# Patient Record
Sex: FEMALE | Race: WHITE | NOT HISPANIC OR LATINO
[De-identification: names, ages, dates, MRNs, and addresses within clinical notes are randomized per-mention and may not be internally consistent; named-entity substitution may affect disease eponyms.]

---

## 2017-09-19 ENCOUNTER — NON-APPOINTMENT (OUTPATIENT)
Age: 72
End: 2017-09-19

## 2017-09-19 ENCOUNTER — APPOINTMENT (OUTPATIENT)
Dept: CARDIOLOGY | Facility: CLINIC | Age: 72
End: 2017-09-19
Payer: MEDICARE

## 2017-09-19 VITALS
BODY MASS INDEX: 27.42 KG/M2 | OXYGEN SATURATION: 96 % | SYSTOLIC BLOOD PRESSURE: 126 MMHG | HEART RATE: 78 BPM | DIASTOLIC BLOOD PRESSURE: 62 MMHG | HEIGHT: 62 IN | WEIGHT: 149 LBS

## 2017-09-19 PROCEDURE — 99214 OFFICE O/P EST MOD 30 MIN: CPT | Mod: 25

## 2017-09-19 PROCEDURE — 93000 ELECTROCARDIOGRAM COMPLETE: CPT

## 2017-09-19 PROCEDURE — 36415 COLL VENOUS BLD VENIPUNCTURE: CPT

## 2017-09-20 LAB
25(OH)D3 SERPL-MCNC: 59.1 NG/ML
ALBUMIN SERPL ELPH-MCNC: 4.1 G/DL
ALP BLD-CCNC: 65 U/L
ALT SERPL-CCNC: 14 U/L
ANION GAP SERPL CALC-SCNC: 12 MMOL/L
APPEARANCE: CLEAR
AST SERPL-CCNC: 20 U/L
BACTERIA: NEGATIVE
BASOPHILS # BLD AUTO: 0.07 K/UL
BASOPHILS NFR BLD AUTO: 0.8 %
BILIRUB SERPL-MCNC: 0.3 MG/DL
BILIRUBIN URINE: NEGATIVE
BLOOD URINE: NEGATIVE
BUN SERPL-MCNC: 21 MG/DL
CALCIUM SERPL-MCNC: 9.9 MG/DL
CHLORIDE SERPL-SCNC: 102 MMOL/L
CHOLEST SERPL-MCNC: 164 MG/DL
CHOLEST/HDLC SERPL: 1.6 RATIO
CK SERPL-CCNC: 93 U/L
CO2 SERPL-SCNC: 28 MMOL/L
COLOR: YELLOW
CREAT SERPL-MCNC: 1.08 MG/DL
EOSINOPHIL # BLD AUTO: 0.67 K/UL
EOSINOPHIL NFR BLD AUTO: 7.5 %
GLUCOSE QUALITATIVE U: NORMAL MG/DL
GLUCOSE SERPL-MCNC: 95 MG/DL
HBA1C MFR BLD HPLC: 5.4 %
HCT VFR BLD CALC: 35.1 %
HDLC SERPL-MCNC: 102 MG/DL
HGB BLD-MCNC: 11 G/DL
HYALINE CASTS: 1 /LPF
IMM GRANULOCYTES NFR BLD AUTO: 0.2 %
KETONES URINE: NEGATIVE
LDLC SERPL CALC-MCNC: 47 MG/DL
LEUKOCYTE ESTERASE URINE: NEGATIVE
LYMPHOCYTES # BLD AUTO: 1.23 K/UL
LYMPHOCYTES NFR BLD AUTO: 13.7 %
MAGNESIUM SERPL-MCNC: 2 MG/DL
MAN DIFF?: NORMAL
MCHC RBC-ENTMCNC: 29.6 PG
MCHC RBC-ENTMCNC: 31.3 GM/DL
MCV RBC AUTO: 94.6 FL
MICROSCOPIC-UA: NORMAL
MONOCYTES # BLD AUTO: 0.87 K/UL
MONOCYTES NFR BLD AUTO: 9.7 %
NEUTROPHILS # BLD AUTO: 6.1 K/UL
NEUTROPHILS NFR BLD AUTO: 68.1 %
NITRITE URINE: NEGATIVE
PH URINE: 6
PLATELET # BLD AUTO: 272 K/UL
POTASSIUM SERPL-SCNC: 5.5 MMOL/L
PROT SERPL-MCNC: 6.4 G/DL
PROTEIN URINE: NEGATIVE MG/DL
RBC # BLD: 3.71 M/UL
RBC # FLD: 14.9 %
RED BLOOD CELLS URINE: 1 /HPF
SODIUM SERPL-SCNC: 142 MMOL/L
SPECIFIC GRAVITY URINE: 1.01
SQUAMOUS EPITHELIAL CELLS: 1 /HPF
TRIGL SERPL-MCNC: 77 MG/DL
TSH SERPL-ACNC: 2.4 UIU/ML
UROBILINOGEN URINE: NORMAL MG/DL
WBC # FLD AUTO: 8.96 K/UL
WHITE BLOOD CELLS URINE: 2 /HPF

## 2017-09-21 ENCOUNTER — OUTPATIENT (OUTPATIENT)
Dept: OUTPATIENT SERVICES | Facility: HOSPITAL | Age: 72
LOS: 1 days | Discharge: HOME | End: 2017-09-21
Payer: MEDICARE

## 2017-09-21 ENCOUNTER — RESULT REVIEW (OUTPATIENT)
Age: 72
End: 2017-09-21

## 2017-09-21 ENCOUNTER — TRANSCRIPTION ENCOUNTER (OUTPATIENT)
Age: 72
End: 2017-09-21

## 2017-09-21 DIAGNOSIS — R10.13 EPIGASTRIC PAIN: ICD-10-CM

## 2017-09-21 DIAGNOSIS — R11.11 VOMITING WITHOUT NAUSEA: ICD-10-CM

## 2017-09-21 DIAGNOSIS — Z98.89 OTHER SPECIFIED POSTPROCEDURAL STATES: Chronic | ICD-10-CM

## 2017-09-21 PROCEDURE — 43239 EGD BIOPSY SINGLE/MULTIPLE: CPT

## 2017-09-21 PROCEDURE — 88313 SPECIAL STAINS GROUP 2: CPT | Mod: 26

## 2017-09-21 PROCEDURE — 88312 SPECIAL STAINS GROUP 1: CPT | Mod: 26

## 2017-09-21 PROCEDURE — 88305 TISSUE EXAM BY PATHOLOGIST: CPT

## 2017-09-21 PROCEDURE — 88305 TISSUE EXAM BY PATHOLOGIST: CPT | Mod: 26

## 2017-09-21 PROCEDURE — 88313 SPECIAL STAINS GROUP 2: CPT

## 2017-09-21 PROCEDURE — 88312 SPECIAL STAINS GROUP 1: CPT

## 2017-09-22 ENCOUNTER — APPOINTMENT (OUTPATIENT)
Dept: CARDIOLOGY | Facility: CLINIC | Age: 72
End: 2017-09-22
Payer: MEDICARE

## 2017-09-22 ENCOUNTER — APPOINTMENT (OUTPATIENT)
Dept: CARDIOLOGY | Facility: CLINIC | Age: 72
End: 2017-09-22

## 2017-09-22 LAB — SURGICAL PATHOLOGY FINAL REPORT - CH: SIGNIFICANT CHANGE UP

## 2017-09-22 PROCEDURE — 93880 EXTRACRANIAL BILAT STUDY: CPT

## 2017-09-23 ENCOUNTER — LABORATORY RESULT (OUTPATIENT)
Age: 72
End: 2017-09-23

## 2017-09-24 ENCOUNTER — RESULT REVIEW (OUTPATIENT)
Age: 72
End: 2017-09-24

## 2017-09-24 LAB — BACTERIA UR CULT: NORMAL

## 2017-09-25 DIAGNOSIS — Z79.82 LONG TERM (CURRENT) USE OF ASPIRIN: ICD-10-CM

## 2017-09-25 DIAGNOSIS — I10 ESSENTIAL (PRIMARY) HYPERTENSION: ICD-10-CM

## 2017-09-25 DIAGNOSIS — Z87.891 PERSONAL HISTORY OF NICOTINE DEPENDENCE: ICD-10-CM

## 2017-09-25 DIAGNOSIS — Z88.3 ALLERGY STATUS TO OTHER ANTI-INFECTIVE AGENTS: ICD-10-CM

## 2017-09-25 DIAGNOSIS — K21.9 GASTRO-ESOPHAGEAL REFLUX DISEASE WITHOUT ESOPHAGITIS: ICD-10-CM

## 2017-09-25 DIAGNOSIS — J44.9 CHRONIC OBSTRUCTIVE PULMONARY DISEASE, UNSPECIFIED: ICD-10-CM

## 2017-09-25 DIAGNOSIS — Z86.73 PERSONAL HISTORY OF TRANSIENT ISCHEMIC ATTACK (TIA), AND CEREBRAL INFARCTION WITHOUT RESIDUAL DEFICITS: ICD-10-CM

## 2017-09-25 DIAGNOSIS — Z88.8 ALLERGY STATUS TO OTHER DRUGS, MEDICAMENTS AND BIOLOGICAL SUBSTANCES: ICD-10-CM

## 2017-09-25 DIAGNOSIS — M06.9 RHEUMATOID ARTHRITIS, UNSPECIFIED: ICD-10-CM

## 2017-09-25 DIAGNOSIS — Z91.041 RADIOGRAPHIC DYE ALLERGY STATUS: ICD-10-CM

## 2017-09-25 DIAGNOSIS — K25.9 GASTRIC ULCER, UNSPECIFIED AS ACUTE OR CHRONIC, WITHOUT HEMORRHAGE OR PERFORATION: ICD-10-CM

## 2017-09-25 DIAGNOSIS — Z88.0 ALLERGY STATUS TO PENICILLIN: ICD-10-CM

## 2017-09-25 DIAGNOSIS — E78.5 HYPERLIPIDEMIA, UNSPECIFIED: ICD-10-CM

## 2017-09-25 DIAGNOSIS — K29.50 UNSPECIFIED CHRONIC GASTRITIS WITHOUT BLEEDING: ICD-10-CM

## 2017-09-25 DIAGNOSIS — K44.9 DIAPHRAGMATIC HERNIA WITHOUT OBSTRUCTION OR GANGRENE: ICD-10-CM

## 2017-10-13 ENCOUNTER — EMERGENCY (EMERGENCY)
Facility: HOSPITAL | Age: 72
LOS: 1 days | Discharge: HOME | End: 2017-10-13
Attending: EMERGENCY MEDICINE | Admitting: EMERGENCY MEDICINE
Payer: MEDICARE

## 2017-10-13 VITALS
SYSTOLIC BLOOD PRESSURE: 128 MMHG | TEMPERATURE: 98 F | OXYGEN SATURATION: 97 % | HEART RATE: 94 BPM | WEIGHT: 145.06 LBS | HEIGHT: 62 IN | DIASTOLIC BLOOD PRESSURE: 62 MMHG | RESPIRATION RATE: 16 BRPM

## 2017-10-13 VITALS — RESPIRATION RATE: 16 BRPM | HEART RATE: 97 BPM | OXYGEN SATURATION: 97 %

## 2017-10-13 DIAGNOSIS — Z98.89 OTHER SPECIFIED POSTPROCEDURAL STATES: Chronic | ICD-10-CM

## 2017-10-13 PROCEDURE — 99284 EMERGENCY DEPT VISIT MOD MDM: CPT

## 2017-10-13 PROCEDURE — 72040 X-RAY EXAM NECK SPINE 2-3 VW: CPT | Mod: 26

## 2017-10-13 PROCEDURE — 72040 X-RAY EXAM NECK SPINE 2-3 VW: CPT

## 2017-10-13 PROCEDURE — 99283 EMERGENCY DEPT VISIT LOW MDM: CPT | Mod: 25

## 2017-10-13 RX ORDER — METHOCARBAMOL 500 MG/1
1 TABLET, FILM COATED ORAL
Qty: 15 | Refills: 0 | OUTPATIENT
Start: 2017-10-13 | End: 2017-10-18

## 2017-10-13 NOTE — ED ADULT NURSE NOTE - OBJECTIVE STATEMENT
71 yr. old female c/o posterior neck pain x 1 wk.  Pain came on spontaneously, no numbness, tingling.

## 2017-10-13 NOTE — ED PROVIDER NOTE - PMH
Asthma    Esophageal reflux    HLD (hyperlipidemia)    Lung cancer  Right lung.  Rheumatoid arthritis    TIA (transient ischemic attack)

## 2017-10-13 NOTE — ED PROVIDER NOTE - OBJECTIVE STATEMENT
70 y/o F pt w/ PMHx of asthma, esophageal reflux, HLD, RA, TIA, Lung CA (right lung) and PSHx Lobectomy of right lung (1996) presents to the ED c/o right sided neck pain, worsening x 1 week. Pt states the pain is worse when she turns her head to the right... states she tried Bengay to relieve the pain but it did not help. Pt took Aleve at 07:00 this morning for pain. Pt denies trauma/injury, numbness, tingling or any other complaints at this time.

## 2017-10-13 NOTE — ED ADULT NURSE NOTE - NS PRO PASSIVE SMOKE EXP
"He is having cystoscopy because he was cancelled before because he did not have a pulmonary clearance" - daughter
No

## 2017-10-13 NOTE — ED PROVIDER NOTE - MUSCULOSKELETAL NECK EXAM
tenderness to lateral aspect right side of neck, increasing pain with lateral movement to the right/PAIN ON MOVEMENT

## 2017-10-23 ENCOUNTER — INPATIENT (INPATIENT)
Facility: HOSPITAL | Age: 72
LOS: 1 days | Discharge: HOME | DRG: 191 | End: 2017-10-25
Attending: INTERNAL MEDICINE | Admitting: INTERNAL MEDICINE
Payer: MEDICARE

## 2017-10-23 VITALS
SYSTOLIC BLOOD PRESSURE: 121 MMHG | DIASTOLIC BLOOD PRESSURE: 60 MMHG | HEIGHT: 62 IN | RESPIRATION RATE: 20 BRPM | TEMPERATURE: 98 F | HEART RATE: 104 BPM | WEIGHT: 139.99 LBS

## 2017-10-23 DIAGNOSIS — M06.9 RHEUMATOID ARTHRITIS, UNSPECIFIED: ICD-10-CM

## 2017-10-23 DIAGNOSIS — J44.1 CHRONIC OBSTRUCTIVE PULMONARY DISEASE WITH (ACUTE) EXACERBATION: ICD-10-CM

## 2017-10-23 DIAGNOSIS — J40 BRONCHITIS, NOT SPECIFIED AS ACUTE OR CHRONIC: ICD-10-CM

## 2017-10-23 DIAGNOSIS — N28.9 DISORDER OF KIDNEY AND URETER, UNSPECIFIED: ICD-10-CM

## 2017-10-23 DIAGNOSIS — Z98.89 OTHER SPECIFIED POSTPROCEDURAL STATES: Chronic | ICD-10-CM

## 2017-10-23 DIAGNOSIS — Z29.9 ENCOUNTER FOR PROPHYLACTIC MEASURES, UNSPECIFIED: ICD-10-CM

## 2017-10-23 DIAGNOSIS — K21.9 GASTRO-ESOPHAGEAL REFLUX DISEASE WITHOUT ESOPHAGITIS: ICD-10-CM

## 2017-10-23 DIAGNOSIS — I10 ESSENTIAL (PRIMARY) HYPERTENSION: ICD-10-CM

## 2017-10-23 DIAGNOSIS — E78.5 HYPERLIPIDEMIA, UNSPECIFIED: ICD-10-CM

## 2017-10-23 LAB
ALBUMIN SERPL ELPH-MCNC: 3.3 G/DL — SIGNIFICANT CHANGE UP (ref 3.3–5)
ALP SERPL-CCNC: 85 U/L — SIGNIFICANT CHANGE UP (ref 30–120)
ALT FLD-CCNC: 14 U/L DA — SIGNIFICANT CHANGE UP (ref 10–60)
ANION GAP SERPL CALC-SCNC: 13 MMOL/L — SIGNIFICANT CHANGE UP (ref 5–17)
APPEARANCE UR: CLEAR — SIGNIFICANT CHANGE UP
APTT BLD: 28.6 SEC — SIGNIFICANT CHANGE UP (ref 27.5–37.4)
AST SERPL-CCNC: 14 U/L — SIGNIFICANT CHANGE UP (ref 10–40)
BACTERIA # UR AUTO: ABNORMAL
BASOPHILS # BLD AUTO: 0.1 K/UL — SIGNIFICANT CHANGE UP (ref 0–0.2)
BASOPHILS NFR BLD AUTO: 1.2 % — SIGNIFICANT CHANGE UP (ref 0–2)
BILIRUB SERPL-MCNC: 0.7 MG/DL — SIGNIFICANT CHANGE UP (ref 0.2–1.2)
BILIRUB UR-MCNC: NEGATIVE — SIGNIFICANT CHANGE UP
BUN SERPL-MCNC: 31 MG/DL — HIGH (ref 7–23)
CALCIUM SERPL-MCNC: 9.6 MG/DL — SIGNIFICANT CHANGE UP (ref 8.4–10.5)
CHLORIDE SERPL-SCNC: 103 MMOL/L — SIGNIFICANT CHANGE UP (ref 96–108)
CK MB CFR SERPL CALC: 0.6 NG/ML — SIGNIFICANT CHANGE UP (ref 0–3.6)
CO2 SERPL-SCNC: 26 MMOL/L — SIGNIFICANT CHANGE UP (ref 22–31)
COLOR SPEC: YELLOW — SIGNIFICANT CHANGE UP
CREAT SERPL-MCNC: 1.64 MG/DL — HIGH (ref 0.5–1.3)
DIFF PNL FLD: NEGATIVE — SIGNIFICANT CHANGE UP
EOSINOPHIL # BLD AUTO: 0.9 K/UL — HIGH (ref 0–0.5)
EOSINOPHIL NFR BLD AUTO: 7.3 % — HIGH (ref 0–6)
EPI CELLS # UR: SIGNIFICANT CHANGE UP
FLUAV SPEC QL CULT: NEGATIVE — SIGNIFICANT CHANGE UP
FLUBV AG SPEC QL IA: NEGATIVE — SIGNIFICANT CHANGE UP
GLUCOSE SERPL-MCNC: 109 MG/DL — HIGH (ref 70–99)
GLUCOSE UR QL: NEGATIVE MG/DL — SIGNIFICANT CHANGE UP
GRAN CASTS # UR COMP ASSIST: ABNORMAL /LPF
HCT VFR BLD CALC: 38.3 % — SIGNIFICANT CHANGE UP (ref 34.5–45)
HGB BLD-MCNC: 12.1 G/DL — SIGNIFICANT CHANGE UP (ref 11.5–15.5)
INR BLD: 1.19 RATIO — HIGH (ref 0.88–1.16)
KETONES UR-MCNC: NEGATIVE — SIGNIFICANT CHANGE UP
LACTATE SERPL-SCNC: 0.6 MMOL/L — LOW (ref 0.7–2)
LEUKOCYTE ESTERASE UR-ACNC: ABNORMAL
LYMPHOCYTES # BLD AUTO: 1.8 K/UL — SIGNIFICANT CHANGE UP (ref 1–3.3)
LYMPHOCYTES # BLD AUTO: 13.9 % — SIGNIFICANT CHANGE UP (ref 13–44)
MCHC RBC-ENTMCNC: 29.9 PG — SIGNIFICANT CHANGE UP (ref 27–34)
MCHC RBC-ENTMCNC: 31.5 GM/DL — LOW (ref 32–36)
MCV RBC AUTO: 95 FL — SIGNIFICANT CHANGE UP (ref 80–100)
MONOCYTES # BLD AUTO: 0.9 K/UL — SIGNIFICANT CHANGE UP (ref 0–0.9)
MONOCYTES NFR BLD AUTO: 7.2 % — SIGNIFICANT CHANGE UP (ref 2–14)
NEUTROPHILS # BLD AUTO: 9.1 K/UL — HIGH (ref 1.8–7.4)
NEUTROPHILS NFR BLD AUTO: 70.4 % — SIGNIFICANT CHANGE UP (ref 43–77)
NITRITE UR-MCNC: NEGATIVE — SIGNIFICANT CHANGE UP
NT-PROBNP SERPL-SCNC: 125 PG/ML — SIGNIFICANT CHANGE UP (ref 0–125)
PH UR: 5 — SIGNIFICANT CHANGE UP (ref 5–8)
PLATELET # BLD AUTO: 335 K/UL — SIGNIFICANT CHANGE UP (ref 150–400)
POTASSIUM SERPL-MCNC: 4.1 MMOL/L — SIGNIFICANT CHANGE UP (ref 3.5–5.3)
POTASSIUM SERPL-SCNC: 4.1 MMOL/L — SIGNIFICANT CHANGE UP (ref 3.5–5.3)
PROT SERPL-MCNC: 7.4 G/DL — SIGNIFICANT CHANGE UP (ref 6–8.3)
PROT UR-MCNC: 15 MG/DL
PROTHROM AB SERPL-ACNC: 13 SEC — HIGH (ref 9.8–12.7)
RBC # BLD: 4.03 M/UL — SIGNIFICANT CHANGE UP (ref 3.8–5.2)
RBC # FLD: 13.4 % — SIGNIFICANT CHANGE UP (ref 10.3–14.5)
SODIUM SERPL-SCNC: 142 MMOL/L — SIGNIFICANT CHANGE UP (ref 135–145)
SP GR SPEC: 1.02 — SIGNIFICANT CHANGE UP (ref 1.01–1.02)
TROPONIN I SERPL-MCNC: 0 NG/ML — LOW (ref 0.02–0.06)
UROBILINOGEN FLD QL: NEGATIVE MG/DL — SIGNIFICANT CHANGE UP
WBC # BLD: 12.9 K/UL — HIGH (ref 3.8–10.5)
WBC # FLD AUTO: 12.9 K/UL — HIGH (ref 3.8–10.5)
WBC UR QL: SIGNIFICANT CHANGE UP

## 2017-10-23 PROCEDURE — 71020: CPT | Mod: 26

## 2017-10-23 PROCEDURE — 74020: CPT | Mod: 26

## 2017-10-23 PROCEDURE — 99285 EMERGENCY DEPT VISIT HI MDM: CPT

## 2017-10-23 RX ORDER — CHOLECALCIFEROL (VITAMIN D3) 125 MCG
2000 CAPSULE ORAL DAILY
Qty: 0 | Refills: 0 | Status: DISCONTINUED | OUTPATIENT
Start: 2017-10-23 | End: 2017-10-25

## 2017-10-23 RX ORDER — ASCORBIC ACID 60 MG
500 TABLET,CHEWABLE ORAL DAILY
Qty: 0 | Refills: 0 | Status: DISCONTINUED | OUTPATIENT
Start: 2017-10-23 | End: 2017-10-25

## 2017-10-23 RX ORDER — HYDROXYCHLOROQUINE SULFATE 200 MG
200 TABLET ORAL DAILY
Qty: 0 | Refills: 0 | Status: DISCONTINUED | OUTPATIENT
Start: 2017-10-23 | End: 2017-10-25

## 2017-10-23 RX ORDER — SODIUM CHLORIDE 9 MG/ML
1000 INJECTION INTRAMUSCULAR; INTRAVENOUS; SUBCUTANEOUS
Qty: 0 | Refills: 0 | Status: COMPLETED | OUTPATIENT
Start: 2017-10-23 | End: 2017-10-23

## 2017-10-23 RX ORDER — ATORVASTATIN CALCIUM 80 MG/1
20 TABLET, FILM COATED ORAL AT BEDTIME
Qty: 0 | Refills: 0 | Status: DISCONTINUED | OUTPATIENT
Start: 2017-10-23 | End: 2017-10-25

## 2017-10-23 RX ORDER — IPRATROPIUM/ALBUTEROL SULFATE 18-103MCG
3 AEROSOL WITH ADAPTER (GRAM) INHALATION EVERY 6 HOURS
Qty: 0 | Refills: 0 | Status: DISCONTINUED | OUTPATIENT
Start: 2017-10-23 | End: 2017-10-25

## 2017-10-23 RX ORDER — MONTELUKAST 4 MG/1
10 TABLET, CHEWABLE ORAL AT BEDTIME
Qty: 0 | Refills: 0 | Status: DISCONTINUED | OUTPATIENT
Start: 2017-10-23 | End: 2017-10-25

## 2017-10-23 RX ORDER — PANTOPRAZOLE SODIUM 20 MG/1
40 TABLET, DELAYED RELEASE ORAL
Qty: 0 | Refills: 0 | Status: DISCONTINUED | OUTPATIENT
Start: 2017-10-23 | End: 2017-10-25

## 2017-10-23 RX ORDER — BUDESONIDE AND FORMOTEROL FUMARATE DIHYDRATE 160; 4.5 UG/1; UG/1
2 AEROSOL RESPIRATORY (INHALATION)
Qty: 0 | Refills: 0 | Status: DISCONTINUED | OUTPATIENT
Start: 2017-10-23 | End: 2017-10-25

## 2017-10-23 RX ORDER — ENOXAPARIN SODIUM 100 MG/ML
30 INJECTION SUBCUTANEOUS EVERY 24 HOURS
Qty: 0 | Refills: 0 | Status: DISCONTINUED | OUTPATIENT
Start: 2017-10-23 | End: 2017-10-25

## 2017-10-23 RX ORDER — IPRATROPIUM/ALBUTEROL SULFATE 18-103MCG
3 AEROSOL WITH ADAPTER (GRAM) INHALATION ONCE
Qty: 0 | Refills: 0 | Status: COMPLETED | OUTPATIENT
Start: 2017-10-23 | End: 2017-10-23

## 2017-10-23 RX ORDER — AMLODIPINE BESYLATE 2.5 MG/1
5 TABLET ORAL DAILY
Qty: 0 | Refills: 0 | Status: DISCONTINUED | OUTPATIENT
Start: 2017-10-23 | End: 2017-10-25

## 2017-10-23 RX ORDER — ASPIRIN/CALCIUM CARB/MAGNESIUM 324 MG
81 TABLET ORAL DAILY
Qty: 0 | Refills: 0 | Status: DISCONTINUED | OUTPATIENT
Start: 2017-10-23 | End: 2017-10-25

## 2017-10-23 RX ORDER — IPRATROPIUM/ALBUTEROL SULFATE 18-103MCG
3 AEROSOL WITH ADAPTER (GRAM) INHALATION EVERY 4 HOURS
Qty: 0 | Refills: 0 | Status: DISCONTINUED | OUTPATIENT
Start: 2017-10-23 | End: 2017-10-25

## 2017-10-23 RX ORDER — FOLIC ACID 0.8 MG
1 TABLET ORAL DAILY
Qty: 0 | Refills: 0 | Status: DISCONTINUED | OUTPATIENT
Start: 2017-10-23 | End: 2017-10-25

## 2017-10-23 RX ORDER — ROPINIROLE 8 MG/1
2 TABLET, FILM COATED, EXTENDED RELEASE ORAL AT BEDTIME
Qty: 0 | Refills: 0 | Status: DISCONTINUED | OUTPATIENT
Start: 2017-10-23 | End: 2017-10-25

## 2017-10-23 RX ORDER — PANTOPRAZOLE SODIUM 20 MG/1
40 TABLET, DELAYED RELEASE ORAL ONCE
Qty: 0 | Refills: 0 | Status: COMPLETED | OUTPATIENT
Start: 2017-10-23 | End: 2017-10-23

## 2017-10-23 RX ORDER — ONDANSETRON 8 MG/1
4 TABLET, FILM COATED ORAL ONCE
Qty: 0 | Refills: 0 | Status: COMPLETED | OUTPATIENT
Start: 2017-10-23 | End: 2017-10-23

## 2017-10-23 RX ORDER — ONDANSETRON 8 MG/1
4 TABLET, FILM COATED ORAL EVERY 4 HOURS
Qty: 0 | Refills: 0 | Status: DISCONTINUED | OUTPATIENT
Start: 2017-10-23 | End: 2017-10-25

## 2017-10-23 RX ORDER — SODIUM CHLORIDE 9 MG/ML
1000 INJECTION INTRAMUSCULAR; INTRAVENOUS; SUBCUTANEOUS
Qty: 0 | Refills: 0 | Status: DISCONTINUED | OUTPATIENT
Start: 2017-10-23 | End: 2017-10-24

## 2017-10-23 RX ORDER — AMLODIPINE BESYLATE 2.5 MG/1
5 TABLET ORAL DAILY
Qty: 0 | Refills: 0 | Status: DISCONTINUED | OUTPATIENT
Start: 2017-10-23 | End: 2017-10-23

## 2017-10-23 RX ADMIN — Medication 3 MILLILITER(S): at 14:39

## 2017-10-23 RX ADMIN — ONDANSETRON 4 MILLIGRAM(S): 8 TABLET, FILM COATED ORAL at 14:42

## 2017-10-23 RX ADMIN — Medication 81 MILLIGRAM(S): at 18:43

## 2017-10-23 RX ADMIN — ENOXAPARIN SODIUM 30 MILLIGRAM(S): 100 INJECTION SUBCUTANEOUS at 22:32

## 2017-10-23 RX ADMIN — BUDESONIDE AND FORMOTEROL FUMARATE DIHYDRATE 2 PUFF(S): 160; 4.5 AEROSOL RESPIRATORY (INHALATION) at 19:53

## 2017-10-23 RX ADMIN — Medication 3 MILLILITER(S): at 20:07

## 2017-10-23 RX ADMIN — SODIUM CHLORIDE 1000 MILLILITER(S): 9 INJECTION INTRAMUSCULAR; INTRAVENOUS; SUBCUTANEOUS at 19:49

## 2017-10-23 RX ADMIN — SODIUM CHLORIDE 1000 MILLILITER(S): 9 INJECTION INTRAMUSCULAR; INTRAVENOUS; SUBCUTANEOUS at 16:00

## 2017-10-23 RX ADMIN — PANTOPRAZOLE SODIUM 40 MILLIGRAM(S): 20 TABLET, DELAYED RELEASE ORAL at 14:42

## 2017-10-23 RX ADMIN — SODIUM CHLORIDE 1000 MILLILITER(S): 9 INJECTION INTRAMUSCULAR; INTRAVENOUS; SUBCUTANEOUS at 14:43

## 2017-10-23 RX ADMIN — Medication 200 MILLIGRAM(S): at 18:43

## 2017-10-23 RX ADMIN — Medication 40 MILLIGRAM(S): at 21:11

## 2017-10-23 NOTE — H&P ADULT - HISTORY OF PRESENT ILLNESS
71 years old female with history of asthma/COPD, lung CA,  status post right sided lobectomy, hypertension, dyslipidemia, rheumatoid arthritis and TIA,  who has been treated for possible bronchitis and COPD exacerbation as outpatient for last 1-2 weeks.  Patient continued to complain of increasing shortness of breath and cough despite the treatment.  She was seen by Dr. Galan in the office today and was recommended admission to the hospital.    Patient denies any chest pain or chest pressure.    She has a cough which is not productive of any sputum.   Patient gets short of breath on minimal exertion.    She denies any nausea or vomiting.    She denies any fevers but feels feverish. .   She denies any syncope.    Denies any orthopnea or PND.

## 2017-10-23 NOTE — H&P ADULT - PROBLEM SELECTOR PLAN 1
Patient with acute exacerbation of her COPD.  Failed out patient treatment.   Will admit to hospital and start on IV steroids and DuoNeb  Continue Symbicort and Spiriva.  Pulmonary follow up.  Further management as per patient's clinical course.

## 2017-10-23 NOTE — ED PROVIDER NOTE - MEDICAL DECISION MAKING DETAILS
70 yo female with COPD bronchitis sent from pulm office with worsening cough and sob after Zpack. PE reveals raspy cough with rhonchi and wheezing. Plan CXR, Duonebs, IV steroids antibiotics, likely admit.

## 2017-10-23 NOTE — H&P ADULT - PROBLEM SELECTOR PLAN 2
Patient with acute bronchitis, possibly viral.  Will defer antibiotics at this time and follow serial CBC.  Follow RVP panel.   Further management as per patient's clinical course.

## 2017-10-23 NOTE — H&P ADULT - NSHPSOCIALHISTORY_GEN_ALL_CORE
Social History:    Marital Status:   Occupation: Retired  Lives with: Spouse    Substance Use :  Tobacco Usage:  (X) never smoked   (   ) former smoker   (   ) current smoker  (     ) pack year  (        ) last tobacco use date  Alcohol Usage: denies    (X) Advanced Directives: (X) declined   [  ] health care proxy

## 2017-10-23 NOTE — ED ADULT TRIAGE NOTE - CHIEF COMPLAINT QUOTE
" Dr Galan sent me, I have a cough shortness of breath, wheezing, my stomach hurts, diarrhea, vomiting 3 days 1go "

## 2017-10-23 NOTE — ED PROVIDER NOTE - OBJECTIVE STATEMENT
72 y/o F w/ PMHx lung CA, HTN presents to the ED c/o vomiting, abd pain, productive cough, wheezing. Pt states she was dx with URI last week, Rxd Z-pack, just finished antibiotics today... saw Dr. Galan, symptoms are not improving with antibiotics sent to ED for further evaluation. Pt states she had an upper endoscopy completed 2-3 weeks ago because of her stomach pains and was told she has an hiatal hernia and small ulcers, is currently on Omeprazole. Pt denies fever, CP or any other complaints at this time.    Pulmonologist: Dr. Galan

## 2017-10-23 NOTE — ED ADULT NURSE NOTE - OBJECTIVE STATEMENT
Amb to ED from PMD office. Pt has had moist productive cough for past 4 days. Started Z pack & Mucinex with some improvement but continues to feel weak & achy with cough .Color fair. Skin warm & dry to touch. Lungs- bilat scattered rhonchi. Abd soft nontender

## 2017-10-23 NOTE — H&P ADULT - PROBLEM SELECTOR PLAN 3
Patient with possible ESPERANZA from dehydration.   Will place on IVF and monitor BMP.  Further management as per patient's clinical course.

## 2017-10-23 NOTE — H&P ADULT - NSHPLABSRESULTS_GEN_ALL_CORE
12.1   12.9  )-----------( 335      ( 23 Oct 2017 14:16 )             38.3     23 Oct 2017 14:16    142    |  103    |  31     ----------------------------<  109    4.1     |  26     |  1.64     Ca    9.6        23 Oct 2017 14:16    TPro  7.4    /  Alb  3.3    /  TBili  0.7    /  DBili  x      /  AST  14     /  ALT  14     /  AlkPhos  85     23 Oct 2017 14:16    CAPILLARY BLOOD GLUCOSE        PT/INR - ( 23 Oct 2017 14:16 )   PT: 13.0 sec;   INR: 1.19 ratio         PTT - ( 23 Oct 2017 14:16 )  PTT:28.6 sec            Troponin I, Serum: .000 ng/mL (10-23 @ 14:16) 12.1   12.9  )-----------( 335      ( 23 Oct 2017 14:16 )             38.3     23 Oct 2017 14:16    142    |  103    |  31     ----------------------------<  109    4.1     |  26     |  1.64     Ca    9.6        23 Oct 2017 14:16    TPro  7.4    /  Alb  3.3    /  TBili  0.7    /  DBili  x      /  AST  14     /  ALT  14     /  AlkPhos  85     23 Oct 2017 14:16      PT/INR - ( 23 Oct 2017 14:16 )   PT: 13.0 sec;   INR: 1.19 ratio         PTT - ( 23 Oct 2017 14:16 )  PTT:28.6 sec      Troponin I, Serum: .000 ng/mL (10-23 @ 14:16)

## 2017-10-23 NOTE — H&P ADULT - NSHPREVIEWOFSYSTEMS_GEN_ALL_CORE
REVIEW OF SYSTEMS:  CONSTITUTIONAL: No fever. + fatigue  EYES: No eye pain, visual disturbances, or discharge  ENMT:  No difficulty hearing, tinnitus, vertigo; No sinus or throat pain  NECK: No pain or stiffness  BREASTS: No pain, masses, or nipple discharge  RESPIRATORY: + cough, + wheezing, + shortness of breath  CARDIOVASCULAR: No chest pain, palpitations, dizziness, or leg swelling  GASTROINTESTINAL: No abdominal or epigastric pain. No nausea, vomiting, or hematemesis; No diarrhea or constipation. No melena or hematochezia.  GENITOURINARY: No dysuria, frequency, hematuria, or incontinence  NEUROLOGICAL: No headaches, memory loss, loss of strength, numbness, or tremors  SKIN: No itching, burning, rashes, or lesions   LYMPH NODES: No enlarged glands  ENDOCRINE: No heat or cold intolerance; No hair loss; No polydipsia or polyuria  MUSCULOSKELETAL: No joint pain or swelling; No muscle, back, or extremity pain  PSYCHIATRIC: No depression, anxiety, mood swings, or difficulty sleeping  HEME/LYMPH: No easy bruising, or bleeding gums  ALLERGY AND IMMUNOLOGIC: No hives or eczema

## 2017-10-23 NOTE — H&P ADULT - PROBLEM SELECTOR PLAN 4
Patient had recent EGD done.  Still c/o abdominal discomfort.   Will continue on PPI and get GI to see patient.

## 2017-10-23 NOTE — H&P ADULT - NSHPPHYSICALEXAM_GEN_ALL_CORE
Vital Signs Last 24 Hrs  T(C): 36.8 (23 Oct 2017 22:15), Max: 36.8 (23 Oct 2017 22:15)  T(F): 98.2 (23 Oct 2017 22:15), Max: 98.2 (23 Oct 2017 22:15)  HR: 86 (23 Oct 2017 22:15) (77 - 104)  BP: 121/80 (23 Oct 2017 22:15) (118/62 - 121/80)  BP(mean): --  RR: 18 (23 Oct 2017 22:15) (16 - 20)  SpO2: 94% (23 Oct 2017 22:15) (94% - 100%)    PHYSICAL EXAM:  GENERAL: well-groomed, well-developed  HEAD:  Atraumatic, Normocephalic  EYES: Mild pallor noted.   ENMT: oral mucosa moist.   NECK: Supple.   CHEST/LUNG: Decreased breath sounds both sides with scattered exp wheeze noted.   HEART: S1, S2, SM +  ABDOMEN: Soft, Nontender, Nondistended; Bowel sounds present  EXTREMITIES:  2+ Peripheral Pulses, No clubbing, cyanosis, or edema  MS: No joint swelling or deformity.   LYMPH: No lymphadenopathy noted  SKIN: No rashes or lesions  NERVOUS SYSTEM:  Motor Strength 4/5 B/L upper and lower extremities; DTRs 2+ intact and symmetric  PSYCH:  Alert & Oriented X3, Good concentration.

## 2017-10-23 NOTE — ED PROVIDER NOTE - CONSTITUTIONAL, MLM
normal... Well appearing, well nourished, awake, alert, oriented to person, place, time/situation and in no apparent distress. Afebrile.

## 2017-10-23 NOTE — H&P ADULT - ASSESSMENT
71 years old female with history of asthma/COPD, lung CA,  status post right sided lobectomy, hypertension, dyslipidemia, rheumatoid arthritis and TIA,  who has been treated for possible bronchitis and COPD exacerbation as outpatient for last 1-2 weeks.  Patient continued to complain of increasing shortness of breath and cough despite the treatment.  She was seen by Dr. Galan in the office today and was recommended admission to the hospital.

## 2017-10-24 LAB
ANION GAP SERPL CALC-SCNC: 8 MMOL/L — SIGNIFICANT CHANGE UP (ref 5–17)
BASOPHILS # BLD AUTO: 0 K/UL — SIGNIFICANT CHANGE UP (ref 0–0.2)
BASOPHILS NFR BLD AUTO: 0.2 % — SIGNIFICANT CHANGE UP (ref 0–2)
BUN SERPL-MCNC: 20 MG/DL — SIGNIFICANT CHANGE UP (ref 7–23)
CALCIUM SERPL-MCNC: 8.9 MG/DL — SIGNIFICANT CHANGE UP (ref 8.4–10.5)
CHLORIDE SERPL-SCNC: 107 MMOL/L — SIGNIFICANT CHANGE UP (ref 96–108)
CHOLEST SERPL-MCNC: 149 MG/DL — SIGNIFICANT CHANGE UP (ref 10–199)
CO2 SERPL-SCNC: 25 MMOL/L — SIGNIFICANT CHANGE UP (ref 22–31)
CREAT SERPL-MCNC: 0.98 MG/DL — SIGNIFICANT CHANGE UP (ref 0.5–1.3)
EOSINOPHIL # BLD AUTO: 0 K/UL — SIGNIFICANT CHANGE UP (ref 0–0.5)
EOSINOPHIL NFR BLD AUTO: 0 % — SIGNIFICANT CHANGE UP (ref 0–6)
GLUCOSE SERPL-MCNC: 134 MG/DL — HIGH (ref 70–99)
HBA1C BLD-MCNC: 5.3 % — SIGNIFICANT CHANGE UP (ref 4–5.6)
HCT VFR BLD CALC: 33.2 % — LOW (ref 34.5–45)
HDLC SERPL-MCNC: 70 MG/DL — SIGNIFICANT CHANGE UP (ref 40–125)
HGB BLD-MCNC: 11.4 G/DL — LOW (ref 11.5–15.5)
LIPID PNL WITH DIRECT LDL SERPL: 60 MG/DL — SIGNIFICANT CHANGE UP
LYMPHOCYTES # BLD AUTO: 0.4 K/UL — LOW (ref 1–3.3)
LYMPHOCYTES # BLD AUTO: 5.1 % — LOW (ref 13–44)
MAGNESIUM SERPL-MCNC: 1.9 MG/DL — SIGNIFICANT CHANGE UP (ref 1.6–2.6)
MCHC RBC-ENTMCNC: 32.3 PG — SIGNIFICANT CHANGE UP (ref 27–34)
MCHC RBC-ENTMCNC: 34.2 GM/DL — SIGNIFICANT CHANGE UP (ref 32–36)
MCV RBC AUTO: 94.5 FL — SIGNIFICANT CHANGE UP (ref 80–100)
MONOCYTES # BLD AUTO: 0.1 K/UL — SIGNIFICANT CHANGE UP (ref 0–0.9)
MONOCYTES NFR BLD AUTO: 1.1 % — LOW (ref 2–14)
NEUTROPHILS # BLD AUTO: 7.8 K/UL — HIGH (ref 1.8–7.4)
NEUTROPHILS NFR BLD AUTO: 93.6 % — HIGH (ref 43–77)
PHOSPHATE SERPL-MCNC: 3.3 MG/DL — SIGNIFICANT CHANGE UP (ref 2.5–4.5)
PLATELET # BLD AUTO: 270 K/UL — SIGNIFICANT CHANGE UP (ref 150–400)
POTASSIUM SERPL-MCNC: 5 MMOL/L — SIGNIFICANT CHANGE UP (ref 3.5–5.3)
POTASSIUM SERPL-SCNC: 5 MMOL/L — SIGNIFICANT CHANGE UP (ref 3.5–5.3)
RBC # BLD: 3.52 M/UL — LOW (ref 3.8–5.2)
RBC # FLD: 12.5 % — SIGNIFICANT CHANGE UP (ref 10.3–14.5)
SODIUM SERPL-SCNC: 140 MMOL/L — SIGNIFICANT CHANGE UP (ref 135–145)
T3 SERPL-MCNC: 83 NG/DL — SIGNIFICANT CHANGE UP (ref 80–200)
T4 AB SER-ACNC: 7.1 UG/DL — SIGNIFICANT CHANGE UP (ref 4.6–12)
TOTAL CHOLESTEROL/HDL RATIO MEASUREMENT: 2.1 RATIO — LOW (ref 3.3–7.1)
TRIGL SERPL-MCNC: 96 MG/DL — SIGNIFICANT CHANGE UP (ref 10–149)
TSH SERPL-MCNC: 0.49 UIU/ML — SIGNIFICANT CHANGE UP (ref 0.27–4.2)
WBC # BLD: 8.3 K/UL — SIGNIFICANT CHANGE UP (ref 3.8–10.5)
WBC # FLD AUTO: 8.3 K/UL — SIGNIFICANT CHANGE UP (ref 3.8–10.5)

## 2017-10-24 RX ADMIN — Medication 40 MILLIGRAM(S): at 05:36

## 2017-10-24 RX ADMIN — Medication 3 MILLILITER(S): at 09:02

## 2017-10-24 RX ADMIN — Medication 200 MILLIGRAM(S): at 11:36

## 2017-10-24 RX ADMIN — Medication 2000 UNIT(S): at 11:36

## 2017-10-24 RX ADMIN — Medication 81 MILLIGRAM(S): at 11:35

## 2017-10-24 RX ADMIN — Medication 1 TABLET(S): at 17:07

## 2017-10-24 RX ADMIN — Medication 3 MILLILITER(S): at 13:29

## 2017-10-24 RX ADMIN — BUDESONIDE AND FORMOTEROL FUMARATE DIHYDRATE 2 PUFF(S): 160; 4.5 AEROSOL RESPIRATORY (INHALATION) at 06:45

## 2017-10-24 RX ADMIN — Medication 40 MILLIGRAM(S): at 13:14

## 2017-10-24 RX ADMIN — PANTOPRAZOLE SODIUM 40 MILLIGRAM(S): 20 TABLET, DELAYED RELEASE ORAL at 06:45

## 2017-10-24 RX ADMIN — Medication 40 MILLIGRAM(S): at 21:33

## 2017-10-24 RX ADMIN — Medication 1 TABLET(S): at 05:36

## 2017-10-24 RX ADMIN — ATORVASTATIN CALCIUM 20 MILLIGRAM(S): 80 TABLET, FILM COATED ORAL at 21:32

## 2017-10-24 RX ADMIN — ENOXAPARIN SODIUM 30 MILLIGRAM(S): 100 INJECTION SUBCUTANEOUS at 21:32

## 2017-10-24 RX ADMIN — MONTELUKAST 10 MILLIGRAM(S): 4 TABLET, CHEWABLE ORAL at 21:32

## 2017-10-24 RX ADMIN — ROPINIROLE 2 MILLIGRAM(S): 8 TABLET, FILM COATED, EXTENDED RELEASE ORAL at 22:24

## 2017-10-24 RX ADMIN — AMLODIPINE BESYLATE 5 MILLIGRAM(S): 2.5 TABLET ORAL at 05:36

## 2017-10-24 RX ADMIN — BUDESONIDE AND FORMOTEROL FUMARATE DIHYDRATE 2 PUFF(S): 160; 4.5 AEROSOL RESPIRATORY (INHALATION) at 19:07

## 2017-10-24 RX ADMIN — Medication 500 MILLIGRAM(S): at 11:36

## 2017-10-24 RX ADMIN — Medication 3 MILLILITER(S): at 19:52

## 2017-10-24 RX ADMIN — Medication 1 MILLIGRAM(S): at 11:36

## 2017-10-24 NOTE — CONSULT NOTE ADULT - SUBJECTIVE AND OBJECTIVE BOX
Chief Complaint:  Patient is a 71y old  Female who presents with a chief complaint of Shortness of breath, abdominal pain, vomiting and diarrhea. (23 Oct 2017 15:30)      HPI:  71 years old female with history of asthma/COPD, lung CA,  status post right sided lobectomy, hypertension, dyslipidemia, rheumatoid arthritis and TIA,  who has been treated for possible bronchitis and COPD exacerbation as outpatient for last 1-2 weeks.  Patient continued to complain of increasing shortness of breath and cough despite the treatment.  She was seen by Dr. Galan in the office today and was recommended admission to the hospital. She was also complaining of abdominal pain, vomiting and episodes of diarrhea and feeling "very sick"    Patient denies any chest pain or chest pressure.    She has a cough which is not productive of any sputum.   Patient gets short of breath on minimal exertion.    She denies any fevers but feels feverish. .   She denies any syncope.    Denies any orthopnea or PND. (23 Oct 2017 15:30)      Allergies:  Enbrel (Unknown)  IV Contrast (Hives; Rash)  Levaquin (Other)  minocycline (Other)  penicillin (Other)      Medications:  ALBUTerol/ipratropium for Nebulization 3 milliLiter(s) Nebulizer every 6 hours  ALBUTerol/ipratropium for Nebulization 3 milliLiter(s) Nebulizer every 4 hours PRN  amLODIPine   Tablet 5 milliGRAM(s) Oral daily  ascorbic acid 500 milliGRAM(s) Oral daily  aspirin enteric coated 81 milliGRAM(s) Oral daily  atorvastatin 20 milliGRAM(s) Oral at bedtime  buDESOnide 160 MICROgram(s)/formoterol 4.5 MICROgram(s) Inhaler 2 Puff(s) Inhalation two times a day  calcium carbonate 1250 mG + Vitamin D (OsCal 500 + D) 1 Tablet(s) Oral two times a day  cholecalciferol 2000 Unit(s) Oral daily  enoxaparin Injectable 30 milliGRAM(s) SubCutaneous every 24 hours  folic acid 1 milliGRAM(s) Oral daily  hydroxychloroquine 200 milliGRAM(s) Oral daily  methylPREDNISolone sodium succinate Injectable 40 milliGRAM(s) IV Push every 8 hours  montelukast 10 milliGRAM(s) Oral at bedtime  ondansetron Injectable 4 milliGRAM(s) IV Push every 4 hours PRN  pantoprazole    Tablet 40 milliGRAM(s) Oral before breakfast  rOPINIRole 2 milliGRAM(s) Oral at bedtime  sodium chloride 0.9%. 1000 milliLiter(s) IV Continuous <Continuous>      PMHX/PSHX:  Lung cancer  Esophageal reflux  TIA (transient ischemic attack)  Rheumatoid arthritis  HLD (hyperlipidemia)  Asthma  S/P lobectomy of lung      Family history:  No pertinent family history in first degree relatives      Social History:     ROS:     General:  No wt loss, fevers, chills, night sweats, fatigue,   Eyes:  Good vision, no reported pain  ENT:  No sore throat, pain, runny nose, dysphagia  CV:  No pain, palpitations, hypo/hypertension  Resp:  No dyspnea, cough, tachypnea, wheezing  GI:  No constipation, No weight loss, No fever, No pruritis, No rectal bleeding, No tarry stools, No dysphagia,  :  No pain, bleeding, incontinence, nocturia  Muscle:  No pain, weakness  Neuro:  No weakness, tingling, memory problems        PHYSICAL EXAM:   Vital Signs:  Vital Signs Last 24 Hrs  T(C): 36.6 (24 Oct 2017 06:29), Max: 36.8 (23 Oct 2017 22:15)  T(F): 97.8 (24 Oct 2017 06:29), Max: 98.2 (23 Oct 2017 22:15)  HR: 84 (24 Oct 2017 08:04) (77 - 104)  BP: 150/72 (24 Oct 2017 08:04) (118/62 - 150/72)  BP(mean): --  RR: 18 (24 Oct 2017 08:04) (16 - 20)  SpO2: 95% (24 Oct 2017 08:04) (94% - 100%)  Daily Height in cm: 157.48 (23 Oct 2017 13:18)    Daily     GENERAL:  Appears stated age, well-groomed, well-nourished, no distress  HEENT:  NC/AT,  conjunctivae clear and pink, no thyromegaly, nodules, adenopathy, no JVD, sclera -anicteric  NECK: Supple, No JVD  CHEST:  Full & symmetric excursion, no increased effort, breath sounds clear  HEART:  Regular rhythm, S1, S2, no murmur/rub/S3/S4, no abdominal bruit, no edema  ABDOMEN:  Soft, non-tender, non-distended, normoactive bowel sounds,  no masses ,no hepato-splenomegaly, no signs of chronic liver disease  EXTEREMITIES:  no cyanosis,clubbing or edema  NEURO:  Alert, oriented, no asterixis, no tremor, no encephalopathy    LABS:                        11.4   8.3   )-----------( 270      ( 24 Oct 2017 07:19 )             33.2     10-24    140  |  107  |  20  ----------------------------<  134<H>  5.0   |  25  |  0.98    Ca    8.9      24 Oct 2017 07:19  Phos  3.3     10-  Mg     1.9     10-    TPro  7.4  /  Alb  3.3  /  TBili  0.7  /  DBili  x   /  AST  14  /  ALT  14  /  AlkPhos  85  10    LIVER FUNCTIONS - ( 23 Oct 2017 14:16 )  Alb: 3.3 g/dL / Pro: 7.4 g/dL / ALK PHOS: 85 U/L / ALT: 14 U/L DA / AST: 14 U/L / GGT: x           PT/INR - ( 23 Oct 2017 14:16 )   PT: 13.0 sec;   INR: 1.19 ratio         PTT - ( 23 Oct 2017 14:16 )  PTT:28.6 sec  Urinalysis Basic - ( 23 Oct 2017 21:37 )    Color: Yellow / Appearance: Clear / S.020 / pH: x  Gluc: x / Ketone: Negative  / Bili: Negative / Urobili: Negative mg/dL   Blood: x / Protein: 15 mg/dL / Nitrite: Negative   Leuk Esterase: Trace / RBC: x / WBC 3-5   Sq Epi: x / Non Sq Epi: Occasional / Bacteria: Moderate          Imaging:

## 2017-10-24 NOTE — CONSULT NOTE ADULT - ASSESSMENT
Parainfluenza Virus, Abdominal pain, Vomiting, Diarrhea    1 Pulmonary treatment as per Dr Galan  2 IVF hydration  3 No further abdominal pain or diarrhea  4 Tolerating PO diet  5 Pt had EGD done 9/17, see report and pathology  6 Care coordinated with PMD  7 Spent 45-55 minutes with patient  8 Thank you

## 2017-10-25 ENCOUNTER — TRANSCRIPTION ENCOUNTER (OUTPATIENT)
Age: 72
End: 2017-10-25

## 2017-10-25 VITALS
RESPIRATION RATE: 18 BRPM | HEART RATE: 94 BPM | SYSTOLIC BLOOD PRESSURE: 132 MMHG | TEMPERATURE: 98 F | OXYGEN SATURATION: 97 % | DIASTOLIC BLOOD PRESSURE: 76 MMHG

## 2017-10-25 LAB
ANION GAP SERPL CALC-SCNC: 8 MMOL/L — SIGNIFICANT CHANGE UP (ref 5–17)
BUN SERPL-MCNC: 25 MG/DL — HIGH (ref 7–23)
CALCIUM SERPL-MCNC: 9.2 MG/DL — SIGNIFICANT CHANGE UP (ref 8.4–10.5)
CHLORIDE SERPL-SCNC: 108 MMOL/L — SIGNIFICANT CHANGE UP (ref 96–108)
CO2 SERPL-SCNC: 24 MMOL/L — SIGNIFICANT CHANGE UP (ref 22–31)
CREAT SERPL-MCNC: 1.06 MG/DL — SIGNIFICANT CHANGE UP (ref 0.5–1.3)
CULTURE RESULTS: SIGNIFICANT CHANGE UP
GLUCOSE SERPL-MCNC: 137 MG/DL — HIGH (ref 70–99)
HCT VFR BLD CALC: 30.3 % — LOW (ref 34.5–45)
HGB BLD-MCNC: 10.3 G/DL — LOW (ref 11.5–15.5)
MCHC RBC-ENTMCNC: 31.6 PG — SIGNIFICANT CHANGE UP (ref 27–34)
MCHC RBC-ENTMCNC: 34 GM/DL — SIGNIFICANT CHANGE UP (ref 32–36)
MCV RBC AUTO: 93.1 FL — SIGNIFICANT CHANGE UP (ref 80–100)
PLATELET # BLD AUTO: 279 K/UL — SIGNIFICANT CHANGE UP (ref 150–400)
POTASSIUM SERPL-MCNC: 4.2 MMOL/L — SIGNIFICANT CHANGE UP (ref 3.5–5.3)
POTASSIUM SERPL-SCNC: 4.2 MMOL/L — SIGNIFICANT CHANGE UP (ref 3.5–5.3)
RBC # BLD: 3.26 M/UL — LOW (ref 3.8–5.2)
RBC # FLD: 13 % — SIGNIFICANT CHANGE UP (ref 10.3–14.5)
SODIUM SERPL-SCNC: 140 MMOL/L — SIGNIFICANT CHANGE UP (ref 135–145)
SPECIMEN SOURCE: SIGNIFICANT CHANGE UP
WBC # BLD: 12.6 K/UL — HIGH (ref 3.8–10.5)
WBC # FLD AUTO: 12.6 K/UL — HIGH (ref 3.8–10.5)

## 2017-10-25 RX ORDER — ACETAMINOPHEN 500 MG
650 TABLET ORAL ONCE
Qty: 0 | Refills: 0 | Status: COMPLETED | OUTPATIENT
Start: 2017-10-25 | End: 2017-10-25

## 2017-10-25 RX ADMIN — Medication 3 MILLILITER(S): at 07:07

## 2017-10-25 RX ADMIN — Medication 200 MILLIGRAM(S): at 06:09

## 2017-10-25 RX ADMIN — Medication 1 MILLIGRAM(S): at 11:14

## 2017-10-25 RX ADMIN — Medication 650 MILLIGRAM(S): at 05:56

## 2017-10-25 RX ADMIN — AMLODIPINE BESYLATE 5 MILLIGRAM(S): 2.5 TABLET ORAL at 05:53

## 2017-10-25 RX ADMIN — BUDESONIDE AND FORMOTEROL FUMARATE DIHYDRATE 2 PUFF(S): 160; 4.5 AEROSOL RESPIRATORY (INHALATION) at 05:57

## 2017-10-25 RX ADMIN — Medication 650 MILLIGRAM(S): at 06:30

## 2017-10-25 RX ADMIN — Medication 40 MILLIGRAM(S): at 05:54

## 2017-10-25 RX ADMIN — Medication 81 MILLIGRAM(S): at 11:14

## 2017-10-25 RX ADMIN — PANTOPRAZOLE SODIUM 40 MILLIGRAM(S): 20 TABLET, DELAYED RELEASE ORAL at 05:53

## 2017-10-25 RX ADMIN — Medication 2000 UNIT(S): at 11:14

## 2017-10-25 RX ADMIN — Medication 1 TABLET(S): at 05:53

## 2017-10-25 RX ADMIN — Medication 500 MILLIGRAM(S): at 11:14

## 2017-10-25 RX ADMIN — Medication 200 MILLIGRAM(S): at 11:14

## 2017-10-25 NOTE — PHYSICAL THERAPY INITIAL EVALUATION ADULT - PERTINENT HX OF CURRENT PROBLEM, REHAB EVAL
SOB,71 years old female with history of asthma/COPD, lung CA,  status post right sided lobectomy, hypertension, dyslipidemia, rheumatoid arthritis and TIA,  who has been treated for possible bronchitis and COPD exacerbation as outpatient for last 1-2 weeks.  Patient continued to complain of increasing shortness of breath and cough despite the treatment.  She was seen by Dr. Galan in the office today and was recommended admission to the hospital.

## 2017-10-25 NOTE — DISCHARGE NOTE ADULT - HOSPITAL COURSE
Admitted for copd and resp distress, found to have rsv causing bronchitis. doing well, will fu with pulm as outpt in one week.

## 2017-10-25 NOTE — PROGRESS NOTE ADULT - PROBLEM SELECTOR PLAN 1
Steroids   HHN  Inhalers
Clinically improving with current management.   Will continue IV steroids and Nebs.  Continue Symbicort.  Continue oxygen supplementation.  Pulmonary input noted and appreciated.   Increase activity.
Taper Steroids   See me in office next week.  Inhalers

## 2017-10-25 NOTE — PROGRESS NOTE ADULT - ASSESSMENT
71 years old female with history of asthma/COPD, lung CA,  status post right sided lobectomy, hypertension, dyslipidemia, rheumatoid arthritis and TIA,  who has been treated for possible bronchitis and COPD exacerbation as outpatient for last 1-2 weeks.  Patient continued to complain of increasing shortness of breath and cough despite the treatment.    No much improved, less dehydrated, Has Parainfluenza bronchitis.
71 years old female with history of asthma/COPD, lung CA,  status post right sided lobectomy, hypertension, dyslipidemia, rheumatoid arthritis and TIA,  who has been treated for possible bronchitis and COPD exacerbation as outpatient for last 1-2 weeks.  Patient continued to complain of increasing shortness of breath and cough despite the treatment.  She was seen by Dr. Galan in the office today and was recommended admission to the hospital.
S/P Abdominal Pain, Nausea and Vomiting, Diarrhea due to viral syndrome    1 Tolerating diet  2 No GI complaints today  3 Stable from a GI standpoint  4 F/U as outpatient  5 Care coordinated with PMD  6 Spent 30 min on patient care
71 years old female with history of asthma/COPD, lung CA,  status post right sided lobectomy, hypertension, dyslipidemia, rheumatoid arthritis and TIA,  who has been treated for possible bronchitis and COPD exacerbation as outpatient for last 1-2 weeks.  Patient continued to complain of increasing shortness of breath and cough despite the treatment.    No much improved, less dehydrated, Has Parainfluenza bronchitis.

## 2017-10-25 NOTE — PHYSICAL THERAPY INITIAL EVALUATION ADULT - CRITERIA FOR SKILLED THERAPEUTIC INTERVENTIONS
d/c home no DME required no additional PT needs/anticipated discharge recommendation/anticipated equipment needs at discharge

## 2017-10-25 NOTE — PROGRESS NOTE ADULT - PROBLEM SELECTOR PROBLEM 6
Rheumatoid arthritis, involving unspecified site, unspecified rheumatoid factor presence
Rheumatoid arthritis, involving unspecified site, unspecified rheumatoid factor presence
Hyperlipidemia, unspecified hyperlipidemia type

## 2017-10-25 NOTE — PROGRESS NOTE ADULT - SUBJECTIVE AND OBJECTIVE BOX
Chief Complaint:  I feel better.      INTERVAL HPI/OVERNIGHT EVENTS: Overnight events noted. No GI complaints today. tolerating diet.        MEDICATIONS  (STANDING):  ALBUTerol/ipratropium for Nebulization 3 milliLiter(s) Nebulizer every 6 hours  amLODIPine   Tablet 5 milliGRAM(s) Oral daily  ascorbic acid 500 milliGRAM(s) Oral daily  aspirin enteric coated 81 milliGRAM(s) Oral daily  atorvastatin 20 milliGRAM(s) Oral at bedtime  buDESOnide 160 MICROgram(s)/formoterol 4.5 MICROgram(s) Inhaler 2 Puff(s) Inhalation two times a day  calcium carbonate 1250 mG + Vitamin D (OsCal 500 + D) 1 Tablet(s) Oral two times a day  cholecalciferol 2000 Unit(s) Oral daily  enoxaparin Injectable 30 milliGRAM(s) SubCutaneous every 24 hours  folic acid 1 milliGRAM(s) Oral daily  hydroxychloroquine 200 milliGRAM(s) Oral daily  methylPREDNISolone sodium succinate Injectable 40 milliGRAM(s) IV Push every 8 hours  montelukast 10 milliGRAM(s) Oral at bedtime  pantoprazole    Tablet 40 milliGRAM(s) Oral before breakfast  rOPINIRole 2 milliGRAM(s) Oral at bedtime    MEDICATIONS  (PRN):  ALBUTerol/ipratropium for Nebulization 3 milliLiter(s) Nebulizer every 4 hours PRN Shortness of Breath and/or Wheezing  ondansetron Injectable 4 milliGRAM(s) IV Push every 4 hours PRN Nausea and/or Vomiting      Allergies    Enbrel (Unknown)  IV Contrast (Hives; Rash)  Levaquin (Other)  minocycline (Other)  penicillin (Other)    Intolerances        ROS:     General:  No wt loss, fevers, chills, night sweats, fatigue,   Eyes:  Good vision, no reported pain  ENT:  No sore throat, pain, runny nose, dysphagia  CV:  No pain, palpitations, hypo/hypertension  Resp:  No dyspnea, cough, tachypnea, wheezing  GI:  No pain, No nausea, No vomiting, No diarrhea, No constipation, No weight loss, No fever, No pruritis, No rectal bleeding, No tarry stools, No dysphagia,  :  No pain, bleeding, incontinence, nocturia  Muscle:  No pain, weakness  Neuro:  No weakness, tingling, memory problems        Vital Signs Last 24 Hrs  T(C): 36.7 (25 Oct 2017 00:58), Max: 37.2 (24 Oct 2017 15:57)  T(F): 98.1 (25 Oct 2017 00:58), Max: 98.9 (24 Oct 2017 15:57)  HR: 74 (25 Oct 2017 07:15) (72 - 97)  BP: 120/80 (25 Oct 2017 00:58) (120/80 - 125/70)  BP(mean): --  RR: 18 (25 Oct 2017 00:58) (18 - 18)  SpO2: 98% (25 Oct 2017 07:15) (91% - 98%)    PHYSICAL EXAM:      Constitutional:Elderly female in NAD        ENMT: NCAT    Neck:Supple, no JVD      Respiratory:Clear Bilaterally    Cardiovascular:RRR@ s1s2    Gastrointestinal:Soft NT + BS    Extremities:No edema    Neurological:A+Ox3                            LABS:                        10.3   12.6  )-----------( 279      ( 25 Oct 2017 06:57 )             30.3     10-25    140  |  108  |  25<H>  ----------------------------<  137<H>  4.2   |  24  |  1.06    Ca    9.2      25 Oct 2017 06:57  Phos  3.3     10-24  Mg     1.9     10-24    TPro  7.4  /  Alb  3.3  /  TBili  0.7  /  DBili  x   /  AST  14  /  ALT  14  /  AlkPhos  85  10-23    PT/INR - ( 23 Oct 2017 14:16 )   PT: 13.0 sec;   INR: 1.19 ratio         PTT - ( 23 Oct 2017 14:16 )  PTT:28.6 sec  Urinalysis Basic - ( 23 Oct 2017 21:37 )    Color: Yellow / Appearance: Clear / S.020 / pH: x  Gluc: x / Ketone: Negative  / Bili: Negative / Urobili: Negative mg/dL   Blood: x / Protein: 15 mg/dL / Nitrite: Negative   Leuk Esterase: Trace / RBC: x / WBC 3-5   Sq Epi: x / Non Sq Epi: Occasional / Bacteria: Moderate        RADIOLOGY & ADDITIONAL TESTS:
PULMONARY/CRITICAL CARE      INTERVAL HPI/OVERNIGHT EVENTS:  Pt. stable, improved, less sob.  71y FemaleHPI:  71 years old female with history of asthma/COPD, lung CA,  status post right sided lobectomy, hypertension, dyslipidemia, rheumatoid arthritis and TIA,  who has been treated for possible bronchitis and COPD exacerbation as outpatient for last 1-2 weeks.  Patient continued to complain of increasing shortness of breath and cough despite the treatment.  She was seen by Dr. Galan in the office today and was recommended admission to the hospital.    Patient denies any chest pain or chest pressure.    She has a cough which is not productive of any sputum.   Patient gets short of breath on minimal exertion.    She denies any nausea or vomiting.    She denies any fevers but feels feverish. .   She denies any syncope.    Denies any orthopnea or PND. (23 Oct 2017 15:30)        PAST MEDICAL & SURGICAL HISTORY:  Lung cancer: Right lung.  Esophageal reflux  TIA (transient ischemic attack)  Rheumatoid arthritis  HLD (hyperlipidemia)  Asthma  S/P lobectomy of lung: Right lung in .        ICU Vital Signs Last 24 Hrs  T(C): 36.7 (25 Oct 2017 00:58), Max: 37.2 (24 Oct 2017 15:57)  T(F): 98.1 (25 Oct 2017 00:58), Max: 98.9 (24 Oct 2017 15:57)  HR: 74 (25 Oct 2017 07:15) (73 - 97)  BP: 120/80 (25 Oct 2017 00:58) (120/80 - 125/70)  BP(mean): --  ABP: --  ABP(mean): --  RR: 18 (25 Oct 2017 00:58) (18 - 18)  SpO2: 97% (25 Oct 2017 08:59) (91% - 98%)    Qtts:     I&O's Summary    24 Oct 2017 07:01  -  25 Oct 2017 07:00  --------------------------------------------------------  IN: 700 mL / OUT: 0 mL / NET: 700 mL            REVIEW OF SYSTEMS:    CONSTITUTIONAL: No fever, weight loss, or fatigue  EYES: No eye pain, visual disturbances, or discharge  ENMT:  No difficulty hearing, tinnitus, vertigo; No sinus or throat pain  NECK: No pain or stiffness  BREASTS: No pain, masses, or nipple discharge  RESPIRATORY: Some cough, wheezing, chills or hemoptysis; No shortness of breath  CARDIOVASCULAR: No chest pain, palpitations, dizziness, or leg swelling  GASTROINTESTINAL: No abdominal or epigastric pain. No nausea, vomiting, or hematemesis; No diarrhea or constipation. No melena or hematochezia.  GENITOURINARY: No dysuria, frequency, hematuria, or incontinence  NEUROLOGICAL: No headaches, memory loss, loss of strength, numbness, or tremors  SKIN: No itching, burning, rashes, or lesions   LYMPH NODES: No enlarged glands  ENDOCRINE: No heat or cold intolerance; No hair loss  MUSCULOSKELETAL: No joint pain or swelling; No muscle, back, or extremity pain, no calf tenderness  PSYCHIATRIC: No depression, anxiety, mood swings, or difficulty sleeping  HEME/LYMPH: No easy bruising, or bleeding gums  ALLERGY AND IMMUNOLOGIC: No hives or eczema      PHYSICAL EXAM:    GENERAL: NAD, well-groomed, well-developed, NAD  HEAD:  Atraumatic, Normocephalic  EYES: EOMI, PERRLA, conjunctiva and sclera clear  ENMT: No tonsillar erythema, exudates, or enlargement; Moist mucous membranes, Good dentition, No lesions  NECK: Supple, No JVD, Normal thyroid  NERVOUS SYSTEM:  Alert & Oriented X3, Good concentration; Motor Strength 5/5 B/L upper and lower extremities  CHEST/LUNG : Few , rhonchi, wheezing, No  rubs  HEART: Regular rate and rhythm; No murmurs, rubs, or gallops  ABDOMEN: Soft, Nontender, Nondistended; Bowel sounds present  EXTREMITIES:  2+ Peripheral Pulses, No clubbing, cyanosis, or edema  LYMPH: No lymphadenopathy noted  SKIN: No rashes or lesions        LABS:                        10.3   12.6  )-----------( 279      ( 25 Oct 2017 06:57 )             30.3     10-    140  |  108  |  25<H>  ----------------------------<  137<H>  4.2   |  24  |  1.06    Ca    9.2      25 Oct 2017 06:57  Phos  3.3     10-24  Mg     1.9     10-24    TPro  7.4  /  Alb  3.3  /  TBili  0.7  /  DBili  x   /  AST  14  /  ALT  14  /  AlkPhos  85  10-    PT/INR - ( 23 Oct 2017 14:16 )   PT: 13.0 sec;   INR: 1.19 ratio         PTT - ( 23 Oct 2017 14:16 )  PTT:28.6 sec  Urinalysis Basic - ( 23 Oct 2017 21:37 )    Color: Yellow / Appearance: Clear / S.020 / pH: x  Gluc: x / Ketone: Negative  / Bili: Negative / Urobili: Negative mg/dL   Blood: x / Protein: 15 mg/dL / Nitrite: Negative   Leuk Esterase: Trace / RBC: x / WBC 3-5   Sq Epi: x / Non Sq Epi: Occasional / Bacteria: Moderate        vanco through     RADIOLOGY & ADDITIONAL STUDIES:      CRITICAL CARE TIME SPENT:
Patient is a 71y old  Female who presents with a chief complaint of Shortness of breath (23 Oct 2017 15:30)    HPI:  71 years old female with history of asthma/COPD, lung CA,  status post right sided lobectomy, hypertension, dyslipidemia, rheumatoid arthritis and TIA,  who has been treated for possible bronchitis and COPD exacerbation as outpatient for last 1-2 weeks.  Patient continued to complain of increasing shortness of breath and cough despite the treatment.  She was seen by Dr. Galan in the office today and was recommended admission to the hospital.    Patient denies any chest pain or chest pressure.    She has a cough which is not productive of any sputum.   Patient gets short of breath on minimal exertion.    She denies any nausea or vomiting.    She denies any fevers but feels feverish. .   She denies any syncope.    Denies any orthopnea or PND. (23 Oct 2017 15:30)    INTERVAL HPI/OVERNIGHT EVENTS:  Chart reviewed, notes reviewed.   Patient seen and examined.  Feeling much better today.  Still has cough but improved.   Shortness of breath is much better.   No chest pain or pressure.   Being followed by following specialists: Pulmonary and GI.     Consultant(s) Notes Reviewed:  [X] Yes    Care Discussed with Consultants/Other Providers: [X] Yes    REVIEW OF SYSTEMS:  CONSTITUTIONAL: No fever. + fatigue  	EYES: No eye pain, visual disturbances, or discharge  	ENMT:  No difficulty hearing, tinnitus, vertigo; No sinus or throat pain  	NECK: No pain or stiffness  	BREASTS: No pain, masses, or nipple discharge  	RESPIRATORY: + cough, + wheezing, + shortness of breath (improved)  	CARDIOVASCULAR: No chest pain, palpitations, dizziness, or leg swelling  	GASTROINTESTINAL: No abdominal or epigastric pain. No nausea, vomiting, or hematemesis; No diarrhea or constipation. No melena or hematochezia.  	GENITOURINARY: No dysuria, frequency, hematuria, or incontinence  	NEUROLOGICAL: No headaches, memory loss, loss of strength, numbness, or tremors  	SKIN: No itching, burning, rashes, or lesions   	LYMPH NODES: No enlarged glands  	ENDOCRINE: No heat or cold intolerance; No hair loss; No polydipsia or polyuria  	MUSCULOSKELETAL: No joint pain or swelling; No muscle, back, or extremity pain  	PSYCHIATRIC: No depression, anxiety, mood swings, or difficulty sleeping    Allergies    Enbrel (Unknown)  IV Contrast (Hives; Rash)  Levaquin (Other)  minocycline (Other)  penicillin (Other)    Intolerances    MEDICATIONS  (STANDING):  ALBUTerol/ipratropium for Nebulization 3 milliLiter(s) Nebulizer every 6 hours  amLODIPine   Tablet 5 milliGRAM(s) Oral daily  ascorbic acid 500 milliGRAM(s) Oral daily  aspirin enteric coated 81 milliGRAM(s) Oral daily  atorvastatin 20 milliGRAM(s) Oral at bedtime  buDESOnide 160 MICROgram(s)/formoterol 4.5 MICROgram(s) Inhaler 2 Puff(s) Inhalation two times a day  calcium carbonate 1250 mG + Vitamin D (OsCal 500 + D) 1 Tablet(s) Oral two times a day  cholecalciferol 2000 Unit(s) Oral daily  enoxaparin Injectable 30 milliGRAM(s) SubCutaneous every 24 hours  folic acid 1 milliGRAM(s) Oral daily  hydroxychloroquine 200 milliGRAM(s) Oral daily  methylPREDNISolone sodium succinate Injectable 40 milliGRAM(s) IV Push every 8 hours  montelukast 10 milliGRAM(s) Oral at bedtime  pantoprazole    Tablet 40 milliGRAM(s) Oral before breakfast  rOPINIRole 2 milliGRAM(s) Oral at bedtime    MEDICATIONS  (PRN):  ALBUTerol/ipratropium for Nebulization 3 milliLiter(s) Nebulizer every 4 hours PRN Shortness of Breath and/or Wheezing  ondansetron Injectable 4 milliGRAM(s) IV Push every 4 hours PRN Nausea and/or Vomiting    Vital Signs Last 24 Hrs  T(C): 36.6 (24 Oct 2017 06:29), Max: 36.8 (23 Oct 2017 22:15)  T(F): 97.8 (24 Oct 2017 06:29), Max: 98.2 (23 Oct 2017 22:15)  HR: 73 (24 Oct 2017 13:34) (72 - 88)  BP: 150/72 (24 Oct 2017 08:04) (118/62 - 150/72)  BP(mean): --  RR: 18 (24 Oct 2017 08:04) (16 - 18)  SpO2: 98% (24 Oct 2017 13:34) (94% - 99%)    PHYSICAL EXAM:  	GENERAL: well-groomed, well-developed  	HEAD:  Atraumatic, Normocephalic  	EYES: Mild pallor noted.   	ENMT: oral mucosa moist.   	NECK: Supple.   	CHEST/LUNG: Improved air entry with occasional exp wheeze noted.   	HEART: S1, S2, SM +  	ABDOMEN: Soft, Nontender, Nondistended; Bowel sounds present  	EXTREMITIES:  2+ Peripheral Pulses, No clubbing, cyanosis, or edema  	MS: No joint swelling or deformity.   	LYMPH: No lymphadenopathy noted  	SKIN: No rashes or lesions  	NERVOUS SYSTEM:  Motor Strength 4/5 B/L upper and lower extremities; DTRs 2+ intact and symmetric    PSYCH:  Alert & Oriented X3, Good concentration.                        11.4   8.3   )-----------( 270      ( 24 Oct 2017 07:19 )             33.2     24 Oct 2017 07:19    140    |  107    |  20     ----------------------------<  134    5.0     |  25     |  0.98     Ca    8.9        24 Oct 2017 07:19  Phos  3.3       24 Oct 2017 07:19  Mg     1.9       24 Oct 2017 07:19    TPro  7.4    /  Alb  3.3    /  TBili  0.7    /  DBili  x      /  AST  14     /  ALT  14     /  AlkPhos  85     23 Oct 2017 14:16    PT/INR - ( 23 Oct 2017 14:16 )   PT: 13.0 sec;   INR: 1.19 ratio       PTT - ( 23 Oct 2017 14:16 )  PTT:28.6 sec    10-24 [id number] 5.3    10-24 Chol 149 LDL 60 HDL 70 Trig 96    Thyroid Stimulating Hormone, Serum: 0.49 uIU/mL (10-24 @ 12:53)    Troponin I, Serum: .000 ng/mL (10-23 @ 14:16)      Urinalysis Basic - ( 23 Oct 2017 21:37 )    Color: Yellow / Appearance: Clear / S.020 / pH: x  Gluc: x / Ketone: Negative  / Bili: Negative / Urobili: Negative mg/dL   Blood: x / Protein: 15 mg/dL / Nitrite: Negative   Leuk Esterase: Trace / RBC: x / WBC 3-5   Sq Epi: x / Non Sq Epi: Occasional / Bacteria: Moderate    Rapid Respiratory Viral Panel (10.23.17 @ 20:14)    Rapid RVP Result: Detected: The [employer] RVP Rapid uses polymerase chain reaction (PCR) and melt  curve analysis to screen for adenovirus; coronavirus HKU1, NL63, 229E,  OC43; human metapneumovirus (hMPV); human enterovirus/rhinovirus  (Entero/RV); influenza A; influenza A/H1;influenza A/H3; influenza  A/H1-2009; influenza B; parainfluenza viruses 1, 2, 3, 4; respiratory  syncytial virus; Bordetella pertussis; Mycoplasma pneumoniae; and  Chlamydophila pneumoniae.    Parainfluenza 4 (RapRVP): Detected    RADIOLOGY TEST: (IMAGES REVIEWED BY ME)      Imaging Personally Reviewed:  [ ] YES        HEALTH ISSUES - PROBLEM Dx:  Need for prophylactic measure: Need for prophylactic measure  Essential hypertension: Essential hypertension  Hyperlipidemia, unspecified hyperlipidemia type: Hyperlipidemia, unspecified hyperlipidemia type  Rheumatoid arthritis, involving unspecified site, unspecified rheumatoid factor presence: Rheumatoid arthritis, involving unspecified site, unspecified rheumatoid factor presence  Gastroesophageal reflux disease, esophagitis presence not specified: Gastroesophageal reflux disease, esophagitis presence not specified  Renal insufficiency: Renal insufficiency  Bronchitis: Bronchitis  COPD exacerbation: COPD exacerbation
PULMONARY/CRITICAL CARE      INTERVAL HPI/OVERNIGHT EVENTS: pt. markedly improved, Less sob, some wheeze.  no fever. No diarrhea.  Parainfluenza positive.     71y FemaleHPI:  71 years old female with history of asthma/COPD, lung CA,  status post right sided lobectomy, hypertension, dyslipidemia, rheumatoid arthritis and TIA,  who has been treated for possible bronchitis and COPD exacerbation as outpatient for last 1-2 weeks.  Patient continued to complain of increasing shortness of breath and cough despite the treatment.  She was seen by Dr. Galan in the office today and was recommended admission to the hospital.    Patient denies any chest pain or chest pressure.    She has a cough which is not productive of any sputum.   Patient gets short of breath on minimal exertion.    She denies any nausea or vomiting.    She denies any fevers but feels feverish. .   She denies any syncope.    Denies any orthopnea or PND. (23 Oct 2017 15:30)        PAST MEDICAL & SURGICAL HISTORY:  Lung cancer: Right lung.  Esophageal reflux  TIA (transient ischemic attack)  Rheumatoid arthritis  HLD (hyperlipidemia)  Asthma  S/P lobectomy of lung: Right lung in .        ICU Vital Signs Last 24 Hrs  T(C): 36.6 (24 Oct 2017 06:29), Max: 36.8 (23 Oct 2017 22:15)  T(F): 97.8 (24 Oct 2017 06:29), Max: 98.2 (23 Oct 2017 22:15)  HR: 84 (24 Oct 2017 08:04) (77 - 104)  BP: 150/72 (24 Oct 2017 08:04) (118/62 - 150/72)  BP(mean): --  ABP: --  ABP(mean): --  RR: 18 (24 Oct 2017 08:04) (16 - 20)  SpO2: 95% (24 Oct 2017 08:04) (94% - 100%)    Qtts:     I&O's Summary    23 Oct 2017 07:01  -  24 Oct 2017 07:00  --------------------------------------------------------  IN: 500 mL / OUT: 300 mL / NET: 200 mL            REVIEW OF SYSTEMS:    CONSTITUTIONAL: No fever, weight loss, or fatigue  EYES: No eye pain, visual disturbances, or discharge  ENMT:  No difficulty hearing, tinnitus, vertigo; No sinus or throat pain  NECK: No pain or stiffness  BREASTS: No pain, masses, or nipple discharge  RESPIRATORY: Has nonprod,cough, wheezing, No further chills or hemoptysis; Mild shortness of breath  CARDIOVASCULAR: No chest pain, palpitations, dizziness, or leg swelling  GASTROINTESTINAL: No abdominal or epigastric pain. No nausea, vomiting, or hematemesis; No diarrhea or constipation. No melena or hematochezia.  GENITOURINARY: No dysuria, frequency, hematuria, or incontinence  NEUROLOGICAL: No headaches, memory loss, loss of strength, numbness, or tremors  SKIN: No itching, burning, rashes, or lesions   LYMPH NODES: No enlarged glands  ENDOCRINE: No heat or cold intolerance; No hair loss  MUSCULOSKELETAL: No joint pain or swelling; No muscle, back, or extremity pain, no calf tenderness  PSYCHIATRIC: No depression, anxiety, mood swings, or difficulty sleeping  HEME/LYMPH: No easy bruising, or bleeding gums  ALLERGY AND IMMUNOLOGIC: No hives or eczema      PHYSICAL EXAM:    GENERAL: NAD, well-groomed, well-developed, NAD  HEAD:  Atraumatic, Normocephalic  EYES: EOMI, PERRLA, conjunctiva and sclera clear  ENMT: No tonsillar erythema, exudates, or enlargement; Moist mucous membranes, Good dentition, No lesions  NECK: Supple, No JVD, Normal thyroid  NERVOUS SYSTEM:  Alert & Oriented X3, Good concentration; Motor Strength 5/5 B/L upper and lower extremities  CHEST/LUNG: Few bilat rhonchi, wheezing, Thoracoplasty right. Good excursion. No rubs  HEART: Regular rate and rhythm; No murmurs, rubs, or gallops  ABDOMEN: Soft, Nontender, Nondistended; Bowel sounds present  EXTREMITIES:  2+ Peripheral Pulses, No clubbing, cyanosis, or edema  LYMPH: No lymphadenopathy noted  SKIN: No rashes or lesions        LABS:                        11.4   8.3   )-----------( 270      ( 24 Oct 2017 07:19 )             33.2     10-24    140  |  107  |  20  ----------------------------<  134<H>  5.0   |  25  |  0.98    Ca    8.9      24 Oct 2017 07:19  Phos  3.3     10-24  Mg     1.9     10-24    TPro  7.4  /  Alb  3.3  /  TBili  0.7  /  DBili  x   /  AST  14  /  ALT  14  /  AlkPhos  85  10-23    PT/INR - ( 23 Oct 2017 14:16 )   PT: 13.0 sec;   INR: 1.19 ratio         PTT - ( 23 Oct 2017 14:16 )  PTT:28.6 sec  Urinalysis Basic - ( 23 Oct 2017 21:37 )    Color: Yellow / Appearance: Clear / S.020 / pH: x  Gluc: x / Ketone: Negative  / Bili: Negative / Urobili: Negative mg/dL   Blood: x / Protein: 15 mg/dL / Nitrite: Negative   Leuk Esterase: Trace / RBC: x / WBC 3-5   Sq Epi: x / Non Sq Epi: Occasional / Bacteria: Moderate        vanco through     RADIOLOGY & ADDITIONAL STUDIES:  CXR unchanged.      CRITICAL CARE TIME SPENT:

## 2017-10-25 NOTE — DISCHARGE NOTE ADULT - PLAN OF CARE
have no more sob stable  fu with pulm in one week continue home meds  fu with pulm in one week stable  drink plenty of fluids  rest resume home meds

## 2017-10-25 NOTE — DISCHARGE NOTE ADULT - NS AS ACTIVITY OBS
Showering allowed/Walking-Indoors allowed/Driving allowed/Walking-Outdoors allowed/No Heavy lifting/straining/Bathing allowed/Stairs allowed

## 2017-10-25 NOTE — DISCHARGE NOTE ADULT - SECONDARY DIAGNOSIS.
COPD (chronic obstructive pulmonary disease) with acute bronchitis Parainfluenza virus bronchitis Rheumatoid arthritis

## 2017-10-25 NOTE — PROGRESS NOTE ADULT - PROBLEM SELECTOR PROBLEM 7
Hyperlipidemia, unspecified hyperlipidemia type
Hyperlipidemia, unspecified hyperlipidemia type
Essential hypertension

## 2017-10-25 NOTE — DISCHARGE NOTE ADULT - MEDICATION SUMMARY - MEDICATIONS TO TAKE
I will START or STAY ON the medications listed below when I get home from the hospital:    benacor  -- 20 milligram(s) by mouth once a day  -- Indication: For Heart    Aspirin Enteric Coated 81 mg oral delayed release tablet  -- 1 tab(s) by mouth once a day  -- home  -- Indication: For Heart    Crestor 5 mg oral tablet  -- 1 tab(s) by mouth once a day  -- Indication: For Heart    Plaquenil Sulfate 200 mg oral tablet  -- 1 tab(s) by mouth once a day  -- home  -- Indication: For Rheumatoid arthritis, involving unspecified site, unspecified rheumatoid factor presence    Requip 2 mg oral tablet  --  by mouth once a day (at bedtime)  -- Indication: For Rheumatoid arthritis, involving unspecified site, unspecified rheumatoid factor presence    Symbicort  -- 2 puff(s) inhaled 2 times a day  -- home  -- Indication: For COPD (chronic obstructive pulmonary disease) with acute bronchitis    Arava 20 mg oral tablet  -- 1 tab(s) by mouth once a day  -- home  -- Indication: For Rheumatoid arthritis, involving unspecified site, unspecified rheumatoid factor presence    Singulair 10 mg oral tablet  -- 1 tab(s) by mouth once a day (in the evening)  -- home  -- Indication: For COPD (chronic obstructive pulmonary disease) with acute bronchitis    Robaxin-750 oral tablet  -- 1 tab(s) by mouth 3 times a day   -- May cause drowsiness.  Alcohol may intensify this effect.  Use care when operating dangerous machinery.    -- Indication: For Cns    Astepro  -- 2 spray(s) into nose 2 times a day  -- home  -- Indication: For Nasal     omeprazole 40 mg oral delayed release capsule  -- 1 cap(s) by mouth once a day  -- Indication: For Gerd

## 2017-10-25 NOTE — DISCHARGE NOTE ADULT - CARE PROVIDER_API CALL
Lukas Fernandez (), Gastroenterology; Internal Medicine  [address] [place] [address]  Phone: [phone]  Fax: [phone]    Adriel Galan), Critical Care Medicine; Internal Medicine; Pulmonary Disease  [address] [place] [address]  Phone: [phone]  Fax: [phone]

## 2017-10-25 NOTE — PHYSICAL THERAPY INITIAL EVALUATION ADULT - RANGE OF MOTION EXAMINATION, REHAB EVAL
bilateral lower extremity was ROM was WNL (within normal limits)/bilateral upper extremity ROM was WFL (within functional limits)

## 2017-10-25 NOTE — DISCHARGE NOTE ADULT - PATIENT PORTAL LINK FT
“You can access the Follow[facility] Patient Portal, offered by [facility], by registering with the following website: [email or web address]”

## 2017-10-25 NOTE — PROGRESS NOTE ADULT - PROBLEM SELECTOR PLAN 2
Steroids  Isolation
Patient with acute viral bronchitis due to parainfluenza.   Continue Contact and droplet precautions.  Supportive care.
Steroids  Isolation

## 2017-10-25 NOTE — DISCHARGE NOTE ADULT - CARE PLAN
Principal Discharge DX:	Bronchitis  Goal:	have no more sob  Instructions for follow-up, activity and diet:	stable  fu with pulm in one week  Secondary Diagnosis:	COPD (chronic obstructive pulmonary disease) with acute bronchitis  Instructions for follow-up, activity and diet:	continue home meds  fu with pulm in one week  Secondary Diagnosis:	Parainfluenza virus bronchitis  Instructions for follow-up, activity and diet:	stable  drink plenty of fluids  rest  Secondary Diagnosis:	Rheumatoid arthritis  Instructions for follow-up, activity and diet:	resume home meds

## 2017-10-25 NOTE — PROGRESS NOTE ADULT - PROBLEM SELECTOR PROBLEM 5
Gastroesophageal reflux disease, esophagitis presence not specified
Gastroesophageal reflux disease, esophagitis presence not specified
Rheumatoid arthritis, involving unspecified site, unspecified rheumatoid factor presence

## 2017-10-29 LAB
CULTURE RESULTS: SIGNIFICANT CHANGE UP
CULTURE RESULTS: SIGNIFICANT CHANGE UP
SPECIMEN SOURCE: SIGNIFICANT CHANGE UP
SPECIMEN SOURCE: SIGNIFICANT CHANGE UP

## 2017-11-08 ENCOUNTER — EMERGENCY (EMERGENCY)
Facility: HOSPITAL | Age: 72
LOS: 1 days | Discharge: HOME | End: 2017-11-08
Attending: EMERGENCY MEDICINE
Payer: MEDICARE

## 2017-11-08 VITALS
SYSTOLIC BLOOD PRESSURE: 167 MMHG | OXYGEN SATURATION: 96 % | HEART RATE: 85 BPM | DIASTOLIC BLOOD PRESSURE: 75 MMHG | WEIGHT: 145.06 LBS | HEIGHT: 62 IN | RESPIRATION RATE: 14 BRPM | TEMPERATURE: 98 F

## 2017-11-08 VITALS
SYSTOLIC BLOOD PRESSURE: 149 MMHG | DIASTOLIC BLOOD PRESSURE: 74 MMHG | OXYGEN SATURATION: 96 % | TEMPERATURE: 98 F | RESPIRATION RATE: 17 BRPM | HEART RATE: 80 BPM

## 2017-11-08 DIAGNOSIS — S80.01XA CONTUSION OF RIGHT KNEE, INITIAL ENCOUNTER: ICD-10-CM

## 2017-11-08 DIAGNOSIS — J45.909 UNSPECIFIED ASTHMA, UNCOMPLICATED: ICD-10-CM

## 2017-11-08 DIAGNOSIS — S60.211A CONTUSION OF RIGHT WRIST, INITIAL ENCOUNTER: ICD-10-CM

## 2017-11-08 DIAGNOSIS — S81.012A LACERATION WITHOUT FOREIGN BODY, LEFT KNEE, INITIAL ENCOUNTER: ICD-10-CM
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
## 2017-11-08 DIAGNOSIS — E78.5 HYPERLIPIDEMIA, UNSPECIFIED: ICD-10-CM
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
## 2017-11-08 DIAGNOSIS — Z98.89 OTHER SPECIFIED POSTPROCEDURAL STATES: Chronic | ICD-10-CM

## 2017-11-08 DIAGNOSIS — Z86.73 PERSONAL HISTORY OF TRANSIENT ISCHEMIC ATTACK (TIA), AND CEREBRAL INFARCTION WITHOUT RESIDUAL DEFICITS: ICD-10-CM

## 2017-11-08 PROCEDURE — 73562 X-RAY EXAM OF KNEE 3: CPT

## 2017-11-08 PROCEDURE — 73110 X-RAY EXAM OF WRIST: CPT

## 2017-11-08 PROCEDURE — 73130 X-RAY EXAM OF HAND: CPT

## 2017-11-08 PROCEDURE — 73590 X-RAY EXAM OF LOWER LEG: CPT

## 2017-11-08 PROCEDURE — 99284 EMERGENCY DEPT VISIT MOD MDM: CPT

## 2017-11-08 PROCEDURE — 73590 X-RAY EXAM OF LOWER LEG: CPT | Mod: 26,LT

## 2017-11-08 PROCEDURE — 73130 X-RAY EXAM OF HAND: CPT | Mod: 26,RT

## 2017-11-08 PROCEDURE — 73110 X-RAY EXAM OF WRIST: CPT | Mod: 26,RT

## 2017-11-08 PROCEDURE — 73562 X-RAY EXAM OF KNEE 3: CPT | Mod: 26,LT

## 2017-11-08 PROCEDURE — 99284 EMERGENCY DEPT VISIT MOD MDM: CPT | Mod: 25

## 2017-11-08 NOTE — ED PROVIDER NOTE - UPPER EXTREMITY EXAM, RIGHT
TENDERNESS/BRUISING/right wrist anterior aspect ecchymosis, mild pain with from,, nvi, no deformity, no open wound

## 2017-11-08 NOTE — CONSULT NOTE ADULT - SUBJECTIVE AND OBJECTIVE BOX
71F community ambulator, right hand dominant presents c/o LLE laceration at the level of the tibial tubercle s/p mechanical fall tripping on a curb while shopping. Patient states she landed on right hand and left knee. Denies HS/LOC. Denies numbness/tingling. Denies fever/chills. Denies pain/injury elsewhere. No other orthopedic complaints at this time.    PAST MEDICAL & SURGICAL HISTORY:  Lung cancer: Right lung.  Esophageal reflux  TIA (transient ischemic attack)  Rheumatoid arthritis  HLD (hyperlipidemia)  Asthma  S/P lobectomy of lung: Right lung in 1996.    Allergies  Enbrel (Unknown)  IV Contrast (Hives; Rash)  Levaquin (Other)  minocycline (Other)  penicillin (Other)    Vital Signs Last 24 Hrs  T(C): 36.7 (08 Nov 2017 18:40), Max: 36.7 (08 Nov 2017 16:02)  T(F): 98.1 (08 Nov 2017 18:40), Max: 98.1 (08 Nov 2017 18:40)  HR: 80 (08 Nov 2017 18:40) (80 - 85)  BP: 149/74 (08 Nov 2017 18:40) (149/74 - 167/75)  RR: 17 (08 Nov 2017 18:40) (14 - 17)  SpO2: 96% (08 Nov 2017 18:40) (96% - 96%)    Physical Exam  Gen: NAD    Left LE:  Approx 5cm superficial laceration 1cm superior to tibial tubercle; no sign of patella tendon involvement; low suspicion of arthrotomy   No TTP surrounding laceration or at joint line  Full AROM/PROM w/out pain  No effusion noted  Able to SLR without pain; pt ambulated for extended period of time following injury and walked into ED  Neg log roll, Negative Heel strike  Full AROM of ankle/hip joint without pain  no ttp elsewhere  EHL/FHL/TA/GS 5/5  SILT L3-S1  DP +2  compartments soft  Calf soft, nontender    Secondary Survey: No TTP over bony prominences, SILT, palpable pulses, full/painless range of motion, compartments soft. Full AROM of R wrist, no TTP, AIN/PIN/M/R/U 5/5.    XR R hand/Wrist: Neg acute Fx  XR L Knee and L Tib/Fib: Neg acute Fx    Procedure:  Laceration injected with 10cc 1% lidocaine using sterile technique  Washed with betadine and sterile saline  Site prepped using standard sterile technique  Laceration closed using 3-0 sutures and dressing using xeroform and sterile 4x4s

## 2017-11-08 NOTE — CONSULT NOTE ADULT - ASSESSMENT
A/P: 71F with LLE laceration  Very low suspicion for joint involvement as patient can SLR, ambulate with minimal pain, no visual evidence of tendon or joint involvement and AROM intact without pain  Pain control  WBAT  Ice/elevate PRN  Keep dressing C/D/I  Abx per ED team  Tetanus up to date  D/w pt no need for urgent orthopedic surgery at this time  All pt questions answered  FU w/ Dr Campos in office 10-14 days following DC from hospital  D/w attending and agrees with above plan  Orthopedically stable for DC

## 2017-11-08 NOTE — ED PROVIDER NOTE - OBJECTIVE STATEMENT
70 yo female presents with s/p trip and fall today in front of store, landed on left knee, + laceration, + bruise,  and struck right wrist/hand on pavement, + bruise,   denies head injury, no loc.    at bedside was presents, states no loc.  patient is on asa.  no neck pain, no back pain,  refused pain medication.  tetanus utd.  was able to ambulate after fall.  Ortho Dr Valentine

## 2017-11-08 NOTE — ED ADULT NURSE NOTE - OBJECTIVE STATEMENT
Presents to ER with left leg laceration. Pt states she tripped on a curb and injured her leg and right wrist.  Denies LOC/denies hitting her head. Small bruise noted to right wrist, lac noted to left leg. Presents to ER with left leg laceration. Pt states she tripped on a curb and injured her leg and right wrist.  Denies LOC/denies hitting her head. Small bruise noted to right wrist, lac noted to left leg.  Pt and significant other at bedside screaming and demanding that they want PA Lavern to suture the pt now so they don't have to wait for the ortho residents.  Pt has only been in our ER for a short period of time.  Pt educated on why we called the ortho residents in but continues to complain. Pt notified that we are working as hard and as fast as we possibly can, but pt continues to complain about the wait.  Dr Thomas now made aware and is currently speaking to pt. Presents to ER with left leg laceration. Pt states she tripped on a curb and injured her leg and right wrist.  Denies LOC/denies hitting her head. Small bruise noted to right wrist, lac noted to left leg.  Pt and significant other at bedside are acting in a manner that is very inappropriate and demanding that they want PA Lavern to suture the pt now so they don't have to wait for the ortho residents.  Pt has only been in our ER for a short period of time.  Pt educated on why we called the ortho residents in but continues to complain. Pt notified that we are working as hard and as fast as we possibly can, but pt continues to complain about the wait.  Dr Thomas now made aware and is currently speaking to pt.

## 2017-11-08 NOTE — ED PROVIDER NOTE - PROGRESS NOTE DETAILS
patient seen and treated by ortho resident, recommended follow up with Dr Valentine, suture removal in 10 days, advised over the counter tylenol as directed for pain, rx for clindamycin sent to pharmacy, copy of results given, return to ed if any signs of infection. patient seen and treated by ortho resident, recommended follow up with Dr Valentine, suture removal in 10 days, advised over the counter tylenol as directed for pain, rx for clindamycin sent to pharmacy, copy of results given, return to ed if any signs of infection.    note:  patients  conducted himself with intrusive behavior during care of his wife, disrupting patient care, demanding his wife be taken care of immediately.  all efforts were made to assure him care was being given effectively , and collaboratively with ortho residents and hospital staff

## 2017-11-08 NOTE — ED PROVIDER NOTE - ATTENDING CONTRIBUTION TO CARE
I have personally performed a face to face diagnostic evaluation on this patient.  I have reviewed the PA note and agree with the history, exam, and plan of care, except as noted.  History and Exam by me shows  left knee lac and right wrist bruising p trip and fall in front of supermarket today.  left knee- 3cm lac sutured by ortho from. right wrist- bruising to ant wrist c from.  xrays were neg for fx.

## 2017-11-08 NOTE — ED PROVIDER NOTE - CARE PLAN
Principal Discharge DX:	Laceration of left knee, initial encounter  Secondary Diagnosis:	Contusion of right knee, initial encounter  Secondary Diagnosis:	Contusion of right wrist, initial encounter

## 2017-11-17 ENCOUNTER — APPOINTMENT (OUTPATIENT)
Dept: CARDIOLOGY | Facility: CLINIC | Age: 72
End: 2017-11-17

## 2017-11-21 ENCOUNTER — APPOINTMENT (OUTPATIENT)
Dept: CARDIOLOGY | Facility: CLINIC | Age: 72
End: 2017-11-21
Payer: MEDICARE

## 2017-11-21 ENCOUNTER — NON-APPOINTMENT (OUTPATIENT)
Age: 72
End: 2017-11-21

## 2017-11-21 VITALS — RESPIRATION RATE: 14 BRPM | SYSTOLIC BLOOD PRESSURE: 130 MMHG | DIASTOLIC BLOOD PRESSURE: 70 MMHG

## 2017-11-21 VITALS — HEART RATE: 78 BPM | OXYGEN SATURATION: 97 %

## 2017-11-21 PROCEDURE — 93000 ELECTROCARDIOGRAM COMPLETE: CPT

## 2017-11-21 PROCEDURE — 99215 OFFICE O/P EST HI 40 MIN: CPT | Mod: 25

## 2017-11-25 ENCOUNTER — EMERGENCY (EMERGENCY)
Facility: HOSPITAL | Age: 72
LOS: 1 days | Discharge: HOME | End: 2017-11-25
Attending: EMERGENCY MEDICINE | Admitting: EMERGENCY MEDICINE
Payer: MEDICARE

## 2017-11-25 VITALS
HEIGHT: 62 IN | HEART RATE: 90 BPM | RESPIRATION RATE: 18 BRPM | SYSTOLIC BLOOD PRESSURE: 130 MMHG | WEIGHT: 145.06 LBS | DIASTOLIC BLOOD PRESSURE: 62 MMHG | TEMPERATURE: 98 F | OXYGEN SATURATION: 95 %

## 2017-11-25 VITALS — RESPIRATION RATE: 16 BRPM | TEMPERATURE: 99 F | OXYGEN SATURATION: 96 % | HEART RATE: 88 BPM

## 2017-11-25 DIAGNOSIS — Z98.89 OTHER SPECIFIED POSTPROCEDURAL STATES: Chronic | ICD-10-CM

## 2017-11-25 PROCEDURE — 99284 EMERGENCY DEPT VISIT MOD MDM: CPT

## 2017-11-25 PROCEDURE — 72100 X-RAY EXAM L-S SPINE 2/3 VWS: CPT

## 2017-11-25 PROCEDURE — 99283 EMERGENCY DEPT VISIT LOW MDM: CPT | Mod: 25

## 2017-11-25 PROCEDURE — 72100 X-RAY EXAM L-S SPINE 2/3 VWS: CPT | Mod: 26

## 2017-11-25 PROCEDURE — 96372 THER/PROPH/DIAG INJ SC/IM: CPT

## 2017-11-25 RX ORDER — KETOROLAC TROMETHAMINE 30 MG/ML
15 SYRINGE (ML) INJECTION ONCE
Qty: 0 | Refills: 0 | Status: DISCONTINUED | OUTPATIENT
Start: 2017-11-25 | End: 2017-11-25

## 2017-11-25 RX ADMIN — Medication 15 MILLIGRAM(S): at 12:17

## 2017-11-25 NOTE — ED PROVIDER NOTE - SKIN, MLM
old laceration 3cm left knee with erythema to border no obvious infection. ecchymosis right hand no signs of infection

## 2017-11-25 NOTE — ED PROVIDER NOTE - OBJECTIVE STATEMENT
71 y/o F pt presents to ED  c/o increase lower back pain for 2 weeks. Pt states she fell on 11/9, was seen in ED and admitted and received pain management. She was placed on medrol dose pack and tramadol, which has only helped for the neck pain; no relief for back pain. Denies new injuries, falls, trauma. Denies chest pain, SOB, abdominal pain, nausea, vomiting, diarrhea, constipation, urinary symptoms, fevers, chills, headache, numbness, tingling,. No further complaints at this time.

## 2017-11-25 NOTE — ED ADULT NURSE NOTE - OBJECTIVE STATEMENT
Pt came in for lower back pain. Pt fell November 9, 2017 and was seen by her MD. Medication was prescribed which afforded relief for her neck pain but not her lower back pain. Pt also presented with a old left knee laceration which was also sustained during her fall.

## 2017-12-04 ENCOUNTER — APPOINTMENT (OUTPATIENT)
Dept: CARDIOLOGY | Facility: CLINIC | Age: 72
End: 2017-12-04

## 2017-12-07 ENCOUNTER — APPOINTMENT (OUTPATIENT)
Dept: CARDIOLOGY | Facility: CLINIC | Age: 72
End: 2017-12-07
Payer: MEDICARE

## 2017-12-07 PROCEDURE — 93306 TTE W/DOPPLER COMPLETE: CPT

## 2017-12-08 ENCOUNTER — APPOINTMENT (OUTPATIENT)
Dept: CARDIOLOGY | Facility: CLINIC | Age: 72
End: 2017-12-08
Payer: MEDICARE

## 2017-12-08 PROCEDURE — 93015 CV STRESS TEST SUPVJ I&R: CPT

## 2017-12-08 PROCEDURE — 78452 HT MUSCLE IMAGE SPECT MULT: CPT

## 2017-12-08 PROCEDURE — A9500: CPT

## 2017-12-19 ENCOUNTER — APPOINTMENT (OUTPATIENT)
Dept: CARDIOLOGY | Facility: CLINIC | Age: 72
End: 2017-12-19
Payer: MEDICARE

## 2017-12-19 VITALS — SYSTOLIC BLOOD PRESSURE: 136 MMHG | DIASTOLIC BLOOD PRESSURE: 78 MMHG | RESPIRATION RATE: 14 BRPM

## 2017-12-19 VITALS — HEART RATE: 81 BPM | BODY MASS INDEX: 27.42 KG/M2 | WEIGHT: 149 LBS | HEIGHT: 62 IN | OXYGEN SATURATION: 94 %

## 2017-12-19 PROCEDURE — 87086 URINE CULTURE/COLONY COUNT: CPT

## 2017-12-19 PROCEDURE — 85027 COMPLETE CBC AUTOMATED: CPT

## 2017-12-19 PROCEDURE — 84443 ASSAY THYROID STIM HORMONE: CPT

## 2017-12-19 PROCEDURE — 80048 BASIC METABOLIC PNL TOTAL CA: CPT

## 2017-12-19 PROCEDURE — 82553 CREATINE MB FRACTION: CPT

## 2017-12-19 PROCEDURE — 71046 X-RAY EXAM CHEST 2 VIEWS: CPT

## 2017-12-19 PROCEDURE — 83036 HEMOGLOBIN GLYCOSYLATED A1C: CPT

## 2017-12-19 PROCEDURE — 94664 DEMO&/EVAL PT USE INHALER: CPT

## 2017-12-19 PROCEDURE — 84480 ASSAY TRIIODOTHYRONINE (T3): CPT

## 2017-12-19 PROCEDURE — 83605 ASSAY OF LACTIC ACID: CPT

## 2017-12-19 PROCEDURE — 83735 ASSAY OF MAGNESIUM: CPT

## 2017-12-19 PROCEDURE — 87040 BLOOD CULTURE FOR BACTERIA: CPT

## 2017-12-19 PROCEDURE — 36415 COLL VENOUS BLD VENIPUNCTURE: CPT

## 2017-12-19 PROCEDURE — 87633 RESP VIRUS 12-25 TARGETS: CPT

## 2017-12-19 PROCEDURE — 87798 DETECT AGENT NOS DNA AMP: CPT

## 2017-12-19 PROCEDURE — 84436 ASSAY OF TOTAL THYROXINE: CPT

## 2017-12-19 PROCEDURE — 81001 URINALYSIS AUTO W/SCOPE: CPT

## 2017-12-19 PROCEDURE — 84484 ASSAY OF TROPONIN QUANT: CPT

## 2017-12-19 PROCEDURE — 83880 ASSAY OF NATRIURETIC PEPTIDE: CPT

## 2017-12-19 PROCEDURE — 80053 COMPREHEN METABOLIC PANEL: CPT

## 2017-12-19 PROCEDURE — 99285 EMERGENCY DEPT VISIT HI MDM: CPT | Mod: 25

## 2017-12-19 PROCEDURE — 87581 M.PNEUMON DNA AMP PROBE: CPT

## 2017-12-19 PROCEDURE — 99214 OFFICE O/P EST MOD 30 MIN: CPT

## 2017-12-19 PROCEDURE — 74020: CPT

## 2017-12-19 PROCEDURE — 80061 LIPID PANEL: CPT

## 2017-12-19 PROCEDURE — 85610 PROTHROMBIN TIME: CPT

## 2017-12-19 PROCEDURE — 84100 ASSAY OF PHOSPHORUS: CPT

## 2017-12-19 PROCEDURE — 94640 AIRWAY INHALATION TREATMENT: CPT

## 2017-12-19 PROCEDURE — 87400 INFLUENZA A/B EACH AG IA: CPT

## 2017-12-19 PROCEDURE — 96374 THER/PROPH/DIAG INJ IV PUSH: CPT

## 2017-12-19 PROCEDURE — 87486 CHLMYD PNEUM DNA AMP PROBE: CPT

## 2017-12-19 PROCEDURE — 97161 PT EVAL LOW COMPLEX 20 MIN: CPT

## 2017-12-19 PROCEDURE — 85730 THROMBOPLASTIN TIME PARTIAL: CPT

## 2018-04-17 ENCOUNTER — EMERGENCY (EMERGENCY)
Facility: HOSPITAL | Age: 73
LOS: 1 days | Discharge: HOME | End: 2018-04-17
Attending: EMERGENCY MEDICINE | Admitting: EMERGENCY MEDICINE
Payer: MEDICARE

## 2018-04-17 VITALS
SYSTOLIC BLOOD PRESSURE: 110 MMHG | RESPIRATION RATE: 18 BRPM | WEIGHT: 139.99 LBS | DIASTOLIC BLOOD PRESSURE: 60 MMHG | HEART RATE: 76 BPM | OXYGEN SATURATION: 95 % | HEIGHT: 62 IN | TEMPERATURE: 98 F

## 2018-04-17 VITALS
HEART RATE: 84 BPM | RESPIRATION RATE: 16 BRPM | OXYGEN SATURATION: 95 % | DIASTOLIC BLOOD PRESSURE: 60 MMHG | SYSTOLIC BLOOD PRESSURE: 109 MMHG

## 2018-04-17 DIAGNOSIS — Z98.89 OTHER SPECIFIED POSTPROCEDURAL STATES: Chronic | ICD-10-CM

## 2018-04-17 LAB
ALBUMIN SERPL ELPH-MCNC: 3.2 G/DL — LOW (ref 3.3–5)
ALP SERPL-CCNC: 82 U/L — SIGNIFICANT CHANGE UP (ref 30–120)
ALT FLD-CCNC: 16 U/L DA — SIGNIFICANT CHANGE UP (ref 10–60)
ANION GAP SERPL CALC-SCNC: 9 MMOL/L — SIGNIFICANT CHANGE UP (ref 5–17)
APTT BLD: 30.3 SEC — SIGNIFICANT CHANGE UP (ref 27.5–37.4)
AST SERPL-CCNC: 17 U/L — SIGNIFICANT CHANGE UP (ref 10–40)
BASOPHILS # BLD AUTO: 0.2 K/UL — SIGNIFICANT CHANGE UP (ref 0–0.2)
BASOPHILS NFR BLD AUTO: 1.7 % — SIGNIFICANT CHANGE UP (ref 0–2)
BILIRUB SERPL-MCNC: 0.4 MG/DL — SIGNIFICANT CHANGE UP (ref 0.2–1.2)
BUN SERPL-MCNC: 21 MG/DL — SIGNIFICANT CHANGE UP (ref 7–23)
CALCIUM SERPL-MCNC: 9.2 MG/DL — SIGNIFICANT CHANGE UP (ref 8.4–10.5)
CHLORIDE SERPL-SCNC: 101 MMOL/L — SIGNIFICANT CHANGE UP (ref 96–108)
CK MB BLD-MCNC: 0.4 % — SIGNIFICANT CHANGE UP (ref 0–3.5)
CK MB CFR SERPL CALC: 0.3 NG/ML — SIGNIFICANT CHANGE UP (ref 0–3.6)
CK SERPL-CCNC: 67 U/L — SIGNIFICANT CHANGE UP (ref 26–192)
CO2 SERPL-SCNC: 28 MMOL/L — SIGNIFICANT CHANGE UP (ref 22–31)
CREAT SERPL-MCNC: 1.91 MG/DL — HIGH (ref 0.5–1.3)
D DIMER BLD IA.RAPID-MCNC: <150 NG/ML DDU — SIGNIFICANT CHANGE UP
EOSINOPHIL # BLD AUTO: 0.2 K/UL — SIGNIFICANT CHANGE UP (ref 0–0.5)
EOSINOPHIL NFR BLD AUTO: 2.1 % — SIGNIFICANT CHANGE UP (ref 0–6)
GLUCOSE SERPL-MCNC: 108 MG/DL — HIGH (ref 70–99)
HCT VFR BLD CALC: 33.2 % — LOW (ref 34.5–45)
HGB BLD-MCNC: 11.2 G/DL — LOW (ref 11.5–15.5)
INR BLD: 1.14 RATIO — SIGNIFICANT CHANGE UP (ref 0.88–1.16)
LIDOCAIN IGE QN: 172 U/L — SIGNIFICANT CHANGE UP (ref 73–393)
LYMPHOCYTES # BLD AUTO: 0.8 K/UL — LOW (ref 1–3.3)
LYMPHOCYTES # BLD AUTO: 8.7 % — LOW (ref 13–44)
MCHC RBC-ENTMCNC: 31 PG — SIGNIFICANT CHANGE UP (ref 27–34)
MCHC RBC-ENTMCNC: 33.5 GM/DL — SIGNIFICANT CHANGE UP (ref 32–36)
MCV RBC AUTO: 92.6 FL — SIGNIFICANT CHANGE UP (ref 80–100)
MONOCYTES # BLD AUTO: 0.9 K/UL — SIGNIFICANT CHANGE UP (ref 0–0.9)
MONOCYTES NFR BLD AUTO: 9.6 % — SIGNIFICANT CHANGE UP (ref 2–14)
NEUTROPHILS # BLD AUTO: 7.1 K/UL — SIGNIFICANT CHANGE UP (ref 1.8–7.4)
NEUTROPHILS NFR BLD AUTO: 77.8 % — HIGH (ref 43–77)
NT-PROBNP SERPL-SCNC: 273 PG/ML — HIGH (ref 0–125)
PLATELET # BLD AUTO: 240 K/UL — SIGNIFICANT CHANGE UP (ref 150–400)
POTASSIUM SERPL-MCNC: 3.9 MMOL/L — SIGNIFICANT CHANGE UP (ref 3.5–5.3)
POTASSIUM SERPL-SCNC: 3.9 MMOL/L — SIGNIFICANT CHANGE UP (ref 3.5–5.3)
PROT SERPL-MCNC: 6.6 G/DL — SIGNIFICANT CHANGE UP (ref 6–8.3)
PROTHROM AB SERPL-ACNC: 12.5 SEC — SIGNIFICANT CHANGE UP (ref 9.8–12.7)
RBC # BLD: 3.59 M/UL — LOW (ref 3.8–5.2)
RBC # FLD: 13.8 % — SIGNIFICANT CHANGE UP (ref 10.3–14.5)
SODIUM SERPL-SCNC: 138 MMOL/L — SIGNIFICANT CHANGE UP (ref 135–145)
TROPONIN I SERPL-MCNC: 0 NG/ML — LOW (ref 0.02–0.06)
WBC # BLD: 9.1 K/UL — SIGNIFICANT CHANGE UP (ref 3.8–10.5)
WBC # FLD AUTO: 9.1 K/UL — SIGNIFICANT CHANGE UP (ref 3.8–10.5)

## 2018-04-17 PROCEDURE — 83880 ASSAY OF NATRIURETIC PEPTIDE: CPT

## 2018-04-17 PROCEDURE — 99285 EMERGENCY DEPT VISIT HI MDM: CPT

## 2018-04-17 PROCEDURE — 99284 EMERGENCY DEPT VISIT MOD MDM: CPT | Mod: 25

## 2018-04-17 PROCEDURE — 74176 CT ABD & PELVIS W/O CONTRAST: CPT

## 2018-04-17 PROCEDURE — 85730 THROMBOPLASTIN TIME PARTIAL: CPT

## 2018-04-17 PROCEDURE — 80053 COMPREHEN METABOLIC PANEL: CPT

## 2018-04-17 PROCEDURE — 85610 PROTHROMBIN TIME: CPT

## 2018-04-17 PROCEDURE — 96374 THER/PROPH/DIAG INJ IV PUSH: CPT

## 2018-04-17 PROCEDURE — 83690 ASSAY OF LIPASE: CPT

## 2018-04-17 PROCEDURE — 74176 CT ABD & PELVIS W/O CONTRAST: CPT | Mod: 26

## 2018-04-17 PROCEDURE — 71045 X-RAY EXAM CHEST 1 VIEW: CPT | Mod: 26

## 2018-04-17 PROCEDURE — 82553 CREATINE MB FRACTION: CPT

## 2018-04-17 PROCEDURE — 71045 X-RAY EXAM CHEST 1 VIEW: CPT

## 2018-04-17 PROCEDURE — 93010 ELECTROCARDIOGRAM REPORT: CPT

## 2018-04-17 PROCEDURE — 82550 ASSAY OF CK (CPK): CPT

## 2018-04-17 PROCEDURE — 84484 ASSAY OF TROPONIN QUANT: CPT

## 2018-04-17 PROCEDURE — 85027 COMPLETE CBC AUTOMATED: CPT

## 2018-04-17 PROCEDURE — 86140 C-REACTIVE PROTEIN: CPT

## 2018-04-17 PROCEDURE — 93005 ELECTROCARDIOGRAM TRACING: CPT

## 2018-04-17 PROCEDURE — 85379 FIBRIN DEGRADATION QUANT: CPT

## 2018-04-17 RX ORDER — SODIUM CHLORIDE 9 MG/ML
3 INJECTION INTRAMUSCULAR; INTRAVENOUS; SUBCUTANEOUS ONCE
Qty: 0 | Refills: 0 | Status: COMPLETED | OUTPATIENT
Start: 2018-04-17 | End: 2018-04-17

## 2018-04-17 RX ORDER — IOHEXOL 300 MG/ML
30 INJECTION, SOLUTION INTRAVENOUS ONCE
Qty: 0 | Refills: 0 | Status: COMPLETED | OUTPATIENT
Start: 2018-04-17 | End: 2018-04-17

## 2018-04-17 RX ORDER — METOCLOPRAMIDE HCL 10 MG
10 TABLET ORAL ONCE
Qty: 0 | Refills: 0 | Status: COMPLETED | OUTPATIENT
Start: 2018-04-17 | End: 2018-04-17

## 2018-04-17 RX ADMIN — Medication 10 MILLIGRAM(S): at 15:09

## 2018-04-17 RX ADMIN — SODIUM CHLORIDE 3 MILLILITER(S): 9 INJECTION INTRAMUSCULAR; INTRAVENOUS; SUBCUTANEOUS at 15:34

## 2018-04-17 RX ADMIN — IOHEXOL 30 MILLILITER(S): 300 INJECTION, SOLUTION INTRAVENOUS at 15:33

## 2018-04-17 NOTE — ED ADULT NURSE NOTE - OBJECTIVE STATEMENT
Amb to Ed. Pt c/o abd discomfort Amb to Ed. Pt c/o abd discomfort with vomiting. this am felt very weak while getting her nails done 'I almost passed out' color fair. skin warm & dry to touch. lungs clear. abd soft nontender. cm- RSR without ectopics noted.

## 2018-04-17 NOTE — ED PROVIDER NOTE - OBJECTIVE STATEMENT
71 y/o F pt presents to ED c/o worsening abdominal pain associated with vomiting, diaphoresis. Pain rated at 8/10. Per  pt was getting her nails done had a near-syncopal episode.  took pt to eat at the diner; soon after seen at Dr. Galan's office and vomited. Sent to ED by Dr. Galan for further evaluation. Per  yesterday pt wasn't feeling well; laid in bed all day with decrease appetite. Pt notes she's had the abdominal pain for a while. Denies fevers, chills, diarrhea, chest pain, SOB. No further complaints at this time.

## 2018-04-17 NOTE — ED PROVIDER NOTE - MEDICAL DECISION MAKING DETAILS
determine the etiology of abdominal pain and near syncope episode. Correlate with lab and imaging study.

## 2018-04-18 LAB — CRP SERPL-MCNC: 2 MG/DL — HIGH (ref 0–0.4)

## 2018-04-19 DIAGNOSIS — R10.9 UNSPECIFIED ABDOMINAL PAIN: ICD-10-CM

## 2018-07-17 ENCOUNTER — APPOINTMENT (OUTPATIENT)
Dept: SURGERY | Facility: CLINIC | Age: 73
End: 2018-07-17
Payer: MEDICARE

## 2018-07-17 DIAGNOSIS — Z86.73 PERSONAL HISTORY OF TRANSIENT ISCHEMIC ATTACK (TIA), AND CEREBRAL INFARCTION W/OUT RESIDUAL DEFICITS: ICD-10-CM

## 2018-07-17 PROCEDURE — 99205 OFFICE O/P NEW HI 60 MIN: CPT

## 2018-07-25 ENCOUNTER — FORM ENCOUNTER (OUTPATIENT)
Age: 73
End: 2018-07-25

## 2018-07-26 ENCOUNTER — OUTPATIENT (OUTPATIENT)
Dept: OUTPATIENT SERVICES | Facility: HOSPITAL | Age: 73
LOS: 1 days | End: 2018-07-26
Payer: MEDICARE

## 2018-07-26 VITALS
RESPIRATION RATE: 16 BRPM | SYSTOLIC BLOOD PRESSURE: 126 MMHG | TEMPERATURE: 98 F | HEART RATE: 75 BPM | HEIGHT: 62 IN | DIASTOLIC BLOOD PRESSURE: 64 MMHG | WEIGHT: 151.02 LBS

## 2018-07-26 DIAGNOSIS — K80.10 CALCULUS OF GALLBLADDER WITH CHRONIC CHOLECYSTITIS WITHOUT OBSTRUCTION: ICD-10-CM

## 2018-07-26 DIAGNOSIS — Z98.89 OTHER SPECIFIED POSTPROCEDURAL STATES: Chronic | ICD-10-CM

## 2018-07-26 DIAGNOSIS — Z01.818 ENCOUNTER FOR OTHER PREPROCEDURAL EXAMINATION: ICD-10-CM

## 2018-07-26 DIAGNOSIS — Z98.890 OTHER SPECIFIED POSTPROCEDURAL STATES: Chronic | ICD-10-CM

## 2018-07-26 LAB
ALBUMIN SERPL ELPH-MCNC: 2.9 G/DL — LOW (ref 3.3–5)
ALP SERPL-CCNC: 72 U/L — SIGNIFICANT CHANGE UP (ref 40–120)
ALT FLD-CCNC: 14 U/L — SIGNIFICANT CHANGE UP (ref 12–78)
ANION GAP SERPL CALC-SCNC: 6 MMOL/L — SIGNIFICANT CHANGE UP (ref 5–17)
AST SERPL-CCNC: 14 U/L — LOW (ref 15–37)
BILIRUB SERPL-MCNC: 0.4 MG/DL — SIGNIFICANT CHANGE UP (ref 0.2–1.2)
BUN SERPL-MCNC: 22 MG/DL — SIGNIFICANT CHANGE UP (ref 7–23)
CALCIUM SERPL-MCNC: 8.6 MG/DL — SIGNIFICANT CHANGE UP (ref 8.5–10.1)
CHLORIDE SERPL-SCNC: 110 MMOL/L — HIGH (ref 96–108)
CO2 SERPL-SCNC: 28 MMOL/L — SIGNIFICANT CHANGE UP (ref 22–31)
CREAT SERPL-MCNC: 1 MG/DL — SIGNIFICANT CHANGE UP (ref 0.5–1.3)
GLUCOSE SERPL-MCNC: 85 MG/DL — SIGNIFICANT CHANGE UP (ref 70–99)
HCT VFR BLD CALC: 33 % — LOW (ref 34.5–45)
HGB BLD-MCNC: 10.3 G/DL — LOW (ref 11.5–15.5)
MCHC RBC-ENTMCNC: 30.4 PG — SIGNIFICANT CHANGE UP (ref 27–34)
MCHC RBC-ENTMCNC: 31.2 GM/DL — LOW (ref 32–36)
MCV RBC AUTO: 97.3 FL — SIGNIFICANT CHANGE UP (ref 80–100)
NRBC # BLD: 0 /100 WBCS — SIGNIFICANT CHANGE UP (ref 0–0)
PLATELET # BLD AUTO: 329 K/UL — SIGNIFICANT CHANGE UP (ref 150–400)
POTASSIUM SERPL-MCNC: 4.7 MMOL/L — SIGNIFICANT CHANGE UP (ref 3.5–5.3)
POTASSIUM SERPL-SCNC: 4.7 MMOL/L — SIGNIFICANT CHANGE UP (ref 3.5–5.3)
PROT SERPL-MCNC: 6.3 G/DL — SIGNIFICANT CHANGE UP (ref 6–8.3)
RBC # BLD: 3.39 M/UL — LOW (ref 3.8–5.2)
RBC # FLD: 16.4 % — HIGH (ref 10.3–14.5)
SODIUM SERPL-SCNC: 144 MMOL/L — SIGNIFICANT CHANGE UP (ref 135–145)
WBC # BLD: 9.91 K/UL — SIGNIFICANT CHANGE UP (ref 3.8–10.5)
WBC # FLD AUTO: 9.91 K/UL — SIGNIFICANT CHANGE UP (ref 3.8–10.5)

## 2018-07-26 PROCEDURE — 93010 ELECTROCARDIOGRAM REPORT: CPT | Mod: NC

## 2018-07-26 PROCEDURE — 86900 BLOOD TYPING SEROLOGIC ABO: CPT

## 2018-07-26 PROCEDURE — 86901 BLOOD TYPING SEROLOGIC RH(D): CPT

## 2018-07-26 PROCEDURE — G0463: CPT

## 2018-07-26 PROCEDURE — 86850 RBC ANTIBODY SCREEN: CPT

## 2018-07-26 PROCEDURE — 80053 COMPREHEN METABOLIC PANEL: CPT

## 2018-07-26 PROCEDURE — 85027 COMPLETE CBC AUTOMATED: CPT

## 2018-07-26 PROCEDURE — 93005 ELECTROCARDIOGRAM TRACING: CPT

## 2018-07-26 PROCEDURE — 71046 X-RAY EXAM CHEST 2 VIEWS: CPT

## 2018-07-26 PROCEDURE — 71046 X-RAY EXAM CHEST 2 VIEWS: CPT | Mod: 26

## 2018-07-26 RX ORDER — CYCLOBENZAPRINE HYDROCHLORIDE 10 MG/1
0 TABLET, FILM COATED ORAL
Qty: 0 | Refills: 0 | COMMUNITY

## 2018-07-26 NOTE — H&P PST ADULT - HISTORY OF PRESENT ILLNESS
73 yo female with HTN, COPD and RA, presents to [facility] scheduled for Laparoscopic cholecystectomy on 8/6 with Dr. Smart. C/O abdominal tenderness after being sick for one week with dehydration. U/S found gallstones.

## 2018-07-26 NOTE — H&P PST ADULT - OTHER CARE PROVIDERS
Dr. Galan ([facility]) Dr. Galan ([facility]) Dr. Barrera ([facility])

## 2018-07-26 NOTE — H&P PST ADULT - PROBLEM SELECTOR PLAN 2
Labs CBC, CMP, T&S and EKG, CXR  MC with Dr. Fernandez  Pre op and Hibiclens instructions reviewed and given. Take routine am meds DOS with sip of water. Hold water pill DOS. Use inhalers. Avoid NSAIDs and OTC supplements. Verbalized understanding

## 2018-07-26 NOTE — H&P PST ADULT - PMH
Asthma    Chronic obstructive pulmonary disease, unspecified COPD type    Esophageal reflux    Essential hypertension    Hiatal hernia with GERD    HLD (hyperlipidemia)    Hyperlipidemia, unspecified hyperlipidemia type    Lung cancer  Right lung.  Rheumatoid arthritis    TIA (transient ischemic attack)  2012 Asthma    Calculus of gallbladder with chronic cholecystitis without obstruction    Chronic obstructive pulmonary disease, unspecified COPD type    Esophageal reflux    Essential hypertension    Hiatal hernia with GERD    HLD (hyperlipidemia)    Hyperlipidemia, unspecified hyperlipidemia type    Lung cancer  Right lung.  Rheumatoid arthritis    TIA (transient ischemic attack)  2012

## 2018-07-31 DIAGNOSIS — J44.9 CHRONIC OBSTRUCTIVE PULMONARY DISEASE, UNSPECIFIED: ICD-10-CM

## 2018-07-31 DIAGNOSIS — M06.9 RHEUMATOID ARTHRITIS, UNSPECIFIED: ICD-10-CM

## 2018-07-31 DIAGNOSIS — Z01.818 ENCOUNTER FOR OTHER PREPROCEDURAL EXAMINATION: ICD-10-CM

## 2018-07-31 DIAGNOSIS — K80.10 CALCULUS OF GALLBLADDER WITH CHRONIC CHOLECYSTITIS WITHOUT OBSTRUCTION: ICD-10-CM

## 2018-08-05 ENCOUNTER — TRANSCRIPTION ENCOUNTER (OUTPATIENT)
Age: 73
End: 2018-08-05

## 2018-08-06 ENCOUNTER — APPOINTMENT (OUTPATIENT)
Dept: SURGERY | Facility: HOSPITAL | Age: 73
End: 2018-08-06

## 2018-08-06 ENCOUNTER — RESULT REVIEW (OUTPATIENT)
Age: 73
End: 2018-08-06

## 2018-08-06 ENCOUNTER — OUTPATIENT (OUTPATIENT)
Dept: OUTPATIENT SERVICES | Facility: HOSPITAL | Age: 73
LOS: 1 days | End: 2018-08-06
Payer: MEDICARE

## 2018-08-06 VITALS
HEART RATE: 80 BPM | DIASTOLIC BLOOD PRESSURE: 60 MMHG | OXYGEN SATURATION: 95 % | RESPIRATION RATE: 16 BRPM | SYSTOLIC BLOOD PRESSURE: 140 MMHG

## 2018-08-06 VITALS
HEIGHT: 62 IN | WEIGHT: 151.02 LBS | HEART RATE: 84 BPM | SYSTOLIC BLOOD PRESSURE: 163 MMHG | TEMPERATURE: 99 F | RESPIRATION RATE: 14 BRPM | DIASTOLIC BLOOD PRESSURE: 70 MMHG | OXYGEN SATURATION: 97 %

## 2018-08-06 DIAGNOSIS — M06.9 RHEUMATOID ARTHRITIS, UNSPECIFIED: ICD-10-CM

## 2018-08-06 DIAGNOSIS — E78.5 HYPERLIPIDEMIA, UNSPECIFIED: ICD-10-CM

## 2018-08-06 DIAGNOSIS — J44.9 CHRONIC OBSTRUCTIVE PULMONARY DISEASE, UNSPECIFIED: ICD-10-CM

## 2018-08-06 DIAGNOSIS — K80.10 CALCULUS OF GALLBLADDER WITH CHRONIC CHOLECYSTITIS WITHOUT OBSTRUCTION: ICD-10-CM

## 2018-08-06 DIAGNOSIS — Z98.89 OTHER SPECIFIED POSTPROCEDURAL STATES: Chronic | ICD-10-CM

## 2018-08-06 DIAGNOSIS — Z98.890 OTHER SPECIFIED POSTPROCEDURAL STATES: Chronic | ICD-10-CM

## 2018-08-06 DIAGNOSIS — J45.909 UNSPECIFIED ASTHMA, UNCOMPLICATED: ICD-10-CM

## 2018-08-06 DIAGNOSIS — I10 ESSENTIAL (PRIMARY) HYPERTENSION: ICD-10-CM

## 2018-08-06 DIAGNOSIS — C34.90 MALIGNANT NEOPLASM OF UNSPECIFIED PART OF UNSPECIFIED BRONCHUS OR LUNG: ICD-10-CM

## 2018-08-06 DIAGNOSIS — Z01.818 ENCOUNTER FOR OTHER PREPROCEDURAL EXAMINATION: ICD-10-CM

## 2018-08-06 PROCEDURE — 47562 LAPAROSCOPIC CHOLECYSTECTOMY: CPT | Mod: AS

## 2018-08-06 PROCEDURE — 88304 TISSUE EXAM BY PATHOLOGIST: CPT

## 2018-08-06 PROCEDURE — 47562 LAPAROSCOPIC CHOLECYSTECTOMY: CPT

## 2018-08-06 PROCEDURE — 88304 TISSUE EXAM BY PATHOLOGIST: CPT | Mod: 26

## 2018-08-06 RX ORDER — OXYCODONE HYDROCHLORIDE 5 MG/1
5 TABLET ORAL ONCE
Qty: 0 | Refills: 0 | Status: DISCONTINUED | OUTPATIENT
Start: 2018-08-06 | End: 2018-08-06

## 2018-08-06 RX ORDER — SODIUM CHLORIDE 9 MG/ML
1000 INJECTION, SOLUTION INTRAVENOUS
Qty: 0 | Refills: 0 | Status: DISCONTINUED | OUTPATIENT
Start: 2018-08-06 | End: 2018-08-06

## 2018-08-06 RX ORDER — GABAPENTIN 400 MG/1
1 CAPSULE ORAL
Qty: 0 | Refills: 0 | COMMUNITY

## 2018-08-06 RX ORDER — HYDROMORPHONE HYDROCHLORIDE 2 MG/ML
0.5 INJECTION INTRAMUSCULAR; INTRAVENOUS; SUBCUTANEOUS
Qty: 0 | Refills: 0 | Status: DISCONTINUED | OUTPATIENT
Start: 2018-08-06 | End: 2018-08-06

## 2018-08-06 RX ORDER — DIPHENHYDRAMINE HCL 50 MG
12.5 CAPSULE ORAL ONCE
Qty: 0 | Refills: 0 | Status: COMPLETED | OUTPATIENT
Start: 2018-08-06 | End: 2018-08-06

## 2018-08-06 RX ORDER — DIPHENHYDRAMINE HCL 50 MG
25 CAPSULE ORAL ONCE
Qty: 0 | Refills: 0 | Status: COMPLETED | OUTPATIENT
Start: 2018-08-06 | End: 2018-08-06

## 2018-08-06 RX ORDER — METOCLOPRAMIDE HCL 10 MG
5 TABLET ORAL ONCE
Qty: 0 | Refills: 0 | Status: DISCONTINUED | OUTPATIENT
Start: 2018-08-06 | End: 2018-08-06

## 2018-08-06 RX ADMIN — SODIUM CHLORIDE 100 MILLILITER(S): 9 INJECTION, SOLUTION INTRAVENOUS at 11:52

## 2018-08-06 RX ADMIN — Medication 25 MILLIGRAM(S): at 13:46

## 2018-08-06 RX ADMIN — SODIUM CHLORIDE 50 MILLILITER(S): 9 INJECTION, SOLUTION INTRAVENOUS at 08:40

## 2018-08-06 RX ADMIN — Medication 12.5 MILLIGRAM(S): at 12:58

## 2018-08-06 RX ADMIN — HYDROMORPHONE HYDROCHLORIDE 0.5 MILLIGRAM(S): 2 INJECTION INTRAMUSCULAR; INTRAVENOUS; SUBCUTANEOUS at 11:51

## 2018-08-06 RX ADMIN — HYDROMORPHONE HYDROCHLORIDE 0.5 MILLIGRAM(S): 2 INJECTION INTRAMUSCULAR; INTRAVENOUS; SUBCUTANEOUS at 12:46

## 2018-08-06 RX ADMIN — HYDROMORPHONE HYDROCHLORIDE 0.5 MILLIGRAM(S): 2 INJECTION INTRAMUSCULAR; INTRAVENOUS; SUBCUTANEOUS at 12:04

## 2018-08-06 RX ADMIN — HYDROMORPHONE HYDROCHLORIDE 0.5 MILLIGRAM(S): 2 INJECTION INTRAMUSCULAR; INTRAVENOUS; SUBCUTANEOUS at 12:24

## 2018-08-06 NOTE — PROGRESS NOTE ADULT - SUBJECTIVE AND OBJECTIVE BOX
PULMONARY/CRITICAL CARE      INTERVAL HPI/OVERNIGHT EVENTS:    72y FemaleHPI:  Pt. well known to me for Cholecystectomy.      PAST MEDICAL & SURGICAL HISTORY:  Calculus of gallbladder with chronic cholecystitis without obstruction  Hyperlipidemia, unspecified hyperlipidemia type  Essential hypertension  Hiatal hernia with GERD  Chronic obstructive pulmonary disease, unspecified COPD type/asthma  Lung cancer: Right lung.  Esophageal reflux  TIA (transient ischemic attack): 2012  Rheumatoid arthritis  HLD (hyperlipidemia)  Asthma  History of endoscopy: Sept 2017  H/O colonoscopy  S/P lobectomy of lung: Right lung in 1996.      Fam and Soc: no recents cigs, etoh.  ICU Vital Signs Last 24 Hrs  T(C): --  T(F): --  HR: --  BP: --  BP(mean): --  ABP: --  ABP(mean): --  RR: --  SpO2: --    Qtts:     I&O's Summary          REVIEW OF SYSTEMS:    CONSTITUTIONAL: No fever, weight loss, or fatigue  EYES: No eye pain, visual disturbances, or discharge  ENMT:  No difficulty hearing, tinnitus, vertigo; No sinus or throat pain  NECK: No pain or stiffness  BREASTS: No pain, masses, or nipple discharge  RESPIRATORY: No cough, wheezing, chills or hemoptysis; No shortness of breath  CARDIOVASCULAR: No chest pain, palpitations, dizziness, or leg swelling  GASTROINTESTINAL: mild ruq abdominal pain. No nausea, vomiting, or hematemesis; No diarrhea or constipation. No melena or hematochezia.  GENITOURINARY: No dysuria, frequency, hematuria, or incontinence  NEUROLOGICAL: No headaches, memory loss, loss of strength, numbness, or tremors  SKIN: No itching, burning, rashes, or lesions   LYMPH NODES: No enlarged glands  ENDOCRINE: No heat or cold intolerance; No hair loss  MUSCULOSKELETAL: No joint pain or swelling; No muscle, back, or extremity pain, no calf tenderness  PSYCHIATRIC: No depression, anxiety, mood swings, or difficulty sleeping  HEME/LYMPH: No easy bruising, or bleeding gums  ALLERGY AND IMMUNOLOGIC: No hives or eczema      PHYSICAL EXAM:    GENERAL: NAD, well-groomed, well-developed, NAD  HEAD:  Atraumatic, Normocephalic  EYES: EOMI, PERRLA, conjunctiva and sclera clear  ENMT: No tonsillar erythema, exudates, or enlargement; Moist mucous membranes, Good dentition, No lesions  NECK: Supple, No JVD, Normal thyroid  NERVOUS SYSTEM:  Alert & Oriented X3, Good concentration; Motor Strength 5/5 B/L upper and lower extremities  CHEST/LUNG: Clear to percussion bilaterally; No rales, rhonchi, wheezing, or rubs : Thoracoplasty scar right  HEART: Regular rate and rhythm; No murmurs, rubs, or gallops  ABDOMEN: Soft, Nontender, Nondistended; Bowel sounds present  EXTREMITIES:  2+ Peripheral Pulses, No clubbing, cyanosis, or edema  LYMPH: No lymphadenopathy noted  SKIN: No rashes or lesions        LABS:                vanco through     RADIOLOGY & ADDITIONAL STUDIES:    < from: Xray Chest 2 Views PA/Lat (07.26.18 @ 11:05) >  EXAM:  XR CHEST PA LAT 2V                            PROCEDURE DATE:  07/26/2018          INTERPRETATION:      Clinical history: Preoperative, gallbladder surgery.    PA and lateral views are submitted.     Comparison: none        Postsurgical changes are present right hemithorax, with volume loss and   rib cage deformity. There is ipsilateral cardiomediastinal shift.  There is probable chronic scarring/fibrosis right lower lung   zone/costophrenic angle.    The left lung is clear.          Impression:    Findings as discussed above.    < end of copied text >  < from: CT Abdomen and Pelvis w/ Oral Cont (04.17.18 @ 18:07) >  EXAM:  CT ABDOMEN AND PELVIS OC                                  PROCEDURE DATE:  04/17/2018          INTERPRETATION:  Clinical information: Abdominal pain    Axial images obtained, coronal and sagittal images computer reformatted.    Oral contrastmaterial administered. IV contrast material not   administered limiting evaluation. No prior studies present for comparison    Stranding, chronic changes right lung base and minimally at the left lung   base. No drainable pleural effusion.    Unenhanced liver spleen adrenal glands pancreas unremarkable. Minimal   stranding, thickening inferior to the pancreas. Gallbladder contains   stones. Normal caliber abdominal aorta. No retroperitoneal   lymphadenopathy. No ascites. No biliary ductal dilatation.    No hydronephrotic changes. Trace left perinephric stranding. 2 mm   parenchymal calcification upper pole left kidney. 3 mm calcification,   right kidney may be in the renal pelvis? Correlate with sonography.    No bowel obstruction. No appendicitis. Diverticulosis present.    Urinary bladder is filled, no stones evident. No free pelvic fluid   identified. Inguinal regions intact. Kyphoplasty L4. Posterior spurring   L5-S1. Posterior spurring L1-L2.    IMPRESSION:  1. No bowel obstruction  2. No hydronephrosis, follow-up with renal sonography, see above report                  SHU WELLINGTON M.D.,ATTENDING RADIOLOGIST  This document has been electronically signed. Apr 17 2018  6:16PM              < end of copied text >    CRITICAL CARE TIME SPENT:

## 2018-08-06 NOTE — ASU DISCHARGE PLAN (ADULT/PEDIATRIC). - MEDICATION SUMMARY - MEDICATIONS TO TAKE
I will START or STAY ON the medications listed below when I get home from the hospital:    Aspirin Enteric Coated 81 mg oral delayed release tablet  -- 1 tab(s) by mouth once a day  -- home  -- Indication: For Continuing home medications    oxyCODONE-acetaminophen 2.5 mg-325 mg oral tablet  -- 1 tab(s) by mouth every 6 hours, As Needed -for severe pain MDD:4   -- Caution federal law prohibits the transfer of this drug to any person other  than the person for whom it was prescribed.  May cause drowsiness.  Alcohol may intensify this effect.  Use care when operating dangerous machinery.  This prescription cannot be refilled.  This product contains acetaminophen.  Do not use  with any other product containing acetaminophen to prevent possible liver damage.  Using more of this medication than prescribed may cause serious breathing problems.    -- Indication: For moderate to severe pain    Crestor 5 mg oral tablet  -- 1 tab(s) by mouth once a day  -- Indication: For Continuing home medications    olmesartan-hydrochlorothiazide 20 mg-12.5 mg oral tablet  -- 1 tab(s) by mouth once a day  -- Indication: For Continuing home medications    Requip 2 mg oral tablet  -- 1  by mouth 2 times a day  -- Indication: For Continuing home medications    Spiriva 18 mcg inhalation capsule  -- 2 cap(s) inhaled 2 times a day  -- Indication: For Continuing home medications    Symbicort  -- 2 puff(s) inhaled 2 times a day  -- home  -- Indication: For Continuing home medications    amLODIPine 5 mg oral tablet  -- 1 tab(s) by mouth once a day  -- Indication: For Continuing home medications    Arava 20 mg oral tablet  -- 1 tab(s) by mouth once a day  -- home  -- Indication: For Continuing home medications    Singulair 10 mg oral tablet  -- 1 tab(s) by mouth once a day (in the evening)  -- home  -- Indication: For Continuing home medications    azelastine-fluticasone 137 mcg-50 mcg/inh nasal spray  -- 1 spray(s) into nose 2 times a day  -- Indication: For Continuing home medications    Acidophilus oral tablet  -- 2 tab(s) by mouth once a day  -- Indication: For Continuing home medications    omeprazole 40 mg oral delayed release capsule  -- 1 cap(s) by mouth once a day  -- Indication: For Continuing home medications    Calcium 600+D oral tablet  -- 2 tab(s) by mouth once a day  -- Indication: For Continuing home medications    Vitamin B-12 1000 mcg oral tablet  -- 1 tab(s) by mouth once a day  -- Indication: For Continuing home medications    Vitamin C 1000 mg oral tablet  -- 1 tab(s) by mouth once a day  -- Indication: For Continuing home medications

## 2018-08-06 NOTE — ASU PATIENT PROFILE, ADULT - PMH
Asthma    Calculus of gallbladder with chronic cholecystitis without obstruction    Chronic obstructive pulmonary disease, unspecified COPD type    Esophageal reflux    Essential hypertension    Hiatal hernia with GERD    HLD (hyperlipidemia)    Hyperlipidemia, unspecified hyperlipidemia type    Lung cancer  Right lung.  Rheumatoid arthritis    TIA (transient ischemic attack)  2012

## 2018-08-06 NOTE — ASU DISCHARGE PLAN (ADULT/PEDIATRIC). - ACTIVITY LEVEL
no sports/gym/no tub baths/no heavy lifting/ambulation no sports/gym/no heavy lifting/no tub baths/ambulation, incentive spirometer

## 2018-08-06 NOTE — ASU DISCHARGE PLAN (ADULT/PEDIATRIC). - SPECIAL INSTRUCTIONS
Ice packs to abdomen and shoulder  Pain meds as per MD  Take an over the counter stool softener like Colace to avoid constipation while taking a narcotic for pain Ice packs to abdomen and shoulder  Pain meds as per MD  Take an over the counter stool softener like Colace to avoid constipation while taking a narcotic for pain  incentive spirometry 10 times hourly while awake for the next week

## 2018-08-06 NOTE — ASU PREOP CHECKLIST - AICD PRESENT
Patient Vitals for the past 12 hrs:   Temp Pulse Resp BP SpO2   05/28/18 0827 97.7 °F (36.5 °C) (!) 111 18 143/58 97 %   05/28/18 0304 98.5 °F (36.9 °C) (!) 108 18 100/55 97 %   05/27/18 2352 97.9 °F (36.6 °C) (!) 110 18 127/80 94 %       POD 1 R hemicolectomy  She says pain is well controlled but c/o being sleepy  Laying in bed- osorio still in place- has only been up to get on the scale for weight per family  abd is obese, dressings intact with minimal staining    Overall stable  Will dc osorio and try to mobilize her today  Labs reviewed          CBC WITH AUTOMATED DIFF    Collection Time: 05/28/18  8:55 AM   Result Value Ref Range    WBC 15.6 (H) 3.6 - 11.0 K/uL    RBC 5.15 3.80 - 5.20 M/uL    HGB 14.5 11.5 - 16.0 g/dL    HCT 45.7 35.0 - 47.0 %    MCV 88.7 80.0 - 99.0 FL    MCH 28.2 26.0 - 34.0 PG    MCHC 31.7 30.0 - 36.5 g/dL    RDW 13.7 11.5 - 14.5 %    PLATELET 307 724 - 869 K/uL    MPV 10.1 8.9 - 12.9 FL    NRBC 0.0 0  WBC    ABSOLUTE NRBC 0.00 0.00 - 0.01 K/uL    NEUTROPHILS 85 (H) 32 - 75 %    BAND NEUTROPHILS 4 0 - 6 %    LYMPHOCYTES 6 (L) 12 - 49 %    MONOCYTES 5 5 - 13 %    EOSINOPHILS 0 0 - 7 %    BASOPHILS 0 0 - 1 %    IMMATURE GRANULOCYTES 0 %    ABS. NEUTROPHILS 13.9 (H) 1.8 - 8.0 K/UL    ABS. LYMPHOCYTES 0.9 0.8 - 3.5 K/UL    ABS. MONOCYTES 0.8 0.0 - 1.0 K/UL    ABS. EOSINOPHILS 0.0 0.0 - 0.4 K/UL    ABS. BASOPHILS 0.0 0.0 - 0.1 K/UL    ABS. IMM.  GRANS. 0.0 K/UL    DF MANUAL      RBC COMMENTS MALLORIE CELLS  PRESENT        RBC COMMENTS ANISOCYTOSIS  1+       CBC W/O DIFF    Collection Time: 05/28/18  3:26 AM   Result Value Ref Range    WBC 15.7 (H) 3.6 - 11.0 K/uL    RBC 5.28 (H) 3.80 - 5.20 M/uL    HGB 15.2 11.5 - 16.0 g/dL    HCT 51.1 (H) 35.0 - 47.0 %    MCV 96.8 80.0 - 99.0 FL    MCH 28.8 26.0 - 34.0 PG    MCHC 29.7 (L) 30.0 - 36.5 g/dL    RDW 13.8 11.5 - 14.5 %    PLATELET 989 914 - 461 K/uL    MPV 10.2 8.9 - 12.9 FL    NRBC 0.0 0  WBC    ABSOLUTE NRBC 0.00 0.00 - 0.01 K/uL Date 05/27/18 0700 - 05/28/18 0659 05/28/18 0700 - 05/29/18 0659   Shift 6266-5905 9169-8700 24 Hour Total 4011-2009 2332-3821 24 Hour Total   I  N  T  A  K  E   I.V.  (mL/kg/hr) 1895  (1.9)  1895  (1)         Volume (0.45% sodium chloride with KCl 20 mEq/L infusion) 395  395         Volume (lactated Ringers infusion) 1500  1500       Shift Total  (mL/kg) 1895  (23)  1895  (22.9)      O  U  T  P  U  T   Urine  (mL/kg/hr) 175  (0.2)  175  (0.1) 700  700      Urine Output 100  100         Urine Output (mL) (Urinary Catheter 05/27/18 2- way) 75  75 700  700    Blood 200  200         Estimated Blood Loss 200  200       Shift Total  (mL/kg) 375  (4.5)  375  (4.5) 700  (8.5)  700  (8.5)   NET 1520  1520 -700  -700   Weight (kg) 82.6 82.8 82.8 82.8 82.8 82.8 no

## 2018-08-06 NOTE — BRIEF OPERATIVE NOTE - PROCEDURE
<<-----Click on this checkbox to enter Procedure Cholecystectomy, laparoscopic  08/06/2018    Active  DONALD

## 2018-08-06 NOTE — BRIEF OPERATIVE NOTE - PRE-OP DX
Calculus of gallbladder with chronic cholecystitis without obstruction  08/06/2018    Active  Bromberg, Nancy E

## 2018-08-07 PROBLEM — K21.9 GASTRO-ESOPHAGEAL REFLUX DISEASE WITHOUT ESOPHAGITIS: Chronic | Status: ACTIVE | Noted: 2018-07-26

## 2018-08-07 PROBLEM — I10 ESSENTIAL (PRIMARY) HYPERTENSION: Chronic | Status: ACTIVE | Noted: 2018-07-26

## 2018-08-14 ENCOUNTER — APPOINTMENT (OUTPATIENT)
Dept: SURGERY | Facility: CLINIC | Age: 73
End: 2018-08-14
Payer: MEDICARE

## 2018-08-14 DIAGNOSIS — K80.10 CALCULUS OF GALLBLADDER WITH CHRONIC CHOLECYSTITIS W/OUT OBSTRUCTION: ICD-10-CM

## 2018-08-14 PROCEDURE — 99024 POSTOP FOLLOW-UP VISIT: CPT

## 2018-08-16 NOTE — H&P PST ADULT - FUNCTIONAL SCREEN CURRENT LEVEL: BATHING, MLM
Anesthesia Post Op Note


Date & Time


Aug 15, 2018 at 13:30





Vital Signs


Pain Intensity:  0





Vital Signs Past 12 Hours








  Date Time  Temp Pulse Resp B/P (MAP) Pulse Ox O2 Delivery O2 Flow Rate FiO2


 


8/15/18 13:25  67 16 145/84 100 Room Air  


 


8/15/18 13:15  59 16 145/77 100 Room Air  


 


8/15/18 13:05  60 16 143/81 99 Room Air  


 


8/15/18 12:57 36.0 60 16 141/74 98 Room Air  


 


8/15/18 07:49 36.6 63 18 148/93 99 Room Air  











Notes


Mental Status:  alert / awake / arousable, participated in evaluation


Pt Amnestic to Procedure:  Yes


Nausea / Vomiting:  adequately controlled


Pain:  adequately controlled


Airway Patency, RR, SpO2:  stable & adequate


BP & HR:  stable & adequate


Hydration State:  stable & adequate


Anesthetic Complications:  no major complications apparent
Anesthesia Post Op Note


Date & Time


Aug 16, 2018 at 09:54





Vital Signs


Pain Intensity:  1.0





Vital Signs Past 12 Hours








  Date Time  Temp Pulse Resp B/P (MAP) Pulse Ox O2 Delivery O2 Flow Rate FiO2


 


8/16/18 07:50      Room Air  


 


8/16/18 07:41 36.6 45 16 151/76 (101) 98 Room Air  


 


8/16/18 07:20  48      


 


8/16/18 02:53 36.6 81 16 152/77 (102) 96 Room Air  


 


8/15/18 23:40      Room Air  


 


8/15/18 23:01 36.4 63 18 161/89 (113) 97 Room Air  











Notes


Mental Status:  alert / awake / arousable, participated in evaluation


Pt Amnestic to Procedure:  Yes


Nausea / Vomiting:  adequately controlled


Pain:  adequately controlled


Airway Patency, RR, SpO2:  stable & adequate


BP & HR:  stable & adequate


Hydration State:  stable & adequate


Neuraxial Anesthesia:  sensory block resolved


Anesthetic Complications:  no major complications apparent
(0) independent

## 2018-08-28 ENCOUNTER — CHART COPY (OUTPATIENT)
Age: 73
End: 2018-08-28

## 2018-12-17 ENCOUNTER — TRANSCRIPTION ENCOUNTER (OUTPATIENT)
Age: 73
End: 2018-12-17

## 2018-12-18 ENCOUNTER — OUTPATIENT (OUTPATIENT)
Dept: OUTPATIENT SERVICES | Facility: HOSPITAL | Age: 73
LOS: 1 days | End: 2018-12-18
Payer: MEDICARE

## 2018-12-18 ENCOUNTER — RESULT REVIEW (OUTPATIENT)
Age: 73
End: 2018-12-18

## 2018-12-18 DIAGNOSIS — Z98.89 OTHER SPECIFIED POSTPROCEDURAL STATES: Chronic | ICD-10-CM

## 2018-12-18 DIAGNOSIS — R13.10 DYSPHAGIA, UNSPECIFIED: ICD-10-CM

## 2018-12-18 DIAGNOSIS — Z98.890 OTHER SPECIFIED POSTPROCEDURAL STATES: Chronic | ICD-10-CM

## 2018-12-18 PROCEDURE — 88313 SPECIAL STAINS GROUP 2: CPT | Mod: 26

## 2018-12-18 PROCEDURE — 88305 TISSUE EXAM BY PATHOLOGIST: CPT

## 2018-12-18 PROCEDURE — 88312 SPECIAL STAINS GROUP 1: CPT

## 2018-12-18 PROCEDURE — 43239 EGD BIOPSY SINGLE/MULTIPLE: CPT

## 2018-12-18 PROCEDURE — 88305 TISSUE EXAM BY PATHOLOGIST: CPT | Mod: 26

## 2018-12-18 PROCEDURE — 88312 SPECIAL STAINS GROUP 1: CPT | Mod: 26

## 2018-12-18 PROCEDURE — 88313 SPECIAL STAINS GROUP 2: CPT

## 2018-12-20 LAB — SURGICAL PATHOLOGY STUDY: SIGNIFICANT CHANGE UP

## 2019-03-15 ENCOUNTER — OUTPATIENT (OUTPATIENT)
Dept: OUTPATIENT SERVICES | Facility: HOSPITAL | Age: 74
LOS: 1 days | Discharge: HOME | End: 2019-03-15
Payer: MEDICARE

## 2019-03-15 DIAGNOSIS — L97.501 NON-PRESSURE CHRONIC ULCER OF OTHER PART OF UNSPECIFIED FOOT LIMITED TO BREAKDOWN OF SKIN: ICD-10-CM

## 2019-03-15 DIAGNOSIS — Z98.890 OTHER SPECIFIED POSTPROCEDURAL STATES: Chronic | ICD-10-CM

## 2019-03-15 DIAGNOSIS — M20.41 OTHER HAMMER TOE(S) (ACQUIRED), RIGHT FOOT: ICD-10-CM

## 2019-03-15 DIAGNOSIS — Z98.89 OTHER SPECIFIED POSTPROCEDURAL STATES: Chronic | ICD-10-CM

## 2019-03-15 PROCEDURE — G0463: CPT

## 2019-03-15 PROCEDURE — 73630 X-RAY EXAM OF FOOT: CPT | Mod: 26,50

## 2019-03-15 PROCEDURE — 73630 X-RAY EXAM OF FOOT: CPT

## 2019-03-22 ENCOUNTER — OUTPATIENT (OUTPATIENT)
Dept: OUTPATIENT SERVICES | Facility: HOSPITAL | Age: 74
LOS: 1 days | End: 2019-03-22
Payer: MEDICARE

## 2019-03-22 DIAGNOSIS — Z98.89 OTHER SPECIFIED POSTPROCEDURAL STATES: Chronic | ICD-10-CM

## 2019-03-22 DIAGNOSIS — M20.41 OTHER HAMMER TOE(S) (ACQUIRED), RIGHT FOOT: ICD-10-CM

## 2019-03-22 DIAGNOSIS — Z98.890 OTHER SPECIFIED POSTPROCEDURAL STATES: Chronic | ICD-10-CM

## 2019-03-22 PROCEDURE — 93923 UPR/LXTR ART STDY 3+ LVLS: CPT

## 2019-03-22 PROCEDURE — 93923 UPR/LXTR ART STDY 3+ LVLS: CPT | Mod: 26

## 2019-03-22 PROCEDURE — 93925 LOWER EXTREMITY STUDY: CPT

## 2019-03-22 PROCEDURE — 93925 LOWER EXTREMITY STUDY: CPT | Mod: 26

## 2019-03-26 ENCOUNTER — OUTPATIENT (OUTPATIENT)
Dept: OUTPATIENT SERVICES | Facility: HOSPITAL | Age: 74
LOS: 1 days | End: 2019-03-26
Payer: MEDICARE

## 2019-03-26 VITALS
OXYGEN SATURATION: 98 % | HEART RATE: 81 BPM | HEIGHT: 62 IN | TEMPERATURE: 98 F | RESPIRATION RATE: 15 BRPM | WEIGHT: 143.08 LBS | SYSTOLIC BLOOD PRESSURE: 151 MMHG | DIASTOLIC BLOOD PRESSURE: 54 MMHG

## 2019-03-26 DIAGNOSIS — M20.41 OTHER HAMMER TOE(S) (ACQUIRED), RIGHT FOOT: ICD-10-CM

## 2019-03-26 DIAGNOSIS — Z98.890 OTHER SPECIFIED POSTPROCEDURAL STATES: Chronic | ICD-10-CM

## 2019-03-26 DIAGNOSIS — Z01.818 ENCOUNTER FOR OTHER PREPROCEDURAL EXAMINATION: ICD-10-CM

## 2019-03-26 DIAGNOSIS — Z90.49 ACQUIRED ABSENCE OF OTHER SPECIFIED PARTS OF DIGESTIVE TRACT: Chronic | ICD-10-CM

## 2019-03-26 DIAGNOSIS — M21.621 BUNIONETTE OF RIGHT FOOT: ICD-10-CM

## 2019-03-26 DIAGNOSIS — M79.671 PAIN IN RIGHT FOOT: ICD-10-CM

## 2019-03-26 DIAGNOSIS — Z98.89 OTHER SPECIFIED POSTPROCEDURAL STATES: Chronic | ICD-10-CM

## 2019-03-26 LAB
ALBUMIN SERPL ELPH-MCNC: 3.1 G/DL — LOW (ref 3.3–5)
ALP SERPL-CCNC: 63 U/L — SIGNIFICANT CHANGE UP (ref 40–120)
ALT FLD-CCNC: 17 U/L — SIGNIFICANT CHANGE UP (ref 12–78)
ANION GAP SERPL CALC-SCNC: 7 MMOL/L — SIGNIFICANT CHANGE UP (ref 5–17)
AST SERPL-CCNC: 16 U/L — SIGNIFICANT CHANGE UP (ref 15–37)
BILIRUB SERPL-MCNC: 0.3 MG/DL — SIGNIFICANT CHANGE UP (ref 0.2–1.2)
BUN SERPL-MCNC: 27 MG/DL — HIGH (ref 7–23)
CALCIUM SERPL-MCNC: 8.4 MG/DL — LOW (ref 8.5–10.1)
CHLORIDE SERPL-SCNC: 105 MMOL/L — SIGNIFICANT CHANGE UP (ref 96–108)
CO2 SERPL-SCNC: 28 MMOL/L — SIGNIFICANT CHANGE UP (ref 22–31)
CREAT SERPL-MCNC: 1.2 MG/DL — SIGNIFICANT CHANGE UP (ref 0.5–1.3)
GLUCOSE SERPL-MCNC: 101 MG/DL — HIGH (ref 70–99)
HCT VFR BLD CALC: 36.8 % — SIGNIFICANT CHANGE UP (ref 34.5–45)
HGB BLD-MCNC: 11.2 G/DL — LOW (ref 11.5–15.5)
MCHC RBC-ENTMCNC: 29.9 PG — SIGNIFICANT CHANGE UP (ref 27–34)
MCHC RBC-ENTMCNC: 30.4 GM/DL — LOW (ref 32–36)
MCV RBC AUTO: 98.1 FL — SIGNIFICANT CHANGE UP (ref 80–100)
NRBC # BLD: 0 /100 WBCS — SIGNIFICANT CHANGE UP (ref 0–0)
PLATELET # BLD AUTO: 266 K/UL — SIGNIFICANT CHANGE UP (ref 150–400)
POTASSIUM SERPL-MCNC: 4.4 MMOL/L — SIGNIFICANT CHANGE UP (ref 3.5–5.3)
POTASSIUM SERPL-SCNC: 4.4 MMOL/L — SIGNIFICANT CHANGE UP (ref 3.5–5.3)
PROT SERPL-MCNC: 6.5 G/DL — SIGNIFICANT CHANGE UP (ref 6–8.3)
RBC # BLD: 3.75 M/UL — LOW (ref 3.8–5.2)
RBC # FLD: 14.2 % — SIGNIFICANT CHANGE UP (ref 10.3–14.5)
SODIUM SERPL-SCNC: 140 MMOL/L — SIGNIFICANT CHANGE UP (ref 135–145)
WBC # BLD: 5.18 K/UL — SIGNIFICANT CHANGE UP (ref 3.8–10.5)
WBC # FLD AUTO: 5.18 K/UL — SIGNIFICANT CHANGE UP (ref 3.8–10.5)

## 2019-03-26 PROCEDURE — 93005 ELECTROCARDIOGRAM TRACING: CPT

## 2019-03-26 PROCEDURE — G0463: CPT

## 2019-03-26 PROCEDURE — 71046 X-RAY EXAM CHEST 2 VIEWS: CPT

## 2019-03-26 PROCEDURE — 80053 COMPREHEN METABOLIC PANEL: CPT

## 2019-03-26 PROCEDURE — 36415 COLL VENOUS BLD VENIPUNCTURE: CPT

## 2019-03-26 PROCEDURE — 71046 X-RAY EXAM CHEST 2 VIEWS: CPT | Mod: 26

## 2019-03-26 PROCEDURE — 85027 COMPLETE CBC AUTOMATED: CPT

## 2019-03-26 PROCEDURE — 93010 ELECTROCARDIOGRAM REPORT: CPT | Mod: NC

## 2019-03-26 RX ORDER — AMLODIPINE BESYLATE 2.5 MG/1
1 TABLET ORAL
Qty: 0 | Refills: 0 | COMMUNITY

## 2019-03-26 NOTE — H&P PST ADULT - HISTORY OF PRESENT ILLNESS
74 yo F for right silver Denilson bunionectomy  hammertoe correction right 2 nd and 5 th toes   c/o pain "years" right foot   use O2 at night  ? 3 L as per pt

## 2019-03-26 NOTE — H&P PST ADULT - NSICDXPASTMEDICALHX_GEN_ALL_CORE_FT
PAST MEDICAL HISTORY:  Asthma     Calculus of gallbladder with chronic cholecystitis without obstruction     Chronic obstructive pulmonary disease, unspecified COPD type O2 at night    Esophageal reflux     Essential hypertension     Hiatal hernia with GERD     HLD (hyperlipidemia)     Hyperlipidemia, unspecified hyperlipidemia type     Lung cancer Right lung.    Rheumatoid arthritis     TIA (transient ischemic attack) 2012

## 2019-03-26 NOTE — H&P PST ADULT - NSICDXPASTSURGICALHX_GEN_ALL_CORE_FT
PAST SURGICAL HISTORY:  H/O colonoscopy     History of endoscopy Sept 2017    S/P cholecystectomy     S/P lobectomy of lung Right lung in 1996.

## 2019-03-26 NOTE — H&P PST ADULT - ASSESSMENT
74 yo F w COPD for right  Luquillo bunionectomy   hammertoe correction  right 2 nd and 5 th toes      Med cl pending      d/c ASA

## 2019-03-26 NOTE — H&P PST ADULT - ATTENDING COMMENTS
04/03/2019 - ADDENDUM:  Pt seen and examined in holding room - pt awake, alert and oriented X 3 - VS as charted - Denies any changes in her health since she was last seen in [facility] - Procedure and site verified with patient - Assessment unchanged - Anesthesia (Dr Behrens) in to speak with patient - Consent obtained - Pt to proceed for scheduled procedure - RIGHT Silver/Dur[place] Bunionectomy - Hammertoe Correction Right 2nd and 5th Toes with Dr Jean Laird on 4/3/19 - JAMES Baltazar ANP-C

## 2019-03-27 PROBLEM — J44.9 CHRONIC OBSTRUCTIVE PULMONARY DISEASE, UNSPECIFIED: Chronic | Status: ACTIVE | Noted: 2018-07-26

## 2019-04-02 ENCOUNTER — TRANSCRIPTION ENCOUNTER (OUTPATIENT)
Age: 74
End: 2019-04-02

## 2019-04-03 ENCOUNTER — OUTPATIENT (OUTPATIENT)
Dept: OUTPATIENT SERVICES | Facility: HOSPITAL | Age: 74
LOS: 1 days | End: 2019-04-03
Payer: MEDICARE

## 2019-04-03 ENCOUNTER — RESULT REVIEW (OUTPATIENT)
Age: 74
End: 2019-04-03

## 2019-04-03 VITALS
DIASTOLIC BLOOD PRESSURE: 77 MMHG | TEMPERATURE: 98 F | SYSTOLIC BLOOD PRESSURE: 184 MMHG | HEART RATE: 85 BPM | HEIGHT: 62 IN | OXYGEN SATURATION: 97 % | RESPIRATION RATE: 15 BRPM | WEIGHT: 143.08 LBS

## 2019-04-03 VITALS
RESPIRATION RATE: 19 BRPM | OXYGEN SATURATION: 95 % | SYSTOLIC BLOOD PRESSURE: 177 MMHG | HEART RATE: 75 BPM | DIASTOLIC BLOOD PRESSURE: 71 MMHG

## 2019-04-03 DIAGNOSIS — Z01.818 ENCOUNTER FOR OTHER PREPROCEDURAL EXAMINATION: ICD-10-CM

## 2019-04-03 DIAGNOSIS — Z98.890 OTHER SPECIFIED POSTPROCEDURAL STATES: Chronic | ICD-10-CM

## 2019-04-03 DIAGNOSIS — M20.41 OTHER HAMMER TOE(S) (ACQUIRED), RIGHT FOOT: ICD-10-CM

## 2019-04-03 DIAGNOSIS — Z98.89 OTHER SPECIFIED POSTPROCEDURAL STATES: Chronic | ICD-10-CM

## 2019-04-03 DIAGNOSIS — Z90.49 ACQUIRED ABSENCE OF OTHER SPECIFIED PARTS OF DIGESTIVE TRACT: Chronic | ICD-10-CM

## 2019-04-03 DIAGNOSIS — M21.621 BUNIONETTE OF RIGHT FOOT: ICD-10-CM

## 2019-04-03 PROCEDURE — 73630 X-RAY EXAM OF FOOT: CPT | Mod: 26,RT

## 2019-04-03 PROCEDURE — 88304 TISSUE EXAM BY PATHOLOGIST: CPT | Mod: 26

## 2019-04-03 PROCEDURE — 28285 REPAIR OF HAMMERTOE: CPT | Mod: T6

## 2019-04-03 PROCEDURE — C1889: CPT

## 2019-04-03 PROCEDURE — 73630 X-RAY EXAM OF FOOT: CPT

## 2019-04-03 PROCEDURE — 88304 TISSUE EXAM BY PATHOLOGIST: CPT

## 2019-04-03 PROCEDURE — C1713: CPT

## 2019-04-03 PROCEDURE — 28298 COR HLX VLGS PRX PHLX OSTEOT: CPT | Mod: RT

## 2019-04-03 PROCEDURE — 88311 DECALCIFY TISSUE: CPT | Mod: 26

## 2019-04-03 PROCEDURE — 88311 DECALCIFY TISSUE: CPT

## 2019-04-03 RX ORDER — ACETAMINOPHEN 500 MG
1000 TABLET ORAL ONCE
Qty: 0 | Refills: 0 | Status: DISCONTINUED | OUTPATIENT
Start: 2019-04-03 | End: 2019-04-04

## 2019-04-03 RX ORDER — HYDROMORPHONE HYDROCHLORIDE 2 MG/ML
0.5 INJECTION INTRAMUSCULAR; INTRAVENOUS; SUBCUTANEOUS
Qty: 0 | Refills: 0 | Status: DISCONTINUED | OUTPATIENT
Start: 2019-04-03 | End: 2019-04-04

## 2019-04-03 RX ORDER — VANCOMYCIN HCL 1 G
1000 VIAL (EA) INTRAVENOUS ONCE
Qty: 0 | Refills: 0 | Status: COMPLETED | OUTPATIENT
Start: 2019-04-03 | End: 2019-04-03

## 2019-04-03 RX ORDER — OXYCODONE HYDROCHLORIDE 5 MG/1
5 TABLET ORAL ONCE
Qty: 0 | Refills: 0 | Status: DISCONTINUED | OUTPATIENT
Start: 2019-04-03 | End: 2019-04-04

## 2019-04-03 RX ORDER — SODIUM CHLORIDE 9 MG/ML
1000 INJECTION, SOLUTION INTRAVENOUS
Qty: 0 | Refills: 0 | Status: DISCONTINUED | OUTPATIENT
Start: 2019-04-03 | End: 2019-04-04

## 2019-04-03 RX ORDER — ASPIRIN/CALCIUM CARB/MAGNESIUM 324 MG
81 TABLET ORAL DAILY
Qty: 0 | Refills: 0 | Status: DISCONTINUED | OUTPATIENT
Start: 2019-04-03 | End: 2019-04-03

## 2019-04-03 RX ORDER — SODIUM CHLORIDE 9 MG/ML
1000 INJECTION, SOLUTION INTRAVENOUS
Qty: 0 | Refills: 0 | Status: DISCONTINUED | OUTPATIENT
Start: 2019-04-03 | End: 2019-04-03

## 2019-04-03 RX ORDER — METOCLOPRAMIDE HCL 10 MG
5 TABLET ORAL ONCE
Qty: 0 | Refills: 0 | Status: DISCONTINUED | OUTPATIENT
Start: 2019-04-03 | End: 2019-04-18

## 2019-04-03 RX ADMIN — SODIUM CHLORIDE 100 MILLILITER(S): 9 INJECTION, SOLUTION INTRAVENOUS at 13:31

## 2019-04-03 RX ADMIN — SODIUM CHLORIDE 50 MILLILITER(S): 9 INJECTION, SOLUTION INTRAVENOUS at 10:07

## 2019-04-03 NOTE — BRIEF OPERATIVE NOTE - NSICDXBRIEFPROCEDURE_GEN_ALL_CORE_FT
PROCEDURES:  Repair of hammertoe 03-Apr-2019 13:17:26 2nd digit Krisat Mckinley  Repair of hammertoe 03-Apr-2019 13:17:17 3rd digit Krista Mckinley  Repair, hammer toe 03-Apr-2019 13:17:09 5th digit Krista Mckinley  Bunionectomy, right 03-Apr-2019 13:17:00  Krista Mckinley  Ryan bunionectomy 03-Apr-2019 13:16:26  Krista Mckinley

## 2019-04-03 NOTE — BRIEF OPERATIVE NOTE - NSICDXBRIEFPREOP_GEN_ALL_CORE_FT
PRE-OP DIAGNOSIS:  Hammer toe of right foot 03-Apr-2019 13:18:35  Krista Mckinley  Hammer toe of right foot 03-Apr-2019 13:18:28 third digit Krista Mckinley  Hammer toe of right foot 03-Apr-2019 13:18:20 5th digit Krista Mckinley  Bunion, right foot 03-Apr-2019 13:18:11  Krista Mckinley

## 2019-04-03 NOTE — ASU DISCHARGE PLAN (ADULT/PEDIATRIC) - CALL YOUR DOCTOR IF YOU HAVE ANY OF THE FOLLOWING:
Bleeding that does not stop/Wound/Surgical Site with redness, or foul smelling discharge or pus/Numbness, tingling, color or temperature change to extremity/Swelling that gets worse/Nausea and vomiting that does not stop/Pain not relieved by Medications

## 2019-04-03 NOTE — ASU DISCHARGE PLAN (ADULT/PEDIATRIC) - ASU DC SPECIAL INSTRUCTIONSFT
Please keep dressing clean dry and intact   elevate lower extremity   cover with waterproof dressing when bathing   patient to be weight bearing as tolerated to heel   Patient to follow up Friday with Dr sarmiento in wound care center   Patient to take pain medication was directed

## 2019-04-05 ENCOUNTER — OUTPATIENT (OUTPATIENT)
Dept: OUTPATIENT SERVICES | Facility: HOSPITAL | Age: 74
LOS: 1 days | Discharge: HOME | End: 2019-04-05
Payer: MEDICARE

## 2019-04-05 DIAGNOSIS — Z90.49 ACQUIRED ABSENCE OF OTHER SPECIFIED PARTS OF DIGESTIVE TRACT: Chronic | ICD-10-CM

## 2019-04-05 DIAGNOSIS — M20.41 OTHER HAMMER TOE(S) (ACQUIRED), RIGHT FOOT: ICD-10-CM

## 2019-04-05 DIAGNOSIS — Z98.890 OTHER SPECIFIED POSTPROCEDURAL STATES: Chronic | ICD-10-CM

## 2019-04-05 DIAGNOSIS — Z98.89 OTHER SPECIFIED POSTPROCEDURAL STATES: Chronic | ICD-10-CM

## 2019-04-05 LAB — SURGICAL PATHOLOGY STUDY: SIGNIFICANT CHANGE UP

## 2019-04-05 PROCEDURE — G0463: CPT

## 2019-04-10 ENCOUNTER — OUTPATIENT (OUTPATIENT)
Dept: OUTPATIENT SERVICES | Facility: HOSPITAL | Age: 74
LOS: 1 days | Discharge: HOME | End: 2019-04-10
Payer: MEDICARE

## 2019-04-10 DIAGNOSIS — Z98.890 OTHER SPECIFIED POSTPROCEDURAL STATES: Chronic | ICD-10-CM

## 2019-04-10 DIAGNOSIS — Z90.49 ACQUIRED ABSENCE OF OTHER SPECIFIED PARTS OF DIGESTIVE TRACT: Chronic | ICD-10-CM

## 2019-04-10 DIAGNOSIS — Z98.89 OTHER SPECIFIED POSTPROCEDURAL STATES: Chronic | ICD-10-CM

## 2019-04-10 DIAGNOSIS — M20.41 OTHER HAMMER TOE(S) (ACQUIRED), RIGHT FOOT: ICD-10-CM

## 2019-04-10 PROCEDURE — G0463: CPT

## 2019-04-14 DIAGNOSIS — Z90.49 ACQUIRED ABSENCE OF OTHER SPECIFIED PARTS OF DIGESTIVE TRACT: ICD-10-CM

## 2019-04-14 DIAGNOSIS — Z80.1 FAMILY HISTORY OF MALIGNANT NEOPLASM OF TRACHEA, BRONCHUS AND LUNG: ICD-10-CM

## 2019-04-14 DIAGNOSIS — E66.3 OVERWEIGHT: ICD-10-CM

## 2019-04-14 DIAGNOSIS — Z82.49 FAMILY HISTORY OF ISCHEMIC HEART DISEASE AND OTHER DISEASES OF THE CIRCULATORY SYSTEM: ICD-10-CM

## 2019-04-14 DIAGNOSIS — Z85.118 PERSONAL HISTORY OF OTHER MALIGNANT NEOPLASM OF BRONCHUS AND LUNG: ICD-10-CM

## 2019-04-14 DIAGNOSIS — E78.00 PURE HYPERCHOLESTEROLEMIA, UNSPECIFIED: ICD-10-CM

## 2019-04-14 DIAGNOSIS — M21.611 BUNION OF RIGHT FOOT: ICD-10-CM

## 2019-04-14 DIAGNOSIS — J45.909 UNSPECIFIED ASTHMA, UNCOMPLICATED: ICD-10-CM

## 2019-04-14 DIAGNOSIS — Z48.02 ENCOUNTER FOR REMOVAL OF SUTURES: ICD-10-CM

## 2019-04-14 DIAGNOSIS — Z86.73 PERSONAL HISTORY OF TRANSIENT ISCHEMIC ATTACK (TIA), AND CEREBRAL INFARCTION WITHOUT RESIDUAL DEFICITS: ICD-10-CM

## 2019-04-14 DIAGNOSIS — I10 ESSENTIAL (PRIMARY) HYPERTENSION: ICD-10-CM

## 2019-04-14 DIAGNOSIS — Z83.3 FAMILY HISTORY OF DIABETES MELLITUS: ICD-10-CM

## 2019-04-14 DIAGNOSIS — Z98.42 CATARACT EXTRACTION STATUS, LEFT EYE: ICD-10-CM

## 2019-04-14 DIAGNOSIS — Z90.2 ACQUIRED ABSENCE OF LUNG [PART OF]: ICD-10-CM

## 2019-04-14 DIAGNOSIS — Z98.41 CATARACT EXTRACTION STATUS, RIGHT EYE: ICD-10-CM

## 2019-04-14 DIAGNOSIS — K21.9 GASTRO-ESOPHAGEAL REFLUX DISEASE WITHOUT ESOPHAGITIS: ICD-10-CM

## 2019-04-14 DIAGNOSIS — M06.9 RHEUMATOID ARTHRITIS, UNSPECIFIED: ICD-10-CM

## 2019-04-14 DIAGNOSIS — M20.41 OTHER HAMMER TOE(S) (ACQUIRED), RIGHT FOOT: ICD-10-CM

## 2019-04-14 DIAGNOSIS — Z79.899 OTHER LONG TERM (CURRENT) DRUG THERAPY: ICD-10-CM

## 2019-04-14 DIAGNOSIS — Z87.891 PERSONAL HISTORY OF NICOTINE DEPENDENCE: ICD-10-CM

## 2019-04-15 DIAGNOSIS — Z80.1 FAMILY HISTORY OF MALIGNANT NEOPLASM OF TRACHEA, BRONCHUS AND LUNG: ICD-10-CM

## 2019-04-15 DIAGNOSIS — Z90.2 ACQUIRED ABSENCE OF LUNG [PART OF]: ICD-10-CM

## 2019-04-15 DIAGNOSIS — Z85.118 PERSONAL HISTORY OF OTHER MALIGNANT NEOPLASM OF BRONCHUS AND LUNG: ICD-10-CM

## 2019-04-15 DIAGNOSIS — Z90.49 ACQUIRED ABSENCE OF OTHER SPECIFIED PARTS OF DIGESTIVE TRACT: ICD-10-CM

## 2019-04-15 DIAGNOSIS — Z98.41 CATARACT EXTRACTION STATUS, RIGHT EYE: ICD-10-CM

## 2019-04-15 DIAGNOSIS — E66.3 OVERWEIGHT: ICD-10-CM

## 2019-04-15 DIAGNOSIS — M21.611 BUNION OF RIGHT FOOT: ICD-10-CM

## 2019-04-15 DIAGNOSIS — Z83.3 FAMILY HISTORY OF DIABETES MELLITUS: ICD-10-CM

## 2019-04-15 DIAGNOSIS — J45.909 UNSPECIFIED ASTHMA, UNCOMPLICATED: ICD-10-CM

## 2019-04-15 DIAGNOSIS — M20.41 OTHER HAMMER TOE(S) (ACQUIRED), RIGHT FOOT: ICD-10-CM

## 2019-04-15 DIAGNOSIS — E78.00 PURE HYPERCHOLESTEROLEMIA, UNSPECIFIED: ICD-10-CM

## 2019-04-15 DIAGNOSIS — Z98.42 CATARACT EXTRACTION STATUS, LEFT EYE: ICD-10-CM

## 2019-04-15 DIAGNOSIS — K21.9 GASTRO-ESOPHAGEAL REFLUX DISEASE WITHOUT ESOPHAGITIS: ICD-10-CM

## 2019-04-15 DIAGNOSIS — Z87.891 PERSONAL HISTORY OF NICOTINE DEPENDENCE: ICD-10-CM

## 2019-04-15 DIAGNOSIS — I10 ESSENTIAL (PRIMARY) HYPERTENSION: ICD-10-CM

## 2019-04-15 DIAGNOSIS — M79.671 PAIN IN RIGHT FOOT: ICD-10-CM

## 2019-04-15 DIAGNOSIS — Z79.899 OTHER LONG TERM (CURRENT) DRUG THERAPY: ICD-10-CM

## 2019-04-15 DIAGNOSIS — Z86.73 PERSONAL HISTORY OF TRANSIENT ISCHEMIC ATTACK (TIA), AND CEREBRAL INFARCTION WITHOUT RESIDUAL DEFICITS: ICD-10-CM

## 2019-04-15 DIAGNOSIS — M06.9 RHEUMATOID ARTHRITIS, UNSPECIFIED: ICD-10-CM

## 2019-04-15 DIAGNOSIS — Z82.49 FAMILY HISTORY OF ISCHEMIC HEART DISEASE AND OTHER DISEASES OF THE CIRCULATORY SYSTEM: ICD-10-CM

## 2019-04-16 ENCOUNTER — OUTPATIENT (OUTPATIENT)
Dept: OUTPATIENT SERVICES | Facility: HOSPITAL | Age: 74
LOS: 1 days | Discharge: HOME | End: 2019-04-16
Payer: MEDICARE

## 2019-04-16 DIAGNOSIS — Z98.890 OTHER SPECIFIED POSTPROCEDURAL STATES: Chronic | ICD-10-CM

## 2019-04-16 DIAGNOSIS — M20.41 OTHER HAMMER TOE(S) (ACQUIRED), RIGHT FOOT: ICD-10-CM

## 2019-04-16 DIAGNOSIS — Z98.89 OTHER SPECIFIED POSTPROCEDURAL STATES: Chronic | ICD-10-CM

## 2019-04-16 DIAGNOSIS — Z90.49 ACQUIRED ABSENCE OF OTHER SPECIFIED PARTS OF DIGESTIVE TRACT: Chronic | ICD-10-CM

## 2019-04-16 PROCEDURE — G0463: CPT

## 2019-04-18 DIAGNOSIS — E66.3 OVERWEIGHT: ICD-10-CM

## 2019-04-18 DIAGNOSIS — M20.41 OTHER HAMMER TOE(S) (ACQUIRED), RIGHT FOOT: ICD-10-CM

## 2019-04-18 DIAGNOSIS — Z90.49 ACQUIRED ABSENCE OF OTHER SPECIFIED PARTS OF DIGESTIVE TRACT: ICD-10-CM

## 2019-04-18 DIAGNOSIS — J45.909 UNSPECIFIED ASTHMA, UNCOMPLICATED: ICD-10-CM

## 2019-04-18 DIAGNOSIS — Z98.41 CATARACT EXTRACTION STATUS, RIGHT EYE: ICD-10-CM

## 2019-04-18 DIAGNOSIS — Z90.2 ACQUIRED ABSENCE OF LUNG [PART OF]: ICD-10-CM

## 2019-04-18 DIAGNOSIS — M06.9 RHEUMATOID ARTHRITIS, UNSPECIFIED: ICD-10-CM

## 2019-04-18 DIAGNOSIS — Z98.42 CATARACT EXTRACTION STATUS, LEFT EYE: ICD-10-CM

## 2019-04-18 DIAGNOSIS — Z79.899 OTHER LONG TERM (CURRENT) DRUG THERAPY: ICD-10-CM

## 2019-04-18 DIAGNOSIS — Z80.1 FAMILY HISTORY OF MALIGNANT NEOPLASM OF TRACHEA, BRONCHUS AND LUNG: ICD-10-CM

## 2019-04-18 DIAGNOSIS — I10 ESSENTIAL (PRIMARY) HYPERTENSION: ICD-10-CM

## 2019-04-18 DIAGNOSIS — Z85.118 PERSONAL HISTORY OF OTHER MALIGNANT NEOPLASM OF BRONCHUS AND LUNG: ICD-10-CM

## 2019-04-18 DIAGNOSIS — E78.00 PURE HYPERCHOLESTEROLEMIA, UNSPECIFIED: ICD-10-CM

## 2019-04-18 DIAGNOSIS — K21.9 GASTRO-ESOPHAGEAL REFLUX DISEASE WITHOUT ESOPHAGITIS: ICD-10-CM

## 2019-04-18 DIAGNOSIS — Z82.49 FAMILY HISTORY OF ISCHEMIC HEART DISEASE AND OTHER DISEASES OF THE CIRCULATORY SYSTEM: ICD-10-CM

## 2019-04-18 DIAGNOSIS — Z87.891 PERSONAL HISTORY OF NICOTINE DEPENDENCE: ICD-10-CM

## 2019-04-18 DIAGNOSIS — M21.611 BUNION OF RIGHT FOOT: ICD-10-CM

## 2019-04-18 DIAGNOSIS — Z48.02 ENCOUNTER FOR REMOVAL OF SUTURES: ICD-10-CM

## 2019-04-18 DIAGNOSIS — Z83.3 FAMILY HISTORY OF DIABETES MELLITUS: ICD-10-CM

## 2019-04-18 DIAGNOSIS — Z86.73 PERSONAL HISTORY OF TRANSIENT ISCHEMIC ATTACK (TIA), AND CEREBRAL INFARCTION WITHOUT RESIDUAL DEFICITS: ICD-10-CM

## 2019-04-23 ENCOUNTER — OUTPATIENT (OUTPATIENT)
Dept: OUTPATIENT SERVICES | Facility: HOSPITAL | Age: 74
LOS: 1 days | Discharge: HOME | End: 2019-04-23
Payer: MEDICARE

## 2019-04-23 DIAGNOSIS — Z98.89 OTHER SPECIFIED POSTPROCEDURAL STATES: Chronic | ICD-10-CM

## 2019-04-23 DIAGNOSIS — M20.41 OTHER HAMMER TOE(S) (ACQUIRED), RIGHT FOOT: ICD-10-CM

## 2019-04-23 DIAGNOSIS — Z98.890 OTHER SPECIFIED POSTPROCEDURAL STATES: Chronic | ICD-10-CM

## 2019-04-23 DIAGNOSIS — Z90.49 ACQUIRED ABSENCE OF OTHER SPECIFIED PARTS OF DIGESTIVE TRACT: Chronic | ICD-10-CM

## 2019-04-23 PROCEDURE — G0463: CPT

## 2019-04-25 ENCOUNTER — OUTPATIENT (OUTPATIENT)
Dept: OUTPATIENT SERVICES | Facility: HOSPITAL | Age: 74
LOS: 1 days | Discharge: HOME | End: 2019-04-25
Payer: MEDICARE

## 2019-04-25 DIAGNOSIS — Z86.73 PERSONAL HISTORY OF TRANSIENT ISCHEMIC ATTACK (TIA), AND CEREBRAL INFARCTION WITHOUT RESIDUAL DEFICITS: ICD-10-CM

## 2019-04-25 DIAGNOSIS — E78.00 PURE HYPERCHOLESTEROLEMIA, UNSPECIFIED: ICD-10-CM

## 2019-04-25 DIAGNOSIS — T81.31XD DISRUPTION OF EXTERNAL OPERATION (SURGICAL) WOUND, NOT ELSEWHERE CLASSIFIED, SUBSEQUENT ENCOUNTER: ICD-10-CM

## 2019-04-25 DIAGNOSIS — Z80.1 FAMILY HISTORY OF MALIGNANT NEOPLASM OF TRACHEA, BRONCHUS AND LUNG: ICD-10-CM

## 2019-04-25 DIAGNOSIS — J45.909 UNSPECIFIED ASTHMA, UNCOMPLICATED: ICD-10-CM

## 2019-04-25 DIAGNOSIS — M20.41 OTHER HAMMER TOE(S) (ACQUIRED), RIGHT FOOT: ICD-10-CM

## 2019-04-25 DIAGNOSIS — Z87.891 PERSONAL HISTORY OF NICOTINE DEPENDENCE: ICD-10-CM

## 2019-04-25 DIAGNOSIS — Z82.49 FAMILY HISTORY OF ISCHEMIC HEART DISEASE AND OTHER DISEASES OF THE CIRCULATORY SYSTEM: ICD-10-CM

## 2019-04-25 DIAGNOSIS — Z85.118 PERSONAL HISTORY OF OTHER MALIGNANT NEOPLASM OF BRONCHUS AND LUNG: ICD-10-CM

## 2019-04-25 DIAGNOSIS — Z83.3 FAMILY HISTORY OF DIABETES MELLITUS: ICD-10-CM

## 2019-04-25 DIAGNOSIS — Z98.42 CATARACT EXTRACTION STATUS, LEFT EYE: ICD-10-CM

## 2019-04-25 DIAGNOSIS — Z98.89 OTHER SPECIFIED POSTPROCEDURAL STATES: Chronic | ICD-10-CM

## 2019-04-25 DIAGNOSIS — Z90.49 ACQUIRED ABSENCE OF OTHER SPECIFIED PARTS OF DIGESTIVE TRACT: Chronic | ICD-10-CM

## 2019-04-25 DIAGNOSIS — E66.3 OVERWEIGHT: ICD-10-CM
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
## 2019-04-25 DIAGNOSIS — Z98.890 OTHER SPECIFIED POSTPROCEDURAL STATES: Chronic | ICD-10-CM

## 2019-04-25 DIAGNOSIS — I10 ESSENTIAL (PRIMARY) HYPERTENSION: ICD-10-CM

## 2019-04-25 DIAGNOSIS — Z90.49 ACQUIRED ABSENCE OF OTHER SPECIFIED PARTS OF DIGESTIVE TRACT: ICD-10-CM

## 2019-04-25 DIAGNOSIS — M06.9 RHEUMATOID ARTHRITIS, UNSPECIFIED: ICD-10-CM

## 2019-04-25 DIAGNOSIS — K21.9 GASTRO-ESOPHAGEAL REFLUX DISEASE WITHOUT ESOPHAGITIS: ICD-10-CM

## 2019-04-25 DIAGNOSIS — Z98.41 CATARACT EXTRACTION STATUS, RIGHT EYE: ICD-10-CM

## 2019-04-25 DIAGNOSIS — Z79.899 OTHER LONG TERM (CURRENT) DRUG THERAPY: ICD-10-CM

## 2019-04-25 DIAGNOSIS — Z90.2 ACQUIRED ABSENCE OF LUNG [PART OF]: ICD-10-CM

## 2019-04-25 PROCEDURE — G0463: CPT

## 2019-04-28 DIAGNOSIS — T81.31XD DISRUPTION OF EXTERNAL OPERATION (SURGICAL) WOUND, NOT ELSEWHERE CLASSIFIED, SUBSEQUENT ENCOUNTER: ICD-10-CM
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
## 2019-04-29 ENCOUNTER — OUTPATIENT (OUTPATIENT)
Dept: OUTPATIENT SERVICES | Facility: HOSPITAL | Age: 74
LOS: 1 days | Discharge: HOME | End: 2019-04-29
Payer: MEDICARE

## 2019-04-29 DIAGNOSIS — Z98.89 OTHER SPECIFIED POSTPROCEDURAL STATES: Chronic | ICD-10-CM

## 2019-04-29 DIAGNOSIS — Z90.49 ACQUIRED ABSENCE OF OTHER SPECIFIED PARTS OF DIGESTIVE TRACT: Chronic | ICD-10-CM

## 2019-04-29 DIAGNOSIS — Z98.890 OTHER SPECIFIED POSTPROCEDURAL STATES: Chronic | ICD-10-CM

## 2019-04-29 DIAGNOSIS — M20.41 OTHER HAMMER TOE(S) (ACQUIRED), RIGHT FOOT: ICD-10-CM

## 2019-04-29 PROCEDURE — 15275 SKIN SUB GRAFT FACE/NK/HF/G: CPT

## 2019-05-01 DIAGNOSIS — I10 ESSENTIAL (PRIMARY) HYPERTENSION: ICD-10-CM

## 2019-05-01 DIAGNOSIS — Z82.49 FAMILY HISTORY OF ISCHEMIC HEART DISEASE AND OTHER DISEASES OF THE CIRCULATORY SYSTEM: ICD-10-CM

## 2019-05-01 DIAGNOSIS — Z86.73 PERSONAL HISTORY OF TRANSIENT ISCHEMIC ATTACK (TIA), AND CEREBRAL INFARCTION WITHOUT RESIDUAL DEFICITS: ICD-10-CM

## 2019-05-01 DIAGNOSIS — Z98.41 CATARACT EXTRACTION STATUS, RIGHT EYE: ICD-10-CM

## 2019-05-01 DIAGNOSIS — Z90.2 ACQUIRED ABSENCE OF LUNG [PART OF]: ICD-10-CM

## 2019-05-01 DIAGNOSIS — Z79.899 OTHER LONG TERM (CURRENT) DRUG THERAPY: ICD-10-CM

## 2019-05-01 DIAGNOSIS — Z83.3 FAMILY HISTORY OF DIABETES MELLITUS: ICD-10-CM

## 2019-05-01 DIAGNOSIS — K21.9 GASTRO-ESOPHAGEAL REFLUX DISEASE WITHOUT ESOPHAGITIS: ICD-10-CM

## 2019-05-01 DIAGNOSIS — Z80.1 FAMILY HISTORY OF MALIGNANT NEOPLASM OF TRACHEA, BRONCHUS AND LUNG: ICD-10-CM

## 2019-05-01 DIAGNOSIS — Z87.891 PERSONAL HISTORY OF NICOTINE DEPENDENCE: ICD-10-CM

## 2019-05-01 DIAGNOSIS — Z85.118 PERSONAL HISTORY OF OTHER MALIGNANT NEOPLASM OF BRONCHUS AND LUNG: ICD-10-CM

## 2019-05-01 DIAGNOSIS — E78.00 PURE HYPERCHOLESTEROLEMIA, UNSPECIFIED: ICD-10-CM

## 2019-05-01 DIAGNOSIS — T81.31XD DISRUPTION OF EXTERNAL OPERATION (SURGICAL) WOUND, NOT ELSEWHERE CLASSIFIED, SUBSEQUENT ENCOUNTER: ICD-10-CM
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
## 2019-05-01 DIAGNOSIS — M06.9 RHEUMATOID ARTHRITIS, UNSPECIFIED: ICD-10-CM

## 2019-05-01 DIAGNOSIS — E66.3 OVERWEIGHT: ICD-10-CM

## 2019-05-01 DIAGNOSIS — J45.909 UNSPECIFIED ASTHMA, UNCOMPLICATED: ICD-10-CM

## 2019-05-01 DIAGNOSIS — Z90.49 ACQUIRED ABSENCE OF OTHER SPECIFIED PARTS OF DIGESTIVE TRACT: ICD-10-CM

## 2019-05-01 DIAGNOSIS — Z98.42 CATARACT EXTRACTION STATUS, LEFT EYE: ICD-10-CM

## 2019-05-06 ENCOUNTER — OUTPATIENT (OUTPATIENT)
Dept: OUTPATIENT SERVICES | Facility: HOSPITAL | Age: 74
LOS: 1 days | Discharge: HOME | End: 2019-05-06
Payer: MEDICARE

## 2019-05-06 DIAGNOSIS — M20.41 OTHER HAMMER TOE(S) (ACQUIRED), RIGHT FOOT: ICD-10-CM

## 2019-05-06 DIAGNOSIS — Z90.49 ACQUIRED ABSENCE OF OTHER SPECIFIED PARTS OF DIGESTIVE TRACT: Chronic | ICD-10-CM

## 2019-05-06 DIAGNOSIS — Z98.890 OTHER SPECIFIED POSTPROCEDURAL STATES: Chronic | ICD-10-CM

## 2019-05-06 DIAGNOSIS — Z98.89 OTHER SPECIFIED POSTPROCEDURAL STATES: Chronic | ICD-10-CM

## 2019-05-06 PROCEDURE — 15275 SKIN SUB GRAFT FACE/NK/HF/G: CPT

## 2019-05-07 DIAGNOSIS — K21.9 GASTRO-ESOPHAGEAL REFLUX DISEASE WITHOUT ESOPHAGITIS: ICD-10-CM

## 2019-05-07 DIAGNOSIS — E66.3 OVERWEIGHT: ICD-10-CM

## 2019-05-07 DIAGNOSIS — Z82.49 FAMILY HISTORY OF ISCHEMIC HEART DISEASE AND OTHER DISEASES OF THE CIRCULATORY SYSTEM: ICD-10-CM

## 2019-05-07 DIAGNOSIS — Z87.891 PERSONAL HISTORY OF NICOTINE DEPENDENCE: ICD-10-CM

## 2019-05-07 DIAGNOSIS — Z86.73 PERSONAL HISTORY OF TRANSIENT ISCHEMIC ATTACK (TIA), AND CEREBRAL INFARCTION WITHOUT RESIDUAL DEFICITS: ICD-10-CM

## 2019-05-07 DIAGNOSIS — Z79.899 OTHER LONG TERM (CURRENT) DRUG THERAPY: ICD-10-CM

## 2019-05-07 DIAGNOSIS — Z98.41 CATARACT EXTRACTION STATUS, RIGHT EYE: ICD-10-CM

## 2019-05-07 DIAGNOSIS — M06.9 RHEUMATOID ARTHRITIS, UNSPECIFIED: ICD-10-CM

## 2019-05-07 DIAGNOSIS — Z85.118 PERSONAL HISTORY OF OTHER MALIGNANT NEOPLASM OF BRONCHUS AND LUNG: ICD-10-CM

## 2019-05-07 DIAGNOSIS — T81.31XD DISRUPTION OF EXTERNAL OPERATION (SURGICAL) WOUND, NOT ELSEWHERE CLASSIFIED, SUBSEQUENT ENCOUNTER: ICD-10-CM
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
## 2019-05-07 DIAGNOSIS — J45.909 UNSPECIFIED ASTHMA, UNCOMPLICATED: ICD-10-CM

## 2019-05-07 DIAGNOSIS — Z98.42 CATARACT EXTRACTION STATUS, LEFT EYE: ICD-10-CM

## 2019-05-07 DIAGNOSIS — Z90.49 ACQUIRED ABSENCE OF OTHER SPECIFIED PARTS OF DIGESTIVE TRACT: ICD-10-CM

## 2019-05-07 DIAGNOSIS — Z80.1 FAMILY HISTORY OF MALIGNANT NEOPLASM OF TRACHEA, BRONCHUS AND LUNG: ICD-10-CM

## 2019-05-07 DIAGNOSIS — E78.00 PURE HYPERCHOLESTEROLEMIA, UNSPECIFIED: ICD-10-CM

## 2019-05-07 DIAGNOSIS — I10 ESSENTIAL (PRIMARY) HYPERTENSION: ICD-10-CM

## 2019-05-07 DIAGNOSIS — Z83.3 FAMILY HISTORY OF DIABETES MELLITUS: ICD-10-CM

## 2019-05-07 DIAGNOSIS — Z90.2 ACQUIRED ABSENCE OF LUNG [PART OF]: ICD-10-CM

## 2019-05-13 ENCOUNTER — APPOINTMENT (OUTPATIENT)
Dept: PODIATRY | Facility: CLINIC | Age: 74
End: 2019-05-13
Payer: MEDICARE

## 2019-05-13 ENCOUNTER — OUTPATIENT (OUTPATIENT)
Dept: OUTPATIENT SERVICES | Facility: HOSPITAL | Age: 74
LOS: 1 days | Discharge: HOME | End: 2019-05-13
Payer: MEDICARE

## 2019-05-13 DIAGNOSIS — E11.621 TYPE 2 DIABETES MELLITUS WITH FOOT ULCER: ICD-10-CM

## 2019-05-13 DIAGNOSIS — Z98.89 OTHER SPECIFIED POSTPROCEDURAL STATES: Chronic | ICD-10-CM

## 2019-05-13 DIAGNOSIS — T81.31XD DISRUPTION OF EXTERNAL OPERATION (SURGICAL) WOUND, NOT ELSEWHERE CLASSIFIED, SUBSEQUENT ENCOUNTER: ICD-10-CM

## 2019-05-13 DIAGNOSIS — Z98.890 OTHER SPECIFIED POSTPROCEDURAL STATES: Chronic | ICD-10-CM

## 2019-05-13 DIAGNOSIS — Z90.49 ACQUIRED ABSENCE OF OTHER SPECIFIED PARTS OF DIGESTIVE TRACT: Chronic | ICD-10-CM

## 2019-05-13 PROCEDURE — 15275 SKIN SUB GRAFT FACE/NK/HF/G: CPT

## 2019-05-14 DIAGNOSIS — Z83.3 FAMILY HISTORY OF DIABETES MELLITUS: ICD-10-CM

## 2019-05-14 DIAGNOSIS — K21.9 GASTRO-ESOPHAGEAL REFLUX DISEASE WITHOUT ESOPHAGITIS: ICD-10-CM

## 2019-05-14 DIAGNOSIS — Z79.899 OTHER LONG TERM (CURRENT) DRUG THERAPY: ICD-10-CM

## 2019-05-14 DIAGNOSIS — M06.9 RHEUMATOID ARTHRITIS, UNSPECIFIED: ICD-10-CM

## 2019-05-14 DIAGNOSIS — Z98.41 CATARACT EXTRACTION STATUS, RIGHT EYE: ICD-10-CM

## 2019-05-14 DIAGNOSIS — Z90.2 ACQUIRED ABSENCE OF LUNG [PART OF]: ICD-10-CM

## 2019-05-14 DIAGNOSIS — I10 ESSENTIAL (PRIMARY) HYPERTENSION: ICD-10-CM

## 2019-05-14 DIAGNOSIS — E66.3 OVERWEIGHT: ICD-10-CM

## 2019-05-14 DIAGNOSIS — Z87.891 PERSONAL HISTORY OF NICOTINE DEPENDENCE: ICD-10-CM
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
## 2019-05-14 DIAGNOSIS — T81.31XD DISRUPTION OF EXTERNAL OPERATION (SURGICAL) WOUND, NOT ELSEWHERE CLASSIFIED, SUBSEQUENT ENCOUNTER: ICD-10-CM

## 2019-05-14 DIAGNOSIS — E78.00 PURE HYPERCHOLESTEROLEMIA, UNSPECIFIED: ICD-10-CM

## 2019-05-14 DIAGNOSIS — Z80.1 FAMILY HISTORY OF MALIGNANT NEOPLASM OF TRACHEA, BRONCHUS AND LUNG: ICD-10-CM

## 2019-05-14 DIAGNOSIS — Z90.49 ACQUIRED ABSENCE OF OTHER SPECIFIED PARTS OF DIGESTIVE TRACT: ICD-10-CM

## 2019-05-14 DIAGNOSIS — J45.909 UNSPECIFIED ASTHMA, UNCOMPLICATED: ICD-10-CM

## 2019-05-14 DIAGNOSIS — Z85.118 PERSONAL HISTORY OF OTHER MALIGNANT NEOPLASM OF BRONCHUS AND LUNG: ICD-10-CM

## 2019-05-14 DIAGNOSIS — Z82.49 FAMILY HISTORY OF ISCHEMIC HEART DISEASE AND OTHER DISEASES OF THE CIRCULATORY SYSTEM: ICD-10-CM

## 2019-05-14 DIAGNOSIS — Z86.73 PERSONAL HISTORY OF TRANSIENT ISCHEMIC ATTACK (TIA), AND CEREBRAL INFARCTION WITHOUT RESIDUAL DEFICITS: ICD-10-CM

## 2019-05-14 DIAGNOSIS — Z98.42 CATARACT EXTRACTION STATUS, LEFT EYE: ICD-10-CM

## 2019-05-20 ENCOUNTER — EMERGENCY (EMERGENCY)
Facility: HOSPITAL | Age: 74
LOS: 1 days | Discharge: HOME | End: 2019-05-20
Attending: EMERGENCY MEDICINE | Admitting: EMERGENCY MEDICINE
Payer: MEDICARE

## 2019-05-20 ENCOUNTER — APPOINTMENT (OUTPATIENT)
Dept: PLASTIC SURGERY | Facility: CLINIC | Age: 74
End: 2019-05-20

## 2019-05-20 VITALS
HEART RATE: 112 BPM | OXYGEN SATURATION: 96 % | DIASTOLIC BLOOD PRESSURE: 78 MMHG | HEIGHT: 62 IN | WEIGHT: 130.07 LBS | SYSTOLIC BLOOD PRESSURE: 156 MMHG | TEMPERATURE: 98 F | RESPIRATION RATE: 16 BRPM

## 2019-05-20 VITALS
OXYGEN SATURATION: 94 % | RESPIRATION RATE: 16 BRPM | HEART RATE: 108 BPM | SYSTOLIC BLOOD PRESSURE: 128 MMHG | DIASTOLIC BLOOD PRESSURE: 70 MMHG | TEMPERATURE: 100 F

## 2019-05-20 DIAGNOSIS — Z98.89 OTHER SPECIFIED POSTPROCEDURAL STATES: Chronic | ICD-10-CM

## 2019-05-20 DIAGNOSIS — Z98.890 OTHER SPECIFIED POSTPROCEDURAL STATES: Chronic | ICD-10-CM

## 2019-05-20 DIAGNOSIS — Z90.49 ACQUIRED ABSENCE OF OTHER SPECIFIED PARTS OF DIGESTIVE TRACT: Chronic | ICD-10-CM

## 2019-05-20 LAB
ALBUMIN SERPL ELPH-MCNC: 3.6 G/DL — SIGNIFICANT CHANGE UP (ref 3.3–5)
ALP SERPL-CCNC: 67 U/L — SIGNIFICANT CHANGE UP (ref 30–120)
ALT FLD-CCNC: 19 U/L DA — SIGNIFICANT CHANGE UP (ref 10–60)
ANION GAP SERPL CALC-SCNC: 9 MMOL/L — SIGNIFICANT CHANGE UP (ref 5–17)
APPEARANCE UR: CLEAR — SIGNIFICANT CHANGE UP
APTT BLD: 24.1 SEC — LOW (ref 28.5–37)
AST SERPL-CCNC: 23 U/L — SIGNIFICANT CHANGE UP (ref 10–40)
BACTERIA # UR AUTO: ABNORMAL
BASOPHILS # BLD AUTO: 0.02 K/UL — SIGNIFICANT CHANGE UP (ref 0–0.2)
BASOPHILS NFR BLD AUTO: 0.3 % — SIGNIFICANT CHANGE UP (ref 0–2)
BILIRUB SERPL-MCNC: 0.6 MG/DL — SIGNIFICANT CHANGE UP (ref 0.2–1.2)
BILIRUB UR-MCNC: NEGATIVE — SIGNIFICANT CHANGE UP
BUN SERPL-MCNC: 24 MG/DL — HIGH (ref 7–23)
CALCIUM SERPL-MCNC: 9.5 MG/DL — SIGNIFICANT CHANGE UP (ref 8.4–10.5)
CHLORIDE SERPL-SCNC: 106 MMOL/L — SIGNIFICANT CHANGE UP (ref 96–108)
CO2 SERPL-SCNC: 30 MMOL/L — SIGNIFICANT CHANGE UP (ref 22–31)
COLOR SPEC: YELLOW — SIGNIFICANT CHANGE UP
CREAT SERPL-MCNC: 1.19 MG/DL — SIGNIFICANT CHANGE UP (ref 0.5–1.3)
DIFF PNL FLD: ABNORMAL
EOSINOPHIL # BLD AUTO: 0.43 K/UL — SIGNIFICANT CHANGE UP (ref 0–0.5)
EOSINOPHIL NFR BLD AUTO: 6.2 % — HIGH (ref 0–6)
EPI CELLS # UR: SIGNIFICANT CHANGE UP
GLUCOSE SERPL-MCNC: 95 MG/DL — SIGNIFICANT CHANGE UP (ref 70–99)
GLUCOSE UR QL: NEGATIVE MG/DL — SIGNIFICANT CHANGE UP
GRAN CASTS # UR COMP ASSIST: ABNORMAL /LPF
HCT VFR BLD CALC: 38.3 % — SIGNIFICANT CHANGE UP (ref 34.5–45)
HGB BLD-MCNC: 12.2 G/DL — SIGNIFICANT CHANGE UP (ref 11.5–15.5)
HYALINE CASTS # UR AUTO: ABNORMAL /LPF
IMM GRANULOCYTES NFR BLD AUTO: 0.1 % — SIGNIFICANT CHANGE UP (ref 0–1.5)
INR BLD: 1.08 RATIO — SIGNIFICANT CHANGE UP (ref 0.88–1.16)
KETONES UR-MCNC: NEGATIVE — SIGNIFICANT CHANGE UP
LACTATE SERPL-SCNC: 0.7 MMOL/L — SIGNIFICANT CHANGE UP (ref 0.7–2)
LEUKOCYTE ESTERASE UR-ACNC: ABNORMAL
LIDOCAIN IGE QN: 81 U/L — SIGNIFICANT CHANGE UP (ref 73–393)
LYMPHOCYTES # BLD AUTO: 0.14 K/UL — LOW (ref 1–3.3)
LYMPHOCYTES # BLD AUTO: 2 % — LOW (ref 13–44)
MCHC RBC-ENTMCNC: 31.2 PG — SIGNIFICANT CHANGE UP (ref 27–34)
MCHC RBC-ENTMCNC: 31.9 GM/DL — LOW (ref 32–36)
MCV RBC AUTO: 98 FL — SIGNIFICANT CHANGE UP (ref 80–100)
MONOCYTES # BLD AUTO: 0.41 K/UL — SIGNIFICANT CHANGE UP (ref 0–0.9)
MONOCYTES NFR BLD AUTO: 5.9 % — SIGNIFICANT CHANGE UP (ref 2–14)
NEUTROPHILS # BLD AUTO: 5.98 K/UL — SIGNIFICANT CHANGE UP (ref 1.8–7.4)
NEUTROPHILS NFR BLD AUTO: 85.5 % — HIGH (ref 43–77)
NITRITE UR-MCNC: NEGATIVE — SIGNIFICANT CHANGE UP
NRBC # BLD: 0 /100 WBCS — SIGNIFICANT CHANGE UP (ref 0–0)
PH UR: 5 — SIGNIFICANT CHANGE UP (ref 5–8)
PLATELET # BLD AUTO: 210 K/UL — SIGNIFICANT CHANGE UP (ref 150–400)
POTASSIUM SERPL-MCNC: 4.5 MMOL/L — SIGNIFICANT CHANGE UP (ref 3.5–5.3)
POTASSIUM SERPL-SCNC: 4.5 MMOL/L — SIGNIFICANT CHANGE UP (ref 3.5–5.3)
PROT SERPL-MCNC: 7 G/DL — SIGNIFICANT CHANGE UP (ref 6–8.3)
PROT UR-MCNC: 15 MG/DL
PROTHROM AB SERPL-ACNC: 11.8 SEC — SIGNIFICANT CHANGE UP (ref 10–12.9)
RBC # BLD: 3.91 M/UL — SIGNIFICANT CHANGE UP (ref 3.8–5.2)
RBC # FLD: 14.5 % — SIGNIFICANT CHANGE UP (ref 10.3–14.5)
RBC CASTS # UR COMP ASSIST: ABNORMAL /HPF (ref 0–4)
SODIUM SERPL-SCNC: 145 MMOL/L — SIGNIFICANT CHANGE UP (ref 135–145)
SP GR SPEC: 1.01 — SIGNIFICANT CHANGE UP (ref 1.01–1.02)
UROBILINOGEN FLD QL: NEGATIVE MG/DL — SIGNIFICANT CHANGE UP
WBC # BLD: 6.99 K/UL — SIGNIFICANT CHANGE UP (ref 3.8–10.5)
WBC # FLD AUTO: 6.99 K/UL — SIGNIFICANT CHANGE UP (ref 3.8–10.5)
WBC UR QL: ABNORMAL

## 2019-05-20 PROCEDURE — 96375 TX/PRO/DX INJ NEW DRUG ADDON: CPT

## 2019-05-20 PROCEDURE — 74019 RADEX ABDOMEN 2 VIEWS: CPT

## 2019-05-20 PROCEDURE — 87040 BLOOD CULTURE FOR BACTERIA: CPT

## 2019-05-20 PROCEDURE — 85027 COMPLETE CBC AUTOMATED: CPT

## 2019-05-20 PROCEDURE — 74019 RADEX ABDOMEN 2 VIEWS: CPT | Mod: 26

## 2019-05-20 PROCEDURE — 96374 THER/PROPH/DIAG INJ IV PUSH: CPT

## 2019-05-20 PROCEDURE — 85730 THROMBOPLASTIN TIME PARTIAL: CPT

## 2019-05-20 PROCEDURE — 81001 URINALYSIS AUTO W/SCOPE: CPT

## 2019-05-20 PROCEDURE — 83605 ASSAY OF LACTIC ACID: CPT

## 2019-05-20 PROCEDURE — 71045 X-RAY EXAM CHEST 1 VIEW: CPT

## 2019-05-20 PROCEDURE — 36415 COLL VENOUS BLD VENIPUNCTURE: CPT

## 2019-05-20 PROCEDURE — 99284 EMERGENCY DEPT VISIT MOD MDM: CPT | Mod: 25

## 2019-05-20 PROCEDURE — 80053 COMPREHEN METABOLIC PANEL: CPT

## 2019-05-20 PROCEDURE — 85610 PROTHROMBIN TIME: CPT

## 2019-05-20 PROCEDURE — 99284 EMERGENCY DEPT VISIT MOD MDM: CPT

## 2019-05-20 PROCEDURE — 83690 ASSAY OF LIPASE: CPT

## 2019-05-20 PROCEDURE — 71045 X-RAY EXAM CHEST 1 VIEW: CPT | Mod: 26

## 2019-05-20 RX ORDER — PANTOPRAZOLE SODIUM 20 MG/1
40 TABLET, DELAYED RELEASE ORAL ONCE
Refills: 0 | Status: COMPLETED | OUTPATIENT
Start: 2019-05-20 | End: 2019-05-20

## 2019-05-20 RX ORDER — METOCLOPRAMIDE HCL 10 MG
10 TABLET ORAL ONCE
Refills: 0 | Status: COMPLETED | OUTPATIENT
Start: 2019-05-20 | End: 2019-05-20

## 2019-05-20 RX ORDER — SODIUM CHLORIDE 9 MG/ML
1000 INJECTION INTRAMUSCULAR; INTRAVENOUS; SUBCUTANEOUS ONCE
Refills: 0 | Status: COMPLETED | OUTPATIENT
Start: 2019-05-20 | End: 2019-05-20

## 2019-05-20 RX ADMIN — PANTOPRAZOLE SODIUM 40 MILLIGRAM(S): 20 TABLET, DELAYED RELEASE ORAL at 09:28

## 2019-05-20 RX ADMIN — Medication 10 MILLIGRAM(S): at 09:28

## 2019-05-20 RX ADMIN — SODIUM CHLORIDE 1000 MILLILITER(S): 9 INJECTION INTRAMUSCULAR; INTRAVENOUS; SUBCUTANEOUS at 09:28

## 2019-05-20 NOTE — ED ADULT NURSE NOTE - OBJECTIVE STATEMENT
73 yr. old female c/o abdominal pain with nausea/vomiting/diarrhea since yesterday.  Pt. states she has been vomiting on and off for several weeks.  Saw Dr. Brewer concerning vomiting.  Pt. history of right foot surgery, bunionectomy in April.  Right foot dressing dry and intact.

## 2019-05-20 NOTE — ED PROVIDER NOTE - NONTENDER LOCATION
left upper quadrant/right costovertebral angle/right upper quadrant/right lower quadrant/left lower quadrant/periumbilical/umbilical/suprapubic/left costovertebral angle

## 2019-05-20 NOTE — ED PROVIDER NOTE - PSH
H/O colonoscopy    History of endoscopy  Sept 2017  S/P cholecystectomy    S/P lobectomy of lung  Right lung in 1996.

## 2019-05-20 NOTE — ED PROVIDER NOTE - OBJECTIVE STATEMENT
72 yo F with nv and abd pain since last night. +watery diarrhea. Denies fever, hematemesis, melena, dysuria hematuria.   PMD Jim Galan.

## 2019-05-20 NOTE — ED PROVIDER NOTE - PMH
Asthma    Calculus of gallbladder with chronic cholecystitis without obstruction    Chronic obstructive pulmonary disease, unspecified COPD type  O2 at night  Esophageal reflux    Essential hypertension    Hiatal hernia with GERD    HLD (hyperlipidemia)    Hyperlipidemia, unspecified hyperlipidemia type    Lung cancer  Right lung.  Rheumatoid arthritis    TIA (transient ischemic attack)  2012

## 2019-05-22 ENCOUNTER — APPOINTMENT (OUTPATIENT)
Dept: SURGERY | Facility: CLINIC | Age: 74
End: 2019-05-22
Payer: MEDICARE

## 2019-05-22 ENCOUNTER — OUTPATIENT (OUTPATIENT)
Dept: OUTPATIENT SERVICES | Facility: HOSPITAL | Age: 74
LOS: 1 days | Discharge: HOME | End: 2019-05-22
Payer: MEDICARE

## 2019-05-22 DIAGNOSIS — Z98.89 OTHER SPECIFIED POSTPROCEDURAL STATES: Chronic | ICD-10-CM

## 2019-05-22 DIAGNOSIS — Z90.49 ACQUIRED ABSENCE OF OTHER SPECIFIED PARTS OF DIGESTIVE TRACT: Chronic | ICD-10-CM

## 2019-05-22 DIAGNOSIS — Z98.890 OTHER SPECIFIED POSTPROCEDURAL STATES: Chronic | ICD-10-CM

## 2019-05-22 DIAGNOSIS — T81.31XD DISRUPTION OF EXTERNAL OPERATION (SURGICAL) WOUND, NOT ELSEWHERE CLASSIFIED, SUBSEQUENT ENCOUNTER: ICD-10-CM

## 2019-05-22 PROCEDURE — 99213 OFFICE O/P EST LOW 20 MIN: CPT

## 2019-05-22 PROCEDURE — G0463: CPT

## 2019-05-25 DIAGNOSIS — Z98.42 CATARACT EXTRACTION STATUS, LEFT EYE: ICD-10-CM

## 2019-05-25 DIAGNOSIS — M06.9 RHEUMATOID ARTHRITIS, UNSPECIFIED: ICD-10-CM

## 2019-05-25 DIAGNOSIS — E78.00 PURE HYPERCHOLESTEROLEMIA, UNSPECIFIED: ICD-10-CM

## 2019-05-25 DIAGNOSIS — Z87.891 PERSONAL HISTORY OF NICOTINE DEPENDENCE: ICD-10-CM
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
## 2019-05-25 DIAGNOSIS — Z85.118 PERSONAL HISTORY OF OTHER MALIGNANT NEOPLASM OF BRONCHUS AND LUNG: ICD-10-CM

## 2019-05-25 DIAGNOSIS — T81.31XD DISRUPTION OF EXTERNAL OPERATION (SURGICAL) WOUND, NOT ELSEWHERE CLASSIFIED, SUBSEQUENT ENCOUNTER: ICD-10-CM

## 2019-05-25 DIAGNOSIS — Z82.49 FAMILY HISTORY OF ISCHEMIC HEART DISEASE AND OTHER DISEASES OF THE CIRCULATORY SYSTEM: ICD-10-CM

## 2019-05-25 DIAGNOSIS — Z79.899 OTHER LONG TERM (CURRENT) DRUG THERAPY: ICD-10-CM

## 2019-05-25 DIAGNOSIS — I10 ESSENTIAL (PRIMARY) HYPERTENSION: ICD-10-CM

## 2019-05-25 DIAGNOSIS — Z90.49 ACQUIRED ABSENCE OF OTHER SPECIFIED PARTS OF DIGESTIVE TRACT: ICD-10-CM

## 2019-05-25 DIAGNOSIS — Z90.2 ACQUIRED ABSENCE OF LUNG [PART OF]: ICD-10-CM

## 2019-05-25 DIAGNOSIS — Z83.3 FAMILY HISTORY OF DIABETES MELLITUS: ICD-10-CM

## 2019-05-25 DIAGNOSIS — J45.909 UNSPECIFIED ASTHMA, UNCOMPLICATED: ICD-10-CM

## 2019-05-25 DIAGNOSIS — Z98.41 CATARACT EXTRACTION STATUS, RIGHT EYE: ICD-10-CM

## 2019-05-25 DIAGNOSIS — Z80.1 FAMILY HISTORY OF MALIGNANT NEOPLASM OF TRACHEA, BRONCHUS AND LUNG: ICD-10-CM

## 2019-05-25 DIAGNOSIS — Z86.73 PERSONAL HISTORY OF TRANSIENT ISCHEMIC ATTACK (TIA), AND CEREBRAL INFARCTION WITHOUT RESIDUAL DEFICITS: ICD-10-CM

## 2019-05-25 DIAGNOSIS — E66.3 OVERWEIGHT: ICD-10-CM

## 2019-05-25 DIAGNOSIS — K21.9 GASTRO-ESOPHAGEAL REFLUX DISEASE WITHOUT ESOPHAGITIS: ICD-10-CM

## 2019-05-28 ENCOUNTER — APPOINTMENT (OUTPATIENT)
Dept: OBGYN | Facility: HOSPITAL | Age: 74
End: 2019-05-28

## 2019-05-28 ENCOUNTER — APPOINTMENT (OUTPATIENT)
Dept: PODIATRY | Facility: HOSPITAL | Age: 74
End: 2019-05-28

## 2019-05-28 ENCOUNTER — OUTPATIENT (OUTPATIENT)
Dept: OUTPATIENT SERVICES | Facility: HOSPITAL | Age: 74
LOS: 1 days | Discharge: HOME | End: 2019-05-28
Payer: MEDICARE

## 2019-05-28 ENCOUNTER — APPOINTMENT (OUTPATIENT)
Dept: PODIATRY | Facility: CLINIC | Age: 74
End: 2019-05-28

## 2019-05-28 DIAGNOSIS — Z98.890 OTHER SPECIFIED POSTPROCEDURAL STATES: Chronic | ICD-10-CM

## 2019-05-28 DIAGNOSIS — Z90.49 ACQUIRED ABSENCE OF OTHER SPECIFIED PARTS OF DIGESTIVE TRACT: Chronic | ICD-10-CM

## 2019-05-28 DIAGNOSIS — T81.31XD DISRUPTION OF EXTERNAL OPERATION (SURGICAL) WOUND, NOT ELSEWHERE CLASSIFIED, SUBSEQUENT ENCOUNTER: ICD-10-CM

## 2019-05-28 DIAGNOSIS — Z98.89 OTHER SPECIFIED POSTPROCEDURAL STATES: Chronic | ICD-10-CM

## 2019-05-28 PROCEDURE — G0463: CPT

## 2019-05-28 PROCEDURE — 99213 OFFICE O/P EST LOW 20 MIN: CPT
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
## 2019-05-29 DIAGNOSIS — J45.909 UNSPECIFIED ASTHMA, UNCOMPLICATED: ICD-10-CM

## 2019-05-29 DIAGNOSIS — Z87.891 PERSONAL HISTORY OF NICOTINE DEPENDENCE: ICD-10-CM

## 2019-05-29 DIAGNOSIS — K21.9 GASTRO-ESOPHAGEAL REFLUX DISEASE WITHOUT ESOPHAGITIS: ICD-10-CM

## 2019-05-29 DIAGNOSIS — Z85.118 PERSONAL HISTORY OF OTHER MALIGNANT NEOPLASM OF BRONCHUS AND LUNG: ICD-10-CM

## 2019-05-29 DIAGNOSIS — Z90.2 ACQUIRED ABSENCE OF LUNG [PART OF]: ICD-10-CM

## 2019-05-29 DIAGNOSIS — Z98.42 CATARACT EXTRACTION STATUS, LEFT EYE: ICD-10-CM

## 2019-05-29 DIAGNOSIS — M06.9 RHEUMATOID ARTHRITIS, UNSPECIFIED: ICD-10-CM

## 2019-05-29 DIAGNOSIS — I10 ESSENTIAL (PRIMARY) HYPERTENSION: ICD-10-CM

## 2019-05-29 DIAGNOSIS — E66.3 OVERWEIGHT: ICD-10-CM

## 2019-05-29 DIAGNOSIS — Z90.49 ACQUIRED ABSENCE OF OTHER SPECIFIED PARTS OF DIGESTIVE TRACT: ICD-10-CM

## 2019-05-29 DIAGNOSIS — Z98.41 CATARACT EXTRACTION STATUS, RIGHT EYE: ICD-10-CM

## 2019-05-29 DIAGNOSIS — Z79.899 OTHER LONG TERM (CURRENT) DRUG THERAPY: ICD-10-CM

## 2019-05-29 DIAGNOSIS — E78.00 PURE HYPERCHOLESTEROLEMIA, UNSPECIFIED: ICD-10-CM

## 2019-05-29 DIAGNOSIS — T81.31XD DISRUPTION OF EXTERNAL OPERATION (SURGICAL) WOUND, NOT ELSEWHERE CLASSIFIED, SUBSEQUENT ENCOUNTER: ICD-10-CM

## 2019-05-29 DIAGNOSIS — Z83.3 FAMILY HISTORY OF DIABETES MELLITUS: ICD-10-CM

## 2019-05-29 DIAGNOSIS — Z80.1 FAMILY HISTORY OF MALIGNANT NEOPLASM OF TRACHEA, BRONCHUS AND LUNG: ICD-10-CM

## 2019-05-29 DIAGNOSIS — Z82.49 FAMILY HISTORY OF ISCHEMIC HEART DISEASE AND OTHER DISEASES OF THE CIRCULATORY SYSTEM: ICD-10-CM

## 2019-05-29 DIAGNOSIS — Z86.73 PERSONAL HISTORY OF TRANSIENT ISCHEMIC ATTACK (TIA), AND CEREBRAL INFARCTION WITHOUT RESIDUAL DEFICITS: ICD-10-CM

## 2019-06-03 ENCOUNTER — OUTPATIENT (OUTPATIENT)
Dept: OUTPATIENT SERVICES | Facility: HOSPITAL | Age: 74
LOS: 1 days | Discharge: HOME | End: 2019-06-03
Payer: MEDICARE

## 2019-06-03 ENCOUNTER — APPOINTMENT (OUTPATIENT)
Dept: PODIATRY | Facility: CLINIC | Age: 74
End: 2019-06-03
Payer: MEDICARE

## 2019-06-03 DIAGNOSIS — Z98.89 OTHER SPECIFIED POSTPROCEDURAL STATES: Chronic | ICD-10-CM

## 2019-06-03 DIAGNOSIS — Z98.890 OTHER SPECIFIED POSTPROCEDURAL STATES: Chronic | ICD-10-CM

## 2019-06-03 DIAGNOSIS — Z90.49 ACQUIRED ABSENCE OF OTHER SPECIFIED PARTS OF DIGESTIVE TRACT: Chronic | ICD-10-CM

## 2019-06-03 DIAGNOSIS — T81.31XD DISRUPTION OF EXTERNAL OPERATION (SURGICAL) WOUND, NOT ELSEWHERE CLASSIFIED, SUBSEQUENT ENCOUNTER: ICD-10-CM

## 2019-06-03 PROCEDURE — 99213 OFFICE O/P EST LOW 20 MIN: CPT

## 2019-06-03 PROCEDURE — G0463: CPT

## 2019-06-04 ENCOUNTER — APPOINTMENT (OUTPATIENT)
Dept: SURGERY | Facility: CLINIC | Age: 74
End: 2019-06-04

## 2019-06-05 DIAGNOSIS — K21.9 GASTRO-ESOPHAGEAL REFLUX DISEASE WITHOUT ESOPHAGITIS: ICD-10-CM

## 2019-06-05 DIAGNOSIS — I10 ESSENTIAL (PRIMARY) HYPERTENSION: ICD-10-CM

## 2019-06-05 DIAGNOSIS — Z83.3 FAMILY HISTORY OF DIABETES MELLITUS: ICD-10-CM

## 2019-06-05 DIAGNOSIS — Z80.1 FAMILY HISTORY OF MALIGNANT NEOPLASM OF TRACHEA, BRONCHUS AND LUNG: ICD-10-CM

## 2019-06-05 DIAGNOSIS — Z87.891 PERSONAL HISTORY OF NICOTINE DEPENDENCE: ICD-10-CM

## 2019-06-05 DIAGNOSIS — Z90.2 ACQUIRED ABSENCE OF LUNG [PART OF]: ICD-10-CM

## 2019-06-05 DIAGNOSIS — E78.00 PURE HYPERCHOLESTEROLEMIA, UNSPECIFIED: ICD-10-CM

## 2019-06-05 DIAGNOSIS — Z82.49 FAMILY HISTORY OF ISCHEMIC HEART DISEASE AND OTHER DISEASES OF THE CIRCULATORY SYSTEM: ICD-10-CM

## 2019-06-05 DIAGNOSIS — Z85.118 PERSONAL HISTORY OF OTHER MALIGNANT NEOPLASM OF BRONCHUS AND LUNG: ICD-10-CM

## 2019-06-05 DIAGNOSIS — Z90.49 ACQUIRED ABSENCE OF OTHER SPECIFIED PARTS OF DIGESTIVE TRACT: ICD-10-CM
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
## 2019-06-05 DIAGNOSIS — Z98.42 CATARACT EXTRACTION STATUS, LEFT EYE: ICD-10-CM

## 2019-06-05 DIAGNOSIS — M06.9 RHEUMATOID ARTHRITIS, UNSPECIFIED: ICD-10-CM

## 2019-06-05 DIAGNOSIS — Z98.41 CATARACT EXTRACTION STATUS, RIGHT EYE: ICD-10-CM

## 2019-06-05 DIAGNOSIS — J45.909 UNSPECIFIED ASTHMA, UNCOMPLICATED: ICD-10-CM

## 2019-06-05 DIAGNOSIS — Z86.73 PERSONAL HISTORY OF TRANSIENT ISCHEMIC ATTACK (TIA), AND CEREBRAL INFARCTION WITHOUT RESIDUAL DEFICITS: ICD-10-CM

## 2019-06-05 DIAGNOSIS — T81.31XD DISRUPTION OF EXTERNAL OPERATION (SURGICAL) WOUND, NOT ELSEWHERE CLASSIFIED, SUBSEQUENT ENCOUNTER: ICD-10-CM

## 2019-06-05 DIAGNOSIS — Z79.899 OTHER LONG TERM (CURRENT) DRUG THERAPY: ICD-10-CM

## 2019-06-05 DIAGNOSIS — E66.3 OVERWEIGHT: ICD-10-CM

## 2019-06-10 ENCOUNTER — RECORD ABSTRACTING (OUTPATIENT)
Age: 74
End: 2019-06-10

## 2019-06-10 DIAGNOSIS — Z87.01 PERSONAL HISTORY OF PNEUMONIA (RECURRENT): ICD-10-CM

## 2019-06-10 DIAGNOSIS — Z85.118 PERSONAL HISTORY OF OTHER MALIGNANT NEOPLASM OF BRONCHUS AND LUNG: ICD-10-CM

## 2019-06-10 DIAGNOSIS — Z87.09 PERSONAL HISTORY OF OTHER DISEASES OF THE RESPIRATORY SYSTEM: ICD-10-CM

## 2019-06-10 DIAGNOSIS — G45.9 TRANSIENT CEREBRAL ISCHEMIC ATTACK, UNSPECIFIED: ICD-10-CM

## 2019-06-17 ENCOUNTER — APPOINTMENT (OUTPATIENT)
Dept: PODIATRY | Facility: CLINIC | Age: 74
End: 2019-06-17
Payer: MEDICARE

## 2019-06-17 ENCOUNTER — OUTPATIENT (OUTPATIENT)
Dept: OUTPATIENT SERVICES | Facility: HOSPITAL | Age: 74
LOS: 1 days | Discharge: HOME | End: 2019-06-17
Payer: MEDICARE

## 2019-06-17 ENCOUNTER — MOBILE ON CALL (OUTPATIENT)
Age: 74
End: 2019-06-17

## 2019-06-17 VITALS — TEMPERATURE: 96 F

## 2019-06-17 VITALS
HEIGHT: 62 IN | HEART RATE: 80 BPM | WEIGHT: 130 LBS | DIASTOLIC BLOOD PRESSURE: 60 MMHG | OXYGEN SATURATION: 97 % | RESPIRATION RATE: 20 BRPM | SYSTOLIC BLOOD PRESSURE: 115 MMHG | BODY MASS INDEX: 23.92 KG/M2

## 2019-06-17 DIAGNOSIS — Z90.49 ACQUIRED ABSENCE OF OTHER SPECIFIED PARTS OF DIGESTIVE TRACT: Chronic | ICD-10-CM

## 2019-06-17 DIAGNOSIS — Z98.890 OTHER SPECIFIED POSTPROCEDURAL STATES: Chronic | ICD-10-CM

## 2019-06-17 DIAGNOSIS — Z98.89 OTHER SPECIFIED POSTPROCEDURAL STATES: Chronic | ICD-10-CM

## 2019-06-17 DIAGNOSIS — T81.31XD DISRUPTION OF EXTERNAL OPERATION (SURGICAL) WOUND, NOT ELSEWHERE CLASSIFIED, SUBSEQUENT ENCOUNTER: ICD-10-CM

## 2019-06-17 PROCEDURE — 99213 OFFICE O/P EST LOW 20 MIN: CPT

## 2019-06-17 PROCEDURE — G0463: CPT

## 2019-06-17 NOTE — ASSESSMENT
[Verbal] : Verbal [Patient] : Patient [Spouse] : Spouse [Good - alert, interested, motivated] : Good - alert, interested, motivated [Verbalizes knowledge/Understanding] : Verbalizes knowledge/understanding [Foot Care] : foot care [Skin Care] : skin care [Pressure relief] : pressure relief [Signs and symptoms of infection] : sign and symptoms of infection [How and When to Call] : how and when to call [Patient responsibility to plan of care] : patient responsibility to plan of care [] : Yes [Stable] : stable [Home] : Home [Ambulatory] : Ambulatory [FreeTextEntry2] : Maintain optimal skin integrity\par  [FreeTextEntry4] : Dr Laird/ Photo taken\par Wound closed\par F/U to C in 1 month [FreeTextEntry1] : patient doing well , happy with the outcome PTR 1 month

## 2019-06-17 NOTE — PHYSICAL EXAM
[de-identified] : No Dressing [FreeTextEntry1] : Dorsal Hallux [de-identified] : closed post surgical  [de-identified] : Closed

## 2019-06-17 NOTE — VITALS
[Pain related to present condition?] : The patient's  pain is not related to present condition. [] : No

## 2019-06-17 NOTE — PLAN
[FreeTextEntry1] : patient doing very well , all areas closed patient happy with the results , PTR 1 month

## 2019-06-18 DIAGNOSIS — J45.909 UNSPECIFIED ASTHMA, UNCOMPLICATED: ICD-10-CM

## 2019-06-18 DIAGNOSIS — Z79.899 OTHER LONG TERM (CURRENT) DRUG THERAPY: ICD-10-CM

## 2019-06-18 DIAGNOSIS — Z87.891 PERSONAL HISTORY OF NICOTINE DEPENDENCE: ICD-10-CM

## 2019-06-18 DIAGNOSIS — Z83.3 FAMILY HISTORY OF DIABETES MELLITUS: ICD-10-CM

## 2019-06-18 DIAGNOSIS — Z85.118 PERSONAL HISTORY OF OTHER MALIGNANT NEOPLASM OF BRONCHUS AND LUNG: ICD-10-CM

## 2019-06-18 DIAGNOSIS — Z80.1 FAMILY HISTORY OF MALIGNANT NEOPLASM OF TRACHEA, BRONCHUS AND LUNG: ICD-10-CM

## 2019-06-18 DIAGNOSIS — Z86.73 PERSONAL HISTORY OF TRANSIENT ISCHEMIC ATTACK (TIA), AND CEREBRAL INFARCTION WITHOUT RESIDUAL DEFICITS: ICD-10-CM

## 2019-06-18 DIAGNOSIS — Z90.2 ACQUIRED ABSENCE OF LUNG [PART OF]: ICD-10-CM

## 2019-06-18 DIAGNOSIS — Z82.3 FAMILY HISTORY OF STROKE: ICD-10-CM

## 2019-06-18 DIAGNOSIS — Z98.49 CATARACT EXTRACTION STATUS, UNSPECIFIED EYE: ICD-10-CM

## 2019-06-18 DIAGNOSIS — Z82.49 FAMILY HISTORY OF ISCHEMIC HEART DISEASE AND OTHER DISEASES OF THE CIRCULATORY SYSTEM: ICD-10-CM
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
## 2019-06-18 DIAGNOSIS — E78.5 HYPERLIPIDEMIA, UNSPECIFIED: ICD-10-CM

## 2019-06-18 DIAGNOSIS — T81.31XD DISRUPTION OF EXTERNAL OPERATION (SURGICAL) WOUND, NOT ELSEWHERE CLASSIFIED, SUBSEQUENT ENCOUNTER: ICD-10-CM

## 2019-06-18 DIAGNOSIS — M06.9 RHEUMATOID ARTHRITIS, UNSPECIFIED: ICD-10-CM

## 2019-06-18 DIAGNOSIS — K21.9 GASTRO-ESOPHAGEAL REFLUX DISEASE WITHOUT ESOPHAGITIS: ICD-10-CM

## 2019-06-18 DIAGNOSIS — I10 ESSENTIAL (PRIMARY) HYPERTENSION: ICD-10-CM

## 2019-06-18 DIAGNOSIS — Z90.49 ACQUIRED ABSENCE OF OTHER SPECIFIED PARTS OF DIGESTIVE TRACT: ICD-10-CM

## 2019-07-16 ENCOUNTER — OUTPATIENT (OUTPATIENT)
Dept: OUTPATIENT SERVICES | Facility: HOSPITAL | Age: 74
LOS: 1 days | Discharge: HOME | End: 2019-07-16
Payer: MEDICARE

## 2019-07-16 ENCOUNTER — APPOINTMENT (OUTPATIENT)
Dept: PODIATRY | Facility: CLINIC | Age: 74
End: 2019-07-16
Payer: MEDICARE

## 2019-07-16 VITALS
BODY MASS INDEX: 23.92 KG/M2 | WEIGHT: 130 LBS | HEIGHT: 62 IN | OXYGEN SATURATION: 97 % | HEART RATE: 75 BPM | DIASTOLIC BLOOD PRESSURE: 80 MMHG | TEMPERATURE: 97.9 F | SYSTOLIC BLOOD PRESSURE: 124 MMHG | RESPIRATION RATE: 20 BRPM

## 2019-07-16 DIAGNOSIS — T81.31XD DISRUPTION OF EXTERNAL OPERATION (SURGICAL) WOUND, NOT ELSEWHERE CLASSIFIED, SUBSEQUENT ENCOUNTER: ICD-10-CM

## 2019-07-16 DIAGNOSIS — Z98.890 OTHER SPECIFIED POSTPROCEDURAL STATES: Chronic | ICD-10-CM

## 2019-07-16 DIAGNOSIS — Z90.49 ACQUIRED ABSENCE OF OTHER SPECIFIED PARTS OF DIGESTIVE TRACT: Chronic | ICD-10-CM

## 2019-07-16 DIAGNOSIS — Z98.89 OTHER SPECIFIED POSTPROCEDURAL STATES: Chronic | ICD-10-CM

## 2019-07-16 PROCEDURE — 99213 OFFICE O/P EST LOW 20 MIN: CPT

## 2019-07-16 PROCEDURE — G0463: CPT

## 2019-07-16 NOTE — ASSESSMENT
[Verbal] : Verbal [Patient] : Patient [Spouse] : Spouse [Good - alert, interested, motivated] : Good - alert, interested, motivated [Verbalizes knowledge/Understanding] : Verbalizes knowledge/understanding [Foot Care] : foot care [Skin Care] : skin care [Pressure relief] : pressure relief [Signs and symptoms of infection] : sign and symptoms of infection [How and When to Call] : how and when to call [Off-loading] : off-loading [Discharge Planning] : discharge planning [Patient responsibility to plan of care] : patient responsibility to plan of care [] : Yes [Stable] : stable [Home] : Home [Ambulatory] : Ambulatory [FreeTextEntry2] : Maintain optimal skin integrity\par  [FreeTextEntry4] : Dr Laird/ Photo taken\par Wound closed- pt discharged from [facility] by Dr Laird

## 2019-07-16 NOTE — REVIEW OF SYSTEMS
[Fever] : no fever [Chills] : no chills [Abdominal Pain] : no abdominal pain [Arthralgias] : arthralgias [Joint Swelling] : no joint swelling [Joint Stiffness] : joint stiffness [Skin Lesions] : no skin lesions [Skin Wound] : no skin wound [FreeTextEntry5] : hld, htn [FreeTextEntry6] : copd [de-identified] : no neuropathy

## 2019-07-16 NOTE — PHYSICAL EXAM
[1+] : left 1+ [Varicose Veins Of Lower Extremities] : bilaterally [Ankle Swelling On The Right] : mild [Alert] : alert [Oriented to Person] : oriented to person [Oriented to Place] : oriented to place [de-identified] : comfortable [de-identified] : pt uses inhaler [de-identified] : htn, hld [de-identified] : hammer toes  [de-identified] : no open wounds  [FreeTextEntry1] : Dorsal Hallux- Closed [de-identified] : No Dressing

## 2019-07-16 NOTE — HISTORY OF PRESENT ILLNESS
[FreeTextEntry1] : patient s/p bunion correction and hammer toes 2,3 . All areas closed . no pain , patient happy with results . Slight abrasion of 3rd toe , patient told to use mupirocin , no follow up needed , patient can be discharged

## 2019-07-18 NOTE — ED PROVIDER NOTE - PROGRESS NOTE DETAILS
Hospitalist Progress Note    2019  Admit Date: 2019 11:21 PM   NAME: Zak Hinson   :  1937   MRN:  [id number]   Attending: Ankit Meeks MD  PCP:  Priscilla Thurman NP    SUBJECTIVE:     Zak Hinson is a 80years old male with hx of prostate cancer, CAD, A.fib, systolic CHF with EF 25-62%, moderated pulmonary HTN with recently diagnosed minimum stage 3 lung cancer admitted on 7/15 with nausea/vomiting, dehydration, acute renal failure. :  Pt feels nauseous while working with PT. Nurse notified to administer antiemetic. Pt was requiring higher than baseline supplemental oxygen yesterday, was started on IV albumin, IV fluids were stopped. No fever, chills, chest/abdominal pain. Review of Systems negative with exception of pertinent positives noted above      PHYSICAL EXAM       Visit Vitals  BP 96/70 (BP 1 Location: Right arm, BP Patient Position: At rest)   Pulse 83   Temp 97.4 °F (36.3 °C)   Resp 20   Ht 5' 6\" (1.676 m)   Wt 67 kg (147 lb 11.2 oz)   SpO2 (!) 89%   BMI 23.84 kg/m²      Temp (24hrs), Av °F (36.7 °C), Min:97.4 °F (36.3 °C), Max:98.6 °F (37 °C)    Oxygen Therapy  O2 Sat (%): (!) 89 % (19)  Pulse via Oximetry: 88 beats per minute (19)  O2 Device: Nasal cannula (19)  O2 Flow Rate (L/min): 5 l/min(found on 4L increased to 5L) (19)    Intake/Output Summary (Last 24 hours) at 2019 1031  Last data filed at 2019 0542  Gross per 24 hour   Intake    Output 125 ml   Net -125 ml          General: Elderly, frail, NAD, on 2 ltrs NC  Head:  Atraumatic Normocephalic. Eyes:  PERRLA, EOMI, Anicteric. ENT:  No discharges/lesions. Lungs:  CTA Bilaterally. CVS:  Regular rate and rhythm,  No murmur, rub, or gallop, No JVD, No lower   extremity edema. Abdomen: Soft, Non distended, Non tender, Positive bowel sounds. MSK:  No deformities, lesions, Spontaneously moves extremities.   Neurologic:  GCS 15, no motor or sensory deficits, CN 2-12 intact  Psychiatry:      No anxiety/Depression  Skin:   No rash/lesions. Good skin turgor  Heme/Lymph/Immune:  No petechiae, ecchymoses, overt signs of bleeding or    lymphadenopathy noted. Recent Results (from the past 24 hour(s))   PLEASE READ & DOCUMENT PPD TEST IN 48 HRS    Collection Time: 07/17/19  2:16 PM   Result Value Ref Range    PPD Negative Negative    mm Induration 0 0 - 5 mm   METABOLIC PANEL, BASIC    Collection Time: 07/18/19  8:14 AM   Result Value Ref Range    Sodium 134 (L) 136 - 145 mmol/L    Potassium 4.2 3.5 - 5.1 mmol/L    Chloride 101 98 - 107 mmol/L    CO2 23 21 - 32 mmol/L    Anion gap 10 7 - 16 mmol/L    Glucose 119 (H) 65 - 100 mg/dL    BUN 44 (H) 8 - 23 MG/DL    Creatinine 2.36 (H) 0.8 - 1.5 MG/DL    GFR est AA 34 (L) >60 ml/min/1.73m2    GFR est non-AA 28 (L) >60 ml/min/1.73m2    Calcium 9.3 8.3 - 10.4 MG/DL         Imaging /Procedures /Studies   Chest X-ray     INDICATION: Shortness of breath and cough     PA and lateral views of the chest were obtained.     FINDINGS: The lungs are clear. There are no infiltrates or effusions. There is  stable cardiomegaly.   Pacemaker is present.       IMPRESSION  IMPRESSION: No acute findings in the chest    ASSESSMENT      Hospital Problems as of 7/18/2019 Date Reviewed: 7/11/2019          Codes Class Noted - Resolved POA    Nausea with vomiting ICD-10-CM: R11.2  ICD-9-CM: 787.01  7/15/2019 - Present Yes        * (Principal) JAZZY (acute kidney injury) ([facility] [address] ICD-10-CM: N17.9  ICD-9-CM: 584.9  7/15/2019 - Present Yes        Malignant neoplasm of hilus of left lung ([facility] [address] ICD-10-CM: C34.02  ICD-9-CM: 162.2  7/3/2019 - Present Yes        History of DVT (deep vein thrombosis) (Chronic) ICD-10-CM: Z86.718  ICD-9-CM: V12.51  6/24/2019 - Present Yes        Atrial fibrillation (HCC) (Chronic) ICD-10-CM: I48.91  ICD-9-CM: 427.31  3/22/2019 - Present Yes        Essential hypertension ICD-10-CM: I10  ICD-9-CM: 401.9  9/4/2018 - Present Yes    Overview Addendum 7/7/2019  5:31 PM by Manan Delgado NP     BP readings have been in target range. No complications noted from the medication presently being used. GERD (gastroesophageal reflux disease) ICD-10-CM: K21.9  ICD-9-CM: 530.81  9/4/2018 - Present Yes        Cardiac defibrillator in place ICD-10-CM: Z95.810  ICD-9-CM: V45.02  9/4/2018 - Present Yes    Overview Addendum 7/7/2019  5:31 PM by Manan Delgado NP     [employer] DDD ICD              History of MI (myocardial infarction) ICD-10-CM: I25.2  ICD-9-CM: 104  9/4/2018 - Present Yes        History of prostate cancer ICD-10-CM: Z85.46  ICD-9-CM: V10.46  9/4/2018 - Present Yes    Overview Signed 7/7/2019  5:29 PM by Manan Delgado NP     Overview:   Added automatically from request for surgery [id number]             CAD (coronary artery disease), atherosclerotic of the native vessel (Chronic) ICD-10-CM: I25.10  ICD-9-CM: 414.00  Unknown - Present Yes    Overview Addendum 10/19/2015  9:21 AM by Pia Carty     ? MI 4-2014  Angina has improved with no episodes of chest pain since the last visit. Mild CAD by cath 6/2014. Chronic obstructive lung disease ([facility] [address] ICD-10-CM: J44.9  ICD-9-CM: 674  Unknown - Present Yes        Chronic congestive heart failure ([facility] [address] ICD-10-CM: I50.9  ICD-9-CM: 428.0  10/7/2014 - Present Yes    Overview Addendum 7/7/2019  5:31 PM by Manan Delgado NP     [place] Class I.  EF 35%. Plan:  - nausea/vomiting; resolved with improving appetite  IV fluids stopped as pt was getting more hypoxic yesterday. He has hx of CHF with EF 25-30%  - Creatinine up from yesterday, 1.92 --> 2.36  Will continue oral lasix and albumin, likely from fluid overload in setting of chronic systolic CHF.   Monitor daily BMP    Pt is DNR/DNI      DVT Prophylaxis: eliquis  High risk   Dispo: plan was for discharge to home with HHA/PT/OT today, however, pt's grandson is overwhelmed and today he has more requests. Palliative care is being consulted to address to those needs. Discharge is cancelled for now.      Tyesha Cloeman MD Slightly better but O2 sat still drops into 80s. Pt wants to be admitted.

## 2019-07-19 DIAGNOSIS — T81.31XD DISRUPTION OF EXTERNAL OPERATION (SURGICAL) WOUND, NOT ELSEWHERE CLASSIFIED, SUBSEQUENT ENCOUNTER: ICD-10-CM

## 2019-07-19 DIAGNOSIS — M06.9 RHEUMATOID ARTHRITIS, UNSPECIFIED: ICD-10-CM

## 2019-07-19 DIAGNOSIS — I10 ESSENTIAL (PRIMARY) HYPERTENSION: ICD-10-CM

## 2019-07-19 DIAGNOSIS — Z86.73 PERSONAL HISTORY OF TRANSIENT ISCHEMIC ATTACK (TIA), AND CEREBRAL INFARCTION WITHOUT RESIDUAL DEFICITS: ICD-10-CM

## 2019-07-19 DIAGNOSIS — Z90.49 ACQUIRED ABSENCE OF OTHER SPECIFIED PARTS OF DIGESTIVE TRACT: ICD-10-CM

## 2019-07-19 DIAGNOSIS — Z87.891 PERSONAL HISTORY OF NICOTINE DEPENDENCE: ICD-10-CM

## 2019-07-19 DIAGNOSIS — Z79.899 OTHER LONG TERM (CURRENT) DRUG THERAPY: ICD-10-CM

## 2019-07-19 DIAGNOSIS — Z85.118 PERSONAL HISTORY OF OTHER MALIGNANT NEOPLASM OF BRONCHUS AND LUNG: ICD-10-CM

## 2019-07-19 DIAGNOSIS — J45.909 UNSPECIFIED ASTHMA, UNCOMPLICATED: ICD-10-CM

## 2019-07-19 DIAGNOSIS — K21.9 GASTRO-ESOPHAGEAL REFLUX DISEASE WITHOUT ESOPHAGITIS: ICD-10-CM

## 2019-07-19 DIAGNOSIS — Z82.3 FAMILY HISTORY OF STROKE: ICD-10-CM

## 2019-07-19 DIAGNOSIS — Z90.2 ACQUIRED ABSENCE OF LUNG [PART OF]: ICD-10-CM

## 2019-07-19 DIAGNOSIS — Z98.49 CATARACT EXTRACTION STATUS, UNSPECIFIED EYE: ICD-10-CM
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
## 2019-07-19 DIAGNOSIS — Z80.1 FAMILY HISTORY OF MALIGNANT NEOPLASM OF TRACHEA, BRONCHUS AND LUNG: ICD-10-CM

## 2019-07-19 DIAGNOSIS — Z82.49 FAMILY HISTORY OF ISCHEMIC HEART DISEASE AND OTHER DISEASES OF THE CIRCULATORY SYSTEM: ICD-10-CM

## 2019-07-19 DIAGNOSIS — E78.5 HYPERLIPIDEMIA, UNSPECIFIED: ICD-10-CM

## 2019-07-19 DIAGNOSIS — Z83.3 FAMILY HISTORY OF DIABETES MELLITUS: ICD-10-CM

## 2019-12-15 ENCOUNTER — TRANSCRIPTION ENCOUNTER (OUTPATIENT)
Age: 74
End: 2019-12-15

## 2019-12-16 ENCOUNTER — OUTPATIENT (OUTPATIENT)
Dept: OUTPATIENT SERVICES | Facility: HOSPITAL | Age: 74
LOS: 1 days | End: 2019-12-16
Payer: MEDICARE

## 2019-12-16 ENCOUNTER — RESULT REVIEW (OUTPATIENT)
Age: 74
End: 2019-12-16

## 2019-12-16 DIAGNOSIS — Z98.89 OTHER SPECIFIED POSTPROCEDURAL STATES: Chronic | ICD-10-CM

## 2019-12-16 DIAGNOSIS — R13.10 DYSPHAGIA, UNSPECIFIED: ICD-10-CM

## 2019-12-16 DIAGNOSIS — Z98.890 OTHER SPECIFIED POSTPROCEDURAL STATES: Chronic | ICD-10-CM

## 2019-12-16 DIAGNOSIS — Z90.49 ACQUIRED ABSENCE OF OTHER SPECIFIED PARTS OF DIGESTIVE TRACT: Chronic | ICD-10-CM

## 2019-12-16 PROCEDURE — 88313 SPECIAL STAINS GROUP 2: CPT | Mod: 26

## 2019-12-16 PROCEDURE — 88313 SPECIAL STAINS GROUP 2: CPT

## 2019-12-16 PROCEDURE — 88312 SPECIAL STAINS GROUP 1: CPT | Mod: 26

## 2019-12-16 PROCEDURE — 43239 EGD BIOPSY SINGLE/MULTIPLE: CPT

## 2019-12-16 PROCEDURE — 88305 TISSUE EXAM BY PATHOLOGIST: CPT

## 2019-12-16 PROCEDURE — 88305 TISSUE EXAM BY PATHOLOGIST: CPT | Mod: 26

## 2019-12-16 PROCEDURE — 88312 SPECIAL STAINS GROUP 1: CPT

## 2019-12-17 LAB — SURGICAL PATHOLOGY STUDY: SIGNIFICANT CHANGE UP

## 2019-12-30 ENCOUNTER — APPOINTMENT (OUTPATIENT)
Dept: PODIATRY | Facility: HOSPITAL | Age: 74
End: 2019-12-30
Payer: MEDICARE

## 2019-12-30 ENCOUNTER — OUTPATIENT (OUTPATIENT)
Dept: OUTPATIENT SERVICES | Facility: HOSPITAL | Age: 74
LOS: 1 days | Discharge: HOME | End: 2019-12-30
Payer: MEDICARE

## 2019-12-30 VITALS
RESPIRATION RATE: 20 BRPM | HEIGHT: 62 IN | DIASTOLIC BLOOD PRESSURE: 60 MMHG | WEIGHT: 130 LBS | TEMPERATURE: 97.4 F | HEART RATE: 92 BPM | BODY MASS INDEX: 23.92 KG/M2 | SYSTOLIC BLOOD PRESSURE: 130 MMHG | OXYGEN SATURATION: 95 %

## 2019-12-30 DIAGNOSIS — S91.309A UNSPECIFIED OPEN WOUND, UNSPECIFIED FOOT, INITIAL ENCOUNTER: ICD-10-CM

## 2019-12-30 DIAGNOSIS — Z98.890 OTHER SPECIFIED POSTPROCEDURAL STATES: Chronic | ICD-10-CM

## 2019-12-30 DIAGNOSIS — Z90.49 ACQUIRED ABSENCE OF OTHER SPECIFIED PARTS OF DIGESTIVE TRACT: Chronic | ICD-10-CM

## 2019-12-30 DIAGNOSIS — Z98.89 OTHER SPECIFIED POSTPROCEDURAL STATES: Chronic | ICD-10-CM

## 2019-12-30 PROCEDURE — 73630 X-RAY EXAM OF FOOT: CPT | Mod: 26,RT

## 2019-12-30 PROCEDURE — G0463: CPT

## 2019-12-30 PROCEDURE — 73630 X-RAY EXAM OF FOOT: CPT

## 2019-12-30 PROCEDURE — 99214 OFFICE O/P EST MOD 30 MIN: CPT

## 2019-12-30 RX ORDER — ALBUTEROL SULFATE 90 UG/1
108 (90 BASE) AEROSOL, METERED RESPIRATORY (INHALATION)
Qty: 1 | Refills: 5 | Status: DISCONTINUED | COMMUNITY
Start: 2017-09-18 | End: 2019-12-30

## 2019-12-30 RX ORDER — LEFLUNOMIDE 10 MG/1
10 TABLET, FILM COATED ORAL DAILY
Qty: 90 | Refills: 0 | Status: DISCONTINUED | COMMUNITY
Start: 2017-06-13 | End: 2019-12-30

## 2019-12-30 RX ORDER — MUPIROCIN 20 MG/G
2 OINTMENT TOPICAL TWICE DAILY
Qty: 1 | Refills: 5 | Status: DISCONTINUED | COMMUNITY
Start: 2019-07-16 | End: 2019-12-30

## 2019-12-31 NOTE — VITALS
[Occasional] : occasional [] : Yes [Tender] : tender [Pain related to present condition?] : The patient's  pain is related to present condition. [de-identified] : 4/10 [FreeTextEntry3] : Right dorsal and medial hallux  [FreeTextEntry1] : Offloading  [FreeTextEntry2] : Direct contact

## 2019-12-31 NOTE — REASON FOR VISIT
[Consultation] : a consultation visit [Spouse] : spouse [FreeTextEntry1] : Patient had right hallux bunionectomy and right foot 2nd metatarsal hammer toe surgery in April 2019. Patient has pain on right hallux arthroplasty site.

## 2019-12-31 NOTE — ASSESSMENT
[Verbal] : Verbal [Patient] : Patient [Written] : Written [Handout] : Handout [Spouse] : Spouse [Good - alert, interested, motivated] : Good - alert, interested, motivated [Verbalizes knowledge/Understanding] : Verbalizes knowledge/understanding [Foot Care] : foot care [Signs and symptoms of infection] : sign and symptoms of infection [Skin Care] : skin care [How and When to Call] : how and when to call [Pain Management] : pain management [Labs and Tests] : labs and tests [Off-loading] : off-loading [Patient responsibility to plan of care] : patient responsibility to plan of care [] : Yes [Stable] : stable [Ambulatory] : Ambulatory [Not Applicable - Long Term Care/Home Health Agency] : Long Term Care/Home Health Agency: Not Applicable [Other: ____] : [unfilled] [FreeTextEntry2] : Offloading \par Infection prevention  [FreeTextEntry4] : Patient having x ray of right hallux today \par Auth submitted for vascular studies per protocol \par Follow up in 1 week for possible Cortizone injections

## 2019-12-31 NOTE — HISTORY OF PRESENT ILLNESS
[FreeTextEntry1] : s/p bunion and hammer toe correction right foot . Adhesions of the scar line right dorsal 1st metatarsal and 3rd toe right

## 2019-12-31 NOTE — PHYSICAL EXAM
[1+] : left 1+ [Varicose Veins Of Lower Extremities] : bilaterally [Ankle Swelling On The Right] : mild [Skin Induration] : induration [Alert] : alert [Oriented to Person] : oriented to person [Oriented to Place] : oriented to place [Calm] : calm [Purpura] : no purpura  [Petechiae] : no petechiae [Skin Ulcer] : no ulcer [de-identified] : comfortable [de-identified] : pt uses inhaler [de-identified] : htn, hld [de-identified] : hammer toes , s/p bunion correction and hammer toes right foot  [de-identified] : no open wounds , there is scar adhesions at the 1st MJ and the dorsal 3rd toe right foot  [FreeTextEntry1] : Right dorsal hallux - Old incisions scar - No open wounds [de-identified] : Cleansed with NS\par No other treatment  [de-identified] : Dorsalis Pedis: +1\par Posterior Tibialis: +1\par Doppler pulses:  N/A\par Extremity color: Pink \par Extremity temperature: warm \par Capillary refill: < 3 sec\par \par  [de-identified] : Normal [TWNoteComboBox4] : None

## 2019-12-31 NOTE — PLAN
[FreeTextEntry1] : evaluate x rays , possible extensor tenotomy 3rd toe and steroid injections of the hallux

## 2019-12-31 NOTE — REVIEW OF SYSTEMS
[Arthralgias] : arthralgias [Joint Stiffness] : joint stiffness [Negative] : Endocrine [Easy Bleeding] : a tendency for easy bleeding [Fever] : no fever [Chills] : no chills [Abdominal Pain] : no abdominal pain [Joint Swelling] : no joint swelling [Skin Lesions] : no skin lesions [Skin Wound] : no skin wound [Confused] : no confusion [Anxiety] : no anxiety [FreeTextEntry5] : hld, htn [de-identified] : adhesions of the scar line right 1st and 3rd toes .  [FreeTextEntry6] : copd [de-identified] : no neuropathy

## 2020-01-01 DIAGNOSIS — T81.31XS DISRUPTION OF EXTERNAL OPERATION (SURGICAL) WOUND, NOT ELSEWHERE CLASSIFIED, SEQUELA: ICD-10-CM

## 2020-01-01 DIAGNOSIS — Z82.49 FAMILY HISTORY OF ISCHEMIC HEART DISEASE AND OTHER DISEASES OF THE CIRCULATORY SYSTEM: ICD-10-CM

## 2020-01-01 DIAGNOSIS — Z85.118 PERSONAL HISTORY OF OTHER MALIGNANT NEOPLASM OF BRONCHUS AND LUNG: ICD-10-CM

## 2020-01-01 DIAGNOSIS — Z80.1 FAMILY HISTORY OF MALIGNANT NEOPLASM OF TRACHEA, BRONCHUS AND LUNG: ICD-10-CM

## 2020-01-01 DIAGNOSIS — Z86.73 PERSONAL HISTORY OF TRANSIENT ISCHEMIC ATTACK (TIA), AND CEREBRAL INFARCTION WITHOUT RESIDUAL DEFICITS: ICD-10-CM

## 2020-01-01 DIAGNOSIS — Z83.3 FAMILY HISTORY OF DIABETES MELLITUS: ICD-10-CM

## 2020-01-01 DIAGNOSIS — Z82.3 FAMILY HISTORY OF STROKE: ICD-10-CM

## 2020-01-01 DIAGNOSIS — I10 ESSENTIAL (PRIMARY) HYPERTENSION: ICD-10-CM

## 2020-01-01 DIAGNOSIS — Z87.891 PERSONAL HISTORY OF NICOTINE DEPENDENCE: ICD-10-CM

## 2020-01-01 DIAGNOSIS — E78.5 HYPERLIPIDEMIA, UNSPECIFIED: ICD-10-CM

## 2020-01-01 DIAGNOSIS — Z90.2 ACQUIRED ABSENCE OF LUNG [PART OF]: ICD-10-CM

## 2020-01-01 DIAGNOSIS — Z79.899 OTHER LONG TERM (CURRENT) DRUG THERAPY: ICD-10-CM

## 2020-01-01 DIAGNOSIS — M06.9 RHEUMATOID ARTHRITIS, UNSPECIFIED: ICD-10-CM

## 2020-01-01 DIAGNOSIS — Z98.49 CATARACT EXTRACTION STATUS, UNSPECIFIED EYE: ICD-10-CM
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
## 2020-01-01 DIAGNOSIS — K21.9 GASTRO-ESOPHAGEAL REFLUX DISEASE WITHOUT ESOPHAGITIS: ICD-10-CM

## 2020-01-01 DIAGNOSIS — Z90.49 ACQUIRED ABSENCE OF OTHER SPECIFIED PARTS OF DIGESTIVE TRACT: ICD-10-CM

## 2020-01-01 DIAGNOSIS — J44.9 CHRONIC OBSTRUCTIVE PULMONARY DISEASE, UNSPECIFIED: ICD-10-CM

## 2020-01-06 ENCOUNTER — OUTPATIENT (OUTPATIENT)
Dept: OUTPATIENT SERVICES | Facility: HOSPITAL | Age: 75
LOS: 1 days | End: 2020-01-06
Payer: MEDICARE

## 2020-01-06 DIAGNOSIS — Z98.890 OTHER SPECIFIED POSTPROCEDURAL STATES: Chronic | ICD-10-CM

## 2020-01-06 DIAGNOSIS — T81.31XS DISRUPTION OF EXTERNAL OPERATION (SURGICAL) WOUND, NOT ELSEWHERE CLASSIFIED, SEQUELA: ICD-10-CM

## 2020-01-06 DIAGNOSIS — Z90.49 ACQUIRED ABSENCE OF OTHER SPECIFIED PARTS OF DIGESTIVE TRACT: Chronic | ICD-10-CM

## 2020-01-06 DIAGNOSIS — Z98.89 OTHER SPECIFIED POSTPROCEDURAL STATES: Chronic | ICD-10-CM

## 2020-01-06 PROCEDURE — 93923 UPR/LXTR ART STDY 3+ LVLS: CPT

## 2020-01-06 PROCEDURE — 93923 UPR/LXTR ART STDY 3+ LVLS: CPT | Mod: 26

## 2020-01-09 ENCOUNTER — OUTPATIENT (OUTPATIENT)
Dept: OUTPATIENT SERVICES | Facility: HOSPITAL | Age: 75
LOS: 1 days | Discharge: HOME | End: 2020-01-09
Payer: MEDICARE

## 2020-01-09 ENCOUNTER — APPOINTMENT (OUTPATIENT)
Dept: PODIATRY | Facility: HOSPITAL | Age: 75
End: 2020-01-09
Payer: MEDICARE

## 2020-01-09 VITALS
HEIGHT: 62 IN | WEIGHT: 127 LBS | RESPIRATION RATE: 20 BRPM | BODY MASS INDEX: 23.37 KG/M2 | HEART RATE: 77 BPM | DIASTOLIC BLOOD PRESSURE: 54 MMHG | SYSTOLIC BLOOD PRESSURE: 136 MMHG | TEMPERATURE: 97.7 F

## 2020-01-09 DIAGNOSIS — Z90.49 ACQUIRED ABSENCE OF OTHER SPECIFIED PARTS OF DIGESTIVE TRACT: Chronic | ICD-10-CM

## 2020-01-09 DIAGNOSIS — Z98.89 OTHER SPECIFIED POSTPROCEDURAL STATES: Chronic | ICD-10-CM

## 2020-01-09 DIAGNOSIS — Z98.890 OTHER SPECIFIED POSTPROCEDURAL STATES: Chronic | ICD-10-CM

## 2020-01-09 DIAGNOSIS — T81.31XS DISRUPTION OF EXTERNAL OPERATION (SURGICAL) WOUND, NOT ELSEWHERE CLASSIFIED, SEQUELA: ICD-10-CM

## 2020-01-09 PROCEDURE — 20600 DRAIN/INJ JOINT/BURSA W/O US: CPT | Mod: RT

## 2020-01-09 PROCEDURE — 99214 OFFICE O/P EST MOD 30 MIN: CPT

## 2020-01-13 NOTE — PHYSICAL EXAM
[1+] : left 1+ [Varicose Veins Of Lower Extremities] : bilaterally [Purpura] : no purpura  [Ankle Swelling On The Right] : mild [Skin Ulcer] : no ulcer [Petechiae] : no petechiae [Skin Induration] : induration [Alert] : alert [Oriented to Place] : oriented to place [Oriented to Person] : oriented to person [de-identified] : comfortable [Calm] : calm [de-identified] : htn, hld [de-identified] : pt uses inhaler [de-identified] : hammer toes , s/p bunion correction and hammer toes right foot  [de-identified] : no open wounds , there is scar adhesions at the 1st MJ and the dorsal 3rd toe right foot  [FreeTextEntry1] : Right Dorsal Hallux- No open wound [de-identified] : Trigger Point injection administered by Dr Laird Right Foot 1st Met [de-identified] : Dry Dressing applied- to be removed by pt this evening [de-identified] : Cleansed with Normal saline\par

## 2020-01-13 NOTE — ASSESSMENT
[Demo] : Demo [Verbal] : Verbal [Good - alert, interested, motivated] : Good - alert, interested, motivated [Patient] : Patient [Verbalizes knowledge/Understanding] : Verbalizes knowledge/understanding [Foot Care] : foot care [Skin Care] : skin care [Dressing changes] : dressing changes [Signs and symptoms of infection] : sign and symptoms of infection [Pressure relief] : pressure relief [How and When to Call] : how and when to call [Labs and Tests] : labs and tests [Off-loading] : off-loading [Pain Management] : pain management [Patient responsibility to plan of care] : patient responsibility to plan of care [Home] : Home [Stable] : stable [Ambulatory] : Ambulatory [] : No [FreeTextEntry2] : Alteration in pt comfort related to pain- promote optimal pt comfort [FreeTextEntry3] : unchanged [FreeTextEntry4] : Dr Laird\jean pierre Pt underwent Foot rays & Vascular studies since last visit to [facility]- results reviewed by Dr Laird with Pt & Pt's \par Trigger Point injection administered by Dr Laird Right Foot 1st Met\par Preauth sheet submitted for Tenotomy Right Foot 3rd Digit next visit to [facility] by Dr Laird\par F/U to [facility] in 1 week

## 2020-01-13 NOTE — PLAN
[FreeTextEntry1] : the right great toe was anesthetized with 8 cc marcaine , along the scar line dorsal , 3cc ( 18 mg ) celestone was also injected along the scar line , the patient tolerated the procedure well .

## 2020-01-13 NOTE — REVIEW OF SYSTEMS
[Fever] : no fever [Chills] : no chills [Abdominal Pain] : no abdominal pain [Arthralgias] : arthralgias [Joint Swelling] : no joint swelling [Joint Stiffness] : joint stiffness [Skin Lesions] : no skin lesions [Skin Wound] : no skin wound [Confused] : no confusion [Easy Bleeding] : a tendency for easy bleeding [Anxiety] : no anxiety [Negative] : Endocrine [FreeTextEntry5] : hld, htn [de-identified] : no neuropathy  [de-identified] : adhesions of the scar line right 1st and 3rd toes .  [FreeTextEntry6] : copd

## 2020-01-13 NOTE — VITALS
[Pain related to present condition?] : The patient's  pain is related to present condition. [Occasional] : occasional [] : No [de-identified] : Pt reports pain Right Hallux area- Dr Laird aware [FreeTextEntry3] : Right Dorsal Hallux [FreeTextEntry1] : Varies [FreeTextEntry2] : Varies [FreeTextEntry4] : offloading

## 2020-01-15 DIAGNOSIS — Z82.3 FAMILY HISTORY OF STROKE: ICD-10-CM

## 2020-01-15 DIAGNOSIS — Z87.891 PERSONAL HISTORY OF NICOTINE DEPENDENCE: ICD-10-CM

## 2020-01-15 DIAGNOSIS — M79.674 PAIN IN RIGHT TOE(S): ICD-10-CM

## 2020-01-15 DIAGNOSIS — Z79.899 OTHER LONG TERM (CURRENT) DRUG THERAPY: ICD-10-CM

## 2020-01-15 DIAGNOSIS — J44.9 CHRONIC OBSTRUCTIVE PULMONARY DISEASE, UNSPECIFIED: ICD-10-CM

## 2020-01-15 DIAGNOSIS — E78.5 HYPERLIPIDEMIA, UNSPECIFIED: ICD-10-CM

## 2020-01-15 DIAGNOSIS — T81.31XS DISRUPTION OF EXTERNAL OPERATION (SURGICAL) WOUND, NOT ELSEWHERE CLASSIFIED, SEQUELA: ICD-10-CM

## 2020-01-15 DIAGNOSIS — M06.9 RHEUMATOID ARTHRITIS, UNSPECIFIED: ICD-10-CM

## 2020-01-15 DIAGNOSIS — Z86.73 PERSONAL HISTORY OF TRANSIENT ISCHEMIC ATTACK (TIA), AND CEREBRAL INFARCTION WITHOUT RESIDUAL DEFICITS: ICD-10-CM
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
## 2020-01-15 DIAGNOSIS — Z90.2 ACQUIRED ABSENCE OF LUNG [PART OF]: ICD-10-CM

## 2020-01-15 DIAGNOSIS — K21.9 GASTRO-ESOPHAGEAL REFLUX DISEASE WITHOUT ESOPHAGITIS: ICD-10-CM

## 2020-01-15 DIAGNOSIS — Z85.118 PERSONAL HISTORY OF OTHER MALIGNANT NEOPLASM OF BRONCHUS AND LUNG: ICD-10-CM

## 2020-01-15 DIAGNOSIS — Z82.49 FAMILY HISTORY OF ISCHEMIC HEART DISEASE AND OTHER DISEASES OF THE CIRCULATORY SYSTEM: ICD-10-CM

## 2020-01-15 DIAGNOSIS — I10 ESSENTIAL (PRIMARY) HYPERTENSION: ICD-10-CM

## 2020-01-15 DIAGNOSIS — Z83.3 FAMILY HISTORY OF DIABETES MELLITUS: ICD-10-CM

## 2020-01-15 DIAGNOSIS — Z90.49 ACQUIRED ABSENCE OF OTHER SPECIFIED PARTS OF DIGESTIVE TRACT: ICD-10-CM

## 2020-01-15 DIAGNOSIS — Z98.49 CATARACT EXTRACTION STATUS, UNSPECIFIED EYE: ICD-10-CM

## 2020-01-15 DIAGNOSIS — Z80.1 FAMILY HISTORY OF MALIGNANT NEOPLASM OF TRACHEA, BRONCHUS AND LUNG: ICD-10-CM

## 2020-01-16 ENCOUNTER — OUTPATIENT (OUTPATIENT)
Dept: OUTPATIENT SERVICES | Facility: HOSPITAL | Age: 75
LOS: 1 days | Discharge: HOME | End: 2020-01-16
Payer: MEDICARE

## 2020-01-16 ENCOUNTER — APPOINTMENT (OUTPATIENT)
Dept: PODIATRY | Facility: HOSPITAL | Age: 75
End: 2020-01-16
Payer: MEDICARE

## 2020-01-16 VITALS
TEMPERATURE: 97.2 F | HEART RATE: 80 BPM | WEIGHT: 127 LBS | SYSTOLIC BLOOD PRESSURE: 126 MMHG | HEIGHT: 62 IN | OXYGEN SATURATION: 98 % | BODY MASS INDEX: 23.37 KG/M2 | RESPIRATION RATE: 20 BRPM | DIASTOLIC BLOOD PRESSURE: 60 MMHG

## 2020-01-16 DIAGNOSIS — Z90.49 ACQUIRED ABSENCE OF OTHER SPECIFIED PARTS OF DIGESTIVE TRACT: Chronic | ICD-10-CM

## 2020-01-16 DIAGNOSIS — Z98.89 OTHER SPECIFIED POSTPROCEDURAL STATES: Chronic | ICD-10-CM

## 2020-01-16 DIAGNOSIS — Z98.890 OTHER SPECIFIED POSTPROCEDURAL STATES: Chronic | ICD-10-CM

## 2020-01-16 DIAGNOSIS — T81.31XS DISRUPTION OF EXTERNAL OPERATION (SURGICAL) WOUND, NOT ELSEWHERE CLASSIFIED, SEQUELA: ICD-10-CM

## 2020-01-16 PROCEDURE — 28234 INCISION OF FOOT TENDON: CPT | Mod: T7

## 2020-01-16 NOTE — REVIEW OF SYSTEMS
[Fever] : no fever [Abdominal Pain] : no abdominal pain [Chills] : no chills [Arthralgias] : arthralgias [Joint Swelling] : no joint swelling [Joint Stiffness] : joint stiffness [Skin Wound] : no skin wound [Skin Lesions] : no skin lesions [Confused] : no confusion [Anxiety] : no anxiety [Easy Bleeding] : a tendency for easy bleeding [Negative] : Endocrine [FreeTextEntry6] : copd [FreeTextEntry5] : hld, htn [de-identified] : no neuropathy  [de-identified] : adhesions of the scar line right 1st and 3rd toes .

## 2020-01-16 NOTE — PROCEDURE
[de-identified] : Contracted 3rd toe right foot  [FreeTextEntry9] : 11:07am [] : Yes [de-identified] : Eitan [FreeTextEntry6] : Painful , deformed contracted 3rd toe right foot  [FreeTextEntry7] : same  [de-identified] : 10 cc marcaine  [de-identified] : 5cc [de-identified] : none

## 2020-01-16 NOTE — PHYSICAL EXAM
[4 x 4] : 4 x 4  [1+] : left 1+ [Varicose Veins Of Lower Extremities] : present [Ankle Swelling On The Right] : mild [Purpura] : no purpura  [Petechiae] : no petechiae [Skin Ulcer] : no ulcer [Alert] : alert [Skin Induration] : induration [Oriented to Person] : oriented to person [Oriented to Place] : oriented to place [Calm] : calm [de-identified] : comfortable [de-identified] : pt uses inhaler [de-identified] : htn, hld [de-identified] : no open wounds , there is scar adhesions at the 1st MJ and the dorsal 3rd toe right foot  [de-identified] : hammer toes , s/p bunion correction and hammer toes right foot  [FreeTextEntry1] : Dorsal hallux- CLOSED  [de-identified] : Cleansed with Normal saline\par  [de-identified] : No treatment required  [de-identified] : Bupivicaine 0.5%, 7ml aspirated, 7 ml injected [de-identified] : Tenectomy  [FreeTextEntry7] : Foot 3rd Dorsal Digit- Incision Line  [de-identified] : 3 sutures applied, Silver Alginate [TWNoteComboBox1] : Right [de-identified] : Cleansed with Betadine then Normal saline\par  [TWNoteComboBox9] : Right [de-identified] : Other [de-identified] : Primary Dressing

## 2020-01-16 NOTE — ASSESSMENT
[Verbal] : Verbal [Patient] : Patient [Fair - mild discomfort, physical impairment, low acceptance] : Fair - mild discomfort, physical impairment, low acceptance [Verbalizes knowledge/Understanding] : Verbalizes knowledge/understanding [Dressing changes] : dressing changes [Foot Care] : foot care [Pressure relief] : pressure relief [Signs and symptoms of infection] : sign and symptoms of infection [How and When to Call] : how and when to call [Off-loading] : off-loading [Patient responsibility to plan of care] : patient responsibility to plan of care [] : Yes [Stable] : stable [Home] : Home [Ambulatory] : Ambulatory [Not Applicable - Long Term Care/Home Health Agency] : Long Term Care/Home Health Agency: Not Applicable [FreeTextEntry2] : Infection Prevention\par Tenectomy of right foot 3rd digit\par F/U 1 week  [FreeTextEntry4] : DPM performed a tenectomy of the right foot 3rd digit.\par Patient advised to keep dressing dry and in place until the next [facility] visit.\par F/U 1 week \par

## 2020-01-18 DIAGNOSIS — M20.41 OTHER HAMMER TOE(S) (ACQUIRED), RIGHT FOOT: ICD-10-CM

## 2020-01-18 DIAGNOSIS — Z82.49 FAMILY HISTORY OF ISCHEMIC HEART DISEASE AND OTHER DISEASES OF THE CIRCULATORY SYSTEM: ICD-10-CM

## 2020-01-18 DIAGNOSIS — Z79.899 OTHER LONG TERM (CURRENT) DRUG THERAPY: ICD-10-CM

## 2020-01-18 DIAGNOSIS — I10 ESSENTIAL (PRIMARY) HYPERTENSION: ICD-10-CM

## 2020-01-18 DIAGNOSIS — J44.9 CHRONIC OBSTRUCTIVE PULMONARY DISEASE, UNSPECIFIED: ICD-10-CM

## 2020-01-18 DIAGNOSIS — Z86.73 PERSONAL HISTORY OF TRANSIENT ISCHEMIC ATTACK (TIA), AND CEREBRAL INFARCTION WITHOUT RESIDUAL DEFICITS: ICD-10-CM

## 2020-01-18 DIAGNOSIS — Z87.891 PERSONAL HISTORY OF NICOTINE DEPENDENCE: ICD-10-CM

## 2020-01-18 DIAGNOSIS — Z80.1 FAMILY HISTORY OF MALIGNANT NEOPLASM OF TRACHEA, BRONCHUS AND LUNG: ICD-10-CM

## 2020-01-18 DIAGNOSIS — Z98.49 CATARACT EXTRACTION STATUS, UNSPECIFIED EYE: ICD-10-CM

## 2020-01-18 DIAGNOSIS — Z83.3 FAMILY HISTORY OF DIABETES MELLITUS: ICD-10-CM

## 2020-01-18 DIAGNOSIS — Z90.2 ACQUIRED ABSENCE OF LUNG [PART OF]: ICD-10-CM

## 2020-01-18 DIAGNOSIS — M06.9 RHEUMATOID ARTHRITIS, UNSPECIFIED: ICD-10-CM

## 2020-01-18 DIAGNOSIS — E78.5 HYPERLIPIDEMIA, UNSPECIFIED: ICD-10-CM

## 2020-01-18 DIAGNOSIS — Z85.118 PERSONAL HISTORY OF OTHER MALIGNANT NEOPLASM OF BRONCHUS AND LUNG: ICD-10-CM

## 2020-01-18 DIAGNOSIS — K21.9 GASTRO-ESOPHAGEAL REFLUX DISEASE WITHOUT ESOPHAGITIS: ICD-10-CM

## 2020-01-18 DIAGNOSIS — Z90.49 ACQUIRED ABSENCE OF OTHER SPECIFIED PARTS OF DIGESTIVE TRACT: ICD-10-CM

## 2020-01-18 DIAGNOSIS — Z82.3 FAMILY HISTORY OF STROKE: ICD-10-CM
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
## 2020-01-23 ENCOUNTER — OUTPATIENT (OUTPATIENT)
Dept: OUTPATIENT SERVICES | Facility: HOSPITAL | Age: 75
LOS: 1 days | Discharge: HOME | End: 2020-01-23
Payer: MEDICARE

## 2020-01-23 ENCOUNTER — APPOINTMENT (OUTPATIENT)
Dept: PODIATRY | Facility: HOSPITAL | Age: 75
End: 2020-01-23
Payer: MEDICARE

## 2020-01-23 VITALS
SYSTOLIC BLOOD PRESSURE: 120 MMHG | DIASTOLIC BLOOD PRESSURE: 60 MMHG | WEIGHT: 127 LBS | RESPIRATION RATE: 20 BRPM | BODY MASS INDEX: 23.37 KG/M2 | HEIGHT: 62 IN | TEMPERATURE: 98.4 F | HEART RATE: 82 BPM | OXYGEN SATURATION: 95 %

## 2020-01-23 DIAGNOSIS — Z90.49 ACQUIRED ABSENCE OF OTHER SPECIFIED PARTS OF DIGESTIVE TRACT: Chronic | ICD-10-CM

## 2020-01-23 DIAGNOSIS — Z98.890 OTHER SPECIFIED POSTPROCEDURAL STATES: Chronic | ICD-10-CM

## 2020-01-23 DIAGNOSIS — T81.31XS DISRUPTION OF EXTERNAL OPERATION (SURGICAL) WOUND, NOT ELSEWHERE CLASSIFIED, SEQUELA: ICD-10-CM

## 2020-01-23 DIAGNOSIS — Z98.89 OTHER SPECIFIED POSTPROCEDURAL STATES: Chronic | ICD-10-CM

## 2020-01-23 PROCEDURE — 99024 POSTOP FOLLOW-UP VISIT: CPT

## 2020-01-23 PROCEDURE — G0463: CPT

## 2020-01-27 NOTE — REVIEW OF SYSTEMS
[Arthralgias] : arthralgias [Joint Stiffness] : joint stiffness [Easy Bleeding] : a tendency for easy bleeding [Negative] : Endocrine [Fever] : no fever [Chills] : no chills [Abdominal Pain] : no abdominal pain [Joint Swelling] : no joint swelling [Skin Lesions] : no skin lesions [Skin Wound] : no skin wound [Confused] : no confusion [Anxiety] : no anxiety [FreeTextEntry5] : hld, htn [FreeTextEntry6] : copd [de-identified] : adhesions of the scar line right 1st and 3rd toes .  [de-identified] : no neuropathy

## 2020-01-27 NOTE — ASSESSMENT
[Written] : Written [Verbal] : Verbal [Patient] : Patient [Spouse] : Spouse [Verbalizes knowledge/Understanding] : Verbalizes knowledge/understanding [Fair - mild discomfort, physical impairment, low acceptance] : Fair - mild discomfort, physical impairment, low acceptance [Dressing changes] : dressing changes [Foot Care] : foot care [Pressure relief] : pressure relief [Signs and symptoms of infection] : sign and symptoms of infection [How and When to Call] : how and when to call [Off-loading] : off-loading [Patient responsibility to plan of care] : patient responsibility to plan of care [] : Yes [Stable] : stable [Home] : Home [Ambulatory] : Ambulatory [Not Applicable - Long Term Care/Home Health Agency] : Long Term Care/Home Health Agency: Not Applicable [FreeTextEntry2] : Infection Prevention\par Offloading/ pressure relief \par Restoration of skin integrity  [FreeTextEntry4] : DPM performed a tenectomy of the right foot 3rd digit last visit, sutures removed this visit.\par No signs/symptoms of infection. \par Follow up to [facility] in one month \par

## 2020-01-27 NOTE — PHYSICAL EXAM
[1+] : left 1+ [Varicose Veins Of Lower Extremities] : bilaterally [Ankle Swelling On The Right] : mild [Skin Induration] : induration [Alert] : alert [Oriented to Person] : oriented to person [Oriented to Place] : oriented to place [Calm] : calm [Purpura] : no purpura  [Petechiae] : no petechiae [Skin Ulcer] : no ulcer [de-identified] : comfortable [de-identified] : pt uses inhaler [de-identified] : htn, hld [de-identified] : hammer toes , s/p bunion correction and hammer toes right foot  [de-identified] : no open wounds , there is scar adhesions at the 1st MJ and the dorsal 3rd toe right foot  [de-identified] : DPM removed sutures  [FreeTextEntry1] : Right Dorsal Hallux- No open wound [de-identified] : Trigger Point injection administered by Dr Laird Right Foot 1st Met [de-identified] : Dry Dressing applied- to be removed by pt this evening [de-identified] : Cleansed with Normal saline\par  [TWNoteComboBox1] : Right

## 2020-01-27 NOTE — PLAN
[FreeTextEntry1] : sutures removed , toe in corrected position , patient happy with the results . PTR 1 week

## 2020-06-03 ENCOUNTER — APPOINTMENT (OUTPATIENT)
Dept: PODIATRY | Facility: HOSPITAL | Age: 75
End: 2020-06-03

## 2020-07-24 NOTE — ED PROVIDER NOTE - BOWEL SOUNDS
Did he agree to go to the er?
He did and he will have someone driving him there
Patient called this am crying because his "head hurts so bad, and feels like throwing up"  He did vomit yesterday  He had an appointment with neurosurg yesterday and he said that he feels like they did not help him for the pain in his head " it hurts so bad, I can't take it anymore"  I told him if he is vomiting, he should go to the ER  I asked him if he had anyone to drive him there    I called Neurosurg and told them what was happening, they advised that he go to the  [facility] and that someone from their team would probably see him there  The patient does have MRI's scheduled at [facility], however they may just do the mri's at [facility] today 
present x 4 quadrants

## 2020-07-27 ENCOUNTER — RESULT REVIEW (OUTPATIENT)
Age: 75
End: 2020-07-27

## 2020-08-19 ENCOUNTER — NON-APPOINTMENT (OUTPATIENT)
Age: 75
End: 2020-08-19

## 2020-08-19 ENCOUNTER — APPOINTMENT (OUTPATIENT)
Dept: CARDIOLOGY | Facility: CLINIC | Age: 75
End: 2020-08-19
Payer: MEDICARE

## 2020-08-19 VITALS
HEART RATE: 83 BPM | BODY MASS INDEX: 22.82 KG/M2 | OXYGEN SATURATION: 98 % | SYSTOLIC BLOOD PRESSURE: 131 MMHG | HEIGHT: 62 IN | WEIGHT: 124 LBS | DIASTOLIC BLOOD PRESSURE: 69 MMHG

## 2020-08-19 DIAGNOSIS — Z87.898 PERSONAL HISTORY OF OTHER SPECIFIED CONDITIONS: ICD-10-CM

## 2020-08-19 DIAGNOSIS — Z87.09 PERSONAL HISTORY OF OTHER DISEASES OF THE RESPIRATORY SYSTEM: ICD-10-CM

## 2020-08-19 DIAGNOSIS — T81.89XS OTHER COMPLICATIONS OF PROCEDURES, NOT ELSEWHERE CLASSIFIED, SEQUELA: ICD-10-CM

## 2020-08-19 DIAGNOSIS — S30.1XXA CONTUSION OF ABDOMINAL WALL, INITIAL ENCOUNTER: ICD-10-CM

## 2020-08-19 DIAGNOSIS — Z87.39 PERSONAL HISTORY OF OTHER DISEASES OF THE MUSCULOSKELETAL SYSTEM AND CONNECTIVE TISSUE: ICD-10-CM

## 2020-08-19 DIAGNOSIS — Z85.118 PERSONAL HISTORY OF OTHER MALIGNANT NEOPLASM OF BRONCHUS AND LUNG: ICD-10-CM

## 2020-08-19 DIAGNOSIS — T81.31XD DISRUPTION OF EXTERNAL OPERATION (SURGICAL) WOUND, NOT ELSEWHERE CLASSIFIED, SUBSEQUENT ENCOUNTER: ICD-10-CM

## 2020-08-19 DIAGNOSIS — Z98.890 OTHER SPECIFIED POSTPROCEDURAL STATES: ICD-10-CM

## 2020-08-19 PROCEDURE — 93000 ELECTROCARDIOGRAM COMPLETE: CPT

## 2020-08-19 PROCEDURE — 99213 OFFICE O/P EST LOW 20 MIN: CPT

## 2020-08-19 PROCEDURE — 99203 OFFICE O/P NEW LOW 30 MIN: CPT

## 2020-08-19 RX ORDER — MUPIROCIN 2 %
2 OINTMENT (GRAM) TOPICAL
Refills: 0 | Status: DISCONTINUED | COMMUNITY
End: 2020-08-19

## 2020-08-19 RX ORDER — TIOTROPIUM BROMIDE INHALATION SPRAY 1.56 UG/1
1.25 SPRAY, METERED RESPIRATORY (INHALATION) DAILY
Refills: 0 | Status: DISCONTINUED | COMMUNITY
End: 2020-08-19

## 2020-08-19 RX ORDER — GABAPENTIN 300 MG
300 TABLET ORAL 3 TIMES DAILY
Refills: 0 | Status: DISCONTINUED | COMMUNITY
End: 2020-08-19

## 2020-08-19 NOTE — DISCUSSION/SUMMARY
[FreeTextEntry1] : Hypertension: Well controlled and tolerated on current regime of medication.  Low Sodium Diet discussed.\par Hyperlipidemia:Recent blood work with Dr. Galan will obtain copy.. Low Fat diet, exercise and weight loss discussed.\par Carotid plaque: Monitoring in future. no symptoms currently. \par F/u in 1 year\par

## 2020-08-19 NOTE — HISTORY OF PRESENT ILLNESS
[Doing Well] : doing well [Chest Pain Which Starts With Exertion] : denies exertional chest pain [Chest Pain Starting When At Rest] : denies chest pain at rest [None] : ~He/She~ has no significant interval events [Feeling Tired (Fatigue)] : denies fatigue [Feelings Of Weakness On Exertion] : denies reduced exercise tolerance [Difficulty Breathing (Dyspnea)] : denies dyspnea [Regional Soft Tissue Swelling Both Lower Extremities] : denies edema [Palpitations] : denies palpitations [Difficulty Breathing Only When Lying Down (Supine Dyspnea)] : denies orthopnea [Dizziness] : denies dizziness [Leg Pain With Exercise (Leg Claudication)] : denies claudication [Dizziness Upon Standing Up] : denies orthostatic dizziness [Adherent] : the patient is adherent with ~his/her~ medication regimen [FreeTextEntry1] : \par

## 2020-08-19 NOTE — REASON FOR VISIT
[Follow-Up - Clinic] : a clinic follow-up of [Spouse] : spouse [FreeTextEntry2] : chest discomfort.

## 2020-08-19 NOTE — PHYSICAL EXAM
[General Appearance - Well Developed] : well developed [Normal Appearance] : normal appearance [General Appearance - In No Acute Distress] : no acute distress [General Appearance - Well Nourished] : well nourished [Well Groomed] : well groomed [Normal Conjunctiva] : the conjunctiva exhibited no abnormalities [Eyelids - No Xanthelasma] : the eyelids demonstrated no xanthelasmas [No Oral Pallor] : no oral pallor [No Oral Cyanosis] : no oral cyanosis [Respiration, Rhythm And Depth] : normal respiratory rhythm and effort [Exaggerated Use Of Accessory Muscles For Inspiration] : no accessory muscle use [Heart Sounds] : normal S1 and S2 [Murmurs] : no murmurs present [Heart Rate And Rhythm] : heart rate and rhythm were normal [Edema] : no peripheral edema present [Abdomen Tenderness] : non-tender [Abdomen Soft] : soft [Bowel Sounds] : normal bowel sounds [Gait - Sufficient For Exercise Testing] : the gait was sufficient for exercise testing [] : no hepato-splenomegaly [Abnormal Walk] : normal gait [Cyanosis, Localized] : no localized cyanosis [Nail Clubbing] : no clubbing of the fingernails [Oriented To Time, Place, And Person] : oriented to person, place, and time [Skin Color & Pigmentation] : normal skin color and pigmentation [Affect] : the affect was normal [No Anxiety] : not feeling anxious [FreeTextEntry1] : no calf tenderness.

## 2020-08-27 ENCOUNTER — APPOINTMENT (OUTPATIENT)
Dept: POPULATION HEALTH | Facility: CLINIC | Age: 75
End: 2020-08-27
Payer: MEDICARE

## 2020-08-27 PROCEDURE — 99205 OFFICE O/P NEW HI 60 MIN: CPT | Mod: 95

## 2020-08-27 NOTE — HISTORY OF PRESENT ILLNESS
[Home] : at home, [unfilled] , at the time of the visit. [Medical Office: ([facility])___] : at the medical office located in  [Verbal consent obtained from patient] : the patient, [unfilled] [FreeTextEntry1] : 73 yo f h/x of LCa and RA for telehealth visit regarding longstanding exposure to the [place] plume and is concerned about its impact on overall health.\par \par She moved to [place] in 1967 and lived in proximity to the plume until 1972. \par \par Lung cancer in 1996 tx'd surgery with lobe and some ribs removed. Pt indicated that the cancer was rare.\par \par House. No vegetable garden; A pool one year.\par No use of park.\par \par Smoking hx: 1/2 ppd [id number].\par \par No testing of air, water, soil. \par \par No family hx of lung cancer.\par \par No unusual hobbies/activites or exposures.\par \par Never worked in [employer]. \par \par Never checked for radon in any of her homes.

## 2020-08-27 NOTE — ASSESSMENT
[FreeTextEntry1] : 73 yo f h/x of LCa and RA for telehealth visit regarding longstanding exposure to the [place] plume which may have contributed to the development of her cancer. \par The histology of the cancer is rare but not known to pt. Regardless, given the five years she was there, a time when the drinking water was contaminated as well as the solid and possibly the IAQ of her home (from vapor intrusion) so she had substantial exposure. A number of the components of the plume are carcinogens and a number linked to lung cancer--radium, PCBs, Cd.\par \par Moving forward there are additional cancers for which there is increased risk at risk. Among the cancers known to be associated with contaminants found in the plume at high levels are: Brain, kidney, lung, hematopoietic, melanoma, nasal, sinus, prostate.\par Among the non-cancer health outcomes include: Hepatocellular injury, kidney injury, neurological injury, autoimmune disease, particularly scleroderma, thyroid, reproductive (fetal cardiac defects, sperm impairment). However, during this visit there was no history given suggestive of these. \par \par While largely sx free, it is valuable and appropriate to evaluate for selected contaminant health effects including hematopoietic, hepatic, kidney and thyroid effects for the purposes of establishing a base line. Subsequently, such issues can be evaluated as guided by signs or symptoms. Given the pandemic, I'm concerned about sending into health facilities at this time. Will send me copies of recent blood work that may include some of the testing I would like done. \par Extensive discussion about these health effects, the importance of vigilance for s/sxs of these, good f/u care with  PMD and other doctors, and following through on routine cancer screenings. Despite the risks to health that exposure to these pollutants pose, I advised that vigilance was more appropriate than panic.\par I further advised that yearly assessment is warranted. If/when further exposure information becomes available, the plan/guidance will be adjusted accordingly.\par I counseled on LSM and preventive medicine strategies. \par All questions answered. Pt understands and agrees with plan. \par RTC 1 year\par

## 2020-09-01 ENCOUNTER — OUTPATIENT (OUTPATIENT)
Dept: OUTPATIENT SERVICES | Facility: HOSPITAL | Age: 75
LOS: 1 days | Discharge: HOME | End: 2020-09-01
Payer: MEDICARE

## 2020-09-01 ENCOUNTER — APPOINTMENT (OUTPATIENT)
Dept: PODIATRY | Facility: HOSPITAL | Age: 75
End: 2020-09-01
Payer: MEDICARE

## 2020-09-01 VITALS
DIASTOLIC BLOOD PRESSURE: 64 MMHG | HEIGHT: 62 IN | BODY MASS INDEX: 21.9 KG/M2 | HEART RATE: 81 BPM | OXYGEN SATURATION: 98 % | WEIGHT: 119 LBS | SYSTOLIC BLOOD PRESSURE: 112 MMHG | RESPIRATION RATE: 16 BRPM | TEMPERATURE: 98.2 F

## 2020-09-01 DIAGNOSIS — L97.301 NON-PRESSURE CHRONIC ULCER OF UNSPECIFIED ANKLE LIMITED TO BREAKDOWN OF SKIN: ICD-10-CM

## 2020-09-01 DIAGNOSIS — S91.309A UNSPECIFIED OPEN WOUND, UNSPECIFIED FOOT, INITIAL ENCOUNTER: ICD-10-CM

## 2020-09-01 DIAGNOSIS — Z98.89 OTHER SPECIFIED POSTPROCEDURAL STATES: Chronic | ICD-10-CM

## 2020-09-01 DIAGNOSIS — Z98.890 OTHER SPECIFIED POSTPROCEDURAL STATES: Chronic | ICD-10-CM

## 2020-09-01 DIAGNOSIS — Z90.49 ACQUIRED ABSENCE OF OTHER SPECIFIED PARTS OF DIGESTIVE TRACT: Chronic | ICD-10-CM

## 2020-09-01 PROCEDURE — G0463: CPT | Mod: 25

## 2020-09-01 PROCEDURE — 20550 NJX 1 TENDON SHEATH/LIGAMENT: CPT | Mod: 50

## 2020-09-01 PROCEDURE — 99214 OFFICE O/P EST MOD 30 MIN: CPT

## 2020-09-02 DIAGNOSIS — Z82.49 FAMILY HISTORY OF ISCHEMIC HEART DISEASE AND OTHER DISEASES OF THE CIRCULATORY SYSTEM: ICD-10-CM

## 2020-09-02 DIAGNOSIS — Z87.891 PERSONAL HISTORY OF NICOTINE DEPENDENCE: ICD-10-CM

## 2020-09-02 DIAGNOSIS — I11.9 HYPERTENSIVE HEART DISEASE WITHOUT HEART FAILURE: ICD-10-CM

## 2020-09-02 DIAGNOSIS — Z88.8 ALLERGY STATUS TO OTHER DRUGS, MEDICAMENTS AND BIOLOGICAL SUBSTANCES STATUS: ICD-10-CM

## 2020-09-02 DIAGNOSIS — Z98.49 CATARACT EXTRACTION STATUS, UNSPECIFIED EYE: ICD-10-CM

## 2020-09-02 DIAGNOSIS — Z85.118 PERSONAL HISTORY OF OTHER MALIGNANT NEOPLASM OF BRONCHUS AND LUNG: ICD-10-CM

## 2020-09-02 DIAGNOSIS — M79.671 PAIN IN RIGHT FOOT: ICD-10-CM

## 2020-09-02 DIAGNOSIS — Z88.1 ALLERGY STATUS TO OTHER ANTIBIOTIC AGENTS STATUS: ICD-10-CM

## 2020-09-02 DIAGNOSIS — I65.29 OCCLUSION AND STENOSIS OF UNSPECIFIED CAROTID ARTERY: ICD-10-CM

## 2020-09-02 DIAGNOSIS — M06.9 RHEUMATOID ARTHRITIS, UNSPECIFIED: ICD-10-CM

## 2020-09-02 DIAGNOSIS — G57.61 LESION OF PLANTAR NERVE, RIGHT LOWER LIMB: ICD-10-CM

## 2020-09-02 DIAGNOSIS — M79.672 PAIN IN LEFT FOOT: ICD-10-CM

## 2020-09-02 DIAGNOSIS — Z83.3 FAMILY HISTORY OF DIABETES MELLITUS: ICD-10-CM

## 2020-09-02 DIAGNOSIS — Z80.1 FAMILY HISTORY OF MALIGNANT NEOPLASM OF TRACHEA, BRONCHUS AND LUNG: ICD-10-CM

## 2020-09-02 DIAGNOSIS — Z87.01 PERSONAL HISTORY OF PNEUMONIA (RECURRENT): ICD-10-CM

## 2020-09-02 DIAGNOSIS — Z79.82 LONG TERM (CURRENT) USE OF ASPIRIN: ICD-10-CM

## 2020-09-02 DIAGNOSIS — E78.5 HYPERLIPIDEMIA, UNSPECIFIED: ICD-10-CM

## 2020-09-02 DIAGNOSIS — Z90.2 ACQUIRED ABSENCE OF LUNG [PART OF]: ICD-10-CM

## 2020-09-02 DIAGNOSIS — Z90.49 ACQUIRED ABSENCE OF OTHER SPECIFIED PARTS OF DIGESTIVE TRACT: ICD-10-CM

## 2020-09-02 DIAGNOSIS — Z82.3 FAMILY HISTORY OF STROKE: ICD-10-CM

## 2020-09-02 DIAGNOSIS — Z79.899 OTHER LONG TERM (CURRENT) DRUG THERAPY: ICD-10-CM

## 2020-09-02 DIAGNOSIS — Z91.041 RADIOGRAPHIC DYE ALLERGY STATUS: ICD-10-CM

## 2020-09-02 DIAGNOSIS — Z86.73 PERSONAL HISTORY OF TRANSIENT ISCHEMIC ATTACK (TIA), AND CEREBRAL INFARCTION WITHOUT RESIDUAL DEFICITS: ICD-10-CM

## 2020-09-02 DIAGNOSIS — J44.9 CHRONIC OBSTRUCTIVE PULMONARY DISEASE, UNSPECIFIED: ICD-10-CM

## 2020-09-02 DIAGNOSIS — K21.9 GASTRO-ESOPHAGEAL REFLUX DISEASE WITHOUT ESOPHAGITIS: ICD-10-CM

## 2020-09-02 DIAGNOSIS — Z88.0 ALLERGY STATUS TO PENICILLIN: ICD-10-CM

## 2020-09-02 NOTE — VITALS
[Pain related to present condition?] : The patient's  pain is related to present condition. [Aching] : aching [Throbbing] : throbbing [Occasional] : occasional [] : No [de-identified] : 4/10      Acceptable pain tolerance levels deemed to be 3/10. [FreeTextEntry3] : Right plantar foot and left lateral foot.  [FreeTextEntry1] : Offloading  [FreeTextEntry2] : Pressure to area  [FreeTextEntry4] : Bupivacaine and Dexamethasone injection.

## 2020-09-02 NOTE — PHYSICAL EXAM
[1+] : left 1+ [Varicose Veins Of Lower Extremities] : bilaterally [Purpura] : no purpura  [Ankle Swelling On The Right] : mild [Petechiae] : no petechiae [Skin Ulcer] : no ulcer [Skin Induration] : no induration [Alert] : alert [Oriented to Person] : oriented to person [Oriented to Place] : oriented to place [Oriented to Time] : oriented to time [Calm] : calm [de-identified] : comfortable [de-identified] : pt uses inhaler [de-identified] : htn, hld [de-identified] : hammer toes , s/p bunion , inter metatarsal pain 1st interspace right foot and pain base of the 5th metatrsal left foot  [de-identified] : no open wounds , radiating pain with ambulation and palpation right foot 1st interspace  [de-identified] : Bupivacaine injection   [FreeTextEntry1] : Right plantar foot inferior to metatarsals pain site - No open wound.  [de-identified] : Dexamethasone, Kenalog injection  [de-identified] : Band Aid  [de-identified] : Injections given between left anterior foot  1st and second metatarsal.  [FreeTextEntry7] : Left lateral foot pain site - No open wound  [de-identified] : Bupivacaine injection   [de-identified] : Dexamethasone, Kenalog injection  [de-identified] : Band Aid  [de-identified] : Injections given between to left lateral foot.  [de-identified] : Dorsalis Pedis: +1 Left foot     +2 right foot \par Posterior Tibialis:+1 left foot     +2 right foot \par Doppler pulses: N/A\par Extremity color: Pink \par Extremity temperature: Warm \par Capillary refill: < 3 sec\par \par  [de-identified] : Normal [de-identified] : Normal

## 2020-09-02 NOTE — HISTORY OF PRESENT ILLNESS
[FreeTextEntry1] : Pain between the 1st and 2nd metatarsal of the right foot radiating laterally and pain with palpation in that area . It has been present for several months and was getting worse over time . The left foot 5th metatarsal base also has arthritic pain with palpation  . the right is worse then the left

## 2020-09-02 NOTE — REASON FOR VISIT
[Consultation] : a consultation visit [Spouse] : spouse [FreeTextEntry1] : Pain on right plantar foot under metatarsals and left lateral foot. Pain has been on and off for months.

## 2020-09-02 NOTE — REVIEW OF SYSTEMS
[Fever] : no fever [Chills] : no chills [Eye Pain] : no eye pain [Loss Of Hearing] : no hearing loss [Abdominal Pain] : no abdominal pain [Shortness Of Breath] : no shortness of breath [Arthralgias] : arthralgias [Joint Swelling] : no joint swelling [Joint Stiffness] : joint stiffness [Skin Lesions] : no skin lesions [Skin Wound] : no skin wound [Confused] : no confusion [Anxiety] : no anxiety [Change In Personality] : no personality change [Negative] : Endocrine [Easy Bleeding] : a tendency for easy bleeding [FreeTextEntry5] : hld, htn [FreeTextEntry6] : copd [de-identified] : neuroma like pain 1st interspace right foot  [de-identified] : neuroma 1st interspace right foot  and left foot 5th metatarsal base

## 2020-09-02 NOTE — ASSESSMENT
[Verbal] : Verbal [Written] : Written [Patient] : Patient [Spouse] : Spouse [Good - alert, interested, motivated] : Good - alert, interested, motivated [Verbalizes knowledge/Understanding] : Verbalizes knowledge/understanding [Foot Care] : foot care [Skin Care] : skin care [Signs and symptoms of infection] : sign and symptoms of infection [Pain Management] : pain management [Off-loading] : off-loading [Patient responsibility to plan of care] : patient responsibility to plan of care [Stable] : stable [Home] : Home [Ambulatory] : Ambulatory [Not Applicable - Long Term Care/Home Health Agency] : Long Term Care/Home Health Agency: Not Applicable [] : No [FreeTextEntry2] : Infection prevention\par Goal of remaining pain free regarding wounds.\par Acceptable pain tolerance levels deemed to be 3/10.\par Offloading \par \par  [FreeTextEntry4] : Follow up in 1 week

## 2020-09-02 NOTE — PLAN
[FreeTextEntry1] : Kenalog , dexamethasone mixture injected 1st interspace right foot and to the base of the 5th metatarsal left foot . The patient will be seen next week for re-evaluation . If the pain persists x rays and MRI may be taken . OFF LOADING  shoe gear may also be needed .

## 2020-09-06 ENCOUNTER — EMERGENCY (EMERGENCY)
Facility: HOSPITAL | Age: 75
LOS: 1 days | Discharge: ANOTHER HOSPITAL | End: 2020-09-06
Attending: EMERGENCY MEDICINE | Admitting: EMERGENCY MEDICINE
Payer: MEDICARE

## 2020-09-06 ENCOUNTER — INPATIENT (INPATIENT)
Facility: HOSPITAL | Age: 75
LOS: 1 days | Discharge: HOME | DRG: 312 | End: 2020-09-08
Attending: FAMILY MEDICINE | Admitting: FAMILY MEDICINE
Payer: MEDICARE

## 2020-09-06 VITALS
HEIGHT: 62 IN | OXYGEN SATURATION: 97 % | SYSTOLIC BLOOD PRESSURE: 136 MMHG | WEIGHT: 117.07 LBS | HEART RATE: 88 BPM | DIASTOLIC BLOOD PRESSURE: 90 MMHG | RESPIRATION RATE: 20 BRPM | TEMPERATURE: 98 F

## 2020-09-06 VITALS
TEMPERATURE: 98 F | RESPIRATION RATE: 16 BRPM | HEART RATE: 79 BPM | OXYGEN SATURATION: 97 % | WEIGHT: 115.96 LBS | HEIGHT: 62 IN

## 2020-09-06 VITALS
RESPIRATION RATE: 18 BRPM | HEART RATE: 78 BPM | OXYGEN SATURATION: 97 % | SYSTOLIC BLOOD PRESSURE: 138 MMHG | DIASTOLIC BLOOD PRESSURE: 88 MMHG

## 2020-09-06 DIAGNOSIS — M06.9 RHEUMATOID ARTHRITIS, UNSPECIFIED: ICD-10-CM

## 2020-09-06 DIAGNOSIS — Z98.890 OTHER SPECIFIED POSTPROCEDURAL STATES: Chronic | ICD-10-CM

## 2020-09-06 DIAGNOSIS — Z90.49 ACQUIRED ABSENCE OF OTHER SPECIFIED PARTS OF DIGESTIVE TRACT: Chronic | ICD-10-CM

## 2020-09-06 DIAGNOSIS — Z98.89 OTHER SPECIFIED POSTPROCEDURAL STATES: Chronic | ICD-10-CM

## 2020-09-06 DIAGNOSIS — I10 ESSENTIAL (PRIMARY) HYPERTENSION: ICD-10-CM

## 2020-09-06 DIAGNOSIS — R55 SYNCOPE AND COLLAPSE: ICD-10-CM

## 2020-09-06 DIAGNOSIS — J44.9 CHRONIC OBSTRUCTIVE PULMONARY DISEASE, UNSPECIFIED: ICD-10-CM

## 2020-09-06 DIAGNOSIS — K21.9 GASTRO-ESOPHAGEAL REFLUX DISEASE WITHOUT ESOPHAGITIS: ICD-10-CM

## 2020-09-06 DIAGNOSIS — S81.811A LACERATION WITHOUT FOREIGN BODY, RIGHT LOWER LEG, INITIAL ENCOUNTER: ICD-10-CM

## 2020-09-06 LAB
ALBUMIN SERPL ELPH-MCNC: 3.2 G/DL — LOW (ref 3.3–5)
ALP SERPL-CCNC: 63 U/L — SIGNIFICANT CHANGE UP (ref 30–120)
ALT FLD-CCNC: 17 U/L DA — SIGNIFICANT CHANGE UP (ref 10–60)
ANION GAP SERPL CALC-SCNC: 9 MMOL/L — SIGNIFICANT CHANGE UP (ref 5–17)
APTT BLD: 27 SEC — LOW (ref 27.5–35.5)
AST SERPL-CCNC: 20 U/L — SIGNIFICANT CHANGE UP (ref 10–40)
BASOPHILS # BLD AUTO: 0.04 K/UL — SIGNIFICANT CHANGE UP (ref 0–0.2)
BASOPHILS NFR BLD AUTO: 0.4 % — SIGNIFICANT CHANGE UP (ref 0–2)
BILIRUB SERPL-MCNC: 0.4 MG/DL — SIGNIFICANT CHANGE UP (ref 0.2–1.2)
BUN SERPL-MCNC: 21 MG/DL — SIGNIFICANT CHANGE UP (ref 7–23)
CALCIUM SERPL-MCNC: 9.1 MG/DL — SIGNIFICANT CHANGE UP (ref 8.4–10.5)
CHLORIDE SERPL-SCNC: 103 MMOL/L — SIGNIFICANT CHANGE UP (ref 96–108)
CO2 SERPL-SCNC: 26 MMOL/L — SIGNIFICANT CHANGE UP (ref 22–31)
CREAT SERPL-MCNC: 1.11 MG/DL — SIGNIFICANT CHANGE UP (ref 0.5–1.3)
EOSINOPHIL # BLD AUTO: 0.15 K/UL — SIGNIFICANT CHANGE UP (ref 0–0.5)
EOSINOPHIL NFR BLD AUTO: 1.4 % — SIGNIFICANT CHANGE UP (ref 0–6)
GLUCOSE SERPL-MCNC: 91 MG/DL — SIGNIFICANT CHANGE UP (ref 70–99)
HCT VFR BLD CALC: 35.9 % — SIGNIFICANT CHANGE UP (ref 34.5–45)
HGB BLD-MCNC: 11.3 G/DL — LOW (ref 11.5–15.5)
IMM GRANULOCYTES NFR BLD AUTO: 0.8 % — SIGNIFICANT CHANGE UP (ref 0–1.5)
INR BLD: 0.98 RATIO — SIGNIFICANT CHANGE UP (ref 0.88–1.16)
LYMPHOCYTES # BLD AUTO: 0.96 K/UL — LOW (ref 1–3.3)
LYMPHOCYTES # BLD AUTO: 9 % — LOW (ref 13–44)
MCHC RBC-ENTMCNC: 30.7 PG — SIGNIFICANT CHANGE UP (ref 27–34)
MCHC RBC-ENTMCNC: 31.5 GM/DL — LOW (ref 32–36)
MCV RBC AUTO: 97.6 FL — SIGNIFICANT CHANGE UP (ref 80–100)
MONOCYTES # BLD AUTO: 0.88 K/UL — SIGNIFICANT CHANGE UP (ref 0–0.9)
MONOCYTES NFR BLD AUTO: 8.3 % — SIGNIFICANT CHANGE UP (ref 2–14)
NEUTROPHILS # BLD AUTO: 8.52 K/UL — HIGH (ref 1.8–7.4)
NEUTROPHILS NFR BLD AUTO: 80.1 % — HIGH (ref 43–77)
NRBC # BLD: 0 /100 WBCS — SIGNIFICANT CHANGE UP (ref 0–0)
PLATELET # BLD AUTO: 265 K/UL — SIGNIFICANT CHANGE UP (ref 150–400)
POTASSIUM SERPL-MCNC: 4.6 MMOL/L — SIGNIFICANT CHANGE UP (ref 3.5–5.3)
POTASSIUM SERPL-SCNC: 4.6 MMOL/L — SIGNIFICANT CHANGE UP (ref 3.5–5.3)
PROT SERPL-MCNC: 6.4 G/DL — SIGNIFICANT CHANGE UP (ref 6–8.3)
PROTHROM AB SERPL-ACNC: 11.9 SEC — SIGNIFICANT CHANGE UP (ref 10.6–13.6)
RBC # BLD: 3.68 M/UL — LOW (ref 3.8–5.2)
RBC # FLD: 15.9 % — HIGH (ref 10.3–14.5)
SARS-COV-2 RNA SPEC QL NAA+PROBE: SIGNIFICANT CHANGE UP
SODIUM SERPL-SCNC: 138 MMOL/L — SIGNIFICANT CHANGE UP (ref 135–145)
TROPONIN I SERPL-MCNC: 0.1 NG/ML — HIGH (ref 0.02–0.06)
TROPONIN I SERPL-MCNC: 0.37 NG/ML — HIGH (ref 0.01–0.04)
WBC # BLD: 10.63 K/UL — HIGH (ref 3.8–10.5)
WBC # FLD AUTO: 10.63 K/UL — HIGH (ref 3.8–10.5)

## 2020-09-06 PROCEDURE — 70450 CT HEAD/BRAIN W/O DYE: CPT

## 2020-09-06 PROCEDURE — 99283 EMERGENCY DEPT VISIT LOW MDM: CPT

## 2020-09-06 PROCEDURE — 72125 CT NECK SPINE W/O DYE: CPT

## 2020-09-06 PROCEDURE — 84484 ASSAY OF TROPONIN QUANT: CPT

## 2020-09-06 PROCEDURE — 96360 HYDRATION IV INFUSION INIT: CPT

## 2020-09-06 PROCEDURE — 80053 COMPREHEN METABOLIC PANEL: CPT

## 2020-09-06 PROCEDURE — 90715 TDAP VACCINE 7 YRS/> IM: CPT

## 2020-09-06 PROCEDURE — 85027 COMPLETE CBC AUTOMATED: CPT

## 2020-09-06 PROCEDURE — 99285 EMERGENCY DEPT VISIT HI MDM: CPT | Mod: 25

## 2020-09-06 PROCEDURE — 90471 IMMUNIZATION ADMIN: CPT

## 2020-09-06 PROCEDURE — 71045 X-RAY EXAM CHEST 1 VIEW: CPT

## 2020-09-06 PROCEDURE — 70450 CT HEAD/BRAIN W/O DYE: CPT | Mod: 26

## 2020-09-06 PROCEDURE — 99223 1ST HOSP IP/OBS HIGH 75: CPT

## 2020-09-06 PROCEDURE — 93010 ELECTROCARDIOGRAM REPORT: CPT

## 2020-09-06 PROCEDURE — 71045 X-RAY EXAM CHEST 1 VIEW: CPT | Mod: 26

## 2020-09-06 PROCEDURE — 93005 ELECTROCARDIOGRAM TRACING: CPT

## 2020-09-06 PROCEDURE — 85730 THROMBOPLASTIN TIME PARTIAL: CPT

## 2020-09-06 PROCEDURE — 85610 PROTHROMBIN TIME: CPT

## 2020-09-06 PROCEDURE — 36415 COLL VENOUS BLD VENIPUNCTURE: CPT

## 2020-09-06 PROCEDURE — 99285 EMERGENCY DEPT VISIT HI MDM: CPT

## 2020-09-06 PROCEDURE — 72125 CT NECK SPINE W/O DYE: CPT | Mod: 26

## 2020-09-06 RX ORDER — INFLUENZA VIRUS VACCINE 15; 15; 15; 15 UG/.5ML; UG/.5ML; UG/.5ML; UG/.5ML
0.5 SUSPENSION INTRAMUSCULAR ONCE
Refills: 0 | Status: DISCONTINUED | OUTPATIENT
Start: 2020-09-06 | End: 2020-09-08

## 2020-09-06 RX ORDER — ASPIRIN/CALCIUM CARB/MAGNESIUM 324 MG
81 TABLET ORAL DAILY
Refills: 0 | Status: DISCONTINUED | OUTPATIENT
Start: 2020-09-06 | End: 2020-09-08

## 2020-09-06 RX ORDER — TIOTROPIUM BROMIDE 18 UG/1
2 CAPSULE ORAL; RESPIRATORY (INHALATION)
Qty: 0 | Refills: 0 | DISCHARGE

## 2020-09-06 RX ORDER — SODIUM CHLORIDE 9 MG/ML
1000 INJECTION INTRAMUSCULAR; INTRAVENOUS; SUBCUTANEOUS ONCE
Refills: 0 | Status: COMPLETED | OUTPATIENT
Start: 2020-09-06 | End: 2020-09-06

## 2020-09-06 RX ORDER — LACTOBACILLUS ACIDOPHILUS 100MM CELL
2 CAPSULE ORAL
Qty: 0 | Refills: 0 | DISCHARGE

## 2020-09-06 RX ORDER — LACTOBACILLUS ACIDOPHILUS 100MM CELL
1 CAPSULE ORAL THREE TIMES A DAY
Refills: 0 | Status: DISCONTINUED | OUTPATIENT
Start: 2020-09-06 | End: 2020-09-08

## 2020-09-06 RX ORDER — ATORVASTATIN CALCIUM 80 MG/1
20 TABLET, FILM COATED ORAL AT BEDTIME
Refills: 0 | Status: DISCONTINUED | OUTPATIENT
Start: 2020-09-06 | End: 2020-09-08

## 2020-09-06 RX ORDER — HYDROXYCHLOROQUINE SULFATE 200 MG
200 TABLET ORAL DAILY
Refills: 0 | Status: DISCONTINUED | OUTPATIENT
Start: 2020-09-06 | End: 2020-09-08

## 2020-09-06 RX ORDER — BUDESONIDE AND FORMOTEROL FUMARATE DIHYDRATE 160; 4.5 UG/1; UG/1
2 AEROSOL RESPIRATORY (INHALATION)
Refills: 0 | Status: DISCONTINUED | OUTPATIENT
Start: 2020-09-06 | End: 2020-09-08

## 2020-09-06 RX ORDER — ASCORBIC ACID 60 MG
500 TABLET,CHEWABLE ORAL DAILY
Refills: 0 | Status: DISCONTINUED | OUTPATIENT
Start: 2020-09-06 | End: 2020-09-08

## 2020-09-06 RX ORDER — ROPINIROLE 8 MG/1
1 TABLET, FILM COATED, EXTENDED RELEASE ORAL
Refills: 0 | Status: DISCONTINUED | OUTPATIENT
Start: 2020-09-06 | End: 2020-09-08

## 2020-09-06 RX ORDER — ASPIRIN/CALCIUM CARB/MAGNESIUM 324 MG
325 TABLET ORAL ONCE
Refills: 0 | Status: COMPLETED | OUTPATIENT
Start: 2020-09-06 | End: 2020-09-06

## 2020-09-06 RX ORDER — PREGABALIN 225 MG/1
1000 CAPSULE ORAL DAILY
Refills: 0 | Status: DISCONTINUED | OUTPATIENT
Start: 2020-09-06 | End: 2020-09-08

## 2020-09-06 RX ORDER — AZELASTINE HYDROCHLORIDE AND FLUTICASONE PROPIONATE 137; 50 UG/1; UG/1
1 SPRAY, METERED NASAL
Qty: 0 | Refills: 0 | DISCHARGE

## 2020-09-06 RX ORDER — MONTELUKAST 4 MG/1
10 TABLET, CHEWABLE ORAL ONCE
Refills: 0 | Status: COMPLETED | OUTPATIENT
Start: 2020-09-06 | End: 2020-09-06

## 2020-09-06 RX ORDER — TETANUS TOXOID, REDUCED DIPHTHERIA TOXOID AND ACELLULAR PERTUSSIS VACCINE, ADSORBED 5; 2.5; 8; 8; 2.5 [IU]/.5ML; [IU]/.5ML; UG/.5ML; UG/.5ML; UG/.5ML
0.5 SUSPENSION INTRAMUSCULAR ONCE
Refills: 0 | Status: COMPLETED | OUTPATIENT
Start: 2020-09-06 | End: 2020-09-06

## 2020-09-06 RX ADMIN — ATORVASTATIN CALCIUM 20 MILLIGRAM(S): 80 TABLET, FILM COATED ORAL at 21:54

## 2020-09-06 RX ADMIN — SODIUM CHLORIDE 1000 MILLILITER(S): 9 INJECTION INTRAMUSCULAR; INTRAVENOUS; SUBCUTANEOUS at 10:10

## 2020-09-06 RX ADMIN — BUDESONIDE AND FORMOTEROL FUMARATE DIHYDRATE 2 PUFF(S): 160; 4.5 AEROSOL RESPIRATORY (INHALATION) at 21:54

## 2020-09-06 RX ADMIN — MONTELUKAST 10 MILLIGRAM(S): 4 TABLET, CHEWABLE ORAL at 22:38

## 2020-09-06 RX ADMIN — SODIUM CHLORIDE 1000 MILLILITER(S): 9 INJECTION INTRAMUSCULAR; INTRAVENOUS; SUBCUTANEOUS at 11:07

## 2020-09-06 RX ADMIN — Medication 1 TABLET(S): at 21:54

## 2020-09-06 RX ADMIN — TETANUS TOXOID, REDUCED DIPHTHERIA TOXOID AND ACELLULAR PERTUSSIS VACCINE, ADSORBED 0.5 MILLILITER(S): 5; 2.5; 8; 8; 2.5 SUSPENSION INTRAMUSCULAR at 10:17

## 2020-09-06 RX ADMIN — ROPINIROLE 1 MILLIGRAM(S): 8 TABLET, FILM COATED, EXTENDED RELEASE ORAL at 21:54

## 2020-09-06 RX ADMIN — Medication 325 MILLIGRAM(S): at 12:08

## 2020-09-06 NOTE — ED PROVIDER NOTE - MUSCULOSKELETAL, MLM
Pt is here today for Tdap.   Spine appears normal, range of motion is not limited, no muscle or joint tenderness. radial and dp pulses equal and intact bilaterally

## 2020-09-06 NOTE — ED PROVIDER NOTE - CARE PLAN
Principal Discharge DX:	Syncope and collapse  Secondary Diagnosis:	Skin tear of lower leg without complication, right, initial encounter  Secondary Diagnosis:	Elevated troponin

## 2020-09-06 NOTE — H&P ADULT - HISTORY OF PRESENT ILLNESS
· Chief Complaint: The patient is a 74y Female complaining of syncope.  · HPI Objective Statement: 73 yo female with h/o HTN, HLD, COPD transferred from [place] for syncope and elevated troponin. Patient explains she was putting choline tablets in the pool, passed out and fell into the pool. Patient obtained skin tear to right knee. no additional imaging. Denies fever, chills, chest pain, sob, abd pain, N/V, UE/LE weakness or paresthesias, headache, dizziness, visual changes. At [place] patient had CT head and neck - both unremarkable, + elevated trop of 0.095. GINNY VASQUEZ is a 75 YO Female transferred from [place] ED for evaluation of syncope and elevated troponin. Patient explains she was putting choline tablets in the pool, passed out and fell into the pool. Patient got skin tear to right knee.  Denies fever, chills, chest pain, SOB, Abd pain, N/V, UE/LE weakness or paresthesias, headache, dizziness, visual changes. At [place] patient had CT head and neck - both unremarkable, + elevated trop of 0.095.   Past medical history of HTN, HLD & COPD.

## 2020-09-06 NOTE — ED PROVIDER NOTE - PROGRESS NOTE DETAILS
d/w lili (cards) will accept xfer to [place] for admission. pt and  refusing xfer to [facility], as they feel it is too far. they are willing to be xferred to [place]. d/w laisha (cards) will follow pt at [place], d/w hira munoz (med) will admit pt for alamuri at [place], d/w haydee ([place] er) will accept xfer er->er

## 2020-09-06 NOTE — ED ADULT NURSE NOTE - CHPI ED NUR SYMPTOMS NEG
no shortness of breath/no vomiting/no nausea/no chills/no diaphoresis/no dizziness/no back pain/no fever/no chest pain/no congestion

## 2020-09-06 NOTE — CONSULT NOTE ADULT - SUBJECTIVE AND OBJECTIVE BOX
[facility] - Neema Park, Emanuel Varela, Rand, Anish, Vita Peralta  Office Number: [phone]    Initial Consult Note  CHIEF COMPLAINT: Patient is a 74y old  Female who presents with a chief complaint of syncope     HPI: 73 yo female with h/o HTN, HLD, COPD transferred from [facility] for syncope and elevated troponin. Patient reports that she was putting choline tablets in the pool, passed out and fell into the pool and sustained skin tear to right knee. Patient was putting chlorine into , passed out, fell on to the pool, woke up underwater, swam to surface, witnessed by , who called for help, neighbor helped pt out of water.   Patient had unremarkable CT head and neck, but found to have elevated trop of 0.095. Patient transferred here for further work up.     Patient follows up Dr Reilly for cardiology. Cardiology consulted for syncope and elevated troponin.     Allergies  Enbrel (Unknown)  IV Contrast (Hives; Rash)  Levaquin (Other)  minocycline (Other)  penicillin (Other)  Zofran (Other)    PAST MEDICAL & SURGICAL HISTORY:  Calculus of gallbladder with chronic cholecystitis without obstruction  Hyperlipidemia, unspecified hyperlipidemia type  Essential hypertension  Hiatal hernia with GERD  Chronic obstructive pulmonary disease, unspecified COPD type: O2 at night  Lung cancer: Right lung.  Esophageal reflux  TIA (transient ischemic attack): 2012  Rheumatoid arthritis  HLD (hyperlipidemia)  Asthma  S/P cholecystectomy  History of endoscopy: Sept 2017  H/O colonoscopy  S/P lobectomy of lung: Right lung in 1996.    MEDICATIONS  (STANDING):    MEDICATIONS  (PRN):    FAMILY HISTORY:  No pertinent family history in first degree relatives    SOCIAL HISTORY  Marital Status:   Occupation:   Lives with:     SUBSTANCE USE  Tobacco Usage:  ( ) None ( ) never smoked   ( ) former smoker  ( ) current smoker; Packs per day:   Alcohol Usage: ( ) none  ( ) occasional ( ) 2-3 times a week ( ) daily; Last drink:   Recreational drugs ( ) None    REVIEW OF SYSTEMS   CONSTITUTIONAL: No fevers, No chills, No fatigue, No weight gain  EYES: No vision changes, No vertigo, No throat pain   ENT: No congestion, No ear pain, No sore throat.  NECK: No pain, No stiffness  RESPIRATORY: No shortness of breath, No cough, No wheezing, No hemoptysis  CARDIOVASCULAR: No chest pain. No palpitations, No CHEN, No orthopnea, No PND, No pleuritic pain  GASTROINTESTINAL: No abdominal pain, No nausea, No vomiting, No hematemesis, No diarrhea No constipation. No melena  GENITOURINARY: No dysuria, No frequency, No incontinence, No hematuria  NEUROLOGICAL: No dizziness, No lightheadedness, + syncope, No LOC, No headache, No numbness, No weakness  MUSCULOSKELETAL: + neck pain, No joint swelling.  PSYCHIATRIC: No anxiety, No depression  DERMATOLOGY: No diaphoresis. No itching, No rashes, No pressure ulcers  HEME/LYMPH: No easy bruising, or bleeding gums  All other review of systems is negative unless indicated above.    VITAL SIGNS:   Vital Signs Last 24 Hrs  T(C): 36.6 (06 Sep 2020 13:18), Max: 36.6 (06 Sep 2020 08:47)  T(F): 97.9 (06 Sep 2020 13:18), Max: 97.9 (06 Sep 2020 13:18)  HR: 79 (06 Sep 2020 13:18) (77 - 88)  BP: 130/70 (06 Sep 2020 13:30) (130/70 - 145/89)  BP(mean): --  RR: 16 (06 Sep 2020 13:30) (16 - 20)  SpO2: 97% (06 Sep 2020 13:30) (97% - 97%)    Physical Exam:  Appearance: NAD, no distress, alert,   HEENT: Moist Mucous Membranes, Anicteric  Cardiovascular: Regular rate and rhythm, Normal S1 S2, No JVD, No murmurs, No rubs, gallops or clicks  Respiratory: Lungs clear to auscultation. No rales, No rhonchi, No wheezing. No tenderness to palpation  Gastrointestinal:  Soft, Non-tender, + BS  Neurologic: Non-focal  Skin: Warm and dry, + laceration, No rashes, No ecchymosis, No cyanosis, No ulcers   Musculoskeletal: No clubbing, No cyanosis  Vascular: Peripheral pulses palpable 2+ bilaterally  Psychiatry: Mood & affect appropriate  Lymph: No peripheral edema.     LABS: All Labs Reviewed:                        11.3   10.63 )-----------( 265      ( 06 Sep 2020 10:12 )             35.9     06 Sep 2020 10:12    138    |  103    |  21     ----------------------------<  91     4.6     |  26     |  1.11     Ca    9.1        06 Sep 2020 10:12    TPro  6.4    /  Alb  3.2    /  TBili  0.4    /  DBili  x      /  AST  20     /  ALT  17     /  AlkPhos  63     06 Sep 2020 10:12  PT/INR - ( 06 Sep 2020 10:12 )   PT: 11.9 sec;   INR: 0.98 ratio    PTT - ( 06 Sep 2020 10:12 )  PTT:27.0 sec  Troponin I, Serum: .095 ng/mL (09-06-20 @ 10:12)    < from: Cardiac Cath Lab (12.11.13 @ 18:06) >  CORONARY VESSELS: The coronary circulation is left dominant.  LM:   --  LM: Angiography showed minor luminal irregularities with no flow  limiting lesions.  LAD:   --  LAD: Angiography showed minor luminal irregularities with no  flow limiting lesions.  CX:   --  Circumflex: Angiography showed minor luminal irregularities with  no flow limiting lesions.  RCA:   --  RCA: Angiography showed minor luminal irregularities with no  flow limiting lesions.  COMPLICATIONS: There were no complications.  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  < end of copied text >    < from: CT Cervical Spine No Cont (09.06.20 @ 11:10) >  FINDINGS:    CT head:  There is no acute intracranial hemorrhage or mass effect. The ventricles and sulci are normal in size for patient's age.  There is no extraaxial fluid collection.  There is no displaced calvarial fracture. Status post bilateral intraocular lens implants. Left sphenoid sinus mucosal thickening. Right maxillary sinus polyp or retention cyst. The mastoid air cells are well aerated.    CT cervical spine:  Cervical alignment is maintained. Vertebral body heights are within normal limits. Intervertebral disc spaces are preserved. Disc osteophyte complex at C4-C5 protrusion spinal canal narrowing. Right C4-C5 neuroforaminal narrowing.  There is no prevertebral soft tissue swelling. The paraspinal soft tissues are unremarkable. Calcified plaque in the bilateral carotid bulbs and proximal ICAs. Postoperative changes in the right hemithorax. Emphysema.    IMPRESSION:  CT head: No acute intracranial hemorrhage or mass effect.  CT cervical spine: No evidence for acute displaced fracture or malalignment.  < end of copied text >    < from: Xray Chest 1 View AP/PA (09.06.20 @ 11:00) >  Frontal expiratory view of the chest shows the heart to be normal in size. The lungs show more volume loss of the upper right lung and similar pleural thickening of the right chest apex. The left lung is clear. Surgical clips in the right hilum are again noted. and there is no evidence of pneumothorax nor pleural effusion. Right upper quadrant clips are again noted.  IMPRESSION:  Progression of volume loss of right lung/chest. Clear left lung.  Thank you for the courtesy of this referral.  < end of copied text > [facility] - Neema Park, Emanuel Varela, Rand, Anish, Vita Peralta  Office Number: [phone]    Initial Consult Note  CHIEF COMPLAINT: Patient is a 74y old  Female who presents with a chief complaint of syncope     HPI: 75 yo female with h/o HTN, HLD, COPD transferred from [facility] for syncope and elevated troponin. Patient reports that she was putting choline tablets in the pool, passed out and fell into the pool and sustained skin tear to right knee. Patient was putting chlorine into , passed out, fell on to the pool, woke up underwater, swam to surface, witnessed by , who called for help, neighbor helped pt out of water. Patient had unremarkable CT head and neck, but found to have elevated trop of 0.095. Patient received NS bolus 1L and aspirin 325 mg x 1. Patient transferred here for further work up.     Patient follows up Dr Reilly for cardiology. Cardiology consulted for syncope and elevated troponin.     Allergies  Enbrel (Unknown)  IV Contrast (Hives; Rash)  Levaquin (Other)  minocycline (Other)  penicillin (Other)  Zofran (Other)    PAST MEDICAL & SURGICAL HISTORY:  Calculus of gallbladder with chronic cholecystitis without obstruction  Hyperlipidemia, unspecified hyperlipidemia type  Essential hypertension  Hiatal hernia with GERD  Chronic obstructive pulmonary disease, unspecified COPD type: O2 at night  Lung cancer: Right lung.  Esophageal reflux  TIA (transient ischemic attack): 2012  Rheumatoid arthritis  HLD (hyperlipidemia)  Asthma  S/P cholecystectomy  History of endoscopy: Sept 2017  H/O colonoscopy  S/P lobectomy of lung: Right lung in 1996.    MEDICATIONS  (STANDING):    MEDICATIONS  (PRN):    FAMILY HISTORY:  No pertinent family history in first degree relatives    SOCIAL HISTORY  Marital Status:   Occupation:   Lives with:     SUBSTANCE USE  Tobacco Usage:  ( ) None ( ) never smoked   ( ) former smoker  ( ) current smoker; Packs per day:   Alcohol Usage: ( ) none  ( ) occasional ( ) 2-3 times a week ( ) daily; Last drink:   Recreational drugs ( ) None    REVIEW OF SYSTEMS   CONSTITUTIONAL: No fevers, No chills, No fatigue, No weight gain  EYES: No vision changes, No vertigo, No throat pain   ENT: No congestion, No ear pain, No sore throat.  NECK: No pain, No stiffness  RESPIRATORY: No shortness of breath, No cough, No wheezing, No hemoptysis  CARDIOVASCULAR: No chest pain. No palpitations, No CHEN, No orthopnea, No PND, No pleuritic pain  GASTROINTESTINAL: No abdominal pain, No nausea, No vomiting, No hematemesis, No diarrhea No constipation. No melena  GENITOURINARY: No dysuria, No frequency, No incontinence, No hematuria  NEUROLOGICAL: No dizziness, No lightheadedness, + syncope, No LOC, No headache, No numbness, No weakness  MUSCULOSKELETAL: + neck pain, No joint swelling.  PSYCHIATRIC: No anxiety, No depression  DERMATOLOGY: No diaphoresis. No itching, No rashes, No pressure ulcers  HEME/LYMPH: No easy bruising, or bleeding gums  All other review of systems is negative unless indicated above.    VITAL SIGNS:   Vital Signs Last 24 Hrs  T(C): 36.6 (06 Sep 2020 13:18), Max: 36.6 (06 Sep 2020 08:47)  T(F): 97.9 (06 Sep 2020 13:18), Max: 97.9 (06 Sep 2020 13:18)  HR: 79 (06 Sep 2020 13:18) (77 - 88)  BP: 130/70 (06 Sep 2020 13:30) (130/70 - 145/89)  BP(mean): --  RR: 16 (06 Sep 2020 13:30) (16 - 20)  SpO2: 97% (06 Sep 2020 13:30) (97% - 97%)    Physical Exam:  Appearance: NAD, no distress, alert,   HEENT: Moist Mucous Membranes, Anicteric  Cardiovascular: Regular rate and rhythm, Normal S1 S2, No JVD, No murmurs, No rubs, gallops or clicks  Respiratory: Lungs clear to auscultation. No rales, No rhonchi, No wheezing. No tenderness to palpation  Gastrointestinal:  Soft, Non-tender, + BS  Neurologic: Non-focal  Skin: Warm and dry, + laceration, No rashes, No ecchymosis, No cyanosis, No ulcers   Musculoskeletal: No clubbing, No cyanosis  Vascular: Peripheral pulses palpable 2+ bilaterally  Psychiatry: Mood & affect appropriate  Lymph: No peripheral edema.     LABS: All Labs Reviewed:                        11.3   10.63 )-----------( 265      ( 06 Sep 2020 10:12 )             35.9     06 Sep 2020 10:12    138    |  103    |  21     ----------------------------<  91     4.6     |  26     |  1.11     Ca    9.1        06 Sep 2020 10:12    TPro  6.4    /  Alb  3.2    /  TBili  0.4    /  DBili  x      /  AST  20     /  ALT  17     /  AlkPhos  63     06 Sep 2020 10:12  PT/INR - ( 06 Sep 2020 10:12 )   PT: 11.9 sec;   INR: 0.98 ratio    PTT - ( 06 Sep 2020 10:12 )  PTT:27.0 sec  Troponin I, Serum: .095 ng/mL (09-06-20 @ 10:12)    < from: Cardiac Cath Lab (12.11.13 @ 18:06) >  CORONARY VESSELS: The coronary circulation is left dominant.  LM:   --  LM: Angiography showed minor luminal irregularities with no flow  limiting lesions.  LAD:   --  LAD: Angiography showed minor luminal irregularities with no  flow limiting lesions.  CX:   --  Circumflex: Angiography showed minor luminal irregularities with  no flow limiting lesions.  RCA:   --  RCA: Angiography showed minor luminal irregularities with no  flow limiting lesions.  COMPLICATIONS: There were no complications.  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  < end of copied text >    < from: CT Cervical Spine No Cont (09.06.20 @ 11:10) >  FINDINGS:    CT head:  There is no acute intracranial hemorrhage or mass effect. The ventricles and sulci are normal in size for patient's age.  There is no extraaxial fluid collection.  There is no displaced calvarial fracture. Status post bilateral intraocular lens implants. Left sphenoid sinus mucosal thickening. Right maxillary sinus polyp or retention cyst. The mastoid air cells are well aerated.    CT cervical spine:  Cervical alignment is maintained. Vertebral body heights are within normal limits. Intervertebral disc spaces are preserved. Disc osteophyte complex at C4-C5 protrusion spinal canal narrowing. Right C4-C5 neuroforaminal narrowing.  There is no prevertebral soft tissue swelling. The paraspinal soft tissues are unremarkable. Calcified plaque in the bilateral carotid bulbs and proximal ICAs. Postoperative changes in the right hemithorax. Emphysema.    IMPRESSION:  CT head: No acute intracranial hemorrhage or mass effect.  CT cervical spine: No evidence for acute displaced fracture or malalignment.  < end of copied text >    < from: Xray Chest 1 View AP/PA (09.06.20 @ 11:00) >  Frontal expiratory view of the chest shows the heart to be normal in size. The lungs show more volume loss of the upper right lung and similar pleural thickening of the right chest apex. The left lung is clear. Surgical clips in the right hilum are again noted. and there is no evidence of pneumothorax nor pleural effusion. Right upper quadrant clips are again noted.  IMPRESSION:  Progression of volume loss of right lung/chest. Clear left lung.  Thank you for the courtesy of this referral.  < end of copied text > [facility] - Neema Park, Emanuel Varela, Rand, Anish, Vita Peralta  Office Number: [phone]    Initial Consult Note  CHIEF COMPLAINT: Patient is a 74y old  Female who presents with a chief complaint of syncope     HPI: 73 yo female with h/o HTN, HLD, COPD transferred from [facility] for syncope and elevated troponin. Patient reports that she was putting choline tablets in the pool, passed out and fell into the pool, woke up underwater, swam to surface, witnessed by , who called for help, neighbor helped pt out of water sustained skin tear to right knee.  Patient had unremarkable CT head and neck, but found to have elevated trop of 0.095. Patient received NS bolus 1L and aspirin 325 mg x 1. Patient transferred here for further work up. EKG showed NSR @ 72. No acute ischemic changes noted.     Patient follows up Dr Reilly for cardiology. Cardiology consulted for syncope and elevated troponin.     Allergies  Enbrel (Unknown)  IV Contrast (Hives; Rash)  Levaquin (Other)  minocycline (Other)  penicillin (Other)  Zofran (Other)    PAST MEDICAL & SURGICAL HISTORY:  Calculus of gallbladder with chronic cholecystitis without obstruction  Hyperlipidemia, unspecified hyperlipidemia type  Essential hypertension  Hiatal hernia with GERD  Chronic obstructive pulmonary disease, unspecified COPD type: O2 at night  Lung cancer: Right lung.  Esophageal reflux  TIA (transient ischemic attack): 2012  Rheumatoid arthritis  HLD (hyperlipidemia)  Asthma  S/P cholecystectomy  History of endoscopy: Sept 2017  H/O colonoscopy  S/P lobectomy of lung: Right lung in 1996.    MEDICATIONS  (STANDING):    MEDICATIONS  (PRN):    FAMILY HISTORY:  No pertinent family history in first degree relatives    SOCIAL HISTORY  Marital Status:   Occupation:   Lives with:     SUBSTANCE USE  Tobacco Usage:  ( ) None ( ) never smoked   ( ) former smoker  ( ) current smoker; Packs per day:   Alcohol Usage: ( ) none  ( ) occasional ( ) 2-3 times a week ( ) daily; Last drink:   Recreational drugs ( ) None    REVIEW OF SYSTEMS   CONSTITUTIONAL: No fevers, No chills, No fatigue, No weight gain  EYES: No vision changes, No vertigo, No throat pain   ENT: No congestion, No ear pain, No sore throat.  NECK: No pain, No stiffness  RESPIRATORY: No shortness of breath, No cough, No wheezing, No hemoptysis  CARDIOVASCULAR: No chest pain. No palpitations, No CHEN, No orthopnea, No PND, No pleuritic pain  GASTROINTESTINAL: No abdominal pain, No nausea, No vomiting, No hematemesis, No diarrhea No constipation. No melena  GENITOURINARY: No dysuria, No frequency, No incontinence, No hematuria  NEUROLOGICAL: No dizziness, No lightheadedness, + syncope, No LOC, No headache, No numbness, No weakness  MUSCULOSKELETAL: + neck pain, No joint swelling.  PSYCHIATRIC: No anxiety, No depression  DERMATOLOGY: No diaphoresis. No itching, No rashes, No pressure ulcers  HEME/LYMPH: No easy bruising, or bleeding gums  All other review of systems is negative unless indicated above.    VITAL SIGNS:   Vital Signs Last 24 Hrs  T(C): 36.6 (06 Sep 2020 13:18), Max: 36.6 (06 Sep 2020 08:47)  T(F): 97.9 (06 Sep 2020 13:18), Max: 97.9 (06 Sep 2020 13:18)  HR: 79 (06 Sep 2020 13:18) (77 - 88)  BP: 130/70 (06 Sep 2020 13:30) (130/70 - 145/89)  BP(mean): --  RR: 16 (06 Sep 2020 13:30) (16 - 20)  SpO2: 97% (06 Sep 2020 13:30) (97% - 97%)    Physical Exam:  Appearance: NAD, no distress, alert,   HEENT: Moist Mucous Membranes, Anicteric  Cardiovascular: Regular rate and rhythm, Normal S1 S2, No JVD, No murmurs, No rubs, gallops or clicks  Respiratory: Lungs clear to auscultation. No rales, No rhonchi, No wheezing. No tenderness to palpation  Gastrointestinal:  Soft, Non-tender, + BS  Neurologic: Non-focal  Skin: Warm and dry, + laceration, No rashes, No ecchymosis, No cyanosis, No ulcers   Musculoskeletal: No clubbing, No cyanosis  Vascular: Peripheral pulses palpable 2+ bilaterally  Psychiatry: Mood & affect appropriate  Lymph: No peripheral edema.     LABS: All Labs Reviewed:                        11.3   10.63 )-----------( 265      ( 06 Sep 2020 10:12 )             35.9     06 Sep 2020 10:12    138    |  103    |  21     ----------------------------<  91     4.6     |  26     |  1.11     Ca    9.1        06 Sep 2020 10:12    TPro  6.4    /  Alb  3.2    /  TBili  0.4    /  DBili  x      /  AST  20     /  ALT  17     /  AlkPhos  63     06 Sep 2020 10:12  PT/INR - ( 06 Sep 2020 10:12 )   PT: 11.9 sec;   INR: 0.98 ratio    PTT - ( 06 Sep 2020 10:12 )  PTT:27.0 sec  Troponin I, Serum: .095 ng/mL (09-06-20 @ 10:12)    < from: Cardiac Cath Lab (12.11.13 @ 18:06) >  CORONARY VESSELS: The coronary circulation is left dominant.  LM:   --  LM: Angiography showed minor luminal irregularities with no flow  limiting lesions.  LAD:   --  LAD: Angiography showed minor luminal irregularities with no  flow limiting lesions.  CX:   --  Circumflex: Angiography showed minor luminal irregularities with  no flow limiting lesions.  RCA:   --  RCA: Angiography showed minor luminal irregularities with no  flow limiting lesions.  COMPLICATIONS: There were no complications.  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  < end of copied text >    < from: CT Cervical Spine No Cont (09.06.20 @ 11:10) >  FINDINGS:    CT head:  There is no acute intracranial hemorrhage or mass effect. The ventricles and sulci are normal in size for patient's age.  There is no extraaxial fluid collection.  There is no displaced calvarial fracture. Status post bilateral intraocular lens implants. Left sphenoid sinus mucosal thickening. Right maxillary sinus polyp or retention cyst. The mastoid air cells are well aerated.    CT cervical spine:  Cervical alignment is maintained. Vertebral body heights are within normal limits. Intervertebral disc spaces are preserved. Disc osteophyte complex at C4-C5 protrusion spinal canal narrowing. Right C4-C5 neuroforaminal narrowing.  There is no prevertebral soft tissue swelling. The paraspinal soft tissues are unremarkable. Calcified plaque in the bilateral carotid bulbs and proximal ICAs. Postoperative changes in the right hemithorax. Emphysema.    IMPRESSION:  CT head: No acute intracranial hemorrhage or mass effect.  CT cervical spine: No evidence for acute displaced fracture or malalignment.  < end of copied text >    < from: Xray Chest 1 View AP/PA (09.06.20 @ 11:00) >  Frontal expiratory view of the chest shows the heart to be normal in size. The lungs show more volume loss of the upper right lung and similar pleural thickening of the right chest apex. The left lung is clear. Surgical clips in the right hilum are again noted. and there is no evidence of pneumothorax nor pleural effusion. Right upper quadrant clips are again noted.  IMPRESSION:  Progression of volume loss of right lung/chest. Clear left lung.  Thank you for the courtesy of this referral.  < end of copied text > [facility] - Neema Park, Emanuel Varela, Rand, Anish, Vita Peralta  Office Number: [phone]    Initial Consult Note  CHIEF COMPLAINT: Patient is a 74y old  Female who presents with a chief complaint of syncope     HPI: 75 yo female with h/o RA, HTN, HLD, COPD, lung CA, s/p lobectomy 1996, TIA transferred from [facility] for syncope and elevated troponin. Patient reports that she was putting choline tablets in the pool, passed out and fell into the pool, woke up underwater, swam to surface, witnessed by , who called for help, neighbor helped pt out of water sustained skin tear to right knee.  Patient had unremarkable CT head and neck, but found to have elevated trop of 0.095. Patient received NS bolus 1L and aspirin 325 mg x 1. Patient transferred here for further work up. EKG showed NSR @ 72. No acute ischemic changes noted.     Patient follows up Dr Reilly for cardiology. Cardiology consulted for syncope and elevated troponin.     Allergies  Enbrel (Unknown)  IV Contrast (Hives; Rash)  Levaquin (Other)  minocycline (Other)  penicillin (Other)  Zofran (Other)    PAST MEDICAL & SURGICAL HISTORY:  Calculus of gallbladder with chronic cholecystitis without obstruction  Hyperlipidemia, unspecified hyperlipidemia type  Essential hypertension  Hiatal hernia with GERD  Chronic obstructive pulmonary disease, unspecified COPD type: O2 at night  Lung cancer: Right lung.  Esophageal reflux  TIA (transient ischemic attack): 2012  Rheumatoid arthritis  HLD (hyperlipidemia)  Asthma  S/P cholecystectomy  History of endoscopy: Sept 2017  H/O colonoscopy  S/P lobectomy of lung: Right lung in 1996.    MEDICATIONS  (STANDING):    MEDICATIONS  (PRN):    FAMILY HISTORY:  No pertinent family history in first degree relatives    SOCIAL HISTORY  Marital Status:    Occupation: retired  Lives with:      SUBSTANCE USE  Tobacco Usage:  ( ) None ( ) never smoked   ( ) former smoker  ( ) current smoker; Packs per day:   Alcohol Usage: ( ) none  ( ) occasional ( ) 2-3 times a week ( ) daily; Last drink:   Recreational drugs (x ) None    REVIEW OF SYSTEMS   CONSTITUTIONAL: No fevers, No chills, No fatigue, No weight gain  EYES: No vision changes, No vertigo, No throat pain   ENT: No congestion, No ear pain, No sore throat.  NECK: No pain, No stiffness  RESPIRATORY: No shortness of breath, No cough, No wheezing, No hemoptysis  CARDIOVASCULAR: No chest pain. No palpitations, No CHEN, No orthopnea, No PND, No pleuritic pain  GASTROINTESTINAL: No abdominal pain, No nausea, No vomiting, No hematemesis, No diarrhea No constipation. No melena  GENITOURINARY: No dysuria, No frequency, No incontinence, No hematuria  NEUROLOGICAL: No dizziness, No lightheadedness, + syncope, No LOC, No headache, No numbness, No weakness  MUSCULOSKELETAL: + neck pain, No joint swelling.  PSYCHIATRIC: No anxiety, No depression  DERMATOLOGY: No diaphoresis. No itching, No rashes, No pressure ulcers  HEME/LYMPH: No easy bruising, or bleeding gums  All other review of systems is negative unless indicated above.    VITAL SIGNS:   Vital Signs Last 24 Hrs  T(C): 36.6 (06 Sep 2020 13:18), Max: 36.6 (06 Sep 2020 08:47)  T(F): 97.9 (06 Sep 2020 13:18), Max: 97.9 (06 Sep 2020 13:18)  HR: 79 (06 Sep 2020 13:18) (77 - 88)  BP: 130/70 (06 Sep 2020 13:30) (130/70 - 145/89)  BP(mean): --  RR: 16 (06 Sep 2020 13:30) (16 - 20)  SpO2: 97% (06 Sep 2020 13:30) (97% - 97%)    Physical Exam:  Appearance: NAD, no distress, alert, Well developed   HEENT: Moist Mucous Membranes, Anicteric  Cardiovascular: Regular rate and rhythm, Normal S1 S2, No JVD, No murmurs, No rubs, gallops or clicks  Respiratory: Lungs clear to auscultation. No rales, No rhonchi, No wheezing. No tenderness to palpation  Gastrointestinal:  Soft, Non-tender, + BS  Neurologic: Non-focal  Skin: Warm and dry, + knee laceration, No rashes, No ecchymosis, No cyanosis, No ulcers   Musculoskeletal: No clubbing, No cyanosis  Vascular: Peripheral pulses palpable 2+ bilaterally  Psychiatry: Mood & affect appropriate  Lymph: No peripheral edema.     LABS: All Labs Reviewed:                        11.3   10.63 )-----------( 265      ( 06 Sep 2020 10:12 )             35.9     06 Sep 2020 10:12    138    |  103    |  21     ----------------------------<  91     4.6     |  26     |  1.11     Ca    9.1        06 Sep 2020 10:12    TPro  6.4    /  Alb  3.2    /  TBili  0.4    /  DBili  x      /  AST  20     /  ALT  17     /  AlkPhos  63     06 Sep 2020 10:12  PT/INR - ( 06 Sep 2020 10:12 )   PT: 11.9 sec;   INR: 0.98 ratio    PTT - ( 06 Sep 2020 10:12 )  PTT:27.0 sec  Troponin I, Serum: .095 ng/mL (09-06-20 @ 10:12)    < from: Cardiac Cath Lab (12.11.13 @ 18:06) >  CORONARY VESSELS: The coronary circulation is left dominant.  LM:   --  LM: Angiography showed minor luminal irregularities with no flow  limiting lesions.  LAD:   --  LAD: Angiography showed minor luminal irregularities with no  flow limiting lesions.  CX:   --  Circumflex: Angiography showed minor luminal irregularities with  no flow limiting lesions.  RCA:   --  RCA: Angiography showed minor luminal irregularities with no  flow limiting lesions.  COMPLICATIONS: There were no complications.  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  < end of copied text >    < from: CT Cervical Spine No Cont (09.06.20 @ 11:10) >  FINDINGS:    CT head:  There is no acute intracranial hemorrhage or mass effect. The ventricles and sulci are normal in size for patient's age.  There is no extraaxial fluid collection.  There is no displaced calvarial fracture. Status post bilateral intraocular lens implants. Left sphenoid sinus mucosal thickening. Right maxillary sinus polyp or retention cyst. The mastoid air cells are well aerated.    CT cervical spine:  Cervical alignment is maintained. Vertebral body heights are within normal limits. Intervertebral disc spaces are preserved. Disc osteophyte complex at C4-C5 protrusion spinal canal narrowing. Right C4-C5 neuroforaminal narrowing.  There is no prevertebral soft tissue swelling. The paraspinal soft tissues are unremarkable. Calcified plaque in the bilateral carotid bulbs and proximal ICAs. Postoperative changes in the right hemithorax. Emphysema.    IMPRESSION:  CT head: No acute intracranial hemorrhage or mass effect.  CT cervical spine: No evidence for acute displaced fracture or malalignment.  < end of copied text >    < from: Xray Chest 1 View AP/PA (09.06.20 @ 11:00) >  Frontal expiratory view of the chest shows the heart to be normal in size. The lungs show more volume loss of the upper right lung and similar pleural thickening of the right chest apex. The left lung is clear. Surgical clips in the right hilum are again noted. and there is no evidence of pneumothorax nor pleural effusion. Right upper quadrant clips are again noted.  IMPRESSION:  Progression of volume loss of right lung/chest. Clear left lung.  Thank you for the courtesy of this referral.  < end of copied text >

## 2020-09-06 NOTE — H&P ADULT - NSHPLABSRESULTS_GEN_ALL_CORE
11.3   10.63 )-----------( 265      ( 06 Sep 2020 10:12 )             35.9     06 Sep 2020 10:12    138    |  103    |  21     ----------------------------<  91     4.6     |  26     |  1.11     Ca    9.1        06 Sep 2020 10:12    TPro  6.4    /  Alb  3.2    /  TBili  0.4    /  DBili  x      /  AST  20     /  ALT  17     /  AlkPhos  63     06 Sep 2020 10:12    LIVER FUNCTIONS - ( 06 Sep 2020 10:12 )  Alb: 3.2 g/dL / Pro: 6.4 g/dL / ALK PHOS: 63 U/L / ALT: 17 U/L DA / AST: 20 U/L / GGT: x           PT/INR - ( 06 Sep 2020 10:12 )   PT: 11.9 sec;   INR: 0.98 ratio         PTT - ( 06 Sep 2020 10:12 )  PTT:27.0 sec  CAPILLARY BLOOD GLUCOSE        CARDIAC MARKERS ( 06 Sep 2020 10:12 )  .095 ng/mL / x     / x     / x     / x        < from: CT Cervical Spine No Cont (09.06.20 @ 11:10) >    IMPRESSION:    CT head: No acute intracranial hemorrhage or mass effect.    CT cervical spine: No evidence for acute displaced fracture or malalignment.    < end of copied text >

## 2020-09-06 NOTE — ED ADULT TRIAGE NOTE - AS HEIGHT TYPE
by mouth daily 90 tablet 1    busPIRone (BUSPAR) 10 MG tablet Take 10 mg by mouth 2 times daily       levocetirizine (XYZAL) 5 MG tablet Take 5 mg by mouth every morning       sertraline (ZOLOFT) 100 MG tablet Take 200 mg by mouth      omeprazole (PRILOSEC) 20 MG delayed release capsule Take 1 capsule by mouth daily 90 capsule 3     No current facility-administered medications for this visit. Allergies   Allergen Reactions    Biaxin [Clarithromycin] Nausea Only    Eggs Or Egg-Derived Products [Eggs Or Egg-Derived Products]        Health Maintenance   Topic Date Due    HIV screen  03/08/2021 (Originally 5/22/2010)    Flu vaccine (1) 09/01/2018    Potassium monitoring  05/02/2019    Creatinine monitoring  05/02/2019    DTaP/Tdap/Td vaccine (7 - Td) 07/17/2022       Subjective:      Review of Systems   Constitutional: Negative. HENT: Negative. Eyes: Negative. Respiratory: Negative. Cardiovascular: Negative. Gastrointestinal: Negative. Genitourinary: Negative. Musculoskeletal: Negative. Skin: Positive for wound. Neurological: Negative for dizziness, syncope, light-headedness and headaches. Objective:     Physical Exam   Constitutional: He appears well-developed and well-nourished. No distress. HENT:   Head: Normocephalic. Head is with laceration. Right Ear: Hearing, tympanic membrane, external ear and ear canal normal.   Left Ear: Hearing, tympanic membrane, external ear and ear canal normal.   Nose: Nose normal.   Mouth/Throat: Uvula is midline. Posterior oropharyngeal erythema present. No oropharyngeal exudate or posterior oropharyngeal edema. Healing good. No redness/swelling/drainage. Eyes: Conjunctivae and EOM are normal. Pupils are equal, round, and reactive to light. Neck: Normal range of motion. Neck supple. Cardiovascular: Normal rate, regular rhythm, normal heart sounds and intact distal pulses. No murmur heard.   Pulmonary/Chest: Effort normal stated

## 2020-09-06 NOTE — CONSULT NOTE ADULT - ASSESSMENT
73 yo female with h/o RA, HTN, HLD, COPD, lung CA, s/p lobectomy 1996, TIA transferred from [facility] for syncope and elevated troponin. Cardiology consulted for syncope and elevated troponin.     Patient reports that she was putting choline tablets in the pool, passed out and fell into the pool, woke up underwater, swam to surface, witnessed by , who called for help, neighbor helped pt out of water sustained skin tear to right knee.  Patient had unremarkable CT head and neck, but found to have elevated trop of 0.095. Patient received NS bolus 1L and aspirin 325 mg x 1.   Patient follows up Dr Reilly for cardiology.     - EKG showed NSR @ 72. No acute ischemic changes noted.   - Patient found to have elevated troponin I .095  - Denies chest pain, SOB, palpitation, CHEN, orthopnea, PND, dizziness, lightheadedness, leg swelling  - Low suspicion of ACS given clinical history.   - Last cath on 2013 which showed non obs CAD  - Unclear etiology of elevated hsTrop, but doubt this represents myocardial injury.  - Continue to trend CE  - Continue Aspirin 81 once a day  - Please do not treat for ACS at present with Plavix or heparin gtt, unless clinical story changes or patient develops chest pain, EKG changes  - Monitor tele for arrhythmias.   - Would obtain TTE in am to r/o valvular abnormalities and evaluate ventricular function and wall motion abnormalities   - Continue medical management for the patients underlying conditions.    Hypertension  - Continue olmesartan-hydrochlorothiazide 20 mg-12.5 mg daily   - Monitor routine hemodynamics     Hyperlipidemia  - Continue Crestor 5 mg once a day    - Monitor patient for chemical pneumonitis   - Monitor and replete lytes, keep K>4, Mg>2.  - All other medical needs as per primary team.  - Other cardiovascular workup will depend on clinical course.  - Will continue to follow.      Errol Holland, MS FNP, [facility]  Nurse Practitioner- Cardiology   Spectra #[phone]

## 2020-09-06 NOTE — PROVIDER CONTACT NOTE (CRITICAL VALUE NOTIFICATION) - NS PROVIDER READ BACK
Referred by: Faustino Shannon MD; Medical Diagnosis (from order):    Diagnosis Information      Diagnosis    719.41 (ICD-9-CM) - M25.511, M25.512 (ICD-10-CM) - Bilateral shoulder pain, unspecified chronicity                Physical Therapy -  Initial Evaluation    Visit:  1   Treatment Diagnosis: right: shoulder symptoms with increased pain/symptoms, impaired posture, impaired range of motion, impaired muscle length/flexibility, impaired scapulohumeral rhythm, impaired strength  Date of onset: 2020  Chart reviewed at time of initial evaluation (relevant co-morbidities, allergies, tests and medications listed): Current Outpatient Medications:  Valacyclovir HCl (VALTREX) 1000 MG Tab, Take 2 tablets by mouth at onset of blisters and take 2 tablets again 12 hours later.  ibuprofen (MOTRIN) 400 MG tablet, Take 1 tablet by mouth 4 times daily as needed for Pain.  tiZANidine (ZANAFLEX) 2 MG tablet, Take 1 tablet by mouth at bedtime as needed (pain).  fluorouracil 4.5 % cream, Apply topically to the face and ears 9:00 am and 3:00 pm for 2 weeks.    No current facility-administered medications for this encounter.     Patient Active Problem List:     Senile nuclear sclerosis     PVD (posterior vitreous detachment), left eye     Blepharitis of both eyes    Diagnostic Tests: MRI studies: reviewed.     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 Pain with reaching behind back. Pain with horizontal AB. Pain at both arms.     Band - horizontal AB, row from foot, bicep curl, band walks lateral, SLR, bridge, supine overhead flexion       Pain / Symptoms:  Pain rating (out of 10): Current: 3 ; Best: 1; Worst: 6  Location: Anterior shoulders bilateral   Quality / Description: sore, stiff, discomfort.  Alleviating Factors: avoiding movement in involved area.   Progression since onset: improved  Function:    Limitations / Exacerbation Factors: difficulty, increased time, pain, upper body dressing, grooming/hygiene/self-care activities, house/yard work, work - limited or modified, opening doors  Prior Level of Function: pain free ADLs and IADLs,  Patient Goals: decreased pain, improved balance, increased motion and increased strength    Prior treatment: outpatient PT  Discharged from hospital, home health, or skilled nursing facility in last 30 days: yes  Home Environment:   Patient lives with significant other  Type of home: multiple level home  Assistance available: consistent  Feels safe at home/work/school.  2 or more falls or an unexplained fall with injury in the last year:  No    OBJECTIVE                                                                                                                     Range of Motion (ROM)   (norms in parentheses, degrees unless noted, active unless noted):   Shoulder:     - Flexion (180):        • Left: 140         • Right: 145     - Internal Rotation at 0°:          • Left: Passive: 40        • Right: Passive: 40    - External Rotation at 90° (90):         • Left: Passive: 60        • Right: Passive: 60    Strength  (out of 5 unless noted, standard test position unless noted, lbs tested with hand held dynamometer)   Shoulder:     - Flexion:         • Left: 4+         • Right: 4+     - Abduction:        • Left: 4-        • Right: 4-    - Adduction:           • Right: 4+    - Internal Rotation:          • Left (at 0°): 5        • Right (at 0°): 5    - External Rotation:         • Left (at 0°): 5        • Right (at 0°) :5    Special Tests:  Drop Arm: Left: negative Right: negative  Vasquez-Jona: Left: positive Right: positive  Neer's: Left: positive Right: positive    Quick Disabilities of the Arm, Shoulder and Hand (QDASH): Score: 23 (11-55), Calculated Score 27.27 (0-100)  scored 0-100; a higher score indicates greater disability    TREATMENT                                                                                                                 initial evaluation completed  Therapeutic Exercise:  Issued HEP  Modified old exercises    Home Exercise Program: (*above indicates provided as part of home exercise program)  Supine flexion with dowel 10  Supine ER with dowel 10  Side lye ER 2 x 10  Green band internal rotation and external rotation 2 x 10    Patient doing horizontal AB, Bicep curl already      ASSESSMENT                                                                                                             78 year old male patient has reported functional limitations listed above impacted by signs and symptoms consistent with right shoulder, increased pain/symptoms, impaired posture, impaired range of motion, impaired muscle length/flexibility, impaired scapulohumeral rhythm and impaired strength.  Patient actually has fairly good range of motion and overall strength. Patient would benefit from therapy.    Prognosis: patient will benefit from skilled therapy  Rehabilitative Potential: good  Predicted patient presentation: Low (stable) - Patient comorbidities and complexities, as defined above, will have little effect on progress for prescribed plan of care.      Pain/symptoms after session: 2  Patient Education:   Who will be receiving education: patient  Preferred learning style: verbal, demonstration and written  Barriers to learning: no barriers apparent at this time  Results of above outlined education: Verbalizes understanding     PLAN                                                                                                                           The following skilled interventions to be implemented to achieve goals listed below:  Manual Therapy (04924)  Therapeutic Exercise (51907)      Frequency / Duration: 1 times per week tapering as patient progresses for 8 weeks    patient involved in and agreed to plan of care and goals.  Patient given  attendance policy at time of initial evaluation.    Suggestions for next session as indicated: Progress per plan of care, review HEP, gym progression    GOALS                                                                                                                       Long Term Goals: To be met by end of plan of care:      Home Exercise Program: Independent with progressed and modified home exercise program (HEP)      Pain: Decrease pain/symptoms to 2  Strength: Improve involved strength to  5  Range of Motion: Improve involved range of motion (ROM) to   WNL to reach above head and behind back.    Dressing: Complete upper body dressing: without reported difficulty   Grooming/Hygiene: Complete grooming/hygiene tasks without reported difficulty   Reaching: Reach without reported difficulty       Procedures and total treatment time documented Time Entry flowsheet.     yes

## 2020-09-06 NOTE — ED PROVIDER NOTE - ATTENDING CONTRIBUTION TO CARE
I have personally performed a face to face diagnostic evaluation on this patient.  I have reviewed the PA note and agree with the history, exam, and plan of care, except as noted.  History and Exam by me shows  referral from [facility] for admission.  pt passed out while pouring chlorine granules over the pool  today and elevated troponin.  pt has no cp. pt is a n ox 3 and in nad.  lungs cta. heart s1s2.  admit

## 2020-09-06 NOTE — ED PROVIDER NOTE - OBJECTIVE STATEMENT
pt c/o skin tear to right knee, neck pain s/p passing out and falling into pool. pt relates was putting chlorine into , woke up underwater, swam to surface, witnessed by , who called for help, neighbor helped pt out of water. no d/n/v, cp, palp, sob, abd pain, focal weakness or numbness, arm pain, back pain. unk last tetanus.  pmd - [facility]  cards - benigno

## 2020-09-06 NOTE — ED ADULT NURSE NOTE - NURSING NEURO LEVEL OF CONSCIOUSNESS
The patient's mother states that she will flush the PICC with heparin when she gets home. alert and awake

## 2020-09-06 NOTE — ED PROVIDER NOTE - OBJECTIVE STATEMENT
73 yo female with h/o HTN, HLD, COPD transferred from [place] for syncope and elevated troponin. Patient explains she was putting choline tablets in the pool, passed out and fell into the pool. Patient obtained skin tear to right knee. no additional imaging. Denies fever, chills, chest pain, sob, abd pain, N/V, UE/LE weakness or paresthesias, headache, dizziness, visual changes. At [place] patient had CT head and neck - both unremarkable, + elevated trop of 0.095.     pmd: Dr. Galan, cards: Dr. Reilly

## 2020-09-06 NOTE — H&P ADULT - NEGATIVE OPHTHALMOLOGIC SYMPTOMS
no lacrimation L/no lacrimation R/no diplopia/no photophobia/no blurred vision R/no blurred vision L

## 2020-09-06 NOTE — H&P ADULT - NEUROLOGICAL DETAILS
no spontaneous movement/deep reflexes intact/cranial nerves intact/normal strength/superficial reflexes intact/sensation intact/alert and oriented x 3

## 2020-09-06 NOTE — PROVIDER CONTACT NOTE (CRITICAL VALUE NOTIFICATION) - ASSESSMENT
Pt currently does not have any active chest pain and refusing any additional VS overnight. Cardiology made aware. No new orders. Will continue to monitor patient overnight.

## 2020-09-06 NOTE — ED ADULT NURSE REASSESSMENT NOTE - NS ED NURSE REASSESS COMMENT FT1
as per Dr. Del Valle, pt reports passing out after smelling the chlorine and fell into the pool
wound care and dressing done, pillow given to pt for back as per family request, monitored pending dispo

## 2020-09-06 NOTE — ED PROVIDER NOTE - CLINICAL SUMMARY MEDICAL DECISION MAKING FREE TEXT BOX
75 yo female with h/o HTN, HLD, COPD transferred from [place] for syncope and elevated troponin. Patient explains she was putting choline tablets in the pool, passed out and fell into the pool. Patient obtained skin tear to right knee. no additional imaging. Denies fever, chills, chest pain, sob, abd pain, N/V, UE/LE weakness or paresthesias, headache, dizziness, visual changes. At [place] patient had CT head and neck - both unremarkable, + elevated trop of 0.095. A/P: admit to tele for syncope and elevated trop

## 2020-09-06 NOTE — CONSULT NOTE ADULT - ATTENDING COMMENTS
The patient was personally seen and examined, in addition to being examined and evaluated by NP.  All elements of the note were edited where appropriate.    syncope and pos troponin  the syncope would have had to be essentially momentary- she fell into the pool and was awake underwater, and did not drown  was she overcome by chlorine fumes? was she holding her breath to avoid it?  her exam and ekg do not suggest heart block/bradyarrhythmia, but cannot rule it out  her trop may reflect demand in the setting of what was likely a hypotensive episode, and then excitement in the pool as she struggled  cycle ce, monitor tele, echo

## 2020-09-06 NOTE — ED ADULT NURSE NOTE - NSIMPLEMENTINTERV_GEN_ALL_ED
Implemented All Fall Risk Interventions:  [place] to call system. Call bell, personal items and telephone within reach. Instruct patient to call for assistance. Room bathroom lighting operational. Non-slip footwear when patient is off stretcher. Physically safe environment: no spills, clutter or unnecessary equipment. Stretcher in lowest position, wheels locked, appropriate side rails in place. Provide visual cue, wrist band, yellow gown, etc. Monitor gait and stability. Monitor for mental status changes and reorient to person, place, and time. Review medications for side effects contributing to fall risk. Reinforce activity limits and safety measures with patient and family.

## 2020-09-06 NOTE — ED ADULT NURSE NOTE - OBJECTIVE STATEMENT
pt transferred from [place] for admission for syncopal episode - pt states she inhaled chlorine and shortly after passed out and fell into pool - she awoke under water was pulled out by neighbor because she couldn't swim - pt was not coughing up water and states she doesn't think she inhaled any.  Pt had elevated troponin so she was transferred here for admission -

## 2020-09-06 NOTE — ED PROVIDER NOTE - CARE PLAN
Principal Discharge DX:	Syncope  Secondary Diagnosis:	Noninfected skin tear of right leg, initial encounter Principal Discharge DX:	Syncope  Secondary Diagnosis:	Noninfected skin tear of right leg, initial encounter  Secondary Diagnosis:	Elevated troponin level

## 2020-09-06 NOTE — ED PROVIDER NOTE - CLINICAL SUMMARY MEDICAL DECISION MAKING FREE TEXT BOX
pt with neck pain, right knee skin tear s/p passing out, falling into pool - ekg/xr/ct/labs/ivf/cards

## 2020-09-06 NOTE — ED ADULT NURSE REASSESSMENT NOTE - NS ED NURSE REASSESS COMMENT FT1
pt covid negative - pt awaits room assignment - Troponin sent to lab - pt denies pain - had dinner -  at bedside

## 2020-09-07 ENCOUNTER — TRANSCRIPTION ENCOUNTER (OUTPATIENT)
Age: 75
End: 2020-09-07

## 2020-09-07 LAB
CK MB BLD-MCNC: 3 % — SIGNIFICANT CHANGE UP (ref 0–3.5)
CK MB CFR SERPL CALC: 1.8 NG/ML — SIGNIFICANT CHANGE UP (ref 0–3.6)
CK SERPL-CCNC: 61 U/L — SIGNIFICANT CHANGE UP (ref 26–192)
HCV AB S/CO SERPL IA: 0.06 S/CO — SIGNIFICANT CHANGE UP (ref 0–0.99)
HCV AB SERPL-IMP: SIGNIFICANT CHANGE UP
SARS-COV-2 IGG SERPL QL IA: NEGATIVE — SIGNIFICANT CHANGE UP
SARS-COV-2 IGM SERPL IA-ACNC: 0.08 INDEX — SIGNIFICANT CHANGE UP
TROPONIN I SERPL-MCNC: 0.12 NG/ML — HIGH (ref 0.01–0.04)

## 2020-09-07 PROCEDURE — 99233 SBSQ HOSP IP/OBS HIGH 50: CPT

## 2020-09-07 PROCEDURE — 93306 TTE W/DOPPLER COMPLETE: CPT | Mod: 26

## 2020-09-07 RX ORDER — ACETAMINOPHEN 500 MG
650 TABLET ORAL EVERY 6 HOURS
Refills: 0 | Status: DISCONTINUED | OUTPATIENT
Start: 2020-09-07 | End: 2020-09-08

## 2020-09-07 RX ADMIN — ROPINIROLE 1 MILLIGRAM(S): 8 TABLET, FILM COATED, EXTENDED RELEASE ORAL at 04:41

## 2020-09-07 RX ADMIN — ROPINIROLE 1 MILLIGRAM(S): 8 TABLET, FILM COATED, EXTENDED RELEASE ORAL at 17:35

## 2020-09-07 RX ADMIN — PREGABALIN 1000 MICROGRAM(S): 225 CAPSULE ORAL at 11:33

## 2020-09-07 RX ADMIN — Medication 650 MILLIGRAM(S): at 18:34

## 2020-09-07 RX ADMIN — Medication 1 TABLET(S): at 11:32

## 2020-09-07 RX ADMIN — BUDESONIDE AND FORMOTEROL FUMARATE DIHYDRATE 2 PUFF(S): 160; 4.5 AEROSOL RESPIRATORY (INHALATION) at 08:21

## 2020-09-07 RX ADMIN — Medication 81 MILLIGRAM(S): at 11:33

## 2020-09-07 RX ADMIN — Medication 1 TABLET(S): at 04:40

## 2020-09-07 RX ADMIN — Medication 1 TABLET(S): at 21:35

## 2020-09-07 RX ADMIN — Medication 200 MILLIGRAM(S): at 11:33

## 2020-09-07 RX ADMIN — Medication 1 TABLET(S): at 11:33

## 2020-09-07 RX ADMIN — BUDESONIDE AND FORMOTEROL FUMARATE DIHYDRATE 2 PUFF(S): 160; 4.5 AEROSOL RESPIRATORY (INHALATION) at 17:35

## 2020-09-07 RX ADMIN — Medication 650 MILLIGRAM(S): at 17:34

## 2020-09-07 RX ADMIN — Medication 500 MILLIGRAM(S): at 11:33

## 2020-09-07 RX ADMIN — ATORVASTATIN CALCIUM 20 MILLIGRAM(S): 80 TABLET, FILM COATED ORAL at 21:35

## 2020-09-07 NOTE — DISCHARGE NOTE PROVIDER - NSDCACTIVITY_GEN_ALL_CORE
No restrictions Do not make important decisions/Do not drive or operate machinery/No heavy lifting/straining

## 2020-09-07 NOTE — PROVIDER CONTACT NOTE (OTHER) - ACTION/TREATMENT ORDERED:
MD Denton made aware. MD Hernandez came to bedside to fill the form , but pt refused to leave today. EDUARDO Fernandez also made aware.

## 2020-09-07 NOTE — DISCHARGE NOTE PROVIDER - HOSPITAL COURSE
GINNY VASQUEZ is a 73 YO Female was presented to [place] ED complaining of syncope and elevated troponin. Patient explains she was putting choline tablets in the pool, passed out and fell into the pool. Patient obtained skin tear to right knee. no additional imaging. Denies fever, chills, chest pain, sob, abd pain, N/V, UE/LE weakness or paresthesias, headache, dizziness, visual changes. At [facility] patient had CT head and neck - both unremarkable, + elevated trop of 0.095.     Patient discussed in detail and also discussed her  bed side he wants to take her home and signed AMA. GINNY VASQUEZ is a 73 YO Female was presented to [place] ED complaining of syncope and elevated troponin. Patient explains she was putting choline tablets in the pool, passed out and fell into the pool. Patient obtained skin tear to right knee. no additional imaging. Denies fever, chills, chest pain, sob, abd pain, N/V, UE/LE weakness or paresthesias, headache, dizziness, visual changes. At [place] patient had CT head and neck - both unremarkable, + elevated trop of 0.095.     Patient discussed in detail and also discussed her  bed side he wants to take her home and signed AMA.    DC after PT and cardio clearance

## 2020-09-07 NOTE — DISCHARGE NOTE PROVIDER - NSDCCPCAREPLAN_GEN_ALL_CORE_FT
PRINCIPAL DISCHARGE DIAGNOSIS  Diagnosis: Syncope and collapse  Assessment and Plan of Treatment:       SECONDARY DISCHARGE DIAGNOSES  Diagnosis: Elevated troponin  Assessment and Plan of Treatment:     Diagnosis: Skin tear of lower leg without complication, right, initial encounter  Assessment and Plan of Treatment: PRINCIPAL DISCHARGE DIAGNOSIS  Diagnosis: Syncope and collapse  Assessment and Plan of Treatment: follow up with heart Dr. GRANT      SECONDARY DISCHARGE DIAGNOSES  Diagnosis: Elevated troponin  Assessment and Plan of Treatment:     Diagnosis: Skin tear of lower leg without complication, right, initial encounter  Assessment and Plan of Treatment:

## 2020-09-07 NOTE — PROGRESS NOTE ADULT - SUBJECTIVE AND OBJECTIVE BOX
Patient is a 74y old  Female who presents with a chief complaint of Syncope (06 Sep 2020 14:33)      INTERVAL OVER NIGHT EVENTS:    MEDICATIONS  (STANDING):  ascorbic acid 500 milliGRAM(s) Oral daily  aspirin enteric coated 81 milliGRAM(s) Oral daily  atorvastatin 20 milliGRAM(s) Oral at bedtime  budesonide 160 MICROgram(s)/formoterol 4.5 MICROgram(s) Inhaler 2 Puff(s) Inhalation two times a day  calcium carbonate 1250 mG  + Vitamin D (OsCal 500 + D) 1 Tablet(s) Oral daily  cyanocobalamin 1000 MICROGram(s) Oral daily  hydroxychloroquine 200 milliGRAM(s) Oral daily  influenza   Vaccine 0.5 milliLiter(s) IntraMuscular once  lactobacillus acidophilus 1 Tablet(s) Oral three times a day  rOPINIRole 1 milliGRAM(s) Oral two times a day    MEDICATIONS  (PRN):  dicyclomine 10 milliGRAM(s) Oral three times a day before meals PRN abdominal cramps      Allergies    Enbrel (Unknown)  IV Contrast (Hives; Rash)  Levaquin (Other)  minocycline (Other)  penicillin (Other)  Zofran (Other)    Intolerances        REVIEW OF SYSTEMS:  CONSTITUTIONAL: No fever, weight loss, or fatigue  EYES: No eye pain, visual disturbances, or discharge  ENMT:  No difficulty hearing, tinnitus, vertigo; No sinus or throat pain  NECK: No pain or stiffness  BREASTS: No pain, masses, or nipple discharge  RESPIRATORY: No cough, wheezing, chills or hemoptysis; No shortness of breath  CARDIOVASCULAR: No chest pain, palpitations, dizziness, or leg swelling  GASTROINTESTINAL: No abdominal or epigastric pain. No nausea, vomiting, or hematemesis; No diarrhea or constipation. No melena or hematochezia.  GENITOURINARY: No dysuria, frequency, hematuria, or incontinence  NEUROLOGICAL: No headaches, memory loss, loss of strength, numbness, or tremors  SKIN: No itching, burning, rashes, or lesions   LYMPH NODES: No enlarged glands  ENDOCRINE: No heat or cold intolerance; No hair loss  MUSCULOSKELETAL: No joint pain or swelling; No muscle, back, or extremity pain  PSYCHIATRIC: No depression, anxiety, mood swings, or difficulty sleeping  HEME/LYMPH: No easy bruising, or bleeding gums  ALLERGY AND IMMUNOLOGIC: No hives or eczema    Vital Signs Last 24 Hrs  T(C): 36.5 (07 Sep 2020 07:36), Max: 37 (07 Sep 2020 04:34)  T(F): 97.7 (07 Sep 2020 07:36), Max: 98.6 (07 Sep 2020 04:34)  HR: 77 (07 Sep 2020 07:36) (77 - 88)  BP: 128/78 (07 Sep 2020 07:36) (120/82 - 145/89)  RR: 17 (07 Sep 2020 07:36) (16 - 20)  SpO2: 94% (07 Sep 2020 07:36) (94% - 97%)    PHYSICAL EXAM:  GENERAL: NAD, well-groomed, well-developed  HEAD:  Atraumatic, Normocephalic  EYES: EOMI, PERRLA, conjunctiva and sclera clear  ENMT: No tonsillar erythema, exudates, or enlargement; Moist mucous membranes, Good dentition, No lesions  NECK: Supple, No JVD, Normal thyroid  NERVOUS SYSTEM:  Alert & Oriented X3, Motor Strength 5/5 B/L upper and lower extremities; DTRs 2+ intact and symmetric  CHEST/LUNG: Clear to percussion bilaterally; No rales, rhonchi, wheezing, or rubs  HEART: Regular rate and rhythm; No murmurs, rubs, or gallops  ABDOMEN: Soft, Nontender, Nondistended; Bowel sounds present  EXTREMITIES:  2+ Peripheral Pulses, No clubbing, cyanosis, or edema  LYMPH: No lymphadenopathy noted  SKIN: No rashes or lesions    LABS:                        11.3   10.63 )-----------( 265      ( 06 Sep 2020 10:12 )             35.9     09-06    138  |  103  |  21  ----------------------------<  91  4.6   |  26  |  1.11    Ca    9.1      06 Sep 2020 10:12    TPro  6.4  /  Alb  3.2<L>  /  TBili  0.4  /  DBili  x   /  AST  20  /  ALT  17  /  AlkPhos  63  09-06    PT/INR - ( 06 Sep 2020 10:12 )   PT: 11.9 sec;   INR: 0.98 ratio         PTT - ( 06 Sep 2020 10:12 )  PTT:27.0 sec    CAPILLARY BLOOD GLUCOSE          RADIOLOGY & ADDITIONAL TESTS:    Imaging Personally Reviewed:  [x] YES  [ ] NO    Consultant(s) Notes Reviewed:  [x] YES  [ ] NO    Care Discussed with Consultants/Other Providers [x] YES  [ ] NO Patient is a 74y old  Female who presents with a chief complaint of Syncope (06 Sep 2020 14:33)      INTERVAL OVER NIGHT EVENTS:    MEDICATIONS  (STANDING):  ascorbic acid 500 milliGRAM(s) Oral daily  aspirin enteric coated 81 milliGRAM(s) Oral daily  atorvastatin 20 milliGRAM(s) Oral at bedtime  budesonide 160 MICROgram(s)/formoterol 4.5 MICROgram(s) Inhaler 2 Puff(s) Inhalation two times a day  calcium carbonate 1250 mG  + Vitamin D (OsCal 500 + D) 1 Tablet(s) Oral daily  cyanocobalamin 1000 MICROGram(s) Oral daily  hydroxychloroquine 200 milliGRAM(s) Oral daily  influenza   Vaccine 0.5 milliLiter(s) IntraMuscular once  lactobacillus acidophilus 1 Tablet(s) Oral three times a day  rOPINIRole 1 milliGRAM(s) Oral two times a day    MEDICATIONS  (PRN):  dicyclomine 10 milliGRAM(s) Oral three times a day before meals PRN abdominal cramps      Allergies    Enbrel (Unknown)  IV Contrast (Hives; Rash)  Levaquin (Other)  minocycline (Other)  penicillin (Other)  Zofran (Other)    Intolerances        REVIEW OF SYSTEMS:  CONSTITUTIONAL: No fever, weight loss, or fatigue  EYES: No eye pain, visual disturbances, or discharge  ENMT:  No difficulty hearing, tinnitus, vertigo; No sinus or throat pain  NECK: No pain or stiffness  BREASTS: No pain, masses, or nipple discharge  RESPIRATORY: No cough, wheezing, chills or hemoptysis; No shortness of breath  CARDIOVASCULAR: No chest pain, palpitations, dizziness, or leg swelling  GASTROINTESTINAL: No abdominal or epigastric pain. No nausea, vomiting, or hematemesis; No diarrhea or constipation. No melena or hematochezia.  GENITOURINARY: No dysuria, frequency, hematuria, or incontinence  NEUROLOGICAL: No headaches, memory loss, loss of strength, numbness, or tremors  SKIN: No itching, burning, rashes, or lesions   LYMPH NODES: No enlarged glands  ENDOCRINE: No heat or cold intolerance; No hair loss  MUSCULOSKELETAL: Rt knee joint pain, swelling and abrasion.     PSYCHIATRIC: No depression, anxiety, mood swings, or difficulty sleeping  HEME/LYMPH: No easy bruising, or bleeding gums  ALLERGY AND IMMUNOLOGIC: No hives or eczema    Vital Signs Last 24 Hrs  T(C): 36.6 (07 Sep 2020 19:38), Max: 37 (07 Sep 2020 04:34)  T(F): 97.9 (07 Sep 2020 19:38), Max: 98.6 (07 Sep 2020 04:34)  HR: 65 (07 Sep 2020 19:38) (65 - 85)  BP: 128/61 (07 Sep 2020 19:38) (120/82 - 130/62)  RR: 18 (07 Sep 2020 19:38) (16 - 18)  SpO2: 95% (07 Sep 2020 19:38) (94% - 98%)    PHYSICAL EXAM:  GENERAL: NAD, well-groomed, well-developed  HEAD:  Atraumatic, Normocephalic  EYES: EOMI, PERRLA, conjunctiva and sclera clear  ENMT: No tonsillar erythema, exudates, or enlargement; Moist mucous membranes, Good dentition, No lesions  NECK: Supple, No JVD, Normal thyroid  NERVOUS SYSTEM:  Alert & Oriented X3, Motor Strength 5/5 B/L upper and lower extremities; DTRs 2+ intact and symmetric  CHEST/LUNG: Clear to percussion bilaterally; No rales, rhonchi, wheezing, or rubs  HEART: Regular rate and rhythm; No murmurs, rubs, or gallops  ABDOMEN: Soft, Nontender, Nondistended; Bowel sounds present  EXTREMITIES:  2+ Peripheral Pulses, No clubbing, cyanosis, or edema  LYMPH: No lymphadenopathy noted  SKIN: No rashes or lesions    LABS:                        11.3   10.63 )-----------( 265      ( 06 Sep 2020 10:12 )             35.9     06 Sep 2020 10:12    138    |  103    |  21     ----------------------------<  91     4.6     |  26     |  1.11     Ca    9.1        06 Sep 2020 10:12    TPro  6.4    /  Alb  3.2    /  TBili  0.4    /  DBili  x      /  AST  20     /  ALT  17     /  AlkPhos  63     06 Sep 2020 10:12    LIVER FUNCTIONS - ( 06 Sep 2020 10:12 )  Alb: 3.2 g/dL / Pro: 6.4 g/dL / ALK PHOS: 63 U/L / ALT: 17 U/L DA / AST: 20 U/L / GGT: x           PT/INR - ( 06 Sep 2020 10:12 )   PT: 11.9 sec;   INR: 0.98 ratio         PTT - ( 06 Sep 2020 10:12 )  PTT:27.0 sec  CAPILLARY BLOOD GLUCOSE        CARDIAC MARKERS ( 07 Sep 2020 11:37 )  .123 ng/mL / x     / 61 U/L / x     / 1.8 ng/mL  CARDIAC MARKERS ( 06 Sep 2020 20:08 )  .371 ng/mL / x     / x     / x     / x      CARDIAC MARKERS ( 06 Sep 2020 10:12 )  .095 ng/mL / x     / x     / x     / x                                11.3   10.63 )-----------( 265      ( 06 Sep 2020 10:12 )             35.9     09-06    138  |  103  |  21  ----------------------------<  91  4.6   |  26  |  1.11    Ca    9.1      06 Sep 2020 10:12    TPro  6.4  /  Alb  3.2<L>  /  TBili  0.4  /  DBili  x   /  AST  20  /  ALT  17  /  AlkPhos  63  09-06    PT/INR - ( 06 Sep 2020 10:12 )   PT: 11.9 sec;   INR: 0.98 ratio         PTT - ( 06 Sep 2020 10:12 )  PTT:27.0 sec    CAPILLARY BLOOD GLUCOSE          RADIOLOGY & ADDITIONAL TESTS:    Imaging Personally Reviewed:  [x] YES  [ ] NO    Consultant(s) Notes Reviewed:  [x] YES  [ ] NO    Care Discussed with Consultants/Other Providers [x] YES  [ ] NO

## 2020-09-07 NOTE — DISCHARGE NOTE PROVIDER - PROVIDER TOKENS
PROVIDER:[TOKEN:[[id number]]],PROVIDER:[TOKEN:[[id number]]] PROVIDER:[TOKEN:[[id number]]],PROVIDER:[TOKEN:[[id number]]],PROVIDER:[TOKEN:[[id number]]],PROVIDER:[TOKEN:[[id number]]]

## 2020-09-07 NOTE — DISCHARGE NOTE PROVIDER - CARE PROVIDER_API CALL
Adriel Galan  CRITICAL CARE MEDICINE  [address] [place] [address]  Phone: [phone]  Fax: [phone]  Follow Up Time:     Andre Reilly  CARDIOVASCULAR DISEASE  [address] [place] [address]  Phone: [phone]  Fax: [phone]  Follow Up Time: Adriel Galan  CRITICAL CARE MEDICINE  [address] [place] [address]  Phone: [phone]  Fax: [phone]  Follow Up Time:     Andre Reilly  CARDIOVASCULAR DISEASE  [address] [place] [address]  Phone: [phone]  Fax: [phone]  Follow Up Time:     Jonas Eddy  PLASTIC SURGERY  [address] [place] [address]  Phone: [phone]  Fax: [phone]  Follow Up Time:     Vinay Benitez  [facility] & HYPERBARIC MEDICINE  [address] [place] [address]  Phone: [phone]  Fax: [phone]  Follow Up Time:

## 2020-09-07 NOTE — DISCHARGE NOTE PROVIDER - NSDCMRMEDTOKEN_GEN_ALL_CORE_FT
Acidophilus oral tablet: 1 tab(s) orally once a day  Arava 20 mg oral tablet: 1 tab(s) orally once a day  Aspir 81 oral delayed release tablet: 1 tab(s) orally once a day  Calcium 600+D oral tablet: 2 tab(s) orally once a day  Crestor 5 mg oral tablet: 1 tab(s) orally once a day  dicyclomine 10 mg oral capsule: 1 cap(s) orally 3 times a day, As Needed  olmesartan-hydrochlorothiazide 20 mg-12.5 mg oral tablet: 1 tab(s) orally once a day  omeprazole 40 mg oral delayed release capsule: 1 cap(s) orally once a day  Plaquenil 200 mg oral tablet: 1 tab(s) orally once a day  Requip 2 mg oral tablet: 1  orally 2 times a day  Singulair 10 mg oral tablet: 1 tab(s) orally once a day (in the evening)  Symbicort 160 mcg-4.5 mcg/inh inhalation aerosol: 2 puff(s) inhaled 2 times a day  Vitamin B-12 1000 mcg oral tablet: 1 tab(s) orally once a day  Vitamin C 1000 mg oral tablet: 1 tab(s) orally once a day  Vitamin D3 2000 intl units (50 mcg) oral tablet: 1 tab(s) orally once a day acetaminophen 325 mg oral tablet: 2 tab(s) orally every 6 hours, As needed, Mild Pain (1 - 3), Moderate Pain (4 - 6)  Acidophilus oral tablet: 1 tab(s) orally once a day  Arava 20 mg oral tablet: 1 tab(s) orally once a day  Aspir 81 oral delayed release tablet: 1 tab(s) orally once a day  Calcium 600+D oral tablet: 2 tab(s) orally once a day  Crestor 5 mg oral tablet: 1 tab(s) orally once a day  dicyclomine 10 mg oral capsule: 1 cap(s) orally 3 times a day, As Needed  olmesartan-hydrochlorothiazide 20 mg-12.5 mg oral tablet: 1 tab(s) orally once a day  omeprazole 40 mg oral delayed release capsule: 1 cap(s) orally once a day  Plaquenil 200 mg oral tablet: 1 tab(s) orally once a day  Requip 2 mg oral tablet: 1  orally 2 times a day  Singulair 10 mg oral tablet: 1 tab(s) orally once a day (in the evening)  Symbicort 160 mcg-4.5 mcg/inh inhalation aerosol: 2 puff(s) inhaled 2 times a day  Vitamin B-12 1000 mcg oral tablet: 1 tab(s) orally once a day  Vitamin C 1000 mg oral tablet: 1 tab(s) orally once a day  Vitamin D3 2000 intl units (50 mcg) oral tablet: 1 tab(s) orally once a day
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
## 2020-09-07 NOTE — PROGRESS NOTE ADULT - SUBJECTIVE AND OBJECTIVE BOX
[facility] -- Neema Park, Nichole, Emanuel, Anish Gordillo Savella, Goodger  Office # [phone]    Follow Up:  Syncope? Fall    Subjective/Observations: Awake and alert.  Denies dizziness or lightheadedness.  Denies chest pain, palpitations or any form of respiratory discomfort.  No tele events    REVIEW OF SYSTEMS: All other review of systems is negative unless indicated above  PAST MEDICAL & SURGICAL HISTORY:  Calculus of gallbladder with chronic cholecystitis without obstruction  Hyperlipidemia, unspecified hyperlipidemia type  Essential hypertension  Hiatal hernia with GERD  Chronic obstructive pulmonary disease, unspecified COPD type: O2 at night  Lung cancer: Right lung.  Esophageal reflux  TIA (transient ischemic attack): 2012  Rheumatoid arthritis  HLD (hyperlipidemia)  Asthma  S/P cholecystectomy  History of endoscopy: Sept 2017  H/O colonoscopy  S/P lobectomy of lung: Right lung in 1996.    MEDICATIONS  (STANDING):  ascorbic acid 500 milliGRAM(s) Oral daily  aspirin enteric coated 81 milliGRAM(s) Oral daily  atorvastatin 20 milliGRAM(s) Oral at bedtime  budesonide 160 MICROgram(s)/formoterol 4.5 MICROgram(s) Inhaler 2 Puff(s) Inhalation two times a day  calcium carbonate 1250 mG  + Vitamin D (OsCal 500 + D) 1 Tablet(s) Oral daily  cyanocobalamin 1000 MICROGram(s) Oral daily  hydroxychloroquine 200 milliGRAM(s) Oral daily  influenza   Vaccine 0.5 milliLiter(s) IntraMuscular once  lactobacillus acidophilus 1 Tablet(s) Oral three times a day  rOPINIRole 1 milliGRAM(s) Oral two times a day    MEDICATIONS  (PRN):  dicyclomine 10 milliGRAM(s) Oral three times a day before meals PRN abdominal cramps    Allergies    Enbrel (Unknown)  IV Contrast (Hives; Rash)  Levaquin (Other)  minocycline (Other)  penicillin (Other)  Zofran (Other)    Intolerances    Vital Signs Last 24 Hrs  T(C): 36.5 (07 Sep 2020 07:36), Max: 37 (07 Sep 2020 04:34)  T(F): 97.7 (07 Sep 2020 07:36), Max: 98.6 (07 Sep 2020 04:34)  HR: 77 (07 Sep 2020 07:36) (77 - 80)  BP: 128/78 (07 Sep 2020 07:36) (120/82 - 138/88)  BP(mean): --  RR: 17 (07 Sep 2020 07:36) (16 - 18)  SpO2: 94% (07 Sep 2020 07:36) (94% - 97%)  I&O's Summary    Weight (kg): 52.6 (09-06 @ 13:18)    PHYSICAL EXAM:  TELE: NSR  Constitutional: NAD, awake and alert, well-developed  HEENT: Moist Mucous Membranes, Anicteric  Pulmonary: Non-labored, breath sounds are clear bilaterally, No wheezing, rales or rhonchi  Cardiovascular: Regular, S1 and S2, No murmurs, rubs, gallops or clicks  Gastrointestinal: Bowel Sounds present, soft, nontender.   Lymph: No peripheral edema. No lymphadenopathy.  Skin: No visible rashee.  right knee abrasion, dressing dry and intact  Psych:  Mood & affect appropriate  LABS: All Labs Reviewed:                        11.3   10.63 )-----------( 265      ( 06 Sep 2020 10:12 )             35.9     06 Sep 2020 10:12    138    |  103    |  21     ----------------------------<  91     4.6     |  26     |  1.11     Ca    9.1        06 Sep 2020 10:12    TPro  6.4    /  Alb  3.2    /  TBili  0.4    /  DBili  x      /  AST  20     /  ALT  17     /  AlkPhos  63     06 Sep 2020 10:12    PT/INR - ( 06 Sep 2020 10:12 )   PT: 11.9 sec;   INR: 0.98 ratio       PTT - ( 06 Sep 2020 10:12 )  PTT:27.0 sec  CARDIAC MARKERS ( 06 Sep 2020 20:08 )  .371 ng/mL / x     / x     / x     / x      CARDIAC MARKERS ( 06 Sep 2020 10:12 )  .095 ng/mL / x     / x     / x     / x        TTE Pending    < from: CT Head No Cont (09.06.20 @ 11:10) >    EXAM:  CT CERVICAL SPINE                          EXAM:  CT BRAIN                                  PROCEDURE DATE:  09/06/2020          INTERPRETATION:  CLINICAL INFORMATION: syncope, fell in pool. . .    TECHNIQUE: CT head: Sequential axial imageswere obtained from the vertex to the skull base without intravenous contrast. Coronal and sagittal reformations were obtained.  CT cervical spine: Noncontrast CT scan of the cervical spine was performed. Thin section axial images with sagittal and coronal reformations were obtained.    COMPARISON: Cervical spine radiographs 10/13/2017. .    FINDINGS:    CT head:    There is no acute intracranial hemorrhage or mass effect. The ventricles and sulci are normal in size for patient's age.    There is no extraaxial fluid collection.    There is no displaced calvarial fracture. Status post bilateral intraocular lens implants. Left sphenoid sinus mucosal thickening. Right maxillary sinus polyp or retention cyst. The mastoid air cells are well aerated.    CT cervical spine:    Cervical alignment is maintained. Vertebral body heights are within normal limits. Intervertebral disc spaces are preserved. Disc osteophyte complex at C4-C5 protrusion spinal canal narrowing. Right C4-C5 neuroforaminal narrowing.    There is no prevertebral soft tissue swelling. The paraspinal soft tissues are unremarkable. Calcified plaque in the bilateral carotid bulbs and proximal ICAs. Postoperative changes in the right hemithorax. Emphysema.    IMPRESSION:    CT head: No acute intracranial hemorrhage or mass effect.    CT cervical spine: No evidence for acute displaced fracture or malalignment.    ELOY PRADO M.D., ATTENDING RADIOLOGIST  This document has been electronically signed. Sep  6 2020 11:29AM      < end of copied text >    < from: Xray Chest 1 View AP/PA (09.06.20 @ 11:00) >    EXAM:  XR CHEST AP OR PA 1V                                  PROCEDURE DATE:  09/06/2020          INTERPRETATION:  Chest one view    HISTORY: Chest pain    COMPARISON STUDY: 5/20/2019    Frontal expiratory view of the chest shows the heart to be normal in size. The lungs show more volume loss of the upper right lung and similar pleural thickening of the right chest apex. The left lung is clear. Surgical clips in the right hilum are again noted. and there is no evidence of pneumothorax nor pleural effusion. Right upper quadrant clips are again noted.    IMPRESSION:  Progression of volume loss of right lung/chest. Clear left lung.    Thank you for the courtesy of this referral.    MOE ESCUDERO M.D., ATTENDING RADIOLOGIST  Thisdocument has been electronically signed. Sep  6 2020 11:33AM     < end of copied text >    < from: 12 Lead ECG (03.26.19 @ 09:34) >    Ventricular Rate 81 BPM    Atrial Rate 81 BPM    P-R Interval 130 ms    QRS Duration 80 ms    Q-T Interval 362 ms    QTC Calculation(Bezet) 420 ms    P Axis 65 degrees    R Axis 45 degrees    T Axis 54 degrees    Diagnosis Line Sinus rhythm with premature supraventricular complexes  Possible Left atrial enlargement  Septal infarct (cited on or before 15-JUL-2013)  Abnormal ECG    Confirmed by Josiane Mccormack ([id number]) on 3/26/2019 12:08:12 PM    < end of copied text >

## 2020-09-07 NOTE — DISCHARGE NOTE PROVIDER - NSDCHHNEEDSERVICE_GEN_ALL_CORE
Teaching and training/Medication teaching and assessment/Observation and assessment/Rehabilitation services

## 2020-09-07 NOTE — DISCHARGE NOTE PROVIDER - CARE PROVIDERS DIRECT ADDRESSES
,DirectAddress_Unknown,[email or web address] ,DirectAddress_Unknown,[email or web address],[email or web address]

## 2020-09-08 ENCOUNTER — TRANSCRIPTION ENCOUNTER (OUTPATIENT)
Age: 75
End: 2020-09-08

## 2020-09-08 VITALS
DIASTOLIC BLOOD PRESSURE: 62 MMHG | RESPIRATION RATE: 17 BRPM | OXYGEN SATURATION: 93 % | TEMPERATURE: 98 F | SYSTOLIC BLOOD PRESSURE: 138 MMHG | HEART RATE: 89 BPM

## 2020-09-08 DIAGNOSIS — R79.89 OTHER SPECIFIED ABNORMAL FINDINGS OF BLOOD CHEMISTRY: ICD-10-CM

## 2020-09-08 DIAGNOSIS — T59.4X1A TOXIC EFFECT OF CHLORINE GAS, ACCIDENTAL (UNINTENTIONAL), INITIAL ENCOUNTER: ICD-10-CM

## 2020-09-08 LAB
ANION GAP SERPL CALC-SCNC: 5 MMOL/L — SIGNIFICANT CHANGE UP (ref 5–17)
BUN SERPL-MCNC: 17 MG/DL — SIGNIFICANT CHANGE UP (ref 7–23)
CALCIUM SERPL-MCNC: 9.6 MG/DL — SIGNIFICANT CHANGE UP (ref 8.5–10.1)
CHLORIDE SERPL-SCNC: 107 MMOL/L — SIGNIFICANT CHANGE UP (ref 96–108)
CO2 SERPL-SCNC: 29 MMOL/L — SIGNIFICANT CHANGE UP (ref 22–31)
CREAT SERPL-MCNC: 1.1 MG/DL — SIGNIFICANT CHANGE UP (ref 0.5–1.3)
GLUCOSE SERPL-MCNC: 128 MG/DL — HIGH (ref 70–99)
HCT VFR BLD CALC: 36.6 % — SIGNIFICANT CHANGE UP (ref 34.5–45)
HGB BLD-MCNC: 11.7 G/DL — SIGNIFICANT CHANGE UP (ref 11.5–15.5)
MCHC RBC-ENTMCNC: 30.3 PG — SIGNIFICANT CHANGE UP (ref 27–34)
MCHC RBC-ENTMCNC: 32 GM/DL — SIGNIFICANT CHANGE UP (ref 32–36)
MCV RBC AUTO: 94.8 FL — SIGNIFICANT CHANGE UP (ref 80–100)
NRBC # BLD: 0 /100 WBCS — SIGNIFICANT CHANGE UP (ref 0–0)
PLATELET # BLD AUTO: 267 K/UL — SIGNIFICANT CHANGE UP (ref 150–400)
POTASSIUM SERPL-MCNC: 4.3 MMOL/L — SIGNIFICANT CHANGE UP (ref 3.5–5.3)
POTASSIUM SERPL-SCNC: 4.3 MMOL/L — SIGNIFICANT CHANGE UP (ref 3.5–5.3)
RBC # BLD: 3.86 M/UL — SIGNIFICANT CHANGE UP (ref 3.8–5.2)
RBC # FLD: 16.2 % — HIGH (ref 10.3–14.5)
SODIUM SERPL-SCNC: 141 MMOL/L — SIGNIFICANT CHANGE UP (ref 135–145)
TROPONIN I SERPL-MCNC: 0.04 NG/ML — SIGNIFICANT CHANGE UP (ref 0.01–0.04)
WBC # BLD: 9.61 K/UL — SIGNIFICANT CHANGE UP (ref 3.8–10.5)
WBC # FLD AUTO: 9.61 K/UL — SIGNIFICANT CHANGE UP (ref 3.8–10.5)

## 2020-09-08 PROCEDURE — 94640 AIRWAY INHALATION TREATMENT: CPT

## 2020-09-08 PROCEDURE — U0003: CPT

## 2020-09-08 PROCEDURE — 84484 ASSAY OF TROPONIN QUANT: CPT

## 2020-09-08 PROCEDURE — 85027 COMPLETE CBC AUTOMATED: CPT

## 2020-09-08 PROCEDURE — 86769 SARS-COV-2 COVID-19 ANTIBODY: CPT

## 2020-09-08 PROCEDURE — 97165 OT EVAL LOW COMPLEX 30 MIN: CPT

## 2020-09-08 PROCEDURE — 82553 CREATINE MB FRACTION: CPT

## 2020-09-08 PROCEDURE — 99285 EMERGENCY DEPT VISIT HI MDM: CPT

## 2020-09-08 PROCEDURE — 93306 TTE W/DOPPLER COMPLETE: CPT

## 2020-09-08 PROCEDURE — 99232 SBSQ HOSP IP/OBS MODERATE 35: CPT

## 2020-09-08 PROCEDURE — 80048 BASIC METABOLIC PNL TOTAL CA: CPT

## 2020-09-08 PROCEDURE — 82550 ASSAY OF CK (CPK): CPT

## 2020-09-08 PROCEDURE — 93880 EXTRACRANIAL BILAT STUDY: CPT | Mod: 26

## 2020-09-08 PROCEDURE — 97161 PT EVAL LOW COMPLEX 20 MIN: CPT

## 2020-09-08 PROCEDURE — 93880 EXTRACRANIAL BILAT STUDY: CPT

## 2020-09-08 PROCEDURE — 86803 HEPATITIS C AB TEST: CPT

## 2020-09-08 PROCEDURE — 36415 COLL VENOUS BLD VENIPUNCTURE: CPT

## 2020-09-08 RX ORDER — ACETAMINOPHEN 500 MG
2 TABLET ORAL
Qty: 0 | Refills: 0 | DISCHARGE
Start: 2020-09-08

## 2020-09-08 RX ADMIN — ROPINIROLE 1 MILLIGRAM(S): 8 TABLET, FILM COATED, EXTENDED RELEASE ORAL at 05:44

## 2020-09-08 RX ADMIN — Medication 1 TABLET(S): at 05:44

## 2020-09-08 RX ADMIN — BUDESONIDE AND FORMOTEROL FUMARATE DIHYDRATE 2 PUFF(S): 160; 4.5 AEROSOL RESPIRATORY (INHALATION) at 08:26
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
## 2020-09-08 NOTE — DISCHARGE NOTE NURSING/CASE MANAGEMENT/SOCIAL WORK - PATIENT PORTAL LINK FT
You can access the FollowMyHealth Patient Portal offered by [facility] by registering at the following website: [email or web address]. By joining [facility]’s FollowMyHealth portal, you will also be able to view your health information using other applications (apps) compatible with our system.
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
## 2020-09-08 NOTE — CHART NOTE - NSCHARTNOTEFT_GEN_A_CORE
Do you have Advance Directives (HCP / LV / Organ donation / Documentation of oral advance Directive):   ( x   )  yes    (      )    NO                                                                            Do you have LV - Living will :                                                                                                                                             (    )  yes    (  x    )   No    Do you have HCP - Health Care Proxy:                                                                                                                            (   x  )  yes   (       ) N0    Do you have DNR- Do Not Resuscitate :                                                                                                                           (      )  yes  (     x   )  No    Do you have DNI- Do Not intubate  :                                                                                                                               (      )  yes   (    x   ) No    Do you have MOLST - Medical orders for Life sustaining treatment  :                                                                    (      ) yes    (  x     ) No    Decision Maker :  (    x ) Patient     (      )  HCA   (     ) Public Health Law Surrogate     (      ) Surrogate  (       ) Guardian    Goals of Care :  (    x  )   Complete Care     (       ) No Limitations                              (       )   Comfort Care       (       )  Hospice                               (      )   Limited medical Intervention / s    Medical Interventions :   (   x     )   CPR       (        )  DNR                                               (    x    )  Intubation with MV - Mechanical Ventilation  (    x  ) BIPAP/CPAP    (         )   DNI                                               (    x     )  Artificial Nutrition -  IVF, TPN / PPN, Tube Feeds             (         )   No Feeding Tube                                                (   x     ) Use Antibiotics                         (          ) No Antibiotics                                                (    x     ) Blood and Blood Products     (         )   No Blood or Blood products                                                (    x      )  Dialysis                                    (         )  No Dialysis                                                (          )  Medical Management only  (         )  No Invasive Interventions or Surgery  Time spent :                        (    x   ) up to 30 minutes                       (           )   more than 30 minutes  ACP and GOC reviewed and discussed
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
## 2020-09-08 NOTE — PROGRESS NOTE ADULT - SUBJECTIVE AND OBJECTIVE BOX
[facility] -- Neema Park, Nichole, Emanuel, Anish Gordillo Savella, Goodger  Office # [phone]    Follow Up:  Syncope    Subjective/Observations: Awake and alert, denies any dizziness and lightheadedness. Denies any respiratory or cardiac discomfort.  No tele events    REVIEW OF SYSTEMS: All other review of systems is negative unless indicated above  PAST MEDICAL & SURGICAL HISTORY:  Calculus of gallbladder with chronic cholecystitis without obstruction  Hyperlipidemia, unspecified hyperlipidemia type  Essential hypertension  Hiatal hernia with GERD  Chronic obstructive pulmonary disease, unspecified COPD type: O2 at night  Lung cancer: Right lung.  Esophageal reflux  TIA (transient ischemic attack): 2012  Rheumatoid arthritis  HLD (hyperlipidemia)  Asthma  S/P cholecystectomy  History of endoscopy: Sept 2017  H/O colonoscopy  S/P lobectomy of lung: Right lung in 1996.    MEDICATIONS  (STANDING):  ascorbic acid 500 milliGRAM(s) Oral daily  aspirin enteric coated 81 milliGRAM(s) Oral daily  atorvastatin 20 milliGRAM(s) Oral at bedtime  budesonide 160 MICROgram(s)/formoterol 4.5 MICROgram(s) Inhaler 2 Puff(s) Inhalation two times a day  calcium carbonate 1250 mG  + Vitamin D (OsCal 500 + D) 1 Tablet(s) Oral daily  cyanocobalamin 1000 MICROGram(s) Oral daily  hydroxychloroquine 200 milliGRAM(s) Oral daily  influenza   Vaccine 0.5 milliLiter(s) IntraMuscular once  lactobacillus acidophilus 1 Tablet(s) Oral three times a day  rOPINIRole 1 milliGRAM(s) Oral two times a day    MEDICATIONS  (PRN):  acetaminophen   Tablet .. 650 milliGRAM(s) Oral every 6 hours PRN Mild Pain (1 - 3), Moderate Pain (4 - 6)  dicyclomine 10 milliGRAM(s) Oral three times a day before meals PRN abdominal cramps    Allergies    Enbrel (Unknown)  IV Contrast (Hives; Rash)  Levaquin (Other)  minocycline (Other)  penicillin (Other)  Zofran (Other)    Intolerances    Vital Signs Last 24 Hrs  T(C): 36.3 (08 Sep 2020 07:30), Max: 36.9 (07 Sep 2020 12:34)  T(F): 97.4 (08 Sep 2020 07:30), Max: 98.4 (07 Sep 2020 12:34)  HR: 83 (08 Sep 2020 07:30) (65 - 86)  BP: 134/76 (08 Sep 2020 07:30) (128/61 - 150/72)  BP(mean): --  RR: 17 (08 Sep 2020 07:30) (17 - 18)  SpO2: 95% (08 Sep 2020 07:30) (94% - 98%)  I&O's Summary    07 Sep 2020 07:01  -  08 Sep 2020 07:00  --------------------------------------------------------  IN: 540 mL / OUT: 0 mL / NET: 540 mL    PHYSICAL EXAM:  TELE: NSR  Constitutional: NAD, awake and alert, well-developed  HEENT: Moist Mucous Membranes, Anicteric  Pulmonary: Non-labored, breath sounds are clear bilaterally, No wheezing, rales or rhonchi  Cardiovascular: Regular, S1 and S2, No murmurs, rubs, gallops or clicks  Gastrointestinal: Bowel Sounds present, soft, nontender.   Lymph: No peripheral edema. No lymphadenopathy.  Skin: No visible rashes.  right knee abrasion/laceration with dressing dry and intact  Psych:  Mood & affect appropriate  LABS: All Labs Reviewed:                        11.7   9.61  )-----------( 267      ( 08 Sep 2020 09:58 )             36.6                         11.3   10.63 )-----------( 265      ( 06 Sep 2020 10:12 )             35.9     08 Sep 2020 09:58    141    |  107    |  17     ----------------------------<  128    4.3     |  29     |  1.10   06 Sep 2020 10:12    138    |  103    |  21     ----------------------------<  91     4.6     |  26     |  1.11     Ca    9.6        08 Sep 2020 09:58  Ca    9.1        06 Sep 2020 10:12    TPro  6.4    /  Alb  3.2    /  TBili  0.4    /  DBili  x      /  AST  20     /  ALT  17     /  AlkPhos  63     06 Sep 2020 10:12    CARDIAC MARKERS ( 08 Sep 2020 09:58 )  .036 ng/mL / x     / x     / x     / x      CARDIAC MARKERS ( 07 Sep 2020 11:37 )  .123 ng/mL / x     / 61 U/L / x     / 1.8 ng/mL  CARDIAC MARKERS ( 06 Sep 2020 20:08 )  .371 ng/mL / x     / x     / x     / x          < from: TTE Echo Complete w/o Contrast w/ Doppler (09.07.20 @ 09:44) >     EXAM:  ECHO TTE WO CON COMP W DOPP         PROCEDURE DATE:  09/07/2020        INTERPRETATION:  INDICATION: Syncope  Sonographer AS    Blood Pressure 120/78    Height 157.48 cm     Weight 52.6 kg       BSA 1.5 sq m    Dimensions:  LA 2.6       Normal Values: 2.0 - 4.0 cm  Ao 3.2        Normal Values: 2.0 - 3.8 cm  SEPTUM 1.2       Normal Values: 0.6 - 1.2 cm  PWT 1.1       Normal Values: 0.6 - 1.1 cm  LVIDd 3.9         Normal Values: 3.0 - 5.6 cm  LVIDs 2.6         Normal Values: 1.8 - 4.0 cm    OBSERVATIONS:  Technically difficult and limited study  Mitral Valve: Thickened leaflets, mild MR.  Aortic Valve/Aorta: Aortic valve is not well-visualized, no signs of severe valvular pathology  Tricuspid Valve: Mild TR.  Pulmonic Valve: Not well-visualized  Left Atrium: normal  Right Atrium: Not well-visualized  Left Ventricle: Grossly normal LV size and systolic function, estimated LVEF of 65%. Endocardium is not well-visualized, cannot rule out segmental dysfunction  Right Ventricle: The right ventricle is not well-visualized  Pericardium/Pleura: Trace pericardial effusion.  Pulmonary/RV Pressure: estimated PA systolic pressure of 27.7 mmHg assuming an RA pressure of 3 mmHg.  LV diastolic dysfunction is present    Conclusion:  Technically difficult and limited study  Grossly normal left ventricular internal dimensions and systolic function, estimated LVEF of 65%.  The right ventricle is not well-visualized  Aortic valve is not well-visualized, no signs of severe valvular pathology  Mild MR and TR.  No significant pericardial effusion.      ARUN MORENO   This document has been electronically signed. Sep  8 2020  8:57AM    < end of copied text >     < from: 12 Lead ECG (09.06.20 @ 09:54) >    Ventricular Rate 72 BPM    Atrial Rate 72 BPM    P-R Interval 134 ms    QRS Duration 80 ms    Q-T Interval 364 ms    QTC Calculation(Bezet) 398 ms    P Axis 68 degrees    R Axis 52 degrees    T Axis 68 degrees    Diagnosis Line Normal sinus rhythm  Left ventricular hypertrophy  Cannot rule out Septal infarct , age undetermined  Abnormal ECG  No previous ECGs available  Confirmed by AUTUMN ALICIA MD ([id number]) on 9/8/2020 7:46:10 AM    < end of copied text >    < from: 12 Lead ECG (09.06.20 @ 09:54) >    Ventricular Rate 72 BPM    Atrial Rate 72 BPM    P-R Interval 134 ms    QRS Duration 80 ms    Q-T Interval 364 ms    QTC Calculation(Bezet) 398 ms    P Axis 68 degrees    R Axis 52 degrees    T Axis 68 degrees    Diagnosis Line Normal sinus rhythm  Left ventricular hypertrophy  Cannot rule out Septal infarct , age undetermined  Abnormal ECG  No previous ECGs available  Confirmed by AUTUMN ALICIA MD ([id number]) on 9/8/2020 7:46:10 AM    < end of copied text >

## 2020-09-08 NOTE — PROGRESS NOTE ADULT - ATTENDING COMMENTS
Seen/examined. agree with above.
The patient was personally seen and examined, in addition to being examined and evaluated by NP.  All elements of the note were edited where appropriate.    unclear explanation for syncope  given the history, she had an altered level of awareness only for moments  her trop is uptrending without clear explanation thus far  followup echo-prelim reveals a normal ef, but will review  would cont to monitor over the next 24h
Patient son called from [place] and asked why her mother in the hospita.  He advised that his mother is A-O X 4.  As per HIPPA law he has to talk to his mother.  Patient's  came to hospital and he wants his wife to take home but later he change his mind didn't took her home.
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
## 2020-09-08 NOTE — OCCUPATIONAL THERAPY INITIAL EVALUATION ADULT - ADDITIONAL COMMENTS
Pt lives with spouse in a private home. She was independent with ADLs/IADLs and functional mobility without AD PTA. Pt reports she has shower chair.

## 2020-09-09 ENCOUNTER — APPOINTMENT (OUTPATIENT)
Dept: PODIATRY | Facility: HOSPITAL | Age: 75
End: 2020-09-09

## 2020-09-11 ENCOUNTER — APPOINTMENT (OUTPATIENT)
Dept: PLASTIC SURGERY | Facility: HOSPITAL | Age: 75
End: 2020-09-11
Payer: MEDICARE

## 2020-09-11 ENCOUNTER — OUTPATIENT (OUTPATIENT)
Dept: OUTPATIENT SERVICES | Facility: HOSPITAL | Age: 75
LOS: 1 days | Discharge: HOME | End: 2020-09-11
Payer: MEDICARE

## 2020-09-11 VITALS
HEIGHT: 62 IN | BODY MASS INDEX: 21.53 KG/M2 | DIASTOLIC BLOOD PRESSURE: 66 MMHG | RESPIRATION RATE: 20 BRPM | WEIGHT: 117 LBS | HEART RATE: 88 BPM | SYSTOLIC BLOOD PRESSURE: 144 MMHG | TEMPERATURE: 98.2 F

## 2020-09-11 DIAGNOSIS — Z98.89 OTHER SPECIFIED POSTPROCEDURAL STATES: Chronic | ICD-10-CM

## 2020-09-11 DIAGNOSIS — M79.671 PAIN IN RIGHT FOOT: ICD-10-CM

## 2020-09-11 DIAGNOSIS — Z98.890 OTHER SPECIFIED POSTPROCEDURAL STATES: Chronic | ICD-10-CM

## 2020-09-11 DIAGNOSIS — M79.672 PAIN IN LEFT FOOT: ICD-10-CM

## 2020-09-11 DIAGNOSIS — Z90.49 ACQUIRED ABSENCE OF OTHER SPECIFIED PARTS OF DIGESTIVE TRACT: Chronic | ICD-10-CM

## 2020-09-11 DIAGNOSIS — G57.61 LESION OF PLANTAR NERVE, RIGHT LOWER LIMB: ICD-10-CM

## 2020-09-11 DIAGNOSIS — S81.801D UNSPECIFIED OPEN WOUND, RIGHT LOWER LEG, SUBSEQUENT ENCOUNTER: ICD-10-CM

## 2020-09-11 PROCEDURE — G0463: CPT

## 2020-09-11 PROCEDURE — 99214 OFFICE O/P EST MOD 30 MIN: CPT

## 2020-09-14 DIAGNOSIS — M79.672 PAIN IN LEFT FOOT: ICD-10-CM

## 2020-09-14 DIAGNOSIS — S81.011D LACERATION WITHOUT FOREIGN BODY, RIGHT KNEE, SUBSEQUENT ENCOUNTER: ICD-10-CM
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
## 2020-09-14 DIAGNOSIS — Z85.118 PERSONAL HISTORY OF OTHER MALIGNANT NEOPLASM OF BRONCHUS AND LUNG: ICD-10-CM

## 2020-09-14 DIAGNOSIS — M79.671 PAIN IN RIGHT FOOT: ICD-10-CM

## 2020-09-14 DIAGNOSIS — J44.9 CHRONIC OBSTRUCTIVE PULMONARY DISEASE, UNSPECIFIED: ICD-10-CM

## 2020-09-14 DIAGNOSIS — Z79.82 LONG TERM (CURRENT) USE OF ASPIRIN: ICD-10-CM

## 2020-09-14 DIAGNOSIS — Z87.01 PERSONAL HISTORY OF PNEUMONIA (RECURRENT): ICD-10-CM

## 2020-09-14 DIAGNOSIS — Z88.1 ALLERGY STATUS TO OTHER ANTIBIOTIC AGENTS STATUS: ICD-10-CM

## 2020-09-14 DIAGNOSIS — M06.9 RHEUMATOID ARTHRITIS, UNSPECIFIED: ICD-10-CM
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
## 2020-09-14 DIAGNOSIS — Z83.3 FAMILY HISTORY OF DIABETES MELLITUS: ICD-10-CM

## 2020-09-14 DIAGNOSIS — Z90.2 ACQUIRED ABSENCE OF LUNG [PART OF]: ICD-10-CM
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
## 2020-09-14 DIAGNOSIS — Z82.49 FAMILY HISTORY OF ISCHEMIC HEART DISEASE AND OTHER DISEASES OF THE CIRCULATORY SYSTEM: ICD-10-CM
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
## 2020-09-14 DIAGNOSIS — E78.5 HYPERLIPIDEMIA, UNSPECIFIED: ICD-10-CM

## 2020-09-14 DIAGNOSIS — K21.9 GASTRO-ESOPHAGEAL REFLUX DISEASE WITHOUT ESOPHAGITIS: ICD-10-CM

## 2020-09-14 DIAGNOSIS — I11.9 HYPERTENSIVE HEART DISEASE WITHOUT HEART FAILURE: ICD-10-CM

## 2020-09-14 DIAGNOSIS — I65.29 OCCLUSION AND STENOSIS OF UNSPECIFIED CAROTID ARTERY: ICD-10-CM

## 2020-09-14 DIAGNOSIS — Z90.49 ACQUIRED ABSENCE OF OTHER SPECIFIED PARTS OF DIGESTIVE TRACT: ICD-10-CM

## 2020-09-14 DIAGNOSIS — Z98.49 CATARACT EXTRACTION STATUS, UNSPECIFIED EYE: ICD-10-CM

## 2020-09-14 DIAGNOSIS — Z87.891 PERSONAL HISTORY OF NICOTINE DEPENDENCE: ICD-10-CM

## 2020-09-14 DIAGNOSIS — Z88.8 ALLERGY STATUS TO OTHER DRUGS, MEDICAMENTS AND BIOLOGICAL SUBSTANCES: ICD-10-CM

## 2020-09-14 DIAGNOSIS — Z88.0 ALLERGY STATUS TO PENICILLIN: ICD-10-CM

## 2020-09-14 DIAGNOSIS — Z86.73 PERSONAL HISTORY OF TRANSIENT ISCHEMIC ATTACK (TIA), AND CEREBRAL INFARCTION WITHOUT RESIDUAL DEFICITS: ICD-10-CM

## 2020-09-14 DIAGNOSIS — Z82.3 FAMILY HISTORY OF STROKE: ICD-10-CM

## 2020-09-14 DIAGNOSIS — Z80.1 FAMILY HISTORY OF MALIGNANT NEOPLASM OF TRACHEA, BRONCHUS AND LUNG: ICD-10-CM

## 2020-09-14 DIAGNOSIS — Z79.899 OTHER LONG TERM (CURRENT) DRUG THERAPY: ICD-10-CM

## 2020-09-14 DIAGNOSIS — Z91.041 RADIOGRAPHIC DYE ALLERGY STATUS: ICD-10-CM

## 2020-09-14 DIAGNOSIS — G57.61 LESION OF PLANTAR NERVE, RIGHT LOWER LIMB: ICD-10-CM

## 2020-09-14 NOTE — PHYSICAL EXAM
[1+] : right 1+ [Varicose Veins Of Lower Extremities] : present [Ankle Swelling On The Right] : mild [Alert] : alert [Oriented to Person] : oriented to person [Oriented to Time] : oriented to time [Oriented to Place] : oriented to place [Calm] : calm [Purpura] : no purpura  [Petechiae] : no petechiae [Skin Ulcer] : no ulcer [Skin Induration] : no induration [de-identified] : comfortable [de-identified] : no open wounds , radiating pain with ambulation and palpation right foot 1st interspace . 9/11/20 right knee wound with avulsion [de-identified] : hammer toes , s/p bunion , inter metatarsal pain 1st interspace right foot and pain base of the 5th metatrsal left foot  [FreeTextEntry1] : Right Plantar Foot- No open wound [FreeTextEntry7] : Left Lateral Foot- No open wound  [de-identified] : Right Knee- Skin tear with residual flap present- NEW [de-identified] : 7.4 [de-identified] : 0.2 [de-identified] : 5.5 [de-identified] : Small Serosanguineous [de-identified] : Xeroform/ Dry Dressing [de-identified] : Intact [de-identified] : Cleansed with Normal saline\par  [de-identified] : False [de-identified] : \par  [de-identified] : Traumatic [de-identified] : None [de-identified] : None [de-identified] : False [de-identified] : 100% [de-identified] : No

## 2020-09-14 NOTE — HISTORY OF PRESENT ILLNESS
[FreeTextEntry1] : 5 days ago patient passed out and fell on concrete injuring and avulsing tissue right knee

## 2020-09-14 NOTE — REVIEW OF SYSTEMS
[Joint Stiffness] : joint stiffness [Arthralgias] : arthralgias [Negative] : Endocrine [Easy Bleeding] : a tendency for easy bleeding [Chills] : no chills [Fever] : no fever [Eye Pain] : no eye pain [Loss Of Hearing] : no hearing loss [Abdominal Pain] : no abdominal pain [Shortness Of Breath] : no shortness of breath [Joint Swelling] : no joint swelling [Confused] : no confusion [Skin Lesions] : no skin lesions [Skin Wound] : no skin wound [FreeTextEntry5] : hld, htn [Change In Personality] : no personality change [Anxiety] : no anxiety [de-identified] : neuroma like pain 1st interspace right foot  [FreeTextEntry6] : copd [de-identified] : neuroma 1st interspace right foot  and left foot 5th metatarsal base

## 2020-09-14 NOTE — ASSESSMENT
[Verbal] : Verbal [Demo] : Demo [Patient] : Patient [Spouse] : Spouse [Good - alert, interested, motivated] : Good - alert, interested, motivated [Verbalizes knowledge/Understanding] : Verbalizes knowledge/understanding [Skin Care] : skin care [Dressing changes] : dressing changes [Pressure relief] : pressure relief [How and When to Call] : how and when to call [Signs and symptoms of infection] : sign and symptoms of infection [Off-loading] : off-loading [Patient responsibility to plan of care] : patient responsibility to plan of care [Stable] : stable [Home] : Home [Ambulatory] : Ambulatory [] : No [FreeTextEntry3] : Right Knee- New wound [FreeTextEntry2] : Alteration in skin integrity- promote optimal skin integrity\par  [FreeTextEntry4] : Dr Gonsales/ Photo taken\par Pt states she fainted from Chlorine at her poolside & injured her Right Knee- pt hospitalized [facility] 09/06/20-09/08/20 related to same & underwent several diagnostic tests (pt states all negative)- Dr Gonsales aware\par F/U to [facility] in 1 week

## 2020-09-17 ENCOUNTER — APPOINTMENT (OUTPATIENT)
Dept: SURGERY | Facility: HOSPITAL | Age: 75
End: 2020-09-17

## 2020-09-17 ENCOUNTER — OUTPATIENT (OUTPATIENT)
Dept: OUTPATIENT SERVICES | Facility: HOSPITAL | Age: 75
LOS: 1 days | Discharge: HOME | End: 2020-09-17
Payer: MEDICARE

## 2020-09-17 ENCOUNTER — APPOINTMENT (OUTPATIENT)
Dept: WOUND CARE | Facility: HOSPITAL | Age: 75
End: 2020-09-17
Payer: MEDICARE

## 2020-09-17 VITALS
SYSTOLIC BLOOD PRESSURE: 136 MMHG | HEIGHT: 62 IN | HEART RATE: 77 BPM | WEIGHT: 117 LBS | BODY MASS INDEX: 21.53 KG/M2 | TEMPERATURE: 97.4 F | RESPIRATION RATE: 20 BRPM | DIASTOLIC BLOOD PRESSURE: 62 MMHG | OXYGEN SATURATION: 96 %

## 2020-09-17 DIAGNOSIS — Z98.890 OTHER SPECIFIED POSTPROCEDURAL STATES: Chronic | ICD-10-CM

## 2020-09-17 DIAGNOSIS — S81.011D LACERATION WITHOUT FOREIGN BODY, RIGHT KNEE, SUBSEQUENT ENCOUNTER: ICD-10-CM
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
## 2020-09-17 DIAGNOSIS — Z90.49 ACQUIRED ABSENCE OF OTHER SPECIFIED PARTS OF DIGESTIVE TRACT: Chronic | ICD-10-CM
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
## 2020-09-17 DIAGNOSIS — Z98.89 OTHER SPECIFIED POSTPROCEDURAL STATES: Chronic | ICD-10-CM

## 2020-09-17 DIAGNOSIS — G57.61 LESION OF PLANTAR NERVE, RIGHT LOWER LIMB: ICD-10-CM
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
## 2020-09-17 PROCEDURE — G0463: CPT

## 2020-09-17 PROCEDURE — ZZZZZ: CPT

## 2020-09-22 ENCOUNTER — APPOINTMENT (OUTPATIENT)
Dept: SURGERY | Facility: HOSPITAL | Age: 75
End: 2020-09-22
Payer: MEDICARE

## 2020-09-22 ENCOUNTER — OUTPATIENT (OUTPATIENT)
Dept: OUTPATIENT SERVICES | Facility: HOSPITAL | Age: 75
LOS: 1 days | Discharge: HOME | End: 2020-09-22
Payer: MEDICARE

## 2020-09-22 VITALS
HEIGHT: 62 IN | TEMPERATURE: 97.3 F | OXYGEN SATURATION: 97 % | HEART RATE: 80 BPM | SYSTOLIC BLOOD PRESSURE: 130 MMHG | DIASTOLIC BLOOD PRESSURE: 60 MMHG | RESPIRATION RATE: 20 BRPM | BODY MASS INDEX: 21.53 KG/M2 | WEIGHT: 117 LBS

## 2020-09-22 DIAGNOSIS — Z98.890 OTHER SPECIFIED POSTPROCEDURAL STATES: Chronic | ICD-10-CM

## 2020-09-22 DIAGNOSIS — S81.011D LACERATION WITHOUT FOREIGN BODY, RIGHT KNEE, SUBSEQUENT ENCOUNTER: ICD-10-CM

## 2020-09-22 DIAGNOSIS — Z98.89 OTHER SPECIFIED POSTPROCEDURAL STATES: Chronic | ICD-10-CM

## 2020-09-22 DIAGNOSIS — Z90.49 ACQUIRED ABSENCE OF OTHER SPECIFIED PARTS OF DIGESTIVE TRACT: Chronic | ICD-10-CM

## 2020-09-22 PROCEDURE — G0463: CPT

## 2020-09-22 PROCEDURE — 99213 OFFICE O/P EST LOW 20 MIN: CPT

## 2020-09-22 NOTE — PHYSICAL EXAM
[4 x 4] : 4 x 4  [Normal Heart Sounds] : normal heart sounds [2+] : left 2+ [0] : left 0 [Ankle Swelling Bilaterally] : bilaterally  [Alert] : alert [Oriented to Person] : oriented to person [Calm] : calm [JVD] : no jugular venous distention  [Ankle Swelling (On Exam)] : not present [Varicose Veins Of Lower Extremities] : not present [] : not present [de-identified] : WD/WN in no acute distress. [de-identified] : WNL [de-identified] : WNL [de-identified] : Right knee laceration is clean, a piece of ischemic skin at the center, other parts of the wound is mostly slough, no infection, periwound skin is intact with no cellulitis. [de-identified] : Right Knee  [de-identified] : 6.8 [de-identified] : 5.2 [de-identified] : 0.1 [de-identified] : Serosanguineous  [de-identified] : 50% [de-identified] : Medihoney  [de-identified] : Cleansed with Normal Saline\par Tegaderm  [de-identified] : Small [de-identified] : Traumatic [de-identified] : None [de-identified] : None [de-identified] : 50% [de-identified] : Yes [de-identified] : 3x Weekly

## 2020-09-22 NOTE — VITALS
[Shooting] : shooting [Occasional] : occasional [] : No [de-identified] : Pt reports pain 5/10 - "Shocking pain" [FreeTextEntry3] : Right Knee [FreeTextEntry1] : Standing/Walking [FreeTextEntry2] : Sitting/elevation  [FreeTextEntry4] : Aleve

## 2020-09-22 NOTE — ASSESSMENT
[Verbal] : Verbal [Written] : Written [Demo] : Demo [Patient] : Patient [Spouse] : Spouse [Good - alert, interested, motivated] : Good - alert, interested, motivated [Verbalizes knowledge/Understanding] : Verbalizes knowledge/understanding [Dressing changes] : dressing changes [Skin Care] : skin care [Pressure relief] : pressure relief [Signs and symptoms of infection] : sign and symptoms of infection [How and When to Call] : how and when to call [Pain Management] : pain management [Patient responsibility to plan of care] : patient responsibility to plan of care [Stable] : stable [Home] : Home [Cane] : Cane [Not Applicable - Long Term Care/Home Health Agency] : Long Term Care/Home Health Agency: Not Applicable [] : No [FreeTextEntry2] : Infection Prevention\par Localized Wound Care\par Promote Optimal Skin Integrity\par Maintain acceptable pain level [FreeTextEntry3] : Altered skin integrity  [FreeTextEntry4] : F/U to [facility] in one week  [FreeTextEntry1] : Right knee wound with ischemic piece of skin at the center, no acute infection\par

## 2020-09-23 DIAGNOSIS — K21.9 GASTRO-ESOPHAGEAL REFLUX DISEASE WITHOUT ESOPHAGITIS: ICD-10-CM

## 2020-09-23 DIAGNOSIS — Z87.891 PERSONAL HISTORY OF NICOTINE DEPENDENCE: ICD-10-CM
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
## 2020-09-23 DIAGNOSIS — Z85.118 PERSONAL HISTORY OF OTHER MALIGNANT NEOPLASM OF BRONCHUS AND LUNG: ICD-10-CM

## 2020-09-23 DIAGNOSIS — Z82.3 FAMILY HISTORY OF STROKE: ICD-10-CM

## 2020-09-23 DIAGNOSIS — Z79.82 LONG TERM (CURRENT) USE OF ASPIRIN: ICD-10-CM

## 2020-09-23 DIAGNOSIS — Z88.1 ALLERGY STATUS TO OTHER ANTIBIOTIC AGENTS STATUS: ICD-10-CM

## 2020-09-23 DIAGNOSIS — Z91.041 RADIOGRAPHIC DYE ALLERGY STATUS: ICD-10-CM
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
## 2020-09-23 DIAGNOSIS — Z87.01 PERSONAL HISTORY OF PNEUMONIA (RECURRENT): ICD-10-CM
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
## 2020-09-23 DIAGNOSIS — I65.29 OCCLUSION AND STENOSIS OF UNSPECIFIED CAROTID ARTERY: ICD-10-CM

## 2020-09-23 DIAGNOSIS — Z98.49 CATARACT EXTRACTION STATUS, UNSPECIFIED EYE: ICD-10-CM

## 2020-09-23 DIAGNOSIS — M06.9 RHEUMATOID ARTHRITIS, UNSPECIFIED: ICD-10-CM

## 2020-09-23 DIAGNOSIS — S81.011D LACERATION WITHOUT FOREIGN BODY, RIGHT KNEE, SUBSEQUENT ENCOUNTER: ICD-10-CM

## 2020-09-23 DIAGNOSIS — Z90.49 ACQUIRED ABSENCE OF OTHER SPECIFIED PARTS OF DIGESTIVE TRACT: ICD-10-CM

## 2020-09-23 DIAGNOSIS — Z90.2 ACQUIRED ABSENCE OF LUNG [PART OF]: ICD-10-CM

## 2020-09-23 DIAGNOSIS — Z82.49 FAMILY HISTORY OF ISCHEMIC HEART DISEASE AND OTHER DISEASES OF THE CIRCULATORY SYSTEM: ICD-10-CM

## 2020-09-23 DIAGNOSIS — Z86.73 PERSONAL HISTORY OF TRANSIENT ISCHEMIC ATTACK (TIA), AND CEREBRAL INFARCTION WITHOUT RESIDUAL DEFICITS: ICD-10-CM

## 2020-09-23 DIAGNOSIS — E78.5 HYPERLIPIDEMIA, UNSPECIFIED: ICD-10-CM

## 2020-09-23 DIAGNOSIS — Z83.3 FAMILY HISTORY OF DIABETES MELLITUS: ICD-10-CM

## 2020-09-23 DIAGNOSIS — G57.61 LESION OF PLANTAR NERVE, RIGHT LOWER LIMB: ICD-10-CM

## 2020-09-23 DIAGNOSIS — Z79.899 OTHER LONG TERM (CURRENT) DRUG THERAPY: ICD-10-CM

## 2020-09-23 DIAGNOSIS — Z88.0 ALLERGY STATUS TO PENICILLIN: ICD-10-CM

## 2020-09-23 DIAGNOSIS — J44.9 CHRONIC OBSTRUCTIVE PULMONARY DISEASE, UNSPECIFIED: ICD-10-CM

## 2020-09-23 DIAGNOSIS — I11.9 HYPERTENSIVE HEART DISEASE WITHOUT HEART FAILURE: ICD-10-CM

## 2020-09-23 DIAGNOSIS — Z88.8 ALLERGY STATUS TO OTHER DRUGS, MEDICAMENTS AND BIOLOGICAL SUBSTANCES: ICD-10-CM

## 2020-09-23 DIAGNOSIS — Z80.1 FAMILY HISTORY OF MALIGNANT NEOPLASM OF TRACHEA, BRONCHUS AND LUNG: ICD-10-CM

## 2020-09-25 ENCOUNTER — NON-APPOINTMENT (OUTPATIENT)
Age: 75
End: 2020-09-25

## 2020-09-25 ENCOUNTER — APPOINTMENT (OUTPATIENT)
Dept: CARDIOLOGY | Facility: CLINIC | Age: 75
End: 2020-09-25
Payer: MEDICARE

## 2020-09-25 VITALS — DIASTOLIC BLOOD PRESSURE: 68 MMHG | SYSTOLIC BLOOD PRESSURE: 138 MMHG

## 2020-09-25 VITALS — OXYGEN SATURATION: 93 % | HEART RATE: 98 BPM | WEIGHT: 118 LBS | HEIGHT: 62 IN | BODY MASS INDEX: 21.71 KG/M2

## 2020-09-25 PROCEDURE — 99215 OFFICE O/P EST HI 40 MIN: CPT

## 2020-09-25 PROCEDURE — 93000 ELECTROCARDIOGRAM COMPLETE: CPT

## 2020-09-25 NOTE — HISTORY OF PRESENT ILLNESS
[FreeTextEntry1] : \par 74 year old female with hx of hypertension , HLD , COPD who was recently  hospitalized with complain th at she might have passed out after inhaling chlorine granules while putting in to pool , patient fell in to pool woke up under the water ,  patient woke up , was taken out of pool ,  patient did not swallow water , she was alert awake no problem , was taken to hospital ,where she had work up , patient  had CT head , cartoid without any significant abnormality , patient had blood work showed minimal elevated troponin\par patient had echo which was TDS  normal LVEF ,  patient denies any complains other than her right knee injury sustained when she fell , patient denies any prior syncopal episodes . patient blood pressure is mild elevated as she does not like the pain with cuff inflation \par \par Patient  [Stable] : stable [None] : ~He/She~ has no significant interval events [Chest Pain Starting When At Rest] : denies chest pain at rest [Chest Pain Which Starts With Exertion] : denies exertional chest pain [Difficulty Breathing (Dyspnea)] : denies dyspnea [Feeling Tired (Fatigue)] : denies fatigue [Feelings Of Weakness On Exertion] : denies reduced exercise tolerance [Palpitations] : denies palpitations [Regional Soft Tissue Swelling Both Lower Extremities] : denies edema [Difficulty Breathing Only When Lying Down (Supine Dyspnea)] : denies orthopnea [Leg Pain With Exercise (Leg Claudication)] : denies claudication [Dizziness] : denies dizziness [Dizziness Upon Standing Up] : denies orthostatic dizziness [Adherent] : the patient is adherent with ~his/her~ medication regimen

## 2020-09-25 NOTE — DISCUSSION/SUMMARY
[FreeTextEntry1] : \par Patient with above hx \par \par s/p syncopal episode ? after inhaled the chlorine ?   who is hemodynamically stable without recurrence , had minimal elevated troponin , with grossly normal LVEF , possibly demand ischemia due to hypotension during syncopal episode ?  will obtain chemical stress test , follow up after the test  continue ecotrin \par \par Hypertension: Well controlled and tolerated on current regime of medication.  Low Sodium Diet discussed.\par Hyperlipidemia:Recent blood work with Dr. Galan will obtain copy.. Low Fat diet, exercise and weight loss discussed.\par \par Carotid plaque: Monitoring in future. no symptoms currently. \par \par \par HLD   continue medication \par

## 2020-09-25 NOTE — PHYSICAL EXAM
[General Appearance - Well Developed] : well developed [Normal Appearance] : normal appearance [Well Groomed] : well groomed [General Appearance - Well Nourished] : well nourished [General Appearance - In No Acute Distress] : no acute distress [Normal Conjunctiva] : the conjunctiva exhibited no abnormalities [Eyelids - No Xanthelasma] : the eyelids demonstrated no xanthelasmas [No Oral Pallor] : no oral pallor [No Oral Cyanosis] : no oral cyanosis [Respiration, Rhythm And Depth] : normal respiratory rhythm and effort [Exaggerated Use Of Accessory Muscles For Inspiration] : no accessory muscle use [Heart Rate And Rhythm] : heart rate and rhythm were normal [Heart Sounds] : normal S1 and S2 [Murmurs] : no murmurs present [Edema] : no peripheral edema present [Bowel Sounds] : normal bowel sounds [Abdomen Soft] : soft [Abdomen Tenderness] : non-tender [] : no hepato-splenomegaly [Abnormal Walk] : normal gait [Gait - Sufficient For Exercise Testing] : the gait was sufficient for exercise testing [Nail Clubbing] : no clubbing of the fingernails [Cyanosis, Localized] : no localized cyanosis [FreeTextEntry1] : bruises noted on arms  [Oriented To Time, Place, And Person] : oriented to person, place, and time [Affect] : the affect was normal [No Anxiety] : not feeling anxious

## 2020-09-25 NOTE — REASON FOR VISIT
[Follow-Up - Clinic] : a clinic follow-up of [Syncope] : syncope [FreeTextEntry1] : cardiac murmur [Spouse] : spouse

## 2020-09-29 ENCOUNTER — APPOINTMENT (OUTPATIENT)
Dept: SURGERY | Facility: HOSPITAL | Age: 75
End: 2020-09-29
Payer: MEDICARE

## 2020-09-29 ENCOUNTER — OUTPATIENT (OUTPATIENT)
Dept: OUTPATIENT SERVICES | Facility: HOSPITAL | Age: 75
LOS: 1 days | Discharge: HOME | End: 2020-09-29
Payer: MEDICARE

## 2020-09-29 VITALS
WEIGHT: 118 LBS | RESPIRATION RATE: 20 BRPM | SYSTOLIC BLOOD PRESSURE: 110 MMHG | HEART RATE: 87 BPM | OXYGEN SATURATION: 98 % | BODY MASS INDEX: 21.71 KG/M2 | HEIGHT: 62 IN | DIASTOLIC BLOOD PRESSURE: 58 MMHG | TEMPERATURE: 97.9 F

## 2020-09-29 DIAGNOSIS — Z90.49 ACQUIRED ABSENCE OF OTHER SPECIFIED PARTS OF DIGESTIVE TRACT: Chronic | ICD-10-CM

## 2020-09-29 DIAGNOSIS — S81.011D LACERATION WITHOUT FOREIGN BODY, RIGHT KNEE, SUBSEQUENT ENCOUNTER: ICD-10-CM

## 2020-09-29 DIAGNOSIS — Z98.890 OTHER SPECIFIED POSTPROCEDURAL STATES: Chronic | ICD-10-CM

## 2020-09-29 DIAGNOSIS — Z98.89 OTHER SPECIFIED POSTPROCEDURAL STATES: Chronic | ICD-10-CM

## 2020-09-29 PROCEDURE — G0463: CPT

## 2020-09-29 PROCEDURE — 99213 OFFICE O/P EST LOW 20 MIN: CPT

## 2020-09-29 NOTE — PHYSICAL EXAM
[4 x 4] : 4 x 4  [Normal Heart Sounds] : normal heart sounds [2+] : left 2+ [0] : left 0 [Ankle Swelling Bilaterally] : bilaterally  [Alert] : alert [Oriented to Person] : oriented to person [Calm] : calm [JVD] : no jugular venous distention  [Ankle Swelling (On Exam)] : not present [Varicose Veins Of Lower Extremities] : not present [] : not present [de-identified] : WD/WN in no acute distress. [de-identified] : WNL [de-identified] : KORINL [de-identified] : WNL [de-identified] : Right knee laceration is clean, mostly slough, no acute infection, periwound skin is intact with no cellulitis. [FreeTextEntry1] : Right Knee [FreeTextEntry2] : 5.6 [FreeTextEntry3] : 3.6 [FreeTextEntry4] : 0.2 [de-identified] : Serosanguineous [de-identified] : 90% [de-identified] : Medihoney [de-identified] : Cleansed with Normal Saline\par  [TWNoteComboBox4] : Moderate [TWNoteComboBox5] : No [de-identified] : No [de-identified] : Erythema [de-identified] : None [de-identified] : None [de-identified] : <20% [de-identified] : Yes [de-identified] : 3x Weekly

## 2020-09-29 NOTE — PLAN
[FreeTextEntry1] : Medihoney, dry dressing, debridement next visit, return to office in one week.\par

## 2020-09-29 NOTE — ASSESSMENT
[Verbal] : Verbal [Patient] : Patient [Good - alert, interested, motivated] : Good - alert, interested, motivated [Verbalizes knowledge/Understanding] : Verbalizes knowledge/understanding [Dressing changes] : dressing changes [Skin Care] : skin care [Signs and symptoms of infection] : sign and symptoms of infection [How and When to Call] : how and when to call [Patient responsibility to plan of care] : patient responsibility to plan of care [] : Yes [Stable] : stable [Home] : Home [Ambulatory] : Ambulatory [Not Applicable - Long Term Care/Home Health Agency] : Long Term Care/Home Health Agency: Not Applicable [FreeTextEntry2] : Restore Skin Integrity\par Infection Control\par Localized wound care\par Maintain acceptable pain levels at satisfactory relief.\par Demonstrates use of both nonpharmacological and pharmacological pain relief strategies [FreeTextEntry4] : Photos Taken\par MD Feels pt would benefit from debridement, Auth submitted\par F/U to [facility] in 1 week [FreeTextEntry1] : Right knee laceration is stable, need debridement next visit.

## 2020-09-30 DIAGNOSIS — Z90.2 ACQUIRED ABSENCE OF LUNG [PART OF]: ICD-10-CM

## 2020-09-30 DIAGNOSIS — Z82.3 FAMILY HISTORY OF STROKE: ICD-10-CM

## 2020-09-30 DIAGNOSIS — Z90.49 ACQUIRED ABSENCE OF OTHER SPECIFIED PARTS OF DIGESTIVE TRACT: ICD-10-CM
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
## 2020-09-30 DIAGNOSIS — Z83.3 FAMILY HISTORY OF DIABETES MELLITUS: ICD-10-CM

## 2020-09-30 DIAGNOSIS — E78.5 HYPERLIPIDEMIA, UNSPECIFIED: ICD-10-CM
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
## 2020-09-30 DIAGNOSIS — Z87.01 PERSONAL HISTORY OF PNEUMONIA (RECURRENT): ICD-10-CM

## 2020-09-30 DIAGNOSIS — K21.9 GASTRO-ESOPHAGEAL REFLUX DISEASE WITHOUT ESOPHAGITIS: ICD-10-CM

## 2020-09-30 DIAGNOSIS — Z79.899 OTHER LONG TERM (CURRENT) DRUG THERAPY: ICD-10-CM

## 2020-09-30 DIAGNOSIS — M06.9 RHEUMATOID ARTHRITIS, UNSPECIFIED: ICD-10-CM
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
## 2020-09-30 DIAGNOSIS — Z88.0 ALLERGY STATUS TO PENICILLIN: ICD-10-CM

## 2020-09-30 DIAGNOSIS — Z82.49 FAMILY HISTORY OF ISCHEMIC HEART DISEASE AND OTHER DISEASES OF THE CIRCULATORY SYSTEM: ICD-10-CM

## 2020-09-30 DIAGNOSIS — Z87.891 PERSONAL HISTORY OF NICOTINE DEPENDENCE: ICD-10-CM

## 2020-09-30 DIAGNOSIS — S81.011D LACERATION WITHOUT FOREIGN BODY, RIGHT KNEE, SUBSEQUENT ENCOUNTER: ICD-10-CM

## 2020-09-30 DIAGNOSIS — I65.29 OCCLUSION AND STENOSIS OF UNSPECIFIED CAROTID ARTERY: ICD-10-CM

## 2020-09-30 DIAGNOSIS — Z80.1 FAMILY HISTORY OF MALIGNANT NEOPLASM OF TRACHEA, BRONCHUS AND LUNG: ICD-10-CM

## 2020-09-30 DIAGNOSIS — Z85.118 PERSONAL HISTORY OF OTHER MALIGNANT NEOPLASM OF BRONCHUS AND LUNG: ICD-10-CM

## 2020-09-30 DIAGNOSIS — J44.9 CHRONIC OBSTRUCTIVE PULMONARY DISEASE, UNSPECIFIED: ICD-10-CM

## 2020-09-30 DIAGNOSIS — Z88.1 ALLERGY STATUS TO OTHER ANTIBIOTIC AGENTS STATUS: ICD-10-CM

## 2020-09-30 DIAGNOSIS — Z79.82 LONG TERM (CURRENT) USE OF ASPIRIN: ICD-10-CM

## 2020-09-30 DIAGNOSIS — I11.9 HYPERTENSIVE HEART DISEASE WITHOUT HEART FAILURE: ICD-10-CM

## 2020-09-30 DIAGNOSIS — Z91.041 RADIOGRAPHIC DYE ALLERGY STATUS: ICD-10-CM

## 2020-09-30 DIAGNOSIS — Z98.49 CATARACT EXTRACTION STATUS, UNSPECIFIED EYE: ICD-10-CM

## 2020-09-30 DIAGNOSIS — G57.61 LESION OF PLANTAR NERVE, RIGHT LOWER LIMB: ICD-10-CM

## 2020-09-30 DIAGNOSIS — Z88.8 ALLERGY STATUS TO OTHER DRUGS, MEDICAMENTS AND BIOLOGICAL SUBSTANCES: ICD-10-CM

## 2020-09-30 DIAGNOSIS — Z86.73 PERSONAL HISTORY OF TRANSIENT ISCHEMIC ATTACK (TIA), AND CEREBRAL INFARCTION WITHOUT RESIDUAL DEFICITS: ICD-10-CM

## 2020-10-06 ENCOUNTER — OUTPATIENT (OUTPATIENT)
Dept: OUTPATIENT SERVICES | Facility: HOSPITAL | Age: 75
LOS: 1 days | Discharge: HOME | End: 2020-10-06
Payer: MEDICARE

## 2020-10-06 ENCOUNTER — APPOINTMENT (OUTPATIENT)
Dept: SURGERY | Facility: HOSPITAL | Age: 75
End: 2020-10-06
Payer: MEDICARE

## 2020-10-06 VITALS
SYSTOLIC BLOOD PRESSURE: 130 MMHG | WEIGHT: 118 LBS | DIASTOLIC BLOOD PRESSURE: 58 MMHG | HEIGHT: 62 IN | OXYGEN SATURATION: 98 % | RESPIRATION RATE: 20 BRPM | TEMPERATURE: 97.8 F | BODY MASS INDEX: 21.71 KG/M2 | HEART RATE: 78 BPM

## 2020-10-06 DIAGNOSIS — Z98.890 OTHER SPECIFIED POSTPROCEDURAL STATES: Chronic | ICD-10-CM

## 2020-10-06 DIAGNOSIS — Z98.89 OTHER SPECIFIED POSTPROCEDURAL STATES: Chronic | ICD-10-CM

## 2020-10-06 DIAGNOSIS — Z80.1 FAMILY HISTORY OF MALIGNANT NEOPLASM OF TRACHEA, BRONCHUS AND LUNG: ICD-10-CM

## 2020-10-06 DIAGNOSIS — Z88.1 ALLERGY STATUS TO OTHER ANTIBIOTIC AGENTS STATUS: ICD-10-CM

## 2020-10-06 DIAGNOSIS — S81.011A LACERATION WITHOUT FOREIGN BODY, RIGHT KNEE, INITIAL ENCOUNTER: ICD-10-CM

## 2020-10-06 DIAGNOSIS — S81.011A LACERATION W/OUT FOREIGN BODY, RIGHT KNEE, INITIAL ENCOUNTER: ICD-10-CM

## 2020-10-06 DIAGNOSIS — Z85.118 PERSONAL HISTORY OF OTHER MALIGNANT NEOPLASM OF BRONCHUS AND LUNG: ICD-10-CM

## 2020-10-06 DIAGNOSIS — Z79.82 LONG TERM (CURRENT) USE OF ASPIRIN: ICD-10-CM

## 2020-10-06 DIAGNOSIS — J44.9 CHRONIC OBSTRUCTIVE PULMONARY DISEASE, UNSPECIFIED: ICD-10-CM

## 2020-10-06 DIAGNOSIS — Z98.49 CATARACT EXTRACTION STATUS, UNSPECIFIED EYE: ICD-10-CM

## 2020-10-06 DIAGNOSIS — E78.5 HYPERLIPIDEMIA, UNSPECIFIED: ICD-10-CM

## 2020-10-06 DIAGNOSIS — Z88.8 ALLERGY STATUS TO OTHER DRUGS, MEDICAMENTS AND BIOLOGICAL SUBSTANCES: ICD-10-CM

## 2020-10-06 DIAGNOSIS — M06.9 RHEUMATOID ARTHRITIS, UNSPECIFIED: ICD-10-CM

## 2020-10-06 DIAGNOSIS — Z88.0 ALLERGY STATUS TO PENICILLIN: ICD-10-CM

## 2020-10-06 DIAGNOSIS — Z79.899 OTHER LONG TERM (CURRENT) DRUG THERAPY: ICD-10-CM

## 2020-10-06 DIAGNOSIS — Z83.3 FAMILY HISTORY OF DIABETES MELLITUS: ICD-10-CM
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
## 2020-10-06 DIAGNOSIS — Z87.01 PERSONAL HISTORY OF PNEUMONIA (RECURRENT): ICD-10-CM

## 2020-10-06 DIAGNOSIS — G57.61 LESION OF PLANTAR NERVE, RIGHT LOWER LIMB: ICD-10-CM

## 2020-10-06 DIAGNOSIS — K21.9 GASTRO-ESOPHAGEAL REFLUX DISEASE WITHOUT ESOPHAGITIS: ICD-10-CM

## 2020-10-06 DIAGNOSIS — Z90.49 ACQUIRED ABSENCE OF OTHER SPECIFIED PARTS OF DIGESTIVE TRACT: Chronic | ICD-10-CM

## 2020-10-06 DIAGNOSIS — Z90.49 ACQUIRED ABSENCE OF OTHER SPECIFIED PARTS OF DIGESTIVE TRACT: ICD-10-CM

## 2020-10-06 DIAGNOSIS — Z86.73 PERSONAL HISTORY OF TRANSIENT ISCHEMIC ATTACK (TIA), AND CEREBRAL INFARCTION WITHOUT RESIDUAL DEFICITS: ICD-10-CM
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
## 2020-10-06 DIAGNOSIS — Z91.041 RADIOGRAPHIC DYE ALLERGY STATUS: ICD-10-CM
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
## 2020-10-06 DIAGNOSIS — S81.011D LACERATION WITHOUT FOREIGN BODY, RIGHT KNEE, SUBSEQUENT ENCOUNTER: ICD-10-CM

## 2020-10-06 DIAGNOSIS — Z82.3 FAMILY HISTORY OF STROKE: ICD-10-CM
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
## 2020-10-06 DIAGNOSIS — Z90.2 ACQUIRED ABSENCE OF LUNG [PART OF]: ICD-10-CM

## 2020-10-06 DIAGNOSIS — I65.29 OCCLUSION AND STENOSIS OF UNSPECIFIED CAROTID ARTERY: ICD-10-CM

## 2020-10-06 DIAGNOSIS — Z82.49 FAMILY HISTORY OF ISCHEMIC HEART DISEASE AND OTHER DISEASES OF THE CIRCULATORY SYSTEM: ICD-10-CM

## 2020-10-06 DIAGNOSIS — I11.9 HYPERTENSIVE HEART DISEASE WITHOUT HEART FAILURE: ICD-10-CM

## 2020-10-06 DIAGNOSIS — Z87.891 PERSONAL HISTORY OF NICOTINE DEPENDENCE: ICD-10-CM

## 2020-10-06 PROCEDURE — 11042 DBRDMT SUBQ TIS 1ST 20SQCM/<: CPT

## 2020-10-06 PROCEDURE — 11045 DBRDMT SUBQ TISS EACH ADDL: CPT

## 2020-10-06 NOTE — ASSESSMENT
[Verbal] : Verbal [Written] : Written [Demo] : Demo [Patient] : Patient [Good - alert, interested, motivated] : Good - alert, interested, motivated [Verbalizes knowledge/Understanding] : Verbalizes knowledge/understanding [Dressing changes] : dressing changes [Skin Care] : skin care [Pressure relief] : pressure relief [Signs and symptoms of infection] : sign and symptoms of infection [How and When to Call] : how and when to call [Pain Management] : pain management [Patient responsibility to plan of care] : patient responsibility to plan of care [Stable] : stable [Home] : Home [Cane] : Cane [Not Applicable - Long Term Care/Home Health Agency] : Long Term Care/Home Health Agency: Not Applicable [] : No [FreeTextEntry2] : Infection Prevention\par Localized Wound Care\par Achieve acceptable pain level with use of pharmacological and nonpharmacological interventions \par Pressure Relief\par Promote Optimal Skin Integrity\par Autolytic Debridement  [FreeTextEntry3] : Increase of slough within the wound bed, wound debridement during today's visit  [FreeTextEntry4] : F/U to [facility] in one week \par

## 2020-10-06 NOTE — REASON FOR VISIT
[FreeTextEntry5] : Right Knee wound [FreeTextEntry4] : Right knee wound with mostly slough, no drainage, no acute infection\par  [FreeTextEntry3] : Slough and necrotic tissue right knee laceration. [FreeTextEntry6] : Slough and necrotic tissue right knee laceration. [FreeTextEntry7] : Slough and necrotic tissue right knee laceration.

## 2020-10-06 NOTE — VITALS
[Sharp] : sharp [Burning] : burning [Stabbing] : stabbing [Shooting] : shooting [Unbearable] : unbearable [Occasional] : occasional [] : No [de-identified] : Pt reports pain 9/10 [FreeTextEntry3] : Right knee  [FreeTextEntry1] : Pressure and Aleve  [FreeTextEntry2] : Cleaning wound and applying bandage  [FreeTextEntry4] : Aleve

## 2020-10-06 NOTE — PHYSICAL EXAM
[Normal Heart Sounds] : normal heart sounds [2+] : left 2+ [0] : left 0 [Ankle Swelling Bilaterally] : bilaterally  [Alert] : alert [Oriented to Person] : oriented to person [Calm] : calm [4 x 4] : 4 x 4  [JVD] : no jugular venous distention  [Ankle Swelling (On Exam)] : not present [Varicose Veins Of Lower Extremities] : not present [] : not present [de-identified] : WD/WN in no acute distress. [de-identified] : WNL [de-identified] : KORINL [de-identified] : WNL [de-identified] : Right knee laceration is clean, mostly slough, no acute infection, periwound skin is intact with no cellulitis. [FreeTextEntry1] : Right Knee  [FreeTextEntry2] : 5.5 [FreeTextEntry3] : 3.5 [FreeTextEntry4] : 0.2 [de-identified] : Serosanguineous  [de-identified] : 75% [de-identified] : 2.5 Lidocaine Topical  [de-identified] : Medihoney  [de-identified] : Cleansed with Normal Saline\par Rolled Gauze  \par Post Debridement Measurement: \par 5.6 x 3.6 x 0.2 [TWNoteComboBox4] : Moderate [TWNoteComboBox5] : No [de-identified] : No [de-identified] : Normal [de-identified] : Mild [de-identified] : None [de-identified] : <20% [de-identified] : Yes [de-identified] : 1% Lidocaine Injection [TWNoteComboBox7] : Sacha [de-identified] : 3x Weekly

## 2020-10-06 NOTE — PROCEDURE
[Saline] : saline [Topical Lidocaine] : topical lidocaine  [Injectable Novocain] : injectable novocain [Fibrotic] : fibrotic [Slough] : slough [Necrotic] : necrotic [Scalpel] : scalpel [Sharp curette] : sharp curette [Fat] : fat [Subcutaneous tissue] : subcutaneous tissue [Clean] : clean [Pressure] : pressure [FreeTextEntry1] : Medihoney, dry dressing, ACE wrap.  [] : No [FreeTextEntry9] : 10:15 AM [de-identified] : Slough and necrotic tissue right knee laceration. [de-identified] : LAVELLE Hinojosa [de-identified] : None [FreeTextEntry6] : Slough and necrotic tissue right knee laceration. [FreeTextEntry7] : Slough and necrotic tissue right knee laceration. [de-identified] : Lidocaine cream and 10cc of 1% plain Lidocaine [de-identified] : 2cc [de-identified] : None

## 2020-10-08 ENCOUNTER — APPOINTMENT (OUTPATIENT)
Dept: CARDIOLOGY | Facility: CLINIC | Age: 75
End: 2020-10-08

## 2020-10-13 ENCOUNTER — APPOINTMENT (OUTPATIENT)
Dept: PLASTIC SURGERY | Facility: HOSPITAL | Age: 75
End: 2020-10-13
Payer: MEDICARE

## 2020-10-13 ENCOUNTER — OUTPATIENT (OUTPATIENT)
Dept: OUTPATIENT SERVICES | Facility: HOSPITAL | Age: 75
LOS: 1 days | Discharge: HOME | End: 2020-10-13
Payer: MEDICARE

## 2020-10-13 VITALS
HEIGHT: 62 IN | TEMPERATURE: 97.7 F | OXYGEN SATURATION: 95 % | HEART RATE: 85 BPM | SYSTOLIC BLOOD PRESSURE: 130 MMHG | BODY MASS INDEX: 21.71 KG/M2 | RESPIRATION RATE: 20 BRPM | WEIGHT: 118 LBS | DIASTOLIC BLOOD PRESSURE: 68 MMHG

## 2020-10-13 DIAGNOSIS — Z98.89 OTHER SPECIFIED POSTPROCEDURAL STATES: Chronic | ICD-10-CM

## 2020-10-13 DIAGNOSIS — S81.011A LACERATION WITHOUT FOREIGN BODY, RIGHT KNEE, INITIAL ENCOUNTER: ICD-10-CM

## 2020-10-13 DIAGNOSIS — Z98.890 OTHER SPECIFIED POSTPROCEDURAL STATES: Chronic | ICD-10-CM

## 2020-10-13 DIAGNOSIS — Z90.49 ACQUIRED ABSENCE OF OTHER SPECIFIED PARTS OF DIGESTIVE TRACT: Chronic | ICD-10-CM

## 2020-10-13 PROCEDURE — G0463: CPT

## 2020-10-13 PROCEDURE — 99213 OFFICE O/P EST LOW 20 MIN: CPT

## 2020-10-13 NOTE — ASSESSMENT
[Verbal] : Verbal [Demo] : Demo [Patient] : Patient [Spouse] : Spouse [Fair - mild discomfort, physical impairment, low acceptance] : Fair - mild discomfort, physical impairment, low acceptance [Needs reinforcement] : needs reinforcement [Dressing changes] : dressing changes [Skin Care] : skin care [Pressure relief] : pressure relief [Signs and symptoms of infection] : sign and symptoms of infection [How and When to Call] : how and when to call [Pain Management] : pain management [Patient responsibility to plan of care] : patient responsibility to plan of care [] : Yes [Stable] : stable [Home] : Home [Cane] : Cane [Not Applicable - Long Term Care/Home Health Agency] : Long Term Care/Home Health Agency: Not Applicable [FreeTextEntry2] : Infection Prevention\par Localized Wound Care\par Achieve acceptable pain level with use of pharmacological and nonpharmacological interventions \par Autolytic Debridement\par Restore Skin Integrity  [FreeTextEntry4] : F/U to [facility] in one week \par Pt was given physician access line to contact a pain management physician \par

## 2020-10-13 NOTE — HISTORY OF PRESENT ILLNESS
[FreeTextEntry1] : Right knee wound is mostly slough, no C/O\par 10/13/20 right knee wound  since debridement.

## 2020-10-13 NOTE — VITALS
[] : No [de-identified] : Pt reports pain 8/10 [FreeTextEntry3] : Right Knee [FreeTextEntry1] : Advil or Aleve  [FreeTextEntry2] : Cleaning wound  [FreeTextEntry4] : Bandage application

## 2020-10-13 NOTE — PHYSICAL EXAM
[4 x 4] : 4 x 4  [Normal Heart Sounds] : normal heart sounds [2+] : left 2+ [0] : left 0 [Ankle Swelling Bilaterally] : bilaterally  [Alert] : alert [Oriented to Person] : oriented to person [Calm] : calm [JVD] : no jugular venous distention  [Ankle Swelling (On Exam)] : not present [Varicose Veins Of Lower Extremities] : not present [] : not present [de-identified] : WD/WN in no acute distress. [de-identified] : WNL [de-identified] : KORINL [de-identified] : WNL [de-identified] : Right knee laceration is clean, mostly slough, no acute infection, periwound skin is intact with no cellulitis. [FreeTextEntry1] : Right Knee  [FreeTextEntry2] : 5.7 [FreeTextEntry3] : 4.0 [FreeTextEntry4] : 0.1 [de-identified] : Serosanguineous  [de-identified] : Intact [de-identified] : 75% [de-identified] : Medihoney  [de-identified] : Cleansed with Normal Saline\par Kerlix  [TWNoteComboBox4] : Small [TWNoteComboBox5] : No [de-identified] : No [de-identified] : None [de-identified] : None [de-identified] : <20% [de-identified] : Yes [de-identified] : 3x Weekly

## 2020-10-14 DIAGNOSIS — K21.9 GASTRO-ESOPHAGEAL REFLUX DISEASE WITHOUT ESOPHAGITIS: ICD-10-CM

## 2020-10-14 DIAGNOSIS — Z83.3 FAMILY HISTORY OF DIABETES MELLITUS: ICD-10-CM
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
## 2020-10-14 DIAGNOSIS — Z82.3 FAMILY HISTORY OF STROKE: ICD-10-CM

## 2020-10-14 DIAGNOSIS — S81.011D LACERATION WITHOUT FOREIGN BODY, RIGHT KNEE, SUBSEQUENT ENCOUNTER: ICD-10-CM

## 2020-10-14 DIAGNOSIS — Z87.01 PERSONAL HISTORY OF PNEUMONIA (RECURRENT): ICD-10-CM

## 2020-10-14 DIAGNOSIS — I65.29 OCCLUSION AND STENOSIS OF UNSPECIFIED CAROTID ARTERY: ICD-10-CM

## 2020-10-14 DIAGNOSIS — Z98.49 CATARACT EXTRACTION STATUS, UNSPECIFIED EYE: ICD-10-CM

## 2020-10-14 DIAGNOSIS — Z86.73 PERSONAL HISTORY OF TRANSIENT ISCHEMIC ATTACK (TIA), AND CEREBRAL INFARCTION WITHOUT RESIDUAL DEFICITS: ICD-10-CM

## 2020-10-14 DIAGNOSIS — I11.9 HYPERTENSIVE HEART DISEASE WITHOUT HEART FAILURE: ICD-10-CM

## 2020-10-14 DIAGNOSIS — Z88.0 ALLERGY STATUS TO PENICILLIN: ICD-10-CM
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
## 2020-10-14 DIAGNOSIS — Z79.899 OTHER LONG TERM (CURRENT) DRUG THERAPY: ICD-10-CM

## 2020-10-14 DIAGNOSIS — Z88.1 ALLERGY STATUS TO OTHER ANTIBIOTIC AGENTS STATUS: ICD-10-CM

## 2020-10-14 DIAGNOSIS — Z80.1 FAMILY HISTORY OF MALIGNANT NEOPLASM OF TRACHEA, BRONCHUS AND LUNG: ICD-10-CM

## 2020-10-14 DIAGNOSIS — Z87.891 PERSONAL HISTORY OF NICOTINE DEPENDENCE: ICD-10-CM

## 2020-10-14 DIAGNOSIS — Z91.041 RADIOGRAPHIC DYE ALLERGY STATUS: ICD-10-CM
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
## 2020-10-14 DIAGNOSIS — E78.5 HYPERLIPIDEMIA, UNSPECIFIED: ICD-10-CM

## 2020-10-14 DIAGNOSIS — Z90.49 ACQUIRED ABSENCE OF OTHER SPECIFIED PARTS OF DIGESTIVE TRACT: ICD-10-CM

## 2020-10-14 DIAGNOSIS — Z79.82 LONG TERM (CURRENT) USE OF ASPIRIN: ICD-10-CM

## 2020-10-14 DIAGNOSIS — M06.9 RHEUMATOID ARTHRITIS, UNSPECIFIED: ICD-10-CM

## 2020-10-14 DIAGNOSIS — J44.9 CHRONIC OBSTRUCTIVE PULMONARY DISEASE, UNSPECIFIED: ICD-10-CM

## 2020-10-14 DIAGNOSIS — G57.61 LESION OF PLANTAR NERVE, RIGHT LOWER LIMB: ICD-10-CM

## 2020-10-14 DIAGNOSIS — Z88.8 ALLERGY STATUS TO OTHER DRUGS, MEDICAMENTS AND BIOLOGICAL SUBSTANCES: ICD-10-CM

## 2020-10-14 DIAGNOSIS — Z90.2 ACQUIRED ABSENCE OF LUNG [PART OF]: ICD-10-CM

## 2020-10-14 DIAGNOSIS — Z82.49 FAMILY HISTORY OF ISCHEMIC HEART DISEASE AND OTHER DISEASES OF THE CIRCULATORY SYSTEM: ICD-10-CM
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
## 2020-10-14 DIAGNOSIS — Z85.118 PERSONAL HISTORY OF OTHER MALIGNANT NEOPLASM OF BRONCHUS AND LUNG: ICD-10-CM

## 2020-10-19 ENCOUNTER — APPOINTMENT (OUTPATIENT)
Dept: SURGERY | Facility: HOSPITAL | Age: 75
End: 2020-10-19
Payer: MEDICARE

## 2020-10-19 ENCOUNTER — OUTPATIENT (OUTPATIENT)
Dept: OUTPATIENT SERVICES | Facility: HOSPITAL | Age: 75
LOS: 1 days | Discharge: HOME | End: 2020-10-19
Payer: MEDICARE

## 2020-10-19 VITALS
BODY MASS INDEX: 21.71 KG/M2 | SYSTOLIC BLOOD PRESSURE: 130 MMHG | DIASTOLIC BLOOD PRESSURE: 72 MMHG | TEMPERATURE: 97 F | HEART RATE: 77 BPM | WEIGHT: 118 LBS | OXYGEN SATURATION: 99 % | RESPIRATION RATE: 20 BRPM | HEIGHT: 62 IN

## 2020-10-19 DIAGNOSIS — Z98.890 OTHER SPECIFIED POSTPROCEDURAL STATES: Chronic | ICD-10-CM

## 2020-10-19 DIAGNOSIS — S81.011A LACERATION WITHOUT FOREIGN BODY, RIGHT KNEE, INITIAL ENCOUNTER: ICD-10-CM

## 2020-10-19 DIAGNOSIS — Z98.89 OTHER SPECIFIED POSTPROCEDURAL STATES: Chronic | ICD-10-CM

## 2020-10-19 DIAGNOSIS — Z90.49 ACQUIRED ABSENCE OF OTHER SPECIFIED PARTS OF DIGESTIVE TRACT: Chronic | ICD-10-CM

## 2020-10-19 PROCEDURE — 99213 OFFICE O/P EST LOW 20 MIN: CPT

## 2020-10-19 PROCEDURE — G0463: CPT

## 2020-10-19 NOTE — ASSESSMENT
[Verbal] : Verbal [Demo] : Demo [Good - alert, interested, motivated] : Good - alert, interested, motivated [Patient] : Patient [Verbalizes knowledge/Understanding] : Verbalizes knowledge/understanding [Dressing changes] : dressing changes [Skin Care] : skin care [Pressure relief] : pressure relief [Signs and symptoms of infection] : sign and symptoms of infection [How and When to Call] : how and when to call [Pain Management] : pain management [Patient responsibility to plan of care] : patient responsibility to plan of care [Stable] : stable [Home] : Home [Cane] : Cane [Not Applicable - Long Term Care/Home Health Agency] : Long Term Care/Home Health Agency: Not Applicable [] : No [FreeTextEntry4] : F/U to [facility] in 2 weeks \par Pt did not feel that she needed to follow up with pain management at this time.  She stated that she has adequate relief with OTC anti-inflamatories   [FreeTextEntry2] : Infection Prevention\par Localized Wound Care\par Autolytic debridement\par Offloading / Pressure Relief\par Achieve acceptable pain level with use of pharmacological and nonpharmacological interventions\par  [FreeTextEntry1] : Right knee wound is stable, more granulation tissue, no acute infection\par

## 2020-10-19 NOTE — VITALS
[] : No [de-identified] : Pt reports pain 5/10 [FreeTextEntry3] : Right Knee  [FreeTextEntry4] : Advil  [FreeTextEntry2] : Cleaning wound  [FreeTextEntry1] : Advil  [FreeTextEntry5] : pt was put on azithromycin from dermatologist for wound last week.  Course of antibiotics completed

## 2020-10-19 NOTE — PHYSICAL EXAM
[4 x 4] : 4 x 4  [Normal Heart Sounds] : normal heart sounds [2+] : left 2+ [0] : left 0 [Ankle Swelling Bilaterally] : bilaterally  [Alert] : alert [Oriented to Person] : oriented to person [Calm] : calm [JVD] : no jugular venous distention  [Varicose Veins Of Lower Extremities] : not present [Ankle Swelling (On Exam)] : not present [] : not present [de-identified] : WD/WN in no acute distress. [de-identified] : WNL [de-identified] : KORINL [de-identified] : Right knee laceration is clean, more granulation tissue, no drainage, no acute infection, periwound skin is intact with no cellulitis. [de-identified] : WNL [FreeTextEntry2] : 5.3 [FreeTextEntry1] : Right Knee  [de-identified] : Serosanguineous  [FreeTextEntry3] : 3.5 [FreeTextEntry4] : 0.1 [de-identified] : 50% [de-identified] : Intact  [de-identified] : Medihoney  [de-identified] : Cleansed with Normal Saline \par Gauze \par Kerlix  [TWNoteComboBox5] : No [TWNoteComboBox4] : Moderate [de-identified] : None [de-identified] : No [de-identified] : None [de-identified] : 50% [de-identified] : 3x Weekly [de-identified] : Yes

## 2020-10-20 DIAGNOSIS — Z90.49 ACQUIRED ABSENCE OF OTHER SPECIFIED PARTS OF DIGESTIVE TRACT: ICD-10-CM
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
## 2020-10-20 DIAGNOSIS — Z88.0 ALLERGY STATUS TO PENICILLIN: ICD-10-CM

## 2020-10-20 DIAGNOSIS — G57.61 LESION OF PLANTAR NERVE, RIGHT LOWER LIMB: ICD-10-CM

## 2020-10-20 DIAGNOSIS — Z86.73 PERSONAL HISTORY OF TRANSIENT ISCHEMIC ATTACK (TIA), AND CEREBRAL INFARCTION WITHOUT RESIDUAL DEFICITS: ICD-10-CM

## 2020-10-20 DIAGNOSIS — Z79.82 LONG TERM (CURRENT) USE OF ASPIRIN: ICD-10-CM

## 2020-10-20 DIAGNOSIS — J44.9 CHRONIC OBSTRUCTIVE PULMONARY DISEASE, UNSPECIFIED: ICD-10-CM

## 2020-10-20 DIAGNOSIS — M06.9 RHEUMATOID ARTHRITIS, UNSPECIFIED: ICD-10-CM

## 2020-10-20 DIAGNOSIS — Z79.899 OTHER LONG TERM (CURRENT) DRUG THERAPY: ICD-10-CM

## 2020-10-20 DIAGNOSIS — Z85.118 PERSONAL HISTORY OF OTHER MALIGNANT NEOPLASM OF BRONCHUS AND LUNG: ICD-10-CM

## 2020-10-20 DIAGNOSIS — Z87.01 PERSONAL HISTORY OF PNEUMONIA (RECURRENT): ICD-10-CM

## 2020-10-20 DIAGNOSIS — S81.011D LACERATION WITHOUT FOREIGN BODY, RIGHT KNEE, SUBSEQUENT ENCOUNTER: ICD-10-CM

## 2020-10-20 DIAGNOSIS — Z82.49 FAMILY HISTORY OF ISCHEMIC HEART DISEASE AND OTHER DISEASES OF THE CIRCULATORY SYSTEM: ICD-10-CM

## 2020-10-20 DIAGNOSIS — Z88.1 ALLERGY STATUS TO OTHER ANTIBIOTIC AGENTS STATUS: ICD-10-CM

## 2020-10-20 DIAGNOSIS — Z98.49 CATARACT EXTRACTION STATUS, UNSPECIFIED EYE: ICD-10-CM
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
## 2020-10-20 DIAGNOSIS — E78.5 HYPERLIPIDEMIA, UNSPECIFIED: ICD-10-CM

## 2020-10-20 DIAGNOSIS — Z82.3 FAMILY HISTORY OF STROKE: ICD-10-CM
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
## 2020-10-20 DIAGNOSIS — Z88.8 ALLERGY STATUS TO OTHER DRUGS, MEDICAMENTS AND BIOLOGICAL SUBSTANCES: ICD-10-CM

## 2020-10-20 DIAGNOSIS — I11.9 HYPERTENSIVE HEART DISEASE WITHOUT HEART FAILURE: ICD-10-CM

## 2020-10-20 DIAGNOSIS — Z83.3 FAMILY HISTORY OF DIABETES MELLITUS: ICD-10-CM

## 2020-10-20 DIAGNOSIS — Z80.1 FAMILY HISTORY OF MALIGNANT NEOPLASM OF TRACHEA, BRONCHUS AND LUNG: ICD-10-CM

## 2020-10-20 DIAGNOSIS — Z90.2 ACQUIRED ABSENCE OF LUNG [PART OF]: ICD-10-CM

## 2020-10-20 DIAGNOSIS — I65.29 OCCLUSION AND STENOSIS OF UNSPECIFIED CAROTID ARTERY: ICD-10-CM

## 2020-10-20 DIAGNOSIS — Z87.891 PERSONAL HISTORY OF NICOTINE DEPENDENCE: ICD-10-CM

## 2020-10-20 DIAGNOSIS — K21.9 GASTRO-ESOPHAGEAL REFLUX DISEASE WITHOUT ESOPHAGITIS: ICD-10-CM

## 2020-10-20 DIAGNOSIS — Z91.041 RADIOGRAPHIC DYE ALLERGY STATUS: ICD-10-CM
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
## 2020-10-23 ENCOUNTER — APPOINTMENT (OUTPATIENT)
Dept: CARDIOLOGY | Facility: CLINIC | Age: 75
End: 2020-10-23

## 2020-11-03 ENCOUNTER — APPOINTMENT (OUTPATIENT)
Dept: PLASTIC SURGERY | Facility: HOSPITAL | Age: 75
End: 2020-11-03
Payer: MEDICARE

## 2020-11-03 ENCOUNTER — OUTPATIENT (OUTPATIENT)
Dept: OUTPATIENT SERVICES | Facility: HOSPITAL | Age: 75
LOS: 1 days | Discharge: HOME | End: 2020-11-03
Payer: MEDICARE

## 2020-11-03 ENCOUNTER — RESULT REVIEW (OUTPATIENT)
Age: 75
End: 2020-11-03

## 2020-11-03 VITALS
DIASTOLIC BLOOD PRESSURE: 82 MMHG | HEIGHT: 62 IN | SYSTOLIC BLOOD PRESSURE: 160 MMHG | OXYGEN SATURATION: 95 % | BODY MASS INDEX: 21.71 KG/M2 | HEART RATE: 83 BPM | WEIGHT: 118 LBS | RESPIRATION RATE: 20 BRPM

## 2020-11-03 DIAGNOSIS — S81.011A LACERATION WITHOUT FOREIGN BODY, RIGHT KNEE, INITIAL ENCOUNTER: ICD-10-CM

## 2020-11-03 DIAGNOSIS — Z98.89 OTHER SPECIFIED POSTPROCEDURAL STATES: Chronic | ICD-10-CM

## 2020-11-03 DIAGNOSIS — Z90.49 ACQUIRED ABSENCE OF OTHER SPECIFIED PARTS OF DIGESTIVE TRACT: Chronic | ICD-10-CM

## 2020-11-03 DIAGNOSIS — Z98.890 OTHER SPECIFIED POSTPROCEDURAL STATES: Chronic | ICD-10-CM

## 2020-11-03 PROCEDURE — 73564 X-RAY EXAM KNEE 4 OR MORE: CPT

## 2020-11-03 PROCEDURE — G0463: CPT

## 2020-11-03 PROCEDURE — 73564 X-RAY EXAM KNEE 4 OR MORE: CPT | Mod: 26,RT

## 2020-11-03 PROCEDURE — 99213 OFFICE O/P EST LOW 20 MIN: CPT

## 2020-11-03 NOTE — ASSESSMENT
[Verbal] : Verbal [Demo] : Demo [Patient] : Patient [Good - alert, interested, motivated] : Good - alert, interested, motivated [Verbalizes knowledge/Understanding] : Verbalizes knowledge/understanding [Dressing changes] : dressing changes [Skin Care] : skin care [Pressure relief] : pressure relief [Signs and symptoms of infection] : sign and symptoms of infection [How and When to Call] : how and when to call [Pain Management] : pain management [Patient responsibility to plan of care] : patient responsibility to plan of care [Stable] : stable [Home] : Home [Cane] : Cane [Not Applicable - Long Term Care/Home Health Agency] : Long Term Care/Home Health Agency: Not Applicable [Labs and Tests] : labs and tests [] : No [FreeTextEntry2] : Infection Prevention\par Localized Wound Care\par Autolytic debridement\par Offloading / Pressure Relief\par Achieve acceptable pain level with use of pharmacological and nonpharmacological interventions\par Xray\par F/U 2 weeks [FreeTextEntry4] : MD ordered an xray of the right knee, patient will complete xray post assessment\par Patient requested 4x4 gauze and Tegaderm be ordered through [facility], request sent to \par MD ordered Medihoney, patient is aware of need to  prescription\par F/U  2 weeks \par  [FreeTextEntry1] : Right knee wound is stable, more granulation tissue, no acute infection\par

## 2020-11-03 NOTE — PHYSICAL EXAM
[4 x 4] : 4 x 4  [Normal Heart Sounds] : normal heart sounds [2+] : left 2+ [0] : left 0 [Ankle Swelling Bilaterally] : bilaterally  [Alert] : alert [Oriented to Person] : oriented to person [Calm] : calm [Abdominal Pad] : Abdominal Pad [JVD] : no jugular venous distention  [Ankle Swelling (On Exam)] : not present [Varicose Veins Of Lower Extremities] : not present [] : not present [de-identified] : WD/WN in no acute distress. [de-identified] : WNL [de-identified] : KORINL [de-identified] : WNL [de-identified] : Right knee laceration is clean, more granulation tissue, no drainage, no acute infection, periwound skin is intact with no cellulitis. [FreeTextEntry1] :  Knee  [FreeTextEntry2] : 4.9 [FreeTextEntry3] : 3.1 [FreeTextEntry4] : 0.1 [de-identified] : Serosanguineous  [de-identified] : mild  [de-identified] : 50% [de-identified] : Medihoney, Alginate [de-identified] : Cleansed with Normal Saline \par  [TWNoteComboBox1] : Right [TWNoteComboBox4] : Moderate [TWNoteComboBox5] : No [de-identified] : No [de-identified] : Erythema [de-identified] : None [de-identified] : None [de-identified] : 50% [de-identified] : Yes [de-identified] : Daily [de-identified] : Primary Dressing

## 2020-11-03 NOTE — VITALS
[Pain related to present condition?] : The patient's  pain is related to present condition. [Burning] : burning [Continuous] : continuous [] : No [de-identified] : 8/10 [FreeTextEntry3] : Right Knee  [FreeTextEntry1] : Gentamycin

## 2020-11-04 DIAGNOSIS — Z80.1 FAMILY HISTORY OF MALIGNANT NEOPLASM OF TRACHEA, BRONCHUS AND LUNG: ICD-10-CM

## 2020-11-04 DIAGNOSIS — J44.9 CHRONIC OBSTRUCTIVE PULMONARY DISEASE, UNSPECIFIED: ICD-10-CM

## 2020-11-04 DIAGNOSIS — Z82.49 FAMILY HISTORY OF ISCHEMIC HEART DISEASE AND OTHER DISEASES OF THE CIRCULATORY SYSTEM: ICD-10-CM

## 2020-11-04 DIAGNOSIS — Z90.49 ACQUIRED ABSENCE OF OTHER SPECIFIED PARTS OF DIGESTIVE TRACT: ICD-10-CM

## 2020-11-04 DIAGNOSIS — I65.29 OCCLUSION AND STENOSIS OF UNSPECIFIED CAROTID ARTERY: ICD-10-CM

## 2020-11-04 DIAGNOSIS — Z85.118 PERSONAL HISTORY OF OTHER MALIGNANT NEOPLASM OF BRONCHUS AND LUNG: ICD-10-CM
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
## 2020-11-04 DIAGNOSIS — K21.9 GASTRO-ESOPHAGEAL REFLUX DISEASE WITHOUT ESOPHAGITIS: ICD-10-CM

## 2020-11-04 DIAGNOSIS — Z82.3 FAMILY HISTORY OF STROKE: ICD-10-CM

## 2020-11-04 DIAGNOSIS — Z91.041 RADIOGRAPHIC DYE ALLERGY STATUS: ICD-10-CM

## 2020-11-04 DIAGNOSIS — Z88.8 ALLERGY STATUS TO OTHER DRUGS, MEDICAMENTS AND BIOLOGICAL SUBSTANCES STATUS: ICD-10-CM

## 2020-11-04 DIAGNOSIS — Z86.73 PERSONAL HISTORY OF TRANSIENT ISCHEMIC ATTACK (TIA), AND CEREBRAL INFARCTION WITHOUT RESIDUAL DEFICITS: ICD-10-CM

## 2020-11-04 DIAGNOSIS — Z88.0 ALLERGY STATUS TO PENICILLIN: ICD-10-CM

## 2020-11-04 DIAGNOSIS — E78.5 HYPERLIPIDEMIA, UNSPECIFIED: ICD-10-CM

## 2020-11-04 DIAGNOSIS — S81.011D LACERATION WITHOUT FOREIGN BODY, RIGHT KNEE, SUBSEQUENT ENCOUNTER: ICD-10-CM

## 2020-11-04 DIAGNOSIS — M06.9 RHEUMATOID ARTHRITIS, UNSPECIFIED: ICD-10-CM

## 2020-11-04 DIAGNOSIS — Z87.01 PERSONAL HISTORY OF PNEUMONIA (RECURRENT): ICD-10-CM

## 2020-11-04 DIAGNOSIS — Z87.891 PERSONAL HISTORY OF NICOTINE DEPENDENCE: ICD-10-CM

## 2020-11-04 DIAGNOSIS — Z79.82 LONG TERM (CURRENT) USE OF ASPIRIN: ICD-10-CM

## 2020-11-04 DIAGNOSIS — G57.61 LESION OF PLANTAR NERVE, RIGHT LOWER LIMB: ICD-10-CM

## 2020-11-04 DIAGNOSIS — Z83.3 FAMILY HISTORY OF DIABETES MELLITUS: ICD-10-CM
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
## 2020-11-04 DIAGNOSIS — Z90.2 ACQUIRED ABSENCE OF LUNG [PART OF]: ICD-10-CM
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
## 2020-11-04 DIAGNOSIS — Z88.1 ALLERGY STATUS TO OTHER ANTIBIOTIC AGENTS STATUS: ICD-10-CM

## 2020-11-04 DIAGNOSIS — I11.9 HYPERTENSIVE HEART DISEASE WITHOUT HEART FAILURE: ICD-10-CM

## 2020-11-04 DIAGNOSIS — Z98.49 CATARACT EXTRACTION STATUS, UNSPECIFIED EYE: ICD-10-CM

## 2020-11-04 DIAGNOSIS — Z79.899 OTHER LONG TERM (CURRENT) DRUG THERAPY: ICD-10-CM

## 2020-11-06 ENCOUNTER — NON-APPOINTMENT (OUTPATIENT)
Age: 75
End: 2020-11-06

## 2020-11-16 ENCOUNTER — NON-APPOINTMENT (OUTPATIENT)
Age: 75
End: 2020-11-16

## 2020-11-17 ENCOUNTER — APPOINTMENT (OUTPATIENT)
Dept: SURGERY | Facility: HOSPITAL | Age: 75
End: 2020-11-17
Payer: MEDICARE

## 2020-11-17 ENCOUNTER — OUTPATIENT (OUTPATIENT)
Dept: OUTPATIENT SERVICES | Facility: HOSPITAL | Age: 75
LOS: 1 days | Discharge: HOME | End: 2020-11-17
Payer: MEDICARE

## 2020-11-17 VITALS
DIASTOLIC BLOOD PRESSURE: 62 MMHG | RESPIRATION RATE: 20 BRPM | OXYGEN SATURATION: 96 % | TEMPERATURE: 97.6 F | HEART RATE: 90 BPM | HEIGHT: 62 IN | SYSTOLIC BLOOD PRESSURE: 110 MMHG | BODY MASS INDEX: 21.71 KG/M2 | WEIGHT: 118 LBS

## 2020-11-17 DIAGNOSIS — Z98.890 OTHER SPECIFIED POSTPROCEDURAL STATES: Chronic | ICD-10-CM

## 2020-11-17 DIAGNOSIS — Z90.49 ACQUIRED ABSENCE OF OTHER SPECIFIED PARTS OF DIGESTIVE TRACT: Chronic | ICD-10-CM

## 2020-11-17 DIAGNOSIS — Z98.89 OTHER SPECIFIED POSTPROCEDURAL STATES: Chronic | ICD-10-CM

## 2020-11-17 DIAGNOSIS — S81.011A LACERATION WITHOUT FOREIGN BODY, RIGHT KNEE, INITIAL ENCOUNTER: ICD-10-CM

## 2020-11-17 PROCEDURE — 99213 OFFICE O/P EST LOW 20 MIN: CPT

## 2020-11-17 PROCEDURE — G0463: CPT

## 2020-11-17 NOTE — PLAN
[FreeTextEntry1] : Medihoney, dry dressing, Lotrisone to periwound skin, return to office in two weeks.\par

## 2020-11-17 NOTE — ASSESSMENT
[Verbal] : Verbal [Demo] : Demo [Patient] : Patient [Good - alert, interested, motivated] : Good - alert, interested, motivated [Verbalizes knowledge/Understanding] : Verbalizes knowledge/understanding [Dressing changes] : dressing changes [Skin Care] : skin care [Pressure relief] : pressure relief [Signs and symptoms of infection] : sign and symptoms of infection [How and When to Call] : how and when to call [Labs and Tests] : labs and tests [Pain Management] : pain management [Patient responsibility to plan of care] : patient responsibility to plan of care [] : Yes [Stable] : stable [Home] : Home [Cane] : Cane [Not Applicable - Long Term Care/Home Health Agency] : Long Term Care/Home Health Agency: Not Applicable [FreeTextEntry2] : Infection Prevention\par Localized Wound Care\par Autolytic debridement\par Offloading / Pressure Relief\par Achieve acceptable pain level with use of pharmacological and nonpharmacological interventions [FreeTextEntry4] : Photo Taken\par F/U  2 weeks \par  [FreeTextEntry1] : Right knee wound is healing well, allergic reaction to Tagaderm dressing in periwound skin, no acute infection\par

## 2020-11-17 NOTE — PHYSICAL EXAM
[4 x 4] : 4 x 4  [Abdominal Pad] : Abdominal Pad [JVD] : no jugular venous distention  [Normal Heart Sounds] : normal heart sounds [2+] : left 2+ [0] : left 0 [Ankle Swelling (On Exam)] : not present [Ankle Swelling Bilaterally] : bilaterally  [Varicose Veins Of Lower Extremities] : bilaterally [] : bilaterally [Alert] : alert [Oriented to Person] : oriented to person [Calm] : calm [de-identified] : WD/WN in no acute distress. [de-identified] : WNL [de-identified] : KORINL [de-identified] : WNL [de-identified] : Right knee laceration is clean, more granulation tissue, no drainage, no acute infection, periwound skin is intact with no cellulitis. [FreeTextEntry1] :  Knee  [FreeTextEntry2] : 3.5 [FreeTextEntry3] : 3.0 [FreeTextEntry4] : 0.1 [de-identified] : Serosanguineous  [de-identified] : mild rash [de-identified] : <25% [de-identified] : Medihoney, Alginate [de-identified] : Cleansed with Normal Saline \par Lotrisone to rash  [TWNoteComboBox1] : Right [TWNoteComboBox4] : Moderate [TWNoteComboBox5] : No [de-identified] : No [de-identified] : Erythema [de-identified] : None [de-identified] : None [de-identified] : >75% [de-identified] : Yes [de-identified] : Daily [de-identified] : Primary Dressing
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
## 2020-11-18 DIAGNOSIS — S81.011D LACERATION WITHOUT FOREIGN BODY, RIGHT KNEE, SUBSEQUENT ENCOUNTER: ICD-10-CM
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
## 2020-11-18 DIAGNOSIS — Z88.0 ALLERGY STATUS TO PENICILLIN: ICD-10-CM

## 2020-11-18 DIAGNOSIS — Z80.1 FAMILY HISTORY OF MALIGNANT NEOPLASM OF TRACHEA, BRONCHUS AND LUNG: ICD-10-CM

## 2020-11-18 DIAGNOSIS — Z83.3 FAMILY HISTORY OF DIABETES MELLITUS: ICD-10-CM

## 2020-11-18 DIAGNOSIS — Z98.49 CATARACT EXTRACTION STATUS, UNSPECIFIED EYE: ICD-10-CM

## 2020-11-18 DIAGNOSIS — Z91.041 RADIOGRAPHIC DYE ALLERGY STATUS: ICD-10-CM

## 2020-11-18 DIAGNOSIS — M06.9 RHEUMATOID ARTHRITIS, UNSPECIFIED: ICD-10-CM

## 2020-11-18 DIAGNOSIS — Z82.49 FAMILY HISTORY OF ISCHEMIC HEART DISEASE AND OTHER DISEASES OF THE CIRCULATORY SYSTEM: ICD-10-CM

## 2020-11-18 DIAGNOSIS — Z88.8 ALLERGY STATUS TO OTHER DRUGS, MEDICAMENTS AND BIOLOGICAL SUBSTANCES: ICD-10-CM

## 2020-11-18 DIAGNOSIS — E78.5 HYPERLIPIDEMIA, UNSPECIFIED: ICD-10-CM
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
## 2020-11-18 DIAGNOSIS — Z90.49 ACQUIRED ABSENCE OF OTHER SPECIFIED PARTS OF DIGESTIVE TRACT: ICD-10-CM

## 2020-11-18 DIAGNOSIS — G57.61 LESION OF PLANTAR NERVE, RIGHT LOWER LIMB: ICD-10-CM

## 2020-11-18 DIAGNOSIS — Z82.3 FAMILY HISTORY OF STROKE: ICD-10-CM

## 2020-11-18 DIAGNOSIS — Z86.73 PERSONAL HISTORY OF TRANSIENT ISCHEMIC ATTACK (TIA), AND CEREBRAL INFARCTION WITHOUT RESIDUAL DEFICITS: ICD-10-CM

## 2020-11-18 DIAGNOSIS — Z79.82 LONG TERM (CURRENT) USE OF ASPIRIN: ICD-10-CM

## 2020-11-18 DIAGNOSIS — Z88.1 ALLERGY STATUS TO OTHER ANTIBIOTIC AGENTS STATUS: ICD-10-CM

## 2020-11-18 DIAGNOSIS — I65.29 OCCLUSION AND STENOSIS OF UNSPECIFIED CAROTID ARTERY: ICD-10-CM

## 2020-11-18 DIAGNOSIS — Z90.2 ACQUIRED ABSENCE OF LUNG [PART OF]: ICD-10-CM

## 2020-11-18 DIAGNOSIS — Z79.899 OTHER LONG TERM (CURRENT) DRUG THERAPY: ICD-10-CM

## 2020-11-18 DIAGNOSIS — Z87.891 PERSONAL HISTORY OF NICOTINE DEPENDENCE: ICD-10-CM

## 2020-11-18 DIAGNOSIS — K21.9 GASTRO-ESOPHAGEAL REFLUX DISEASE WITHOUT ESOPHAGITIS: ICD-10-CM

## 2020-11-18 DIAGNOSIS — I11.9 HYPERTENSIVE HEART DISEASE WITHOUT HEART FAILURE: ICD-10-CM

## 2020-11-18 DIAGNOSIS — Z87.01 PERSONAL HISTORY OF PNEUMONIA (RECURRENT): ICD-10-CM

## 2020-11-18 DIAGNOSIS — J44.9 CHRONIC OBSTRUCTIVE PULMONARY DISEASE, UNSPECIFIED: ICD-10-CM

## 2020-11-18 DIAGNOSIS — Z85.118 PERSONAL HISTORY OF OTHER MALIGNANT NEOPLASM OF BRONCHUS AND LUNG: ICD-10-CM

## 2020-12-01 ENCOUNTER — APPOINTMENT (OUTPATIENT)
Dept: SURGERY | Facility: HOSPITAL | Age: 75
End: 2020-12-01

## 2020-12-01 ENCOUNTER — OUTPATIENT (OUTPATIENT)
Dept: OUTPATIENT SERVICES | Facility: HOSPITAL | Age: 75
LOS: 1 days | Discharge: HOME | End: 2020-12-01
Payer: MEDICARE

## 2020-12-01 ENCOUNTER — APPOINTMENT (OUTPATIENT)
Dept: PLASTIC SURGERY | Facility: HOSPITAL | Age: 75
End: 2020-12-01
Payer: MEDICARE

## 2020-12-01 VITALS
DIASTOLIC BLOOD PRESSURE: 58 MMHG | WEIGHT: 118 LBS | TEMPERATURE: 97.4 F | RESPIRATION RATE: 20 BRPM | HEART RATE: 77 BPM | OXYGEN SATURATION: 98 % | BODY MASS INDEX: 21.71 KG/M2 | SYSTOLIC BLOOD PRESSURE: 120 MMHG | HEIGHT: 62 IN

## 2020-12-01 DIAGNOSIS — S81.011A LACERATION WITHOUT FOREIGN BODY, RIGHT KNEE, INITIAL ENCOUNTER: ICD-10-CM

## 2020-12-01 DIAGNOSIS — Z98.89 OTHER SPECIFIED POSTPROCEDURAL STATES: Chronic | ICD-10-CM

## 2020-12-01 DIAGNOSIS — Z98.890 OTHER SPECIFIED POSTPROCEDURAL STATES: Chronic | ICD-10-CM

## 2020-12-01 DIAGNOSIS — Z90.49 ACQUIRED ABSENCE OF OTHER SPECIFIED PARTS OF DIGESTIVE TRACT: Chronic | ICD-10-CM

## 2020-12-01 PROCEDURE — 99213 OFFICE O/P EST LOW 20 MIN: CPT

## 2020-12-01 PROCEDURE — 87070 CULTURE OTHR SPECIMN AEROBIC: CPT

## 2020-12-01 PROCEDURE — G0463: CPT

## 2020-12-01 NOTE — PHYSICAL EXAM
[4 x 4] : 4 x 4  [Abdominal Pad] : Abdominal Pad [Normal Heart Sounds] : normal heart sounds [2+] : left 2+ [0] : left 0 [Ankle Swelling Bilaterally] : bilaterally  [Alert] : alert [Oriented to Person] : oriented to person [Calm] : calm [JVD] : no jugular venous distention  [Ankle Swelling (On Exam)] : not present [Varicose Veins Of Lower Extremities] : not present [] : not present [de-identified] : WD/WN in no acute distress. [de-identified] : WNL [de-identified] : KORINL [de-identified] : WNL [de-identified] : Right knee laceration is clean, more granulation tissue, no drainage, no acute infection, periwound skin is intact with no cellulitis. [FreeTextEntry1] :  Knee  [FreeTextEntry2] : 3.5 [FreeTextEntry3] : 3.0 [FreeTextEntry4] : 0.1 [de-identified] : Serosanguineous  [de-identified] : Erythema/Rash [de-identified] : <25% [de-identified] : Adaptic, Silver Alginate [de-identified] : Cleansed with Normal Saline \par Lotrisone to periwound [TWNoteComboBox1] : Right [TWNoteComboBox4] : Moderate [TWNoteComboBox5] : No [de-identified] : No [de-identified] : Erythema [de-identified] : None [de-identified] : None [de-identified] : >75% [de-identified] : Yes [de-identified] : Every other day [de-identified] : Primary Dressing

## 2020-12-01 NOTE — REASON FOR VISIT
----- Message from Burt Benjamin MD sent at 9/9/2019 11:40 AM CDT -----  Stool for occult blood is negative   [Follow-Up: _____] : a [unfilled] follow-up visit

## 2020-12-01 NOTE — ASSESSMENT
[Verbal] : Verbal [Demo] : Demo [Patient] : Patient [Good - alert, interested, motivated] : Good - alert, interested, motivated [Verbalizes knowledge/Understanding] : Verbalizes knowledge/understanding [Dressing changes] : dressing changes [Skin Care] : skin care [Pressure relief] : pressure relief [Signs and symptoms of infection] : sign and symptoms of infection [How and When to Call] : how and when to call [Labs and Tests] : labs and tests [Pain Management] : pain management [Patient responsibility to plan of care] : patient responsibility to plan of care [Stable] : stable [Home] : Home [Cane] : Cane [Not Applicable - Long Term Care/Home Health Agency] : Long Term Care/Home Health Agency: Not Applicable [] : No [FreeTextEntry2] : Infection Prevention\par Localized Wound Care\par Autolytic debridement\par Offloading / Pressure Relief\par Achieve acceptable pain level with use of pharmacological and nonpharmacological interventions [FreeTextEntry3] : Wound remains the same in status [FreeTextEntry4] : Photo Taken\par Culture swab obtained and sent to lab\par F/U to [facility] in 1 week \par

## 2020-12-01 NOTE — HISTORY OF PRESENT ILLNESS
[FreeTextEntry1] : Right knee wound is clean, more granulation, no C/O\par 12/1/20 right knee culture taken. persistent erythema periwound area. little slough

## 2020-12-01 NOTE — VITALS
[Burning] : burning [Aching] : aching [] : No [de-identified] : Patient reports pain is 4/10. [FreeTextEntry3] : Right knee [FreeTextEntry1] : Advil, aleve [FreeTextEntry2] : Medihoney [FreeTextEntry4] : Advil and aleve at home

## 2020-12-01 NOTE — PLAN
[FreeTextEntry1] : AgAlginate,wound veil,, dry dressing, Lotrisone to periwound skin, return to office in one weeks.\par

## 2020-12-02 DIAGNOSIS — Z82.3 FAMILY HISTORY OF STROKE: ICD-10-CM

## 2020-12-02 DIAGNOSIS — I65.29 OCCLUSION AND STENOSIS OF UNSPECIFIED CAROTID ARTERY: ICD-10-CM

## 2020-12-02 DIAGNOSIS — J44.9 CHRONIC OBSTRUCTIVE PULMONARY DISEASE, UNSPECIFIED: ICD-10-CM

## 2020-12-02 DIAGNOSIS — Z88.1 ALLERGY STATUS TO OTHER ANTIBIOTIC AGENTS STATUS: ICD-10-CM

## 2020-12-02 DIAGNOSIS — S81.011D LACERATION WITHOUT FOREIGN BODY, RIGHT KNEE, SUBSEQUENT ENCOUNTER: ICD-10-CM

## 2020-12-02 DIAGNOSIS — Z86.73 PERSONAL HISTORY OF TRANSIENT ISCHEMIC ATTACK (TIA), AND CEREBRAL INFARCTION WITHOUT RESIDUAL DEFICITS: ICD-10-CM
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
## 2020-12-02 DIAGNOSIS — Z79.82 LONG TERM (CURRENT) USE OF ASPIRIN: ICD-10-CM

## 2020-12-02 DIAGNOSIS — Z79.899 OTHER LONG TERM (CURRENT) DRUG THERAPY: ICD-10-CM
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
## 2020-12-02 DIAGNOSIS — I11.9 HYPERTENSIVE HEART DISEASE WITHOUT HEART FAILURE: ICD-10-CM

## 2020-12-02 DIAGNOSIS — Z98.49 CATARACT EXTRACTION STATUS, UNSPECIFIED EYE: ICD-10-CM

## 2020-12-02 DIAGNOSIS — Z85.118 PERSONAL HISTORY OF OTHER MALIGNANT NEOPLASM OF BRONCHUS AND LUNG: ICD-10-CM

## 2020-12-02 DIAGNOSIS — M06.9 RHEUMATOID ARTHRITIS, UNSPECIFIED: ICD-10-CM
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
## 2020-12-02 DIAGNOSIS — Z91.041 RADIOGRAPHIC DYE ALLERGY STATUS: ICD-10-CM
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
## 2020-12-02 DIAGNOSIS — G57.61 LESION OF PLANTAR NERVE, RIGHT LOWER LIMB: ICD-10-CM

## 2020-12-02 DIAGNOSIS — Z87.01 PERSONAL HISTORY OF PNEUMONIA (RECURRENT): ICD-10-CM

## 2020-12-02 DIAGNOSIS — Z90.49 ACQUIRED ABSENCE OF OTHER SPECIFIED PARTS OF DIGESTIVE TRACT: ICD-10-CM

## 2020-12-02 DIAGNOSIS — Z80.1 FAMILY HISTORY OF MALIGNANT NEOPLASM OF TRACHEA, BRONCHUS AND LUNG: ICD-10-CM

## 2020-12-02 DIAGNOSIS — Z88.0 ALLERGY STATUS TO PENICILLIN: ICD-10-CM

## 2020-12-02 DIAGNOSIS — Z87.891 PERSONAL HISTORY OF NICOTINE DEPENDENCE: ICD-10-CM

## 2020-12-02 DIAGNOSIS — Z83.3 FAMILY HISTORY OF DIABETES MELLITUS: ICD-10-CM

## 2020-12-02 DIAGNOSIS — E78.5 HYPERLIPIDEMIA, UNSPECIFIED: ICD-10-CM

## 2020-12-02 DIAGNOSIS — K21.9 GASTRO-ESOPHAGEAL REFLUX DISEASE WITHOUT ESOPHAGITIS: ICD-10-CM

## 2020-12-02 DIAGNOSIS — Z90.2 ACQUIRED ABSENCE OF LUNG [PART OF]: ICD-10-CM

## 2020-12-02 DIAGNOSIS — Z88.8 ALLERGY STATUS TO OTHER DRUGS, MEDICAMENTS AND BIOLOGICAL SUBSTANCES: ICD-10-CM

## 2020-12-02 DIAGNOSIS — Z82.49 FAMILY HISTORY OF ISCHEMIC HEART DISEASE AND OTHER DISEASES OF THE CIRCULATORY SYSTEM: ICD-10-CM

## 2020-12-03 LAB
CULTURE RESULTS: SIGNIFICANT CHANGE UP
SPECIMEN SOURCE: SIGNIFICANT CHANGE UP

## 2020-12-08 ENCOUNTER — OUTPATIENT (OUTPATIENT)
Dept: OUTPATIENT SERVICES | Facility: HOSPITAL | Age: 75
LOS: 1 days | Discharge: HOME | End: 2020-12-08
Payer: MEDICARE

## 2020-12-08 ENCOUNTER — APPOINTMENT (OUTPATIENT)
Dept: SURGERY | Facility: HOSPITAL | Age: 75
End: 2020-12-08
Payer: MEDICARE

## 2020-12-08 VITALS
HEART RATE: 78 BPM | OXYGEN SATURATION: 98 % | BODY MASS INDEX: 21.71 KG/M2 | TEMPERATURE: 97.2 F | HEIGHT: 62 IN | SYSTOLIC BLOOD PRESSURE: 130 MMHG | WEIGHT: 118 LBS | DIASTOLIC BLOOD PRESSURE: 52 MMHG | RESPIRATION RATE: 20 BRPM

## 2020-12-08 DIAGNOSIS — Z98.89 OTHER SPECIFIED POSTPROCEDURAL STATES: Chronic | ICD-10-CM

## 2020-12-08 DIAGNOSIS — Z98.890 OTHER SPECIFIED POSTPROCEDURAL STATES: Chronic | ICD-10-CM

## 2020-12-08 DIAGNOSIS — Z90.49 ACQUIRED ABSENCE OF OTHER SPECIFIED PARTS OF DIGESTIVE TRACT: Chronic | ICD-10-CM

## 2020-12-08 DIAGNOSIS — S81.011D LACERATION WITHOUT FOREIGN BODY, RIGHT KNEE, SUBSEQUENT ENCOUNTER: ICD-10-CM

## 2020-12-08 PROCEDURE — G0463: CPT

## 2020-12-08 PROCEDURE — 99213 OFFICE O/P EST LOW 20 MIN: CPT

## 2020-12-08 NOTE — ASSESSMENT
[Verbal] : Verbal [Written] : Written [Patient] : Patient [Good - alert, interested, motivated] : Good - alert, interested, motivated [Verbalizes knowledge/Understanding] : Verbalizes knowledge/understanding [Dressing changes] : dressing changes [Skin Care] : skin care [Pressure relief] : pressure relief [Signs and symptoms of infection] : sign and symptoms of infection [How and When to Call] : how and when to call [Pain Management] : pain management [Patient responsibility to plan of care] : patient responsibility to plan of care [Stable] : stable [Home] : Home [Cane] : Cane [Not Applicable - Long Term Care/Home Health Agency] : Long Term Care/Home Health Agency: Not Applicable [] : No [FreeTextEntry2] : Infection Prevention\par Localized Wound Care\par Promote Optimal Skin Integrity\par Pressure Relief\par Achieve acceptable pain level with use of pharmacological and nonpharmacological interventions  [FreeTextEntry4] : F/U to [facility] in One Week \par Pt stated that she has had significant weight loss and has been unable to hold food / medications down, pt GI has advised colonoscopy and pt expressed fear of her knee being affected during the procedure.  Pt was strongly advised to f/u with GI to address GI concerns and to make physician aware of wound so it can be avoided during GI procedures.  Pt verbalized understanding and stated that she will make an appointment with GI  \par Pt reported that she has an appointment for f/u with her dermatologist in regards to a rash that has developed on her leg \par Culture results from 12/1/2020 reported to pt - Normal Skin Betsy  [FreeTextEntry1] : Right knee laceration is healing, no acute infection\par

## 2020-12-08 NOTE — PLAN
[FreeTextEntry1] : Lotrisone to periwound skin, Adaptic touch, Silver Alginate, dry dressing, return to office in two weeks.\par

## 2020-12-08 NOTE — PHYSICAL EXAM
[4 x 4] : 4 x 4  [Normal Heart Sounds] : normal heart sounds [2+] : left 2+ [0] : left 0 [Ankle Swelling Bilaterally] : bilaterally  [Alert] : alert [Oriented to Person] : oriented to person [Calm] : calm [JVD] : no jugular venous distention  [Ankle Swelling (On Exam)] : not present [Varicose Veins Of Lower Extremities] : not present [] : not present [de-identified] : WD/WN in no acute distress. [de-identified] : WNL [de-identified] : KORINL [de-identified] : WNL [de-identified] : Right knee laceration is clean, more granulation tissue, no drainage, no acute infection, periwound skin is intact with no cellulitis. [FreeTextEntry1] :  Knee  [FreeTextEntry2] : 2.9 [FreeTextEntry3] : 3.0 [FreeTextEntry4] : 0.1 [de-identified] : Serosanguineous  [de-identified] : Erythema/Rash [de-identified] : <25% [de-identified] : Adaptic, Silver Alginate [de-identified] : Cleansed with Normal Saline \par Lotrisone to periwound [TWNoteComboBox1] : Right [TWNoteComboBox4] : Small [TWNoteComboBox5] : No [de-identified] : No [de-identified] : Erythema [de-identified] : None [de-identified] : None [de-identified] : >75% [de-identified] : Yes [de-identified] : Every other day [de-identified] : Primary Dressing

## 2020-12-08 NOTE — VITALS
[Burning] : burning [Shooting] : shooting [] : No [de-identified] : Pt reports pain 5/10 [FreeTextEntry3] : Right Anterior Leg  [FreeTextEntry1] : Aleve (pt will only take when pain is unbearable)  [FreeTextEntry2] : When cleaning wound  [FreeTextEntry4] : Applied Bandage

## 2020-12-09 DIAGNOSIS — Z88.0 ALLERGY STATUS TO PENICILLIN: ICD-10-CM

## 2020-12-09 DIAGNOSIS — E78.5 HYPERLIPIDEMIA, UNSPECIFIED: ICD-10-CM

## 2020-12-09 DIAGNOSIS — Z90.2 ACQUIRED ABSENCE OF LUNG [PART OF]: ICD-10-CM
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
## 2020-12-09 DIAGNOSIS — Z90.49 ACQUIRED ABSENCE OF OTHER SPECIFIED PARTS OF DIGESTIVE TRACT: ICD-10-CM

## 2020-12-09 DIAGNOSIS — Z82.49 FAMILY HISTORY OF ISCHEMIC HEART DISEASE AND OTHER DISEASES OF THE CIRCULATORY SYSTEM: ICD-10-CM

## 2020-12-09 DIAGNOSIS — Z87.891 PERSONAL HISTORY OF NICOTINE DEPENDENCE: ICD-10-CM

## 2020-12-09 DIAGNOSIS — K21.9 GASTRO-ESOPHAGEAL REFLUX DISEASE WITHOUT ESOPHAGITIS: ICD-10-CM

## 2020-12-09 DIAGNOSIS — I11.9 HYPERTENSIVE HEART DISEASE WITHOUT HEART FAILURE: ICD-10-CM

## 2020-12-09 DIAGNOSIS — M06.9 RHEUMATOID ARTHRITIS, UNSPECIFIED: ICD-10-CM

## 2020-12-09 DIAGNOSIS — G57.61 LESION OF PLANTAR NERVE, RIGHT LOWER LIMB: ICD-10-CM

## 2020-12-09 DIAGNOSIS — Z91.041 RADIOGRAPHIC DYE ALLERGY STATUS: ICD-10-CM

## 2020-12-09 DIAGNOSIS — Z79.82 LONG TERM (CURRENT) USE OF ASPIRIN: ICD-10-CM
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
## 2020-12-09 DIAGNOSIS — Z85.118 PERSONAL HISTORY OF OTHER MALIGNANT NEOPLASM OF BRONCHUS AND LUNG: ICD-10-CM

## 2020-12-09 DIAGNOSIS — Z88.1 ALLERGY STATUS TO OTHER ANTIBIOTIC AGENTS STATUS: ICD-10-CM
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
## 2020-12-09 DIAGNOSIS — Z98.49 CATARACT EXTRACTION STATUS, UNSPECIFIED EYE: ICD-10-CM
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
## 2020-12-09 DIAGNOSIS — Z82.3 FAMILY HISTORY OF STROKE: ICD-10-CM

## 2020-12-09 DIAGNOSIS — I65.29 OCCLUSION AND STENOSIS OF UNSPECIFIED CAROTID ARTERY: ICD-10-CM

## 2020-12-09 DIAGNOSIS — Z80.1 FAMILY HISTORY OF MALIGNANT NEOPLASM OF TRACHEA, BRONCHUS AND LUNG: ICD-10-CM

## 2020-12-09 DIAGNOSIS — Z79.899 OTHER LONG TERM (CURRENT) DRUG THERAPY: ICD-10-CM

## 2020-12-09 DIAGNOSIS — Z87.01 PERSONAL HISTORY OF PNEUMONIA (RECURRENT): ICD-10-CM

## 2020-12-09 DIAGNOSIS — Z88.8 ALLERGY STATUS TO OTHER DRUGS, MEDICAMENTS AND BIOLOGICAL SUBSTANCES: ICD-10-CM

## 2020-12-09 DIAGNOSIS — Z83.3 FAMILY HISTORY OF DIABETES MELLITUS: ICD-10-CM

## 2020-12-09 DIAGNOSIS — S81.011D LACERATION WITHOUT FOREIGN BODY, RIGHT KNEE, SUBSEQUENT ENCOUNTER: ICD-10-CM

## 2020-12-09 DIAGNOSIS — Z86.73 PERSONAL HISTORY OF TRANSIENT ISCHEMIC ATTACK (TIA), AND CEREBRAL INFARCTION WITHOUT RESIDUAL DEFICITS: ICD-10-CM

## 2020-12-09 DIAGNOSIS — J44.9 CHRONIC OBSTRUCTIVE PULMONARY DISEASE, UNSPECIFIED: ICD-10-CM

## 2020-12-22 ENCOUNTER — APPOINTMENT (OUTPATIENT)
Dept: SURGERY | Facility: HOSPITAL | Age: 75
End: 2020-12-22
Payer: MEDICARE

## 2020-12-22 ENCOUNTER — OUTPATIENT (OUTPATIENT)
Dept: OUTPATIENT SERVICES | Facility: HOSPITAL | Age: 75
LOS: 1 days | Discharge: HOME | End: 2020-12-22
Payer: MEDICARE

## 2020-12-22 VITALS
HEIGHT: 62 IN | SYSTOLIC BLOOD PRESSURE: 120 MMHG | OXYGEN SATURATION: 99 % | DIASTOLIC BLOOD PRESSURE: 22 MMHG | WEIGHT: 110 LBS | HEART RATE: 87 BPM | TEMPERATURE: 97.6 F | RESPIRATION RATE: 20 BRPM | BODY MASS INDEX: 20.24 KG/M2

## 2020-12-22 DIAGNOSIS — S81.011D LACERATION WITHOUT FOREIGN BODY, RIGHT KNEE, SUBSEQUENT ENCOUNTER: ICD-10-CM

## 2020-12-22 DIAGNOSIS — Z90.49 ACQUIRED ABSENCE OF OTHER SPECIFIED PARTS OF DIGESTIVE TRACT: Chronic | ICD-10-CM

## 2020-12-22 DIAGNOSIS — Z98.890 OTHER SPECIFIED POSTPROCEDURAL STATES: Chronic | ICD-10-CM

## 2020-12-22 DIAGNOSIS — Z98.89 OTHER SPECIFIED POSTPROCEDURAL STATES: Chronic | ICD-10-CM

## 2020-12-22 DIAGNOSIS — S81.812A LACERATION W/OUT FOREIGN BODY, LEFT LOWER LEG, INITIAL ENCOUNTER: ICD-10-CM

## 2020-12-22 PROCEDURE — 99214 OFFICE O/P EST MOD 30 MIN: CPT

## 2020-12-22 PROCEDURE — G0463: CPT

## 2020-12-22 NOTE — ASSESSMENT
[Verbal] : Verbal [Written] : Written [Patient] : Patient [Good - alert, interested, motivated] : Good - alert, interested, motivated [Verbalizes knowledge/Understanding] : Verbalizes knowledge/understanding [Dressing changes] : dressing changes [Skin Care] : skin care [Pressure relief] : pressure relief [Signs and symptoms of infection] : sign and symptoms of infection [How and When to Call] : how and when to call [Pain Management] : pain management [Patient responsibility to plan of care] : patient responsibility to plan of care [] : Yes [Stable] : stable [Home] : Home [Cane] : Cane [Not Applicable - Long Term Care/Home Health Agency] : Long Term Care/Home Health Agency: Not Applicable [FreeTextEntry2] : Infection Prevention\par Localized Wound Care\par Promote Optimal Skin Integrity\par Pressure Relief\par Achieve acceptable pain level with use of pharmacological and nonpharmacological interventions  [FreeTextEntry4] : Photos Taken\par F/U to [facility] in 2 Weeks \par  [FreeTextEntry1] : Right knee wound is healing well, new left anterior leg laceration, no acute infection.\par

## 2020-12-22 NOTE — HISTORY OF PRESENT ILLNESS
[FreeTextEntry1] : Right knee wound is clean, more granulation, also has two weeks old laceration of right anterior leg after a blunt injury, self treating the wound with dry dressing,  no C/O

## 2020-12-22 NOTE — PHYSICAL EXAM
[4 x 4] : 4 x 4  [JVD] : no jugular venous distention  [Normal Heart Sounds] : normal heart sounds [2+] : left 2+ [0] : left 0 [Ankle Swelling (On Exam)] : not present [Ankle Swelling Bilaterally] : bilaterally  [Varicose Veins Of Lower Extremities] : bilaterally [] : bilaterally [Alert] : alert [Oriented to Person] : oriented to person [Calm] : calm [de-identified] : WD/WN in no acute distress. [de-identified] : KORINL [de-identified] : WNL [de-identified] : Right knee laceration is clean, smaller, base is red and viable, no drainage, no acute infection, periwound skin is intact with no cellulitis. two weeks old new laceration left anterior leg, V shape laceration, with mild ecchymosis, wound is covered with black eschar, no acute infection, periwound skin is intact with no cellulitis. [FreeTextEntry1] :  Knee  [FreeTextEntry2] : 2.0 [FreeTextEntry3] : 2.6 [FreeTextEntry4] : 0.1 [de-identified] : Serosanguineous  [de-identified] : Erythema/Rash [de-identified] : <25% [de-identified] : Adaptic, Silver Alginate [de-identified] : Cleansed with Normal Saline \par Kerlix [FreeTextEntry7] : Anterior Leg - Dry black eschar [FreeTextEntry8] : 2.0 [FreeTextEntry9] : 3.5 [de-identified] : 0.1 [de-identified] : Xeroform [de-identified] : Cleansed with Normal Saline \par Kerlix [TWNoteComboBox1] : Right [TWNoteComboBox4] : Small [TWNoteComboBox5] : No [de-identified] : No [de-identified] : Erythema [de-identified] : None [de-identified] : None [de-identified] : >75% [de-identified] : Yes [de-identified] : Every other day [de-identified] : Primary Dressing [TWNoteComboBox9] : Left [de-identified] : None [de-identified] : 100% [de-identified] : None [de-identified] : No [de-identified] : Every other day

## 2020-12-22 NOTE — PLAN
[FreeTextEntry1] : Adaptic touch, Silver Alginate to right knee wound, Xeroform, dry dressing to left leg wound, return to office in two weeks.\par

## 2020-12-23 DIAGNOSIS — Z79.82 LONG TERM (CURRENT) USE OF ASPIRIN: ICD-10-CM

## 2020-12-23 DIAGNOSIS — Z82.3 FAMILY HISTORY OF STROKE: ICD-10-CM
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
## 2020-12-23 DIAGNOSIS — S81.812A LACERATION WITHOUT FOREIGN BODY, LEFT LOWER LEG, INITIAL ENCOUNTER: ICD-10-CM

## 2020-12-23 DIAGNOSIS — S81.011D LACERATION WITHOUT FOREIGN BODY, RIGHT KNEE, SUBSEQUENT ENCOUNTER: ICD-10-CM

## 2020-12-23 DIAGNOSIS — Z86.73 PERSONAL HISTORY OF TRANSIENT ISCHEMIC ATTACK (TIA), AND CEREBRAL INFARCTION WITHOUT RESIDUAL DEFICITS: ICD-10-CM

## 2020-12-23 DIAGNOSIS — Z90.2 ACQUIRED ABSENCE OF LUNG [PART OF]: ICD-10-CM

## 2020-12-23 DIAGNOSIS — Z91.041 RADIOGRAPHIC DYE ALLERGY STATUS: ICD-10-CM

## 2020-12-23 DIAGNOSIS — Z88.1 ALLERGY STATUS TO OTHER ANTIBIOTIC AGENTS STATUS: ICD-10-CM
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
## 2020-12-23 DIAGNOSIS — Z87.01 PERSONAL HISTORY OF PNEUMONIA (RECURRENT): ICD-10-CM
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
## 2020-12-23 DIAGNOSIS — K21.9 GASTRO-ESOPHAGEAL REFLUX DISEASE WITHOUT ESOPHAGITIS: ICD-10-CM

## 2020-12-23 DIAGNOSIS — J44.9 CHRONIC OBSTRUCTIVE PULMONARY DISEASE, UNSPECIFIED: ICD-10-CM
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
## 2020-12-23 DIAGNOSIS — G57.61 LESION OF PLANTAR NERVE, RIGHT LOWER LIMB: ICD-10-CM

## 2020-12-23 DIAGNOSIS — Z98.49 CATARACT EXTRACTION STATUS, UNSPECIFIED EYE: ICD-10-CM
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
## 2020-12-23 DIAGNOSIS — Z80.1 FAMILY HISTORY OF MALIGNANT NEOPLASM OF TRACHEA, BRONCHUS AND LUNG: ICD-10-CM

## 2020-12-23 DIAGNOSIS — I65.29 OCCLUSION AND STENOSIS OF UNSPECIFIED CAROTID ARTERY: ICD-10-CM

## 2020-12-23 DIAGNOSIS — E78.5 HYPERLIPIDEMIA, UNSPECIFIED: ICD-10-CM

## 2020-12-23 DIAGNOSIS — Z83.3 FAMILY HISTORY OF DIABETES MELLITUS: ICD-10-CM

## 2020-12-23 DIAGNOSIS — Z87.891 PERSONAL HISTORY OF NICOTINE DEPENDENCE: ICD-10-CM

## 2020-12-23 DIAGNOSIS — M06.9 RHEUMATOID ARTHRITIS, UNSPECIFIED: ICD-10-CM

## 2020-12-23 DIAGNOSIS — Z88.0 ALLERGY STATUS TO PENICILLIN: ICD-10-CM

## 2020-12-23 DIAGNOSIS — Z82.49 FAMILY HISTORY OF ISCHEMIC HEART DISEASE AND OTHER DISEASES OF THE CIRCULATORY SYSTEM: ICD-10-CM

## 2020-12-23 DIAGNOSIS — Z88.8 ALLERGY STATUS TO OTHER DRUGS, MEDICAMENTS AND BIOLOGICAL SUBSTANCES: ICD-10-CM

## 2020-12-23 DIAGNOSIS — Z85.118 PERSONAL HISTORY OF OTHER MALIGNANT NEOPLASM OF BRONCHUS AND LUNG: ICD-10-CM

## 2020-12-23 DIAGNOSIS — Z90.49 ACQUIRED ABSENCE OF OTHER SPECIFIED PARTS OF DIGESTIVE TRACT: ICD-10-CM

## 2020-12-23 DIAGNOSIS — Z79.899 OTHER LONG TERM (CURRENT) DRUG THERAPY: ICD-10-CM

## 2020-12-23 DIAGNOSIS — I11.9 HYPERTENSIVE HEART DISEASE WITHOUT HEART FAILURE: ICD-10-CM

## 2021-01-04 ENCOUNTER — APPOINTMENT (OUTPATIENT)
Dept: SURGERY | Facility: HOSPITAL | Age: 76
End: 2021-01-04
Payer: MEDICARE

## 2021-01-04 ENCOUNTER — OUTPATIENT (OUTPATIENT)
Dept: OUTPATIENT SERVICES | Facility: HOSPITAL | Age: 76
LOS: 1 days | Discharge: HOME | End: 2021-01-04
Payer: MEDICARE

## 2021-01-04 VITALS
BODY MASS INDEX: 20.24 KG/M2 | SYSTOLIC BLOOD PRESSURE: 110 MMHG | DIASTOLIC BLOOD PRESSURE: 55 MMHG | TEMPERATURE: 97.8 F | RESPIRATION RATE: 20 BRPM | HEART RATE: 78 BPM | HEIGHT: 62 IN | WEIGHT: 110 LBS | OXYGEN SATURATION: 98 %

## 2021-01-04 DIAGNOSIS — S81.011D LACERATION WITHOUT FOREIGN BODY, RIGHT KNEE, SUBSEQUENT ENCOUNTER: ICD-10-CM

## 2021-01-04 DIAGNOSIS — Z98.890 OTHER SPECIFIED POSTPROCEDURAL STATES: Chronic | ICD-10-CM

## 2021-01-04 DIAGNOSIS — Z90.49 ACQUIRED ABSENCE OF OTHER SPECIFIED PARTS OF DIGESTIVE TRACT: Chronic | ICD-10-CM

## 2021-01-04 DIAGNOSIS — Z98.89 OTHER SPECIFIED POSTPROCEDURAL STATES: Chronic | ICD-10-CM

## 2021-01-04 PROCEDURE — 99213 OFFICE O/P EST LOW 20 MIN: CPT

## 2021-01-04 PROCEDURE — G0463: CPT

## 2021-01-04 NOTE — PHYSICAL EXAM
[Normal Heart Sounds] : normal heart sounds [2+] : left 2+ [0] : left 0 [Ankle Swelling Bilaterally] : bilaterally  [Alert] : alert [Oriented to Person] : oriented to person [Calm] : calm [JVD] : no jugular venous distention  [Ankle Swelling (On Exam)] : not present [Varicose Veins Of Lower Extremities] : not present [] : not present [de-identified] : WD/WN in no acute distress. [de-identified] : WNL [de-identified] : KORINL [de-identified] : WNL [de-identified] : Right knee laceration is clean, more skin is forming, left leg laceration has fully healed, no acute infection, periwound skin is intact with no cellulitis. [FreeTextEntry1] : Right Knee- A few scattered small superficial open areas  [de-identified] : Scant Serosanguineous [de-identified] : Intact [de-identified] : Wound veil then Silver Alginate/ Dry Dressing  [de-identified] : Cleansed with Normal saline\par  [FreeTextEntry7] : Left Anterior Leg- Dry Eschar [de-identified] : No Dressing [de-identified] : Cleansed with Normal saline\par  [de-identified] : None [de-identified] : None [de-identified] : 100% [de-identified] : No [de-identified] : None

## 2021-01-04 NOTE — ASSESSMENT
[Verbal] : Verbal [Demo] : Demo [Patient] : Patient [Good - alert, interested, motivated] : Good - alert, interested, motivated [Verbalizes knowledge/Understanding] : Verbalizes knowledge/understanding [Dressing changes] : dressing changes [Skin Care] : skin care [Pressure relief] : pressure relief [Signs and symptoms of infection] : sign and symptoms of infection [How and When to Call] : how and when to call [Off-loading] : off-loading [Patient responsibility to plan of care] : patient responsibility to plan of care [] : Yes [Stable] : stable [Home] : Home [Cane] : Cane [FreeTextEntry2] : Alteration in skin integrity- promote optimal skin integrity\par  [FreeTextEntry4] : Dr Hinojosa/ Photo taken\par F/U to [facility] in 2 weeks [FreeTextEntry1] : Right knee wound has significantly improved, leg laceration is closed, no acute infection\par

## 2021-01-04 NOTE — HISTORY OF PRESENT ILLNESS
[FreeTextEntry1] : Right knee wound is clean, more granulation, more skin forming, laceration has fully healed, no C/O

## 2021-01-05 DIAGNOSIS — I11.9 HYPERTENSIVE HEART DISEASE WITHOUT HEART FAILURE: ICD-10-CM
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
## 2021-01-05 DIAGNOSIS — J44.9 CHRONIC OBSTRUCTIVE PULMONARY DISEASE, UNSPECIFIED: ICD-10-CM
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
## 2021-01-05 DIAGNOSIS — Z87.891 PERSONAL HISTORY OF NICOTINE DEPENDENCE: ICD-10-CM

## 2021-01-05 DIAGNOSIS — Z85.118 PERSONAL HISTORY OF OTHER MALIGNANT NEOPLASM OF BRONCHUS AND LUNG: ICD-10-CM

## 2021-01-05 DIAGNOSIS — S81.812D LACERATION WITHOUT FOREIGN BODY, LEFT LOWER LEG, SUBSEQUENT ENCOUNTER: ICD-10-CM
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
## 2021-01-05 DIAGNOSIS — M06.9 RHEUMATOID ARTHRITIS, UNSPECIFIED: ICD-10-CM

## 2021-01-05 DIAGNOSIS — Z79.899 OTHER LONG TERM (CURRENT) DRUG THERAPY: ICD-10-CM

## 2021-01-05 DIAGNOSIS — Z79.82 LONG TERM (CURRENT) USE OF ASPIRIN: ICD-10-CM

## 2021-01-05 DIAGNOSIS — Z98.49 CATARACT EXTRACTION STATUS, UNSPECIFIED EYE: ICD-10-CM

## 2021-01-05 DIAGNOSIS — Z91.041 RADIOGRAPHIC DYE ALLERGY STATUS: ICD-10-CM

## 2021-01-05 DIAGNOSIS — Z88.1 ALLERGY STATUS TO OTHER ANTIBIOTIC AGENTS STATUS: ICD-10-CM

## 2021-01-05 DIAGNOSIS — I65.29 OCCLUSION AND STENOSIS OF UNSPECIFIED CAROTID ARTERY: ICD-10-CM

## 2021-01-05 DIAGNOSIS — Z82.3 FAMILY HISTORY OF STROKE: ICD-10-CM

## 2021-01-05 DIAGNOSIS — Z90.49 ACQUIRED ABSENCE OF OTHER SPECIFIED PARTS OF DIGESTIVE TRACT: ICD-10-CM

## 2021-01-05 DIAGNOSIS — E78.5 HYPERLIPIDEMIA, UNSPECIFIED: ICD-10-CM

## 2021-01-05 DIAGNOSIS — Z82.49 FAMILY HISTORY OF ISCHEMIC HEART DISEASE AND OTHER DISEASES OF THE CIRCULATORY SYSTEM: ICD-10-CM
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
## 2021-01-05 DIAGNOSIS — Z88.0 ALLERGY STATUS TO PENICILLIN: ICD-10-CM

## 2021-01-05 DIAGNOSIS — Z87.01 PERSONAL HISTORY OF PNEUMONIA (RECURRENT): ICD-10-CM
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
## 2021-01-05 DIAGNOSIS — Z86.73 PERSONAL HISTORY OF TRANSIENT ISCHEMIC ATTACK (TIA), AND CEREBRAL INFARCTION WITHOUT RESIDUAL DEFICITS: ICD-10-CM

## 2021-01-05 DIAGNOSIS — Z80.1 FAMILY HISTORY OF MALIGNANT NEOPLASM OF TRACHEA, BRONCHUS AND LUNG: ICD-10-CM

## 2021-01-05 DIAGNOSIS — Z88.8 ALLERGY STATUS TO OTHER DRUGS, MEDICAMENTS AND BIOLOGICAL SUBSTANCES: ICD-10-CM

## 2021-01-05 DIAGNOSIS — Z90.2 ACQUIRED ABSENCE OF LUNG [PART OF]: ICD-10-CM

## 2021-01-05 DIAGNOSIS — S81.011D LACERATION WITHOUT FOREIGN BODY, RIGHT KNEE, SUBSEQUENT ENCOUNTER: ICD-10-CM

## 2021-01-05 DIAGNOSIS — G57.61 LESION OF PLANTAR NERVE, RIGHT LOWER LIMB: ICD-10-CM

## 2021-01-05 DIAGNOSIS — Z83.3 FAMILY HISTORY OF DIABETES MELLITUS: ICD-10-CM

## 2021-01-05 DIAGNOSIS — K21.9 GASTRO-ESOPHAGEAL REFLUX DISEASE WITHOUT ESOPHAGITIS: ICD-10-CM

## 2021-01-18 ENCOUNTER — OUTPATIENT (OUTPATIENT)
Dept: OUTPATIENT SERVICES | Facility: HOSPITAL | Age: 76
LOS: 1 days | Discharge: HOME | End: 2021-01-18
Payer: MEDICARE

## 2021-01-18 ENCOUNTER — APPOINTMENT (OUTPATIENT)
Dept: OBGYN | Facility: HOSPITAL | Age: 76
End: 2021-01-18
Payer: MEDICARE

## 2021-01-18 VITALS
BODY MASS INDEX: 20.24 KG/M2 | WEIGHT: 110 LBS | DIASTOLIC BLOOD PRESSURE: 62 MMHG | HEART RATE: 85 BPM | HEIGHT: 62 IN | SYSTOLIC BLOOD PRESSURE: 119 MMHG | OXYGEN SATURATION: 96 % | RESPIRATION RATE: 20 BRPM

## 2021-01-18 DIAGNOSIS — Z90.49 ACQUIRED ABSENCE OF OTHER SPECIFIED PARTS OF DIGESTIVE TRACT: Chronic | ICD-10-CM

## 2021-01-18 DIAGNOSIS — Z98.89 OTHER SPECIFIED POSTPROCEDURAL STATES: Chronic | ICD-10-CM

## 2021-01-18 DIAGNOSIS — Z98.890 OTHER SPECIFIED POSTPROCEDURAL STATES: Chronic | ICD-10-CM

## 2021-01-18 DIAGNOSIS — S81.011D LACERATION WITHOUT FOREIGN BODY, RIGHT KNEE, SUBSEQUENT ENCOUNTER: ICD-10-CM

## 2021-01-18 PROCEDURE — 99213 OFFICE O/P EST LOW 20 MIN: CPT

## 2021-01-18 PROCEDURE — G0463: CPT

## 2021-01-19 DIAGNOSIS — Z80.1 FAMILY HISTORY OF MALIGNANT NEOPLASM OF TRACHEA, BRONCHUS AND LUNG: ICD-10-CM

## 2021-01-19 DIAGNOSIS — Z90.49 ACQUIRED ABSENCE OF OTHER SPECIFIED PARTS OF DIGESTIVE TRACT: ICD-10-CM

## 2021-01-19 DIAGNOSIS — Z83.3 FAMILY HISTORY OF DIABETES MELLITUS: ICD-10-CM

## 2021-01-19 DIAGNOSIS — Z91.041 RADIOGRAPHIC DYE ALLERGY STATUS: ICD-10-CM
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
## 2021-01-19 DIAGNOSIS — G57.61 LESION OF PLANTAR NERVE, RIGHT LOWER LIMB: ICD-10-CM

## 2021-01-19 DIAGNOSIS — S81.011D LACERATION WITHOUT FOREIGN BODY, RIGHT KNEE, SUBSEQUENT ENCOUNTER: ICD-10-CM

## 2021-01-19 DIAGNOSIS — Z82.49 FAMILY HISTORY OF ISCHEMIC HEART DISEASE AND OTHER DISEASES OF THE CIRCULATORY SYSTEM: ICD-10-CM

## 2021-01-19 DIAGNOSIS — S81.812D LACERATION WITHOUT FOREIGN BODY, LEFT LOWER LEG, SUBSEQUENT ENCOUNTER: ICD-10-CM
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
## 2021-01-19 DIAGNOSIS — J44.9 CHRONIC OBSTRUCTIVE PULMONARY DISEASE, UNSPECIFIED: ICD-10-CM

## 2021-01-19 DIAGNOSIS — Z86.73 PERSONAL HISTORY OF TRANSIENT ISCHEMIC ATTACK (TIA), AND CEREBRAL INFARCTION WITHOUT RESIDUAL DEFICITS: ICD-10-CM

## 2021-01-19 DIAGNOSIS — Z98.49 CATARACT EXTRACTION STATUS, UNSPECIFIED EYE: ICD-10-CM

## 2021-01-19 DIAGNOSIS — Z85.118 PERSONAL HISTORY OF OTHER MALIGNANT NEOPLASM OF BRONCHUS AND LUNG: ICD-10-CM

## 2021-01-19 DIAGNOSIS — Z82.3 FAMILY HISTORY OF STROKE: ICD-10-CM
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
## 2021-01-19 DIAGNOSIS — Z88.0 ALLERGY STATUS TO PENICILLIN: ICD-10-CM

## 2021-01-19 DIAGNOSIS — I65.29 OCCLUSION AND STENOSIS OF UNSPECIFIED CAROTID ARTERY: ICD-10-CM

## 2021-01-19 DIAGNOSIS — Z88.1 ALLERGY STATUS TO OTHER ANTIBIOTIC AGENTS STATUS: ICD-10-CM

## 2021-01-19 DIAGNOSIS — Z87.01 PERSONAL HISTORY OF PNEUMONIA (RECURRENT): ICD-10-CM

## 2021-01-19 DIAGNOSIS — M06.9 RHEUMATOID ARTHRITIS, UNSPECIFIED: ICD-10-CM

## 2021-01-19 DIAGNOSIS — Z88.8 ALLERGY STATUS TO OTHER DRUGS, MEDICAMENTS AND BIOLOGICAL SUBSTANCES: ICD-10-CM

## 2021-01-19 DIAGNOSIS — E78.5 HYPERLIPIDEMIA, UNSPECIFIED: ICD-10-CM

## 2021-01-19 DIAGNOSIS — K21.9 GASTRO-ESOPHAGEAL REFLUX DISEASE WITHOUT ESOPHAGITIS: ICD-10-CM

## 2021-01-19 DIAGNOSIS — Z87.891 PERSONAL HISTORY OF NICOTINE DEPENDENCE: ICD-10-CM
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
## 2021-01-19 DIAGNOSIS — Z79.899 OTHER LONG TERM (CURRENT) DRUG THERAPY: ICD-10-CM

## 2021-01-19 DIAGNOSIS — Z79.82 LONG TERM (CURRENT) USE OF ASPIRIN: ICD-10-CM

## 2021-01-19 NOTE — PHYSICAL EXAM
[Normal Heart Sounds] : normal heart sounds [2+] : left 2+ [0] : left 0 [Ankle Swelling Bilaterally] : bilaterally  [Alert] : alert [Oriented to Person] : oriented to person [Calm] : calm [JVD] : no jugular venous distention  [Ankle Swelling (On Exam)] : not present [Varicose Veins Of Lower Extremities] : not present [] : not present [Abdomen Masses] : No abdominal massess [Abdomen Tenderness] : ~T ~M No abdominal tenderness [Tender] : nontender [Enlarged] : not enlarged [de-identified] : WD/WN in no acute distress. [de-identified] : WNL [de-identified] : KORINL [de-identified] : WNL [de-identified] : Right knee laceration is clean, more skin is forming, left leg laceration has fully healed, no acute infection, periwound skin is intact with no cellulitis. [FreeTextEntry1] : Right Knee- A few scattered very small superficial open areas & scattered dry eschar [de-identified] : Scant Serosanguineous [de-identified] : Intact [de-identified] : Wound veil then Silver Alginate/ Dry Dressing  [de-identified] : Cleansed with Normal saline\par  [FreeTextEntry7] : Left Anterior Leg- Dry Eschar [de-identified] : No Dressing [de-identified] : Cleansed with Normal saline\par  [de-identified] : None [de-identified] : None [de-identified] : 100% [de-identified] : No [de-identified] : None

## 2021-01-19 NOTE — ASSESSMENT
[Verbal] : Verbal [Demo] : Demo [Patient] : Patient [Good - alert, interested, motivated] : Good - alert, interested, motivated [Verbalizes knowledge/Understanding] : Verbalizes knowledge/understanding [Dressing changes] : dressing changes [Skin Care] : skin care [Pressure relief] : pressure relief [Signs and symptoms of infection] : sign and symptoms of infection [How and When to Call] : how and when to call [Off-loading] : off-loading [Patient responsibility to plan of care] : patient responsibility to plan of care [] : Yes [Stable] : stable [Home] : Home [Cane] : Cane [FreeTextEntry2] : Alteration in skin integrity- promote optimal skin integrity\par  [FreeTextEntry4] : Dr Morales/ Photo taken\par F/U to [facility] in 2 weeks [FreeTextEntry1] : Right knee wound has significantly improved, leg laceration is closed, no acute infection\par

## 2021-02-01 ENCOUNTER — APPOINTMENT (OUTPATIENT)
Dept: SURGERY | Facility: HOSPITAL | Age: 76
End: 2021-02-01

## 2021-02-03 ENCOUNTER — APPOINTMENT (OUTPATIENT)
Dept: SURGERY | Facility: HOSPITAL | Age: 76
End: 2021-02-03
Payer: MEDICARE

## 2021-02-03 ENCOUNTER — OUTPATIENT (OUTPATIENT)
Dept: OUTPATIENT SERVICES | Facility: HOSPITAL | Age: 76
LOS: 1 days | Discharge: HOME | End: 2021-02-03
Payer: MEDICARE

## 2021-02-03 VITALS
HEIGHT: 62 IN | BODY MASS INDEX: 20.24 KG/M2 | OXYGEN SATURATION: 97 % | HEART RATE: 90 BPM | DIASTOLIC BLOOD PRESSURE: 62 MMHG | RESPIRATION RATE: 20 BRPM | TEMPERATURE: 97.3 F | WEIGHT: 110 LBS | SYSTOLIC BLOOD PRESSURE: 108 MMHG

## 2021-02-03 DIAGNOSIS — Z98.89 OTHER SPECIFIED POSTPROCEDURAL STATES: Chronic | ICD-10-CM

## 2021-02-03 DIAGNOSIS — Z90.49 ACQUIRED ABSENCE OF OTHER SPECIFIED PARTS OF DIGESTIVE TRACT: Chronic | ICD-10-CM

## 2021-02-03 DIAGNOSIS — Z98.890 OTHER SPECIFIED POSTPROCEDURAL STATES: Chronic | ICD-10-CM

## 2021-02-03 DIAGNOSIS — S81.011D LACERATION WITHOUT FOREIGN BODY, RIGHT KNEE, SUBSEQUENT ENCOUNTER: ICD-10-CM

## 2021-02-03 PROCEDURE — G0463: CPT

## 2021-02-03 PROCEDURE — 99213 OFFICE O/P EST LOW 20 MIN: CPT

## 2021-02-03 NOTE — PHYSICAL EXAM
[4 x 4] : 4 x 4  [JVD] : no jugular venous distention  [Normal Heart Sounds] : normal heart sounds [2+] : left 2+ [0] : left 0 [Ankle Swelling (On Exam)] : not present [Ankle Swelling Bilaterally] : bilaterally  [Varicose Veins Of Lower Extremities] : bilaterally [] : bilaterally [Alert] : alert [Oriented to Person] : oriented to person [Calm] : calm [de-identified] : WD/WN in no acute distress. [de-identified] : WNL [de-identified] : KORINL [de-identified] : WNL [de-identified] : Right knee laceration is almost closed, small pinpoint wound with clean, red, and viable base, left leg laceration has fully healed, no acute infection, periwound skin is intact with no cellulitis. [FreeTextEntry1] : Right Knee- A few scattered very small superficial open areas & scattered dry eschar [de-identified] : Scant Serosanguineous [de-identified] : Intact [de-identified] : Xeroform  [de-identified] : Cleansed with Normal saline\par  [de-identified] : \par  [de-identified] : None [de-identified] : None [de-identified] : 100% [de-identified] : No [de-identified] : 3x Weekly [de-identified] : None

## 2021-02-03 NOTE — ASSESSMENT
[Verbal] : Verbal [Demo] : Demo [Patient] : Patient [Good - alert, interested, motivated] : Good - alert, interested, motivated [Verbalizes knowledge/Understanding] : Verbalizes knowledge/understanding [Dressing changes] : dressing changes [Skin Care] : skin care [Pressure relief] : pressure relief [Signs and symptoms of infection] : sign and symptoms of infection [How and When to Call] : how and when to call [Off-loading] : off-loading [Patient responsibility to plan of care] : patient responsibility to plan of care [] : Yes [Stable] : stable [Home] : Home [Cane] : Cane [FreeTextEntry2] : Alteration in skin integrity- promote optimal skin integrity\par Localized Wound Care \par Infection Prevention \par  [FreeTextEntry4] : F/U to [facility] in 2 weeks [FreeTextEntry1] : Right knee laceration is almost healed, left leg laceration is closed, no acute infection\par

## 2021-02-03 NOTE — PLAN
[FreeTextEntry1] : Xeroform, dry dressing, return to office in two weeks.\par 25 minutes spent for patient care and medical decision making.\par
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
## 2021-02-04 DIAGNOSIS — Z90.2 ACQUIRED ABSENCE OF LUNG [PART OF]: ICD-10-CM
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
## 2021-02-04 DIAGNOSIS — Z88.1 ALLERGY STATUS TO OTHER ANTIBIOTIC AGENTS STATUS: ICD-10-CM

## 2021-02-04 DIAGNOSIS — Z91.041 RADIOGRAPHIC DYE ALLERGY STATUS: ICD-10-CM

## 2021-02-04 DIAGNOSIS — S81.011D LACERATION WITHOUT FOREIGN BODY, RIGHT KNEE, SUBSEQUENT ENCOUNTER: ICD-10-CM

## 2021-02-04 DIAGNOSIS — Z79.899 OTHER LONG TERM (CURRENT) DRUG THERAPY: ICD-10-CM

## 2021-02-04 DIAGNOSIS — I11.9 HYPERTENSIVE HEART DISEASE WITHOUT HEART FAILURE: ICD-10-CM

## 2021-02-04 DIAGNOSIS — Z86.73 PERSONAL HISTORY OF TRANSIENT ISCHEMIC ATTACK (TIA), AND CEREBRAL INFARCTION WITHOUT RESIDUAL DEFICITS: ICD-10-CM

## 2021-02-04 DIAGNOSIS — Z98.49 CATARACT EXTRACTION STATUS, UNSPECIFIED EYE: ICD-10-CM

## 2021-02-04 DIAGNOSIS — J44.9 CHRONIC OBSTRUCTIVE PULMONARY DISEASE, UNSPECIFIED: ICD-10-CM

## 2021-02-04 DIAGNOSIS — E78.5 HYPERLIPIDEMIA, UNSPECIFIED: ICD-10-CM
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
## 2021-02-04 DIAGNOSIS — Z88.8 ALLERGY STATUS TO OTHER DRUGS, MEDICAMENTS AND BIOLOGICAL SUBSTANCES: ICD-10-CM

## 2021-02-04 DIAGNOSIS — Z79.82 LONG TERM (CURRENT) USE OF ASPIRIN: ICD-10-CM

## 2021-02-04 DIAGNOSIS — Z87.891 PERSONAL HISTORY OF NICOTINE DEPENDENCE: ICD-10-CM

## 2021-02-04 DIAGNOSIS — Z82.49 FAMILY HISTORY OF ISCHEMIC HEART DISEASE AND OTHER DISEASES OF THE CIRCULATORY SYSTEM: ICD-10-CM

## 2021-02-04 DIAGNOSIS — Z87.01 PERSONAL HISTORY OF PNEUMONIA (RECURRENT): ICD-10-CM

## 2021-02-04 DIAGNOSIS — M06.9 RHEUMATOID ARTHRITIS, UNSPECIFIED: ICD-10-CM

## 2021-02-04 DIAGNOSIS — Z83.3 FAMILY HISTORY OF DIABETES MELLITUS: ICD-10-CM

## 2021-02-04 DIAGNOSIS — Z85.118 PERSONAL HISTORY OF OTHER MALIGNANT NEOPLASM OF BRONCHUS AND LUNG: ICD-10-CM

## 2021-02-04 DIAGNOSIS — I65.29 OCCLUSION AND STENOSIS OF UNSPECIFIED CAROTID ARTERY: ICD-10-CM

## 2021-02-04 DIAGNOSIS — Z82.3 FAMILY HISTORY OF STROKE: ICD-10-CM

## 2021-02-04 DIAGNOSIS — K21.9 GASTRO-ESOPHAGEAL REFLUX DISEASE WITHOUT ESOPHAGITIS: ICD-10-CM
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
## 2021-02-04 DIAGNOSIS — G57.61 LESION OF PLANTAR NERVE, RIGHT LOWER LIMB: ICD-10-CM

## 2021-02-04 DIAGNOSIS — Z90.49 ACQUIRED ABSENCE OF OTHER SPECIFIED PARTS OF DIGESTIVE TRACT: ICD-10-CM

## 2021-02-04 DIAGNOSIS — S81.812D LACERATION WITHOUT FOREIGN BODY, LEFT LOWER LEG, SUBSEQUENT ENCOUNTER: ICD-10-CM

## 2021-02-04 DIAGNOSIS — Z80.1 FAMILY HISTORY OF MALIGNANT NEOPLASM OF TRACHEA, BRONCHUS AND LUNG: ICD-10-CM

## 2021-02-04 DIAGNOSIS — Z88.0 ALLERGY STATUS TO PENICILLIN: ICD-10-CM

## 2021-02-15 ENCOUNTER — APPOINTMENT (OUTPATIENT)
Dept: SURGERY | Facility: HOSPITAL | Age: 76
End: 2021-02-15
Payer: MEDICARE

## 2021-02-15 ENCOUNTER — OUTPATIENT (OUTPATIENT)
Dept: OUTPATIENT SERVICES | Facility: HOSPITAL | Age: 76
LOS: 1 days | Discharge: HOME | End: 2021-02-15
Payer: MEDICARE

## 2021-02-15 VITALS
BODY MASS INDEX: 20.24 KG/M2 | TEMPERATURE: 97.3 F | HEART RATE: 79 BPM | HEIGHT: 62 IN | WEIGHT: 110 LBS | SYSTOLIC BLOOD PRESSURE: 130 MMHG | DIASTOLIC BLOOD PRESSURE: 72 MMHG | RESPIRATION RATE: 20 BRPM | OXYGEN SATURATION: 99 %

## 2021-02-15 DIAGNOSIS — Z90.49 ACQUIRED ABSENCE OF OTHER SPECIFIED PARTS OF DIGESTIVE TRACT: Chronic | ICD-10-CM

## 2021-02-15 DIAGNOSIS — E11.621 TYPE 2 DIABETES MELLITUS WITH FOOT ULCER: ICD-10-CM

## 2021-02-15 DIAGNOSIS — S81.011D LACERATION WITHOUT FOREIGN BODY, RIGHT KNEE, SUBSEQUENT ENCOUNTER: ICD-10-CM

## 2021-02-15 DIAGNOSIS — Z98.890 OTHER SPECIFIED POSTPROCEDURAL STATES: Chronic | ICD-10-CM

## 2021-02-15 DIAGNOSIS — Z98.89 OTHER SPECIFIED POSTPROCEDURAL STATES: Chronic | ICD-10-CM

## 2021-02-15 DIAGNOSIS — S81.011D LACERATION W/OUT FOREIGN BODY, RIGHT KNEE, SUBSEQUENT ENCOUNTER: ICD-10-CM

## 2021-02-15 PROCEDURE — G0463: CPT

## 2021-02-15 PROCEDURE — 99213 OFFICE O/P EST LOW 20 MIN: CPT

## 2021-02-15 NOTE — ASSESSMENT
[Verbal] : Verbal [Written] : Written [Patient] : Patient [Good - alert, interested, motivated] : Good - alert, interested, motivated [Verbalizes knowledge/Understanding] : Verbalizes knowledge/understanding [Skin Care] : skin care [Signs and symptoms of infection] : sign and symptoms of infection [How and When to Call] : how and when to call [Pain Management] : pain management [Discharge Planning] : discharge planning [Patient responsibility to plan of care] : patient responsibility to plan of care [] : Yes [Stable] : stable [Home] : Home [Ambulatory] : Ambulatory [Not Applicable - Long Term Care/Home Health Agency] : Long Term Care/Home Health Agency: Not Applicable [FreeTextEntry2] : Infection prevention\par Goal of remaining pain free regarding wounds.\par Promote optimal skin integrity  [FreeTextEntry4] : Patient discharged  [FreeTextEntry1] : Right knee wound is closed, no acute infection\par

## 2021-02-15 NOTE — PHYSICAL EXAM
[Normal Heart Sounds] : normal heart sounds [2+] : left 2+ [0] : left 0 [Ankle Swelling Bilaterally] : bilaterally  [Oriented to Person] : oriented to person [Alert] : alert [Calm] : calm [JVD] : no jugular venous distention  [Ankle Swelling (On Exam)] : not present [Varicose Veins Of Lower Extremities] : not present [] : not present [de-identified] : WD/WN in no acute distress. [de-identified] : WNL [de-identified] : KORINL [de-identified] : WNL [de-identified] : Right knee laceration is closed, no drainage, no acute infection. [FreeTextEntry1] : Right knee - closed - no open wound  [de-identified] : Cleansed with NS\par

## 2021-02-15 NOTE — PLAN
[FreeTextEntry1] : No wound care needed, discharged, RTO PRN.\par 25 minutes spent for patient care and medical decision making.\par
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
## 2021-02-16 DIAGNOSIS — Z88.8 ALLERGY STATUS TO OTHER DRUGS, MEDICAMENTS AND BIOLOGICAL SUBSTANCES: ICD-10-CM

## 2021-02-16 DIAGNOSIS — Z88.1 ALLERGY STATUS TO OTHER ANTIBIOTIC AGENTS STATUS: ICD-10-CM

## 2021-02-16 DIAGNOSIS — Z90.2 ACQUIRED ABSENCE OF LUNG [PART OF]: ICD-10-CM

## 2021-02-16 DIAGNOSIS — G57.61 LESION OF PLANTAR NERVE, RIGHT LOWER LIMB: ICD-10-CM

## 2021-02-16 DIAGNOSIS — Z87.891 PERSONAL HISTORY OF NICOTINE DEPENDENCE: ICD-10-CM

## 2021-02-16 DIAGNOSIS — Z98.49 CATARACT EXTRACTION STATUS, UNSPECIFIED EYE: ICD-10-CM
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
## 2021-02-16 DIAGNOSIS — Z80.1 FAMILY HISTORY OF MALIGNANT NEOPLASM OF TRACHEA, BRONCHUS AND LUNG: ICD-10-CM

## 2021-02-16 DIAGNOSIS — E78.5 HYPERLIPIDEMIA, UNSPECIFIED: ICD-10-CM

## 2021-02-16 DIAGNOSIS — K21.9 GASTRO-ESOPHAGEAL REFLUX DISEASE WITHOUT ESOPHAGITIS: ICD-10-CM

## 2021-02-16 DIAGNOSIS — Z79.899 OTHER LONG TERM (CURRENT) DRUG THERAPY: ICD-10-CM

## 2021-02-16 DIAGNOSIS — M06.9 RHEUMATOID ARTHRITIS, UNSPECIFIED: ICD-10-CM
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
## 2021-02-16 DIAGNOSIS — Z82.3 FAMILY HISTORY OF STROKE: ICD-10-CM

## 2021-02-16 DIAGNOSIS — Z91.041 RADIOGRAPHIC DYE ALLERGY STATUS: ICD-10-CM

## 2021-02-16 DIAGNOSIS — S81.812D LACERATION WITHOUT FOREIGN BODY, LEFT LOWER LEG, SUBSEQUENT ENCOUNTER: ICD-10-CM

## 2021-02-16 DIAGNOSIS — J44.9 CHRONIC OBSTRUCTIVE PULMONARY DISEASE, UNSPECIFIED: ICD-10-CM

## 2021-02-16 DIAGNOSIS — S81.011D LACERATION WITHOUT FOREIGN BODY, RIGHT KNEE, SUBSEQUENT ENCOUNTER: ICD-10-CM

## 2021-02-16 DIAGNOSIS — Z79.82 LONG TERM (CURRENT) USE OF ASPIRIN: ICD-10-CM

## 2021-02-16 DIAGNOSIS — I11.9 HYPERTENSIVE HEART DISEASE WITHOUT HEART FAILURE: ICD-10-CM

## 2021-02-16 DIAGNOSIS — Z90.49 ACQUIRED ABSENCE OF OTHER SPECIFIED PARTS OF DIGESTIVE TRACT: ICD-10-CM

## 2021-02-16 DIAGNOSIS — Z82.49 FAMILY HISTORY OF ISCHEMIC HEART DISEASE AND OTHER DISEASES OF THE CIRCULATORY SYSTEM: ICD-10-CM

## 2021-02-16 DIAGNOSIS — Z87.01 PERSONAL HISTORY OF PNEUMONIA (RECURRENT): ICD-10-CM

## 2021-02-16 DIAGNOSIS — Z83.3 FAMILY HISTORY OF DIABETES MELLITUS: ICD-10-CM

## 2021-02-16 DIAGNOSIS — Z85.118 PERSONAL HISTORY OF OTHER MALIGNANT NEOPLASM OF BRONCHUS AND LUNG: ICD-10-CM
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
## 2021-02-16 DIAGNOSIS — Z86.73 PERSONAL HISTORY OF TRANSIENT ISCHEMIC ATTACK (TIA), AND CEREBRAL INFARCTION WITHOUT RESIDUAL DEFICITS: ICD-10-CM

## 2021-02-16 DIAGNOSIS — Z88.0 ALLERGY STATUS TO PENICILLIN: ICD-10-CM
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
## 2021-02-16 DIAGNOSIS — I65.29 OCCLUSION AND STENOSIS OF UNSPECIFIED CAROTID ARTERY: ICD-10-CM

## 2021-03-03 ENCOUNTER — NON-APPOINTMENT (OUTPATIENT)
Age: 76
End: 2021-03-03

## 2021-06-02 ENCOUNTER — APPOINTMENT (OUTPATIENT)
Dept: PODIATRY | Facility: HOSPITAL | Age: 76
End: 2021-06-02

## 2021-10-14 ENCOUNTER — APPOINTMENT (OUTPATIENT)
Dept: OTOLARYNGOLOGY | Facility: CLINIC | Age: 76
End: 2021-10-14
Payer: MEDICARE

## 2021-10-14 VITALS — HEIGHT: 62 IN | WEIGHT: 97 LBS | BODY MASS INDEX: 17.85 KG/M2

## 2021-10-14 DIAGNOSIS — R49.0 DYSPHONIA: ICD-10-CM

## 2021-10-14 DIAGNOSIS — J00 ACUTE NASOPHARYNGITIS [COMMON COLD]: ICD-10-CM

## 2021-10-14 DIAGNOSIS — R09.82 POSTNASAL DRIP: ICD-10-CM

## 2021-10-14 PROCEDURE — 99204 OFFICE O/P NEW MOD 45 MIN: CPT | Mod: 25

## 2021-10-14 PROCEDURE — 31575 DIAGNOSTIC LARYNGOSCOPY: CPT

## 2021-10-14 RX ORDER — ACETAMINOPHEN 325 MG/1
325 TABLET ORAL EVERY 6 HOURS
Refills: 0 | Status: COMPLETED | COMMUNITY
End: 2021-10-14

## 2021-10-14 RX ORDER — OMEPRAZOLE 40 MG/1
40 CAPSULE, DELAYED RELEASE ORAL
Qty: 30 | Refills: 1 | Status: COMPLETED | COMMUNITY
Start: 2017-07-17 | End: 2021-10-14

## 2021-10-14 NOTE — PHYSICAL EXAM
[] : septum deviated bilaterally [Midline] : trachea located in midline position [Laryngoscopy Performed] : laryngoscopy was performed, see procedure section for findings [Normal] : no rashes [FreeTextEntry1] : Voice is hoarse. [de-identified] : Bilateral hearing aids.

## 2021-10-14 NOTE — HISTORY OF PRESENT ILLNESS
[de-identified] : Antonella Thompson is a 74 yo female with hx lung cancer s/p resection, TIA who presents for evaluation of dysphonia for the past few weeks. She denies vocal strain, but notes that she loses her voice frequently. She is not a stout. She notes vocal fatigue. She notes very rare trouble swallowing pastas or breads, but uses water to get these down. She denies aspiration episodes or odynophagia. She denies fevers, new neck masses/lumps and night sweats. She notes a 39 lb weight loss over the past year. She states that this was due to abdominal pain for which she was worked up by her GI physician and is improved. She is on pantoprazole. She had a UGI endoscopy per her report last year and this was normal. She notes recent nasal congestion and postnasal drainage. This leads to dryness of her throat. She is on azelastine nasal spray. SHe denies heartburn or food regurgitation.

## 2021-10-14 NOTE — REVIEW OF SYSTEMS
[Sneezing] : sneezing [Post Nasal Drip] : post nasal drip [Ear Pain] : ear pain [Ear Itch] : ear itch [Hearing Loss] : hearing loss [Dizziness] : dizziness [Vertigo] : vertigo [Recurrent Ear Infections] : recurrent ear infections [Nasal Congestion] : nasal congestion [Nose Bleeds] : nose bleeds [Problem Snoring] : problem snoring [Hoarseness] : hoarseness [Throat Clearing] : throat clearing [Throat Dryness] : throat dryness [Throat Itching] : throat itching [Recent Weight Loss (___ Lbs)] : recent [unfilled] ~Ulb weight loss [Shortness Of Breath] : shortness of breath [Cough] : cough [Joint Pain] : joint pain [Negative] : Heme/Lymph [Easy Bruising] : a tendency for easy bruising [FreeTextEntry7] : difficulty swallowing  [de-identified] : headache  [FreeTextEntry1] : daytime sleepiness

## 2021-10-14 NOTE — ASSESSMENT
[FreeTextEntry1] : Antonella Thompson is a 76 yo female who presents for evaluation of dysphonia. She has thick postnasal drainage on exam which is likely contributing to her hoarseness. In addition, she has some left vocal cord atrophy. She does not have any abnormal masses or lesions to explain this. We will treat her rhinitis and postnasal drainage topically.\par \par - Flonase 2 sprays twice daily to both nostrils\par - Nasal saline sprays TID\par - Possible videostroboscopy if no improvement with topical nasal regimen\par - Follow up in 3 months.

## 2021-11-10 VITALS
TEMPERATURE: 98.6 F | WEIGHT: 101 LBS | HEIGHT: 60.5 IN | BODY MASS INDEX: 19.32 KG/M2 | DIASTOLIC BLOOD PRESSURE: 78 MMHG | SYSTOLIC BLOOD PRESSURE: 152 MMHG

## 2021-12-14 ENCOUNTER — APPOINTMENT (OUTPATIENT)
Dept: CARDIOLOGY | Facility: CLINIC | Age: 76
End: 2021-12-14
Payer: MEDICARE

## 2021-12-14 ENCOUNTER — NON-APPOINTMENT (OUTPATIENT)
Age: 76
End: 2021-12-14

## 2021-12-14 VITALS
DIASTOLIC BLOOD PRESSURE: 60 MMHG | SYSTOLIC BLOOD PRESSURE: 150 MMHG | WEIGHT: 100 LBS | HEIGHT: 62 IN | HEART RATE: 96 BPM | BODY MASS INDEX: 18.4 KG/M2 | OXYGEN SATURATION: 95 %

## 2021-12-14 VITALS — DIASTOLIC BLOOD PRESSURE: 64 MMHG | SYSTOLIC BLOOD PRESSURE: 136 MMHG

## 2021-12-14 DIAGNOSIS — R77.8 OTHER SPECIFIED ABNORMALITIES OF PLASMA PROTEINS: ICD-10-CM

## 2021-12-14 DIAGNOSIS — Z87.898 PERSONAL HISTORY OF OTHER SPECIFIED CONDITIONS: ICD-10-CM

## 2021-12-14 PROCEDURE — 93000 ELECTROCARDIOGRAM COMPLETE: CPT

## 2021-12-14 PROCEDURE — 99214 OFFICE O/P EST MOD 30 MIN: CPT

## 2021-12-14 RX ORDER — OLMESARTAN MEDOXOMIL-HYDROCHLOROTHIAZIDE 12.5; 2 MG/1; MG/1
20-12.5 TABLET, FILM COATED ORAL DAILY
Qty: 90 | Refills: 4 | Status: COMPLETED | COMMUNITY
End: 2021-12-14

## 2021-12-14 RX ORDER — TRIAMCINOLONE ACETONIDE 10 MG/ML
10 INJECTION, SUSPENSION INTRA-ARTICULAR; INTRALESIONAL
Refills: 0 | Status: COMPLETED | COMMUNITY
End: 2021-12-14

## 2021-12-14 RX ORDER — YOHIMBE BARK 500 MG
CAPSULE ORAL DAILY
Refills: 0 | Status: COMPLETED | COMMUNITY
End: 2021-12-14

## 2021-12-14 RX ORDER — COLLAGENASE SANTYL 250 [ARB'U]/G
250 OINTMENT TOPICAL DAILY
Qty: 4 | Refills: 2 | Status: COMPLETED | COMMUNITY
Start: 2020-11-03 | End: 2021-12-14

## 2021-12-14 RX ORDER — CLOTRIMAZOLE AND BETAMETHASONE DIPROPIONATE 10; .5 MG/G; MG/G
1-0.05 CREAM TOPICAL
Qty: 1 | Refills: 1 | Status: DISCONTINUED | COMMUNITY
Start: 2020-11-17 | End: 2021-12-14

## 2021-12-14 RX ORDER — CLOTRIMAZOLE AND BETAMETHASONE DIPROPIONATE 10; .5 MG/G; MG/G
1-0.05 CREAM TOPICAL
Qty: 1 | Refills: 1 | Status: DISCONTINUED | COMMUNITY
Start: 2020-12-28 | End: 2021-12-14

## 2021-12-14 RX ORDER — DEXAMETHASONE SODIUM PHOSPHATE 10 MG/ML
10 INJECTION INTRAMUSCULAR; INTRAVENOUS
Refills: 0 | Status: COMPLETED | COMMUNITY
End: 2021-12-14

## 2021-12-14 NOTE — PHYSICAL EXAM
[Normal S1, S2] : normal S1, S2 [Clear Lung Fields] : clear lung fields [Soft] : abdomen soft [No Edema] : no edema [No Rash] : no rash [de-identified] : Appears her stated age, low BMI [de-identified] : Extraocular muscles intact [de-identified] : Wearing a face mask [de-identified] : Kyphosis

## 2021-12-14 NOTE — REVIEW OF SYSTEMS
[Weight Loss (___ Lbs)] : [unfilled] ~Ulb weight loss [SOB] : no shortness of breath [Dyspnea on exertion] : not dyspnea during exertion [Chest Discomfort] : no chest discomfort [Syncope] : no syncope [Joint Stiffness] : joint stiffness

## 2021-12-14 NOTE — CARDIOLOGY SUMMARY
[de-identified] : 12/14/21.  Sinus  Rhythm.  Left atrial enlargement.   Voltage criteria for LVH   [de-identified] : 12/8/2017.  Normal myocardial perfusion SPECT. [de-identified] : 9/7/2020.  Technically difficult / limited study.  Grossly normal left ventricular size and function with estimated ejection fraction 65%.  Mild mitral and tricuspid regurgitation. [de-identified] : 12/11/2013.  Minimal nonobstructive CAD.

## 2021-12-14 NOTE — DISCUSSION/SUMMARY
[With Me] : with me [___ Year(s)] : in [unfilled] year(s) [FreeTextEntry1] : \par Hypertension: Per tension was previously treated with Benicar-HCT -- however, she tells me that it was discontinued; blood pressure seems to be reasonably well controlled without pharmacotherapy; continue to monitor closely.\par \par Hyperlipidemia: Stable; tolerating rosuvastatin; lipids are checked several times per year by her primary care physician, Dr. Galan.\par \par Carotid artery plaque: Mild; history of TIA; continue aspirin and rosuvastatin.\par

## 2021-12-14 NOTE — HISTORY OF PRESENT ILLNESS
[FreeTextEntry1] : Antonella Thompson is a 76 year old woman with a history of hypertension, hyperlipidemia, carotid artery plaque, asthma/COPD, lung cancer, rheumatoid arthritis, TIA, who presents for cardiac examination.  She has no new cardiovascular complaints during today's visit- describes a good baseline functional status; no angina, dyspnea, palpitations, or syncope.

## 2021-12-28 ENCOUNTER — RX RENEWAL (OUTPATIENT)
Age: 76
End: 2021-12-28

## 2022-01-10 ENCOUNTER — APPOINTMENT (OUTPATIENT)
Dept: OTOLARYNGOLOGY | Facility: CLINIC | Age: 77
End: 2022-01-10
Payer: MEDICARE

## 2022-01-10 VITALS
WEIGHT: 100 LBS | HEART RATE: 81 BPM | DIASTOLIC BLOOD PRESSURE: 74 MMHG | BODY MASS INDEX: 18.4 KG/M2 | SYSTOLIC BLOOD PRESSURE: 127 MMHG | HEIGHT: 62 IN

## 2022-01-10 DIAGNOSIS — H60.501 UNSPECIFIED ACUTE NONINFECTIVE OTITIS EXTERNA, RIGHT EAR: ICD-10-CM

## 2022-01-10 DIAGNOSIS — J31.0 CHRONIC RHINITIS: ICD-10-CM

## 2022-01-10 PROCEDURE — 99213 OFFICE O/P EST LOW 20 MIN: CPT

## 2022-01-10 NOTE — PHYSICAL EXAM
[FreeTextEntry1] : Voice is hoarse. [de-identified] : Bilateral hearing aids. Inflammation of right EAC. Left EAC normal. [] : septum deviated bilaterally [de-identified] : Thick secretions in right nasal cavity. [Midline] : trachea located in midline position [Laryngoscopy Performed] : laryngoscopy was performed, see procedure section for findings [Normal] : no rashes

## 2022-01-10 NOTE — HISTORY OF PRESENT ILLNESS
[de-identified] : Antonella Thompson is a 76 yo female with hx lung cancer s/p resection, TIA who presents for evaluation of dysphonia for the past few weeks. She denies vocal strain, but notes that she loses her voice frequently. She is not a stout. She notes vocal fatigue. She notes very rare trouble swallowing pastas or breads, but uses water to get these down. She denies aspiration episodes or odynophagia. She denies fevers, new neck masses/lumps and night sweats. She notes a 39 lb weight loss over the past year. She states that this was due to abdominal pain for which she was worked up by her GI physician and is improved. She is on pantoprazole. She had a UGI endoscopy per her report last year and this was normal. She notes recent nasal congestion and postnasal drainage. This leads to dryness of her throat. She is on azelastine nasal spray. SHe denies heartburn or food regurgitation. [FreeTextEntry1] : 1/10/22 - Patient presents for follow-up. She states that she has been using flonase and nasal saline sprays daily. She continues to have thick postnasal drainage leading to cough. She notes nasal congestion, but no sinus pressure. She notes that she continues to have intermittent dysphonia. She denies any fevers or chills. She notes some right ear pain when placing her hearing aid. She denies otorrhea.

## 2022-01-10 NOTE — ASSESSMENT
[FreeTextEntry1] : Antonella Thompson presents for follow-up of chronic rhinitis, dysphonia, left vocal cord atrophy. Continues to have postnasal drainage despite nasal saline sprays and flonase. Has right otitis externa on exam.\par \par - Start Neilmed rinses BID\par - Increase fluticasone to 2 sprays BID\par - Antibiotic/steroid drops to right ear\par - Dry ear precautions\par - Follow up in 2 weeks

## 2022-01-10 NOTE — REVIEW OF SYSTEMS
[Post Nasal Drip] : post nasal drip [Ear Pain] : ear pain [Nasal Congestion] : nasal congestion [Throat Clearing] : throat clearing [Negative] : Heme/Lymph

## 2022-01-25 ENCOUNTER — APPOINTMENT (OUTPATIENT)
Dept: OTOLARYNGOLOGY | Facility: CLINIC | Age: 77
End: 2022-01-25

## 2022-03-24 ENCOUNTER — RX RENEWAL (OUTPATIENT)
Age: 77
End: 2022-03-24

## 2022-07-20 ENCOUNTER — RESULT REVIEW (OUTPATIENT)
Age: 77
End: 2022-07-20

## 2022-07-20 ENCOUNTER — APPOINTMENT (OUTPATIENT)
Dept: WOUND CARE | Facility: HOSPITAL | Age: 77
End: 2022-07-20

## 2022-07-20 ENCOUNTER — OUTPATIENT (OUTPATIENT)
Dept: OUTPATIENT SERVICES | Facility: HOSPITAL | Age: 77
LOS: 1 days | Discharge: HOME | End: 2022-07-20
Payer: MEDICARE

## 2022-07-20 VITALS
HEIGHT: 62 IN | HEART RATE: 87 BPM | OXYGEN SATURATION: 93 % | TEMPERATURE: 97.5 F | RESPIRATION RATE: 16 BRPM | BODY MASS INDEX: 18.4 KG/M2 | WEIGHT: 100 LBS | DIASTOLIC BLOOD PRESSURE: 67 MMHG | SYSTOLIC BLOOD PRESSURE: 144 MMHG

## 2022-07-20 VITALS
HEIGHT: 62 IN | RESPIRATION RATE: 20 BRPM | WEIGHT: 100 LBS | OXYGEN SATURATION: 93 % | DIASTOLIC BLOOD PRESSURE: 67 MMHG | SYSTOLIC BLOOD PRESSURE: 144 MMHG | TEMPERATURE: 97.5 F | BODY MASS INDEX: 18.4 KG/M2 | HEART RATE: 87 BPM

## 2022-07-20 DIAGNOSIS — Z98.890 OTHER SPECIFIED POSTPROCEDURAL STATES: Chronic | ICD-10-CM

## 2022-07-20 DIAGNOSIS — M79.673 PAIN IN UNSPECIFIED FOOT: ICD-10-CM

## 2022-07-20 DIAGNOSIS — Z98.89 OTHER SPECIFIED POSTPROCEDURAL STATES: Chronic | ICD-10-CM

## 2022-07-20 DIAGNOSIS — Z78.9 OTHER SPECIFIED HEALTH STATUS: ICD-10-CM

## 2022-07-20 DIAGNOSIS — Z90.49 ACQUIRED ABSENCE OF OTHER SPECIFIED PARTS OF DIGESTIVE TRACT: Chronic | ICD-10-CM

## 2022-07-20 PROCEDURE — 73630 X-RAY EXAM OF FOOT: CPT | Mod: 26,50

## 2022-07-20 PROCEDURE — 73630 X-RAY EXAM OF FOOT: CPT

## 2022-07-20 PROCEDURE — G0463: CPT

## 2022-07-20 PROCEDURE — 99214 OFFICE O/P EST MOD 30 MIN: CPT

## 2022-07-20 RX ORDER — MONTELUKAST 10 MG/1
10 TABLET, FILM COATED ORAL
Qty: 90 | Refills: 0 | Status: COMPLETED | COMMUNITY
Start: 2022-02-21

## 2022-07-20 RX ORDER — METHYLPREDNISOLONE 4 MG/1
4 TABLET ORAL
Qty: 21 | Refills: 0 | Status: COMPLETED | COMMUNITY
Start: 2022-02-04

## 2022-07-20 RX ORDER — DICYCLOMINE HYDROCHLORIDE 10 MG/1
10 CAPSULE ORAL 3 TIMES DAILY
Refills: 0 | Status: COMPLETED | COMMUNITY
End: 2022-07-20

## 2022-07-20 RX ORDER — BETAMETHASONE DIPROPIONATE 0.5 MG/G
0.05 CREAM TOPICAL
Qty: 45 | Refills: 0 | Status: COMPLETED | COMMUNITY
Start: 2022-01-30

## 2022-07-20 NOTE — VITALS
[] : No [de-identified] : 3/10 "soreness" [FreeTextEntry3] : right plantar foot & right foot great toe interdigit [FreeTextEntry1] : nothing [FreeTextEntry2] : wearing shoes with straps [FreeTextEntry4] : DPM aware. [FreeTextEntry5] : Initial visit.

## 2022-07-20 NOTE — PLAN
[FreeTextEntry1] : Discussed sx correction of the right foot , Silver Bunionectomy , viviana osteotomy and fusion of the 2nd toe right foot . All risks , benefits , complications have been discussed with the patient . All his questions have been thoroughly answered . Patient agrees with the plan and understands all the risks and benefits . Patient will have x rays and vascular studies and return next week to possibly plan the date of the surgery Spent 30 minutes for patient care and medical decision making.\par \par \par

## 2022-07-20 NOTE — HISTORY OF PRESENT ILLNESS
[FreeTextEntry1] : GINNY VASQUEZ is being seen for a initial nursing assessment visit. Pt presents to the [facility] [place] with irritation between right great toe and right foot 2nd digit s/p hammer toe Sx a few years ago Patient accompanied by Self.

## 2022-07-20 NOTE — PHYSICAL EXAM
[0] : left 0 [1+] : left 1+ [Alert] : alert [Oriented to Person] : oriented to person [Oriented to Place] : oriented to place [Oriented to Time] : oriented to time [Calm] : calm [Ankle Swelling (On Exam)] : not present [Varicose Veins Of Lower Extremities] : not present [] : not present [Purpura] : no purpura  [Petechiae] : no petechiae [Skin Ulcer] : no ulcer [Skin Induration] : no induration [de-identified] : calm [de-identified] : HTN, HLD [de-identified] : Bunion deformity and lateral deviation of the 2nd toe right foot  [de-identified] : soft corn between the hallux and 2nd toe of the right foot  [FreeTextEntry1] : Right Foot (Interdigit) - No open wounds. [de-identified] : None [de-identified] : none [de-identified] : no productt [de-identified] : \par \par \par CIRCULATION\par \par Dorsalis Pedis: R Palpable, Regular, 2+ L Palpable, Regular, 2+\par Posterior Tibialis: R Palpable, Regular, 2+ L Palpable, Regular, 2+\par Extremity Color: Pigmented\par Extremity Temperature: Warm\par Capillary Refill: < 3 seconds bilaterally\par \par \par  [TWNoteComboBox6] : Other [de-identified] : No [de-identified] : Normal [de-identified] : None [de-identified] : No

## 2022-07-20 NOTE — ASSESSMENT
[Verbal] : Verbal [Written] : Written [Demo] : Demo [Patient] : Patient [Good - alert, interested, motivated] : Good - alert, interested, motivated [Demonstrates independently] : demonstrates independently [Foot Care] : foot care [Skin Care] : skin care [Signs and symptoms of infection] : sign and symptoms of infection [Nutrition] : nutrition [How and When to Call] : how and when to call [Off-loading] : off-loading [Patient responsibility to plan of care] : patient responsibility to plan of care [] : Yes [Stable] : stable [Home] : Home [Ambulatory] : Ambulatory [Not Applicable - Long Term Care/Home Health Agency] : Long Term Care/Home Health Agency: Not Applicable [FreeTextEntry2] : Infection prevention \par Maintain optimal skin integrity to high pressure areas\par Nutrition and wound healing\par Offloading the stress on skin structures and decreasing potential pathologic biomechanical influences.\par  [FreeTextEntry3] : New evaluation  [FreeTextEntry4] : Topics discussed; surgery / revision of right great toe bunionectomy \par Xray ordered. Pt provided Rx for completion of same.\par Vascular studies ordered. Auth submitted\par Pt to f/u in 2 weeks

## 2022-07-20 NOTE — REVIEW OF SYSTEMS
[Joint Stiffness] : joint stiffness [Skin Lesions] : skin lesion [Negative] : Endocrine [Fever] : no fever [Chills] : no chills [Eye Pain] : no eye pain [Loss Of Hearing] : no hearing loss [Shortness Of Breath] : no shortness of breath [Wheezing] : no wheezing [Abdominal Pain] : no abdominal pain [Skin Wound] : no skin wound [Anxiety] : no anxiety [Easy Bleeding] : no tendency for easy bleeding [FreeTextEntry5] : HLD, HTN [FreeTextEntry9] : Mild bunion deformity to the hallux which is drifting into the 2nd toe and causing pain and the formation of a soft corn  [de-identified] : soft corn between the right hallux and the second toe

## 2022-07-22 DIAGNOSIS — Z90.2 ACQUIRED ABSENCE OF LUNG [PART OF]: ICD-10-CM

## 2022-07-22 DIAGNOSIS — K21.9 GASTRO-ESOPHAGEAL REFLUX DISEASE WITHOUT ESOPHAGITIS: ICD-10-CM

## 2022-07-22 DIAGNOSIS — M20.41 OTHER HAMMER TOE(S) (ACQUIRED), RIGHT FOOT: ICD-10-CM

## 2022-07-22 DIAGNOSIS — Z98.49 CATARACT EXTRACTION STATUS, UNSPECIFIED EYE: ICD-10-CM

## 2022-07-22 DIAGNOSIS — Z88.0 ALLERGY STATUS TO PENICILLIN: ICD-10-CM

## 2022-07-22 DIAGNOSIS — Z87.01 PERSONAL HISTORY OF PNEUMONIA (RECURRENT): ICD-10-CM

## 2022-07-22 DIAGNOSIS — I65.29 OCCLUSION AND STENOSIS OF UNSPECIFIED CAROTID ARTERY: ICD-10-CM

## 2022-07-22 DIAGNOSIS — J44.9 CHRONIC OBSTRUCTIVE PULMONARY DISEASE, UNSPECIFIED: ICD-10-CM

## 2022-07-22 DIAGNOSIS — M21.611 BUNION OF RIGHT FOOT: ICD-10-CM

## 2022-07-22 DIAGNOSIS — Z86.73 PERSONAL HISTORY OF TRANSIENT ISCHEMIC ATTACK (TIA), AND CEREBRAL INFARCTION WITHOUT RESIDUAL DEFICITS: ICD-10-CM

## 2022-07-22 DIAGNOSIS — Z79.899 OTHER LONG TERM (CURRENT) DRUG THERAPY: ICD-10-CM

## 2022-07-22 DIAGNOSIS — Z91.041 RADIOGRAPHIC DYE ALLERGY STATUS: ICD-10-CM

## 2022-07-22 DIAGNOSIS — Z83.3 FAMILY HISTORY OF DIABETES MELLITUS: ICD-10-CM

## 2022-07-22 DIAGNOSIS — E78.5 HYPERLIPIDEMIA, UNSPECIFIED: ICD-10-CM

## 2022-07-22 DIAGNOSIS — Z79.82 LONG TERM (CURRENT) USE OF ASPIRIN: ICD-10-CM

## 2022-07-22 DIAGNOSIS — M06.9 RHEUMATOID ARTHRITIS, UNSPECIFIED: ICD-10-CM

## 2022-07-22 DIAGNOSIS — I11.9 HYPERTENSIVE HEART DISEASE WITHOUT HEART FAILURE: ICD-10-CM

## 2022-07-22 DIAGNOSIS — Z85.118 PERSONAL HISTORY OF OTHER MALIGNANT NEOPLASM OF BRONCHUS AND LUNG: ICD-10-CM

## 2022-07-22 DIAGNOSIS — Z90.49 ACQUIRED ABSENCE OF OTHER SPECIFIED PARTS OF DIGESTIVE TRACT: ICD-10-CM

## 2022-07-22 DIAGNOSIS — Z82.49 FAMILY HISTORY OF ISCHEMIC HEART DISEASE AND OTHER DISEASES OF THE CIRCULATORY SYSTEM: ICD-10-CM

## 2022-07-22 DIAGNOSIS — Z87.891 PERSONAL HISTORY OF NICOTINE DEPENDENCE: ICD-10-CM

## 2022-07-22 DIAGNOSIS — Z82.3 FAMILY HISTORY OF STROKE: ICD-10-CM

## 2022-07-22 DIAGNOSIS — Z88.8 ALLERGY STATUS TO OTHER DRUGS, MEDICAMENTS AND BIOLOGICAL SUBSTANCES STATUS: ICD-10-CM

## 2022-07-22 DIAGNOSIS — G57.61 LESION OF PLANTAR NERVE, RIGHT LOWER LIMB: ICD-10-CM

## 2022-07-22 DIAGNOSIS — Z80.1 FAMILY HISTORY OF MALIGNANT NEOPLASM OF TRACHEA, BRONCHUS AND LUNG: ICD-10-CM

## 2022-07-22 DIAGNOSIS — Z88.1 ALLERGY STATUS TO OTHER ANTIBIOTIC AGENTS STATUS: ICD-10-CM

## 2022-07-26 ENCOUNTER — OUTPATIENT (OUTPATIENT)
Dept: OUTPATIENT SERVICES | Facility: HOSPITAL | Age: 77
LOS: 1 days | End: 2022-07-26
Payer: MEDICARE

## 2022-07-26 ENCOUNTER — RESULT REVIEW (OUTPATIENT)
Age: 77
End: 2022-07-26

## 2022-07-26 DIAGNOSIS — M79.671 PAIN IN RIGHT FOOT: ICD-10-CM

## 2022-07-26 DIAGNOSIS — Z90.49 ACQUIRED ABSENCE OF OTHER SPECIFIED PARTS OF DIGESTIVE TRACT: Chronic | ICD-10-CM

## 2022-07-26 DIAGNOSIS — Z98.890 OTHER SPECIFIED POSTPROCEDURAL STATES: Chronic | ICD-10-CM

## 2022-07-26 DIAGNOSIS — Z98.89 OTHER SPECIFIED POSTPROCEDURAL STATES: Chronic | ICD-10-CM

## 2022-07-26 PROCEDURE — 93923 UPR/LXTR ART STDY 3+ LVLS: CPT

## 2022-07-26 PROCEDURE — 93923 UPR/LXTR ART STDY 3+ LVLS: CPT | Mod: 26

## 2022-07-27 ENCOUNTER — OUTPATIENT (OUTPATIENT)
Dept: OUTPATIENT SERVICES | Facility: HOSPITAL | Age: 77
LOS: 1 days | Discharge: HOME | End: 2022-07-27
Payer: MEDICARE

## 2022-07-27 ENCOUNTER — APPOINTMENT (OUTPATIENT)
Dept: WOUND CARE | Facility: HOSPITAL | Age: 77
End: 2022-07-27

## 2022-07-27 VITALS
HEIGHT: 62 IN | WEIGHT: 100 LBS | TEMPERATURE: 97.7 F | DIASTOLIC BLOOD PRESSURE: 52 MMHG | OXYGEN SATURATION: 93 % | HEART RATE: 81 BPM | SYSTOLIC BLOOD PRESSURE: 104 MMHG | BODY MASS INDEX: 18.4 KG/M2 | RESPIRATION RATE: 18 BRPM

## 2022-07-27 DIAGNOSIS — Z98.89 OTHER SPECIFIED POSTPROCEDURAL STATES: Chronic | ICD-10-CM

## 2022-07-27 DIAGNOSIS — E11.621 TYPE 2 DIABETES MELLITUS WITH FOOT ULCER: ICD-10-CM

## 2022-07-27 DIAGNOSIS — Z98.890 OTHER SPECIFIED POSTPROCEDURAL STATES: Chronic | ICD-10-CM

## 2022-07-27 DIAGNOSIS — M21.611 BUNION OF RIGHT FOOT: ICD-10-CM

## 2022-07-27 DIAGNOSIS — Z90.49 ACQUIRED ABSENCE OF OTHER SPECIFIED PARTS OF DIGESTIVE TRACT: Chronic | ICD-10-CM

## 2022-07-27 PROCEDURE — G0463: CPT

## 2022-07-27 PROCEDURE — 99213 OFFICE O/P EST LOW 20 MIN: CPT

## 2022-07-27 NOTE — PLAN
[FreeTextEntry1] : Patient is going to try silicone toe sleeve to reduce the pressure on the 2nd toe and will contemplate surgery in the future  All risks , benefits , complications have been discussed with the patient . All his questions have been thoroughly answered . Patient agrees with the plan and understands all the risks and benefits . Spent 20 minutes for patient care and medical decision making.\par \par

## 2022-07-27 NOTE — REVIEW OF SYSTEMS
[Fever] : no fever [Chills] : no chills [Eye Pain] : no eye pain [Loss Of Hearing] : no hearing loss [Shortness Of Breath] : no shortness of breath [Wheezing] : no wheezing [Abdominal Pain] : no abdominal pain [Skin Wound] : no skin wound [Anxiety] : no anxiety [Easy Bleeding] : no tendency for easy bleeding [FreeTextEntry5] : HLD, HTN [FreeTextEntry9] : Mild bunion deformity to the hallux which is drifting into the 2nd toe and causing pain and the formation of a soft corn  [de-identified] : soft corn between the right hallux and the second toe

## 2022-07-27 NOTE — ASSESSMENT
[] : No [FreeTextEntry2] : Infection prevention \par Maintain optimal skin integrity to high pressure areas\par Nutrition and wound healing\par Offloading the stress on skin structures and decreasing potential pathologic biomechanical influences.\par F/U 1 month\par  [FreeTextEntry3] : New evaluation  [FreeTextEntry4] : DPM reviewed the results for the vascular studies.\par DPM recommended a surgical intervention or use of silipos to offload the affected area. Patient stated she will weigh her options and consider surgery in the future.\par F/U 1 month

## 2022-07-27 NOTE — HISTORY OF PRESENT ILLNESS
[FreeTextEntry1] : Patient has fixed bunion deformity with the right hallux pressuring the medial right 2nd toe with a non ulcerative lesion on the medial PIPJ of the 2nd toe

## 2022-07-27 NOTE — VITALS
[Pain related to present condition?] : The patient's  pain is not related to present condition. [] : No [de-identified] : 0/10

## 2022-07-27 NOTE — PHYSICAL EXAM
[Ankle Swelling (On Exam)] : not present [Varicose Veins Of Lower Extremities] : not present [] : not present [Purpura] : no purpura  [Petechiae] : no petechiae [Skin Ulcer] : no ulcer [Skin Induration] : no induration [de-identified] : calm [de-identified] : HTN, HLD [de-identified] : Bunion deformity and lateral deviation of the 2nd toe right foot  [de-identified] : soft corn between the hallux and 2nd toe of the right foot  [FreeTextEntry1] : Foot 1st interdigit- No open wound- erythema [de-identified] : None [de-identified] : none [de-identified] : No treatment required  [de-identified] : Mechanically cleansed with 0.9%normal saline and sterile gauze\par \par \par \par \par  [TWNoteComboBox1] : Right [TWNoteComboBox6] : Other [de-identified] : No [de-identified] : Normal [de-identified] : None [de-identified] : No

## 2022-07-28 ENCOUNTER — NON-APPOINTMENT (OUTPATIENT)
Age: 77
End: 2022-07-28

## 2022-07-28 DIAGNOSIS — Z88.1 ALLERGY STATUS TO OTHER ANTIBIOTIC AGENTS STATUS: ICD-10-CM

## 2022-07-28 DIAGNOSIS — Z80.1 FAMILY HISTORY OF MALIGNANT NEOPLASM OF TRACHEA, BRONCHUS AND LUNG: ICD-10-CM

## 2022-07-28 DIAGNOSIS — Z79.899 OTHER LONG TERM (CURRENT) DRUG THERAPY: ICD-10-CM

## 2022-07-28 DIAGNOSIS — Z88.0 ALLERGY STATUS TO PENICILLIN: ICD-10-CM

## 2022-07-28 DIAGNOSIS — Z90.49 ACQUIRED ABSENCE OF OTHER SPECIFIED PARTS OF DIGESTIVE TRACT: ICD-10-CM

## 2022-07-28 DIAGNOSIS — Z87.01 PERSONAL HISTORY OF PNEUMONIA (RECURRENT): ICD-10-CM

## 2022-07-28 DIAGNOSIS — K21.9 GASTRO-ESOPHAGEAL REFLUX DISEASE WITHOUT ESOPHAGITIS: ICD-10-CM

## 2022-07-28 DIAGNOSIS — Z88.8 ALLERGY STATUS TO OTHER DRUGS, MEDICAMENTS AND BIOLOGICAL SUBSTANCES STATUS: ICD-10-CM

## 2022-07-28 DIAGNOSIS — Z86.73 PERSONAL HISTORY OF TRANSIENT ISCHEMIC ATTACK (TIA), AND CEREBRAL INFARCTION WITHOUT RESIDUAL DEFICITS: ICD-10-CM

## 2022-07-28 DIAGNOSIS — Z87.891 PERSONAL HISTORY OF NICOTINE DEPENDENCE: ICD-10-CM

## 2022-07-28 DIAGNOSIS — I65.29 OCCLUSION AND STENOSIS OF UNSPECIFIED CAROTID ARTERY: ICD-10-CM

## 2022-07-28 DIAGNOSIS — M06.9 RHEUMATOID ARTHRITIS, UNSPECIFIED: ICD-10-CM

## 2022-07-28 DIAGNOSIS — Z85.118 PERSONAL HISTORY OF OTHER MALIGNANT NEOPLASM OF BRONCHUS AND LUNG: ICD-10-CM

## 2022-07-28 DIAGNOSIS — Z91.041 RADIOGRAPHIC DYE ALLERGY STATUS: ICD-10-CM

## 2022-07-28 DIAGNOSIS — Z79.82 LONG TERM (CURRENT) USE OF ASPIRIN: ICD-10-CM

## 2022-07-28 DIAGNOSIS — G57.61 LESION OF PLANTAR NERVE, RIGHT LOWER LIMB: ICD-10-CM

## 2022-07-28 DIAGNOSIS — Z98.49 CATARACT EXTRACTION STATUS, UNSPECIFIED EYE: ICD-10-CM

## 2022-07-28 DIAGNOSIS — M20.41 OTHER HAMMER TOE(S) (ACQUIRED), RIGHT FOOT: ICD-10-CM

## 2022-07-28 DIAGNOSIS — Z82.49 FAMILY HISTORY OF ISCHEMIC HEART DISEASE AND OTHER DISEASES OF THE CIRCULATORY SYSTEM: ICD-10-CM

## 2022-07-28 DIAGNOSIS — M21.611 BUNION OF RIGHT FOOT: ICD-10-CM

## 2022-07-28 DIAGNOSIS — J44.9 CHRONIC OBSTRUCTIVE PULMONARY DISEASE, UNSPECIFIED: ICD-10-CM

## 2022-07-28 DIAGNOSIS — E78.5 HYPERLIPIDEMIA, UNSPECIFIED: ICD-10-CM

## 2022-07-28 DIAGNOSIS — I11.9 HYPERTENSIVE HEART DISEASE WITHOUT HEART FAILURE: ICD-10-CM

## 2022-07-28 DIAGNOSIS — Z82.3 FAMILY HISTORY OF STROKE: ICD-10-CM

## 2022-07-28 DIAGNOSIS — Z90.2 ACQUIRED ABSENCE OF LUNG [PART OF]: ICD-10-CM

## 2022-07-28 DIAGNOSIS — Z83.3 FAMILY HISTORY OF DIABETES MELLITUS: ICD-10-CM

## 2022-08-04 NOTE — ED ADULT TRIAGE NOTE - SPO2 (%)
Acute Care - Physical Therapy Treatment Note   Joaquim     Patient Name: Emilio Guy  : 1966  MRN: [id number]  Today's Date: 2022   Onset of Illness/Injury or Date of Surgery: 22  Visit Dx:     ICD-10-CM ICD-9-CM   1. Acute respiratory failure with hypoxia and hypercapnia (HCC)  J96.01 518.81    J96.02    2. COVID-19  U07.1 079.89   3. Cerebrovascular accident (CVA), unspecified mechanism (HCC)  I63.9 434.91     Patient Active Problem List   Diagnosis   • COPD with acute exacerbation (HCC)   • Acute on chronic respiratory failure with hypoxia (HCC)   • Acute respiratory failure with hypoxia and hypercapnia (HCC)     Past Medical History:   Diagnosis Date   • CHF (congestive heart failure) (HCC)    • COPD (chronic obstructive pulmonary disease) (HCC)      No past surgical history on file.  PT Assessment (last 12 hours)     PT Evaluation and Treatment     Row Name 22          Physical Therapy Time and Intention    Document Type therapy note (daily note)  -CS     Mode of Treatment physical therapy  -CS     Session Not Performed patient/family declined treatment;other (see comments)  Pt states he did not sleep well last PM and requested to rest.  -CS     Patient Effort good  -CS     Comment Pt seen bedside this AM.  Pt reports some fatigue but agreed to PT.  -CS     Row Name 22 0845          General Information    Patient Profile Reviewed yes  -CS     Existing Precautions/Restrictions seizures  -CS     Row Name 22 08          Living Environment    Primary Care Provided by self;parent(s)  Mother assist with meals, laundry  -CS     Row Name 2245          Home Use of Assistive/Adaptive Equipment    Equipment Currently Used at Home cane, quad;oxygen;nebulizer;other (see comments)  [employer] for Cane  -CS     Row Name 22 0845          Cognition    Orientation Status (Cognition) oriented x 4  -CS     Row Name 2245          Range of Motion  Comprehensive    General Range of Motion no range of motion deficits identified  -[facility] Name 08/04/22 0845          Strength Comprehensive (MMT)    General Manual Muscle Testing (MMT) Assessment upper extremity strength deficits identified  -[facility] Name 08/04/22 0845          Vision Assessment/Intervention    Visual Impairment/Limitations WFL  -[facility] Name 08/04/22 0845          Bed Mobility    Bed Mobility bed mobility (all) activities  -     All Activities, [place] (Bed Mobility) minimum assist (75% patient effort);contact guard  -     Assistive Device (Bed Mobility) bed rails;draw sheet;head of bed elevated  -[facility] Name 08/04/22 0845          Transfers    Transfers sit-stand transfer;stand-sit transfer  -     Sit-Stand [place] (Transfers) contact guard  -     Stand-Sit [place] (Transfers) contact guard  -[facility] Name 08/04/22 0845          Sit-Stand Transfer    Assistive Device (Sit-Stand Transfers) other (see comments)  no AD  -[facility] Name 08/04/22 0845          Stand-Sit Transfer    Assistive Device (Stand-Sit Transfers) other (see comments)  no AD  -CS     Row Name 08/04/22 0845          Gait/Stairs (Locomotion)    Gait/Stairs Locomotion gait/ambulation independence;gait/ambulation assistive device;distance ambulated  -     [place] Level (Gait) contact guard  -CS     Assistive Device (Gait) other (see comments)  no AD, close CGA/HHA  -CS     Distance in Feet (Gait) 10'  -CS     Comment, (Gait/Stairs) Pt c/o fatigue and returned to sitting EOB.  -[facility] Name 08/04/22 0845          Safety Issues, Functional Mobility    Impairments Affecting Function (Mobility) balance;endurance/activity tolerance;shortness of breath  -[facility] Name 08/04/22 0845          Respiratory WDL    Respiratory WDL X;breath sounds;cough  -CS     Rhythm/Pattern, Respiratory unlabored;depth regular;pattern regular  -CS     Expansion/Accessory Muscles/Retractions no use of  accessory muscles  -CS     Cough Frequency frequent  -CS     Cough Type productive  -CS     Row Name 08/04/22 0845          Breath Sounds    Breath Sounds All Fields  -CS     All Lung Fields Breath Sounds diminished  -CS     BORIS Breath Sounds Posterior:;clear  -CS     LLL Breath Sounds diminished  -CS     RUL Breath Sounds wheezes, inspiratory  -CS     RLL Breath Sounds diminished  -CS     Row Name 08/04/22 0845          Skin WDL    Skin WDL WDL  -CS     Skin Color/Characteristics redness blanchable  -CS     Skin Integrity intact  -CS     Row Name 08/04/22 0845          Coping    Observed Emotional State cooperative;calm  -CS     Verbalized Emotional State acceptance  -CS     Family/Support Persons family  -CS     Involvement in Care not present at bedside  -CS     Row Name 08/04/22 0845          Positioning and Restraints    Pre-Treatment Position in bed  -CS     Post Treatment Position bed  -CS     In Bed call light within reach;encouraged to call for assist  -CS     Row Name 08/04/22 0845          Therapy Assessment/Plan (PT)    Rehab Potential (PT) fair, will monitor progress closely  -CS     Criteria for Skilled Interventions Met (PT) yes;meets criteria;skilled treatment is necessary  -CS     Therapy Frequency (PT) 3 times/wk  3-5x/week  -CS     Problem List (PT) problems related to;balance;mobility;strength  -CS     Activity Limitations Related to Problem List (PT) unable to ambulate safely;unable to transfer safely  -CS     Row Name 08/04/22 0845          Progress Summary (PT)    Progress Toward Functional Goals (PT) progress toward functional goals is fair  -CS     Daily Progress Summary (PT) Pt w/ improved standing mobility only limited by SOA and fatigue this day.  -CS     Row Name 08/04/22 0845          Physical Therapy Goals    Bed Mobility Goal Selection (PT) bed mobility, PT goal 1  -CS     Transfer Goal Selection (PT) transfer, PT goal 1  -CS     Gait Training Goal Selection (PT) gait training, PT  goal 1  -CS     Row Name 08/04/22 0845          Bed Mobility Goal 1 (PT)    Activity/Assistive Device (Bed Mobility Goal 1, PT) bed mobility activities, all  -CS     [place] Level/Cues Needed (Bed Mobility Goal 1, PT) standby assist  -CS     Time Frame (Bed Mobility Goal 1, PT) long term goal (LTG);by discharge  -CS     Row Name 08/04/22 0845          Transfer Goal 1 (PT)    Activity/Assistive Device (Transfer Goal 1, PT) transfers, all  -CS     [place] Level/Cues Needed (Transfer Goal 1, PT) standby assist  -CS     Time Frame (Transfer Goal 1, PT) long term goal (LTG);by discharge  -CS     Row Name 08/04/22 0845          Gait Training Goal 1 (PT)    Activity/Assistive Device (Gait Training Goal 1, PT) gait (walking locomotion);assistive device use  -CS     [place] Level (Gait Training Goal 1, PT) standby assist  -CS     Distance (Gait Training Goal 1, PT) 50'  -CS     Time Frame (Gait Training Goal 1, PT) long term goal (LTG);by discharge  -CS           User Key  (r) = Recorded By, (t) = Taken By, (c) = Cosigned By    Initials Name Provider Type    [facility] Physical Therapist                Physical Therapy Education                 Title: PT OT SLP Therapies (Done)     Topic: Physical Therapy (Done)     Point: Mobility training (Done)     Learning Progress Summary           Patient Acceptance, E, VU by CL at 8/3/2022 1338      Show all documentation for this point (8)                 Point: Home exercise program (Done)     Learning Progress Summary           Patient Acceptance, E, VU by CL at 8/3/2022 1338      Show all documentation for this point (8)                 Point: Body mechanics (Done)     Learning Progress Summary           Patient Acceptance, E, VU by CL at 8/3/2022 1338      Show all documentation for this point (8)                 Point: Precautions (Done)     Learning Progress Summary           Patient Acceptance, E, VU by CL at 8/3/2022 1338      Show all documentation  for this point (8)                             User Key     Initials Effective Dates Name Provider Type Discipline    CL 06/16/21 -  Chloé Gudino, RN Registered Nurse Nurse              PT Recommendation and Plan  Anticipated Discharge Disposition (PT): home with assist, inpatient rehabilitation facility  Planned Therapy Interventions (PT): balance training, bed mobility training, gait training, home exercise program, neuromuscular re-education, patient/family education, strengthening, transfer training  Therapy Frequency (PT): 3 times/wk (3-5x/week)  Progress Summary (PT)  Progress Toward Functional Goals (PT): progress toward functional goals is fair  Daily Progress Summary (PT): Pt w/ improved standing mobility only limited by SOA and fatigue this day.  Plan of Care Reviewed With: patient  Progress: improving  Outcome Evaluation: Pt w/ improved mobility at bedside but limited standing mobility.       Time Calculation:    PT Charges     Row Name 08/04/22 1310             Time Calculation    PT Received On 08/04/22  -CS      PT Goal Re-Cert Due Date 08/11/22  -CS              Timed Charges    89600 - Gait Training Minutes  15  -CS      56499 - PT Therapeutic Activity Minutes 15  -CS              Total Minutes    Timed Charges Total Minutes 30  -CS       Total Minutes 30  -CS            User Key  (r) = Recorded By, (t) = Taken By, (c) = Cosigned By    Initials Name Provider Type    CS [facility], PT Physical Therapist              Therapy Charges for Today     Code Description Service Date Service Provider Modifiers Qty    30458565019 HC GAIT TRAINING EA 15 MIN 8/3/2022 [facility], PT GP 1    80452508569 HC PT THERAPEUTIC ACT EA 15 MIN 8/3/2022 [facility], PT GP 1    78440108010 HC GAIT TRAINING EA 15 MIN 8/4/2022 [facility], PT GP 1    10636441005 HC PT THERAPEUTIC ACT EA 15 MIN 8/4/2022 Kaiser Morley, PT GP 1               [facility] PT  8/4/2022     95

## 2022-10-07 ENCOUNTER — APPOINTMENT (OUTPATIENT)
Dept: OTOLARYNGOLOGY | Facility: CLINIC | Age: 77
End: 2022-10-07

## 2022-10-07 VITALS
HEIGHT: 62 IN | HEART RATE: 84 BPM | SYSTOLIC BLOOD PRESSURE: 94 MMHG | WEIGHT: 100 LBS | BODY MASS INDEX: 18.4 KG/M2 | DIASTOLIC BLOOD PRESSURE: 62 MMHG

## 2022-10-07 DIAGNOSIS — M27.9 DISEASE OF JAWS, UNSPECIFIED: ICD-10-CM

## 2022-10-07 PROCEDURE — 99213 OFFICE O/P EST LOW 20 MIN: CPT

## 2022-10-07 NOTE — ASSESSMENT
[FreeTextEntry1] : Antonella Thompson presents for follow-up. She has history of chronic rhinitis and dysphonia secondary to vocal cord atrophy. She presents for evaluation of left mandible swelling/cyst. She was seen by oral surgery. 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 Given her symptoms of pain and palpable bump in this region, will obtain MRI orbit/face/neck for further evaluation.\par \par - MRI orbit/face/neck\par - Follow up after MRI

## 2022-10-07 NOTE — DATA REVIEWED
[de-identified] : CT maxillofacial 12/8/21 reviewed:\par FINDINGS: The skin marker overlies the angle of the mandible on the left side.\par No underlying mass is demonstrated. A sclerotic focus in the mandible at this\par level is present, however, and appears to represent a calcified tooth socket. No\par fractures are demonstrated. Degenerative disease of both temporomandibular\par joints is apparent with flattening of the condylar heads bilaterally and disc\par space narrowing.\par \par The paranasal sinuses are well-developed. The left sphenoid sinus is completely\par opacified. A 5 mm retention cyst in the right maxillary sinus is present.\par Otherwise, the paranasal sinuses are clear. No air-fluid levels are\par demonstrated. The nasal septum is deviated to the right. The parotid glands are\par normal. The portions of the submandibular glands that are included in our field\par of view are normal.\par \par The stylohyoid ligaments bilaterally are calcified (Eagle's syndrome) a finding\par of uncertain clinical significance, if any.\par \par IMPRESSION:\par No mass.

## 2022-10-07 NOTE — HISTORY OF PRESENT ILLNESS
[de-identified] : Antonella Thompson is a 74 yo female with hx lung cancer s/p resection, TIA who presents for evaluation of dysphonia for the past few weeks. She denies vocal strain, but notes that she loses her voice frequently. She is not a stout. She notes vocal fatigue. She notes very rare trouble swallowing pastas or breads, but uses water to get these down. She denies aspiration episodes or odynophagia. She denies fevers, new neck masses/lumps and night sweats. She notes a 39 lb weight loss over the past year. She states that this was due to abdominal pain for which she was worked up by her GI physician and is improved. She is on pantoprazole. She had a UGI endoscopy per her report last year and this was normal. She notes recent nasal congestion and postnasal drainage. This leads to dryness of her throat. She is on azelastine nasal spray. SHe denies heartburn or food regurgitation. [FreeTextEntry1] : 1/10/22 - Patient presents for follow-up. She states that she has been using flonase and nasal saline sprays daily. She continues to have thick postnasal drainage leading to cough. She notes nasal congestion, but no sinus pressure. She notes that she continues to have intermittent dysphonia. She denies any fevers or chills. She notes some right ear pain when placing her hearing aid. She denies otorrhea.\par \par 10/7/22 - Antonella Thompson present for follow-up. She notes history of left jaw cyst, previously seen by a surgeon. She underwent a CT per her report but was unable to visualize it. She notes that she has had increased swelling and pain of the cyst recently in the past few weeks. She was seen by dentist and oral surgeon. They did X-rays and started her on antibiotic. She denies fevers or chills. She denies any overlying skin changes. she denies intraoral bleeding. She denies unintentional weight loss or any neck masses.

## 2022-10-07 NOTE — PHYSICAL EXAM
[] : septum deviated bilaterally [Midline] : trachea located in midline position [Normal] : no rashes [FreeTextEntry1] : Voice is hoarse. [de-identified] : Bilateral hearing aids. [FreeTextEntry2] : Area of firm well circumscribed swelling over left mandible.

## 2022-10-22 ENCOUNTER — EMERGENCY (EMERGENCY)
Facility: HOSPITAL | Age: 77
LOS: 1 days | Discharge: HOME | End: 2022-10-22
Attending: EMERGENCY MEDICINE | Admitting: EMERGENCY MEDICINE
Payer: MEDICARE

## 2022-10-22 VITALS
DIASTOLIC BLOOD PRESSURE: 56 MMHG | RESPIRATION RATE: 20 BRPM | OXYGEN SATURATION: 94 % | TEMPERATURE: 98 F | HEIGHT: 62 IN | SYSTOLIC BLOOD PRESSURE: 120 MMHG | WEIGHT: 100.09 LBS | HEART RATE: 92 BPM

## 2022-10-22 VITALS
OXYGEN SATURATION: 96 % | TEMPERATURE: 98 F | SYSTOLIC BLOOD PRESSURE: 115 MMHG | RESPIRATION RATE: 18 BRPM | HEART RATE: 80 BPM | DIASTOLIC BLOOD PRESSURE: 54 MMHG

## 2022-10-22 DIAGNOSIS — Z98.89 OTHER SPECIFIED POSTPROCEDURAL STATES: Chronic | ICD-10-CM

## 2022-10-22 DIAGNOSIS — Z90.49 ACQUIRED ABSENCE OF OTHER SPECIFIED PARTS OF DIGESTIVE TRACT: Chronic | ICD-10-CM

## 2022-10-22 DIAGNOSIS — Z98.890 OTHER SPECIFIED POSTPROCEDURAL STATES: Chronic | ICD-10-CM

## 2022-10-22 LAB
ALBUMIN SERPL ELPH-MCNC: 2.7 G/DL — LOW (ref 3.3–5)
ALP SERPL-CCNC: 82 U/L — SIGNIFICANT CHANGE UP (ref 30–120)
ALT FLD-CCNC: 17 U/L DA — SIGNIFICANT CHANGE UP (ref 10–60)
ANION GAP SERPL CALC-SCNC: 5 MMOL/L — SIGNIFICANT CHANGE UP (ref 5–17)
APTT BLD: 30.1 SEC — SIGNIFICANT CHANGE UP (ref 27.5–35.5)
AST SERPL-CCNC: 34 U/L — SIGNIFICANT CHANGE UP (ref 10–40)
BASOPHILS # BLD AUTO: 0.08 K/UL — SIGNIFICANT CHANGE UP (ref 0–0.2)
BASOPHILS NFR BLD AUTO: 0.7 % — SIGNIFICANT CHANGE UP (ref 0–2)
BILIRUB SERPL-MCNC: 0.4 MG/DL — SIGNIFICANT CHANGE UP (ref 0.2–1.2)
BUN SERPL-MCNC: 26 MG/DL — HIGH (ref 7–23)
CALCIUM SERPL-MCNC: 8.7 MG/DL — SIGNIFICANT CHANGE UP (ref 8.4–10.5)
CHLORIDE SERPL-SCNC: 103 MMOL/L — SIGNIFICANT CHANGE UP (ref 96–108)
CO2 SERPL-SCNC: 28 MMOL/L — SIGNIFICANT CHANGE UP (ref 22–31)
CREAT SERPL-MCNC: 1.07 MG/DL — SIGNIFICANT CHANGE UP (ref 0.5–1.3)
D DIMER BLD IA.RAPID-MCNC: 405 NG/ML DDU — HIGH
EGFR: 54 ML/MIN/1.73M2 — LOW
EOSINOPHIL # BLD AUTO: 0.85 K/UL — HIGH (ref 0–0.5)
EOSINOPHIL NFR BLD AUTO: 7.6 % — HIGH (ref 0–6)
GLUCOSE SERPL-MCNC: 89 MG/DL — SIGNIFICANT CHANGE UP (ref 70–99)
HCT VFR BLD CALC: 32.7 % — LOW (ref 34.5–45)
HGB BLD-MCNC: 10.3 G/DL — LOW (ref 11.5–15.5)
IMM GRANULOCYTES NFR BLD AUTO: 0.3 % — SIGNIFICANT CHANGE UP (ref 0–0.9)
INR BLD: 1.16 RATIO — SIGNIFICANT CHANGE UP (ref 0.88–1.16)
LYMPHOCYTES # BLD AUTO: 0.85 K/UL — LOW (ref 1–3.3)
LYMPHOCYTES # BLD AUTO: 7.6 % — LOW (ref 13–44)
MCHC RBC-ENTMCNC: 30 PG — SIGNIFICANT CHANGE UP (ref 27–34)
MCHC RBC-ENTMCNC: 31.5 GM/DL — LOW (ref 32–36)
MCV RBC AUTO: 95.3 FL — SIGNIFICANT CHANGE UP (ref 80–100)
MONOCYTES # BLD AUTO: 1.05 K/UL — HIGH (ref 0–0.9)
MONOCYTES NFR BLD AUTO: 9.4 % — SIGNIFICANT CHANGE UP (ref 2–14)
NEUTROPHILS # BLD AUTO: 8.36 K/UL — HIGH (ref 1.8–7.4)
NEUTROPHILS NFR BLD AUTO: 74.4 % — SIGNIFICANT CHANGE UP (ref 43–77)
NRBC # BLD: 0 /100 WBCS — SIGNIFICANT CHANGE UP (ref 0–0)
PLATELET # BLD AUTO: 202 K/UL — SIGNIFICANT CHANGE UP (ref 150–400)
POTASSIUM SERPL-MCNC: 4.3 MMOL/L — SIGNIFICANT CHANGE UP (ref 3.5–5.3)
POTASSIUM SERPL-SCNC: 4.3 MMOL/L — SIGNIFICANT CHANGE UP (ref 3.5–5.3)
PROT SERPL-MCNC: 6.3 G/DL — SIGNIFICANT CHANGE UP (ref 6–8.3)
PROTHROM AB SERPL-ACNC: 13.4 SEC — SIGNIFICANT CHANGE UP (ref 10.5–13.4)
RBC # BLD: 3.43 M/UL — LOW (ref 3.8–5.2)
RBC # FLD: 14.6 % — HIGH (ref 10.3–14.5)
SARS-COV-2 RNA SPEC QL NAA+PROBE: SIGNIFICANT CHANGE UP
SODIUM SERPL-SCNC: 136 MMOL/L — SIGNIFICANT CHANGE UP (ref 135–145)
TROPONIN I, HIGH SENSITIVITY RESULT: 20.3 NG/L — SIGNIFICANT CHANGE UP
WBC # BLD: 11.22 K/UL — HIGH (ref 3.8–10.5)
WBC # FLD AUTO: 11.22 K/UL — HIGH (ref 3.8–10.5)

## 2022-10-22 PROCEDURE — 85610 PROTHROMBIN TIME: CPT

## 2022-10-22 PROCEDURE — 85730 THROMBOPLASTIN TIME PARTIAL: CPT

## 2022-10-22 PROCEDURE — 85025 COMPLETE CBC W/AUTO DIFF WBC: CPT

## 2022-10-22 PROCEDURE — 87635 SARS-COV-2 COVID-19 AMP PRB: CPT

## 2022-10-22 PROCEDURE — 74176 CT ABD & PELVIS W/O CONTRAST: CPT | Mod: 26,MA

## 2022-10-22 PROCEDURE — 93005 ELECTROCARDIOGRAM TRACING: CPT

## 2022-10-22 PROCEDURE — 99285 EMERGENCY DEPT VISIT HI MDM: CPT | Mod: 25

## 2022-10-22 PROCEDURE — 74176 CT ABD & PELVIS W/O CONTRAST: CPT | Mod: MA

## 2022-10-22 PROCEDURE — 71250 CT THORAX DX C-: CPT | Mod: 26,MA

## 2022-10-22 PROCEDURE — 71045 X-RAY EXAM CHEST 1 VIEW: CPT

## 2022-10-22 PROCEDURE — 93010 ELECTROCARDIOGRAM REPORT: CPT

## 2022-10-22 PROCEDURE — 36415 COLL VENOUS BLD VENIPUNCTURE: CPT

## 2022-10-22 PROCEDURE — 71250 CT THORAX DX C-: CPT | Mod: MA

## 2022-10-22 PROCEDURE — 80053 COMPREHEN METABOLIC PANEL: CPT

## 2022-10-22 PROCEDURE — 84484 ASSAY OF TROPONIN QUANT: CPT

## 2022-10-22 PROCEDURE — 85379 FIBRIN DEGRADATION QUANT: CPT

## 2022-10-22 PROCEDURE — 99285 EMERGENCY DEPT VISIT HI MDM: CPT | Mod: CS

## 2022-10-22 PROCEDURE — 71045 X-RAY EXAM CHEST 1 VIEW: CPT | Mod: 26

## 2022-10-22 RX ORDER — IBUPROFEN 200 MG
400 TABLET ORAL ONCE
Refills: 0 | Status: COMPLETED | OUTPATIENT
Start: 2022-10-22 | End: 2022-10-22

## 2022-10-22 RX ORDER — ACETAMINOPHEN 500 MG
975 TABLET ORAL ONCE
Refills: 0 | Status: COMPLETED | OUTPATIENT
Start: 2022-10-22 | End: 2022-10-22

## 2022-10-22 RX ADMIN — Medication 400 MILLIGRAM(S): at 12:59

## 2022-10-22 RX ADMIN — Medication 975 MILLIGRAM(S): at 10:20

## 2022-10-22 RX ADMIN — Medication 975 MILLIGRAM(S): at 09:07

## 2022-10-22 NOTE — ED ADULT NURSE REASSESSMENT NOTE - NS ED NURSE REASSESS COMMENT FT1
Pt reconnected to CM s/p imaging.   and pt updated on length of time needed for ct results to population.  Safety maintained.  Pt in comfortably position, side rail raised, bed in lowest position, call bell within reach. Pt reconnected to CM s/p imaging.   and pt updated on length of time needed for ct results to population.  Safety maintained.  Pt in comfortably position, states that pain has improved with tylenol but still not tolerable, MD aware. side rail raised, bed in lowest position, call bell within reach.

## 2022-10-22 NOTE — ED ADULT NURSE REASSESSMENT NOTE - NS ED NURSE REASSESS COMMENT FT1
expressing concern about wait for ct results and educated on wait times again.   demonstrates anxiety and apprehension about results.  Educated that when results populate, the team will update him and the patient.  expressing concern about wait for ct results and educated on wait times again.   demonstrates anxiety and apprehension about results and asking if pt can eat and drink.  Educated that when results populate, the team will update him and the patient and that pt should remain NPO until results are back.

## 2022-10-22 NOTE — ED PROVIDER NOTE - CLINICAL SUMMARY MEDICAL DECISION MAKING FREE TEXT BOX
Patient with history of high blood pressure high cholesterol GERD COPD rheumatoid arthritis lung CA status post lobectomy and rib removal status post cholecystectomy complaining of right chest and back pain associated with shortness of breath.  No fevers chills headache nausea vomiting abdominal pain edema calf pain travel.  Plan EKG chest x-ray labs

## 2022-10-22 NOTE — ED PROVIDER NOTE - NS ED MD DISPO DISCHARGE CCDA
normal... well appearing, well nourished, and in no apparent distress. Patient/Caregiver provided printed discharge information.

## 2022-10-22 NOTE — ED PROVIDER NOTE - PROVIDER TOKENS
PROVIDER:[TOKEN:[[id number]],FOLLOWUP:[1-3 Days]],PROVIDER:[TOKEN:[[id number]],FOLLOWUP:[1-3 Days]]

## 2022-10-22 NOTE — ED ADULT NURSE NOTE - OBJECTIVE STATEMENT
75 yo F pmh of Lung CA with sx removing 4 ribs on and lung on the right side, HLD, GERD brought in via wheelchair from home c/o sudden onset of constant left pectoral/breast chest pain radiating to the back.  Pt denies trauma, strain, or excessive exertion prior to pain starting.  Denies SOB, fevers/chills, n/v/d, lightheadedness, dizziness, changes in urinary or bowel habits.  A&Ox4, EKG performed, placed on CM, VSS.  Skin w/d/i, noted surgical scar with concave noted to right scapular area on the back.  Pt has noted tenderness to palpation and generalized myalgia.  +peripheral pulses noted, pt able to ambulated to bed with 1 assist.  NARD.  IV established and specimens sent to lab.   Pend results.

## 2022-10-22 NOTE — CONSULT NOTE ADULT - ASSESSMENT
76y Female complaining of chest pain.  history of high blood pressure high cholesterol GERD COPD rheumatoid arthritis lung CA status post lobectomy and rib removal status post cholecystectomy complaining of right chest and back pain associated with shortness of breath. 76y Female complaining of chest pain.  history of high blood pressure high cholesterol GERD COPD rheumatoid arthritis lung CA status post lobectomy and rib removal status post cholecystectomy complaining of right chest and back pain associated with shortness of breath.    pt reports pain after hit with door at doctor's office  will check CT imaging to eval rib fx  D dimer pending  ACAP now  spoke with pt and

## 2022-10-22 NOTE — ED PROVIDER NOTE - CARE PROVIDER_API CALL
Adriel Galan)  Critical Care Medicine; Internal Medicine; Pulmonary Disease  [address] [place] [address]  Phone: [phone]  Fax: [phone]  Follow Up Time: 1-3 Days    Hugo Oh)  Cardiovascular Disease; Internal Medicine  [address], [address]  [place] [address]  Phone: [phone]  Fax: [phone]  Follow Up Time: 1-3 Days

## 2022-10-22 NOTE — ED ADULT NURSE REASSESSMENT NOTE - NS ED NURSE REASSESS COMMENT FT1
pt asked for pain meds prior todc and provided with motrin as per MD orders. Patient d/c. Reviewed d/c paperwork with patients, all questions answered at this time. Patient verbalizes understanding. IV removed. Patient instructed to return to the ER for any worsening s/s including chest pain, SOB, fever, n/v/d. Patient alert and stable at time of d/c.

## 2022-10-22 NOTE — ED ADULT TRIAGE NOTE - NS ED TRIAGE AVPU SCALE
From: Bettina Downing  Sent: 11/10/2018 4:12 AM EST  Subject: Medication Renewal Request    Bettina Downing would like a refill of the following medications:     sildenafil (VIAGRA) 100 MG tablet Satya Crawford, ]    Preferred pharmacy: [address] [place] [phone] -  [phone]
Alert-The patient is alert, awake and responds to voice. The patient is oriented to time, place, and person. The triage nurse is able to obtain subjective information.

## 2022-10-22 NOTE — ED PROVIDER NOTE - PATIENT PORTAL LINK FT
You can access the FollowMyHealth Patient Portal offered by [facility] by registering at the following website: [email or web address]. By joining [facility]’s FollowMyHealth portal, you will also be able to view your health information using other applications (apps) compatible with our system.

## 2022-10-22 NOTE — ED PROVIDER NOTE - OBJECTIVE STATEMENT
Patient with history of high blood pressure high cholesterol GERD COPD rheumatoid arthritis lung CA status post lobectomy and rib removal status post cholecystectomy complaining of right chest and back pain associated with shortness of breath.  No fevers chills headache nausea vomiting abdominal pain edema calf pain travel.  PMD/pulmonary [place] Patient with history of high blood pressure high cholesterol GERD COPD rheumatoid arthritis lung CA status post lobectomy and rib removal status post cholecystectomy complaining of right chest and back pain associated with shortness of breath.  No fevers chills headache nausea vomiting abdominal pain edema calf pain travel.  PMD/pulmonary [place]  Cardio Boglioli

## 2022-10-22 NOTE — ED ADULT NURSE REASSESSMENT NOTE - NS ED NURSE REASSESS COMMENT FT1
Pt did not want water, provided with apple juice. Pt did not want water, provided with apple juice.  Pt repositioned in bed, and head of the bed elevated. Safety maintained.

## 2022-10-22 NOTE — ED ADULT NURSE REASSESSMENT NOTE - NS ED NURSE REASSESS COMMENT FT1
Pt SO aggravated, expressing displeasure with wait for CT results.  Pt educated on results just coming back now and MD will be over to discuss the results with pt and family member.  Pt asked again about pt eating, pt family member educated again on needing results prior to being able to eat or drink.  Pt currently sleeping, and will be provided with food and drink when MD confirms pt okay to eat. Safety maintained.  Bed in lowest position.  asked if pt masked could be placed over mouth, but refused.

## 2022-10-22 NOTE — ED ADULT NURSE NOTE - NSIMPLEMENTINTERV_GEN_ALL_ED
Implemented All Fall Risk Interventions:  [place] to call system. Call bell, personal items and telephone within reach. Instruct patient to call for assistance. Room bathroom lighting operational. Non-slip footwear when patient is off stretcher. Physically safe environment: no spills, clutter or unnecessary equipment. Stretcher in lowest position, wheels locked, appropriate side rails in place. Provide visual cue, wrist band, yellow gown, etc. Monitor gait and stability. Monitor for mental status changes and reorient to person, place, and time. Review medications for side effects contributing to fall risk. Reinforce activity limits and safety measures with patient and family.

## 2022-10-22 NOTE — ED PROVIDER NOTE - PROGRESS NOTE DETAILS
kolby (pul) seen eval pt, relates no need for VQ as age adjusted ddimer is ok. Reevaluated patient at bedside.  Patient feeling much improved after tylenol, doesn't want to be admitted, wants to be d/c home to f/u as outpt.  Discussed the results of all diagnostic testing in ED and copies of all reports given.   An opportunity to ask questions was given.  Discussed the importance of prompt, close medical follow-up.  Patient will return with any changes, concerns or persistent / worsening symptoms.  Understanding of all instructions verbalized.

## 2022-10-22 NOTE — CONSULT NOTE ADULT - SUBJECTIVE AND OBJECTIVE BOX
Date/Time Patient Seen:  		  Referring MD:   Data Reviewed	       Patient is a 76y old  Female who presents with a chief complaint of     Subjective/HPI  old records reviewed  vs noted  labs pending  ER provider note reviewed  follows with Dr Galan for Pulm Med       · Chief Complaint: The patient is a 76y Female complaining of chest pain.  · HPI Objective Statement: Patient with history of high blood pressure high cholesterol GERD COPD rheumatoid arthritis lung CA status post lobectomy and rib removal status post cholecystectomy complaining of right chest and back pain associated with shortness of breath.  No fevers chills headache nausea vomiting abdominal pain edema calf pain travel.  PMD/pulmonary [facility]  · Presenting Symptoms: BACK PAIN, CHEST PAIN, SHORTNESS OF BREATH  · Negative Findings: no cough, no fever, no nausea, no vomiting  · Location: pectoral region  · Laterality: right  · Radiation: back  · Timing: constant  · Context: unknown  · Aggravated Factors: none  · Relieving Factors: none    PAST MEDICAL & SURGICAL HISTORY:  Asthma    HLD (hyperlipidemia)    Rheumatoid arthritis    TIA (transient ischemic attack)  2012    Esophageal reflux    Lung cancer  Right lung.    Chronic obstructive pulmonary disease, unspecified COPD type  O2 at night    Hiatal hernia with GERD    Essential hypertension    Hyperlipidemia, unspecified hyperlipidemia type    Calculus of gallbladder with chronic cholecystitis without obstruction    S/P lobectomy of lung  Right lung in 1996.    H/O colonoscopy    History of endoscopy  Sept 2017    S/P cholecystectomy     Tobacco Screening:  · Core Measure Site	Yes  · Has the patient used tobacco in the past 30 days?	No     Risk Assessment:    Present on Admission:  Deep Venous Thrombosis	no   Pulmonary Embolus	no   Urinary Catheter	no   Central Venous Catheter/PICC Line	no   Surgical Site Incision	no   Pressure Ulcer(s)	no      Heart Failure:  Does this patient have a history of or has been diagnosed with heart failure? no.      Medication list         MEDICATIONS  (STANDING):    MEDICATIONS  (PRN):         Vitals log        ICU Vital Signs Last 24 Hrs  T(C): 36.7 (22 Oct 2022 08:17), Max: 36.7 (22 Oct 2022 08:17)  T(F): 98.1 (22 Oct 2022 08:17), Max: 98.1 (22 Oct 2022 08:17)  HR: 92 (22 Oct 2022 08:17) (92 - 92)  BP: 120/56 (22 Oct 2022 08:17) (120/56 - 120/56)  BP(mean): --  ABP: --  ABP(mean): --  RR: 20 (22 Oct 2022 08:17) (20 - 20)  SpO2: 94% (22 Oct 2022 08:17) (94% - 94%)    O2 Parameters below as of 22 Oct 2022 08:17  Patient On (Oxygen Delivery Method): room air                 Input and Output:  I&O's Detail      Lab Data                  Review of Systems	      Objective     Physical Examination        Pertinent Lab findings & Imaging      [place]:  NO   Adequate UO     I&O's Detail           Discussed with:     Cultures:	        Radiology    Ventricular Rate 88 BPM    Atrial Rate 88 BPM    P-R Interval 138 ms    QRS Duration 88 ms    Q-T Interval 372 ms    QTC Calculation(Bazett) 450 ms    P Axis 72 degrees    R Axis 49 degrees    T Axis 69 degrees    Diagnosis Line Normal sinus rhythm  Minimal voltage criteria for LVH, may be normal variant ( Sokolow-Poe )  Borderline ECG  When compared with ECG of 06-SEP-2020 09:54,  QT has lengthened  Confirmed by Nancy Johnson MD ([id number]) on 10/22/2022 8:46:08 AM           EXAM:  ECHO TTE WO CON COMP W DOPP         PROCEDURE DATE:  09/07/2020        INTERPRETATION:  INDICATION: Syncope  Sonographer AS    Blood Pressure 120/78    Height 157.48 cm     Weight 52.6 kg       BSA 1.5 sq m    Dimensions:  LA 2.6       Normal Values: 2.0 - 4.0 cm  Ao 3.2        Normal Values: 2.0 - 3.8 cm  SEPTUM 1.2       Normal Values: 0.6 - 1.2 cm  PWT 1.1       Normal Values: 0.6 - 1.1 cm  LVIDd 3.9         Normal Values: 3.0 - 5.6 cm  LVIDs 2.6         Normal Values: 1.8 - 4.0 cm      OBSERVATIONS:  Technically difficult and limited study  Mitral Valve: Thickened leaflets, mild MR.  Aortic Valve/Aorta: Aortic valve is not well-visualized, no signs of severe valvular pathology  Tricuspid Valve: Mild TR.  Pulmonic Valve: Not well-visualized  Left Atrium: normal  Right Atrium: Not well-visualized  Left Ventricle: Grossly normal LV size and systolic function, estimated LVEF of 65%. Endocardium is not well-visualized, cannot rule out segmental dysfunction  Right Ventricle: The right ventricle is not well-visualized  Pericardium/Pleura: Trace pericardial effusion.  Pulmonary/RV Pressure: estimated PA systolic pressure of 27.7 mmHg assuming an RA pressure of 3 mmHg.  LV diastolic dysfunction is present    Conclusion:  Technically difficult and limited study  Grossly normal left ventricular internal dimensions and systolic function, estimated LVEF of 65%.  The right ventricle is not well-visualized  Aortic valve is not well-visualized, no signs of severe valvular pathology  Mild MR and TR.  No significant pericardial effusion.

## 2022-10-22 NOTE — ED PROVIDER NOTE - CARE PROVIDERS DIRECT ADDRESSES
,DirectAddress_Unknown,[email or web address]

## 2022-10-22 NOTE — ED ADULT NURSE REASSESSMENT NOTE - NS ED NURSE REASSESS COMMENT FT1
Pt turned, repositioned in bed and provided with pillows.   present at bedside and updated on plan of care.  Pt did not initially want to take tylenol and wanted something stronger for pain, MD aware and advised to give tylenol and re-evaluate pain.  Bed in lowest position with side rail raised and call bell placed within reach.

## 2022-10-28 ENCOUNTER — APPOINTMENT (OUTPATIENT)
Dept: OTOLARYNGOLOGY | Facility: CLINIC | Age: 77
End: 2022-10-28

## 2022-10-28 VITALS
WEIGHT: 102 LBS | SYSTOLIC BLOOD PRESSURE: 134 MMHG | DIASTOLIC BLOOD PRESSURE: 67 MMHG | HEIGHT: 62 IN | HEART RATE: 90 BPM | BODY MASS INDEX: 18.77 KG/M2

## 2022-10-28 DIAGNOSIS — M27.3 ALVEOLITIS OF JAWS: ICD-10-CM

## 2022-10-28 PROCEDURE — 99213 OFFICE O/P EST LOW 20 MIN: CPT

## 2022-10-28 NOTE — HISTORY OF PRESENT ILLNESS
[de-identified] : Antonella Thompson is a 74 yo female with hx lung cancer s/p resection, TIA who presents for evaluation of dysphonia for the past few weeks. She denies vocal strain, but notes that she loses her voice frequently. She is not a stout. She notes vocal fatigue. She notes very rare trouble swallowing pastas or breads, but uses water to get these down. She denies aspiration episodes or odynophagia. She denies fevers, new neck masses/lumps and night sweats. She notes a 39 lb weight loss over the past year. She states that this was due to abdominal pain for which she was worked up by her GI physician and is improved. She is on pantoprazole. She had a UGI endoscopy per her report last year and this was normal. She notes recent nasal congestion and postnasal drainage. This leads to dryness of her throat. She is on azelastine nasal spray. SHe denies heartburn or food regurgitation. [FreeTextEntry1] : 1/10/22 - Patient presents for follow-up. She states that she has been using flonase and nasal saline sprays daily. She continues to have thick postnasal drainage leading to cough. She notes nasal congestion, but no sinus pressure. She notes that she continues to have intermittent dysphonia. She denies any fevers or chills. She notes some right ear pain when placing her hearing aid. She denies otorrhea.\par \par 10/7/22 - Antonella Thompson present for follow-up. She notes history of left jaw cyst, previously seen by a surgeon. She underwent a CT per her report but was unable to visualize it. She notes that she has had increased swelling and pain of the cyst recently in the past few weeks. She was seen by dentist and oral surgeon. They did X-rays and started her on antibiotic. She denies fevers or chills. She denies any overlying skin changes. she denies intraoral bleeding. She denies unintentional weight loss or any neck masses.\par \par 10/28/22 - Ms. Thompson presents for follow-up. She obtained her MRI. She notes recent increased swelling of the left mandible cyst requiring antibiotics. This has resolved. She denies pain, fevers, or chills.

## 2022-10-28 NOTE — ASSESSMENT
[FreeTextEntry1] : Antonella Thompson presents for follow-up. She obtained MRI orbit/face/neck. This showed a calcified tooth socket in the region of her palpable left mandibular swelling. She will follow up with her dentist and oral surgeon regarding this. \par \par - Follow up prn.\par

## 2022-10-28 NOTE — DATA REVIEWED
[de-identified] : MRI orbit/face/neck:\par FINDINGS:\par \par Palpable abnormality: No discrete soft tissue or osseous mass or fluid\par collection is identified. There is subtle focus of T1 hypointensity in the left\par hemimandible without corresponding STIR hyperintensity or enhancement, which\par appears to correspond calcified tooth socket as seen on the prior CT.\par \par Lymph nodes: No pathologic adenopathy by imaging size criteria.\par \par Aerodigestive structures: Unremarkable\par \par Thyroid gland: Unremarkable\par \par Parotid and submandibular glands: Unremarkable.\par \par Paranasal sinuses: Mild mucosal thickening in the left sphenoid sinus. Small\par amount of secretions in the right maxillary sinus.\par \par Mastoid air cells: Trace fluid in bilateral mastoid air cell complexes.\par \par Orbits: Aphakic globes.\par \par Partially visualized intracranial structures: Unremarkable\par \par Other: There is a right upper lobe volume loss due to prior lobectomy, as seen\par on the prior chest CT from 12/14/2021. There is a large complex right shoulder\par joint effusion.\par \par IMPRESSION:\par \par \par No mass or edema involving the left hemimandible and adjacent soft tissues.\par \par Mild left sphenoid and right maxillary sinusitis.

## 2022-10-28 NOTE — PHYSICAL EXAM
[de-identified] : Bilateral hearing aids. [] : septum deviated bilaterally [Midline] : trachea located in midline position [Normal] : no rashes [FreeTextEntry2] : Small area of swelling over left mandible, decreased from prior

## 2022-11-17 NOTE — PATIENT PROFILE ADULT. - HEALTHCARE INFORMATION NEEDED, PROFILE
2022       RE: Shanda Haines  [address]  [facility] [address]     Dear Colleague,    Thank you for referring your patient, Shanda Haines, to the [facility] E[facility] NO[facility] AND THROAT [facility]INIC [place] at [facility]. Please see a copy of my visit note below.        [facility]   at the [facility]   Otolaryngology Clinic     Patient: Shanda Haines    MRN: [id number]    : 1991    Age/Gender: 31 year old female  Date of Service: 2022  Rendering Provider:   Rhonda Carlos MD     Chief Complaint   Dysphonia  Interval History   HISTORY OF PRESENT ILLNESS: Ms. Haines is a 31 year old female is being followed for dysphonia. she was initially seen on 10/25/22. Please refer to this note for full history.     Today, she presents for follow up. she reports:  - she took her anti-fungal course   - no negative side effects with this  - improvement in voice after treatment   - desires a little more improvement, but nearly improved   -      PAST MEDICAL HISTORY: No past medical history on file.    PAST SURGICAL HISTORY: No past surgical history on file.    CURRENT MEDICATIONS:   Current Outpatient Medications:      albuterol (PROAIR HFA/PROVENTIL HFA/VENTOLIN HFA) 108 (90 Base) MCG/ACT inhaler, Inhale 2 puffs into the lungs every 6 hours as needed for shortness of breath / dyspnea or wheezing, Disp: 1 g, Rfl: 11     cetirizine (ZYRTEC) 10 MG tablet, TAKE 1 TABLET BY MOUTH DAILY, Disp: 90 tablet, Rfl: 2     fluticasone (FLONASE) 50 MCG/ACT nasal spray, Spray 1 spray into both nostrils daily as needed for rhinitis or allergies, Disp: 18.2 mL, Rfl: 11     fluticasone-salmeterol (ADVAIR) 100-50 MCG/DOSE inhaler, Inhale 1 puff into the lungs every 12 hours, Disp: 1 Inhaler, Rfl: 11     fluticasone-salmeterol (WIXELA INHUB) 100-50 MCG/ACT inhaler, Inhale 1 puff into the lungs every 12 hours, Disp: 180 each,  Rfl: 3     levonorgestrel (MIRENA) 20 MCG/24HR IUD, 1 each (20 mcg) by Intrauterine route once, Disp: , Rfl:      levonorgestrel (MIRENA) 20 MCG/24HR IUD, 1 each by Intrauterine route once, Disp: , Rfl:      montelukast (SINGULAIR) 10 MG tablet, Take 1 tablet (10 mg) by mouth At Bedtime, Disp: 90 tablet, Rfl: 3     spironolactone (ALDACTONE) 50 MG tablet, TK 1 T PO  BID, Disp: , Rfl:     ALLERGIES: Corylus, Cats, Hazelnuts [nuts], and Sulfamethoxazole-trimethoprim    SOCIAL HISTORY:    Social History     Socioeconomic History     Marital status: Single     Spouse name: Not on file     Number of children: Not on file     Years of education: Not on file     Highest education level: Not on file   Occupational History     Not on file   Tobacco Use     Smoking status: Never     Smokeless tobacco: Never   Substance and Sexual Activity     Alcohol use: Yes     Drug use: Never     Sexual activity: Yes     Partners: Male     Birth control/protection: I.U.D.   Other Topics Concern     Parent/sibling w/ CABG, MI or angioplasty before 65F 55M? No   Social History Narrative     Not on file     Social Determinants of Health     Financial Resource Strain: Not on file   Food Insecurity: Not on file   Transportation Needs: Not on file   Physical Activity: Not on file   Stress: Not on file   Social Connections: Not on file   Intimate Partner Violence: Not on file   Housing Stability: Not on file         FAMILY HISTORY:   Family History   Problem Relation Age of Onset     Diabetes Maternal Grandmother         Type 2     Breast Cancer Other         Aunt     Other Cancer Maternal Grandfather         Lymphoma     Asthma Sister       Non-contributory for problems with anesthesia    REVIEW OF SYSTEMS:   The patient was asked a 14 point review of systems regarding constitutional symptoms, eye symptoms, ears, nose, mouth, throat symptoms, cardiovascular symptoms, respiratory symptoms, gastrointestinal symptoms, genitourinary symptoms,  musculoskeletal symptoms, integumentary symptoms, neurological symptoms, psychiatric symptoms, endocrine symptoms, hematologic/lymphatic symptoms, and allergic/ immunologic symptoms.   The pertinent factors have been included in the HPI and below.  Patient Supplied Answers to Review of Systems  No flowsheet data found.    Physical Examination   The patient underwent a physical examination as described below. The pertinent positive and negative findings are summarized after the description of the examination.  Constitutional: The patient's developmental and nutritional status was assessed. The patient's voice quality was assessed.  Head and Face: The head and face were inspected for deformities. The sinuses were palpated. The salivary glands were palpated. Facial muscle strength was assessed bilaterally.  Eyes: Extraocular movements and primary gaze alignment were assessed.  Ears, Nose, Mouth and Throat: The ears and nose were examined for deformities. The nasal septum, mucosa, and turbinates were inspected by anterior rhinoscopy. The lips, teeth, and gums were examined for abnormalities. The oral mucosa, tongue, palate, tonsils, lateral and posterior pharynx were inspected for the presence of asymmetry or mucosal lesions.    Neck: The tracheal position was noted, and the neck mass palpated to determine if there were any asymmetries, abnormal neck masses, thyromegally, or thyroid nodules.  Respiratory: The nature of the breathing and chest expansion/symmetry was observed.  Cardiovascular: The patient was examined to determine the presence of any edema or jugular venous distension.  Abdomen: The contour of the abdomen was noted.  Lymphatic: The patient was examined for infraclavicular lymphadenopathy.  Musculoskeletal: The patient was inspected for the presence of skeletal deformities.  Extremities: The extremities were examined for any clubbing or cyanosis.  Skin: The skin was examined for inflammatory or neoplastic  conditions.  Neurologic: The patient's orientation, mood, and affect were noted. The cranial nerve  functions were examined.  Other pertinent positive and negative findings on physical examination:   OC/OP: no lesions, uvula midline, soft palate elevates symmetrically   Neck: no lesions, no TH tenderness to palpation  All other physical examination findings were within normal limits and noncontributory.    Procedures   Flexible laryngoscopy (CPT 00472)      Pre-procedure diagnosis: dysphonia, hx of fungal infection  Post-procedure diagnosis: same as above  Indication for procedure: Ms. Haines is a 31 year old female with see above  Procedure(s): Fiberoptic Laryngoscopy    Details of Procedure: After informed consent was obtained, the patient was seated in the examination chair.  The areas of the nasopharynx as well as the hypopharynx were anesthetized with topical 4% lidocaine with 0.25% phenylephrine atomizer.  Examination of the base of tongue was performed first.  Attention was directed to any evidence of masses in the area or evidence of leukoplakia or candidal infection.  Attention was directed to the epiglottis where its size and position was determined and its movement on phonation of the vowel  e .  The piriform sinuses were then inspected for any mass lesions or pooling of secretions.  Attention was then directed to the larynx. The vocal folds were inspected for infection or any areas of leukoplakia, for masses, polypoid degeneration, or hemorrhage.  Having done this, the arytenoids and vocal processes were inspected for erythema or evidence of granuloma formation.  The posterior commissure was then inspected for evidence of inflammatory changes in the mucosa and heaping up of mucosal tissue. The patient was then instructed to say the vowel  e .  Adduction of vocal folds to the midline was observed for any evidence of paresis or paralysis of the larynx or asymmetry in rotation of the larynx to the left or  n/a "right. The patient was asked to breathe and the degree of abduction was noted bilaterally.  Subglottic view of the larynx was obtained for any additional mass lesions or mucosal changes.  Finally the post cricoid was examined for evidence of pooling of secretions, as well as the pharyngeal wall mucosa.   Anesthesia type: 0.25% phenylephrine    Findings:  Anatomic/physiological deviations: RNC, resolved fungal pharyngitis and laryngitis, has some discoloration of the vocal folds   Right vocal process: No restriction of mobility   Left vocal process: No restriction of mobility  Glottal gap: Complete glottal closure  Supraglottic structures: Normal  Hypopharynx: Normal     Estimated Blood Loss: minimal  Complications: None  Disposition: Patient tolerated the procedure well           Review of Relevant Clinical Data   I personally reviewed:  Labs:  No results found for: TSH  Lab Results   Component Value Date     10/25/2022    CO2 28 10/25/2022    BUN 11.1 10/25/2022     No results found for: WBC, HGB, HCT, MCV, PLT  No results found for: PT, PTT, INR  No results found for: RONIT  No components found for: RHEUMATOIDFACTOR,  RF  No results found for: CRP  No components found for: CKTOT, URICACID  No components found for: C3, C4, DSDNAAB, NDNAABIFA  No results found for: MPOAB    Patient reported Quality of Life (QOL) Measures   Patient Supplied Answers To VHI Questionnaire  Voice Handicap Index (VHI-10) 10/17/2022   My voice makes it difficult for people to hear me 2   People have difficulty understanding me in a noisy room 2   My voice difficulties restrict my personal and social life.  1   I feel left out of conversations because of my voice 1   My voice problem causes me to lose income 1   I feel as though I have to strain to produce voice 3   The clarity of my voice is unpredictable 3   My voice problem upsets me 4   My voice makes me feel handicapped 1   People ask, \"What's wrong with your voice?\" 2   VHI-10 20 "         Patient Supplied Answers To EAT Questionnaire  Eating Assessment Tool (EAT-10) 10/17/2022   My swallowing problem has caused me to lose weight 0   My swallowing problem interferes with my ability to go out for meals 0   Swallowing liquids takes extra effort 0   Swallowing solids takes extra effort 0   Swallowing pills takes extra effort 0   Swallowing is painful 0   The pleasure of eating is affected by my swallowing 0   When I swallow food sticks in my throat 0   I cough when I eat 0   Swallowing is stressful 0   EAT-10 0         Patient Supplied Answers To CSI Questionnaire  Cough Severity Index (CSI) 10/17/2022   My cough is worse when I lie down 0   My coughing problem causes me to restrict my personal and social life 0   I tend to avoid places because of my cough problem 0   I feel embarrassed because of my coughing problem 0   People ask, ''What's wrong?'' because I cough a lot 0   I run out of air when I cough 0   My coughing problem affects my voice 0   My coughing problem limits my physical activity 0   My coughing problem upsets me 0   People ask me if I am sick because I cough a lot 0   CSI Score 0         Patient Supplied Answers to Dyspnea Index Questionnaire:  No flowsheet data found.    Impression & Plan     IMPRESSION: Ms. Haines is a 31 year old female who is being seen for the followin. 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 and hoarseness during and after classes, she wears a microphone but the music is loud and often yells during this  - was evaluated by outside ENT in  and  for voice hoarseness and completed voice therapy twice  - mid range of voice flucuates, voice is scratchy and sometimes does not come out at all   - was a voice major in college but doesn't sing much anymore other than warming up for teaching classes   - uses steroid inhalers  for asthma which helps with voice as it helps with cough and congestion as well  - scope 10/25/22 shows fungal laryngitis and pharyngeal candidiasis   - symptoms likely due to fungal infection    - discussed voice therapy following medication if symptoms improve   - will start with anti fungals, fluconazole and nystatin    - symptoms improved after antifungals, near complete improvement, however not quite 100%  - scope 11/16/22 shows resolved fungal pharyngitis and laryngitis  - no need for further medical therapy at this time and she should continue with voice therapy follow-up  - monitor symptoms with continued inhaler usage and look to change this if fungal infection returns  Plan  - voice therapy with Tammy Colbert M.M. (voice)JUDITAShirley, CCC/SLP   - follow-up with me if symptoms return           - then will need different inhaler as well as pulmonology referral    RETURN VISIT: As needed after therapy      Scribe Disclosure:  I, Marcus Herr, am serving as a scribe to document services personally performed by Rhonda Carlos MD based on data collection and the provider's statements to me.     Rhonda Carlos MD    Laryngology    [facility]  Department of  Otolaryngology - Head and Neck Surgery  Clinics & Surgery Center  [address] [place] [address]  Appointment line: [phone]  Fax: [phone]  [email or web address]

## 2022-11-25 NOTE — ASU PATIENT PROFILE, ADULT - NS PRO MODE OF ARRIVAL
I left message with patient's daughter, Rashida Graf ([phone]) following up on patients care  ambulatory

## 2022-12-20 NOTE — ASU PREOP CHECKLIST - WEIGHT IN LBS
151
PAST SURGICAL HISTORY:  S/P hysterectomy     S/P knee surgery right knee arthroscopy 7/23/2012    S/P total knee arthroplasty

## 2023-03-07 ENCOUNTER — NON-APPOINTMENT (OUTPATIENT)
Age: 78
End: 2023-03-07

## 2023-03-07 ENCOUNTER — APPOINTMENT (OUTPATIENT)
Dept: CARDIOLOGY | Facility: CLINIC | Age: 78
End: 2023-03-07
Payer: MEDICARE

## 2023-03-07 VITALS
HEART RATE: 99 BPM | OXYGEN SATURATION: 92 % | DIASTOLIC BLOOD PRESSURE: 60 MMHG | WEIGHT: 112 LBS | SYSTOLIC BLOOD PRESSURE: 130 MMHG | BODY MASS INDEX: 20.49 KG/M2

## 2023-03-07 PROCEDURE — 99214 OFFICE O/P EST MOD 30 MIN: CPT

## 2023-03-07 PROCEDURE — 93000 ELECTROCARDIOGRAM COMPLETE: CPT

## 2023-03-07 NOTE — DISCUSSION/SUMMARY
[With Me] : with me [___ Year(s)] : in [unfilled] year(s) [FreeTextEntry1] : \par Hypertension: Satisfactory control and no longer requiring pharmacotherapy (in the past she was taking Benicar and HCTZ).\par \par Hyperlipidemia: Controlled; continue rosuvastatin.\par \par Carotid artery plaque: Mild carotid plaque with a history of TIA; continue aspirin and rosuvastatin.\par

## 2023-03-07 NOTE — PHYSICAL EXAM
[No Acute Distress] : no acute distress [Normal S1, S2] : normal S1, S2 [Clear Lung Fields] : clear lung fields [Normal Gait] : normal gait [No Edema] : no edema [Normal Speech] : normal speech [Alert and Oriented] : alert and oriented [de-identified] :   Appears her stated age [de-identified] :  pupils are round [de-identified] :  wearing a facemask [de-identified] :  no JVD

## 2023-03-07 NOTE — HISTORY OF PRESENT ILLNESS
[FreeTextEntry1] : Antonella Thompson is a 77-year-old woman with a history of hypertension, hyperlipidemia, carotid artery plaque, asthma/COPD, lung cancer, rheumatoid arthritis, TIA, who presents for cardiac examination.  SheHas no new cardiovascular symptoms and describes a good baseline functional status.  She has been tolerating all prescribed pharmacotherapy.  She denies chest discomfort, palpitations, syncope; occasional mild dyspnea which has been stable and attributed to her COPD.

## 2023-03-07 NOTE — CARDIOLOGY SUMMARY
[de-identified] : 3/7/23.  Sinus  Rhythm.  Left atrial enlargement.  [de-identified] : 12/8/2017. Normal myocardial perfusion SPECT.  [de-identified] : 9/7/2020. Technically difficult / limited study. Grossly normal left ventricular size and function with estimated ejection fraction 65%. Mild mitral and tricuspid regurgitation.  [de-identified] : 12/11/2013. Minimal nonobstructive CAD.

## 2023-03-07 NOTE — REVIEW OF SYSTEMS
[Dyspnea on exertion] : dyspnea during exertion [Chest Discomfort] : no chest discomfort [Lower Ext Edema] : no extremity edema [Joint Pain] : joint pain [Joint Stiffness] : joint stiffness

## 2023-03-29 ENCOUNTER — NON-APPOINTMENT (OUTPATIENT)
Age: 78
End: 2023-03-29

## 2023-03-29 DIAGNOSIS — N17.9 ACUTE KIDNEY FAILURE, UNSPECIFIED: ICD-10-CM

## 2023-03-29 DIAGNOSIS — Z87.19 PERSONAL HISTORY OF OTHER DISEASES OF THE DIGESTIVE SYSTEM: ICD-10-CM

## 2023-04-21 NOTE — ED PROVIDER NOTE - CARE PLAN
Called and unable to lvm with patient to schedule referral. If patient returns call-   Please schedule them in a 30 minute new patient slot with Linden Martinez. Place  Np- I80.01ICD-10-CMThrombophlebitis of superficial veins of right lower extremity- [facility] Presume referral  In notes. Thank you! Principal Discharge DX:	COPD (chronic obstructive pulmonary disease) with acute bronchitis

## 2023-04-24 ENCOUNTER — APPOINTMENT (OUTPATIENT)
Dept: PAIN MANAGEMENT | Facility: CLINIC | Age: 78
End: 2023-04-24
Payer: MEDICARE

## 2023-04-24 VITALS — BODY MASS INDEX: 20.24 KG/M2 | HEIGHT: 62 IN | WEIGHT: 110 LBS

## 2023-04-24 DIAGNOSIS — M54.2 CERVICALGIA: ICD-10-CM

## 2023-04-24 PROCEDURE — 99204 OFFICE O/P NEW MOD 45 MIN: CPT

## 2023-04-24 NOTE — DISCUSSION/SUMMARY
[de-identified] : After discussing various treatment options with the patient including but not limited to oral medications, physical therapy, exercise modalities as well as interventional spinal injections, we have decided with the following plan:\par \par - Continue Home exercises, stretching, activity modification, physical therapy, and conservative care.\par - MRI report and/or images was reviewed and discussed with the patient.\par - Recommend C7-T1 Cervical Epidural Steroid Injection under fluoroscopic guidance with image.\par - The risks, benefits and alternatives of the proposed procedure were explained in detail with the patient. The risks outlined include but are not limited to infection, bleeding, post-dural puncture headache, nerve injury, a temporary increase in pain, failure to resolve symptoms, allergic reaction, symptom recurrence, and possible elevation of blood sugar in diabetics. All questions were answered to patient's apparent satisfaction and he/she verbalized an understanding.\par - Patient is presenting with acute/sub-acute radicular pain with impairment in ADLs and functionality.  The pain has not responded to conservative care including NSAID therapy and/or physical therapy.  There is no bleeding tendency, unstable medical condition, or systemic infection.\par - Follow up in 1-2 weeks post injection for re-evaluation.\par \par - Recommend Diclofenac 75mg BID PRN. Potential adverse effects including but not limited to bleeding, ulcers, increased risk of hypertension, heart disease, kidney disease and stroke were discussed with the patient.  Medication would allow patient to be more mobile and perform ADL's.  Will continue to monitor patient and attempt to wean as soon as possible. Will use the lowest dosage possible for the shortest possible period of time.\par *pt takes 81mg Aspirin\par

## 2023-04-24 NOTE — HISTORY OF PRESENT ILLNESS
[10] : 10 [Burning] : burning [Constant] : constant [Nothing helps with pain getting better] : Nothing helps with pain getting better [Sitting] : sitting [Standing] : standing [Walking] : walking [Lying in bed] : lying in bed [] : no [FreeTextEntry1] : r [FreeTextEntry7] : right trap and scapula  [de-identified] : C MRI

## 2023-04-24 NOTE — PHYSICAL EXAM
[de-identified] : Constitutional; Appears well, no apparent distress\par Ability to communicate: Normal \par Respiratory: non-labored breathing\par Skin: No rash noted\par Head: Normocephalic, atraumatic\par Neck: no visible thyroid enlargement\par Eyes: Extraocular movements intact\par Neurologic: Alert and oriented x3\par Psychiatric: normal mood, affect and behavior\par

## 2023-05-12 ENCOUNTER — APPOINTMENT (OUTPATIENT)
Dept: PAIN MANAGEMENT | Facility: CLINIC | Age: 78
End: 2023-05-12
Payer: MEDICARE

## 2023-05-12 VITALS — BODY MASS INDEX: 20.24 KG/M2 | WEIGHT: 110 LBS | HEIGHT: 62 IN

## 2023-05-12 PROCEDURE — 99214 OFFICE O/P EST MOD 30 MIN: CPT

## 2023-05-12 NOTE — HISTORY OF PRESENT ILLNESS
[10] : 10 [Burning] : burning [Constant] : constant [Nothing helps with pain getting better] : Nothing helps with pain getting better [Sitting] : sitting [Standing] : standing [Walking] : walking [Lying in bed] : lying in bed [Right Leg] : right leg [FreeTextEntry1] : 05/11/23: Pt is scheduled for PREETI on 5/24/23. Pt here today for right leg pain which started 2-3 weeks ago. Pain is radiating down the right posterior thigh and lateral lower leg to the ankle. Worse pain is at the lateral lower leg described as a burning throbbing pain. Some numbness/tingling.  Tried diclofenac and msucle relaxers which didn’t help. \par \par Initial HPI 04/24/2023:\par Pain started 3 weeks ago and is on the right side of the neck and radiates to the right scapula described as a shooting burning pain. \par \par MRI Cervical Spine: none \par CT cervical spine 9/6/20: RIGHT C4-5 stenosis \par Conservative Care: NONE\par Pain Medications: prednisone, gabapentin 300mg QHS (wakes up loopy), muscle relaxers, ibuprofen PRN. \par Past Injections:TPI with no relief from ortho\par Spine surgery: none \par Blood thinners: *pt takes 81mg Aspirin\par \par  [] : no [FreeTextEntry7] : right trap and scapula , rt leg  [de-identified] : C MRI

## 2023-05-12 NOTE — DISCUSSION/SUMMARY
[de-identified] : After discussing various treatment options with the patient including but not limited to oral medications, physical therapy, exercise, as well as interventional spinal injections, we have decided with the following plan:\par \par - Continue home exercises, stretching, activity modification, physical therapy, and conservative care.\par - Will order cervical MRI to rule out HNP. Please let this note serve as a formal request for authorization to perform a MRI of their Cervical Spine.\par - Follow-up post diagnostic imaging to review and discuss further treatment.\par - Will prescribe Medrol Dose Pack to help relieve pain/inflammation.  Side effects including nausea, vomiting, headache, dizziness, trouble sleeping, swelling, irregular periods, increased blood sugar and appetite changes were discussed.\par

## 2023-05-12 NOTE — PHYSICAL EXAM
[de-identified] : Constitutional; Appears well, no apparent distress\par Ability to communicate: Normal \par Respiratory: non-labored breathing\par Skin: No rash noted\par Head: Normocephalic, atraumatic\par Neck: no visible thyroid enlargement\par Eyes: Extraocular movements intact\par Neurologic: Alert and oriented x3\par Psychiatric: normal mood, affect and behavior\par  [4___] : right plantor flexors 4[unfilled]/5 [] : positive sitting straight leg raise [de-identified] : 4

## 2023-05-16 ENCOUNTER — FORM ENCOUNTER (OUTPATIENT)
Age: 78
End: 2023-05-16

## 2023-05-17 ENCOUNTER — APPOINTMENT (OUTPATIENT)
Dept: MRI IMAGING | Facility: CLINIC | Age: 78
End: 2023-05-17
Payer: MEDICARE

## 2023-05-17 PROCEDURE — 72148 MRI LUMBAR SPINE W/O DYE: CPT | Mod: MH

## 2023-05-18 ENCOUNTER — RX RENEWAL (OUTPATIENT)
Age: 78
End: 2023-05-18

## 2023-05-24 ENCOUNTER — APPOINTMENT (OUTPATIENT)
Dept: PAIN MANAGEMENT | Facility: CLINIC | Age: 78
End: 2023-05-24
Payer: MEDICARE

## 2023-05-24 PROCEDURE — J3490M: CUSTOM | Mod: NC

## 2023-05-24 PROCEDURE — 64483 NJX AA&/STRD TFRM EPI L/S 1: CPT | Mod: RT

## 2023-05-24 PROCEDURE — 64484 NJX AA&/STRD TFRM EPI L/S EA: CPT | Mod: RT

## 2023-05-24 NOTE — PROCEDURE
[FreeTextEntry3] : Date of Service: 05/24/2023 \par \par Account: [id number]\par \par Patient: GINNY VASQUEZ \par \par YOB: 1945\par \par Age: 77 year\par \par \par Surgeon:                                                          Andre Logan D.O.\par \par Assistant:                                                         None.\par \par Pre-Operative Diagnosis:                             Lumbosacral radiculitis (M54.17)\par \par Post-Operative Diagnosis:                           Same\par \par Procedure:                                                      Right L4-5, L5-S1 transforaminal epidural steroid injection under fluoroscopic guidance.\par \par Anesthesia:                                                     Local with MAC\par \par \par This procedure was carried out using fluoroscopic guidance.  The risks and benefits of the procedure were discussed extensively with the patient.  The consent of the patient was obtained and the following procedure was performed. The patient was placed in the prone position on the fluoroscopic table and the lumbar area was prepped and draped in a sterile fashion. A timeout was performed with all essential staff present and the site and side were verified.\par \par The right L4-5, L5-S1 neural foramen were identified on right oblique "anisa dog" anatomical view at the 6 o'clock position using fluoroscopic guidance, and the area was marked. The overlying skin and subcutaneous structures were anesthetized using sterile technique with 1% Lidocaine.  A 22-gauge spinal needle was directed toward the inferior (6 o'clock) position of the pedicle, which formed the roof of the identified foramen.  Once in the epidural space, after negative aspiration for heme and CSF, 1 cc of Omnipaque contrast was injected under live fluoroscopy to confirm epidural location and assess filling defects and rule out intravascular needle placement. \par \par The following contrast flow and epidurogram was observed: no intravascular or intrathecal flow pattern was noted.  No blood or CSF was aspirated. Contrast spread appeared to outline the right L4 and L5 nerve roots and spread medially into the epidural space.  \par \par After this, 3 ml of a total mixture of 12 mg betamethasone, 2 ml of preservative free normal saline, and 2 ml of 0.25% bupivacaine was administered without any resistance in the epidural space at each of the two levels. \par \par The needle was subsequently removed.  Vital signs remained normal.  Pulse oximeter was used throughout the procedure and the patient's pulse and oxygen saturation remained within normal limits.  The patient tolerated the procedure well.  There were no complications.  The patient was instructed to apply ice over the injection sites for twenty minutes every two hours for the next 24 to 48 hours.  The patient was also instructed to contact me immediately if there were any problems.\par \par Andre Logan D.O.\par

## 2023-05-31 ENCOUNTER — APPOINTMENT (OUTPATIENT)
Dept: PAIN MANAGEMENT | Facility: CLINIC | Age: 78
End: 2023-05-31

## 2023-06-07 ENCOUNTER — APPOINTMENT (OUTPATIENT)
Dept: PAIN MANAGEMENT | Facility: CLINIC | Age: 78
End: 2023-06-07
Payer: MEDICARE

## 2023-06-07 VITALS — WEIGHT: 110 LBS | BODY MASS INDEX: 20.24 KG/M2 | HEIGHT: 62 IN

## 2023-06-07 DIAGNOSIS — M54.12 RADICULOPATHY, CERVICAL REGION: ICD-10-CM

## 2023-06-07 PROCEDURE — 99212 OFFICE O/P EST SF 10 MIN: CPT

## 2023-06-07 NOTE — DISCUSSION/SUMMARY
[de-identified] : After discussing various treatment options with the patient including but not limited to oral medications, physical therapy, exercise modalities as well as interventional spinal injections, we have decided with the following plan:\par \par - Continue home exercises, stretching, activity modification, physical therapy, and conservative care.\par - Follow-up as needed.\par

## 2023-06-07 NOTE — PHYSICAL EXAM
[4___] : right plantor flexors 4[unfilled]/5 [de-identified] : Constitutional; Appears well, no apparent distress\par Ability to communicate: Normal \par Respiratory: non-labored breathing\par Skin: No rash noted\par Head: Normocephalic, atraumatic\par Neck: no visible thyroid enlargement\par Eyes: Extraocular movements intact\par Neurologic: Alert and oriented x3\par Psychiatric: normal mood, affect and behavior\par  [] : negative sitting straight leg raise

## 2023-06-07 NOTE — HISTORY OF PRESENT ILLNESS
[Right Leg] : right leg [Burning] : burning [Nothing helps with pain getting better] : Nothing helps with pain getting better [Sitting] : sitting [Standing] : standing [Walking] : walking [Lying in bed] : lying in bed [0] : 0 [FreeTextEntry1] : 06/07/2023: s/p RIGHT L4-5, L5-S1 TFESI on 05/24/23 with 100% relief and improvement of ADLs. Has been feeling great since the injection. Neck pain has been feeling better. \par \par 05/11/23: Pt is scheduled for PREETI on 5/24/23. Pt here today for right leg pain which started 2-3 weeks ago. Pain is radiating down the right posterior thigh and lateral lower leg to the ankle. Worse pain is at the lateral lower leg described as a burning throbbing pain. Some numbness/tingling.  Tried diclofenac and msucle relaxers which didn’t help. \par \par Initial HPI 04/24/2023:\par Pain started 3 weeks ago and is on the right side of the neck and radiates to the right scapula described as a shooting burning pain. \par \par MRI Cervical Spine: none \par CT cervical spine 9/6/20: RIGHT C4-5 stenosis \par Conservative Care: NONE\par Pain Medications: prednisone, gabapentin 300mg QHS (wakes up loopy), muscle relaxers, ibuprofen PRN. \par Past Injections:TPI with no relief from ortho\par Spine surgery: none \par Blood thinners: *pt takes 81mg Aspirin\par \par  [] : no [FreeTextEntry7] : right trap and scapula , rt leg  [de-identified] : C MRI  [TWNoteComboBox1] : 100%

## 2023-07-10 ENCOUNTER — EMERGENCY (EMERGENCY)
Facility: HOSPITAL | Age: 78
LOS: 1 days | Discharge: HOME | End: 2023-07-10
Attending: EMERGENCY MEDICINE | Admitting: EMERGENCY MEDICINE
Payer: MEDICARE

## 2023-07-10 VITALS
RESPIRATION RATE: 16 BRPM | DIASTOLIC BLOOD PRESSURE: 70 MMHG | SYSTOLIC BLOOD PRESSURE: 130 MMHG | HEART RATE: 82 BPM | OXYGEN SATURATION: 94 % | TEMPERATURE: 98 F

## 2023-07-10 VITALS
SYSTOLIC BLOOD PRESSURE: 85 MMHG | RESPIRATION RATE: 18 BRPM | HEART RATE: 101 BPM | WEIGHT: 100.09 LBS | OXYGEN SATURATION: 94 % | DIASTOLIC BLOOD PRESSURE: 50 MMHG | TEMPERATURE: 98 F | HEIGHT: 62 IN

## 2023-07-10 DIAGNOSIS — Z98.89 OTHER SPECIFIED POSTPROCEDURAL STATES: Chronic | ICD-10-CM

## 2023-07-10 DIAGNOSIS — Z90.49 ACQUIRED ABSENCE OF OTHER SPECIFIED PARTS OF DIGESTIVE TRACT: Chronic | ICD-10-CM

## 2023-07-10 DIAGNOSIS — Z98.890 OTHER SPECIFIED POSTPROCEDURAL STATES: Chronic | ICD-10-CM

## 2023-07-10 LAB
ALBUMIN SERPL ELPH-MCNC: 2.7 G/DL — LOW (ref 3.3–5)
ALP SERPL-CCNC: 109 U/L — SIGNIFICANT CHANGE UP (ref 40–120)
ALT FLD-CCNC: 23 U/L — SIGNIFICANT CHANGE UP (ref 12–78)
ANION GAP SERPL CALC-SCNC: 7 MMOL/L — SIGNIFICANT CHANGE UP (ref 5–17)
APTT BLD: 30.2 SEC — SIGNIFICANT CHANGE UP (ref 27.5–35.5)
AST SERPL-CCNC: 27 U/L — SIGNIFICANT CHANGE UP (ref 15–37)
BASOPHILS # BLD AUTO: 0.13 K/UL — SIGNIFICANT CHANGE UP (ref 0–0.2)
BASOPHILS NFR BLD AUTO: 1.1 % — SIGNIFICANT CHANGE UP (ref 0–2)
BILIRUB SERPL-MCNC: 0.3 MG/DL — SIGNIFICANT CHANGE UP (ref 0.2–1.2)
BUN SERPL-MCNC: 26 MG/DL — HIGH (ref 7–23)
CALCIUM SERPL-MCNC: 9.6 MG/DL — SIGNIFICANT CHANGE UP (ref 8.5–10.1)
CHLORIDE SERPL-SCNC: 100 MMOL/L — SIGNIFICANT CHANGE UP (ref 96–108)
CO2 SERPL-SCNC: 28 MMOL/L — SIGNIFICANT CHANGE UP (ref 22–31)
CREAT SERPL-MCNC: 1.7 MG/DL — HIGH (ref 0.5–1.3)
EGFR: 31 ML/MIN/1.73M2 — LOW
EOSINOPHIL # BLD AUTO: 1.76 K/UL — HIGH (ref 0–0.5)
EOSINOPHIL NFR BLD AUTO: 14.8 % — HIGH (ref 0–6)
GLUCOSE SERPL-MCNC: 114 MG/DL — HIGH (ref 70–99)
HCT VFR BLD CALC: 35.5 % — SIGNIFICANT CHANGE UP (ref 34.5–45)
HGB BLD-MCNC: 11.3 G/DL — LOW (ref 11.5–15.5)
IMM GRANULOCYTES NFR BLD AUTO: 0.3 % — SIGNIFICANT CHANGE UP (ref 0–0.9)
INR BLD: 1.18 RATIO — HIGH (ref 0.88–1.16)
LIDOCAIN IGE QN: 146 U/L — SIGNIFICANT CHANGE UP (ref 73–393)
LYMPHOCYTES # BLD AUTO: 1.12 K/UL — SIGNIFICANT CHANGE UP (ref 1–3.3)
LYMPHOCYTES # BLD AUTO: 9.4 % — LOW (ref 13–44)
MAGNESIUM SERPL-MCNC: 1.9 MG/DL — SIGNIFICANT CHANGE UP (ref 1.6–2.6)
MCHC RBC-ENTMCNC: 29.7 PG — SIGNIFICANT CHANGE UP (ref 27–34)
MCHC RBC-ENTMCNC: 31.8 GM/DL — LOW (ref 32–36)
MCV RBC AUTO: 93.2 FL — SIGNIFICANT CHANGE UP (ref 80–100)
MONOCYTES # BLD AUTO: 0.98 K/UL — HIGH (ref 0–0.9)
MONOCYTES NFR BLD AUTO: 8.3 % — SIGNIFICANT CHANGE UP (ref 2–14)
NEUTROPHILS # BLD AUTO: 7.84 K/UL — HIGH (ref 1.8–7.4)
NEUTROPHILS NFR BLD AUTO: 66.1 % — SIGNIFICANT CHANGE UP (ref 43–77)
NRBC # BLD: 0 /100 WBCS — SIGNIFICANT CHANGE UP (ref 0–0)
NT-PROBNP SERPL-SCNC: 929 PG/ML — HIGH (ref 0–450)
PLATELET # BLD AUTO: 344 K/UL — SIGNIFICANT CHANGE UP (ref 150–400)
POTASSIUM SERPL-MCNC: 4.2 MMOL/L — SIGNIFICANT CHANGE UP (ref 3.5–5.3)
POTASSIUM SERPL-SCNC: 4.2 MMOL/L — SIGNIFICANT CHANGE UP (ref 3.5–5.3)
PROT SERPL-MCNC: 6.5 G/DL — SIGNIFICANT CHANGE UP (ref 6–8.3)
PROTHROM AB SERPL-ACNC: 13.8 SEC — HIGH (ref 10.5–13.4)
RAPID RVP RESULT: SIGNIFICANT CHANGE UP
RBC # BLD: 3.81 M/UL — SIGNIFICANT CHANGE UP (ref 3.8–5.2)
RBC # FLD: 14.3 % — SIGNIFICANT CHANGE UP (ref 10.3–14.5)
SARS-COV-2 RNA SPEC QL NAA+PROBE: SIGNIFICANT CHANGE UP
SODIUM SERPL-SCNC: 135 MMOL/L — SIGNIFICANT CHANGE UP (ref 135–145)
TROPONIN I, HIGH SENSITIVITY RESULT: 20.4 NG/L — SIGNIFICANT CHANGE UP
WBC # BLD: 11.87 K/UL — HIGH (ref 3.8–10.5)
WBC # FLD AUTO: 11.87 K/UL — HIGH (ref 3.8–10.5)

## 2023-07-10 PROCEDURE — 99285 EMERGENCY DEPT VISIT HI MDM: CPT | Mod: FS

## 2023-07-10 PROCEDURE — 71045 X-RAY EXAM CHEST 1 VIEW: CPT

## 2023-07-10 PROCEDURE — 71250 CT THORAX DX C-: CPT | Mod: MA

## 2023-07-10 PROCEDURE — 83735 ASSAY OF MAGNESIUM: CPT

## 2023-07-10 PROCEDURE — 84484 ASSAY OF TROPONIN QUANT: CPT

## 2023-07-10 PROCEDURE — 74176 CT ABD & PELVIS W/O CONTRAST: CPT | Mod: 26,MA

## 2023-07-10 PROCEDURE — 83880 ASSAY OF NATRIURETIC PEPTIDE: CPT

## 2023-07-10 PROCEDURE — 0225U NFCT DS DNA&RNA 21 SARSCOV2: CPT

## 2023-07-10 PROCEDURE — 71045 X-RAY EXAM CHEST 1 VIEW: CPT | Mod: 26

## 2023-07-10 PROCEDURE — 74176 CT ABD & PELVIS W/O CONTRAST: CPT | Mod: MA

## 2023-07-10 PROCEDURE — 36415 COLL VENOUS BLD VENIPUNCTURE: CPT

## 2023-07-10 PROCEDURE — 85025 COMPLETE CBC W/AUTO DIFF WBC: CPT

## 2023-07-10 PROCEDURE — 80053 COMPREHEN METABOLIC PANEL: CPT

## 2023-07-10 PROCEDURE — 99285 EMERGENCY DEPT VISIT HI MDM: CPT | Mod: 25

## 2023-07-10 PROCEDURE — 85610 PROTHROMBIN TIME: CPT

## 2023-07-10 PROCEDURE — 71250 CT THORAX DX C-: CPT | Mod: 26,MA

## 2023-07-10 PROCEDURE — 83690 ASSAY OF LIPASE: CPT

## 2023-07-10 PROCEDURE — 93005 ELECTROCARDIOGRAM TRACING: CPT

## 2023-07-10 PROCEDURE — 93010 ELECTROCARDIOGRAM REPORT: CPT

## 2023-07-10 PROCEDURE — 85730 THROMBOPLASTIN TIME PARTIAL: CPT

## 2023-07-10 PROCEDURE — 82962 GLUCOSE BLOOD TEST: CPT

## 2023-07-10 RX ORDER — SODIUM CHLORIDE 9 MG/ML
500 INJECTION INTRAMUSCULAR; INTRAVENOUS; SUBCUTANEOUS ONCE
Refills: 0 | Status: COMPLETED | OUTPATIENT
Start: 2023-07-10 | End: 2023-07-10

## 2023-07-10 RX ORDER — PANTOPRAZOLE SODIUM 20 MG/1
1 TABLET, DELAYED RELEASE ORAL
Qty: 14 | Refills: 0
Start: 2023-07-10 | End: 2023-07-23

## 2023-07-10 RX ORDER — SUCRALFATE 1 G
1 TABLET ORAL
Qty: 42 | Refills: 0
Start: 2023-07-10 | End: 2023-07-23

## 2023-07-10 RX ADMIN — SODIUM CHLORIDE 500 MILLILITER(S): 9 INJECTION INTRAMUSCULAR; INTRAVENOUS; SUBCUTANEOUS at 11:41

## 2023-07-10 RX ADMIN — SODIUM CHLORIDE 500 MILLILITER(S): 9 INJECTION INTRAMUSCULAR; INTRAVENOUS; SUBCUTANEOUS at 14:18

## 2023-07-10 NOTE — ED ADULT NURSE NOTE - NSFALLHARMRISKINTERV_ED_ALL_ED
Assistance OOB with selected safe patient handling equipment if applicable/Assistance with ambulation/Communicate risk of Fall with Harm to all staff, patient, and family/Monitor gait and stability/Provide visual cue: red socks, yellow wristband, yellow gown, etc/Reinforce activity limits and safety measures with patient and family/Bed in lowest position, wheels locked, appropriate side rails in place/Call bell, personal items and telephone in reach/Instruct patient to call for assistance before getting out of bed/chair/stretcher/Non-slip footwear applied when patient is off stretcher/[place] to call system/Physically safe environment - no spills, clutter or unnecessary equipment/Purposeful Proactive Rounding/Room/bathroom lighting operational, light cord in reach

## 2023-07-10 NOTE — ED PROVIDER NOTE - ATTENDING APP SHARED VISIT CONTRIBUTION OF CARE
Examination reveals a elderly white female in no acute distress slightly weak appearing with clear lungs normal heart sounds and a flat nontender abdomen.  I agree with plan of management outlined by PA.

## 2023-07-10 NOTE — ED PROVIDER NOTE - PROGRESS NOTE DETAILS
Reevaluated patient at bedside.  Patient feeling well. Discussed the results of all diagnostic testing in ED and copies of all available reports given.   An opportunity to ask questions was provided.  Discussed the importance of prompt, close medical follow-up.  Patient will return with any changes, concerns or persistent/worsening symptoms.  Understanding of all instructions verbalized.    has seen dr perlman for symptoms, was given GI meds, not currently taking any - will prescribed medications and advised to f/u with dr perlman     tolerating PO     at bedside

## 2023-07-10 NOTE — ED PROVIDER NOTE - RESPIRATORY, MLM
Breath sounds clear and equal bilaterally. 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

## 2023-07-10 NOTE — ED ADULT NURSE NOTE - CAS TRG GENERAL NORM CIRC DET
MRN:[id number]                      After Visit Summary   3/13/2018    Geneva Cooley    MRN: [id number]           Visit Information        Provider Department      3/13/2018 10:30 AM 3, Sh Gerry Prado, ISAK [facility]      [facility] Information     [facility] secure [facility] to [facility] electronic health record. If you see a primary care provider, you can also send messages to your care team and make appointments. If you have questions, please call your primary care clinic.  If you do not have a primary care provider, please call [phone] and they will assist you.        Care EveryWhere ID     This is your Care EveryWhere ID. This could be used by other organizations to access your [facility] medical records  [id number]        Equal Access to Services     RUBEN CYR : Adam Couch, wabrenda sanitago, boubacar gallardoalcassandra moctezuma, oscar mata. So [facility] [phone].    ATENCIÓN: Si habla español, tiene a tyler disposición servicios gratuitos de asistencia lingüística. Llame al [phone].    We comply with applicable federal civil rights laws and [place] laws. We do not discriminate on the basis of race, color, national origin, age, disability, sex, sexual orientation, or gender identity.            
Strong peripheral pulses

## 2023-07-10 NOTE — ED ADULT TRIAGE NOTE - CHIEF COMPLAINT QUOTE
Patient states shes been vomiting "for months" with worsening dizziness and weakness. Patient states she's having SOB evertime she walks.

## 2023-07-10 NOTE — ED PROVIDER NOTE - NSFOLLOWUPINSTRUCTIONS_ED_ALL_ED_FT
Medications as prescribed.  Cherry diet.  Stay hydrated.  Follow up with gastroenterologist.  Return for worsening or concerning symptoms.        Pain in the abdomen (abdominal pain) can be caused by many things. Often, abdominal pain is not serious and it gets better with no treatment or by being treated at home. However, sometimes abdominal pain is serious.    Your health care provider will ask questions about your medical history and do a physical exam to try to determine the cause of your abdominal pain.    Follow these instructions at home:  Medicines    Take over-the-counter and prescription medicines only as told by your health care provider.  Do not take a laxative unless told by your health care provider.  General instructions      Watch your condition for any changes.  Drink enough fluid to keep your urine pale yellow.  Keep all follow-up visits as told by your health care provider. This is important.  Contact a health care provider if:  Your abdominal pain changes or gets worse.  You are not hungry or you lose weight without trying.  You are constipated or have diarrhea for more than 2–3 days.  You have pain when you urinate or have a bowel movement.  Your abdominal pain wakes you up at night.  Your pain gets worse with meals, after eating, or with certain foods.  You are vomiting and cannot keep anything down.  You have a fever.  You have blood in your urine.  Get help right away if:  Your pain does not go away as soon as your health care provider told you to expect.  You cannot stop vomiting.  Your pain is only in areas of the abdomen, such as the right side or the left lower portion of the abdomen. Pain on the right side could be caused by appendicitis.  You have bloody or black stools, or stools that look like tar.  You have severe pain, cramping, or bloating in your abdomen.  You have signs of dehydration, such as:  Dark urine, very little urine, or no urine.  Cracked lips.  Dry mouth.  Sunken eyes.  Sleepiness.  Weakness.  You have trouble breathing or chest pain.  Summary  Often, abdominal pain is not serious and it gets better with no treatment or by being treated at home. However, sometimes abdominal pain is serious.  Watch your condition for any changes.  Take over-the-counter and prescription medicines only as told by your health care provider.  Contact a health care provider if your abdominal pain changes or gets worse.  Get help right away if you have severe pain, cramping, or bloating in your abdomen.

## 2023-07-10 NOTE — ED PROVIDER NOTE - NS ED ATTENDING STATEMENT MOD
This was a shared visit with the SUKHWINDER. I reviewed and verified the documentation and independently performed the documented:

## 2023-07-10 NOTE — ED PROVIDER NOTE - CLINICAL SUMMARY MEDICAL DECISION MAKING FREE TEXT BOX
Patient is a 77-year-old white female presents to the emergency department here at [facility] today complaining of ill-defined loss of appetite occasional nausea occasional shortness of breath with exertion slight decreased p.o. intake recently.  No fever no chills no chest pain no abdominal pain no back pain no dysuria.  This case will require evaluation as well as imaging labs IV fluids and medications.

## 2023-07-10 NOTE — ED PROVIDER NOTE - PATIENT PORTAL LINK FT
You can access the FollowMyHealth Patient Portal offered by [facility] by registering at the following website: [email or web address]. By joining [facility]’s FollowMyHealth portal, you will also be able to view your health information using other applications (apps) compatible with our system.

## 2023-07-10 NOTE — ED PROVIDER NOTE - OBJECTIVE STATEMENT
77-year-old female history of COPD (on O2 at night), GERD, hypertension, hyperlipidemia, right lung cancer status post lobectomy and rib removal, rheumatoid arthritis, TIA, cholecystectomy, on aspirin c/o worsening generalized weakness, fatigue, phillips. Symptoms x months including decreased PO intake due to episodes of vomiting. States came in today bc her  wanted her to be evaluated. Denies f/c, cp, bleeding, hematuria, melena.    pmd/pulm: daniel  cv: lili

## 2023-07-11 NOTE — ED ADULT NURSE NOTE - GASTROINTESTINAL ASSESSMENT
Pt here for Annual and Breast Exam  Last pap was  03/24/2022   which was normal.  No h/o abnormal paps.  Periods have been: Regular   Never  Had mammogram  Current meds reviewed  Birth control: None   Health maintenance reviewed and updated      Concerns: Left Arm Goes Numb Left Toes Issues.    Visit Vitals  /80   Pulse 89   Temp 98.6 °F (37 °C) (Temporal)   Ht 5' 8\" (1.727 m)   Wt 116.1 kg (256 lb)   LMP 06/27/2023 (Approximate)   SpO2 98%   BMI 38.92 kg/m²           Patient would like communication of their results via:        Cell Phone:   Telephone Information:   Mobile [phone]     Okay to leave a message containing results? Yes               WDL

## 2023-07-25 ENCOUNTER — APPOINTMENT (OUTPATIENT)
Dept: PAIN MANAGEMENT | Facility: CLINIC | Age: 78
End: 2023-07-25
Payer: MEDICARE

## 2023-07-25 VITALS — HEIGHT: 62 IN | BODY MASS INDEX: 20.24 KG/M2 | WEIGHT: 110 LBS

## 2023-07-25 DIAGNOSIS — M54.50 LOW BACK PAIN, UNSPECIFIED: ICD-10-CM

## 2023-07-25 PROCEDURE — 99442: CPT | Mod: 95

## 2023-07-25 NOTE — HISTORY OF PRESENT ILLNESS
[Right Leg] : right leg [0] : 0 [Burning] : burning [Nothing helps with pain getting better] : Nothing helps with pain getting better [Sitting] : sitting [Standing] : standing [Walking] : walking [Lying in bed] : lying in bed [] : no [FreeTextEntry7] : right trap and scapula , rt leg  [de-identified] : C MRI  [TWNoteComboBox1] : 100% [FreeTextEntry1] : 7/25/23: televisit;  pain was great till 2 days ago when it started to return and raditing down the right leg. Tried ibuprofen, tylenol and advil. \par \par 06/07/2023: s/p RIGHT L4-5, L5-S1 TFESI on 05/24/23 with 100% relief and improvement of ADLs. Has been feeling great since the injection. Neck pain has been feeling better. \par \par 05/11/23: Pt is scheduled for PREETI on 5/24/23. Pt here today for right leg pain which started 2-3 weeks ago. Pain is radiating down the right posterior thigh and lateral lower leg to the ankle. Worse pain is at the lateral lower leg described as a burning throbbing pain. Some numbness/tingling.  Tried diclofenac and msucle relaxers which didn’t help. \par \par Initial HPI 04/24/2023:\par Pain started 3 weeks ago and is on the right side of the neck and radiates to the right scapula described as a shooting burning pain. \par \par MRI Cervical Spine: none \par CT cervical spine 9/6/20: RIGHT C4-5 stenosis \par Conservative Care: NONE\par Pain Medications: prednisone, gabapentin 300mg QHS (wakes up loopy), muscle relaxers, ibuprofen PRN. \par Past Injections:TPI with no relief from ortho\par Spine surgery: none \par Blood thinners: *pt takes 81mg Aspirin\par \par

## 2023-07-25 NOTE — DISCUSSION/SUMMARY
[de-identified] : After discussing various treatment options with the patient including but not limited to oral medications, physical therapy, exercise modalities as well as interventional spinal injections, we have decided with the following plan:\par \par - Continue home exercises, stretching, activity modification, physical therapy, and conservative care.\par - Follow-up as needed.\par - Recommend Gabapentin 100mg TID. Pt instructed not to drive or operate heavy machinery while on medications.\par

## 2023-09-06 ENCOUNTER — APPOINTMENT (OUTPATIENT)
Age: 78
End: 2023-09-06
Payer: MEDICARE

## 2023-09-06 PROCEDURE — 99203 OFFICE O/P NEW LOW 30 MIN: CPT

## 2023-09-08 NOTE — REASON FOR VISIT
Statement Selected [Hyperlipidemia] : hyperlipidemia [Hypertension] : hypertension [Carotid, Aortic and Peripheral Vascular Disease] : carotid, aortic and peripheral vascular disease [Spouse] : spouse

## 2023-09-12 ENCOUNTER — APPOINTMENT (OUTPATIENT)
Dept: CT IMAGING | Facility: CLINIC | Age: 78
End: 2023-09-12
Payer: MEDICARE

## 2023-09-12 ENCOUNTER — RESULT REVIEW (OUTPATIENT)
Age: 78
End: 2023-09-12

## 2023-09-12 ENCOUNTER — OUTPATIENT (OUTPATIENT)
Dept: OUTPATIENT SERVICES | Facility: HOSPITAL | Age: 78
LOS: 1 days | End: 2023-09-12
Payer: MEDICARE

## 2023-09-12 DIAGNOSIS — Z98.89 OTHER SPECIFIED POSTPROCEDURAL STATES: Chronic | ICD-10-CM

## 2023-09-12 DIAGNOSIS — Z90.49 ACQUIRED ABSENCE OF OTHER SPECIFIED PARTS OF DIGESTIVE TRACT: Chronic | ICD-10-CM

## 2023-09-12 DIAGNOSIS — Z98.890 OTHER SPECIFIED POSTPROCEDURAL STATES: Chronic | ICD-10-CM

## 2023-09-12 DIAGNOSIS — D16.5 BENIGN NEOPLASM OF LOWER JAW BONE: ICD-10-CM

## 2023-09-12 PROCEDURE — 70486 CT MAXILLOFACIAL W/O DYE: CPT | Mod: 26,MH

## 2023-09-12 PROCEDURE — 70486 CT MAXILLOFACIAL W/O DYE: CPT

## 2023-09-22 ENCOUNTER — APPOINTMENT (OUTPATIENT)
Dept: NEPHROLOGY | Facility: CLINIC | Age: 78
End: 2023-09-22
Payer: MEDICARE

## 2023-09-22 VITALS
BODY MASS INDEX: 19.88 KG/M2 | DIASTOLIC BLOOD PRESSURE: 60 MMHG | OXYGEN SATURATION: 94 % | WEIGHT: 108 LBS | SYSTOLIC BLOOD PRESSURE: 134 MMHG | TEMPERATURE: 98 F | HEART RATE: 83 BPM | HEIGHT: 62 IN

## 2023-09-22 DIAGNOSIS — M54.17 RADICULOPATHY, LUMBOSACRAL REGION: ICD-10-CM

## 2023-09-22 DIAGNOSIS — J34.2 DEVIATED NASAL SEPTUM: ICD-10-CM

## 2023-09-22 PROCEDURE — 99204 OFFICE O/P NEW MOD 45 MIN: CPT

## 2023-09-22 PROCEDURE — 99214 OFFICE O/P EST MOD 30 MIN: CPT

## 2023-09-22 NOTE — H&P PST ADULT - NS PRO FEM REPRO HEALTH SCREEN
The right coronary artery was selectively engaged and injected. Multiple views of the injected vessel were taken. mammogram

## 2023-09-26 ENCOUNTER — APPOINTMENT (OUTPATIENT)
Age: 78
End: 2023-09-26
Payer: MEDICARE

## 2023-09-26 PROCEDURE — 99213 OFFICE O/P EST LOW 20 MIN: CPT

## 2023-11-07 ENCOUNTER — OUTPATIENT (OUTPATIENT)
Dept: OUTPATIENT SERVICES | Facility: HOSPITAL | Age: 78
LOS: 1 days | End: 2023-11-07
Payer: MEDICARE

## 2023-11-07 ENCOUNTER — RESULT REVIEW (OUTPATIENT)
Age: 78
End: 2023-11-07

## 2023-11-07 ENCOUNTER — APPOINTMENT (OUTPATIENT)
Dept: ULTRASOUND IMAGING | Facility: CLINIC | Age: 78
End: 2023-11-07
Payer: MEDICARE

## 2023-11-07 DIAGNOSIS — Z98.890 OTHER SPECIFIED POSTPROCEDURAL STATES: Chronic | ICD-10-CM

## 2023-11-07 DIAGNOSIS — N18.30 CHRONIC KIDNEY DISEASE, STAGE 3 UNSPECIFIED: ICD-10-CM

## 2023-11-07 DIAGNOSIS — Z90.49 ACQUIRED ABSENCE OF OTHER SPECIFIED PARTS OF DIGESTIVE TRACT: Chronic | ICD-10-CM

## 2023-11-07 DIAGNOSIS — Z98.89 OTHER SPECIFIED POSTPROCEDURAL STATES: Chronic | ICD-10-CM

## 2023-11-07 PROCEDURE — 76770 US EXAM ABDO BACK WALL COMP: CPT | Mod: 26

## 2023-11-07 PROCEDURE — 76770 US EXAM ABDO BACK WALL COMP: CPT

## 2023-11-08 LAB
ALBUMIN SERPL ELPH-MCNC: 4.1 G/DL
ANION GAP SERPL CALC-SCNC: 13 MMOL/L
APPEARANCE: CLEAR
BACTERIA: NEGATIVE /HPF
BASOPHILS # BLD AUTO: 0.08 K/UL
BASOPHILS NFR BLD AUTO: 0.8 %
BILIRUBIN URINE: NEGATIVE
BLOOD URINE: NEGATIVE
BUN SERPL-MCNC: 27 MG/DL
CALCIUM SERPL-MCNC: 9.3 MG/DL
CAST: 1 /LPF
CHLORIDE SERPL-SCNC: 97 MMOL/L
CO2 SERPL-SCNC: 25 MMOL/L
COLOR: YELLOW
CREAT SERPL-MCNC: 1.37 MG/DL
CREAT SPEC-SCNC: 40 MG/DL
CREAT/PROT UR: 0.2 RATIO
EGFR: 40 ML/MIN/1.73M2
EOSINOPHIL # BLD AUTO: 1.13 K/UL
EOSINOPHIL NFR BLD AUTO: 12 %
EPITHELIAL CELLS: 0 /HPF
FERRITIN SERPL-MCNC: 91 NG/ML
GLUCOSE QUALITATIVE U: NEGATIVE MG/DL
GLUCOSE SERPL-MCNC: 85 MG/DL
HCT VFR BLD CALC: 36.9 %
HGB BLD-MCNC: 11.4 G/DL
IMM GRANULOCYTES NFR BLD AUTO: 0.2 %
IRON SATN MFR SERPL: 18 %
IRON SERPL-MCNC: 54 UG/DL
KETONES URINE: NEGATIVE MG/DL
LEUKOCYTE ESTERASE URINE: NEGATIVE
LYMPHOCYTES # BLD AUTO: 1.43 K/UL
LYMPHOCYTES NFR BLD AUTO: 15.1 %
MAN DIFF?: NORMAL
MCHC RBC-ENTMCNC: 30.1 PG
MCHC RBC-ENTMCNC: 30.9 GM/DL
MCV RBC AUTO: 97.4 FL
MICROSCOPIC-UA: NORMAL
MONOCYTES # BLD AUTO: 0.82 K/UL
MONOCYTES NFR BLD AUTO: 8.7 %
NEUTROPHILS # BLD AUTO: 5.97 K/UL
NEUTROPHILS NFR BLD AUTO: 63.2 %
NITRITE URINE: NEGATIVE
PH URINE: 5.5
PHOSPHATE SERPL-MCNC: 3.9 MG/DL
PLATELET # BLD AUTO: 231 K/UL
POTASSIUM SERPL-SCNC: 4.7 MMOL/L
PROT UR-MCNC: 8 MG/DL
PROTEIN URINE: NEGATIVE MG/DL
RBC # BLD: 3.79 M/UL
RBC # BLD: 3.79 M/UL
RBC # FLD: 14 %
RED BLOOD CELLS URINE: 0 /HPF
RETICS # AUTO: 0.8 %
RETICS AGGREG/RBC NFR: 28.8 K/UL
SODIUM SERPL-SCNC: 135 MMOL/L
SPECIFIC GRAVITY URINE: 1.01
TIBC SERPL-MCNC: 304 UG/DL
UIBC SERPL-MCNC: 250 UG/DL
UROBILINOGEN URINE: 0.2 MG/DL
VIT B12 SERPL-MCNC: 779 PG/ML
WBC # FLD AUTO: 9.45 K/UL
WHITE BLOOD CELLS URINE: 0 /HPF

## 2023-11-09 NOTE — ED ADULT NURSE NOTE - CAS TRG GEN SKIN CONDITION
----- Message from Neeru Rona Kenneth sent at 11/9/2023  4:05 PM CST -----  Patient called saying she made an appt for an IUD placement but it was cancelled. I explained she needs to have a referral and the procedure has to be scheduled from the referral. She is asking for a referral to be put in and for someone to call her back to schedule the insertion. She can be reached at [phone]     Warm/Dry

## 2023-11-10 ENCOUNTER — APPOINTMENT (OUTPATIENT)
Dept: NEPHROLOGY | Facility: CLINIC | Age: 78
End: 2023-11-10
Payer: MEDICARE

## 2023-11-10 VITALS
BODY MASS INDEX: 19.69 KG/M2 | WEIGHT: 107 LBS | OXYGEN SATURATION: 94 % | HEIGHT: 62 IN | SYSTOLIC BLOOD PRESSURE: 108 MMHG | TEMPERATURE: 97.5 F | DIASTOLIC BLOOD PRESSURE: 66 MMHG | HEART RATE: 83 BPM

## 2023-11-10 LAB
ALBUMIN MFR SERPL ELPH: 56.6 %
ALBUMIN SERPL-MCNC: 3.6 G/DL
ALBUMIN/GLOB SERPL: 1.3 RATIO
ALPHA1 GLOB MFR SERPL ELPH: 6 %
ALPHA1 GLOB SERPL ELPH-MCNC: 0.4 G/DL
ALPHA2 GLOB MFR SERPL ELPH: 14 %
ALPHA2 GLOB SERPL ELPH-MCNC: 0.9 G/DL
B-GLOBULIN MFR SERPL ELPH: 13.1 %
B-GLOBULIN SERPL ELPH-MCNC: 0.8 G/DL
GAMMA GLOB FLD ELPH-MCNC: 0.6 G/DL
GAMMA GLOB MFR SERPL ELPH: 10.3 %
INTERPRETATION SERPL IEP-IMP: NORMAL
M PROTEIN MFR SERPL ELPH: 2.8 %
M PROTEIN SPEC IFE-MCNC: NORMAL
MONOCLON BAND OBS SERPL: 0.2 G/DL
PROT SERPL-MCNC: 6.3 G/DL
PROT SERPL-MCNC: 6.3 G/DL

## 2023-11-10 PROCEDURE — 99214 OFFICE O/P EST MOD 30 MIN: CPT

## 2023-11-10 RX ORDER — HYDROXYCHLOROQUINE SULFATE 200 MG/1
200 TABLET, FILM COATED ORAL DAILY
Refills: 0 | Status: DISCONTINUED | COMMUNITY
End: 2023-11-10

## 2023-11-10 RX ORDER — METHOCARBAMOL 500 MG/1
500 TABLET, FILM COATED ORAL
Qty: 60 | Refills: 0 | Status: DISCONTINUED | COMMUNITY
Start: 2023-05-24 | End: 2023-11-10

## 2023-11-10 RX ORDER — HYDROXYCHLOROQUINE SULFATE 200 MG/1
200 TABLET ORAL
Refills: 0 | Status: DISCONTINUED | COMMUNITY
End: 2023-11-10

## 2023-11-10 RX ORDER — PNV NO.95/FERROUS FUM/FOLIC AC 28MG-0.8MG
TABLET ORAL DAILY
Refills: 0 | Status: DISCONTINUED | COMMUNITY
End: 2023-11-10

## 2023-11-10 RX ORDER — DOXYCYCLINE HYCLATE 100 MG/1
100 CAPSULE ORAL
Qty: 14 | Refills: 0 | Status: DISCONTINUED | COMMUNITY
Start: 2022-10-08 | End: 2023-11-10

## 2023-11-10 RX ORDER — AZELASTINE HYDROCHLORIDE 137 UG/1
0.1 SPRAY, METERED NASAL
Qty: 30 | Refills: 0 | Status: DISCONTINUED | COMMUNITY
Start: 2021-06-08 | End: 2023-11-10

## 2023-11-10 RX ORDER — GABAPENTIN 100 MG/1
100 CAPSULE ORAL
Qty: 90 | Refills: 0 | Status: DISCONTINUED | COMMUNITY
Start: 2023-07-25 | End: 2023-11-10

## 2023-11-10 RX ORDER — DICLOFENAC SODIUM 75 MG/1
75 TABLET, DELAYED RELEASE ORAL
Qty: 60 | Refills: 0 | Status: DISCONTINUED | COMMUNITY
Start: 2023-04-24 | End: 2023-11-10

## 2023-11-10 RX ORDER — AZITHROMYCIN 250 MG/1
250 TABLET, FILM COATED ORAL
Qty: 6 | Refills: 0 | Status: DISCONTINUED | COMMUNITY
Start: 2022-09-08 | End: 2023-11-10

## 2023-11-10 RX ORDER — METHYLPREDNISOLONE 4 MG/1
4 TABLET ORAL
Qty: 1 | Refills: 0 | Status: DISCONTINUED | COMMUNITY
Start: 2023-05-12 | End: 2023-11-10

## 2023-11-10 RX ORDER — PREDNISONE 20 MG/1
20 TABLET ORAL
Qty: 10 | Refills: 0 | Status: DISCONTINUED | COMMUNITY
Start: 2022-09-08 | End: 2023-11-10

## 2023-11-10 RX ORDER — CLINDAMYCIN HYDROCHLORIDE 300 MG/1
300 CAPSULE ORAL
Qty: 21 | Refills: 0 | Status: DISCONTINUED | COMMUNITY
Start: 2022-10-04 | End: 2023-11-10

## 2023-11-10 RX ORDER — B-COMPLEX WITH VITAMIN C
1250 (500 CA) TABLET ORAL DAILY
Refills: 0 | Status: DISCONTINUED | COMMUNITY
End: 2023-11-10

## 2023-11-10 RX ORDER — COVID-19 ANTIGEN TEST
KIT MISCELLANEOUS
Qty: 8 | Refills: 0 | Status: DISCONTINUED | COMMUNITY
Start: 2022-09-07 | End: 2023-11-10

## 2023-11-10 RX ORDER — HYOSCYAMINE SULFATE 0.38 MG/1
0.38 TABLET, EXTENDED RELEASE ORAL
Qty: 180 | Refills: 0 | Status: DISCONTINUED | COMMUNITY
Start: 2022-02-28 | End: 2023-11-10

## 2023-11-13 ENCOUNTER — OUTPATIENT (OUTPATIENT)
Dept: OUTPATIENT SERVICES | Facility: HOSPITAL | Age: 78
LOS: 1 days | Discharge: HOME | End: 2023-11-13
Payer: MEDICARE

## 2023-11-13 ENCOUNTER — APPOINTMENT (OUTPATIENT)
Dept: WOUND CARE | Facility: HOSPITAL | Age: 78
End: 2023-11-13
Payer: MEDICARE

## 2023-11-13 ENCOUNTER — RESULT REVIEW (OUTPATIENT)
Age: 78
End: 2023-11-13

## 2023-11-13 VITALS
WEIGHT: 107 LBS | BODY MASS INDEX: 19.69 KG/M2 | RESPIRATION RATE: 12 BRPM | SYSTOLIC BLOOD PRESSURE: 93 MMHG | TEMPERATURE: 97.7 F | DIASTOLIC BLOOD PRESSURE: 59 MMHG | HEIGHT: 62 IN | HEART RATE: 90 BPM | OXYGEN SATURATION: 95 %

## 2023-11-13 DIAGNOSIS — M79.674 PAIN IN RIGHT TOE(S): ICD-10-CM

## 2023-11-13 DIAGNOSIS — G89.29 PAIN IN RIGHT TOE(S): ICD-10-CM

## 2023-11-13 DIAGNOSIS — Z98.890 OTHER SPECIFIED POSTPROCEDURAL STATES: Chronic | ICD-10-CM

## 2023-11-13 DIAGNOSIS — S91.309A UNSPECIFIED OPEN WOUND, UNSPECIFIED FOOT, INITIAL ENCOUNTER: ICD-10-CM

## 2023-11-13 DIAGNOSIS — Z98.89 OTHER SPECIFIED POSTPROCEDURAL STATES: Chronic | ICD-10-CM

## 2023-11-13 DIAGNOSIS — Z90.49 ACQUIRED ABSENCE OF OTHER SPECIFIED PARTS OF DIGESTIVE TRACT: Chronic | ICD-10-CM

## 2023-11-13 PROCEDURE — 73630 X-RAY EXAM OF FOOT: CPT | Mod: 26,50

## 2023-11-13 PROCEDURE — 99214 OFFICE O/P EST MOD 30 MIN: CPT

## 2023-11-13 PROCEDURE — 73630 X-RAY EXAM OF FOOT: CPT

## 2023-11-13 PROCEDURE — G0463: CPT

## 2023-11-13 RX ORDER — ROSUVASTATIN CALCIUM 5 MG/1
5 TABLET, FILM COATED ORAL DAILY
Qty: 30 | Refills: 3 | Status: ACTIVE | COMMUNITY

## 2023-11-13 RX ORDER — LEFLUNOMIDE 10 MG/1
10 TABLET, FILM COATED ORAL
Refills: 0 | Status: ACTIVE | COMMUNITY

## 2023-11-13 RX ORDER — ROPINIROLE 2 MG/1
2 TABLET, FILM COATED ORAL TWICE DAILY
Refills: 0 | Status: ACTIVE | COMMUNITY

## 2023-11-13 RX ORDER — AZELASTINE HYDROCHLORIDE 137 UG/1
137 SPRAY, METERED NASAL DAILY
Refills: 0 | Status: ACTIVE | COMMUNITY

## 2023-11-13 RX ORDER — BUDESONIDE AND FORMOTEROL FUMARATE DIHYDRATE 160; 4.5 UG/1; UG/1
160-4.5 AEROSOL RESPIRATORY (INHALATION) TWICE DAILY
Qty: 2 | Refills: 3 | Status: ACTIVE | COMMUNITY
Start: 2017-07-17

## 2023-11-13 RX ORDER — COLD-HOT PACK
125 MCG EACH MISCELLANEOUS DAILY
Refills: 0 | Status: ACTIVE | COMMUNITY

## 2023-11-13 RX ORDER — FLUTICASONE PROPIONATE 50 UG/1
50 SPRAY, METERED NASAL TWICE DAILY
Qty: 16 | Refills: 2 | Status: COMPLETED | COMMUNITY
Start: 2021-10-14 | End: 2023-11-13

## 2023-11-13 RX ORDER — MV-MN/C/THEANINE/HERB NO.310 1000-200MG
POWDER IN PACKET (EA) ORAL
Refills: 0 | Status: ACTIVE | COMMUNITY

## 2023-11-13 RX ORDER — MULTIVIT-MIN/FOLIC/VIT K/LYCOP 400-300MCG
1000 TABLET ORAL DAILY
Refills: 0 | Status: ACTIVE | COMMUNITY

## 2023-11-13 RX ORDER — PANTOPRAZOLE 40 MG/1
40 TABLET, DELAYED RELEASE ORAL
Qty: 30 | Refills: 0 | Status: COMPLETED | COMMUNITY
Start: 2021-12-16 | End: 2023-11-13

## 2023-11-13 RX ORDER — MONTELUKAST SODIUM 10 MG/1
10 TABLET, FILM COATED ORAL DAILY
Refills: 0 | Status: ACTIVE | COMMUNITY

## 2023-11-14 DIAGNOSIS — Z88.1 ALLERGY STATUS TO OTHER ANTIBIOTIC AGENTS STATUS: ICD-10-CM

## 2023-11-14 DIAGNOSIS — K21.9 GASTRO-ESOPHAGEAL REFLUX DISEASE WITHOUT ESOPHAGITIS: ICD-10-CM

## 2023-11-14 DIAGNOSIS — Z98.49 CATARACT EXTRACTION STATUS, UNSPECIFIED EYE: ICD-10-CM

## 2023-11-14 DIAGNOSIS — Z85.118 PERSONAL HISTORY OF OTHER MALIGNANT NEOPLASM OF BRONCHUS AND LUNG: ICD-10-CM

## 2023-11-14 DIAGNOSIS — M21.611 BUNION OF RIGHT FOOT: ICD-10-CM

## 2023-11-14 DIAGNOSIS — Z90.49 ACQUIRED ABSENCE OF OTHER SPECIFIED PARTS OF DIGESTIVE TRACT: ICD-10-CM

## 2023-11-14 DIAGNOSIS — Z86.73 PERSONAL HISTORY OF TRANSIENT ISCHEMIC ATTACK (TIA), AND CEREBRAL INFARCTION WITHOUT RESIDUAL DEFICITS: ICD-10-CM

## 2023-11-14 DIAGNOSIS — I65.29 OCCLUSION AND STENOSIS OF UNSPECIFIED CAROTID ARTERY: ICD-10-CM

## 2023-11-14 DIAGNOSIS — Z79.82 LONG TERM (CURRENT) USE OF ASPIRIN: ICD-10-CM

## 2023-11-14 DIAGNOSIS — Z87.01 PERSONAL HISTORY OF PNEUMONIA (RECURRENT): ICD-10-CM

## 2023-11-14 DIAGNOSIS — Z83.3 FAMILY HISTORY OF DIABETES MELLITUS: ICD-10-CM

## 2023-11-14 DIAGNOSIS — G57.61 LESION OF PLANTAR NERVE, RIGHT LOWER LIMB: ICD-10-CM

## 2023-11-14 DIAGNOSIS — M06.9 RHEUMATOID ARTHRITIS, UNSPECIFIED: ICD-10-CM

## 2023-11-14 DIAGNOSIS — Z90.2 ACQUIRED ABSENCE OF LUNG [PART OF]: ICD-10-CM

## 2023-11-14 DIAGNOSIS — Z82.3 FAMILY HISTORY OF STROKE: ICD-10-CM

## 2023-11-14 DIAGNOSIS — Z91.041 RADIOGRAPHIC DYE ALLERGY STATUS: ICD-10-CM

## 2023-11-14 DIAGNOSIS — I10 ESSENTIAL (PRIMARY) HYPERTENSION: ICD-10-CM

## 2023-11-14 DIAGNOSIS — Z88.0 ALLERGY STATUS TO PENICILLIN: ICD-10-CM

## 2023-11-14 DIAGNOSIS — M79.674 PAIN IN RIGHT TOE(S): ICD-10-CM

## 2023-11-14 DIAGNOSIS — Z79.899 OTHER LONG TERM (CURRENT) DRUG THERAPY: ICD-10-CM

## 2023-11-14 DIAGNOSIS — E78.5 HYPERLIPIDEMIA, UNSPECIFIED: ICD-10-CM

## 2023-11-14 DIAGNOSIS — Z88.8 ALLERGY STATUS TO OTHER DRUGS, MEDICAMENTS AND BIOLOGICAL SUBSTANCES: ICD-10-CM

## 2023-11-14 DIAGNOSIS — Z82.49 FAMILY HISTORY OF ISCHEMIC HEART DISEASE AND OTHER DISEASES OF THE CIRCULATORY SYSTEM: ICD-10-CM

## 2023-11-14 DIAGNOSIS — L84 CORNS AND CALLOSITIES: ICD-10-CM

## 2023-11-14 DIAGNOSIS — Z87.891 PERSONAL HISTORY OF NICOTINE DEPENDENCE: ICD-10-CM

## 2023-11-14 DIAGNOSIS — J44.9 CHRONIC OBSTRUCTIVE PULMONARY DISEASE, UNSPECIFIED: ICD-10-CM

## 2023-11-14 DIAGNOSIS — Z80.1 FAMILY HISTORY OF MALIGNANT NEOPLASM OF TRACHEA, BRONCHUS AND LUNG: ICD-10-CM

## 2023-11-14 DIAGNOSIS — M20.41 OTHER HAMMER TOE(S) (ACQUIRED), RIGHT FOOT: ICD-10-CM

## 2023-11-17 ENCOUNTER — OUTPATIENT (OUTPATIENT)
Dept: OUTPATIENT SERVICES | Facility: HOSPITAL | Age: 78
LOS: 1 days | End: 2023-11-17
Payer: MEDICARE

## 2023-11-17 DIAGNOSIS — Z98.890 OTHER SPECIFIED POSTPROCEDURAL STATES: Chronic | ICD-10-CM

## 2023-11-17 DIAGNOSIS — Z98.89 OTHER SPECIFIED POSTPROCEDURAL STATES: Chronic | ICD-10-CM

## 2023-11-17 DIAGNOSIS — M21.611 BUNION OF RIGHT FOOT: ICD-10-CM

## 2023-11-17 DIAGNOSIS — M79.671 PAIN IN RIGHT FOOT: ICD-10-CM

## 2023-11-17 DIAGNOSIS — Z90.49 ACQUIRED ABSENCE OF OTHER SPECIFIED PARTS OF DIGESTIVE TRACT: Chronic | ICD-10-CM

## 2023-11-17 PROCEDURE — 93923 UPR/LXTR ART STDY 3+ LVLS: CPT

## 2023-11-17 PROCEDURE — 93923 UPR/LXTR ART STDY 3+ LVLS: CPT | Mod: 26

## 2023-11-20 ENCOUNTER — APPOINTMENT (OUTPATIENT)
Dept: WOUND CARE | Facility: HOSPITAL | Age: 78
End: 2023-11-20
Payer: MEDICARE

## 2023-11-20 ENCOUNTER — OUTPATIENT (OUTPATIENT)
Dept: OUTPATIENT SERVICES | Facility: HOSPITAL | Age: 78
LOS: 1 days | Discharge: HOME | End: 2023-11-20
Payer: MEDICARE

## 2023-11-20 VITALS
SYSTOLIC BLOOD PRESSURE: 138 MMHG | HEART RATE: 92 BPM | WEIGHT: 107 LBS | TEMPERATURE: 98.4 F | RESPIRATION RATE: 12 BRPM | OXYGEN SATURATION: 96 % | HEIGHT: 62 IN | DIASTOLIC BLOOD PRESSURE: 82 MMHG | BODY MASS INDEX: 19.69 KG/M2

## 2023-11-20 DIAGNOSIS — Z90.49 ACQUIRED ABSENCE OF OTHER SPECIFIED PARTS OF DIGESTIVE TRACT: Chronic | ICD-10-CM

## 2023-11-20 DIAGNOSIS — M21.611 BUNION OF RIGHT FOOT: ICD-10-CM

## 2023-11-20 DIAGNOSIS — Z98.890 OTHER SPECIFIED POSTPROCEDURAL STATES: Chronic | ICD-10-CM

## 2023-11-20 DIAGNOSIS — Z98.89 OTHER SPECIFIED POSTPROCEDURAL STATES: Chronic | ICD-10-CM

## 2023-11-20 PROCEDURE — G0463: CPT

## 2023-11-20 PROCEDURE — 99213 OFFICE O/P EST LOW 20 MIN: CPT

## 2023-11-22 DIAGNOSIS — Z79.82 LONG TERM (CURRENT) USE OF ASPIRIN: ICD-10-CM

## 2023-11-22 DIAGNOSIS — Z88.1 ALLERGY STATUS TO OTHER ANTIBIOTIC AGENTS STATUS: ICD-10-CM

## 2023-11-22 DIAGNOSIS — M21.611 BUNION OF RIGHT FOOT: ICD-10-CM

## 2023-11-22 DIAGNOSIS — Z88.8 ALLERGY STATUS TO OTHER DRUGS, MEDICAMENTS AND BIOLOGICAL SUBSTANCES: ICD-10-CM

## 2023-11-22 DIAGNOSIS — Z80.1 FAMILY HISTORY OF MALIGNANT NEOPLASM OF TRACHEA, BRONCHUS AND LUNG: ICD-10-CM

## 2023-11-22 DIAGNOSIS — Z91.041 RADIOGRAPHIC DYE ALLERGY STATUS: ICD-10-CM

## 2023-11-22 DIAGNOSIS — Z86.73 PERSONAL HISTORY OF TRANSIENT ISCHEMIC ATTACK (TIA), AND CEREBRAL INFARCTION WITHOUT RESIDUAL DEFICITS: ICD-10-CM

## 2023-11-22 DIAGNOSIS — M20.41 OTHER HAMMER TOE(S) (ACQUIRED), RIGHT FOOT: ICD-10-CM

## 2023-11-22 DIAGNOSIS — Z98.49 CATARACT EXTRACTION STATUS, UNSPECIFIED EYE: ICD-10-CM

## 2023-11-22 DIAGNOSIS — Z87.01 PERSONAL HISTORY OF PNEUMONIA (RECURRENT): ICD-10-CM

## 2023-11-22 DIAGNOSIS — K21.9 GASTRO-ESOPHAGEAL REFLUX DISEASE WITHOUT ESOPHAGITIS: ICD-10-CM

## 2023-11-22 DIAGNOSIS — I10 ESSENTIAL (PRIMARY) HYPERTENSION: ICD-10-CM

## 2023-11-22 DIAGNOSIS — G57.61 LESION OF PLANTAR NERVE, RIGHT LOWER LIMB: ICD-10-CM

## 2023-11-22 DIAGNOSIS — Z79.899 OTHER LONG TERM (CURRENT) DRUG THERAPY: ICD-10-CM

## 2023-11-22 DIAGNOSIS — Z87.891 PERSONAL HISTORY OF NICOTINE DEPENDENCE: ICD-10-CM

## 2023-11-22 DIAGNOSIS — Z82.49 FAMILY HISTORY OF ISCHEMIC HEART DISEASE AND OTHER DISEASES OF THE CIRCULATORY SYSTEM: ICD-10-CM

## 2023-11-22 DIAGNOSIS — E78.5 HYPERLIPIDEMIA, UNSPECIFIED: ICD-10-CM

## 2023-11-22 DIAGNOSIS — Z88.0 ALLERGY STATUS TO PENICILLIN: ICD-10-CM

## 2023-11-22 DIAGNOSIS — M06.9 RHEUMATOID ARTHRITIS, UNSPECIFIED: ICD-10-CM

## 2023-11-22 DIAGNOSIS — L84 CORNS AND CALLOSITIES: ICD-10-CM

## 2023-11-22 DIAGNOSIS — I65.29 OCCLUSION AND STENOSIS OF UNSPECIFIED CAROTID ARTERY: ICD-10-CM

## 2023-11-22 DIAGNOSIS — Z82.3 FAMILY HISTORY OF STROKE: ICD-10-CM

## 2023-11-22 DIAGNOSIS — Z85.118 PERSONAL HISTORY OF OTHER MALIGNANT NEOPLASM OF BRONCHUS AND LUNG: ICD-10-CM

## 2023-11-22 DIAGNOSIS — Z90.2 ACQUIRED ABSENCE OF LUNG [PART OF]: ICD-10-CM

## 2023-11-22 DIAGNOSIS — M79.674 PAIN IN RIGHT TOE(S): ICD-10-CM

## 2023-11-22 DIAGNOSIS — Z83.3 FAMILY HISTORY OF DIABETES MELLITUS: ICD-10-CM

## 2023-11-22 DIAGNOSIS — Z90.49 ACQUIRED ABSENCE OF OTHER SPECIFIED PARTS OF DIGESTIVE TRACT: ICD-10-CM

## 2023-11-22 DIAGNOSIS — J44.9 CHRONIC OBSTRUCTIVE PULMONARY DISEASE, UNSPECIFIED: ICD-10-CM

## 2023-11-27 ENCOUNTER — OUTPATIENT (OUTPATIENT)
Dept: OUTPATIENT SERVICES | Facility: HOSPITAL | Age: 78
LOS: 1 days | Discharge: HOME | End: 2023-11-27
Payer: MEDICARE

## 2023-11-27 ENCOUNTER — APPOINTMENT (OUTPATIENT)
Dept: WOUND CARE | Facility: HOSPITAL | Age: 78
End: 2023-11-27
Payer: MEDICARE

## 2023-11-27 VITALS
WEIGHT: 107 LBS | HEART RATE: 87 BPM | DIASTOLIC BLOOD PRESSURE: 59 MMHG | SYSTOLIC BLOOD PRESSURE: 95 MMHG | OXYGEN SATURATION: 89 % | BODY MASS INDEX: 19.69 KG/M2 | TEMPERATURE: 98 F | HEIGHT: 62 IN | RESPIRATION RATE: 12 BRPM

## 2023-11-27 DIAGNOSIS — Z98.890 OTHER SPECIFIED POSTPROCEDURAL STATES: Chronic | ICD-10-CM

## 2023-11-27 DIAGNOSIS — S91.309A UNSPECIFIED OPEN WOUND, UNSPECIFIED FOOT, INITIAL ENCOUNTER: ICD-10-CM

## 2023-11-27 DIAGNOSIS — Z90.49 ACQUIRED ABSENCE OF OTHER SPECIFIED PARTS OF DIGESTIVE TRACT: Chronic | ICD-10-CM

## 2023-11-27 DIAGNOSIS — Z98.89 OTHER SPECIFIED POSTPROCEDURAL STATES: Chronic | ICD-10-CM

## 2023-11-27 PROCEDURE — G0463: CPT

## 2023-11-27 PROCEDURE — 99213 OFFICE O/P EST LOW 20 MIN: CPT

## 2023-11-28 ENCOUNTER — OUTPATIENT (OUTPATIENT)
Dept: OUTPATIENT SERVICES | Facility: HOSPITAL | Age: 78
LOS: 1 days | End: 2023-11-28
Payer: MEDICARE

## 2023-11-28 DIAGNOSIS — Z98.890 OTHER SPECIFIED POSTPROCEDURAL STATES: Chronic | ICD-10-CM

## 2023-11-28 DIAGNOSIS — Z98.89 OTHER SPECIFIED POSTPROCEDURAL STATES: Chronic | ICD-10-CM

## 2023-11-28 DIAGNOSIS — Z90.49 ACQUIRED ABSENCE OF OTHER SPECIFIED PARTS OF DIGESTIVE TRACT: Chronic | ICD-10-CM

## 2023-11-28 DIAGNOSIS — M21.611 BUNION OF RIGHT FOOT: ICD-10-CM

## 2023-11-28 PROCEDURE — 93925 LOWER EXTREMITY STUDY: CPT | Mod: 26

## 2023-11-28 PROCEDURE — 93925 LOWER EXTREMITY STUDY: CPT

## 2023-11-29 DIAGNOSIS — E78.5 HYPERLIPIDEMIA, UNSPECIFIED: ICD-10-CM

## 2023-11-29 DIAGNOSIS — I10 ESSENTIAL (PRIMARY) HYPERTENSION: ICD-10-CM

## 2023-11-29 DIAGNOSIS — M21.611 BUNION OF RIGHT FOOT: ICD-10-CM

## 2023-11-29 DIAGNOSIS — Z88.0 ALLERGY STATUS TO PENICILLIN: ICD-10-CM

## 2023-11-29 DIAGNOSIS — Z83.3 FAMILY HISTORY OF DIABETES MELLITUS: ICD-10-CM

## 2023-11-29 DIAGNOSIS — Z85.118 PERSONAL HISTORY OF OTHER MALIGNANT NEOPLASM OF BRONCHUS AND LUNG: ICD-10-CM

## 2023-11-29 DIAGNOSIS — Z82.49 FAMILY HISTORY OF ISCHEMIC HEART DISEASE AND OTHER DISEASES OF THE CIRCULATORY SYSTEM: ICD-10-CM

## 2023-11-29 DIAGNOSIS — L84 CORNS AND CALLOSITIES: ICD-10-CM

## 2023-11-29 DIAGNOSIS — Z82.3 FAMILY HISTORY OF STROKE: ICD-10-CM

## 2023-11-29 DIAGNOSIS — J44.9 CHRONIC OBSTRUCTIVE PULMONARY DISEASE, UNSPECIFIED: ICD-10-CM

## 2023-11-29 DIAGNOSIS — Z88.1 ALLERGY STATUS TO OTHER ANTIBIOTIC AGENTS STATUS: ICD-10-CM

## 2023-11-29 DIAGNOSIS — Z80.1 FAMILY HISTORY OF MALIGNANT NEOPLASM OF TRACHEA, BRONCHUS AND LUNG: ICD-10-CM

## 2023-11-29 DIAGNOSIS — Z87.01 PERSONAL HISTORY OF PNEUMONIA (RECURRENT): ICD-10-CM

## 2023-11-29 DIAGNOSIS — I65.29 OCCLUSION AND STENOSIS OF UNSPECIFIED CAROTID ARTERY: ICD-10-CM

## 2023-11-29 DIAGNOSIS — Z79.82 LONG TERM (CURRENT) USE OF ASPIRIN: ICD-10-CM

## 2023-11-29 DIAGNOSIS — Z88.8 ALLERGY STATUS TO OTHER DRUGS, MEDICAMENTS AND BIOLOGICAL SUBSTANCES: ICD-10-CM

## 2023-11-29 DIAGNOSIS — Z90.49 ACQUIRED ABSENCE OF OTHER SPECIFIED PARTS OF DIGESTIVE TRACT: ICD-10-CM

## 2023-11-29 DIAGNOSIS — Z91.041 RADIOGRAPHIC DYE ALLERGY STATUS: ICD-10-CM

## 2023-11-29 DIAGNOSIS — Z79.899 OTHER LONG TERM (CURRENT) DRUG THERAPY: ICD-10-CM

## 2023-11-29 DIAGNOSIS — G57.61 LESION OF PLANTAR NERVE, RIGHT LOWER LIMB: ICD-10-CM

## 2023-11-29 DIAGNOSIS — Z86.73 PERSONAL HISTORY OF TRANSIENT ISCHEMIC ATTACK (TIA), AND CEREBRAL INFARCTION WITHOUT RESIDUAL DEFICITS: ICD-10-CM

## 2023-11-29 DIAGNOSIS — Z87.891 PERSONAL HISTORY OF NICOTINE DEPENDENCE: ICD-10-CM

## 2023-11-29 DIAGNOSIS — M20.41 OTHER HAMMER TOE(S) (ACQUIRED), RIGHT FOOT: ICD-10-CM

## 2023-11-29 DIAGNOSIS — Z98.49 CATARACT EXTRACTION STATUS, UNSPECIFIED EYE: ICD-10-CM

## 2023-11-29 DIAGNOSIS — Z90.2 ACQUIRED ABSENCE OF LUNG [PART OF]: ICD-10-CM

## 2023-11-29 DIAGNOSIS — K21.9 GASTRO-ESOPHAGEAL REFLUX DISEASE WITHOUT ESOPHAGITIS: ICD-10-CM

## 2023-11-29 DIAGNOSIS — M06.9 RHEUMATOID ARTHRITIS, UNSPECIFIED: ICD-10-CM

## 2023-11-30 ENCOUNTER — OUTPATIENT (OUTPATIENT)
Dept: OUTPATIENT SERVICES | Facility: HOSPITAL | Age: 78
LOS: 1 days | Discharge: HOME | End: 2023-11-30
Payer: MEDICARE

## 2023-11-30 ENCOUNTER — APPOINTMENT (OUTPATIENT)
Dept: WOUND CARE | Facility: HOSPITAL | Age: 78
End: 2023-11-30
Payer: MEDICARE

## 2023-11-30 DIAGNOSIS — Z98.890 OTHER SPECIFIED POSTPROCEDURAL STATES: Chronic | ICD-10-CM

## 2023-11-30 DIAGNOSIS — Z98.89 OTHER SPECIFIED POSTPROCEDURAL STATES: Chronic | ICD-10-CM

## 2023-11-30 DIAGNOSIS — G57.61 LESION OF PLANTAR NERVE, RIGHT LOWER LIMB: ICD-10-CM

## 2023-11-30 DIAGNOSIS — S91.309D UNSPECIFIED OPEN WOUND, UNSPECIFIED FOOT, SUBSEQUENT ENCOUNTER: ICD-10-CM

## 2023-11-30 PROCEDURE — G0463: CPT

## 2023-11-30 PROCEDURE — 99213 OFFICE O/P EST LOW 20 MIN: CPT

## 2023-12-03 DIAGNOSIS — Z90.2 ACQUIRED ABSENCE OF LUNG [PART OF]: ICD-10-CM

## 2023-12-03 DIAGNOSIS — Z88.1 ALLERGY STATUS TO OTHER ANTIBIOTIC AGENTS STATUS: ICD-10-CM

## 2023-12-03 DIAGNOSIS — Z79.899 OTHER LONG TERM (CURRENT) DRUG THERAPY: ICD-10-CM

## 2023-12-03 DIAGNOSIS — Z88.0 ALLERGY STATUS TO PENICILLIN: ICD-10-CM

## 2023-12-03 DIAGNOSIS — Z88.8 ALLERGY STATUS TO OTHER DRUGS, MEDICAMENTS AND BIOLOGICAL SUBSTANCES: ICD-10-CM

## 2023-12-03 DIAGNOSIS — Z86.73 PERSONAL HISTORY OF TRANSIENT ISCHEMIC ATTACK (TIA), AND CEREBRAL INFARCTION WITHOUT RESIDUAL DEFICITS: ICD-10-CM

## 2023-12-03 DIAGNOSIS — I65.29 OCCLUSION AND STENOSIS OF UNSPECIFIED CAROTID ARTERY: ICD-10-CM

## 2023-12-03 DIAGNOSIS — K21.9 GASTRO-ESOPHAGEAL REFLUX DISEASE WITHOUT ESOPHAGITIS: ICD-10-CM

## 2023-12-03 DIAGNOSIS — Z83.3 FAMILY HISTORY OF DIABETES MELLITUS: ICD-10-CM

## 2023-12-03 DIAGNOSIS — Z98.49 CATARACT EXTRACTION STATUS, UNSPECIFIED EYE: ICD-10-CM

## 2023-12-03 DIAGNOSIS — G57.61 LESION OF PLANTAR NERVE, RIGHT LOWER LIMB: ICD-10-CM

## 2023-12-03 DIAGNOSIS — I10 ESSENTIAL (PRIMARY) HYPERTENSION: ICD-10-CM

## 2023-12-03 DIAGNOSIS — M21.611 BUNION OF RIGHT FOOT: ICD-10-CM

## 2023-12-03 DIAGNOSIS — M06.9 RHEUMATOID ARTHRITIS, UNSPECIFIED: ICD-10-CM

## 2023-12-03 DIAGNOSIS — Z82.49 FAMILY HISTORY OF ISCHEMIC HEART DISEASE AND OTHER DISEASES OF THE CIRCULATORY SYSTEM: ICD-10-CM

## 2023-12-03 DIAGNOSIS — Z91.041 RADIOGRAPHIC DYE ALLERGY STATUS: ICD-10-CM

## 2023-12-03 DIAGNOSIS — Z87.891 PERSONAL HISTORY OF NICOTINE DEPENDENCE: ICD-10-CM

## 2023-12-03 DIAGNOSIS — Z79.82 LONG TERM (CURRENT) USE OF ASPIRIN: ICD-10-CM

## 2023-12-03 DIAGNOSIS — E78.5 HYPERLIPIDEMIA, UNSPECIFIED: ICD-10-CM

## 2023-12-03 DIAGNOSIS — Z85.118 PERSONAL HISTORY OF OTHER MALIGNANT NEOPLASM OF BRONCHUS AND LUNG: ICD-10-CM

## 2023-12-03 DIAGNOSIS — L84 CORNS AND CALLOSITIES: ICD-10-CM

## 2023-12-03 DIAGNOSIS — J44.9 CHRONIC OBSTRUCTIVE PULMONARY DISEASE, UNSPECIFIED: ICD-10-CM

## 2023-12-03 DIAGNOSIS — Z87.01 PERSONAL HISTORY OF PNEUMONIA (RECURRENT): ICD-10-CM

## 2023-12-03 DIAGNOSIS — Z80.1 FAMILY HISTORY OF MALIGNANT NEOPLASM OF TRACHEA, BRONCHUS AND LUNG: ICD-10-CM

## 2023-12-03 DIAGNOSIS — Z82.3 FAMILY HISTORY OF STROKE: ICD-10-CM

## 2023-12-03 DIAGNOSIS — M20.41 OTHER HAMMER TOE(S) (ACQUIRED), RIGHT FOOT: ICD-10-CM

## 2023-12-03 DIAGNOSIS — Z90.49 ACQUIRED ABSENCE OF OTHER SPECIFIED PARTS OF DIGESTIVE TRACT: ICD-10-CM

## 2023-12-12 ENCOUNTER — APPOINTMENT (OUTPATIENT)
Dept: WOUND CARE | Facility: HOSPITAL | Age: 78
End: 2023-12-12
Payer: MEDICARE

## 2023-12-12 ENCOUNTER — OUTPATIENT (OUTPATIENT)
Dept: OUTPATIENT SERVICES | Facility: HOSPITAL | Age: 78
LOS: 1 days | Discharge: HOME | End: 2023-12-12
Payer: MEDICARE

## 2023-12-12 VITALS
HEIGHT: 62 IN | SYSTOLIC BLOOD PRESSURE: 162 MMHG | TEMPERATURE: 98.1 F | WEIGHT: 107 LBS | DIASTOLIC BLOOD PRESSURE: 69 MMHG | RESPIRATION RATE: 16 BRPM | HEART RATE: 81 BPM | OXYGEN SATURATION: 99 % | BODY MASS INDEX: 19.69 KG/M2

## 2023-12-12 DIAGNOSIS — M20.41 OTHER HAMMER TOE(S) (ACQUIRED), RIGHT FOOT: ICD-10-CM

## 2023-12-12 DIAGNOSIS — Z98.89 OTHER SPECIFIED POSTPROCEDURAL STATES: Chronic | ICD-10-CM

## 2023-12-12 DIAGNOSIS — Z98.890 OTHER SPECIFIED POSTPROCEDURAL STATES: Chronic | ICD-10-CM

## 2023-12-12 DIAGNOSIS — Z90.49 ACQUIRED ABSENCE OF OTHER SPECIFIED PARTS OF DIGESTIVE TRACT: Chronic | ICD-10-CM

## 2023-12-12 DIAGNOSIS — M21.611 BUNION OF RIGHT FOOT: ICD-10-CM

## 2023-12-12 DIAGNOSIS — L84 CORNS AND CALLOSITIES: ICD-10-CM

## 2023-12-12 DIAGNOSIS — S91.309D UNSPECIFIED OPEN WOUND, UNSPECIFIED FOOT, SUBSEQUENT ENCOUNTER: ICD-10-CM

## 2023-12-12 PROCEDURE — 99213 OFFICE O/P EST LOW 20 MIN: CPT

## 2023-12-12 PROCEDURE — G0463: CPT

## 2023-12-13 ENCOUNTER — APPOINTMENT (OUTPATIENT)
Dept: OTOLARYNGOLOGY | Facility: CLINIC | Age: 78
End: 2023-12-13
Payer: MEDICARE

## 2023-12-13 VITALS
SYSTOLIC BLOOD PRESSURE: 80 MMHG | HEIGHT: 62 IN | BODY MASS INDEX: 19.69 KG/M2 | WEIGHT: 107 LBS | DIASTOLIC BLOOD PRESSURE: 60 MMHG

## 2023-12-13 VITALS — SYSTOLIC BLOOD PRESSURE: 100 MMHG | DIASTOLIC BLOOD PRESSURE: 50 MMHG

## 2023-12-13 DIAGNOSIS — H91.93 UNSPECIFIED HEARING LOSS, BILATERAL: ICD-10-CM

## 2023-12-13 PROCEDURE — 99213 OFFICE O/P EST LOW 20 MIN: CPT

## 2023-12-13 NOTE — PHYSICAL EXAM
[de-identified] : Bilateral hearing aids. Significant firm bilateral cerumen impaction, unable to be removed. [de-identified] : Unable to visualize tympanic membranes due to cerumen. [] : septum deviated bilaterally [Midline] : trachea located in midline position [Normal] : no rashes [FreeTextEntry2] : Small area of swelling over left mandible, stable from prior.

## 2023-12-13 NOTE — ASSESSMENT
[FreeTextEntry1] : Antonella Thompson presents for follow-up. She has history of left mandibular swelling and possible dental cyst. She is following with an oral surgen for this. She wears hearing aids and has had difficulty placing right sided one. She has significant firm bilateral cerumen impaction taht was unable to be removed. Will start debrox drops, then reevaluate.  - Debrox drops to both ears BID x 1 week. - f/u in 2 weeks.

## 2023-12-13 NOTE — HISTORY OF PRESENT ILLNESS
[de-identified] : Antonella Thompson is a 76 yo female with hx lung cancer s/p resection, TIA who presents for evaluation of dysphonia for the past few weeks. She denies vocal strain, but notes that she loses her voice frequently. She is not a stout. She notes vocal fatigue. She notes very rare trouble swallowing pastas or breads, but uses water to get these down. She denies aspiration episodes or odynophagia. She denies fevers, new neck masses/lumps and night sweats. She notes a 39 lb weight loss over the past year. She states that this was due to abdominal pain for which she was worked up by her GI physician and is improved. She is on pantoprazole. She had a UGI endoscopy per her report last year and this was normal. She notes recent nasal congestion and postnasal drainage. This leads to dryness of her throat. She is on azelastine nasal spray. SHe denies heartburn or food regurgitation. [FreeTextEntry1] : 1/10/22 - Patient presents for follow-up. She states that she has been using flonase and nasal saline sprays daily. She continues to have thick postnasal drainage leading to cough. She notes nasal congestion, but no sinus pressure. She notes that she continues to have intermittent dysphonia. She denies any fevers or chills. She notes some right ear pain when placing her hearing aid. She denies otorrhea.  10/7/22 - Antonella Thompson present for follow-up. She notes history of left jaw cyst, previously seen by a surgeon. She underwent a CT per her report but was unable to visualize it. She notes that she has had increased swelling and pain of the cyst recently in the past few weeks. She was seen by dentist and oral surgeon. They did X-rays and started her on antibiotic. She denies fevers or chills. She denies any overlying skin changes. she denies intraoral bleeding. She denies unintentional weight loss or any neck masses.  10/28/22 - Ms. Thompson presents for follow-up. She obtained her MRI. She notes recent increased swelling of the left mandible cyst requiring antibiotics. This has resolved. She denies pain, fevers, or chills.  12/13/23 - nAtonella Thompson presents for follow-up. She is seeing oral surgeon for mandible cyst. She wears hearing aids. She notes that for the past year, she has had difficulty putting the right hearing aid in due to pain. She denies otorrhea, tinnitus,, or vertigo. She feels her hearing is diminished. She denies recent fevers or chills. She denies facial weakness or facial numbness. She denies hsitory of recurrent ear infections.

## 2023-12-24 DIAGNOSIS — Z83.3 FAMILY HISTORY OF DIABETES MELLITUS: ICD-10-CM

## 2023-12-24 DIAGNOSIS — Z88.0 ALLERGY STATUS TO PENICILLIN: ICD-10-CM

## 2023-12-24 DIAGNOSIS — M21.611 BUNION OF RIGHT FOOT: ICD-10-CM

## 2023-12-24 DIAGNOSIS — Z82.3 FAMILY HISTORY OF STROKE: ICD-10-CM

## 2023-12-24 DIAGNOSIS — Z90.49 ACQUIRED ABSENCE OF OTHER SPECIFIED PARTS OF DIGESTIVE TRACT: ICD-10-CM

## 2023-12-24 DIAGNOSIS — Z88.1 ALLERGY STATUS TO OTHER ANTIBIOTIC AGENTS STATUS: ICD-10-CM

## 2023-12-24 DIAGNOSIS — Z86.73 PERSONAL HISTORY OF TRANSIENT ISCHEMIC ATTACK (TIA), AND CEREBRAL INFARCTION WITHOUT RESIDUAL DEFICITS: ICD-10-CM

## 2023-12-24 DIAGNOSIS — I10 ESSENTIAL (PRIMARY) HYPERTENSION: ICD-10-CM

## 2023-12-24 DIAGNOSIS — Z79.899 OTHER LONG TERM (CURRENT) DRUG THERAPY: ICD-10-CM

## 2023-12-24 DIAGNOSIS — Z90.2 ACQUIRED ABSENCE OF LUNG [PART OF]: ICD-10-CM

## 2023-12-24 DIAGNOSIS — Z80.1 FAMILY HISTORY OF MALIGNANT NEOPLASM OF TRACHEA, BRONCHUS AND LUNG: ICD-10-CM

## 2023-12-24 DIAGNOSIS — E78.5 HYPERLIPIDEMIA, UNSPECIFIED: ICD-10-CM

## 2023-12-24 DIAGNOSIS — M06.9 RHEUMATOID ARTHRITIS, UNSPECIFIED: ICD-10-CM

## 2023-12-24 DIAGNOSIS — I65.29 OCCLUSION AND STENOSIS OF UNSPECIFIED CAROTID ARTERY: ICD-10-CM

## 2023-12-24 DIAGNOSIS — Z82.49 FAMILY HISTORY OF ISCHEMIC HEART DISEASE AND OTHER DISEASES OF THE CIRCULATORY SYSTEM: ICD-10-CM

## 2023-12-24 DIAGNOSIS — Z88.8 ALLERGY STATUS TO OTHER DRUGS, MEDICAMENTS AND BIOLOGICAL SUBSTANCES: ICD-10-CM

## 2023-12-24 DIAGNOSIS — M20.41 OTHER HAMMER TOE(S) (ACQUIRED), RIGHT FOOT: ICD-10-CM

## 2023-12-24 DIAGNOSIS — Z85.118 PERSONAL HISTORY OF OTHER MALIGNANT NEOPLASM OF BRONCHUS AND LUNG: ICD-10-CM

## 2023-12-24 DIAGNOSIS — Z98.49 CATARACT EXTRACTION STATUS, UNSPECIFIED EYE: ICD-10-CM

## 2023-12-24 DIAGNOSIS — J44.9 CHRONIC OBSTRUCTIVE PULMONARY DISEASE, UNSPECIFIED: ICD-10-CM

## 2023-12-24 DIAGNOSIS — Z87.891 PERSONAL HISTORY OF NICOTINE DEPENDENCE: ICD-10-CM

## 2023-12-24 DIAGNOSIS — Z87.01 PERSONAL HISTORY OF PNEUMONIA (RECURRENT): ICD-10-CM

## 2023-12-24 DIAGNOSIS — L84 CORNS AND CALLOSITIES: ICD-10-CM

## 2023-12-24 DIAGNOSIS — Z79.82 LONG TERM (CURRENT) USE OF ASPIRIN: ICD-10-CM

## 2023-12-24 DIAGNOSIS — Z91.041 RADIOGRAPHIC DYE ALLERGY STATUS: ICD-10-CM

## 2023-12-24 DIAGNOSIS — K21.9 GASTRO-ESOPHAGEAL REFLUX DISEASE WITHOUT ESOPHAGITIS: ICD-10-CM

## 2023-12-26 ENCOUNTER — APPOINTMENT (OUTPATIENT)
Age: 78
End: 2023-12-26
Payer: MEDICARE

## 2023-12-26 PROCEDURE — 99213 OFFICE O/P EST LOW 20 MIN: CPT

## 2024-01-03 ENCOUNTER — APPOINTMENT (OUTPATIENT)
Dept: OTOLARYNGOLOGY | Facility: CLINIC | Age: 79
End: 2024-01-03
Payer: MEDICARE

## 2024-01-03 VITALS
HEIGHT: 62 IN | HEART RATE: 90 BPM | DIASTOLIC BLOOD PRESSURE: 63 MMHG | SYSTOLIC BLOOD PRESSURE: 109 MMHG | WEIGHT: 105 LBS | BODY MASS INDEX: 19.32 KG/M2

## 2024-01-03 DIAGNOSIS — H60.502 UNSPECIFIED ACUTE NONINFECTIVE OTITIS EXTERNA, LEFT EAR: ICD-10-CM

## 2024-01-03 PROCEDURE — 69210 REMOVE IMPACTED EAR WAX UNI: CPT

## 2024-01-03 PROCEDURE — 99213 OFFICE O/P EST LOW 20 MIN: CPT | Mod: 25

## 2024-01-03 RX ORDER — NEOMYCIN SULFATE, POLYMYXIN B SULFATE, HYDROCORTISONE 3.5; 10000; 1 MG/ML; [USP'U]/ML; MG/ML
1 SOLUTION/ DROPS AURICULAR (OTIC) TWICE DAILY
Qty: 1 | Refills: 0 | Status: ACTIVE | COMMUNITY
Start: 2024-01-03 | End: 1900-01-01

## 2024-01-03 NOTE — ASSESSMENT
[FreeTextEntry1] : Antonella Thompson presents for follow-up. She has history of left mandibular swelling and possible dental cyst. She is following with an oral surgen for this. She wears hearing aids. Bilateral cerumen impaction was removed tdoay. She has left otitis externa. Will start topical drops. In addition, she will see outside audiologist for hearing test and new hearing aids. Offered audio today but she deferred.   - Start neomycin-polymyxin-HC drops -4 drops BID to left ear for 7 days. - Dry ear precautions. - f/u prn

## 2024-01-03 NOTE — PHYSICAL EXAM
[] : septum deviated bilaterally [Midline] : trachea located in midline position [Normal] : no rashes [de-identified] : Significant firm bilateral cerumen impaction. Once removed, there was inflammation of left EAC. right EAC wnl. [FreeTextEntry2] : Small area of swelling over left mandible, stable from prior.

## 2024-01-03 NOTE — HISTORY OF PRESENT ILLNESS
[de-identified] : Antonella Thompson is a 76 yo female with hx lung cancer s/p resection, TIA who presents for evaluation of dysphonia for the past few weeks. She denies vocal strain, but notes that she loses her voice frequently. She is not a stout. She notes vocal fatigue. She notes very rare trouble swallowing pastas or breads, but uses water to get these down. She denies aspiration episodes or odynophagia. She denies fevers, new neck masses/lumps and night sweats. She notes a 39 lb weight loss over the past year. She states that this was due to abdominal pain for which she was worked up by her GI physician and is improved. She is on pantoprazole. She had a UGI endoscopy per her report last year and this was normal. She notes recent nasal congestion and postnasal drainage. This leads to dryness of her throat. She is on azelastine nasal spray. SHe denies heartburn or food regurgitation. [FreeTextEntry1] : 1/10/22 - Patient presents for follow-up. She states that she has been using flonase and nasal saline sprays daily. She continues to have thick postnasal drainage leading to cough. She notes nasal congestion, but no sinus pressure. She notes that she continues to have intermittent dysphonia. She denies any fevers or chills. She notes some right ear pain when placing her hearing aid. She denies otorrhea.  10/7/22 - Antonella Thompson present for follow-up. She notes history of left jaw cyst, previously seen by a surgeon. She underwent a CT per her report but was unable to visualize it. She notes that she has had increased swelling and pain of the cyst recently in the past few weeks. She was seen by dentist and oral surgeon. They did X-rays and started her on antibiotic. She denies fevers or chills. She denies any overlying skin changes. she denies intraoral bleeding. She denies unintentional weight loss or any neck masses.  10/28/22 - Ms. Thompson presents for follow-up. She obtained her MRI. She notes recent increased swelling of the left mandible cyst requiring antibiotics. This has resolved. She denies pain, fevers, or chills.  12/13/23 - Antonella Thompson presents for follow-up. She is seeing oral surgeon for mandible cyst. She wears hearing aids. She notes that for the past year, she has had difficulty putting the right hearing aid in due to pain. She denies otorrhea, tinnitus,, or vertigo. She feels her hearing is diminished. She denies recent fevers or chills. She denies facial weakness or facial numbness. She denies hsitory of recurrent ear infections.  1/3/24 - Ms. Thompson presents for follow-up. She used debrox drops. She denies otalgia, otorrhea, tinnitus, or  vertigo. She denies change in hearing. No fevers or chills.

## 2024-01-10 ENCOUNTER — OUTPATIENT (OUTPATIENT)
Dept: OUTPATIENT SERVICES | Facility: HOSPITAL | Age: 79
LOS: 1 days | End: 2024-01-10
Payer: MEDICARE

## 2024-01-10 VITALS
HEIGHT: 62 IN | DIASTOLIC BLOOD PRESSURE: 53 MMHG | RESPIRATION RATE: 15 BRPM | TEMPERATURE: 98 F | OXYGEN SATURATION: 97 % | HEART RATE: 88 BPM | WEIGHT: 108.03 LBS | SYSTOLIC BLOOD PRESSURE: 106 MMHG

## 2024-01-10 DIAGNOSIS — Z01.818 ENCOUNTER FOR OTHER PREPROCEDURAL EXAMINATION: ICD-10-CM

## 2024-01-10 DIAGNOSIS — M20.41 OTHER HAMMER TOE(S) (ACQUIRED), RIGHT FOOT: ICD-10-CM

## 2024-01-10 DIAGNOSIS — I25.10 ATHEROSCLEROTIC HEART DISEASE OF NATIVE CORONARY ARTERY WITHOUT ANGINA PECTORIS: Chronic | ICD-10-CM

## 2024-01-10 DIAGNOSIS — Z90.49 ACQUIRED ABSENCE OF OTHER SPECIFIED PARTS OF DIGESTIVE TRACT: Chronic | ICD-10-CM

## 2024-01-10 DIAGNOSIS — Z98.890 OTHER SPECIFIED POSTPROCEDURAL STATES: Chronic | ICD-10-CM

## 2024-01-10 DIAGNOSIS — Z98.89 OTHER SPECIFIED POSTPROCEDURAL STATES: Chronic | ICD-10-CM

## 2024-01-10 DIAGNOSIS — M21.611 BUNION OF RIGHT FOOT: ICD-10-CM

## 2024-01-10 DIAGNOSIS — K08.89 OTHER SPECIFIED DISORDERS OF TEETH AND SUPPORTING STRUCTURES: ICD-10-CM

## 2024-01-10 LAB
ALBUMIN SERPL ELPH-MCNC: 3 G/DL — LOW (ref 3.3–5)
ALBUMIN SERPL ELPH-MCNC: 3 G/DL — LOW (ref 3.3–5)
ALP SERPL-CCNC: 83 U/L — SIGNIFICANT CHANGE UP (ref 40–120)
ALP SERPL-CCNC: 83 U/L — SIGNIFICANT CHANGE UP (ref 40–120)
ALT FLD-CCNC: 22 U/L — SIGNIFICANT CHANGE UP (ref 12–78)
ALT FLD-CCNC: 22 U/L — SIGNIFICANT CHANGE UP (ref 12–78)
ANION GAP SERPL CALC-SCNC: 5 MMOL/L — SIGNIFICANT CHANGE UP (ref 5–17)
ANION GAP SERPL CALC-SCNC: 5 MMOL/L — SIGNIFICANT CHANGE UP (ref 5–17)
AST SERPL-CCNC: 23 U/L — SIGNIFICANT CHANGE UP (ref 15–37)
AST SERPL-CCNC: 23 U/L — SIGNIFICANT CHANGE UP (ref 15–37)
BILIRUB SERPL-MCNC: 0.3 MG/DL — SIGNIFICANT CHANGE UP (ref 0.2–1.2)
BILIRUB SERPL-MCNC: 0.3 MG/DL — SIGNIFICANT CHANGE UP (ref 0.2–1.2)
BUN SERPL-MCNC: 37 MG/DL — HIGH (ref 7–23)
BUN SERPL-MCNC: 37 MG/DL — HIGH (ref 7–23)
CALCIUM SERPL-MCNC: 8.9 MG/DL — SIGNIFICANT CHANGE UP (ref 8.5–10.1)
CALCIUM SERPL-MCNC: 8.9 MG/DL — SIGNIFICANT CHANGE UP (ref 8.5–10.1)
CHLORIDE SERPL-SCNC: 107 MMOL/L — SIGNIFICANT CHANGE UP (ref 96–108)
CHLORIDE SERPL-SCNC: 107 MMOL/L — SIGNIFICANT CHANGE UP (ref 96–108)
CO2 SERPL-SCNC: 29 MMOL/L — SIGNIFICANT CHANGE UP (ref 22–31)
CO2 SERPL-SCNC: 29 MMOL/L — SIGNIFICANT CHANGE UP (ref 22–31)
CREAT SERPL-MCNC: 1.4 MG/DL — HIGH (ref 0.5–1.3)
CREAT SERPL-MCNC: 1.4 MG/DL — HIGH (ref 0.5–1.3)
EGFR: 39 ML/MIN/1.73M2 — LOW
EGFR: 39 ML/MIN/1.73M2 — LOW
GLUCOSE SERPL-MCNC: 88 MG/DL — SIGNIFICANT CHANGE UP (ref 70–99)
GLUCOSE SERPL-MCNC: 88 MG/DL — SIGNIFICANT CHANGE UP (ref 70–99)
HCT VFR BLD CALC: 36.7 % — SIGNIFICANT CHANGE UP (ref 34.5–45)
HCT VFR BLD CALC: 36.7 % — SIGNIFICANT CHANGE UP (ref 34.5–45)
HGB BLD-MCNC: 11.4 G/DL — LOW (ref 11.5–15.5)
HGB BLD-MCNC: 11.4 G/DL — LOW (ref 11.5–15.5)
MCHC RBC-ENTMCNC: 30 PG — SIGNIFICANT CHANGE UP (ref 27–34)
MCHC RBC-ENTMCNC: 30 PG — SIGNIFICANT CHANGE UP (ref 27–34)
MCHC RBC-ENTMCNC: 31.1 GM/DL — LOW (ref 32–36)
MCHC RBC-ENTMCNC: 31.1 GM/DL — LOW (ref 32–36)
MCV RBC AUTO: 96.6 FL — SIGNIFICANT CHANGE UP (ref 80–100)
MCV RBC AUTO: 96.6 FL — SIGNIFICANT CHANGE UP (ref 80–100)
NRBC # BLD: 0 /100 WBCS — SIGNIFICANT CHANGE UP (ref 0–0)
NRBC # BLD: 0 /100 WBCS — SIGNIFICANT CHANGE UP (ref 0–0)
PLATELET # BLD AUTO: 298 K/UL — SIGNIFICANT CHANGE UP (ref 150–400)
PLATELET # BLD AUTO: 298 K/UL — SIGNIFICANT CHANGE UP (ref 150–400)
POTASSIUM SERPL-MCNC: 4.6 MMOL/L — SIGNIFICANT CHANGE UP (ref 3.5–5.3)
POTASSIUM SERPL-MCNC: 4.6 MMOL/L — SIGNIFICANT CHANGE UP (ref 3.5–5.3)
POTASSIUM SERPL-SCNC: 4.6 MMOL/L — SIGNIFICANT CHANGE UP (ref 3.5–5.3)
POTASSIUM SERPL-SCNC: 4.6 MMOL/L — SIGNIFICANT CHANGE UP (ref 3.5–5.3)
PROT SERPL-MCNC: 6.7 G/DL — SIGNIFICANT CHANGE UP (ref 6–8.3)
PROT SERPL-MCNC: 6.7 G/DL — SIGNIFICANT CHANGE UP (ref 6–8.3)
RBC # BLD: 3.8 M/UL — SIGNIFICANT CHANGE UP (ref 3.8–5.2)
RBC # BLD: 3.8 M/UL — SIGNIFICANT CHANGE UP (ref 3.8–5.2)
RBC # FLD: 14 % — SIGNIFICANT CHANGE UP (ref 10.3–14.5)
RBC # FLD: 14 % — SIGNIFICANT CHANGE UP (ref 10.3–14.5)
SODIUM SERPL-SCNC: 141 MMOL/L — SIGNIFICANT CHANGE UP (ref 135–145)
SODIUM SERPL-SCNC: 141 MMOL/L — SIGNIFICANT CHANGE UP (ref 135–145)
WBC # BLD: 8.67 K/UL — SIGNIFICANT CHANGE UP (ref 3.8–10.5)
WBC # BLD: 8.67 K/UL — SIGNIFICANT CHANGE UP (ref 3.8–10.5)
WBC # FLD AUTO: 8.67 K/UL — SIGNIFICANT CHANGE UP (ref 3.8–10.5)
WBC # FLD AUTO: 8.67 K/UL — SIGNIFICANT CHANGE UP (ref 3.8–10.5)

## 2024-01-10 PROCEDURE — 93005 ELECTROCARDIOGRAM TRACING: CPT

## 2024-01-10 PROCEDURE — 93010 ELECTROCARDIOGRAM REPORT: CPT

## 2024-01-10 PROCEDURE — 85027 COMPLETE CBC AUTOMATED: CPT

## 2024-01-10 PROCEDURE — 80053 COMPREHEN METABOLIC PANEL: CPT

## 2024-01-10 PROCEDURE — 36415 COLL VENOUS BLD VENIPUNCTURE: CPT

## 2024-01-10 PROCEDURE — G0463: CPT

## 2024-01-10 RX ORDER — ASCORBIC ACID 60 MG
1 TABLET,CHEWABLE ORAL
Qty: 0 | Refills: 0 | DISCHARGE

## 2024-01-10 RX ORDER — OLMESARTAN MEDOXOMIL-HYDROCHLOROTHIAZIDE 25; 40 MG/1; MG/1
1 TABLET, FILM COATED ORAL
Qty: 0 | Refills: 0 | DISCHARGE

## 2024-01-10 RX ORDER — LACTOBACILLUS ACIDOPHILUS 100MM CELL
1 CAPSULE ORAL
Qty: 0 | Refills: 0 | DISCHARGE

## 2024-01-10 RX ORDER — PREGABALIN 225 MG/1
1 CAPSULE ORAL
Qty: 0 | Refills: 0 | DISCHARGE

## 2024-01-10 RX ORDER — CHOLECALCIFEROL (VITAMIN D3) 125 MCG
1 CAPSULE ORAL
Qty: 0 | Refills: 0 | DISCHARGE

## 2024-01-10 NOTE — H&P PST ADULT - PROBLEM SELECTOR PLAN 3
Dental aniceto at left lower #20  Pain noted to base of tooth and along jaw  Advised to call PCP for possible dental infection  Surgeon office notified

## 2024-01-10 NOTE — H&P PST ADULT - ENMT COMMENTS
left lower tooth #20 with tenderness to base of tooth and along jaw, advised to call PCP for possible dental infection

## 2024-01-10 NOTE — H&P PST ADULT - NSICDXPROCEDURE_GEN_ALL_CORE_FT
PROCEDURES:  Correction, hallux valgus, with sesamoidectomy and double osteotomy 10-Wallace-2024 09:38:56 RIGHT Amanda Delaney  Correction, hammer toe 10-Wallace-2024 09:39:22 RIGHT Amanda Delaney

## 2024-01-10 NOTE — H&P PST ADULT - NSICDXPASTMEDICALHX_GEN_ALL_CORE_FT
PAST MEDICAL HISTORY:  Carotid artery plaque     Childhood asthma     Chronic obstructive pulmonary disease, unspecified COPD type O2 at night    Essential hypertension     Hiatal hernia with GERD     HLD (hyperlipidemia)     IBS (irritable bowel syndrome)     Lung cancer Right lung.    Psoriatic arthritis     Rheumatoid arthritis     Stage 3 chronic kidney disease     TIA (transient ischemic attack) 2012

## 2024-01-10 NOTE — H&P PST ADULT - PROBLEM SELECTOR PLAN 2
Right Foot Silver Bunionectomy - Right Foot Akin Bunionectomy - Right Arthroplasty with K-wire Fixation by Dr. Dubois on 1/24/2024.

## 2024-01-10 NOTE — H&P PST ADULT - NSICDXFAMILYHX_GEN_ALL_CORE_FT
FAMILY HISTORY:  Father  Still living? Unknown  FH: CAD (coronary artery disease), Age at diagnosis: Age Unknown  FH: myocardial infarction, Age at diagnosis: Age Unknown    Mother  Still living? Unknown  FH: type 2 diabetes, Age at diagnosis: Age Unknown    Sibling  Still living? Unknown  FH: CAD (coronary artery disease), Age at diagnosis: Age Unknown  FH: stroke, Age at diagnosis: Age Unknown  FH: colon cancer, Age at diagnosis: Age Unknown  FH: lung cancer, Age at diagnosis: Age Unknown

## 2024-01-10 NOTE — H&P PST ADULT - ATTENDING COMMENTS
Pt AxOx3.  Denies any changes in health since last visit.  Denies fever, cough, or illness.  Lungs clear.  Hear RRR, S1S2, No M/R/G.  Confirmed surgical site with patient - "Right Foot".  Amanda PATEL

## 2024-01-10 NOTE — H&P PST ADULT - MUSCULOSKELETAL
antalgic gait d/t right foot pain/normal/ROM intact/normal gait/strength 5/5 bilateral upper extremities/strength 5/5 bilateral lower extremities negative

## 2024-01-10 NOTE — H&P PST ADULT - BP NONINVASIVE SYSTOLIC (MM HG)
[address]   Electrophysiology Note    Date: 12/10/20  Patient Name: Sandhya Hernandez  YOB: 1948     Chief Complaint: 6 Month Follow-Up and Atrial Fibrillation    Primary Care Physician: DARSHANA Pinto CNP      HISTORY OF PRESENT ILLNESS: Sandhya Hernandez is a 67 y.o. male who initially presented for follow up for paroxysmal atrial fibrillation. He has a history of GI bleed on Eliquis and has undergone cardioversion 6 times for AF at [facility]. Underwent Watchman device implantation on 12/18/2019. He underwent a cardioversion on 4/1/2020 for paroxsymal atrial fibrillation. Interval history since last office visit: His EKG today 12/10/20 demonstrates SR with HR 61 BPM. Today 12/10/20 he reports he is doing well from a cardiac standpoint. He reports he is taking his dronedarone and metoprolol as prescribed and tolerates them well. He reports he is active around his home and tolerates that well. Patient denies current edema, chest pain, sob, palpitations, dizziness or syncope. Past Medical History:   has a past medical history of Arthritis, Atrial fibrillation ([address] Hyperlipidemia, Hypertension, SCC (squamous cell carcinoma), Sleep apnea, and Systolic heart failure ([address] Past Surgical History:   has a past surgical history that includes Cardioversion (2005); Total hip arthroplasty (Left, 2013); Colonoscopy ([id number]); and ablation of dysrhythmic focus. Social History:   reports that he has never smoked. He has never used smokeless tobacco. He reports that he does not drink alcohol or use drugs. Family History: family history includes Cancer in his mother; Heart Disease (age of onset: 79) in his father; High Blood Pressure in his mother. Allergies:  Patient has no known allergies. Home Medications:    Prior to Admission medications    Medication Sig Start Date End Date Taking?  Authorizing Provider   MULTAQ 400 MG TABS TAKE 1 TABLET BY MOUTH TWICE DAILY WITH MEALS 11/30/20  Yes Leon Chino MD   levothyroxine (SYNTHROID) 75 MCG tablet TAKE 1 TABLET BY MOUTH DAILY 11/20/20  Yes Mic Che MD   pravastatin (PRAVACHOL) 80 MG tablet TAKE 1 TABLET BY MOUTH DAILY 6/19/20  Yes DARSHANA Gamez CNP   lisinopril (PRINIVIL;ZESTRIL) 10 MG tablet Take 0.5 tablets by mouth daily 5/21/20  Yes DARSHANA Pereyra CNP   metoprolol succinate (TOPROL XL) 50 MG extended release tablet TAKE 1& 1/2 TABLETS BY MOUTH EVERY DAY 5/21/20  Yes DARSHANA Pereyra CNP   dorzolamide-timolol (COSOPT) 22.3-6.8 MG/ML ophthalmic solution INT 1 GTT INTO OU BID 4/14/20  Yes Historical Provider, MD   latanoprost (XALATAN) 0.005 % ophthalmic solution INT 1 GTT IN OU QD IN THE [place]RE[place] 4/14/20  Yes Historical Provider, MD   omeprazole (PRILOSEC OTC) 20 MG tablet Take 20 mg by mouth daily   Yes Historical Provider, MD   aspirin 81 MG chewable tablet Take 1 tablet by mouth daily 11/20/19  Yes DARSHANA Lane CNP   Multiple Vitamins-Minerals (MULTIVITAMIN-MINERALS) TABS tablet Take 1 tablet by mouth daily 7/24/19  Yes DARSHANA Pereyra CNP       REVIEW OF SYSTEMS:      All 14-point review of systems are completed and pertinent positives are mentioned in the history of present illness. Other systems are reviewed and are negative. Physical Examination:    /70   Pulse 60   Ht 5' 9\" (1.753 m)   Wt 192 lb 8 oz (87.3 kg)   SpO2 99%   BMI 28.43 kg/m²    Constitutional and General Appearance: alert, cooperative, no distress and appears stated age  HEENT: PERRL, no cervical lymphadenopathy. No masses palpable. Normal oral mucosa  Respiratory:  · Normal excursion and expansion without use of accessory muscles  · Resp Auscultation: Normal breath sounds without dullness or wheezing  Cardiovascular:  · The apical impulse is not displaced  · Heart tones are crisp and normal. irregular S1 and S2. Tachycardic.   Abdomen:  · No masses or tenderness  · Bowel sounds present  Extremities:  ·  No Cyanosis or Clubbing  ·  Lower extremity edema: No  · Skin: Warm and dry  Neurological:  · Alert and oriented. · Moves all extremities well  · No abnormalities of mood, affect, memory, mentation, or behavior are noted    DATA:    EC/10/20: SR with HR 61 BPM    EC20: SR 63    ECG: 3/31/20: AF with RVR with     ECHO: 19: Summary   Ejection fraction is visually estimated to be 55-60 %. Mild mitral and tricuspid regurgitation. Watchman device is well seated within the left atrial appendage. No leak   around the device   Mild to moderate aortic regurgitation. [id number] Score for Atrial Fibrillation Stroke Risk   Risk   Factors  Component Value   C CHF No 0   H HTN Yes 1   A2 Age >= 76 No,  (73 y.o.) 0   D DM No 0   S2 Prior Stroke/TIA No 0   V Vascular Disease No 0   A Age 74-69 Yes,  (73 y.o.) 1   Sc Sex male 0    [id number]  Score  2   Score last updated 3/30/20 6:43 PM EDT    Click here for a link to the UpToDate guideline \"Atrial Fibrillation: Anticoagulation therapy to prevent embolization    Disclaimer: Risk Score calculation is dependent on accuracy of patient problem list and past encounter diagnosis. IMPRESSION:    1. Persistent atrial ftehacecigjs-[id number] Score 2 (HTN and age)    Mr. Pankaj Brown is a pleasant 66-year-old male with a medical history significant for persistent atrial fibrillation status post ablation with Dr. Tsering Campos, hypertension, diabetes mellitus type 2, and nonischemic cardiomyopathy with recovered ejection fraction who presents from home with recent onset of atrial fibrillation with RVR. According to patient his symptoms started on  afternoon when he began to suffer from palpitations, malaise, lethargy, and fatigue. He presents now for evaluation. We discussed rate control, rhythm control, AVN + pacemaker, and repeat atrial fibrillation ablation. We reviewed risks and benefits of each approach. We discussed side effects of class IC, class II, class III, and class IV antiarrhythmics. Patient would like to proceed with starting on dronedarone followed by cardioversion tomorrow. We will then schedule him for an atrial fibrillation ablation given that his heart rates are not well controlled and his blood pressures are soft and I am not sure if we will be able to control his heart rates with corey agents given his current hemodynamic status. All questions were answered. He is unable to afford his dronedarone we will have him start a load of amiodarone. 06/09/2020  Patient doing well. Able to afford drondarone. No more atrial fibrillation (symptomatic). No side effects from medications. Tolerating aspirin (has Watchman). No shortness of breath or palpitations. Discussed ablation. He is doing well and would like to continue current course. - Continue dronedarone. - Continue metoprolol.  - Continue aspirin.  - Follow up in 6 months or PRN. Will extend to yearly thereafter. 12/10/2020  Patient presents for follow-up. He is been doing well in general.  He said no more palpitations. We discussed ablation. At this point patient like to hold off. He may require cardioversions in the future and/or ablation.  - Continue dronedarone. - Continue metoprolol.  - Continue aspirin for Watchman.  - Follow up with EP NP in 1 year unless/until procedure/discussion required or PRN. 2.  Dizziness  Stable/resolved. - Plan as per above. RECOMMENDATIONS:  1. Continue taking dronedarone and metoprolol, declined refills today   2. Continue to be active  3. Follow up with EP NP in 1 year     QUALITY MEASURES  1. Tobacco Cessation Counseling: NA  2. Retake of BP if >140/90:   NA  3. Documentation to PCP/referring for new patient:  Sent to PCP at close of office visit  4. CAD patient on anti-platelet: NA  5. CAD patient on STATIN therapy:  NA  6.  Patient with CHF and aFib on anticoagulation:  Taken off of  All questions and concerns were addressed to the patient/family. Alternatives to my treatment were discussed. This note was scribed in the presence of Kimberly Leon MD by Mariza Johnson. Keiry Gandhi RN. Dr. Arslan Otto MD  Electrophysiology  [facility]. [address]. [address]. [place] [address]  Phone: [phone]  Fax: [phone]   12/10/2020     NOTE: This report was transcribed using voice recognition software. Every effort was made to ensure accuracy, however, inadvertent computerized transcription errors may be present. The scribe's documentation has been prepared under my direction and personally reviewed by me in its entirety. I confirm that the note above accurately reflects all work, physical examination, the discussion of treatments and procedures, and medical decision making performed by me. Bernabe Lora MD personally performed the services described in this documentation as scribed by nurse in my presence, and is both accurate and complete.     Electronically signed by Ray Fu MD on 12/10/2020 at 4:00 PM 106

## 2024-01-10 NOTE — H&P PST ADULT - PROBLEM SELECTOR PLAN 4
Labs CBC, CMP, and EKG   Medical and Cardiac clearance necessary  Pre op and Hibiclens instructions reviewed and given.   Anesthesia consult with Dr. Busby, patient uses 2L NC at night  No meds to take am of surgery.   Instructed to hold and/or avoid other NSAIDs and OTC supplements. Tylenol is ok. Verbalized understanding

## 2024-01-10 NOTE — H&P PST ADULT - HISTORY OF PRESENT ILLNESS
77 y/o female with PMH of HLD, HTN, TIA (2012), Right Lung CA (1996), Asthma, COPD (uses 2L NC at night), RA, and GERD & SHx  Right Lobectomy (1996), Cholecystectomy, Endoscopy, and Colonoscopy for PST having Right Foot Silver Bunionectomy - Right Foot Akin Bunionectomy - Right Arthroplasty with K-wire Fixation by Dr. Dubois on 1/24/2024.

## 2024-01-16 ENCOUNTER — APPOINTMENT (OUTPATIENT)
Dept: CARDIOLOGY | Facility: CLINIC | Age: 79
End: 2024-01-16
Payer: MEDICARE

## 2024-01-16 ENCOUNTER — NON-APPOINTMENT (OUTPATIENT)
Age: 79
End: 2024-01-16

## 2024-01-16 VITALS
SYSTOLIC BLOOD PRESSURE: 110 MMHG | BODY MASS INDEX: 19.94 KG/M2 | DIASTOLIC BLOOD PRESSURE: 60 MMHG | OXYGEN SATURATION: 96 % | HEART RATE: 114 BPM | WEIGHT: 109 LBS

## 2024-01-16 DIAGNOSIS — I65.29 OCCLUSION AND STENOSIS OF UNSPECIFIED CAROTID ARTERY: ICD-10-CM

## 2024-01-16 DIAGNOSIS — R94.31 ABNORMAL ELECTROCARDIOGRAM [ECG] [EKG]: ICD-10-CM

## 2024-01-16 DIAGNOSIS — E78.2 MIXED HYPERLIPIDEMIA: ICD-10-CM

## 2024-01-16 DIAGNOSIS — Z01.810 ENCOUNTER FOR PREPROCEDURAL CARDIOVASCULAR EXAMINATION: ICD-10-CM

## 2024-01-16 PROCEDURE — 99214 OFFICE O/P EST MOD 30 MIN: CPT | Mod: 25

## 2024-01-16 PROCEDURE — 93000 ELECTROCARDIOGRAM COMPLETE: CPT | Mod: NC

## 2024-01-16 NOTE — REASON FOR VISIT
[Hyperlipidemia] : hyperlipidemia [Hypertension] : hypertension [Other: ____] : [unfilled] [Spouse] : spouse

## 2024-01-16 NOTE — PHYSICAL EXAM
[No Acute Distress] : no acute distress [Normal S1, S2] : normal S1, S2 [Clear Lung Fields] : clear lung fields [Normal Gait] : normal gait [No Edema] : no edema [Alert and Oriented] : alert and oriented [No Murmur] : no murmur [de-identified] :  pupils are round

## 2024-01-16 NOTE — HISTORY OF PRESENT ILLNESS
[FreeTextEntry1] : Antonella Thompson is a 78-year-old woman with a history of hypertension, hyperlipidemia, carotid artery plaque, asthma/COPD, lung cancer, rheumatoid arthritis, and TIA, who returns for cardiac examination requesting "clearance" for upcoming foot surgery (correction of hammertoe deformity) - our last encounter was in March 2023. She describes worsening foot/toe pain and recurrent infections--eager to have toe problem rectified.  She describes a continued good baseline functional status and has not been experiencing angina, palpitations, syncope, or dyspnea.

## 2024-01-16 NOTE — DISCUSSION/SUMMARY
[With Me] : with me [___ Year(s)] : in [unfilled] year(s) [FreeTextEntry1] :  Preoperative cardiac examination: Stable from cardiac standpoint for upcoming foot surgery; there is no history of ischemic heart disease, congestive heart failure, or significant valvular disease.  Hypertension: There is a history of hypertension; blood pressure has been controlled without pharmacotherapy and she is normotensive today; continued observation (off Rx) planned.  Hyperlipidemia: Controlled; continue rosuvastatin.  Carotid artery plaque: Mild carotid plaque with a history of TIA; continue aspirin and rosuvastatin; Return for routine carotid Doppler ultrasound once she has recovered from upcoming foot surgery.  Abnormal ECG: Today's ECG has a similar appearance to prior tracings with suggestions of biatrial enlargement; I recommend echocardiography to reevaluate for structural abnormalities--I asked her to return for imaging after she has recovered from foot surgery.

## 2024-01-16 NOTE — REVIEW OF SYSTEMS
[Joint Pain] : joint pain [Joint Stiffness] : joint stiffness [SOB] : no shortness of breath [Chest Discomfort] : no chest discomfort [Lower Ext Edema] : no extremity edema [Easy Bleeding] : no tendency for easy bleeding

## 2024-01-16 NOTE — CARDIOLOGY SUMMARY
[de-identified] : 1/16/2024. Sinus Rhythm. Combined atrial enlargement. Voltage criteria for LVH.  [de-identified] : 12/8/2017. Normal myocardial perfusion SPECT.  [de-identified] : 9/7/2020. Technically difficult / limited study. Grossly normal left ventricular size and function with estimated ejection fraction 65%. Mild mitral and tricuspid regurgitation.  [de-identified] : 12/11/2013. Minimal nonobstructive CAD.

## 2024-01-17 PROBLEM — I65.29 OCCLUSION AND STENOSIS OF UNSPECIFIED CAROTID ARTERY: Chronic | Status: ACTIVE | Noted: 2024-01-10

## 2024-01-17 PROBLEM — J45.909 UNSPECIFIED ASTHMA, UNCOMPLICATED: Chronic | Status: ACTIVE | Noted: 2024-01-10

## 2024-01-17 PROBLEM — K58.9 IRRITABLE BOWEL SYNDROME, UNSPECIFIED: Chronic | Status: ACTIVE | Noted: 2024-01-10

## 2024-01-17 PROBLEM — L40.50 ARTHROPATHIC PSORIASIS, UNSPECIFIED: Chronic | Status: ACTIVE | Noted: 2024-01-10

## 2024-01-17 PROBLEM — N18.30 CHRONIC KIDNEY DISEASE, STAGE 3 UNSPECIFIED: Chronic | Status: ACTIVE | Noted: 2024-01-10

## 2024-01-17 PROBLEM — K58.9 IRRITABLE BOWEL SYNDROME WITHOUT DIARRHEA: Chronic | Status: ACTIVE | Noted: 2024-01-10

## 2024-01-23 ENCOUNTER — APPOINTMENT (OUTPATIENT)
Dept: RADIOLOGY | Facility: CLINIC | Age: 79
End: 2024-01-23
Payer: MEDICARE

## 2024-01-23 ENCOUNTER — TRANSCRIPTION ENCOUNTER (OUTPATIENT)
Age: 79
End: 2024-01-23

## 2024-01-23 PROCEDURE — 77075 RADEX OSSEOUS SURVEY COMPL: CPT

## 2024-01-23 NOTE — ASU PATIENT PROFILE, ADULT - FALL HARM RISK - RISK INTERVENTIONS

## 2024-01-24 ENCOUNTER — APPOINTMENT (OUTPATIENT)
Dept: WOUND CARE | Facility: HOSPITAL | Age: 79
End: 2024-01-24

## 2024-01-24 ENCOUNTER — OUTPATIENT (OUTPATIENT)
Dept: OUTPATIENT SERVICES | Facility: HOSPITAL | Age: 79
LOS: 1 days | End: 2024-01-24
Payer: MEDICARE

## 2024-01-24 ENCOUNTER — TRANSCRIPTION ENCOUNTER (OUTPATIENT)
Age: 79
End: 2024-01-24

## 2024-01-24 VITALS
DIASTOLIC BLOOD PRESSURE: 66 MMHG | RESPIRATION RATE: 15 BRPM | HEART RATE: 88 BPM | TEMPERATURE: 98 F | OXYGEN SATURATION: 95 % | SYSTOLIC BLOOD PRESSURE: 160 MMHG

## 2024-01-24 VITALS
SYSTOLIC BLOOD PRESSURE: 160 MMHG | HEART RATE: 99 BPM | RESPIRATION RATE: 16 BRPM | OXYGEN SATURATION: 97 % | DIASTOLIC BLOOD PRESSURE: 88 MMHG | WEIGHT: 104.94 LBS | HEIGHT: 62 IN | TEMPERATURE: 99 F

## 2024-01-24 DIAGNOSIS — M21.611 BUNION OF RIGHT FOOT: ICD-10-CM

## 2024-01-24 DIAGNOSIS — M20.41 OTHER HAMMER TOE(S) (ACQUIRED), RIGHT FOOT: ICD-10-CM

## 2024-01-24 DIAGNOSIS — Z98.89 OTHER SPECIFIED POSTPROCEDURAL STATES: Chronic | ICD-10-CM

## 2024-01-24 DIAGNOSIS — Z98.890 OTHER SPECIFIED POSTPROCEDURAL STATES: Chronic | ICD-10-CM

## 2024-01-24 DIAGNOSIS — Z90.49 ACQUIRED ABSENCE OF OTHER SPECIFIED PARTS OF DIGESTIVE TRACT: Chronic | ICD-10-CM

## 2024-01-24 PROCEDURE — 73630 X-RAY EXAM OF FOOT: CPT

## 2024-01-24 PROCEDURE — 76000 FLUOROSCOPY <1 HR PHYS/QHP: CPT

## 2024-01-24 PROCEDURE — 88300 SURGICAL PATH GROSS: CPT | Mod: 26

## 2024-01-24 PROCEDURE — 28285 REPAIR OF HAMMERTOE: CPT | Mod: T6

## 2024-01-24 PROCEDURE — 28310 REVISION OF BIG TOE: CPT | Mod: 59,RT

## 2024-01-24 PROCEDURE — 97162 PT EVAL MOD COMPLEX 30 MIN: CPT

## 2024-01-24 PROCEDURE — 73630 X-RAY EXAM OF FOOT: CPT | Mod: 26,RT

## 2024-01-24 PROCEDURE — C1713: CPT

## 2024-01-24 PROCEDURE — 28285 REPAIR OF HAMMERTOE: CPT | Mod: RT

## 2024-01-24 PROCEDURE — 88300 SURGICAL PATH GROSS: CPT

## 2024-01-24 PROCEDURE — 28298 COR HLX VLGS PRX PHLX OSTEOT: CPT | Mod: RT

## 2024-01-24 PROCEDURE — C1889: CPT

## 2024-01-24 DEVICE — STAPLE DYNANITE NITI W/INSTR 9X10: Type: IMPLANTABLE DEVICE | Status: FUNCTIONAL

## 2024-01-24 DEVICE — K-WIRE S&N DOUBLE TROCAR 0.062" X 4": Type: IMPLANTABLE DEVICE | Status: FUNCTIONAL

## 2024-01-24 DEVICE — IMPLANTABLE DEVICE: Type: IMPLANTABLE DEVICE | Status: FUNCTIONAL

## 2024-01-24 RX ORDER — SODIUM CHLORIDE 9 MG/ML
1000 INJECTION, SOLUTION INTRAVENOUS
Refills: 0 | Status: DISCONTINUED | OUTPATIENT
Start: 2024-01-24 | End: 2024-01-24

## 2024-01-24 RX ORDER — ROPINIROLE 8 MG/1
1 TABLET, FILM COATED, EXTENDED RELEASE ORAL
Qty: 0 | Refills: 0 | DISCHARGE

## 2024-01-24 RX ORDER — AZELASTINE 137 UG/1
2 SPRAY, METERED NASAL
Refills: 0 | DISCHARGE

## 2024-01-24 RX ORDER — ASPIRIN/CALCIUM CARB/MAGNESIUM 324 MG
1 TABLET ORAL
Refills: 0 | DISCHARGE

## 2024-01-24 RX ORDER — HYDRALAZINE HCL 50 MG
10 TABLET ORAL ONCE
Refills: 0 | Status: COMPLETED | OUTPATIENT
Start: 2024-01-24 | End: 2024-01-24

## 2024-01-24 RX ORDER — CLINDAMYCIN HYDROCHLORIDE 300 MG/1
300 CAPSULE ORAL
Qty: 28 | Refills: 0 | Status: COMPLETED | COMMUNITY
Start: 2024-01-24 | End: 2024-01-31

## 2024-01-24 RX ORDER — TRAMADOL HYDROCHLORIDE 50 MG/1
50 TABLET, COATED ORAL EVERY 6 HOURS
Qty: 28 | Refills: 0 | Status: ACTIVE | COMMUNITY
Start: 2024-01-24 | End: 1900-01-01

## 2024-01-24 RX ORDER — HYDROXYCHLOROQUINE SULFATE 200 MG
1 TABLET ORAL
Refills: 0 | DISCHARGE

## 2024-01-24 RX ORDER — BUDESONIDE AND FORMOTEROL FUMARATE DIHYDRATE 160; 4.5 UG/1; UG/1
2 AEROSOL RESPIRATORY (INHALATION)
Qty: 0 | Refills: 0 | DISCHARGE

## 2024-01-24 RX ORDER — HYDROMORPHONE HYDROCHLORIDE 2 MG/ML
0.5 INJECTION INTRAMUSCULAR; INTRAVENOUS; SUBCUTANEOUS
Refills: 0 | Status: DISCONTINUED | OUTPATIENT
Start: 2024-01-24 | End: 2024-01-24

## 2024-01-24 RX ORDER — BUPIVACAINE 13.3 MG/ML
20 INJECTION, SUSPENSION, LIPOSOMAL INFILTRATION ONCE
Refills: 0 | Status: DISCONTINUED | OUTPATIENT
Start: 2024-01-24 | End: 2024-01-24

## 2024-01-24 RX ADMIN — HYDROMORPHONE HYDROCHLORIDE 0.5 MILLIGRAM(S): 2 INJECTION INTRAMUSCULAR; INTRAVENOUS; SUBCUTANEOUS at 15:45

## 2024-01-24 RX ADMIN — SODIUM CHLORIDE 75 MILLILITER(S): 9 INJECTION, SOLUTION INTRAVENOUS at 14:44

## 2024-01-24 RX ADMIN — SODIUM CHLORIDE 60 MILLILITER(S): 9 INJECTION, SOLUTION INTRAVENOUS at 09:55

## 2024-01-24 RX ADMIN — Medication 10 MILLIGRAM(S): at 15:03

## 2024-01-24 RX ADMIN — HYDROMORPHONE HYDROCHLORIDE 0.5 MILLIGRAM(S): 2 INJECTION INTRAMUSCULAR; INTRAVENOUS; SUBCUTANEOUS at 15:36

## 2024-01-24 NOTE — PHYSICAL THERAPY INITIAL EVALUATION ADULT - RANGE OF MOTION EXAMINATION, REHAB EVAL
Ankle not tested: bandaged/bilateral upper extremity ROM was WNL (within normal limits)/bilateral lower extremity was ROM was WNL (within normal limits)

## 2024-01-24 NOTE — BRIEF OPERATIVE NOTE - NSICDXBRIEFPOSTOP_GEN_ALL_CORE_FT
POST-OP DIAGNOSIS:  Bunion, right foot 24-Jan-2024 14:42:40  Dony Vides  Hammertoe of right foot 24-Jan-2024 14:42:49  Dony Vides

## 2024-01-24 NOTE — ASU DISCHARGE PLAN (ADULT/PEDIATRIC) - NS MD DC FALL RISK RISK
For information on Fall & Injury Prevention, visit: [email or web address] OR  [email or web address] OR  https://www.cdc.gov/steadi/patient.html

## 2024-01-24 NOTE — BRIEF OPERATIVE NOTE - NSICDXBRIEFPROCEDURE_GEN_ALL_CORE_FT
PROCEDURES:  Akin bunionectomy of right foot with condylectomy 24-Jan-2024 14:41:25  Dony Vides  Hammertoe repair 24-Jan-2024 14:42:02  Dony Vides

## 2024-01-24 NOTE — PHYSICAL THERAPY INITIAL EVALUATION ADULT - ADDITIONAL COMMENTS
Patient lives in private home, 0 [address] and resides on main floor. Patient was independent in all ADLs and ambulates independently without device.

## 2024-01-24 NOTE — ASU DISCHARGE PLAN (ADULT/PEDIATRIC) - CARE PROVIDER_API CALL
Ricardo Dubois-Nilson  Foot Surgery  [address] [place] [address]  Phone: [phone]  Fax: [phone]  Follow Up Time: 1 week

## 2024-01-24 NOTE — BRIEF OPERATIVE NOTE - NSICDXBRIEFPREOP_GEN_ALL_CORE_FT
PRE-OP DIAGNOSIS:  Bunion, right foot 24-Jan-2024 14:42:17  Dony Vides  Hammertoe of right foot 24-Jan-2024 14:42:29  Dony Vides

## 2024-01-25 RX ORDER — OXYCODONE AND ACETAMINOPHEN 5; 325 MG/1; MG/1
5-325 TABLET ORAL
Qty: 28 | Refills: 0 | Status: COMPLETED | COMMUNITY
Start: 2024-01-25 | End: 2024-02-01

## 2024-01-29 ENCOUNTER — NON-APPOINTMENT (OUTPATIENT)
Age: 79
End: 2024-01-29

## 2024-01-31 ENCOUNTER — APPOINTMENT (OUTPATIENT)
Dept: WOUND CARE | Facility: HOSPITAL | Age: 79
End: 2024-01-31
Payer: MEDICARE

## 2024-01-31 ENCOUNTER — OUTPATIENT (OUTPATIENT)
Dept: OUTPATIENT SERVICES | Facility: HOSPITAL | Age: 79
LOS: 1 days | Discharge: HOME | End: 2024-01-31
Payer: MEDICARE

## 2024-01-31 VITALS
SYSTOLIC BLOOD PRESSURE: 135 MMHG | RESPIRATION RATE: 18 BRPM | WEIGHT: 109 LBS | BODY MASS INDEX: 20.06 KG/M2 | HEART RATE: 86 BPM | HEIGHT: 62 IN | OXYGEN SATURATION: 95 % | DIASTOLIC BLOOD PRESSURE: 66 MMHG | TEMPERATURE: 98.2 F

## 2024-01-31 DIAGNOSIS — Z09 ENCOUNTER FOR FOLLOW-UP EXAMINATION AFTER COMPLETED TREATMENT FOR CONDITIONS OTHER THAN MALIGNANT NEOPLASM: ICD-10-CM

## 2024-01-31 DIAGNOSIS — Z98.89 OTHER SPECIFIED POSTPROCEDURAL STATES: Chronic | ICD-10-CM

## 2024-01-31 DIAGNOSIS — Z98.890 OTHER SPECIFIED POSTPROCEDURAL STATES: Chronic | ICD-10-CM

## 2024-01-31 DIAGNOSIS — Z90.49 ACQUIRED ABSENCE OF OTHER SPECIFIED PARTS OF DIGESTIVE TRACT: Chronic | ICD-10-CM

## 2024-01-31 PROCEDURE — 99024 POSTOP FOLLOW-UP VISIT: CPT

## 2024-01-31 PROCEDURE — G0463: CPT

## 2024-01-31 RX ORDER — HYDROCODONE BITARTRATE AND ACETAMINOPHEN 5; 325 MG/1; MG/1
5-325 TABLET ORAL EVERY 4 HOURS
Qty: 42 | Refills: 0 | Status: ACTIVE | COMMUNITY
Start: 2024-01-31 | End: 1900-01-01

## 2024-02-02 DIAGNOSIS — I65.29 OCCLUSION AND STENOSIS OF UNSPECIFIED CAROTID ARTERY: ICD-10-CM

## 2024-02-02 DIAGNOSIS — Z82.3 FAMILY HISTORY OF STROKE: ICD-10-CM

## 2024-02-02 DIAGNOSIS — Z88.1 ALLERGY STATUS TO OTHER ANTIBIOTIC AGENTS STATUS: ICD-10-CM

## 2024-02-02 DIAGNOSIS — Z88.0 ALLERGY STATUS TO PENICILLIN: ICD-10-CM

## 2024-02-02 DIAGNOSIS — T81.89XD OTHER COMPLICATIONS OF PROCEDURES, NOT ELSEWHERE CLASSIFIED, SUBSEQUENT ENCOUNTER: ICD-10-CM
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
## 2024-02-02 DIAGNOSIS — Z91.041 RADIOGRAPHIC DYE ALLERGY STATUS: ICD-10-CM

## 2024-02-02 DIAGNOSIS — Z98.49 CATARACT EXTRACTION STATUS, UNSPECIFIED EYE: ICD-10-CM

## 2024-02-02 DIAGNOSIS — Z82.49 FAMILY HISTORY OF ISCHEMIC HEART DISEASE AND OTHER DISEASES OF THE CIRCULATORY SYSTEM: ICD-10-CM
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
## 2024-02-02 DIAGNOSIS — I12.9 HYPERTENSIVE CHRONIC KIDNEY DISEASE WITH STAGE 1 THROUGH STAGE 4 CHRONIC KIDNEY DISEASE, OR UNSPECIFIED CHRONIC KIDNEY DISEASE: ICD-10-CM

## 2024-02-02 DIAGNOSIS — Z79.899 OTHER LONG TERM (CURRENT) DRUG THERAPY: ICD-10-CM

## 2024-02-02 DIAGNOSIS — G57.61 LESION OF PLANTAR NERVE, RIGHT LOWER LIMB: ICD-10-CM

## 2024-02-02 DIAGNOSIS — M06.9 RHEUMATOID ARTHRITIS, UNSPECIFIED: ICD-10-CM

## 2024-02-02 DIAGNOSIS — N18.30 CHRONIC KIDNEY DISEASE, STAGE 3 UNSPECIFIED: ICD-10-CM

## 2024-02-02 DIAGNOSIS — Z90.2 ACQUIRED ABSENCE OF LUNG [PART OF]: ICD-10-CM

## 2024-02-02 DIAGNOSIS — Z86.73 PERSONAL HISTORY OF TRANSIENT ISCHEMIC ATTACK (TIA), AND CEREBRAL INFARCTION WITHOUT RESIDUAL DEFICITS: ICD-10-CM

## 2024-02-02 DIAGNOSIS — K21.9 GASTRO-ESOPHAGEAL REFLUX DISEASE WITHOUT ESOPHAGITIS: ICD-10-CM

## 2024-02-02 DIAGNOSIS — Z87.891 PERSONAL HISTORY OF NICOTINE DEPENDENCE: ICD-10-CM

## 2024-02-02 DIAGNOSIS — Z79.82 LONG TERM (CURRENT) USE OF ASPIRIN: ICD-10-CM

## 2024-02-02 DIAGNOSIS — Z90.49 ACQUIRED ABSENCE OF OTHER SPECIFIED PARTS OF DIGESTIVE TRACT: ICD-10-CM

## 2024-02-02 DIAGNOSIS — Z80.1 FAMILY HISTORY OF MALIGNANT NEOPLASM OF TRACHEA, BRONCHUS AND LUNG: ICD-10-CM

## 2024-02-02 DIAGNOSIS — Z83.3 FAMILY HISTORY OF DIABETES MELLITUS: ICD-10-CM

## 2024-02-02 DIAGNOSIS — Z85.118 PERSONAL HISTORY OF OTHER MALIGNANT NEOPLASM OF BRONCHUS AND LUNG: ICD-10-CM

## 2024-02-02 DIAGNOSIS — J44.9 CHRONIC OBSTRUCTIVE PULMONARY DISEASE, UNSPECIFIED: ICD-10-CM

## 2024-02-02 DIAGNOSIS — Z88.8 ALLERGY STATUS TO OTHER DRUGS, MEDICAMENTS AND BIOLOGICAL SUBSTANCES: ICD-10-CM

## 2024-02-02 DIAGNOSIS — E78.5 HYPERLIPIDEMIA, UNSPECIFIED: ICD-10-CM

## 2024-02-02 DIAGNOSIS — Z87.01 PERSONAL HISTORY OF PNEUMONIA (RECURRENT): ICD-10-CM

## 2024-02-02 NOTE — PHYSICAL EXAM
[1+] : left 1+ [Ankle Swelling (On Exam)] : not present [Ankle Swelling Bilaterally] : bilaterally  [Varicose Veins Of Lower Extremities] : bilaterally [Ankle Swelling On The Right] : mild [] : bilaterally [Purpura] : no purpura  [Petechiae] : no petechiae [Skin Ulcer] : no ulcer [Skin Induration] : no induration [Alert] : alert [Oriented to Person] : oriented to person [Oriented to Place] : oriented to place [Oriented to Time] : oriented to time [Calm] : calm [de-identified] : A&Ox3, NAD [de-identified] : HTN, HLD [de-identified] : Status post right foot [place] bunionectomy right second digit arthroplasty with K wire fixation [de-identified] : Right foot incision with intact sutures, no dehiscence, no purulence, no fluctuance, no proximal streaking [de-identified] : wnl [FreeTextEntry1] : Right foot bunion correction with 2nd hammertoe repair 1/24/24 [de-identified] : Sanguineous drainage [de-identified] : Adaptic Touch [de-identified] : Mechanically cleansed with sterile gauze and 0.9% normal saline. Dry Dressing Kerlix [TWNoteComboBox4] : Moderate [TWNoteComboBox5] : No [TWNoteComboBox6] : Surgical [de-identified] : No [de-identified] : None [de-identified] : None [de-identified] : 100% [de-identified] : Primary Dressing

## 2024-02-02 NOTE — HISTORY OF PRESENT ILLNESS
[FreeTextEntry1] : Patient seen status post right foot [place] bunionectomy with right second digit arthroplasty (DOS: 1/24/2024).  Patient relates that her pain medications began to give her itchiness so she related that she has not been taking her pain medication.

## 2024-02-02 NOTE — REVIEW OF SYSTEMS
[Fever] : no fever [Chills] : no chills [Eye Pain] : no eye pain [Loss Of Hearing] : no hearing loss [Shortness Of Breath] : no shortness of breath [Abdominal Pain] : no abdominal pain [Joint Stiffness] : joint stiffness [Skin Lesions] : skin lesion [Anxiety] : no anxiety [Easy Bleeding] : no tendency for easy bleeding [Negative] : Endocrine [FreeTextEntry5] : HTN, HLD [FreeTextEntry9] : Bunion and hammer toe deformity's of both feet , right worse then the left  [de-identified] : corn 2nd toe right foot , painful

## 2024-02-02 NOTE — PLAN
[FreeTextEntry1] : Patient examined and evaluated this time.  Patient and patient's  advised regarding postoperative healing process.  Patient to remain minimally weightbearing to the right heel.  All questions answered to satisfaction patient verbalized understanding.  Prescription for hydrocodone sent to the pharmacy at this time.  Spent 30 minutes in patient care and medical decision making.

## 2024-02-02 NOTE — ASSESSMENT
[Verbal] : Verbal [Written] : Written [Demo] : Demo [Patient] : Patient [Spouse] : Spouse [Good - alert, interested, motivated] : Good - alert, interested, motivated [Demonstrates independently] : demonstrates independently [Foot Care] : foot care [Signs and symptoms of infection] : sign and symptoms of infection [Surgery] : surgery [How and When to Call] : how and when to call [Patient responsibility to plan of care] : patient responsibility to plan of care [Stable] : stable [Home] : Home [Ambulatory] : Ambulatory [] : No [FreeTextEntry2] : Infection Prevention Foot and nail care Nutrition and wound healing Pt Demonstrates use of both nonpharmacological and pharmacological pain relief strategies. [FreeTextEntry4] : DPM assessed wound and removed dressing around digit #2; left pin in. Educated patient on why pain so severe (i.e, swelling); and advised over time the swelling will come down. DPM will e-prescribe a new pain medication since the Tramadol and Oxycodone hasn't been successful for the patient. DPM advised to apply adaptic touch and dry dressing. Keep dressing on until next visit. F/U 1 week

## 2024-02-07 ENCOUNTER — APPOINTMENT (OUTPATIENT)
Dept: WOUND CARE | Facility: HOSPITAL | Age: 79
End: 2024-02-07
Payer: MEDICARE

## 2024-02-07 ENCOUNTER — OUTPATIENT (OUTPATIENT)
Dept: OUTPATIENT SERVICES | Facility: HOSPITAL | Age: 79
LOS: 1 days | Discharge: HOME | End: 2024-02-07
Payer: MEDICARE

## 2024-02-07 VITALS
RESPIRATION RATE: 18 BRPM | SYSTOLIC BLOOD PRESSURE: 171 MMHG | HEIGHT: 62 IN | TEMPERATURE: 97.9 F | WEIGHT: 109 LBS | HEART RATE: 86 BPM | OXYGEN SATURATION: 95 % | BODY MASS INDEX: 20.06 KG/M2 | DIASTOLIC BLOOD PRESSURE: 80 MMHG

## 2024-02-07 DIAGNOSIS — Z98.890 OTHER SPECIFIED POSTPROCEDURAL STATES: Chronic | ICD-10-CM

## 2024-02-07 DIAGNOSIS — Z09 ENCOUNTER FOR FOLLOW-UP EXAMINATION AFTER COMPLETED TREATMENT FOR CONDITIONS OTHER THAN MALIGNANT NEOPLASM: ICD-10-CM

## 2024-02-07 DIAGNOSIS — Z90.49 ACQUIRED ABSENCE OF OTHER SPECIFIED PARTS OF DIGESTIVE TRACT: Chronic | ICD-10-CM

## 2024-02-07 DIAGNOSIS — Z98.89 OTHER SPECIFIED POSTPROCEDURAL STATES: Chronic | ICD-10-CM

## 2024-02-07 PROCEDURE — G0463: CPT

## 2024-02-07 PROCEDURE — 99024 POSTOP FOLLOW-UP VISIT: CPT

## 2024-02-13 DIAGNOSIS — Z83.3 FAMILY HISTORY OF DIABETES MELLITUS: ICD-10-CM

## 2024-02-13 DIAGNOSIS — Z82.3 FAMILY HISTORY OF STROKE: ICD-10-CM

## 2024-02-13 DIAGNOSIS — J44.9 CHRONIC OBSTRUCTIVE PULMONARY DISEASE, UNSPECIFIED: ICD-10-CM

## 2024-02-13 DIAGNOSIS — E78.5 HYPERLIPIDEMIA, UNSPECIFIED: ICD-10-CM

## 2024-02-13 DIAGNOSIS — T81.89XD OTHER COMPLICATIONS OF PROCEDURES, NOT ELSEWHERE CLASSIFIED, SUBSEQUENT ENCOUNTER: ICD-10-CM

## 2024-02-13 DIAGNOSIS — G57.61 LESION OF PLANTAR NERVE, RIGHT LOWER LIMB: ICD-10-CM

## 2024-02-13 DIAGNOSIS — Z90.49 ACQUIRED ABSENCE OF OTHER SPECIFIED PARTS OF DIGESTIVE TRACT: ICD-10-CM

## 2024-02-13 DIAGNOSIS — Z91.041 RADIOGRAPHIC DYE ALLERGY STATUS: ICD-10-CM
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
## 2024-02-13 DIAGNOSIS — Z85.118 PERSONAL HISTORY OF OTHER MALIGNANT NEOPLASM OF BRONCHUS AND LUNG: ICD-10-CM

## 2024-02-13 DIAGNOSIS — I12.9 HYPERTENSIVE CHRONIC KIDNEY DISEASE WITH STAGE 1 THROUGH STAGE 4 CHRONIC KIDNEY DISEASE, OR UNSPECIFIED CHRONIC KIDNEY DISEASE: ICD-10-CM

## 2024-02-13 DIAGNOSIS — M06.9 RHEUMATOID ARTHRITIS, UNSPECIFIED: ICD-10-CM

## 2024-02-13 DIAGNOSIS — Z79.82 LONG TERM (CURRENT) USE OF ASPIRIN: ICD-10-CM

## 2024-02-13 DIAGNOSIS — Z86.73 PERSONAL HISTORY OF TRANSIENT ISCHEMIC ATTACK (TIA), AND CEREBRAL INFARCTION WITHOUT RESIDUAL DEFICITS: ICD-10-CM
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
## 2024-02-13 DIAGNOSIS — I65.29 OCCLUSION AND STENOSIS OF UNSPECIFIED CAROTID ARTERY: ICD-10-CM

## 2024-02-13 DIAGNOSIS — Z80.1 FAMILY HISTORY OF MALIGNANT NEOPLASM OF TRACHEA, BRONCHUS AND LUNG: ICD-10-CM

## 2024-02-13 DIAGNOSIS — Z88.0 ALLERGY STATUS TO PENICILLIN: ICD-10-CM

## 2024-02-13 DIAGNOSIS — Z88.1 ALLERGY STATUS TO OTHER ANTIBIOTIC AGENTS STATUS: ICD-10-CM

## 2024-02-13 DIAGNOSIS — Z90.2 ACQUIRED ABSENCE OF LUNG [PART OF]: ICD-10-CM

## 2024-02-13 DIAGNOSIS — Z79.899 OTHER LONG TERM (CURRENT) DRUG THERAPY: ICD-10-CM

## 2024-02-13 DIAGNOSIS — Z82.49 FAMILY HISTORY OF ISCHEMIC HEART DISEASE AND OTHER DISEASES OF THE CIRCULATORY SYSTEM: ICD-10-CM

## 2024-02-13 DIAGNOSIS — Z88.8 ALLERGY STATUS TO OTHER DRUGS, MEDICAMENTS AND BIOLOGICAL SUBSTANCES: ICD-10-CM

## 2024-02-13 DIAGNOSIS — Z98.49 CATARACT EXTRACTION STATUS, UNSPECIFIED EYE: ICD-10-CM

## 2024-02-13 DIAGNOSIS — N18.30 CHRONIC KIDNEY DISEASE, STAGE 3 UNSPECIFIED: ICD-10-CM

## 2024-02-13 DIAGNOSIS — Z87.01 PERSONAL HISTORY OF PNEUMONIA (RECURRENT): ICD-10-CM

## 2024-02-13 DIAGNOSIS — Z87.891 PERSONAL HISTORY OF NICOTINE DEPENDENCE: ICD-10-CM

## 2024-02-13 DIAGNOSIS — K21.9 GASTRO-ESOPHAGEAL REFLUX DISEASE WITHOUT ESOPHAGITIS: ICD-10-CM

## 2024-02-13 NOTE — VITALS
[] : No [de-identified] : Patient reports pain 7/10 [FreeTextEntry3] : Right Foot  [FreeTextEntry2] : Ambulating  [FreeTextEntry1] : Offloading and analgesics  [FreeTextEntry4] : Patient put in seated position with legs elevated

## 2024-02-13 NOTE — PLAN
[FreeTextEntry1] : Patient examined and evaluated this time.  Patient and patient's  advised regarding postoperative healing process.  Patient to remain minimally weightbearing to the right heel.  All questions answered to satisfaction patient verbalized understanding.  Spent 20 minutes in patient care and medical decision making.

## 2024-02-13 NOTE — HISTORY OF PRESENT ILLNESS
[FreeTextEntry1] : Patient seen status post right foot [place] bunionectomy with right second digit arthroplasty (DOS: 1/24/2024).  Patient relates that her pain is controlled at this time.

## 2024-02-13 NOTE — PHYSICAL EXAM
[4 x 4] : 4 x 4  [1+] : left 1+ [Ankle Swelling (On Exam)] : not present [Ankle Swelling Bilaterally] : bilaterally  [Varicose Veins Of Lower Extremities] : present [Ankle Swelling On The Right] : mild [] : bilaterally [Purpura] : no purpura  [Petechiae] : no petechiae [Skin Ulcer] : no ulcer [Skin Induration] : no induration [Alert] : alert [Oriented to Person] : oriented to person [Oriented to Place] : oriented to place [Oriented to Time] : oriented to time [Calm] : calm [de-identified] : A&Ox3, NAD [de-identified] : HTN, HLD [de-identified] : Status post right foot [place] bunionectomy right second digit arthroplasty with K wire fixation [de-identified] : Right foot incision with intact sutures, no dehiscence, no purulence, no fluctuance, no proximal streaking [de-identified] : wnl [FreeTextEntry1] : Right foot bunion correction 1/24/24 - Suture CDI [de-identified] : Sanguineous  [de-identified] : Bruising  [de-identified] : Adaptic Touch [de-identified] : Cleansed with 0.9% Normal Saline  Kerlix  [de-identified] : Pin measures 0.9 cm from insertion site to curvature of pin.   [FreeTextEntry7] : Foot 2nd digit - Hammer toe correction - suture CDI - Pin in place  [de-identified] : sanguineous  [de-identified] : bruising  [de-identified] : Adaptic touch  [de-identified] : Cleansed with 0.9% Normal Saline  Kerlix  [TWNoteComboBox1] : Right [TWNoteComboBox5] : No [TWNoteComboBox4] : Small [TWNoteComboBox6] : Surgical [de-identified] : No [de-identified] : None [de-identified] : None [de-identified] : False [de-identified] : Weekly [de-identified] : Primary Dressing [TWNoteComboBox9] : Right [de-identified] : Small [de-identified] : No [de-identified] : Surgical [de-identified] : No [de-identified] : None [de-identified] : None [de-identified] : Weekly

## 2024-02-13 NOTE — REVIEW OF SYSTEMS
[Fever] : no fever [Chills] : no chills [Eye Pain] : no eye pain [Loss Of Hearing] : no hearing loss [Shortness Of Breath] : no shortness of breath [Abdominal Pain] : no abdominal pain [Joint Stiffness] : joint stiffness [Skin Lesions] : skin lesion [Anxiety] : no anxiety [Easy Bleeding] : no tendency for easy bleeding [Negative] : Endocrine [FreeTextEntry5] : HTN, HLD [de-identified] : corn 2nd toe right foot , painful  [FreeTextEntry9] : Bunion and hammer toe deformity's of both feet , right worse then the left

## 2024-02-13 NOTE — ASSESSMENT
[Verbal] : Verbal [Demo] : Demo [Patient] : Patient [Spouse] : Spouse [Good - alert, interested, motivated] : Good - alert, interested, motivated [Verbalizes knowledge/Understanding] : Verbalizes knowledge/understanding [Dressing changes] : dressing changes [Foot Care] : foot care [Skin Care] : skin care [Pressure relief] : pressure relief [Signs and symptoms of infection] : sign and symptoms of infection [Nutrition] : nutrition [How and When to Call] : how and when to call [Pain Management] : pain management [Off-loading] : off-loading [Patient responsibility to plan of care] : patient responsibility to plan of care [Other: ____] : [unfilled] [] : Yes [Stable] : stable [Home] : Home [Wheelchair] : Wheelchair [Not Applicable - Long Term Care/Home Health Agency] : Long Term Care/Home Health Agency: Not Applicable [FreeTextEntry2] : Infection prevention Localized wound care Promote optimal skin integrity  Achieve acceptable level of pain with use of pharmacological and nonpharmacological interventions Offloading / Pressure relief   [FreeTextEntry4] : Follow up for an assessment in one week Patient was provided with enough material to perform a bandage change at home if the bandage needs to be changed prior to next assessment.  Wound care to be performed at the [facility].  Order for x-ray of the right foot to be performed prior to next visit.

## 2024-02-16 ENCOUNTER — APPOINTMENT (OUTPATIENT)
Dept: WOUND CARE | Facility: HOSPITAL | Age: 79
End: 2024-02-16
Payer: MEDICARE

## 2024-02-16 ENCOUNTER — OUTPATIENT (OUTPATIENT)
Dept: OUTPATIENT SERVICES | Facility: HOSPITAL | Age: 79
LOS: 1 days | Discharge: HOME | End: 2024-02-16
Payer: MEDICARE

## 2024-02-16 DIAGNOSIS — Z98.890 OTHER SPECIFIED POSTPROCEDURAL STATES: Chronic | ICD-10-CM

## 2024-02-16 DIAGNOSIS — Z09 ENCOUNTER FOR FOLLOW-UP EXAMINATION AFTER COMPLETED TREATMENT FOR CONDITIONS OTHER THAN MALIGNANT NEOPLASM: ICD-10-CM

## 2024-02-16 DIAGNOSIS — Z90.49 ACQUIRED ABSENCE OF OTHER SPECIFIED PARTS OF DIGESTIVE TRACT: Chronic | ICD-10-CM

## 2024-02-16 DIAGNOSIS — Z98.89 OTHER SPECIFIED POSTPROCEDURAL STATES: Chronic | ICD-10-CM

## 2024-02-16 PROCEDURE — G0463: CPT

## 2024-02-16 PROCEDURE — 73630 X-RAY EXAM OF FOOT: CPT

## 2024-02-16 PROCEDURE — 73630 X-RAY EXAM OF FOOT: CPT | Mod: 26,RT

## 2024-02-16 PROCEDURE — 99024 POSTOP FOLLOW-UP VISIT: CPT

## 2024-02-20 NOTE — REVIEW OF SYSTEMS
[Fever] : no fever [Chills] : no chills [Eye Pain] : no eye pain [Loss Of Hearing] : no hearing loss [Shortness Of Breath] : no shortness of breath [Abdominal Pain] : no abdominal pain [Joint Stiffness] : joint stiffness [Skin Lesions] : skin lesion [Confused] : confusion [Convulsions] : convulsions [Anxiety] : no anxiety [Easy Bleeding] : no tendency for easy bleeding [Negative] : Endocrine [FreeTextEntry5] : HTN, HLD [FreeTextEntry9] : Bunion and hammer toe deformity's of both feet , right worse then the left  [de-identified] : 2nd toe pin in place s/p arthroplasty and hallux s/p [place] with staple , stable , no soi

## 2024-02-20 NOTE — HISTORY OF PRESENT ILLNESS
[FreeTextEntry1] : S/p correction of toes 1,2, right foot , sutures intact , pin in 2nd toe stable and no soi

## 2024-02-20 NOTE — PHYSICAL EXAM
[4 x 4] : 4 x 4  [1+] : left 1+ [Ankle Swelling (On Exam)] : not present [Ankle Swelling Bilaterally] : bilaterally  [Varicose Veins Of Lower Extremities] : bilaterally [Ankle Swelling On The Right] : mild [] : bilaterally [Purpura] : no purpura  [Petechiae] : no petechiae [Skin Ulcer] : no ulcer [Skin Induration] : no induration [Alert] : alert [Oriented to Person] : oriented to person [Oriented to Place] : oriented to place [Oriented to Time] : oriented to time [Calm] : calm [de-identified] : A&Ox3, NAD [de-identified] : HTN, HLD [de-identified] : Status post right foot [place] bunionectomy right second digit arthroplasty with K wire fixation [de-identified] : Right foot incision with intact sutures, no dehiscence, no purulence, no fluctuance, no proximal streaking [de-identified] : wnl [FreeTextEntry1] : Right foot bunion correction 1/24/24 [de-identified] : Sanguineous  [de-identified] : Bruising  [de-identified] : Adaptic Touch & Silver Alginate [de-identified] : Mechanically cleansed with sterile gauze and 0.9% normal saline. Dry Dressing Kerlix [de-identified] : Pin measures 0.9 cm from insertion site to curvature of pin.   [FreeTextEntry7] : Foot 2nd digit - Hammer toe correction - Pin in place  [de-identified] : sanguineous  [de-identified] : bruising  [de-identified] : Adaptic Touch & Silver Alginate [de-identified] : Mechanically cleansed with sterile gauze and 0.9% normal saline. Dry Dressing Kerlix [TWNoteComboBox1] : Right [TWNoteComboBox4] : Moderate [TWNoteComboBox5] : No [TWNoteComboBox6] : Surgical [de-identified] : No [de-identified] : None [de-identified] : None [de-identified] : Weekly [de-identified] : Primary Dressing [TWNoteComboBox9] : Right [de-identified] : Small [de-identified] : No [de-identified] : Surgical [de-identified] : No [de-identified] : None [de-identified] : None [de-identified] : Weekly

## 2024-02-20 NOTE — VITALS
[] : No [de-identified] : 2/10 [FreeTextEntry3] : Right Foot  [FreeTextEntry1] : Offloading and analgesics  [FreeTextEntry2] : Ambulating  [FreeTextEntry4] : Patient put in seated position with legs elevated

## 2024-02-20 NOTE — ASSESSMENT
[Verbal] : Verbal [Demo] : Demo [Patient] : Patient [Spouse] : Spouse [Good - alert, interested, motivated] : Good - alert, interested, motivated [Verbalizes knowledge/Understanding] : Verbalizes knowledge/understanding [Dressing changes] : dressing changes [Foot Care] : foot care [Skin Care] : skin care [Pressure relief] : pressure relief [Signs and symptoms of infection] : sign and symptoms of infection [Nutrition] : nutrition [How and When to Call] : how and when to call [Pain Management] : pain management [Off-loading] : off-loading [Patient responsibility to plan of care] : patient responsibility to plan of care [Other: ____] : [unfilled] [] : Yes [Stable] : stable [Home] : Home [Wheelchair] : Wheelchair [Not Applicable - Long Term Care/Home Health Agency] : Long Term Care/Home Health Agency: Not Applicable [FreeTextEntry2] : Infection prevention Localized wound care Promote optimal skin integrity  Achieve acceptable level of pain with use of pharmacological and nonpharmacological interventions Offloading / Pressure relief   [FreeTextEntry4] : X-ray of the right foot obtained about 15 minutes prior to [facility] visit. DPM advised patient x-ray shows the foot is nice and straight. DPM removed stitches from both toes; left PIN in place. Advised next week PIN can be removed. Removed some of the eschar and dried skin. Wound care to be performed at the [facility].  F/U 1 week

## 2024-02-20 NOTE — PLAN
Star Wedge Flap Text: The defect edges were debeveled with a #15 scalpel blade.  Given the location of the defect, shape of the defect and the proximity to free margins a star wedge flap was deemed most appropriate.  Using a sterile surgical marker, an appropriate rotation flap was drawn incorporating the defect and placing the expected incisions within the relaxed skin tension lines where possible. The area thus outlined was incised deep to adipose tissue with a #15 scalpel blade.  The skin margins were undermined to an appropriate distance in all directions utilizing iris scissors. [FreeTextEntry1] : sutures removed , continue post op dressing protocol . Patient happy with the outcome PTR 1week  Spent 20 minutes for patient care and medical decision making.

## 2024-02-22 ENCOUNTER — OUTPATIENT (OUTPATIENT)
Dept: OUTPATIENT SERVICES | Facility: HOSPITAL | Age: 79
LOS: 1 days | Discharge: HOME | End: 2024-02-22
Payer: MEDICARE

## 2024-02-22 ENCOUNTER — APPOINTMENT (OUTPATIENT)
Dept: WOUND CARE | Facility: HOSPITAL | Age: 79
End: 2024-02-22
Payer: MEDICARE

## 2024-02-22 VITALS
HEART RATE: 99 BPM | DIASTOLIC BLOOD PRESSURE: 60 MMHG | WEIGHT: 109 LBS | OXYGEN SATURATION: 95 % | HEIGHT: 62 IN | RESPIRATION RATE: 20 BRPM | BODY MASS INDEX: 20.06 KG/M2 | SYSTOLIC BLOOD PRESSURE: 96 MMHG | TEMPERATURE: 98 F

## 2024-02-22 DIAGNOSIS — Z98.890 OTHER SPECIFIED POSTPROCEDURAL STATES: Chronic | ICD-10-CM

## 2024-02-22 DIAGNOSIS — N18.30 CHRONIC KIDNEY DISEASE, STAGE 3 UNSPECIFIED: ICD-10-CM

## 2024-02-22 DIAGNOSIS — Z82.49 FAMILY HISTORY OF ISCHEMIC HEART DISEASE AND OTHER DISEASES OF THE CIRCULATORY SYSTEM: ICD-10-CM

## 2024-02-22 DIAGNOSIS — G57.61 LESION OF PLANTAR NERVE, RIGHT LOWER LIMB: ICD-10-CM

## 2024-02-22 DIAGNOSIS — T81.89XD OTHER COMPLICATIONS OF PROCEDURES, NOT ELSEWHERE CLASSIFIED, SUBSEQUENT ENCOUNTER: ICD-10-CM

## 2024-02-22 DIAGNOSIS — I65.29 OCCLUSION AND STENOSIS OF UNSPECIFIED CAROTID ARTERY: ICD-10-CM

## 2024-02-22 DIAGNOSIS — K21.9 GASTRO-ESOPHAGEAL REFLUX DISEASE WITHOUT ESOPHAGITIS: ICD-10-CM

## 2024-02-22 DIAGNOSIS — Z88.8 ALLERGY STATUS TO OTHER DRUGS, MEDICAMENTS AND BIOLOGICAL SUBSTANCES: ICD-10-CM

## 2024-02-22 DIAGNOSIS — Z91.041 RADIOGRAPHIC DYE ALLERGY STATUS: ICD-10-CM

## 2024-02-22 DIAGNOSIS — Z88.1 ALLERGY STATUS TO OTHER ANTIBIOTIC AGENTS STATUS: ICD-10-CM

## 2024-02-22 DIAGNOSIS — Z83.3 FAMILY HISTORY OF DIABETES MELLITUS: ICD-10-CM

## 2024-02-22 DIAGNOSIS — Z82.3 FAMILY HISTORY OF STROKE: ICD-10-CM

## 2024-02-22 DIAGNOSIS — Z09 ENCOUNTER FOR FOLLOW-UP EXAMINATION AFTER COMPLETED TREATMENT FOR CONDITIONS OTHER THAN MALIGNANT NEOPLASM: ICD-10-CM

## 2024-02-22 DIAGNOSIS — Z88.0 ALLERGY STATUS TO PENICILLIN: ICD-10-CM

## 2024-02-22 DIAGNOSIS — Z90.2 ACQUIRED ABSENCE OF LUNG [PART OF]: ICD-10-CM

## 2024-02-22 DIAGNOSIS — Z90.49 ACQUIRED ABSENCE OF OTHER SPECIFIED PARTS OF DIGESTIVE TRACT: ICD-10-CM

## 2024-02-22 DIAGNOSIS — Z79.899 OTHER LONG TERM (CURRENT) DRUG THERAPY: ICD-10-CM
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
## 2024-02-22 DIAGNOSIS — Z79.82 LONG TERM (CURRENT) USE OF ASPIRIN: ICD-10-CM

## 2024-02-22 DIAGNOSIS — Z87.01 PERSONAL HISTORY OF PNEUMONIA (RECURRENT): ICD-10-CM

## 2024-02-22 DIAGNOSIS — Z85.118 PERSONAL HISTORY OF OTHER MALIGNANT NEOPLASM OF BRONCHUS AND LUNG: ICD-10-CM

## 2024-02-22 DIAGNOSIS — J44.9 CHRONIC OBSTRUCTIVE PULMONARY DISEASE, UNSPECIFIED: ICD-10-CM

## 2024-02-22 DIAGNOSIS — Z80.1 FAMILY HISTORY OF MALIGNANT NEOPLASM OF TRACHEA, BRONCHUS AND LUNG: ICD-10-CM

## 2024-02-22 DIAGNOSIS — E78.5 HYPERLIPIDEMIA, UNSPECIFIED: ICD-10-CM

## 2024-02-22 DIAGNOSIS — Z86.73 PERSONAL HISTORY OF TRANSIENT ISCHEMIC ATTACK (TIA), AND CEREBRAL INFARCTION WITHOUT RESIDUAL DEFICITS: ICD-10-CM

## 2024-02-22 DIAGNOSIS — I12.9 HYPERTENSIVE CHRONIC KIDNEY DISEASE WITH STAGE 1 THROUGH STAGE 4 CHRONIC KIDNEY DISEASE, OR UNSPECIFIED CHRONIC KIDNEY DISEASE: ICD-10-CM

## 2024-02-22 DIAGNOSIS — Z98.49 CATARACT EXTRACTION STATUS, UNSPECIFIED EYE: ICD-10-CM
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
## 2024-02-22 DIAGNOSIS — M06.9 RHEUMATOID ARTHRITIS, UNSPECIFIED: ICD-10-CM

## 2024-02-22 DIAGNOSIS — Z90.49 ACQUIRED ABSENCE OF OTHER SPECIFIED PARTS OF DIGESTIVE TRACT: Chronic | ICD-10-CM

## 2024-02-22 DIAGNOSIS — Z87.891 PERSONAL HISTORY OF NICOTINE DEPENDENCE: ICD-10-CM

## 2024-02-22 DIAGNOSIS — Z98.89 OTHER SPECIFIED POSTPROCEDURAL STATES: Chronic | ICD-10-CM

## 2024-02-22 PROCEDURE — 99024 POSTOP FOLLOW-UP VISIT: CPT

## 2024-02-22 PROCEDURE — G0463: CPT

## 2024-02-22 NOTE — PLAN
[FreeTextEntry1] : Patient examined and evaluated this time.  Patient and patient's  advised regarding postoperative healing process.  K-wire removed without incident. Patient to remain minimally weightbearing to the right heel.  All questions answered to satisfaction patient verbalized understanding.  Spent 20 minutes in patient care and medical decision making. Pt to follow up in 1 week.

## 2024-02-22 NOTE — REVIEW OF SYSTEMS
[Fever] : no fever [Chills] : no chills [Eye Pain] : no eye pain [Loss Of Hearing] : no hearing loss [Shortness Of Breath] : no shortness of breath [Joint Stiffness] : joint stiffness [Abdominal Pain] : no abdominal pain [Anxiety] : no anxiety [Skin Lesions] : skin lesion [Easy Bleeding] : no tendency for easy bleeding [Negative] : Endocrine [FreeTextEntry5] : HTN, HLD [FreeTextEntry9] : Bunion and hammer toe deformity's of both feet , right worse then the left  [de-identified] : corn 2nd toe right foot , painful

## 2024-02-22 NOTE — PHYSICAL EXAM
[4 x 4] : 4 x 4  [1+] : left 1+ [Ankle Swelling (On Exam)] : not present [Ankle Swelling Bilaterally] : bilaterally  [Varicose Veins Of Lower Extremities] : bilaterally [Ankle Swelling On The Right] : mild [] : not present [Purpura] : no purpura  [Skin Ulcer] : no ulcer [Petechiae] : no petechiae [Skin Induration] : no induration [Alert] : alert [Oriented to Person] : oriented to person [Oriented to Place] : oriented to place [Oriented to Time] : oriented to time [Calm] : calm [de-identified] : HTN, HLD [de-identified] : A&Ox3, NAD [de-identified] : Status post right foot [place] bunionectomy right second digit arthroplasty with K wire fixation [de-identified] : Right foot incision intact, no dehiscence, no purulence, no fluctuance, no proximal streaking [de-identified] : wnl [FreeTextEntry1] : Right foot bunion correction 1/24/24 [de-identified] : Sanguineous  [de-identified] : Bruising  [de-identified] : Adaptic Touch [de-identified] : Mechanically cleansed with sterile gauze and 0.9% normal saline. Dry Dressing Kerlix [de-identified] : Pin removed today, Pt tolerated well [FreeTextEntry7] : Foot 2nd digit - Hammer toe correction  [de-identified] : sanguineous  [de-identified] : bruising  [de-identified] : Adaptic Touch [de-identified] : Mechanically cleansed with sterile gauze and 0.9% normal saline. Dry Dressing Kerlix [TWNoteComboBox1] : Right [TWNoteComboBox4] : Small [TWNoteComboBox5] : No [TWNoteComboBox6] : Surgical [de-identified] : No [de-identified] : None [de-identified] : None [de-identified] : Weekly [TWNoteComboBox9] : Right [de-identified] : Primary Dressing [de-identified] : Small [de-identified] : No [de-identified] : Surgical [de-identified] : No [de-identified] : None [de-identified] : None [de-identified] : Weekly [de-identified] : Primary Dressing

## 2024-02-22 NOTE — ASSESSMENT
[Verbal] : Verbal [Demo] : Demo [Patient] : Patient [Spouse] : Spouse [Verbalizes knowledge/Understanding] : Verbalizes knowledge/understanding [Good - alert, interested, motivated] : Good - alert, interested, motivated [Dressing changes] : dressing changes [Foot Care] : foot care [Skin Care] : skin care [Pressure relief] : pressure relief [Signs and symptoms of infection] : sign and symptoms of infection [Nutrition] : nutrition [How and When to Call] : how and when to call [Pain Management] : pain management [Off-loading] : off-loading [Patient responsibility to plan of care] : patient responsibility to plan of care [Other: ____] : [unfilled] [] : Yes [Stable] : stable [Wheelchair] : Wheelchair [Home] : Home [Not Applicable - Long Term Care/Home Health Agency] : Long Term Care/Home Health Agency: Not Applicable [FreeTextEntry4] : Wound care to be performed at the [facility].  F/U 1 week [FreeTextEntry2] : Infection prevention Localized wound care Promote optimal skin integrity  Achieve acceptable level of pain with use of pharmacological and nonpharmacological interventions Offloading / Pressure relief

## 2024-02-22 NOTE — HISTORY OF PRESENT ILLNESS
[FreeTextEntry1] : Patient seen status post right foot [place] bunionectomy with right second digit arthroplasty (DOS: 1/24/2024).  Patient relates that her pain is controlled at this time.

## 2024-02-22 NOTE — VITALS
[] : No [de-identified] : 2/10 [FreeTextEntry3] : Right Foot  [FreeTextEntry1] : Offloading and analgesics  [FreeTextEntry2] : Ambulating  [FreeTextEntry4] : Patient put in seated position with legs elevated

## 2024-02-25 DIAGNOSIS — Z79.899 OTHER LONG TERM (CURRENT) DRUG THERAPY: ICD-10-CM

## 2024-02-25 DIAGNOSIS — Z87.01 PERSONAL HISTORY OF PNEUMONIA (RECURRENT): ICD-10-CM

## 2024-02-25 DIAGNOSIS — Z98.49 CATARACT EXTRACTION STATUS, UNSPECIFIED EYE: ICD-10-CM

## 2024-02-25 DIAGNOSIS — T81.89XD OTHER COMPLICATIONS OF PROCEDURES, NOT ELSEWHERE CLASSIFIED, SUBSEQUENT ENCOUNTER: ICD-10-CM

## 2024-02-25 DIAGNOSIS — J44.9 CHRONIC OBSTRUCTIVE PULMONARY DISEASE, UNSPECIFIED: ICD-10-CM

## 2024-02-25 DIAGNOSIS — Z85.118 PERSONAL HISTORY OF OTHER MALIGNANT NEOPLASM OF BRONCHUS AND LUNG: ICD-10-CM

## 2024-02-25 DIAGNOSIS — Z90.49 ACQUIRED ABSENCE OF OTHER SPECIFIED PARTS OF DIGESTIVE TRACT: ICD-10-CM

## 2024-02-25 DIAGNOSIS — M06.9 RHEUMATOID ARTHRITIS, UNSPECIFIED: ICD-10-CM

## 2024-02-25 DIAGNOSIS — Z82.3 FAMILY HISTORY OF STROKE: ICD-10-CM
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
## 2024-02-25 DIAGNOSIS — K21.9 GASTRO-ESOPHAGEAL REFLUX DISEASE WITHOUT ESOPHAGITIS: ICD-10-CM

## 2024-02-25 DIAGNOSIS — Z88.0 ALLERGY STATUS TO PENICILLIN: ICD-10-CM

## 2024-02-25 DIAGNOSIS — Z88.8 ALLERGY STATUS TO OTHER DRUGS, MEDICAMENTS AND BIOLOGICAL SUBSTANCES STATUS: ICD-10-CM

## 2024-02-25 DIAGNOSIS — Z83.3 FAMILY HISTORY OF DIABETES MELLITUS: ICD-10-CM

## 2024-02-25 DIAGNOSIS — N18.30 CHRONIC KIDNEY DISEASE, STAGE 3 UNSPECIFIED: ICD-10-CM

## 2024-02-25 DIAGNOSIS — Z86.73 PERSONAL HISTORY OF TRANSIENT ISCHEMIC ATTACK (TIA), AND CEREBRAL INFARCTION WITHOUT RESIDUAL DEFICITS: ICD-10-CM

## 2024-02-25 DIAGNOSIS — Z79.82 LONG TERM (CURRENT) USE OF ASPIRIN: ICD-10-CM

## 2024-02-25 DIAGNOSIS — Z91.041 RADIOGRAPHIC DYE ALLERGY STATUS: ICD-10-CM

## 2024-02-25 DIAGNOSIS — Z87.891 PERSONAL HISTORY OF NICOTINE DEPENDENCE: ICD-10-CM

## 2024-02-25 DIAGNOSIS — Z80.1 FAMILY HISTORY OF MALIGNANT NEOPLASM OF TRACHEA, BRONCHUS AND LUNG: ICD-10-CM

## 2024-02-25 DIAGNOSIS — I65.29 OCCLUSION AND STENOSIS OF UNSPECIFIED CAROTID ARTERY: ICD-10-CM

## 2024-02-25 DIAGNOSIS — G57.61 LESION OF PLANTAR NERVE, RIGHT LOWER LIMB: ICD-10-CM

## 2024-02-25 DIAGNOSIS — I12.9 HYPERTENSIVE CHRONIC KIDNEY DISEASE WITH STAGE 1 THROUGH STAGE 4 CHRONIC KIDNEY DISEASE, OR UNSPECIFIED CHRONIC KIDNEY DISEASE: ICD-10-CM

## 2024-02-25 DIAGNOSIS — Z90.2 ACQUIRED ABSENCE OF LUNG [PART OF]: ICD-10-CM

## 2024-02-25 DIAGNOSIS — E78.5 HYPERLIPIDEMIA, UNSPECIFIED: ICD-10-CM

## 2024-02-25 DIAGNOSIS — Z82.49 FAMILY HISTORY OF ISCHEMIC HEART DISEASE AND OTHER DISEASES OF THE CIRCULATORY SYSTEM: ICD-10-CM

## 2024-02-25 DIAGNOSIS — Z88.1 ALLERGY STATUS TO OTHER ANTIBIOTIC AGENTS STATUS: ICD-10-CM
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
## 2024-02-28 ENCOUNTER — OUTPATIENT (OUTPATIENT)
Dept: OUTPATIENT SERVICES | Facility: HOSPITAL | Age: 79
LOS: 1 days | Discharge: HOME | End: 2024-02-28
Payer: MEDICARE

## 2024-02-28 ENCOUNTER — APPOINTMENT (OUTPATIENT)
Dept: WOUND CARE | Facility: HOSPITAL | Age: 79
End: 2024-02-28
Payer: MEDICARE

## 2024-02-28 VITALS
WEIGHT: 109 LBS | SYSTOLIC BLOOD PRESSURE: 108 MMHG | BODY MASS INDEX: 20.06 KG/M2 | TEMPERATURE: 98 F | HEIGHT: 62 IN | DIASTOLIC BLOOD PRESSURE: 64 MMHG | RESPIRATION RATE: 20 BRPM | OXYGEN SATURATION: 94 % | HEART RATE: 83 BPM

## 2024-02-28 DIAGNOSIS — G57.61 LESION OF PLANTAR NERVE, RIGHT LOWER LIMB: ICD-10-CM

## 2024-02-28 DIAGNOSIS — Z86.73 PERSONAL HISTORY OF TRANSIENT ISCHEMIC ATTACK (TIA), AND CEREBRAL INFARCTION WITHOUT RESIDUAL DEFICITS: ICD-10-CM

## 2024-02-28 DIAGNOSIS — Z98.890 OTHER SPECIFIED POSTPROCEDURAL STATES: Chronic | ICD-10-CM

## 2024-02-28 DIAGNOSIS — Z87.01 PERSONAL HISTORY OF PNEUMONIA (RECURRENT): ICD-10-CM

## 2024-02-28 DIAGNOSIS — Z90.49 ACQUIRED ABSENCE OF OTHER SPECIFIED PARTS OF DIGESTIVE TRACT: ICD-10-CM
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
## 2024-02-28 DIAGNOSIS — J44.9 CHRONIC OBSTRUCTIVE PULMONARY DISEASE, UNSPECIFIED: ICD-10-CM

## 2024-02-28 DIAGNOSIS — E78.5 HYPERLIPIDEMIA, UNSPECIFIED: ICD-10-CM

## 2024-02-28 DIAGNOSIS — Z98.89 OTHER SPECIFIED POSTPROCEDURAL STATES: Chronic | ICD-10-CM

## 2024-02-28 DIAGNOSIS — Z90.2 ACQUIRED ABSENCE OF LUNG [PART OF]: ICD-10-CM

## 2024-02-28 DIAGNOSIS — K21.9 GASTRO-ESOPHAGEAL REFLUX DISEASE WITHOUT ESOPHAGITIS: ICD-10-CM

## 2024-02-28 DIAGNOSIS — Z87.891 PERSONAL HISTORY OF NICOTINE DEPENDENCE: ICD-10-CM

## 2024-02-28 DIAGNOSIS — Z88.0 ALLERGY STATUS TO PENICILLIN: ICD-10-CM

## 2024-02-28 DIAGNOSIS — Z83.3 FAMILY HISTORY OF DIABETES MELLITUS: ICD-10-CM

## 2024-02-28 DIAGNOSIS — Z88.1 ALLERGY STATUS TO OTHER ANTIBIOTIC AGENTS STATUS: ICD-10-CM

## 2024-02-28 DIAGNOSIS — I12.9 HYPERTENSIVE CHRONIC KIDNEY DISEASE WITH STAGE 1 THROUGH STAGE 4 CHRONIC KIDNEY DISEASE, OR UNSPECIFIED CHRONIC KIDNEY DISEASE: ICD-10-CM

## 2024-02-28 DIAGNOSIS — Z09 ENCOUNTER FOR FOLLOW-UP EXAMINATION AFTER COMPLETED TREATMENT FOR CONDITIONS OTHER THAN MALIGNANT NEOPLASM: ICD-10-CM

## 2024-02-28 DIAGNOSIS — N18.30 CHRONIC KIDNEY DISEASE, STAGE 3 UNSPECIFIED: ICD-10-CM

## 2024-02-28 DIAGNOSIS — Z79.82 LONG TERM (CURRENT) USE OF ASPIRIN: ICD-10-CM

## 2024-02-28 DIAGNOSIS — T81.89XD OTHER COMPLICATIONS OF PROCEDURES, NOT ELSEWHERE CLASSIFIED, SUBSEQUENT ENCOUNTER: ICD-10-CM

## 2024-02-28 DIAGNOSIS — Z82.3 FAMILY HISTORY OF STROKE: ICD-10-CM

## 2024-02-28 DIAGNOSIS — Z88.8 ALLERGY STATUS TO OTHER DRUGS, MEDICAMENTS AND BIOLOGICAL SUBSTANCES: ICD-10-CM
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
## 2024-02-28 DIAGNOSIS — I65.29 OCCLUSION AND STENOSIS OF UNSPECIFIED CAROTID ARTERY: ICD-10-CM

## 2024-02-28 DIAGNOSIS — Z91.041 RADIOGRAPHIC DYE ALLERGY STATUS: ICD-10-CM

## 2024-02-28 DIAGNOSIS — Z79.899 OTHER LONG TERM (CURRENT) DRUG THERAPY: ICD-10-CM

## 2024-02-28 DIAGNOSIS — Z90.49 ACQUIRED ABSENCE OF OTHER SPECIFIED PARTS OF DIGESTIVE TRACT: Chronic | ICD-10-CM

## 2024-02-28 DIAGNOSIS — M06.9 RHEUMATOID ARTHRITIS, UNSPECIFIED: ICD-10-CM

## 2024-02-28 DIAGNOSIS — Z80.1 FAMILY HISTORY OF MALIGNANT NEOPLASM OF TRACHEA, BRONCHUS AND LUNG: ICD-10-CM

## 2024-02-28 DIAGNOSIS — Z85.118 PERSONAL HISTORY OF OTHER MALIGNANT NEOPLASM OF BRONCHUS AND LUNG: ICD-10-CM

## 2024-02-28 DIAGNOSIS — Z82.49 FAMILY HISTORY OF ISCHEMIC HEART DISEASE AND OTHER DISEASES OF THE CIRCULATORY SYSTEM: ICD-10-CM

## 2024-02-28 DIAGNOSIS — Z98.49 CATARACT EXTRACTION STATUS, UNSPECIFIED EYE: ICD-10-CM

## 2024-02-28 PROCEDURE — 99024 POSTOP FOLLOW-UP VISIT: CPT

## 2024-02-28 PROCEDURE — G0463: CPT

## 2024-02-28 RX ORDER — AZITHROMYCIN DIHYDRATE 250 MG/1
250 TABLET, FILM COATED ORAL
Refills: 0 | Status: ACTIVE | COMMUNITY

## 2024-02-28 NOTE — HISTORY OF PRESENT ILLNESS
[FreeTextEntry1] : s/p bunion surgery and hammer toe right foot . The area is healing slowly there are no soi . The dorsal suture line is eschared and there are superficial opening under the eschar

## 2024-02-28 NOTE — PLAN
[FreeTextEntry1] : continue wound care and patient able to walk plantar grade , PTR 1 week Spent 20 minutes for patient care and medical decision making.

## 2024-02-28 NOTE — ASSESSMENT
[Verbal] : Verbal [Written] : Written [Demo] : Demo [Patient] : Patient [Spouse] : Spouse [Good - alert, interested, motivated] : Good - alert, interested, motivated [Verbalizes knowledge/Understanding] : Verbalizes knowledge/understanding [Dressing changes] : dressing changes [Foot Care] : foot care [Skin Care] : skin care [Signs and symptoms of infection] : sign and symptoms of infection [How and When to Call] : how and when to call [Patient responsibility to plan of care] : patient responsibility to plan of care [Pain Management] : pain management [Stable] : stable [Home] : Home [Ambulatory] : Ambulatory [Not Applicable - Long Term Care/Home Health Agency] : Long Term Care/Home Health Agency: Not Applicable [] : No [FreeTextEntry2] : Infection prevention Localized wound care  Goal remaining pain free regarding wounds  [FreeTextEntry4] : Dressing to remain dry and intact for 1 week  Follow up in 1 week

## 2024-02-28 NOTE — PHYSICAL EXAM
[4 x 4] : 4 x 4  [1+] : left 1+ [Ankle Swelling (On Exam)] : not present [Ankle Swelling Bilaterally] : bilaterally  [Varicose Veins Of Lower Extremities] : bilaterally [Ankle Swelling On The Right] : mild [] : bilaterally [Purpura] : no purpura  [Petechiae] : no petechiae [Skin Ulcer] : no ulcer [Skin Induration] : no induration [Oriented to Person] : oriented to person [Alert] : alert [Oriented to Place] : oriented to place [Oriented to Time] : oriented to time [Calm] : calm [de-identified] : A&Ox3, NAD [de-identified] : HTN, HLD [de-identified] : Status post right foot [place] bunionectomy right second digit arthroplasty with K wire fixation [de-identified] : Right foot incision dorsal eschar over the 1st MPJ , no soi  [FreeTextEntry1] : Right foot bunion correction - Dry eschar - no open wounds  [de-identified] : wnl [de-identified] : Mild  [de-identified] : Bactroban, Adaptic touch  [FreeTextEntry7] : Right foot 2nd digit - Hammer toe correction - Dry eschar - no open wounds.  [de-identified] : Mechanically cleansed with sterile gauze and normal saline. Kerlix  Loose eschar removed by DPM.  2 loose  sutures removed by DPM   [de-identified] : Bactroban, Adaptic touch  [TWNoteComboBox4] : None [TWNoteComboBox6] : Surgical [de-identified] : None [de-identified] : Erythema [de-identified] : None [de-identified] : No [de-identified] : Weekly [de-identified] : Primary Dressing [de-identified] : None [de-identified] : Normal [de-identified] : None [de-identified] : None [de-identified] : No

## 2024-02-28 NOTE — REVIEW OF SYSTEMS
[Fever] : no fever [Chills] : no chills [Eye Pain] : no eye pain [Shortness Of Breath] : no shortness of breath [Loss Of Hearing] : no hearing loss [Abdominal Pain] : no abdominal pain [Joint Stiffness] : joint stiffness [Skin Lesions] : skin lesion [Confused] : confusion [Convulsions] : convulsions [Anxiety] : no anxiety [Negative] : Endocrine [Easy Bleeding] : no tendency for easy bleeding [FreeTextEntry9] : Bunion and hammer toe deformity's of both feet , right worse then the left  [FreeTextEntry5] : HTN, HLD [de-identified] :  s/p arthroplasty and hallux s/p [place] with staple , stable , no soi  , suture line has eschar which was partially removed

## 2024-03-06 ENCOUNTER — OUTPATIENT (OUTPATIENT)
Dept: OUTPATIENT SERVICES | Facility: HOSPITAL | Age: 79
LOS: 1 days | Discharge: HOME | End: 2024-03-06
Payer: MEDICARE

## 2024-03-06 ENCOUNTER — APPOINTMENT (OUTPATIENT)
Dept: WOUND CARE | Facility: HOSPITAL | Age: 79
End: 2024-03-06
Payer: MEDICARE

## 2024-03-06 VITALS
RESPIRATION RATE: 20 BRPM | BODY MASS INDEX: 20.06 KG/M2 | WEIGHT: 109 LBS | HEART RATE: 86 BPM | TEMPERATURE: 98.5 F | HEIGHT: 62 IN | DIASTOLIC BLOOD PRESSURE: 78 MMHG | OXYGEN SATURATION: 90 % | SYSTOLIC BLOOD PRESSURE: 160 MMHG

## 2024-03-06 DIAGNOSIS — Z09 ENCOUNTER FOR FOLLOW-UP EXAMINATION AFTER COMPLETED TREATMENT FOR CONDITIONS OTHER THAN MALIGNANT NEOPLASM: ICD-10-CM

## 2024-03-06 DIAGNOSIS — Z98.890 OTHER SPECIFIED POSTPROCEDURAL STATES: Chronic | ICD-10-CM

## 2024-03-06 DIAGNOSIS — Z98.89 OTHER SPECIFIED POSTPROCEDURAL STATES: Chronic | ICD-10-CM

## 2024-03-06 PROCEDURE — 99024 POSTOP FOLLOW-UP VISIT: CPT

## 2024-03-06 PROCEDURE — G0463: CPT

## 2024-03-12 DIAGNOSIS — Z98.49 CATARACT EXTRACTION STATUS, UNSPECIFIED EYE: ICD-10-CM

## 2024-03-12 DIAGNOSIS — Z80.1 FAMILY HISTORY OF MALIGNANT NEOPLASM OF TRACHEA, BRONCHUS AND LUNG: ICD-10-CM

## 2024-03-12 DIAGNOSIS — Z91.041 RADIOGRAPHIC DYE ALLERGY STATUS: ICD-10-CM
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
## 2024-03-12 DIAGNOSIS — E78.5 HYPERLIPIDEMIA, UNSPECIFIED: ICD-10-CM

## 2024-03-12 DIAGNOSIS — J44.9 CHRONIC OBSTRUCTIVE PULMONARY DISEASE, UNSPECIFIED: ICD-10-CM

## 2024-03-12 DIAGNOSIS — Z90.2 ACQUIRED ABSENCE OF LUNG [PART OF]: ICD-10-CM

## 2024-03-12 DIAGNOSIS — Z88.8 ALLERGY STATUS TO OTHER DRUGS, MEDICAMENTS AND BIOLOGICAL SUBSTANCES: ICD-10-CM

## 2024-03-12 DIAGNOSIS — I65.29 OCCLUSION AND STENOSIS OF UNSPECIFIED CAROTID ARTERY: ICD-10-CM

## 2024-03-12 DIAGNOSIS — Z90.49 ACQUIRED ABSENCE OF OTHER SPECIFIED PARTS OF DIGESTIVE TRACT: ICD-10-CM

## 2024-03-12 DIAGNOSIS — Z79.82 LONG TERM (CURRENT) USE OF ASPIRIN: ICD-10-CM

## 2024-03-12 DIAGNOSIS — N18.30 CHRONIC KIDNEY DISEASE, STAGE 3 UNSPECIFIED: ICD-10-CM

## 2024-03-12 DIAGNOSIS — Z87.891 PERSONAL HISTORY OF NICOTINE DEPENDENCE: ICD-10-CM

## 2024-03-12 DIAGNOSIS — M06.9 RHEUMATOID ARTHRITIS, UNSPECIFIED: ICD-10-CM

## 2024-03-12 DIAGNOSIS — Z83.3 FAMILY HISTORY OF DIABETES MELLITUS: ICD-10-CM

## 2024-03-12 DIAGNOSIS — Z88.1 ALLERGY STATUS TO OTHER ANTIBIOTIC AGENTS STATUS: ICD-10-CM

## 2024-03-12 DIAGNOSIS — Z85.118 PERSONAL HISTORY OF OTHER MALIGNANT NEOPLASM OF BRONCHUS AND LUNG: ICD-10-CM

## 2024-03-12 DIAGNOSIS — Z88.0 ALLERGY STATUS TO PENICILLIN: ICD-10-CM

## 2024-03-12 DIAGNOSIS — Z82.49 FAMILY HISTORY OF ISCHEMIC HEART DISEASE AND OTHER DISEASES OF THE CIRCULATORY SYSTEM: ICD-10-CM

## 2024-03-12 DIAGNOSIS — T81.89XD OTHER COMPLICATIONS OF PROCEDURES, NOT ELSEWHERE CLASSIFIED, SUBSEQUENT ENCOUNTER: ICD-10-CM

## 2024-03-12 DIAGNOSIS — Z79.899 OTHER LONG TERM (CURRENT) DRUG THERAPY: ICD-10-CM

## 2024-03-12 DIAGNOSIS — K21.9 GASTRO-ESOPHAGEAL REFLUX DISEASE WITHOUT ESOPHAGITIS: ICD-10-CM

## 2024-03-12 DIAGNOSIS — Z87.01 PERSONAL HISTORY OF PNEUMONIA (RECURRENT): ICD-10-CM

## 2024-03-12 DIAGNOSIS — Z82.3 FAMILY HISTORY OF STROKE: ICD-10-CM
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
## 2024-03-12 DIAGNOSIS — I12.9 HYPERTENSIVE CHRONIC KIDNEY DISEASE WITH STAGE 1 THROUGH STAGE 4 CHRONIC KIDNEY DISEASE, OR UNSPECIFIED CHRONIC KIDNEY DISEASE: ICD-10-CM

## 2024-03-12 DIAGNOSIS — Z86.73 PERSONAL HISTORY OF TRANSIENT ISCHEMIC ATTACK (TIA), AND CEREBRAL INFARCTION WITHOUT RESIDUAL DEFICITS: ICD-10-CM

## 2024-03-12 NOTE — PLAN
[FreeTextEntry1] : .Patient seen and evaluated. Discussed etiology and treatment plan. Patient verbalized understanding. Continue wound care and patient able to walk plantar grade , PTR 1 week.  Spent 20 minutes for patient care and medical decision making.

## 2024-03-12 NOTE — REVIEW OF SYSTEMS
[Fever] : no fever [Eye Pain] : no eye pain [Chills] : no chills [Loss Of Hearing] : no hearing loss [Shortness Of Breath] : no shortness of breath [Skin Lesions] : skin lesion [Abdominal Pain] : no abdominal pain [Joint Stiffness] : joint stiffness [Anxiety] : no anxiety [Confused] : confusion [Convulsions] : convulsions [Negative] : Endocrine [Easy Bleeding] : no tendency for easy bleeding [FreeTextEntry9] : Bunion and hammer toe deformity's of both feet , right worse then the left  [FreeTextEntry5] : HTN, HLD [de-identified] :  s/p arthroplasty and hallux s/p [place] with staple , stable , no soi, suture line has eschar which was partially removed

## 2024-03-12 NOTE — HISTORY OF PRESENT ILLNESS
[FreeTextEntry1] : Patient is 78 year old female presenting for f/u s/p bunion surgery and hammer toe right foot with stable eschars. Patient denies any pain to the right foot.

## 2024-03-12 NOTE — VITALS
[Pain related to present condition?] : The patient's  pain is related to present condition. [de-identified] : 9/10 [] : No [FreeTextEntry2] : Walking [FreeTextEntry3] : Heel and bunion [FreeTextEntry1] : "Nothing" - she hasn't been taking anything [FreeTextEntry4] : Offload

## 2024-03-12 NOTE — PHYSICAL EXAM
[4 x 4] : 4 x 4  [Ankle Swelling Bilaterally] : bilaterally  [1+] : left 1+ [Ankle Swelling (On Exam)] : not present [Varicose Veins Of Lower Extremities] : present [Ankle Swelling On The Right] : mild [] : bilaterally [Petechiae] : no petechiae [Purpura] : no purpura  [Skin Induration] : no induration [Alert] : alert [Skin Ulcer] : no ulcer [Oriented to Place] : oriented to place [Oriented to Person] : oriented to person [Oriented to Time] : oriented to time [de-identified] : A&Ox3, NAD [Calm] : calm [de-identified] : Right foot incision  on the dorsal aspect with stable  eschar over the 1st MPJ, 2nd digit surgical site healed  [de-identified] : Status post right foot [place] bunionectomy right second digit arthroplasty with K wire fixation [de-identified] : HTN, HLD [de-identified] : wnl [FreeTextEntry1] : Right foot bunion correction [FreeTextEntry3] : 1.2 [FreeTextEntry4] : 0.1 [FreeTextEntry2] : 2.5 [de-identified] : Serosanguineous [de-identified] : Mild  [de-identified] : Adaptic Touch & Silver Alginate [de-identified] : 90% [de-identified] : 10% [de-identified] : Mechanically cleansed with sterile gauze and 0.9% normal saline. Dry Dressing Kerlix [FreeTextEntry7] : Right foot 2nd digit - Hammer toe correction - Dry eschar - no open wounds.  [FreeTextEntry8] : 0.7 [FreeTextEntry9] : 0.5 [de-identified] : 0.0 (scab) [de-identified] : Adaptic Touch & Silver Alginate [de-identified] : Mechanically cleansed with sterile gauze and 0.9% normal saline. Dry Dressing Kerlix [TWNoteComboBox4] : Moderate [de-identified] : No [TWNoteComboBox5] : No [TWNoteComboBox6] : Surgical [de-identified] : None [de-identified] : Erythema [de-identified] : None [de-identified] : Daily [de-identified] : Primary Dressing [de-identified] : None [de-identified] : Normal [de-identified] : No [de-identified] : None [de-identified] : None [de-identified] : No [de-identified] : Daily [de-identified] : Primary Dressing

## 2024-03-12 NOTE — ASSESSMENT
[Verbal] : Verbal [Demo] : Demo [Patient] : Patient [Good - alert, interested, motivated] : Good - alert, interested, motivated [Spouse] : Spouse [Dressing changes] : dressing changes [Verbalizes knowledge/Understanding] : Verbalizes knowledge/understanding [Foot Care] : foot care [Skin Care] : skin care [Signs and symptoms of infection] : sign and symptoms of infection [How and When to Call] : how and when to call [Pain Management] : pain management [Patient responsibility to plan of care] : patient responsibility to plan of care [Stable] : stable [Ambulatory] : Ambulatory [Home] : Home [Not Applicable - Long Term Care/Home Health Agency] : Long Term Care/Home Health Agency: Not Applicable [] : No [FreeTextEntry2] : Infection prevention Localized wound care  Goal remaining pain free regarding wounds  [FreeTextEntry3] : Moderate drainage; area very tender to pt. [FreeTextEntry4] : JAVIER Frankron saw pt today. Pt confirmed she has not been changing the dressing, she's been leaving the dressing on for the entire week until she comes for her assessment at which time the dressing is changed. DPM assessed wound and removed the dark bloody scabs on her bunion and 2nd digit. DPM removed a dissolvable suture underneath the scab on the bunion and the 2nd digit - her body was rejecting them. DPM advised pt not to shower yet. F/U 1 week

## 2024-03-13 ENCOUNTER — OUTPATIENT (OUTPATIENT)
Dept: OUTPATIENT SERVICES | Facility: HOSPITAL | Age: 79
LOS: 1 days | Discharge: HOME | End: 2024-03-13
Payer: MEDICARE

## 2024-03-13 ENCOUNTER — APPOINTMENT (OUTPATIENT)
Dept: WOUND CARE | Facility: HOSPITAL | Age: 79
End: 2024-03-13
Payer: MEDICARE

## 2024-03-13 DIAGNOSIS — Z98.890 OTHER SPECIFIED POSTPROCEDURAL STATES: Chronic | ICD-10-CM

## 2024-03-13 DIAGNOSIS — T81.89XD OTHER COMPLICATIONS OF PROCEDURES, NOT ELSEWHERE CLASSIFIED, SUBSEQUENT ENCOUNTER: ICD-10-CM

## 2024-03-13 DIAGNOSIS — Z09 ENCOUNTER FOR FOLLOW-UP EXAMINATION AFTER COMPLETED TREATMENT FOR CONDITIONS OTHER THAN MALIGNANT NEOPLASM: ICD-10-CM

## 2024-03-13 DIAGNOSIS — M21.611 BUNION OF RIGHT FOOT: ICD-10-CM

## 2024-03-13 DIAGNOSIS — Z90.49 ACQUIRED ABSENCE OF OTHER SPECIFIED PARTS OF DIGESTIVE TRACT: Chronic | ICD-10-CM

## 2024-03-13 DIAGNOSIS — Z98.89 OTHER SPECIFIED POSTPROCEDURAL STATES: Chronic | ICD-10-CM

## 2024-03-13 PROCEDURE — G0463: CPT

## 2024-03-13 PROCEDURE — 99024 POSTOP FOLLOW-UP VISIT: CPT

## 2024-03-13 NOTE — ASSESSMENT
[Verbal] : Verbal [Demo] : Demo [Patient] : Patient [Spouse] : Spouse [Good - alert, interested, motivated] : Good - alert, interested, motivated [Verbalizes knowledge/Understanding] : Verbalizes knowledge/understanding [Dressing changes] : dressing changes [Foot Care] : foot care [Skin Care] : skin care [Signs and symptoms of infection] : sign and symptoms of infection [How and When to Call] : how and when to call [Pain Management] : pain management [Patient responsibility to plan of care] : patient responsibility to plan of care [Stable] : stable [Home] : Home [Not Applicable - Long Term Care/Home Health Agency] : Long Term Care/Home Health Agency: Not Applicable [Nutrition] : nutrition [Off-loading] : off-loading [Ambulatory] : Ambulatory [] : No [FreeTextEntry4] : **At dressing change appt. on Friday 3/15/24, please have JAVIER Dubois view wound.**  JAVIER Dubois assessed wound site - gently cleaned wound bed and shaved periwound.  Patient to have Xray prior to next assessment. Authorization submitted for [facility] at next assessment. Patient verbalized understanding of all discussed. Patient to return to [facility] on Friday and Monday for dressing changes, and one week for assessment. **At dressing change appt. on Friday 3/15/24, please have JAVIER Dubois view wound.**  [FreeTextEntry2] : Infection Prevention Wound care and Dressing changes Promote and Restore optimal skin integrity Nutrition and Wound Healing  Offloading and Pressure relief Protect and Guard wound site  Maintain acceptable levels of Pain Compliance R/T Elevation of Lower Extremities

## 2024-03-13 NOTE — HISTORY OF PRESENT ILLNESS
[FreeTextEntry1] : Patient is 78 year old female presenting for f/u s/p bunion surgery and hammer toe right foot with stable eschars. Patient denies any pain to the right foot.  Patient seen for right foot dorsal medial incision dehiscence with open wound down to skin, subcutaneous tissue, fat.

## 2024-03-13 NOTE — PHYSICAL EXAM
[4 x 4] : 4 x 4  [1+] : left 1+ [Ankle Swelling Bilaterally] : bilaterally  [Varicose Veins Of Lower Extremities] : present [Ankle Swelling On The Right] : mild [Alert] : alert [Oriented to Person] : oriented to person [Oriented to Place] : oriented to place [Oriented to Time] : oriented to time [Calm] : calm [Ankle Swelling (On Exam)] : not present [] : not present [Purpura] : no purpura  [Petechiae] : no petechiae [Skin Ulcer] : no ulcer [Skin Induration] : no induration [de-identified] : HTN, HLD [de-identified] : A&Ox3, NAD [de-identified] : Status post right foot [place] bunionectomy right second digit arthroplasty with K wire fixation [de-identified] : Right foot incision dorsal medial incision over the 1st MPJ dehiscence with open wound down to skin, subcutaneous tissue, fat, 2nd digit surgical site healed  [de-identified] : wnl [FreeTextEntry1] : Right Foot - Bunion correction site [FreeTextEntry2] : 1.8 [FreeTextEntry3] : 1.0 [FreeTextEntry4] : 0.3 - 0.4 [de-identified] : Serosanguinous [de-identified] : (Mild) [de-identified] : 90% [de-identified] : 10% [de-identified] : MARISOL [de-identified] : Mechanically cleansed with sterile gauze and 0.9% normal saline. Dry Dressing Kerlix Surgical  Shoe [FreeTextEntry7] : Right Foot 2nd digit - Hammer toe correction  [FreeTextEntry8] : 0.5 [FreeTextEntry9] : 0.3 [de-identified] : SCAB [de-identified] : NONE [de-identified] : Mechanically cleansed with sterile gauze and 0.9% normal saline. Dry Dressing Kerlix Surgical  Shoe [TWNoteComboBox5] : No [TWNoteComboBox4] : Moderate [TWNoteComboBox6] : Surgical [de-identified] : No [de-identified] : None [de-identified] : Erythema [de-identified] : None [de-identified] : Daily [de-identified] : Primary Dressing [de-identified] : No [de-identified] : None [de-identified] : No [de-identified] : Surgical [de-identified] : Normal [de-identified] : None [de-identified] : None [de-identified] : 100% [de-identified] : Secondary Dressing

## 2024-03-13 NOTE — PLAN
[FreeTextEntry1] : Patient examined and evaluated at this time.  Patient advised regarding the need for local wound care and advanced skin substitute graft application to aid in wound healing to prevent worsening of symptoms.  Will request authorization for advanced skin substitute graft at this time.  Spent 20 minutes on patient care and medical decision making.  Patient to follow-up in 1 week with new radiographs.

## 2024-03-13 NOTE — REVIEW OF SYSTEMS
[Skin Lesions] : skin lesion [Joint Stiffness] : joint stiffness [Convulsions] : convulsions [Confused] : confusion [Negative] : Endocrine [Fever] : no fever [Eye Pain] : no eye pain [Chills] : no chills [Loss Of Hearing] : no hearing loss [Shortness Of Breath] : no shortness of breath [Anxiety] : no anxiety [Abdominal Pain] : no abdominal pain [FreeTextEntry5] : HTN, HLD [Easy Bleeding] : no tendency for easy bleeding [FreeTextEntry9] : Bunion and hammer toe deformity's of both feet , right worse then the left  [de-identified] :  s/p arthroplasty and hallux s/p [place] with staple , stable , no soi, suture line has eschar which was partially removed

## 2024-03-15 ENCOUNTER — APPOINTMENT (OUTPATIENT)
Dept: WOUND CARE | Facility: HOSPITAL | Age: 79
End: 2024-03-15
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
## 2024-03-15 DIAGNOSIS — J44.9 CHRONIC OBSTRUCTIVE PULMONARY DISEASE, UNSPECIFIED: ICD-10-CM

## 2024-03-15 DIAGNOSIS — N18.30 CHRONIC KIDNEY DISEASE, STAGE 3 UNSPECIFIED: ICD-10-CM

## 2024-03-15 DIAGNOSIS — Z85.118 PERSONAL HISTORY OF OTHER MALIGNANT NEOPLASM OF BRONCHUS AND LUNG: ICD-10-CM

## 2024-03-15 DIAGNOSIS — Z90.2 ACQUIRED ABSENCE OF LUNG [PART OF]: ICD-10-CM
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
## 2024-03-15 DIAGNOSIS — Z80.1 FAMILY HISTORY OF MALIGNANT NEOPLASM OF TRACHEA, BRONCHUS AND LUNG: ICD-10-CM

## 2024-03-15 DIAGNOSIS — I65.29 OCCLUSION AND STENOSIS OF UNSPECIFIED CAROTID ARTERY: ICD-10-CM

## 2024-03-15 DIAGNOSIS — E78.5 HYPERLIPIDEMIA, UNSPECIFIED: ICD-10-CM

## 2024-03-15 DIAGNOSIS — Z88.1 ALLERGY STATUS TO OTHER ANTIBIOTIC AGENTS STATUS: ICD-10-CM

## 2024-03-15 DIAGNOSIS — T81.31XD DISRUPTION OF EXTERNAL OPERATION (SURGICAL) WOUND, NOT ELSEWHERE CLASSIFIED, SUBSEQUENT ENCOUNTER: ICD-10-CM

## 2024-03-15 DIAGNOSIS — Z90.49 ACQUIRED ABSENCE OF OTHER SPECIFIED PARTS OF DIGESTIVE TRACT: ICD-10-CM

## 2024-03-15 DIAGNOSIS — Z88.8 ALLERGY STATUS TO OTHER DRUGS, MEDICAMENTS AND BIOLOGICAL SUBSTANCES: ICD-10-CM

## 2024-03-15 DIAGNOSIS — Z86.73 PERSONAL HISTORY OF TRANSIENT ISCHEMIC ATTACK (TIA), AND CEREBRAL INFARCTION WITHOUT RESIDUAL DEFICITS: ICD-10-CM

## 2024-03-15 DIAGNOSIS — Z82.3 FAMILY HISTORY OF STROKE: ICD-10-CM

## 2024-03-15 DIAGNOSIS — I12.9 HYPERTENSIVE CHRONIC KIDNEY DISEASE WITH STAGE 1 THROUGH STAGE 4 CHRONIC KIDNEY DISEASE, OR UNSPECIFIED CHRONIC KIDNEY DISEASE: ICD-10-CM

## 2024-03-15 DIAGNOSIS — Z88.0 ALLERGY STATUS TO PENICILLIN: ICD-10-CM

## 2024-03-15 DIAGNOSIS — Z82.49 FAMILY HISTORY OF ISCHEMIC HEART DISEASE AND OTHER DISEASES OF THE CIRCULATORY SYSTEM: ICD-10-CM

## 2024-03-15 DIAGNOSIS — Z83.3 FAMILY HISTORY OF DIABETES MELLITUS: ICD-10-CM

## 2024-03-15 DIAGNOSIS — M06.9 RHEUMATOID ARTHRITIS, UNSPECIFIED: ICD-10-CM

## 2024-03-15 DIAGNOSIS — Z98.49 CATARACT EXTRACTION STATUS, UNSPECIFIED EYE: ICD-10-CM

## 2024-03-15 DIAGNOSIS — Z91.041 RADIOGRAPHIC DYE ALLERGY STATUS: ICD-10-CM

## 2024-03-15 DIAGNOSIS — K21.9 GASTRO-ESOPHAGEAL REFLUX DISEASE WITHOUT ESOPHAGITIS: ICD-10-CM

## 2024-03-15 DIAGNOSIS — Z79.899 OTHER LONG TERM (CURRENT) DRUG THERAPY: ICD-10-CM

## 2024-03-15 DIAGNOSIS — Z87.891 PERSONAL HISTORY OF NICOTINE DEPENDENCE: ICD-10-CM

## 2024-03-15 DIAGNOSIS — Z79.82 LONG TERM (CURRENT) USE OF ASPIRIN: ICD-10-CM

## 2024-03-15 DIAGNOSIS — Z87.01 PERSONAL HISTORY OF PNEUMONIA (RECURRENT): ICD-10-CM

## 2024-03-18 ENCOUNTER — APPOINTMENT (OUTPATIENT)
Dept: WOUND CARE | Facility: HOSPITAL | Age: 79
End: 2024-03-18
Payer: MEDICARE

## 2024-03-18 ENCOUNTER — OUTPATIENT (OUTPATIENT)
Dept: OUTPATIENT SERVICES | Facility: HOSPITAL | Age: 79
LOS: 1 days | Discharge: HOME | End: 2024-03-18
Payer: MEDICARE

## 2024-03-18 VITALS
OXYGEN SATURATION: 95 % | BODY MASS INDEX: 20.06 KG/M2 | HEART RATE: 95 BPM | HEIGHT: 62 IN | RESPIRATION RATE: 18 BRPM | TEMPERATURE: 98.3 F | SYSTOLIC BLOOD PRESSURE: 141 MMHG | DIASTOLIC BLOOD PRESSURE: 64 MMHG | WEIGHT: 109 LBS

## 2024-03-18 DIAGNOSIS — T81.31XD DISRUPTION OF EXTERNAL OPERATION (SURGICAL) WOUND, NOT ELSEWHERE CLASSIFIED, SUBSEQUENT ENCOUNTER: ICD-10-CM

## 2024-03-18 DIAGNOSIS — M21.611 BUNION OF RIGHT FOOT: ICD-10-CM

## 2024-03-18 DIAGNOSIS — Z90.49 ACQUIRED ABSENCE OF OTHER SPECIFIED PARTS OF DIGESTIVE TRACT: Chronic | ICD-10-CM
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
## 2024-03-18 DIAGNOSIS — Z98.89 OTHER SPECIFIED POSTPROCEDURAL STATES: Chronic | ICD-10-CM

## 2024-03-18 DIAGNOSIS — Z98.890 OTHER SPECIFIED POSTPROCEDURAL STATES: Chronic | ICD-10-CM
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
## 2024-03-18 PROCEDURE — 73630 X-RAY EXAM OF FOOT: CPT | Mod: 26,RT

## 2024-03-18 PROCEDURE — ZZZZZ: CPT

## 2024-03-18 PROCEDURE — 73630 X-RAY EXAM OF FOOT: CPT

## 2024-03-18 PROCEDURE — G0463: CPT

## 2024-03-20 ENCOUNTER — APPOINTMENT (OUTPATIENT)
Dept: WOUND CARE | Facility: HOSPITAL | Age: 79
End: 2024-03-20
Payer: MEDICARE

## 2024-03-20 ENCOUNTER — OUTPATIENT (OUTPATIENT)
Dept: OUTPATIENT SERVICES | Facility: HOSPITAL | Age: 79
LOS: 1 days | Discharge: HOME | End: 2024-03-20
Payer: MEDICARE

## 2024-03-20 VITALS
SYSTOLIC BLOOD PRESSURE: 124 MMHG | DIASTOLIC BLOOD PRESSURE: 72 MMHG | BODY MASS INDEX: 20.06 KG/M2 | OXYGEN SATURATION: 94 % | HEART RATE: 84 BPM | RESPIRATION RATE: 16 BRPM | TEMPERATURE: 97.6 F | WEIGHT: 109 LBS | HEIGHT: 62 IN

## 2024-03-20 DIAGNOSIS — E09.621 DRUG OR CHEMICAL INDUCED DIABETES MELLITUS WITH FOOT ULCER: ICD-10-CM

## 2024-03-20 DIAGNOSIS — Z98.89 OTHER SPECIFIED POSTPROCEDURAL STATES: Chronic | ICD-10-CM

## 2024-03-20 DIAGNOSIS — L97.515 DRUG OR CHEMICAL INDUCED DIABETES MELLITUS WITH FOOT ULCER: ICD-10-CM

## 2024-03-20 DIAGNOSIS — Z90.49 ACQUIRED ABSENCE OF OTHER SPECIFIED PARTS OF DIGESTIVE TRACT: Chronic | ICD-10-CM

## 2024-03-20 DIAGNOSIS — Z98.890 OTHER SPECIFIED POSTPROCEDURAL STATES: Chronic | ICD-10-CM

## 2024-03-20 DIAGNOSIS — M21.611 BUNION OF RIGHT FOOT: ICD-10-CM

## 2024-03-20 DIAGNOSIS — T81.31XS DISRUPTION OF EXTERNAL OPERATION (SURGICAL) WOUND, NOT ELSEWHERE CLASSIFIED, SEQUELA: ICD-10-CM

## 2024-03-20 DIAGNOSIS — E13.621 OTHER SPECIFIED DIABETES MELLITUS WITH FOOT ULCER: ICD-10-CM

## 2024-03-20 DIAGNOSIS — L97.515 OTHER SPECIFIED DIABETES MELLITUS WITH FOOT ULCER: ICD-10-CM

## 2024-03-20 PROCEDURE — 15275 SKIN SUB GRAFT FACE/NK/HF/G: CPT

## 2024-03-26 DIAGNOSIS — Z88.8 ALLERGY STATUS TO OTHER DRUGS, MEDICAMENTS AND BIOLOGICAL SUBSTANCES: ICD-10-CM
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
## 2024-03-26 DIAGNOSIS — Z88.1 ALLERGY STATUS TO OTHER ANTIBIOTIC AGENTS STATUS: ICD-10-CM

## 2024-03-26 DIAGNOSIS — Z87.891 PERSONAL HISTORY OF NICOTINE DEPENDENCE: ICD-10-CM

## 2024-03-26 DIAGNOSIS — I12.9 HYPERTENSIVE CHRONIC KIDNEY DISEASE WITH STAGE 1 THROUGH STAGE 4 CHRONIC KIDNEY DISEASE, OR UNSPECIFIED CHRONIC KIDNEY DISEASE: ICD-10-CM

## 2024-03-26 DIAGNOSIS — E78.5 HYPERLIPIDEMIA, UNSPECIFIED: ICD-10-CM

## 2024-03-26 DIAGNOSIS — Z79.82 LONG TERM (CURRENT) USE OF ASPIRIN: ICD-10-CM

## 2024-03-26 DIAGNOSIS — Z88.0 ALLERGY STATUS TO PENICILLIN: ICD-10-CM

## 2024-03-26 DIAGNOSIS — Z98.49 CATARACT EXTRACTION STATUS, UNSPECIFIED EYE: ICD-10-CM

## 2024-03-26 DIAGNOSIS — N18.30 CHRONIC KIDNEY DISEASE, STAGE 3 UNSPECIFIED: ICD-10-CM

## 2024-03-26 DIAGNOSIS — Z90.49 ACQUIRED ABSENCE OF OTHER SPECIFIED PARTS OF DIGESTIVE TRACT: ICD-10-CM

## 2024-03-26 DIAGNOSIS — Z91.041 RADIOGRAPHIC DYE ALLERGY STATUS: ICD-10-CM

## 2024-03-26 DIAGNOSIS — M06.9 RHEUMATOID ARTHRITIS, UNSPECIFIED: ICD-10-CM

## 2024-03-26 DIAGNOSIS — K21.9 GASTRO-ESOPHAGEAL REFLUX DISEASE WITHOUT ESOPHAGITIS: ICD-10-CM

## 2024-03-26 DIAGNOSIS — T81.31XA DISRUPTION OF EXTERNAL OPERATION (SURGICAL) WOUND, NOT ELSEWHERE CLASSIFIED, INITIAL ENCOUNTER: ICD-10-CM
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
## 2024-03-26 DIAGNOSIS — I65.29 OCCLUSION AND STENOSIS OF UNSPECIFIED CAROTID ARTERY: ICD-10-CM

## 2024-03-26 DIAGNOSIS — Z85.118 PERSONAL HISTORY OF OTHER MALIGNANT NEOPLASM OF BRONCHUS AND LUNG: ICD-10-CM

## 2024-03-26 DIAGNOSIS — L97.515 NON-PRESSURE CHRONIC ULCER OF OTHER PART OF RIGHT FOOT WITH MUSCLE INVOLVEMENT WITHOUT EVIDENCE OF NECROSIS: ICD-10-CM

## 2024-03-26 DIAGNOSIS — J44.9 CHRONIC OBSTRUCTIVE PULMONARY DISEASE, UNSPECIFIED: ICD-10-CM

## 2024-03-26 DIAGNOSIS — Z86.73 PERSONAL HISTORY OF TRANSIENT ISCHEMIC ATTACK (TIA), AND CEREBRAL INFARCTION WITHOUT RESIDUAL DEFICITS: ICD-10-CM

## 2024-03-26 DIAGNOSIS — Z87.01 PERSONAL HISTORY OF PNEUMONIA (RECURRENT): ICD-10-CM

## 2024-03-26 DIAGNOSIS — Z90.2 ACQUIRED ABSENCE OF LUNG [PART OF]: ICD-10-CM

## 2024-03-26 DIAGNOSIS — Z79.899 OTHER LONG TERM (CURRENT) DRUG THERAPY: ICD-10-CM

## 2024-03-26 PROBLEM — T81.31XS: Status: ACTIVE | Noted: 2024-03-26

## 2024-03-26 PROBLEM — E13.621: Status: ACTIVE | Noted: 2024-03-26

## 2024-03-26 PROBLEM — E09.621: Status: ACTIVE | Noted: 2024-03-26

## 2024-03-26 NOTE — REVIEW OF SYSTEMS
[Joint Stiffness] : joint stiffness [Skin Lesions] : skin lesion [Confused] : confusion [Convulsions] : convulsions [Negative] : Endocrine [FreeTextEntry1] : 4218 [Chills] : no chills [Fever] : no fever [Eye Pain] : no eye pain [Loss Of Hearing] : no hearing loss [Shortness Of Breath] : no shortness of breath [Anxiety] : no anxiety [Abdominal Pain] : no abdominal pain [Easy Bleeding] : no tendency for easy bleeding [FreeTextEntry5] : HTN, HLD [FreeTextEntry9] : Bunion and hammer toe deformity's of both feet , right worse then the left  [de-identified] :  s/p arthroplasty and hallux s/p [place] with staple , stable , no soi, suture line has eschar which was partially removed

## 2024-03-26 NOTE — PHYSICAL EXAM
[4 x 4] : 4 x 4  [Abdominal Pad] : Abdominal Pad [1+] : left 1+ [Ankle Swelling Bilaterally] : bilaterally  [Varicose Veins Of Lower Extremities] : bilaterally [Ankle Swelling On The Right] : mild [Alert] : alert [Oriented to Person] : oriented to person [Oriented to Place] : oriented to place [Oriented to Time] : oriented to time [Calm] : calm [Ankle Swelling (On Exam)] : not present [] : not present [Purpura] : no purpura  [Petechiae] : no petechiae [Skin Ulcer] : no ulcer [Skin Induration] : no induration [de-identified] : A&Ox3, NAD [de-identified] : HTN, HLD [de-identified] : Status post right foot [place] bunionectomy right second digit arthroplasty with K wire fixation [de-identified] : Right foot incision dorsal medial incision over the 1st MPJ dehiscence with open wound down to skin, subcutaneous tissue, fat, 2nd digit surgical site healed  [de-identified] : wnl [FreeTextEntry1] : Right foot bunion correction [FreeTextEntry2] : 1.5 [FreeTextEntry3] : 0.9 [FreeTextEntry4] : 0.2 - 0.3 [de-identified] : Serous [de-identified] : Mild  [de-identified] : None [de-identified] : Post debridement measurements 1.6 x 1.0 x 0.4 [de-identified] : Dm & Adaptic Touch [de-identified] : Mechanically cleansed with sterile gauze and 0.9% normal saline. Dry Dressing Kerlix Stockingette  Current Tissue : Tissue Product:  BIO_TISSUE Tissue Type:  HUMAN Added On The Fly:  No Serial Number:  [id number] Lot Number:  [id number] Ext. Ref. Code:  [id number] Expiration Date:  11/19/2028 Received Date:  03/18/2024 Tissue Status:  Implanted Tissue Size:  1.6 sq cm 100% applied 0% wasted  Saline Lot #[id number] 1/9/26 [FreeTextEntry7] : Right foot 2nd digit - Hammer toe correction - Dry eschar - no open wounds.  [FreeTextEntry8] : 0.7 [FreeTextEntry9] : 0.5 [de-identified] : 0.0 (scab) [de-identified] : No Product [de-identified] : Mechanically cleansed with sterile gauze and 0.9% normal saline. Dry Dressing Kerlix [TWNoteComboBox4] : Moderate [TWNoteComboBox5] : No [TWNoteComboBox6] : Surgical [de-identified] : No [de-identified] : Erythema [de-identified] : None [de-identified] : None [de-identified] : None [de-identified] : No [de-identified] : Tendon [TWNoteComboBox7] : Sacha [de-identified] : Debridement performed of all devitalized tissue to bleeding viable tissue [de-identified] : Daily [de-identified] : Primary Dressing [de-identified] : None [de-identified] : Normal [de-identified] : No [de-identified] : None [de-identified] : None [de-identified] : No [de-identified] : Daily [de-identified] : Primary Dressing

## 2024-03-26 NOTE — PROCEDURE
[Saline] : saline [Scalpel] : scalpel [Sharp curette] : sharp curette [Skin] : skin [Other:___] : [unfilled] [Subcutaneous Tissue] : subcutaneous tissue [Fascia] : fascia [Hydrated with saline] : hydrated with saline [Other: ___] : [unfilled] [____ % was discarded] : and [unfilled] % was discarded [____ % was used] : and [unfilled] % was used [FreeTextEntry1] : adaptic [] : No [de-identified] : Open ulcer dorsal right first metatarsal head  [FreeTextEntry9] : 1800 [FreeTextEntry7] : same [FreeTextEntry6] : Oper ulcer right dorsal foot  [de-identified] : [place] [de-identified] : o [de-identified] : 3cc [de-identified] : lobo

## 2024-03-26 NOTE — ASSESSMENT
[Verbal] : Verbal [Demo] : Demo [Patient] : Patient [Spouse] : Spouse [Good - alert, interested, motivated] : Good - alert, interested, motivated [Verbalizes knowledge/Understanding] : Verbalizes knowledge/understanding [Dressing changes] : dressing changes [Foot Care] : foot care [Skin Care] : skin care [Signs and symptoms of infection] : sign and symptoms of infection [Pain Management] : pain management [How and When to Call] : how and when to call [Patient responsibility to plan of care] : patient responsibility to plan of care [] : Yes [Stable] : stable [Ambulatory] : Ambulatory [Home] : Home [Not Applicable - Long Term Care/Home Health Agency] : Long Term Care/Home Health Agency: Not Applicable [FreeTextEntry2] : Infection prevention Localized wound care  Goal remaining pain free regarding wounds  [FreeTextEntry4] : Pt comes to [facility] for dressing changes - neither she nor her  can do them at home. Pt frustrated that wound is not healing faster.  DPM assessed wound and applied NuSheild graft. New auth submitted for next week F/U 1 week

## 2024-03-26 NOTE — VITALS
[Pain related to present condition?] : The patient's  pain is related to present condition. [] : No [de-identified] : 1/10 [FreeTextEntry3] : Heel and bunion [FreeTextEntry1] : "Nothing" - she hasn't been taking anything [FreeTextEntry2] : Walking [FreeTextEntry4] : Offload

## 2024-03-27 ENCOUNTER — APPOINTMENT (OUTPATIENT)
Dept: WOUND CARE | Facility: HOSPITAL | Age: 79
End: 2024-03-27
Payer: MEDICARE

## 2024-03-27 ENCOUNTER — OUTPATIENT (OUTPATIENT)
Dept: OUTPATIENT SERVICES | Facility: HOSPITAL | Age: 79
LOS: 1 days | Discharge: HOME | End: 2024-03-27
Payer: MEDICARE

## 2024-03-27 VITALS
DIASTOLIC BLOOD PRESSURE: 73 MMHG | WEIGHT: 109 LBS | RESPIRATION RATE: 18 BRPM | HEIGHT: 62 IN | SYSTOLIC BLOOD PRESSURE: 183 MMHG | OXYGEN SATURATION: 93 % | HEART RATE: 93 BPM | TEMPERATURE: 97.8 F | BODY MASS INDEX: 20.06 KG/M2

## 2024-03-27 VITALS
DIASTOLIC BLOOD PRESSURE: 80 MMHG | WEIGHT: 109 LBS | BODY MASS INDEX: 20.06 KG/M2 | HEIGHT: 62 IN | SYSTOLIC BLOOD PRESSURE: 130 MMHG

## 2024-03-27 DIAGNOSIS — M21.611 BUNION OF RIGHT FOOT: ICD-10-CM

## 2024-03-27 DIAGNOSIS — Z90.49 ACQUIRED ABSENCE OF OTHER SPECIFIED PARTS OF DIGESTIVE TRACT: Chronic | ICD-10-CM

## 2024-03-27 DIAGNOSIS — Z98.89 OTHER SPECIFIED POSTPROCEDURAL STATES: Chronic | ICD-10-CM

## 2024-03-27 DIAGNOSIS — T81.31XA DISRUPTION OF EXTERNAL OPERATION (SURGICAL) WOUND, NOT ELSEWHERE CLASSIFIED, INITIAL ENCOUNTER: ICD-10-CM

## 2024-03-27 DIAGNOSIS — Z98.890 OTHER SPECIFIED POSTPROCEDURAL STATES: Chronic | ICD-10-CM

## 2024-03-27 PROCEDURE — 15275 SKIN SUB GRAFT FACE/NK/HF/G: CPT | Mod: 78

## 2024-03-27 PROCEDURE — 15275 SKIN SUB GRAFT FACE/NK/HF/G: CPT

## 2024-03-27 NOTE — REVIEW OF SYSTEMS
[Joint Stiffness] : joint stiffness [Skin Lesions] : skin lesion [Convulsions] : convulsions [Confused] : confusion [Negative] : Endocrine [FreeTextEnMain [facility] Health/[facility]] : 9026 [Fever] : no fever [Chills] : no chills [Eye Pain] : no eye pain [Loss Of Hearing] : no hearing loss [Abdominal Pain] : no abdominal pain [Shortness Of Breath] : no shortness of breath [Anxiety] : no anxiety [Easy Bleeding] : no tendency for easy bleeding [FreeTextEntry5] : HTN, HLD [FreeTextEntry9] : Bunion and hammer toe deformity's of both feet , right worse then the left  [de-identified] :  s/p arthroplasty and hallux s/p [place] with staple , stable , no soi, suture line has eschar which was partially removed

## 2024-03-27 NOTE — VITALS
[Pain related to present condition?] : The patient's  pain is related to present condition. [] : No [de-identified] : 1/10 [FreeTextEntry1] : "Nothing" - she hasn't been taking anything [FreeTextEntry3] : Heel and bunion [FreeTextEntry2] : Walking [FreeTextEntry5] : Patient took 2 doses of Prednisone prescribed for shoulder surgery  [FreeTextEntry4] : Offload

## 2024-03-27 NOTE — PHYSICAL EXAM
[Abdominal Pad] : Abdominal Pad [4 x 4] : 4 x 4  [1+] : left 1+ [Ankle Swelling Bilaterally] : bilaterally  [Varicose Veins Of Lower Extremities] : bilaterally [Ankle Swelling On The Right] : mild [Alert] : alert [Oriented to Place] : oriented to place [Oriented to Person] : oriented to person [Calm] : calm [Oriented to Time] : oriented to time [] : not present [Ankle Swelling (On Exam)] : not present [Purpura] : no purpura  [Petechiae] : no petechiae [Skin Ulcer] : no ulcer [Skin Induration] : no induration [de-identified] : A&Ox3, NAD [de-identified] : HTN, HLD [de-identified] : Status post right foot [place] bunionectomy right second digit arthroplasty with K wire fixation [de-identified] : Right foot incision dorsal medial incision over the 1st MPJ dehiscence with open wound down to skin, subcutaneous tissue, fat, 2nd digit surgical site healed  [de-identified] : wnl [FreeTextEntry2] : 1.2cm [FreeTextEntry1] : Right foot bunion correction [FreeTextEntry3] : 0.5cm [FreeTextEntry4] : 0.2 - 0.3cm [de-identified] : Serous [de-identified] : Mild  [de-identified] : None [de-identified] : Post debridement measurements 1.3cm x 0.6cm x 0.3cm [de-identified] : Dm & Adaptic Touch [de-identified] : Mechanically cleansed with sterile gauze and 0.9% normal saline. Dry Dressing Kerlix Stockinette  Current Tissue : Tissue Product:  BIO_TISSUE Tissue Type:  HUMAN Added On The Fly:  No Serial Number:  [id number] Lot Number:  [id number]. Ref. Code:  [id number] Expiration Date:  11/14/2029 Received Date:  03/26/24 Tissue Status:  Implanted Tissue Size:  1.6 sq cm 50% applied 50% wasted  Saline Lot #[id number] 10/12/2025 [FreeTextEntry8] : 0.7 [FreeTextEntry7] : Right foot 2nd digit - Hammer toe correction - Dry eschar - no open wounds.  [FreeTextEntry9] : 0.5 [de-identified] : 0.0 (scab) [de-identified] : No Product [de-identified] : Mechanically cleansed with sterile gauze and 0.9% normal saline. Dry Dressing Kerlix [TWNoteComboBox4] : Moderate [TWNoteComboBox5] : No [TWNoteComboBox6] : Surgical [de-identified] : Erythema [de-identified] : No [de-identified] : None [de-identified] : None [de-identified] : None [de-identified] : Tendon [de-identified] : No [TWNoteComboBox7] : Sacha [de-identified] : Debridement performed of all devitalized tissue to bleeding viable tissue [de-identified] : Daily [de-identified] : Primary Dressing [de-identified] : None [de-identified] : No [de-identified] : Normal [de-identified] : None [de-identified] : None [de-identified] : No [de-identified] : Daily [de-identified] : Primary Dressing

## 2024-03-27 NOTE — PROCEDURE
[Saline] : saline [Scalpel] : scalpel [Other:___] : [unfilled] [Sharp curette] : sharp curette [Skin] : skin [Subcutaneous Tissue] : subcutaneous tissue [Hydrated with saline] : hydrated with saline [Fascia] : fascia [Other: ___] : [unfilled] [____ % was used] : and [unfilled] % was used [____ % was discarded] : and [unfilled] % was discarded [FreeTextEntry1] : adaptic [] : No [FreeTextEntry9] : 0081 [de-identified] : Open ulcer dorsal right first metatarsal head  [de-identified] : [place] [de-identified] : [place] [FreeTextEntry7] : same [FreeTextEntry6] : Oper ulcer right dorsal foot  [de-identified] : o [de-identified] : 1 [de-identified] : lobo

## 2024-03-27 NOTE — ASSESSMENT
[Verbal] : Verbal [Patient] : Patient [Demo] : Demo [Good - alert, interested, motivated] : Good - alert, interested, motivated [Spouse] : Spouse [Verbalizes knowledge/Understanding] : Verbalizes knowledge/understanding [Dressing changes] : dressing changes [Foot Care] : foot care [Signs and symptoms of infection] : sign and symptoms of infection [Skin Care] : skin care [How and When to Call] : how and when to call [Pain Management] : pain management [Patient responsibility to plan of care] : patient responsibility to plan of care [Stable] : stable [Home] : Home [Ambulatory] : Ambulatory [Not Applicable - Long Term Care/Home Health Agency] : Long Term Care/Home Health Agency: Not Applicable [Nutrition] : nutrition [Other: ____] : [unfilled] [] : No [FreeTextEntry2] : Infection prevention Localized wound care  Goal remaining pain free regarding wounds Skin substitute Sharp Debridement  [FreeTextEntry4] : Pt comes to [facility] for dressing changes - neither she nor her  can do them at home. Encouraged to start walking. Pt frustrated that wound is not healing faster.  DPM assessed wound and applied NuSheild graft. New auth submitted for next week F/U 1 week

## 2024-03-28 DIAGNOSIS — L97.515 NON-PRESSURE CHRONIC ULCER OF OTHER PART OF RIGHT FOOT WITH MUSCLE INVOLVEMENT WITHOUT EVIDENCE OF NECROSIS: ICD-10-CM

## 2024-03-28 DIAGNOSIS — N18.30 CHRONIC KIDNEY DISEASE, STAGE 3 UNSPECIFIED: ICD-10-CM

## 2024-03-28 DIAGNOSIS — Z88.1 ALLERGY STATUS TO OTHER ANTIBIOTIC AGENTS STATUS: ICD-10-CM

## 2024-03-28 DIAGNOSIS — E78.5 HYPERLIPIDEMIA, UNSPECIFIED: ICD-10-CM
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
## 2024-03-28 DIAGNOSIS — Z85.118 PERSONAL HISTORY OF OTHER MALIGNANT NEOPLASM OF BRONCHUS AND LUNG: ICD-10-CM

## 2024-03-28 DIAGNOSIS — Z87.891 PERSONAL HISTORY OF NICOTINE DEPENDENCE: ICD-10-CM

## 2024-03-28 DIAGNOSIS — Z88.0 ALLERGY STATUS TO PENICILLIN: ICD-10-CM

## 2024-03-28 DIAGNOSIS — Z90.2 ACQUIRED ABSENCE OF LUNG [PART OF]: ICD-10-CM

## 2024-03-28 DIAGNOSIS — I12.9 HYPERTENSIVE CHRONIC KIDNEY DISEASE WITH STAGE 1 THROUGH STAGE 4 CHRONIC KIDNEY DISEASE, OR UNSPECIFIED CHRONIC KIDNEY DISEASE: ICD-10-CM

## 2024-03-28 DIAGNOSIS — M06.9 RHEUMATOID ARTHRITIS, UNSPECIFIED: ICD-10-CM

## 2024-03-28 DIAGNOSIS — J44.9 CHRONIC OBSTRUCTIVE PULMONARY DISEASE, UNSPECIFIED: ICD-10-CM
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
## 2024-03-28 DIAGNOSIS — Z86.73 PERSONAL HISTORY OF TRANSIENT ISCHEMIC ATTACK (TIA), AND CEREBRAL INFARCTION WITHOUT RESIDUAL DEFICITS: ICD-10-CM

## 2024-03-28 DIAGNOSIS — Z79.899 OTHER LONG TERM (CURRENT) DRUG THERAPY: ICD-10-CM

## 2024-03-28 DIAGNOSIS — Z98.49 CATARACT EXTRACTION STATUS, UNSPECIFIED EYE: ICD-10-CM

## 2024-03-28 DIAGNOSIS — K21.9 GASTRO-ESOPHAGEAL REFLUX DISEASE WITHOUT ESOPHAGITIS: ICD-10-CM

## 2024-03-28 DIAGNOSIS — I65.29 OCCLUSION AND STENOSIS OF UNSPECIFIED CAROTID ARTERY: ICD-10-CM

## 2024-03-28 DIAGNOSIS — Z91.041 RADIOGRAPHIC DYE ALLERGY STATUS: ICD-10-CM

## 2024-03-28 DIAGNOSIS — Z88.8 ALLERGY STATUS TO OTHER DRUGS, MEDICAMENTS AND BIOLOGICAL SUBSTANCES STATUS: ICD-10-CM

## 2024-03-28 DIAGNOSIS — T81.31XA DISRUPTION OF EXTERNAL OPERATION (SURGICAL) WOUND, NOT ELSEWHERE CLASSIFIED, INITIAL ENCOUNTER: ICD-10-CM

## 2024-03-28 DIAGNOSIS — Z90.49 ACQUIRED ABSENCE OF OTHER SPECIFIED PARTS OF DIGESTIVE TRACT: ICD-10-CM

## 2024-03-28 DIAGNOSIS — Z79.82 LONG TERM (CURRENT) USE OF ASPIRIN: ICD-10-CM

## 2024-03-28 DIAGNOSIS — Z87.01 PERSONAL HISTORY OF PNEUMONIA (RECURRENT): ICD-10-CM

## 2024-04-02 ENCOUNTER — OUTPATIENT (OUTPATIENT)
Dept: OUTPATIENT SERVICES | Facility: HOSPITAL | Age: 79
LOS: 1 days | Discharge: HOME | End: 2024-04-02
Payer: MEDICARE

## 2024-04-02 ENCOUNTER — APPOINTMENT (OUTPATIENT)
Dept: WOUND CARE | Facility: HOSPITAL | Age: 79
End: 2024-04-02
Payer: MEDICARE

## 2024-04-02 VITALS
SYSTOLIC BLOOD PRESSURE: 135 MMHG | HEART RATE: 87 BPM | WEIGHT: 109 LBS | HEIGHT: 62 IN | DIASTOLIC BLOOD PRESSURE: 54 MMHG | RESPIRATION RATE: 18 BRPM | OXYGEN SATURATION: 94 % | TEMPERATURE: 98 F | BODY MASS INDEX: 20.06 KG/M2

## 2024-04-02 DIAGNOSIS — Z90.49 ACQUIRED ABSENCE OF OTHER SPECIFIED PARTS OF DIGESTIVE TRACT: Chronic | ICD-10-CM

## 2024-04-02 DIAGNOSIS — Z98.890 OTHER SPECIFIED POSTPROCEDURAL STATES: Chronic | ICD-10-CM

## 2024-04-02 DIAGNOSIS — Z98.89 OTHER SPECIFIED POSTPROCEDURAL STATES: Chronic | ICD-10-CM

## 2024-04-02 DIAGNOSIS — M21.611 BUNION OF RIGHT FOOT: ICD-10-CM

## 2024-04-02 PROCEDURE — G0463: CPT

## 2024-04-02 PROCEDURE — 99213 OFFICE O/P EST LOW 20 MIN: CPT | Mod: 24

## 2024-04-02 NOTE — HISTORY OF PRESENT ILLNESS
[FreeTextEntry1] : Patient is 78 year old female presenting for f/u s/p bunion surgery and hammer toe right foot with stable eschars. Patient denies any pain to the right foot.  Patient seen for right foot dorsal medial incision dehiscence with open wound down to skin, subcutaneous tissue, fat, healed with stable eschar.

## 2024-04-02 NOTE — REVIEW OF SYSTEMS
[Fever] : no fever [Chills] : no chills [Eye Pain] : no eye pain [Loss Of Hearing] : no hearing loss [Shortness Of Breath] : no shortness of breath [Abdominal Pain] : no abdominal pain [Joint Stiffness] : joint stiffness [Skin Lesions] : skin lesion [Confused] : confusion [Convulsions] : convulsions [Anxiety] : no anxiety [Easy Bleeding] : no tendency for easy bleeding [Negative] : Endocrine [FreeTextEntry5] : HTN, HLD [FreeTextEntry9] : Bunion and hammer toe deformity's of both feet , right worse then the left  [de-identified] :  s/p arthroplasty and hallux s/p [place] with staple , stable , no soi, suture line has eschar which was partially removed

## 2024-04-02 NOTE — PHYSICAL EXAM
[2 x 2] : 2 x 2  [Ankle Swelling (On Exam)] : not present [1+] : left 1+ [Ankle Swelling Bilaterally] : bilaterally  [Varicose Veins Of Lower Extremities] : bilaterally [Ankle Swelling On The Right] : mild [] : bilaterally [Purpura] : no purpura  [Petechiae] : no petechiae [Skin Ulcer] : no ulcer [Skin Induration] : no induration [Alert] : alert [Oriented to Person] : oriented to person [Oriented to Place] : oriented to place [Oriented to Time] : oriented to time [Calm] : calm [de-identified] : HTN, HLD [de-identified] : A&Ox3, NAD [de-identified] : Status post right foot [place] bunionectomy right second digit arthroplasty with K wire fixation [de-identified] : wnl [de-identified] : Right foot incision dorsal medial incision over the 1st MPJ dehiscence with open wound down to skin, subcutaneous tissue, fat, healed with stable eschar.  2nd digit surgical site healed  [FreeTextEntry2] : 1.2cm [FreeTextEntry1] : Right Foot Great Toe [FreeTextEntry3] : 0.5cm [FreeTextEntry4] : 0.2  [de-identified] : Serosanguinous [de-identified] : Silver Alginate [de-identified] : Mechanically cleansed with sterile gauze and 0.9% normal saline. Cloth Tape [TWNoteComboBox4] : Small [TWNoteComboBox6] : Surgical [TWNoteComboBox5] : No [de-identified] : No [de-identified] : Normal [de-identified] : None [de-identified] : None [de-identified] : None [de-identified] : No [de-identified] : False [TWNoteComboBox7] : False [de-identified] : False [de-identified] : 2x Weekly [de-identified] : Primary Dressing [de-identified] : False [de-identified] : False [de-identified] : False [de-identified] : False [de-identified] : False [de-identified] : False [de-identified] : False [de-identified] : False

## 2024-04-02 NOTE — ASSESSMENT
[Verbal] : Verbal [Patient] : Patient [Demo] : Demo [Spouse] : Spouse [Good - alert, interested, motivated] : Good - alert, interested, motivated [Dressing changes] : dressing changes [Verbalizes knowledge/Understanding] : Verbalizes knowledge/understanding [Foot Care] : foot care [Skin Care] : skin care [Signs and symptoms of infection] : sign and symptoms of infection [Nutrition] : nutrition [How and When to Call] : how and when to call [Pain Management] : pain management [Patient responsibility to plan of care] : patient responsibility to plan of care [Other: ____] : [unfilled] [Stable] : stable [Home] : Home [Ambulatory] : Ambulatory [Not Applicable - Long Term Care/Home Health Agency] : Long Term Care/Home Health Agency: Not Applicable [] : Yes [FreeTextEntry2] : Infection prevention Localized wound care  Goal remaining pain free regarding wounds Skin substitute Sharp Debridement  [FreeTextEntry4] : No Graft used today, DPM cleaned surrounding wound, changed Tx to silver alginate Pt to return Friday for dressing change and in 1 week for assessment.

## 2024-04-02 NOTE — VITALS
[Pain related to present condition?] : The patient's  pain is related to present condition. [de-identified] : 2/10 [] : No [FreeTextEntry1] : "Nothing" - she hasn't been taking anything [FreeTextEntry3] : Heel and bunion [FreeTextEntry2] : Walking [FreeTextEntry4] : Offload

## 2024-04-02 NOTE — PLAN
[FreeTextEntry1] : Patient examined and evaluated at this time.  Continue local wound care and offloading.  Spent 20 minutes on patient care and medical decision making.  Patient to follow-up in 1 week.

## 2024-04-03 DIAGNOSIS — I65.29 OCCLUSION AND STENOSIS OF UNSPECIFIED CAROTID ARTERY: ICD-10-CM

## 2024-04-03 DIAGNOSIS — Z86.73 PERSONAL HISTORY OF TRANSIENT ISCHEMIC ATTACK (TIA), AND CEREBRAL INFARCTION WITHOUT RESIDUAL DEFICITS: ICD-10-CM

## 2024-04-03 DIAGNOSIS — Z91.041 RADIOGRAPHIC DYE ALLERGY STATUS: ICD-10-CM
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
## 2024-04-03 DIAGNOSIS — Z82.3 FAMILY HISTORY OF STROKE: ICD-10-CM

## 2024-04-03 DIAGNOSIS — N18.30 CHRONIC KIDNEY DISEASE, STAGE 3 UNSPECIFIED: ICD-10-CM

## 2024-04-03 DIAGNOSIS — Z85.118 PERSONAL HISTORY OF OTHER MALIGNANT NEOPLASM OF BRONCHUS AND LUNG: ICD-10-CM

## 2024-04-03 DIAGNOSIS — Z80.1 FAMILY HISTORY OF MALIGNANT NEOPLASM OF TRACHEA, BRONCHUS AND LUNG: ICD-10-CM

## 2024-04-03 DIAGNOSIS — Z88.8 ALLERGY STATUS TO OTHER DRUGS, MEDICAMENTS AND BIOLOGICAL SUBSTANCES STATUS: ICD-10-CM

## 2024-04-03 DIAGNOSIS — I12.9 HYPERTENSIVE CHRONIC KIDNEY DISEASE WITH STAGE 1 THROUGH STAGE 4 CHRONIC KIDNEY DISEASE, OR UNSPECIFIED CHRONIC KIDNEY DISEASE: ICD-10-CM

## 2024-04-03 DIAGNOSIS — Z79.82 LONG TERM (CURRENT) USE OF ASPIRIN: ICD-10-CM

## 2024-04-03 DIAGNOSIS — Z98.49 CATARACT EXTRACTION STATUS, UNSPECIFIED EYE: ICD-10-CM

## 2024-04-03 DIAGNOSIS — K21.9 GASTRO-ESOPHAGEAL REFLUX DISEASE WITHOUT ESOPHAGITIS: ICD-10-CM

## 2024-04-03 DIAGNOSIS — Z88.1 ALLERGY STATUS TO OTHER ANTIBIOTIC AGENTS STATUS: ICD-10-CM

## 2024-04-03 DIAGNOSIS — Z90.2 ACQUIRED ABSENCE OF LUNG [PART OF]: ICD-10-CM

## 2024-04-03 DIAGNOSIS — L97.515 NON-PRESSURE CHRONIC ULCER OF OTHER PART OF RIGHT FOOT WITH MUSCLE INVOLVEMENT WITHOUT EVIDENCE OF NECROSIS: ICD-10-CM

## 2024-04-03 DIAGNOSIS — Z90.49 ACQUIRED ABSENCE OF OTHER SPECIFIED PARTS OF DIGESTIVE TRACT: ICD-10-CM
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
## 2024-04-03 DIAGNOSIS — M06.9 RHEUMATOID ARTHRITIS, UNSPECIFIED: ICD-10-CM

## 2024-04-03 DIAGNOSIS — Z87.891 PERSONAL HISTORY OF NICOTINE DEPENDENCE: ICD-10-CM

## 2024-04-03 DIAGNOSIS — T81.31XD DISRUPTION OF EXTERNAL OPERATION (SURGICAL) WOUND, NOT ELSEWHERE CLASSIFIED, SUBSEQUENT ENCOUNTER: ICD-10-CM

## 2024-04-03 DIAGNOSIS — J44.9 CHRONIC OBSTRUCTIVE PULMONARY DISEASE, UNSPECIFIED: ICD-10-CM

## 2024-04-03 DIAGNOSIS — Z87.01 PERSONAL HISTORY OF PNEUMONIA (RECURRENT): ICD-10-CM

## 2024-04-03 DIAGNOSIS — Z82.49 FAMILY HISTORY OF ISCHEMIC HEART DISEASE AND OTHER DISEASES OF THE CIRCULATORY SYSTEM: ICD-10-CM

## 2024-04-03 DIAGNOSIS — Z83.3 FAMILY HISTORY OF DIABETES MELLITUS: ICD-10-CM

## 2024-04-03 DIAGNOSIS — E78.5 HYPERLIPIDEMIA, UNSPECIFIED: ICD-10-CM

## 2024-04-03 DIAGNOSIS — Z88.0 ALLERGY STATUS TO PENICILLIN: ICD-10-CM

## 2024-04-03 DIAGNOSIS — Z79.899 OTHER LONG TERM (CURRENT) DRUG THERAPY: ICD-10-CM

## 2024-04-05 ENCOUNTER — APPOINTMENT (OUTPATIENT)
Dept: CARDIOLOGY | Facility: CLINIC | Age: 79
End: 2024-04-05
Payer: MEDICARE

## 2024-04-05 ENCOUNTER — APPOINTMENT (OUTPATIENT)
Dept: WOUND CARE | Facility: HOSPITAL | Age: 79
End: 2024-04-05
Payer: MEDICARE

## 2024-04-05 ENCOUNTER — OUTPATIENT (OUTPATIENT)
Dept: OUTPATIENT SERVICES | Facility: HOSPITAL | Age: 79
LOS: 1 days | Discharge: HOME | End: 2024-04-05
Payer: MEDICARE

## 2024-04-05 VITALS
RESPIRATION RATE: 18 BRPM | HEIGHT: 62 IN | DIASTOLIC BLOOD PRESSURE: 65 MMHG | BODY MASS INDEX: 20.06 KG/M2 | SYSTOLIC BLOOD PRESSURE: 131 MMHG | HEART RATE: 76 BPM | WEIGHT: 109 LBS | TEMPERATURE: 98.2 F | OXYGEN SATURATION: 94 %

## 2024-04-05 DIAGNOSIS — Z98.890 OTHER SPECIFIED POSTPROCEDURAL STATES: Chronic | ICD-10-CM

## 2024-04-05 DIAGNOSIS — T81.31XD DISRUPTION OF EXTERNAL OPERATION (SURGICAL) WOUND, NOT ELSEWHERE CLASSIFIED, SUBSEQUENT ENCOUNTER: ICD-10-CM

## 2024-04-05 DIAGNOSIS — M21.611 BUNION OF RIGHT FOOT: ICD-10-CM
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
## 2024-04-05 DIAGNOSIS — L97.515 NON-PRESSURE CHRONIC ULCER OF OTHER PART OF RIGHT FOOT WITH MUSCLE INVOLVEMENT WITHOUT EVIDENCE OF NECROSIS: ICD-10-CM

## 2024-04-05 DIAGNOSIS — Z98.89 OTHER SPECIFIED POSTPROCEDURAL STATES: Chronic | ICD-10-CM
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
## 2024-04-05 PROCEDURE — 93306 TTE W/DOPPLER COMPLETE: CPT

## 2024-04-05 PROCEDURE — 93880 EXTRACRANIAL BILAT STUDY: CPT

## 2024-04-05 PROCEDURE — ZZZZZ: CPT

## 2024-04-05 PROCEDURE — G0463: CPT

## 2024-04-09 NOTE — ED ADULT NURSE NOTE - NS ED NURSE RECORD ANOTHER HT AND WT
Pt here for prolia injection.  Ca++ 9.1.  Endorses taking Ca++ and vitamin D supplement at home.  Denies jaw pain.  Prolia administered to abdominal tissue.  Pt tolerated.  Ambulated out unassisted by self.  
Yes

## 2024-04-11 ENCOUNTER — APPOINTMENT (OUTPATIENT)
Dept: WOUND CARE | Facility: HOSPITAL | Age: 79
End: 2024-04-11
Payer: MEDICARE

## 2024-04-11 ENCOUNTER — OUTPATIENT (OUTPATIENT)
Dept: OUTPATIENT SERVICES | Facility: HOSPITAL | Age: 79
LOS: 1 days | Discharge: HOME | End: 2024-04-11
Payer: MEDICARE

## 2024-04-11 VITALS
RESPIRATION RATE: 18 BRPM | DIASTOLIC BLOOD PRESSURE: 73 MMHG | HEART RATE: 92 BPM | OXYGEN SATURATION: 90 % | HEIGHT: 62 IN | SYSTOLIC BLOOD PRESSURE: 178 MMHG | WEIGHT: 109 LBS | BODY MASS INDEX: 20.06 KG/M2

## 2024-04-11 DIAGNOSIS — Z90.49 ACQUIRED ABSENCE OF OTHER SPECIFIED PARTS OF DIGESTIVE TRACT: Chronic | ICD-10-CM

## 2024-04-11 DIAGNOSIS — Z98.890 OTHER SPECIFIED POSTPROCEDURAL STATES: Chronic | ICD-10-CM

## 2024-04-11 DIAGNOSIS — M21.611 BUNION OF RIGHT FOOT: ICD-10-CM

## 2024-04-11 DIAGNOSIS — Z98.89 OTHER SPECIFIED POSTPROCEDURAL STATES: Chronic | ICD-10-CM

## 2024-04-11 PROCEDURE — G0463: CPT

## 2024-04-11 PROCEDURE — 99024 POSTOP FOLLOW-UP VISIT: CPT

## 2024-04-11 NOTE — PHYSICAL EXAM
[2 x 2] : 2 x 2  [1+] : left 1+ [Ankle Swelling (On Exam)] : not present [Ankle Swelling Bilaterally] : bilaterally  [Varicose Veins Of Lower Extremities] : bilaterally [Ankle Swelling On The Right] : mild [] : bilaterally [Purpura] : no purpura  [Petechiae] : no petechiae [Skin Ulcer] : no ulcer [Skin Induration] : no induration [Alert] : alert [Oriented to Person] : oriented to person [Oriented to Place] : oriented to place [Oriented to Time] : oriented to time [Calm] : calm [de-identified] : A&Ox3, NAD [de-identified] : HTN, HLD [de-identified] : Status post right foot [place] bunionectomy right second digit arthroplasty with K wire fixation [de-identified] : Right foot incision dorsal medial incision over the 1st MPJ dehiscence with open wound down to skin, subcutaneous tissue, fat, healed with stable eschar.  2nd digit surgical site healed  [de-identified] : wnl [FreeTextEntry1] : Right Foot Great Toe, s/p bunion surgery [FreeTextEntry2] : 1.1 [FreeTextEntry3] : 0.4 [FreeTextEntry4] : 0.1 [de-identified] : serous [de-identified] : 2 x 2 gauze, tape [de-identified] : Mechanically cleansed with sterile gauze and normal saline 0.9% Dry Dressing [TWNoteComboBox4] : Small [TWNoteComboBox5] : No [TWNoteComboBox6] : Surgical [de-identified] : No [de-identified] : Normal [de-identified] : None [de-identified] : None [de-identified] : None [de-identified] : No [de-identified] : 2x Weekly [de-identified] : Primary Dressing

## 2024-04-11 NOTE — ASSESSMENT
[Verbal] : Verbal [Demo] : Demo [Patient] : Patient [Spouse] : Spouse [Good - alert, interested, motivated] : Good - alert, interested, motivated [Verbalizes knowledge/Understanding] : Verbalizes knowledge/understanding [Dressing changes] : dressing changes [Foot Care] : foot care [Skin Care] : skin care [Signs and symptoms of infection] : sign and symptoms of infection [Nutrition] : nutrition [How and When to Call] : how and when to call [Pain Management] : pain management [Patient responsibility to plan of care] : patient responsibility to plan of care [Other: ____] : [unfilled] [Stable] : stable [Home] : Home [Ambulatory] : Ambulatory [Not Applicable - Long Term Care/Home Health Agency] : Long Term Care/Home Health Agency: Not Applicable [] : No [FreeTextEntry2] : Infection Prevention Foot and nail care Nutrition and wound healing Pt Demonstrates use of both nonpharmacological and pharmacological pain relief strategies. [FreeTextEntry4] : F/U 1 Week

## 2024-04-11 NOTE — REVIEW OF SYSTEMS
[Fever] : no fever [Chills] : no chills [Eye Pain] : no eye pain [Loss Of Hearing] : no hearing loss [Shortness Of Breath] : no shortness of breath [Abdominal Pain] : no abdominal pain [Joint Stiffness] : joint stiffness [Skin Lesions] : skin lesion [Confused] : confusion [Convulsions] : convulsions [Anxiety] : no anxiety [Easy Bleeding] : no tendency for easy bleeding [Negative] : Endocrine [FreeTextEntry5] : HTN, HLD [FreeTextEntry9] : Bunion and hammer toe deformity's of both feet , right worse then the left  [de-identified] :  s/p arthroplasty and hallux s/p [place] with staple , stable , no soi, suture line has eschar which was partially removed

## 2024-04-11 NOTE — VITALS
[Pain related to present condition?] : The patient's  pain is related to present condition. [] : No [de-identified] : 2/10 [FreeTextEntry3] : Heel and bunion [FreeTextEntry1] : "Nothing" - she hasn't been taking anything [FreeTextEntry2] : Walking [FreeTextEntry4] : Offload

## 2024-04-15 DIAGNOSIS — Z88.0 ALLERGY STATUS TO PENICILLIN: ICD-10-CM

## 2024-04-15 DIAGNOSIS — Z85.118 PERSONAL HISTORY OF OTHER MALIGNANT NEOPLASM OF BRONCHUS AND LUNG: ICD-10-CM

## 2024-04-15 DIAGNOSIS — E78.5 HYPERLIPIDEMIA, UNSPECIFIED: ICD-10-CM
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
## 2024-04-15 DIAGNOSIS — T81.31XD DISRUPTION OF EXTERNAL OPERATION (SURGICAL) WOUND, NOT ELSEWHERE CLASSIFIED, SUBSEQUENT ENCOUNTER: ICD-10-CM

## 2024-04-15 DIAGNOSIS — J44.9 CHRONIC OBSTRUCTIVE PULMONARY DISEASE, UNSPECIFIED: ICD-10-CM

## 2024-04-15 DIAGNOSIS — M06.9 RHEUMATOID ARTHRITIS, UNSPECIFIED: ICD-10-CM

## 2024-04-15 DIAGNOSIS — Z88.1 ALLERGY STATUS TO OTHER ANTIBIOTIC AGENTS: ICD-10-CM

## 2024-04-15 DIAGNOSIS — L97.515 NON-PRESSURE CHRONIC ULCER OF OTHER PART OF RIGHT FOOT WITH MUSCLE INVOLVEMENT WITHOUT EVIDENCE OF NECROSIS: ICD-10-CM

## 2024-04-15 DIAGNOSIS — Z86.73 PERSONAL HISTORY OF TRANSIENT ISCHEMIC ATTACK (TIA), AND CEREBRAL INFARCTION WITHOUT RESIDUAL DEFICITS: ICD-10-CM

## 2024-04-15 DIAGNOSIS — Z90.2 ACQUIRED ABSENCE OF LUNG [PART OF]: ICD-10-CM

## 2024-04-15 DIAGNOSIS — Z82.3 FAMILY HISTORY OF STROKE: ICD-10-CM

## 2024-04-15 DIAGNOSIS — Z98.49 CATARACT EXTRACTION STATUS, UNSPECIFIED EYE: ICD-10-CM

## 2024-04-15 DIAGNOSIS — Z83.3 FAMILY HISTORY OF DIABETES MELLITUS: ICD-10-CM

## 2024-04-15 DIAGNOSIS — Z80.1 FAMILY HISTORY OF MALIGNANT NEOPLASM OF TRACHEA, BRONCHUS AND LUNG: ICD-10-CM

## 2024-04-15 DIAGNOSIS — Z79.82 LONG TERM (CURRENT) USE OF ASPIRIN: ICD-10-CM

## 2024-04-15 DIAGNOSIS — Z87.891 PERSONAL HISTORY OF NICOTINE DEPENDENCE: ICD-10-CM

## 2024-04-15 DIAGNOSIS — N18.30 CHRONIC KIDNEY DISEASE, STAGE 3 UNSPECIFIED: ICD-10-CM

## 2024-04-15 DIAGNOSIS — Z88.8 ALLERGY STATUS TO OTHER DRUGS, MEDICAMENTS AND BIOLOGICAL SUBSTANCES: ICD-10-CM

## 2024-04-15 DIAGNOSIS — Z79.899 OTHER LONG TERM (CURRENT) DRUG THERAPY: ICD-10-CM

## 2024-04-15 DIAGNOSIS — K21.9 GASTRO-ESOPHAGEAL REFLUX DISEASE WITHOUT ESOPHAGITIS: ICD-10-CM

## 2024-04-15 DIAGNOSIS — Z91.041 RADIOGRAPHIC DYE ALLERGY STATUS: ICD-10-CM

## 2024-04-15 DIAGNOSIS — Z90.49 ACQUIRED ABSENCE OF OTHER SPECIFIED PARTS OF DIGESTIVE TRACT: ICD-10-CM

## 2024-04-15 DIAGNOSIS — Z87.01 PERSONAL HISTORY OF PNEUMONIA (RECURRENT): ICD-10-CM

## 2024-04-15 DIAGNOSIS — I12.9 HYPERTENSIVE CHRONIC KIDNEY DISEASE WITH STAGE 1 THROUGH STAGE 4 CHRONIC KIDNEY DISEASE, OR UNSPECIFIED CHRONIC KIDNEY DISEASE: ICD-10-CM
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
## 2024-04-15 DIAGNOSIS — I65.29 OCCLUSION AND STENOSIS OF UNSPECIFIED CAROTID ARTERY: ICD-10-CM

## 2024-04-15 DIAGNOSIS — Z82.49 FAMILY HISTORY OF ISCHEMIC HEART DISEASE AND OTHER DISEASES OF THE CIRCULATORY SYSTEM: ICD-10-CM

## 2024-04-17 ENCOUNTER — OUTPATIENT (OUTPATIENT)
Dept: OUTPATIENT SERVICES | Facility: HOSPITAL | Age: 79
LOS: 1 days | Discharge: HOME | End: 2024-04-17
Payer: MEDICARE

## 2024-04-17 ENCOUNTER — APPOINTMENT (OUTPATIENT)
Dept: WOUND CARE | Facility: HOSPITAL | Age: 79
End: 2024-04-17
Payer: MEDICARE

## 2024-04-17 VITALS
HEART RATE: 82 BPM | SYSTOLIC BLOOD PRESSURE: 172 MMHG | OXYGEN SATURATION: 97 % | BODY MASS INDEX: 20.06 KG/M2 | DIASTOLIC BLOOD PRESSURE: 70 MMHG | RESPIRATION RATE: 18 BRPM | HEIGHT: 62 IN | WEIGHT: 109 LBS

## 2024-04-17 VITALS — DIASTOLIC BLOOD PRESSURE: 82 MMHG | SYSTOLIC BLOOD PRESSURE: 132 MMHG

## 2024-04-17 DIAGNOSIS — Z98.890 OTHER SPECIFIED POSTPROCEDURAL STATES: Chronic | ICD-10-CM

## 2024-04-17 DIAGNOSIS — Z98.89 OTHER SPECIFIED POSTPROCEDURAL STATES: Chronic | ICD-10-CM

## 2024-04-17 DIAGNOSIS — M21.611 BUNION OF RIGHT FOOT: ICD-10-CM

## 2024-04-17 DIAGNOSIS — Z90.49 ACQUIRED ABSENCE OF OTHER SPECIFIED PARTS OF DIGESTIVE TRACT: Chronic | ICD-10-CM

## 2024-04-17 PROCEDURE — 99024 POSTOP FOLLOW-UP VISIT: CPT

## 2024-04-17 PROCEDURE — G0463: CPT

## 2024-04-17 NOTE — PHYSICAL EXAM
[2 x 2] : 2 x 2  [1+] : left 1+ [Ankle Swelling (On Exam)] : not present [Ankle Swelling Bilaterally] : bilaterally  [Varicose Veins Of Lower Extremities] : bilaterally [Ankle Swelling On The Right] : mild [] : bilaterally [Purpura] : no purpura  [Petechiae] : no petechiae [Skin Ulcer] : no ulcer [Skin Induration] : no induration [Alert] : alert [Oriented to Person] : oriented to person [Oriented to Place] : oriented to place [Oriented to Time] : oriented to time [Calm] : calm [de-identified] : A&Ox3, NAD [de-identified] : HTN, HLD [de-identified] : Status post right foot [place] bunionectomy right second digit arthroplasty with K wire fixation [de-identified] : Right foot incision dorsal medial incision over the 1st MPJ dehiscence with open wound down to skin, subcutaneous tissue, fat, reopened with granular tissue.  2nd digit surgical site healed  [de-identified] : wnl [FreeTextEntry1] : Right Foot Great Toe, s/p bunion surgery [FreeTextEntry2] : 0.9 [FreeTextEntry3] : 0.4 [FreeTextEntry4] : 0.3 [de-identified] : serosanguineous  [de-identified] : Adaptic touch  [de-identified] : Cleansed with 0.9% Normal Saline  Cloth tape  [TWNoteComboBox4] : Small [TWNoteComboBox5] : No [TWNoteComboBox6] : Surgical [de-identified] : No [de-identified] : Normal [de-identified] : None [de-identified] : None [de-identified] : 100% [de-identified] : No [de-identified] : 3x Weekly [de-identified] : Primary Dressing

## 2024-04-17 NOTE — REVIEW OF SYSTEMS
[Fever] : no fever [Chills] : no chills [Eye Pain] : no eye pain [Loss Of Hearing] : no hearing loss [Shortness Of Breath] : no shortness of breath [Abdominal Pain] : no abdominal pain [Joint Stiffness] : joint stiffness [Skin Lesions] : skin lesion [Confused] : confusion [Convulsions] : convulsions [Anxiety] : no anxiety [Easy Bleeding] : no tendency for easy bleeding [Negative] : Endocrine [FreeTextEntry5] : HTN, HLD [FreeTextEntry9] : Bunion and hammer toe deformity's of both feet , right worse then the left  [de-identified] :  s/p arthroplasty and hallux s/p [place] with staple , stable , no soi, suture line has eschar which was partially removed

## 2024-04-17 NOTE — VITALS
[] : No [de-identified] : Patient reports pain 0/10  [FreeTextEntry5] : Patient began a new medication for GI, patient unsure of name of drug

## 2024-04-17 NOTE — ASSESSMENT
[Verbal] : Verbal [Demo] : Demo [Patient] : Patient [Good - alert, interested, motivated] : Good - alert, interested, motivated [Verbalizes knowledge/Understanding] : Verbalizes knowledge/understanding [Dressing changes] : dressing changes [Skin Care] : skin care [Pressure relief] : pressure relief [Signs and symptoms of infection] : sign and symptoms of infection [Nutrition] : nutrition [How and When to Call] : how and when to call [Off-loading] : off-loading [Patient responsibility to plan of care] : patient responsibility to plan of care [] : Yes [Stable] : stable [Ambulatory] : Ambulatory [Home] : Home [Not Applicable - Long Term Care/Home Health Agency] : Long Term Care/Home Health Agency: Not Applicable [FreeTextEntry4] : Follow up for an assessment in one week. Patient to return 4/16/24 for a bandage change. Patient will be coming to the [facility] for bandage changes, no supplies needed at this time.  [FreeTextEntry2] : Infection prevention Localized wound care Promote optimal skin integrity  Maintain acceptable level of pain with use of pharmacological and nonpharmacological interventions Offloading / Pressure relief  Foot care Post surgical care

## 2024-04-17 NOTE — PLAN
Return in 3 months for follow up on diabetes and BP  Lab work at that time    Thank you for coming in today, if any questions or concerns arise, please call my office.   BRIAN Martins-CNP    
[FreeTextEntry1] : Patient examined and evaluated at this time.  Continue local wound care and offloading.  Spent 20 minutes on patient care and medical decision making.  Patient to follow-up in 1 week.

## 2024-04-17 NOTE — HISTORY OF PRESENT ILLNESS
[FreeTextEntry1] : Patient is 78 year old female presenting for f/u s/p bunion surgery and hammer toe right foot with stable eschars. Patient denies any pain to the right foot.  Patient seen for right foot dorsal medial incision dehiscence with open wound down to skin, subcutaneous tissue, fat, reopened with granular tissue

## 2024-04-19 ENCOUNTER — OUTPATIENT (OUTPATIENT)
Dept: OUTPATIENT SERVICES | Facility: HOSPITAL | Age: 79
LOS: 1 days | Discharge: HOME | End: 2024-04-19
Payer: MEDICARE

## 2024-04-19 ENCOUNTER — APPOINTMENT (OUTPATIENT)
Dept: WOUND CARE | Facility: HOSPITAL | Age: 79
End: 2024-04-19
Payer: MEDICARE

## 2024-04-19 VITALS
HEART RATE: 86 BPM | TEMPERATURE: 97.9 F | DIASTOLIC BLOOD PRESSURE: 66 MMHG | RESPIRATION RATE: 18 BRPM | OXYGEN SATURATION: 94 % | HEIGHT: 62 IN | WEIGHT: 109 LBS | SYSTOLIC BLOOD PRESSURE: 198 MMHG | BODY MASS INDEX: 20.06 KG/M2

## 2024-04-19 VITALS — DIASTOLIC BLOOD PRESSURE: 70 MMHG | SYSTOLIC BLOOD PRESSURE: 165 MMHG

## 2024-04-19 DIAGNOSIS — Z98.890 OTHER SPECIFIED POSTPROCEDURAL STATES: Chronic | ICD-10-CM

## 2024-04-19 DIAGNOSIS — Z98.89 OTHER SPECIFIED POSTPROCEDURAL STATES: Chronic | ICD-10-CM

## 2024-04-19 DIAGNOSIS — Z90.49 ACQUIRED ABSENCE OF OTHER SPECIFIED PARTS OF DIGESTIVE TRACT: Chronic | ICD-10-CM

## 2024-04-19 DIAGNOSIS — M21.611 BUNION OF RIGHT FOOT: ICD-10-CM

## 2024-04-19 PROCEDURE — G0463: CPT

## 2024-04-19 PROCEDURE — ZZZZZ: CPT

## 2024-04-20 DIAGNOSIS — Z80.1 FAMILY HISTORY OF MALIGNANT NEOPLASM OF TRACHEA, BRONCHUS AND LUNG: ICD-10-CM

## 2024-04-20 DIAGNOSIS — L97.515 NON-PRESSURE CHRONIC ULCER OF OTHER PART OF RIGHT FOOT WITH MUSCLE INVOLVEMENT WITHOUT EVIDENCE OF NECROSIS: ICD-10-CM

## 2024-04-20 DIAGNOSIS — Z86.73 PERSONAL HISTORY OF TRANSIENT ISCHEMIC ATTACK (TIA), AND CEREBRAL INFARCTION WITHOUT RESIDUAL DEFICITS: ICD-10-CM

## 2024-04-20 DIAGNOSIS — Z88.0 ALLERGY STATUS TO PENICILLIN: ICD-10-CM
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
## 2024-04-20 DIAGNOSIS — K21.9 GASTRO-ESOPHAGEAL REFLUX DISEASE WITHOUT ESOPHAGITIS: ICD-10-CM

## 2024-04-20 DIAGNOSIS — I12.9 HYPERTENSIVE CHRONIC KIDNEY DISEASE WITH STAGE 1 THROUGH STAGE 4 CHRONIC KIDNEY DISEASE, OR UNSPECIFIED CHRONIC KIDNEY DISEASE: ICD-10-CM

## 2024-04-20 DIAGNOSIS — T81.31XD DISRUPTION OF EXTERNAL OPERATION (SURGICAL) WOUND, NOT ELSEWHERE CLASSIFIED, SUBSEQUENT ENCOUNTER: ICD-10-CM

## 2024-04-20 DIAGNOSIS — J44.9 CHRONIC OBSTRUCTIVE PULMONARY DISEASE, UNSPECIFIED: ICD-10-CM

## 2024-04-20 DIAGNOSIS — Z88.1 ALLERGY STATUS TO OTHER ANTIBIOTIC AGENTS STATUS: ICD-10-CM

## 2024-04-20 DIAGNOSIS — Z87.01 PERSONAL HISTORY OF PNEUMONIA (RECURRENT): ICD-10-CM

## 2024-04-20 DIAGNOSIS — I65.29 OCCLUSION AND STENOSIS OF UNSPECIFIED CAROTID ARTERY: ICD-10-CM

## 2024-04-20 DIAGNOSIS — Z83.3 FAMILY HISTORY OF DIABETES MELLITUS: ICD-10-CM

## 2024-04-20 DIAGNOSIS — Z79.899 OTHER LONG TERM (CURRENT) DRUG THERAPY: ICD-10-CM

## 2024-04-20 DIAGNOSIS — N18.30 CHRONIC KIDNEY DISEASE, STAGE 3 UNSPECIFIED: ICD-10-CM
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
## 2024-04-20 DIAGNOSIS — Z82.3 FAMILY HISTORY OF STROKE: ICD-10-CM

## 2024-04-20 DIAGNOSIS — Z91.041 RADIOGRAPHIC DYE ALLERGY STATUS: ICD-10-CM

## 2024-04-20 DIAGNOSIS — Z87.891 PERSONAL HISTORY OF NICOTINE DEPENDENCE: ICD-10-CM

## 2024-04-20 DIAGNOSIS — Z85.118 PERSONAL HISTORY OF OTHER MALIGNANT NEOPLASM OF BRONCHUS AND LUNG: ICD-10-CM

## 2024-04-20 DIAGNOSIS — Z90.2 ACQUIRED ABSENCE OF LUNG [PART OF]: ICD-10-CM

## 2024-04-20 DIAGNOSIS — Z82.49 FAMILY HISTORY OF ISCHEMIC HEART DISEASE AND OTHER DISEASES OF THE CIRCULATORY SYSTEM: ICD-10-CM

## 2024-04-20 DIAGNOSIS — Z79.82 LONG TERM (CURRENT) USE OF ASPIRIN: ICD-10-CM

## 2024-04-20 DIAGNOSIS — Z88.8 ALLERGY STATUS TO OTHER DRUGS, MEDICAMENTS AND BIOLOGICAL SUBSTANCES: ICD-10-CM

## 2024-04-20 DIAGNOSIS — Z90.49 ACQUIRED ABSENCE OF OTHER SPECIFIED PARTS OF DIGESTIVE TRACT: ICD-10-CM

## 2024-04-20 DIAGNOSIS — E78.5 HYPERLIPIDEMIA, UNSPECIFIED: ICD-10-CM

## 2024-04-20 DIAGNOSIS — Z98.49 CATARACT EXTRACTION STATUS, UNSPECIFIED EYE: ICD-10-CM

## 2024-04-20 DIAGNOSIS — M06.9 RHEUMATOID ARTHRITIS, UNSPECIFIED: ICD-10-CM

## 2024-04-22 ENCOUNTER — OUTPATIENT (OUTPATIENT)
Dept: OUTPATIENT SERVICES | Facility: HOSPITAL | Age: 79
LOS: 1 days | Discharge: HOME | End: 2024-04-22
Payer: MEDICARE

## 2024-04-22 ENCOUNTER — APPOINTMENT (OUTPATIENT)
Dept: WOUND CARE | Facility: HOSPITAL | Age: 79
End: 2024-04-22
Payer: MEDICARE

## 2024-04-22 VITALS
HEART RATE: 80 BPM | WEIGHT: 107 LBS | RESPIRATION RATE: 20 BRPM | BODY MASS INDEX: 19.69 KG/M2 | HEIGHT: 62 IN | DIASTOLIC BLOOD PRESSURE: 66 MMHG | OXYGEN SATURATION: 94 % | SYSTOLIC BLOOD PRESSURE: 153 MMHG

## 2024-04-22 DIAGNOSIS — L97.515 NON-PRESSURE CHRONIC ULCER OF OTHER PART OF RIGHT FOOT WITH MUSCLE INVOLVEMENT WITHOUT EVIDENCE OF NECROSIS: ICD-10-CM

## 2024-04-22 DIAGNOSIS — Z98.89 OTHER SPECIFIED POSTPROCEDURAL STATES: Chronic | ICD-10-CM

## 2024-04-22 DIAGNOSIS — Z98.890 OTHER SPECIFIED POSTPROCEDURAL STATES: Chronic | ICD-10-CM

## 2024-04-22 DIAGNOSIS — M21.611 BUNION OF RIGHT FOOT: ICD-10-CM

## 2024-04-22 DIAGNOSIS — T81.31XD DISRUPTION OF EXTERNAL OPERATION (SURGICAL) WOUND, NOT ELSEWHERE CLASSIFIED, SUBSEQUENT ENCOUNTER: ICD-10-CM
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
## 2024-04-22 PROCEDURE — ZZZZZ: CPT

## 2024-04-22 PROCEDURE — G0463: CPT

## 2024-04-25 ENCOUNTER — OUTPATIENT (OUTPATIENT)
Dept: OUTPATIENT SERVICES | Facility: HOSPITAL | Age: 79
LOS: 1 days | Discharge: HOME | End: 2024-04-25
Payer: MEDICARE

## 2024-04-25 ENCOUNTER — APPOINTMENT (OUTPATIENT)
Dept: WOUND CARE | Facility: HOSPITAL | Age: 79
End: 2024-04-25
Payer: MEDICARE

## 2024-04-25 ENCOUNTER — APPOINTMENT (OUTPATIENT)
Dept: OTOLARYNGOLOGY | Facility: CLINIC | Age: 79
End: 2024-04-25

## 2024-04-25 VITALS
DIASTOLIC BLOOD PRESSURE: 64 MMHG | OXYGEN SATURATION: 91 % | BODY MASS INDEX: 19.69 KG/M2 | WEIGHT: 107 LBS | TEMPERATURE: 98.3 F | RESPIRATION RATE: 18 BRPM | SYSTOLIC BLOOD PRESSURE: 154 MMHG | HEIGHT: 62 IN | HEART RATE: 94 BPM

## 2024-04-25 DIAGNOSIS — T81.31XD DISRUPTION OF EXTERNAL OPERATION (SURGICAL) WOUND, NOT ELSEWHERE CLASSIFIED, SUBSEQUENT ENCOUNTER: ICD-10-CM

## 2024-04-25 DIAGNOSIS — Z98.890 OTHER SPECIFIED POSTPROCEDURAL STATES: Chronic | ICD-10-CM

## 2024-04-25 DIAGNOSIS — Z90.49 ACQUIRED ABSENCE OF OTHER SPECIFIED PARTS OF DIGESTIVE TRACT: Chronic | ICD-10-CM

## 2024-04-25 DIAGNOSIS — Z98.89 OTHER SPECIFIED POSTPROCEDURAL STATES: Chronic | ICD-10-CM

## 2024-04-25 PROCEDURE — 99213 OFFICE O/P EST LOW 20 MIN: CPT

## 2024-04-25 PROCEDURE — G0463: CPT

## 2024-04-25 NOTE — PHYSICAL EXAM
[2 x 2] : 2 x 2  [1+] : left 1+ [Ankle Swelling (On Exam)] : not present [Ankle Swelling Bilaterally] : bilaterally  [Varicose Veins Of Lower Extremities] : bilaterally [Ankle Swelling On The Right] : mild [] : bilaterally [Purpura] : no purpura  [Petechiae] : no petechiae [Skin Ulcer] : no ulcer [Skin Induration] : no induration [Alert] : alert [Oriented to Person] : oriented to person [Oriented to Place] : oriented to place [Oriented to Time] : oriented to time [Calm] : calm [de-identified] : A&Ox3, NAD [de-identified] : HTN, HLD [de-identified] : Status post right foot [place] bunionectomy right second digit arthroplasty with K wire fixation [de-identified] : Right foot incision dorsal medial incision over the 1st MPJ dehiscence with open wound down to skin, subcutaneous tissue, fat,  with granular tissue.  2nd digit surgical site healed  [de-identified] : wnl [FreeTextEntry1] : Right Foot Great Toe, s/p bunion surgery [FreeTextEntry2] : 0.8 [FreeTextEntry3] : 0.3 [FreeTextEntry4] : 0.3 [de-identified] : serosanguineous  [de-identified] : Laura [de-identified] : Cleansed with 0.9% Normal Saline  Cloth tape  [TWNoteComboBox4] : Small [TWNoteComboBox5] : No [TWNoteComboBox6] : Surgical [de-identified] : No [de-identified] : Normal [de-identified] : None [de-identified] : None [de-identified] : 100% [de-identified] : No [de-identified] : 2x Weekly [de-identified] : Primary Dressing

## 2024-04-25 NOTE — REVIEW OF SYSTEMS
[Fever] : no fever [Chills] : no chills [Eye Pain] : no eye pain [Loss Of Hearing] : no hearing loss [Shortness Of Breath] : no shortness of breath [Abdominal Pain] : no abdominal pain [Joint Stiffness] : joint stiffness [Skin Lesions] : skin lesion [Confused] : confusion [Convulsions] : convulsions [Anxiety] : no anxiety [Easy Bleeding] : no tendency for easy bleeding [Negative] : Endocrine [FreeTextEntry5] : HTN, HLD [FreeTextEntry9] : Bunion and hammer toe deformity's of both feet , right worse then the left  [de-identified] :  s/p arthroplasty and hallux s/p [place] with staple , stable , no soi, suture line has eschar which was partially removed

## 2024-04-25 NOTE — ASSESSMENT
[Verbal] : Verbal [Demo] : Demo [Patient] : Patient [Good - alert, interested, motivated] : Good - alert, interested, motivated [Verbalizes knowledge/Understanding] : Verbalizes knowledge/understanding [Dressing changes] : dressing changes [Skin Care] : skin care [Pressure relief] : pressure relief [Signs and symptoms of infection] : sign and symptoms of infection [Nutrition] : nutrition [How and When to Call] : how and when to call [Off-loading] : off-loading [Patient responsibility to plan of care] : patient responsibility to plan of care [Stable] : stable [Home] : Home [Ambulatory] : Ambulatory [Not Applicable - Long Term Care/Home Health Agency] : Long Term Care/Home Health Agency: Not Applicable [] : No [FreeTextEntry2] : Infection prevention Localized wound care Promote optimal skin integrity  Maintain acceptable level of pain with use of pharmacological and nonpharmacological interventions Offloading / Pressure relief  Foot care Post surgical care  [FreeTextEntry4] : Patient to return x1 for DSG change and 1 week for assessment.

## 2024-04-26 DIAGNOSIS — N18.30 CHRONIC KIDNEY DISEASE, STAGE 3 UNSPECIFIED: ICD-10-CM

## 2024-04-26 DIAGNOSIS — E78.5 HYPERLIPIDEMIA, UNSPECIFIED: ICD-10-CM

## 2024-04-26 DIAGNOSIS — Z91.041 RADIOGRAPHIC DYE ALLERGY STATUS: ICD-10-CM

## 2024-04-26 DIAGNOSIS — Z98.49 CATARACT EXTRACTION STATUS, UNSPECIFIED EYE: ICD-10-CM
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
## 2024-04-26 DIAGNOSIS — K21.9 GASTRO-ESOPHAGEAL REFLUX DISEASE WITHOUT ESOPHAGITIS: ICD-10-CM

## 2024-04-26 DIAGNOSIS — Z87.891 PERSONAL HISTORY OF NICOTINE DEPENDENCE: ICD-10-CM

## 2024-04-26 DIAGNOSIS — Z79.899 OTHER LONG TERM (CURRENT) DRUG THERAPY: ICD-10-CM

## 2024-04-26 DIAGNOSIS — I65.29 OCCLUSION AND STENOSIS OF UNSPECIFIED CAROTID ARTERY: ICD-10-CM

## 2024-04-26 DIAGNOSIS — Z85.118 PERSONAL HISTORY OF OTHER MALIGNANT NEOPLASM OF BRONCHUS AND LUNG: ICD-10-CM

## 2024-04-26 DIAGNOSIS — Z88.0 ALLERGY STATUS TO PENICILLIN: ICD-10-CM

## 2024-04-26 DIAGNOSIS — Z82.49 FAMILY HISTORY OF ISCHEMIC HEART DISEASE AND OTHER DISEASES OF THE CIRCULATORY SYSTEM: ICD-10-CM

## 2024-04-26 DIAGNOSIS — Z86.73 PERSONAL HISTORY OF TRANSIENT ISCHEMIC ATTACK (TIA), AND CEREBRAL INFARCTION WITHOUT RESIDUAL DEFICITS: ICD-10-CM

## 2024-04-26 DIAGNOSIS — M06.9 RHEUMATOID ARTHRITIS, UNSPECIFIED: ICD-10-CM

## 2024-04-26 DIAGNOSIS — L97.515 NON-PRESSURE CHRONIC ULCER OF OTHER PART OF RIGHT FOOT WITH MUSCLE INVOLVEMENT WITHOUT EVIDENCE OF NECROSIS: ICD-10-CM

## 2024-04-26 DIAGNOSIS — J44.9 CHRONIC OBSTRUCTIVE PULMONARY DISEASE, UNSPECIFIED: ICD-10-CM

## 2024-04-26 DIAGNOSIS — I12.9 HYPERTENSIVE CHRONIC KIDNEY DISEASE WITH STAGE 1 THROUGH STAGE 4 CHRONIC KIDNEY DISEASE, OR UNSPECIFIED CHRONIC KIDNEY DISEASE: ICD-10-CM

## 2024-04-26 DIAGNOSIS — Z90.2 ACQUIRED ABSENCE OF LUNG [PART OF]: ICD-10-CM

## 2024-04-26 DIAGNOSIS — T81.31XD DISRUPTION OF EXTERNAL OPERATION (SURGICAL) WOUND, NOT ELSEWHERE CLASSIFIED, SUBSEQUENT ENCOUNTER: ICD-10-CM

## 2024-04-26 DIAGNOSIS — Z87.01 PERSONAL HISTORY OF PNEUMONIA (RECURRENT): ICD-10-CM

## 2024-04-26 DIAGNOSIS — Z80.1 FAMILY HISTORY OF MALIGNANT NEOPLASM OF TRACHEA, BRONCHUS AND LUNG: ICD-10-CM

## 2024-04-26 DIAGNOSIS — Z88.1 ALLERGY STATUS TO OTHER ANTIBIOTIC AGENTS STATUS: ICD-10-CM

## 2024-04-26 DIAGNOSIS — Z90.49 ACQUIRED ABSENCE OF OTHER SPECIFIED PARTS OF DIGESTIVE TRACT: ICD-10-CM
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
## 2024-04-26 DIAGNOSIS — Z82.3 FAMILY HISTORY OF STROKE: ICD-10-CM

## 2024-04-26 DIAGNOSIS — Z79.82 LONG TERM (CURRENT) USE OF ASPIRIN: ICD-10-CM

## 2024-04-26 DIAGNOSIS — Z88.8 ALLERGY STATUS TO OTHER DRUGS, MEDICAMENTS AND BIOLOGICAL SUBSTANCES: ICD-10-CM

## 2024-04-26 DIAGNOSIS — Z83.3 FAMILY HISTORY OF DIABETES MELLITUS: ICD-10-CM

## 2024-04-29 ENCOUNTER — APPOINTMENT (OUTPATIENT)
Dept: WOUND CARE | Facility: HOSPITAL | Age: 79
End: 2024-04-29
Payer: MEDICARE

## 2024-04-29 ENCOUNTER — OUTPATIENT (OUTPATIENT)
Dept: OUTPATIENT SERVICES | Facility: HOSPITAL | Age: 79
LOS: 1 days | Discharge: HOME | End: 2024-04-29
Payer: MEDICARE

## 2024-04-29 VITALS
WEIGHT: 107 LBS | DIASTOLIC BLOOD PRESSURE: 68 MMHG | HEIGHT: 62 IN | TEMPERATURE: 98.5 F | OXYGEN SATURATION: 94 % | HEART RATE: 94 BPM | BODY MASS INDEX: 19.69 KG/M2 | RESPIRATION RATE: 18 BRPM | SYSTOLIC BLOOD PRESSURE: 180 MMHG

## 2024-04-29 DIAGNOSIS — Z90.49 ACQUIRED ABSENCE OF OTHER SPECIFIED PARTS OF DIGESTIVE TRACT: Chronic | ICD-10-CM

## 2024-04-29 DIAGNOSIS — Z98.89 OTHER SPECIFIED POSTPROCEDURAL STATES: Chronic | ICD-10-CM

## 2024-04-29 DIAGNOSIS — T81.31XD DISRUPTION OF EXTERNAL OPERATION (SURGICAL) WOUND, NOT ELSEWHERE CLASSIFIED, SUBSEQUENT ENCOUNTER: ICD-10-CM

## 2024-04-29 DIAGNOSIS — Z98.890 OTHER SPECIFIED POSTPROCEDURAL STATES: Chronic | ICD-10-CM
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
## 2024-04-29 DIAGNOSIS — L97.515 NON-PRESSURE CHRONIC ULCER OF OTHER PART OF RIGHT FOOT WITH MUSCLE INVOLVEMENT WITHOUT EVIDENCE OF NECROSIS: ICD-10-CM
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
## 2024-04-29 PROCEDURE — G0463: CPT

## 2024-04-29 PROCEDURE — ZZZZZ: CPT

## 2024-05-01 ENCOUNTER — OUTPATIENT (OUTPATIENT)
Dept: OUTPATIENT SERVICES | Facility: HOSPITAL | Age: 79
LOS: 1 days | Discharge: HOME | End: 2024-05-01
Payer: MEDICARE

## 2024-05-01 ENCOUNTER — APPOINTMENT (OUTPATIENT)
Dept: WOUND CARE | Facility: HOSPITAL | Age: 79
End: 2024-05-01
Payer: MEDICARE

## 2024-05-01 VITALS
TEMPERATURE: 98.2 F | OXYGEN SATURATION: 95 % | RESPIRATION RATE: 18 BRPM | WEIGHT: 107 LBS | HEART RATE: 83 BPM | HEIGHT: 62 IN | BODY MASS INDEX: 19.69 KG/M2 | SYSTOLIC BLOOD PRESSURE: 145 MMHG | DIASTOLIC BLOOD PRESSURE: 72 MMHG

## 2024-05-01 DIAGNOSIS — Z98.890 OTHER SPECIFIED POSTPROCEDURAL STATES: Chronic | ICD-10-CM

## 2024-05-01 DIAGNOSIS — T81.31XD DISRUPTION OF EXTERNAL OPERATION (SURGICAL) WOUND, NOT ELSEWHERE CLASSIFIED, SUBSEQUENT ENCOUNTER: ICD-10-CM

## 2024-05-01 DIAGNOSIS — Z90.49 ACQUIRED ABSENCE OF OTHER SPECIFIED PARTS OF DIGESTIVE TRACT: Chronic | ICD-10-CM

## 2024-05-01 DIAGNOSIS — Z98.89 OTHER SPECIFIED POSTPROCEDURAL STATES: Chronic | ICD-10-CM

## 2024-05-01 DIAGNOSIS — T81.89XD OTHER COMPLICATIONS OF PROCEDURES, NOT ELSEWHERE CLASSIFIED, SUBSEQUENT ENCOUNTER: ICD-10-CM

## 2024-05-01 PROCEDURE — 99213 OFFICE O/P EST LOW 20 MIN: CPT

## 2024-05-01 PROCEDURE — G0463: CPT

## 2024-05-01 NOTE — ASSESSMENT
[Verbal] : Verbal [Demo] : Demo [Patient] : Patient [Good - alert, interested, motivated] : Good - alert, interested, motivated [Verbalizes knowledge/Understanding] : Verbalizes knowledge/understanding [Dressing changes] : dressing changes [Foot Care] : foot care [Skin Care] : skin care [Pressure relief] : pressure relief [Signs and symptoms of infection] : sign and symptoms of infection [Nutrition] : nutrition [How and When to Call] : how and when to call [Off-loading] : off-loading [Patient responsibility to plan of care] : patient responsibility to plan of care [] : Yes [Stable] : stable [Home] : Home [Ambulatory] : Ambulatory [Not Applicable - Long Term Care/Home Health Agency] : Long Term Care/Home Health Agency: Not Applicable [FreeTextEntry2] : Infection prevention Localized wound care Promote optimal skin integrity  Maintain acceptable level of pain with use of pharmacological and nonpharmacological interventions Offloading / Pressure relief  Foot care   [FreeTextEntry4] : follow up in 1 week

## 2024-05-01 NOTE — PHYSICAL EXAM
[2 x 2] : 2 x 2  [1+] : left 1+ [Ankle Swelling (On Exam)] : not present [Ankle Swelling Bilaterally] : bilaterally  [Varicose Veins Of Lower Extremities] : bilaterally [Ankle Swelling On The Right] : mild [] : bilaterally [Purpura] : no purpura  [Petechiae] : no petechiae [Skin Ulcer] : no ulcer [Skin Induration] : no induration [Alert] : alert [Oriented to Person] : oriented to person [Oriented to Place] : oriented to place [Oriented to Time] : oriented to time [Calm] : calm [de-identified] : A&Ox3, NAD [de-identified] : HTN, HLD [de-identified] : Status post right foot [place] bunionectomy right second digit arthroplasty with K wire fixation [de-identified] : Right foot incision dorsal medial incision over the 1st MPJ dehiscence with open wound down to skin, subcutaneous tissue, fat,  with granular tissue.  2nd digit surgical site healed  [de-identified] : wnl [FreeTextEntry1] : Right Foot Great Toe, s/p bunion surgery [FreeTextEntry2] : 0.3 [FreeTextEntry3] : 0.2 [FreeTextEntry4] : 0.2 [de-identified] : Serosanguineous  [de-identified] : Laura [de-identified] :  Mechanically cleansed with NS 0.9%, Sterile Gauze Cloth Tape [TWNoteComboBox4] : Small [TWNoteComboBox5] : No [TWNoteComboBox6] : Surgical [de-identified] : No [de-identified] : Normal [de-identified] : None [de-identified] : None [de-identified] : 100% [de-identified] : No [de-identified] : Weekly [de-identified] : Primary Dressing

## 2024-05-01 NOTE — REVIEW OF SYSTEMS
[Fever] : no fever [Chills] : no chills [Eye Pain] : no eye pain [Loss Of Hearing] : no hearing loss [Shortness Of Breath] : no shortness of breath [Abdominal Pain] : no abdominal pain [Joint Stiffness] : joint stiffness [Skin Lesions] : skin lesion [Confused] : confusion [Convulsions] : convulsions [Anxiety] : no anxiety [Easy Bleeding] : no tendency for easy bleeding [Negative] : Endocrine [FreeTextEntry9] : Bunion and hammer toe deformity's of both feet , right worse then the left  [FreeTextEntry5] : HTN, HLD [de-identified] :  s/p arthroplasty and hallux s/p [place] with staple , stable , no soi, suture line has eschar which was partially removed

## 2024-05-02 ENCOUNTER — APPOINTMENT (OUTPATIENT)
Dept: OTOLARYNGOLOGY | Facility: CLINIC | Age: 79
End: 2024-05-02

## 2024-05-03 DIAGNOSIS — M06.9 RHEUMATOID ARTHRITIS, UNSPECIFIED: ICD-10-CM
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
## 2024-05-03 DIAGNOSIS — Z83.3 FAMILY HISTORY OF DIABETES MELLITUS: ICD-10-CM

## 2024-05-03 DIAGNOSIS — I65.29 OCCLUSION AND STENOSIS OF UNSPECIFIED CAROTID ARTERY: ICD-10-CM

## 2024-05-03 DIAGNOSIS — T81.31XD DISRUPTION OF EXTERNAL OPERATION (SURGICAL) WOUND, NOT ELSEWHERE CLASSIFIED, SUBSEQUENT ENCOUNTER: ICD-10-CM

## 2024-05-03 DIAGNOSIS — Z82.3 FAMILY HISTORY OF STROKE: ICD-10-CM

## 2024-05-03 DIAGNOSIS — Z88.8 ALLERGY STATUS TO OTHER DRUGS, MEDICAMENTS AND BIOLOGICAL SUBSTANCES: ICD-10-CM

## 2024-05-03 DIAGNOSIS — E78.5 HYPERLIPIDEMIA, UNSPECIFIED: ICD-10-CM

## 2024-05-03 DIAGNOSIS — Z90.2 ACQUIRED ABSENCE OF LUNG [PART OF]: ICD-10-CM

## 2024-05-03 DIAGNOSIS — Z79.899 OTHER LONG TERM (CURRENT) DRUG THERAPY: ICD-10-CM

## 2024-05-03 DIAGNOSIS — N18.30 CHRONIC KIDNEY DISEASE, STAGE 3 UNSPECIFIED: ICD-10-CM

## 2024-05-03 DIAGNOSIS — J44.9 CHRONIC OBSTRUCTIVE PULMONARY DISEASE, UNSPECIFIED: ICD-10-CM
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
## 2024-05-03 DIAGNOSIS — Z88.1 ALLERGY STATUS TO OTHER ANTIBIOTIC AGENTS STATUS: ICD-10-CM

## 2024-05-03 DIAGNOSIS — Z88.0 ALLERGY STATUS TO PENICILLIN: ICD-10-CM

## 2024-05-03 DIAGNOSIS — Z79.82 LONG TERM (CURRENT) USE OF ASPIRIN: ICD-10-CM

## 2024-05-03 DIAGNOSIS — Z86.73 PERSONAL HISTORY OF TRANSIENT ISCHEMIC ATTACK (TIA), AND CEREBRAL INFARCTION WITHOUT RESIDUAL DEFICITS: ICD-10-CM

## 2024-05-03 DIAGNOSIS — I12.9 HYPERTENSIVE CHRONIC KIDNEY DISEASE WITH STAGE 1 THROUGH STAGE 4 CHRONIC KIDNEY DISEASE, OR UNSPECIFIED CHRONIC KIDNEY DISEASE: ICD-10-CM

## 2024-05-03 DIAGNOSIS — K21.9 GASTRO-ESOPHAGEAL REFLUX DISEASE WITHOUT ESOPHAGITIS: ICD-10-CM

## 2024-05-03 DIAGNOSIS — L97.515 NON-PRESSURE CHRONIC ULCER OF OTHER PART OF RIGHT FOOT WITH MUSCLE INVOLVEMENT WITHOUT EVIDENCE OF NECROSIS: ICD-10-CM

## 2024-05-03 DIAGNOSIS — Z85.118 PERSONAL HISTORY OF OTHER MALIGNANT NEOPLASM OF BRONCHUS AND LUNG: ICD-10-CM

## 2024-05-03 DIAGNOSIS — Z87.01 PERSONAL HISTORY OF PNEUMONIA (RECURRENT): ICD-10-CM

## 2024-05-03 DIAGNOSIS — Z98.49 CATARACT EXTRACTION STATUS, UNSPECIFIED EYE: ICD-10-CM

## 2024-05-03 DIAGNOSIS — Z87.891 PERSONAL HISTORY OF NICOTINE DEPENDENCE: ICD-10-CM

## 2024-05-03 DIAGNOSIS — Z82.49 FAMILY HISTORY OF ISCHEMIC HEART DISEASE AND OTHER DISEASES OF THE CIRCULATORY SYSTEM: ICD-10-CM

## 2024-05-03 DIAGNOSIS — Z80.1 FAMILY HISTORY OF MALIGNANT NEOPLASM OF TRACHEA, BRONCHUS AND LUNG: ICD-10-CM

## 2024-05-03 DIAGNOSIS — Z91.041 RADIOGRAPHIC DYE ALLERGY STATUS: ICD-10-CM

## 2024-05-03 DIAGNOSIS — Z90.49 ACQUIRED ABSENCE OF OTHER SPECIFIED PARTS OF DIGESTIVE TRACT: ICD-10-CM

## 2024-05-08 ENCOUNTER — LABORATORY RESULT (OUTPATIENT)
Age: 79
End: 2024-05-08

## 2024-05-08 ENCOUNTER — OUTPATIENT (OUTPATIENT)
Dept: OUTPATIENT SERVICES | Facility: HOSPITAL | Age: 79
LOS: 1 days | Discharge: HOME | End: 2024-05-08
Payer: MEDICARE

## 2024-05-08 ENCOUNTER — APPOINTMENT (OUTPATIENT)
Dept: WOUND CARE | Facility: HOSPITAL | Age: 79
End: 2024-05-08
Payer: MEDICARE

## 2024-05-08 VITALS
HEIGHT: 62 IN | WEIGHT: 107 LBS | DIASTOLIC BLOOD PRESSURE: 62 MMHG | BODY MASS INDEX: 19.69 KG/M2 | OXYGEN SATURATION: 93 % | SYSTOLIC BLOOD PRESSURE: 142 MMHG | RESPIRATION RATE: 18 BRPM | HEART RATE: 81 BPM

## 2024-05-08 DIAGNOSIS — Z98.89 OTHER SPECIFIED POSTPROCEDURAL STATES: Chronic | ICD-10-CM

## 2024-05-08 DIAGNOSIS — T81.31XD DISRUPTION OF EXTERNAL OPERATION (SURGICAL) WOUND, NOT ELSEWHERE CLASSIFIED, SUBSEQUENT ENCOUNTER: ICD-10-CM

## 2024-05-08 DIAGNOSIS — L97.515 NON-PRESSURE CHRONIC ULCER OF OTHER PART OF RIGHT FOOT WITH MUSCLE INVOLVEMENT WITHOUT EVIDENCE OF NECROSIS: ICD-10-CM

## 2024-05-08 DIAGNOSIS — Z98.890 OTHER SPECIFIED POSTPROCEDURAL STATES: Chronic | ICD-10-CM

## 2024-05-08 DIAGNOSIS — Z90.49 ACQUIRED ABSENCE OF OTHER SPECIFIED PARTS OF DIGESTIVE TRACT: Chronic | ICD-10-CM

## 2024-05-08 PROCEDURE — G0463: CPT

## 2024-05-08 PROCEDURE — 99213 OFFICE O/P EST LOW 20 MIN: CPT

## 2024-05-08 NOTE — PHYSICAL EXAM
[1+] : left 1+ [Ankle Swelling (On Exam)] : not present [Ankle Swelling Bilaterally] : bilaterally  [Varicose Veins Of Lower Extremities] : bilaterally [Ankle Swelling On The Right] : mild [] : bilaterally [Purpura] : no purpura  [Petechiae] : no petechiae [Skin Ulcer] : no ulcer [Skin Induration] : no induration [Alert] : alert [Oriented to Person] : oriented to person [Oriented to Place] : oriented to place [Oriented to Time] : oriented to time [Calm] : calm [de-identified] : A&Ox3, NAD [de-identified] : HTN, HLD [de-identified] : Status post right foot [place] bunionectomy right second digit arthroplasty with K wire fixation [de-identified] : Right foot incision dorsal medial incision over the 1st MPJ dehiscence with open wound down to skin, subcutaneous tissue, fat, healed with stable eschar.  2nd digit surgical site healed  [de-identified] : wnl [FreeTextEntry1] : Right Foot Great Toe, s/p bunion surgery- scab/dried esha  [FreeTextEntry2] : 0.3 [FreeTextEntry3] : 0.2 [FreeTextEntry4] : 0.2 [de-identified] : none [de-identified] :  Mechanically cleansed with NS 0.9%, Sterile Gauze  [TWNoteComboBox4] : None [TWNoteComboBox5] : No [TWNoteComboBox6] : Surgical [de-identified] : No [de-identified] : Normal [de-identified] : None [de-identified] : None [de-identified] : None [de-identified] : No

## 2024-05-08 NOTE — HISTORY OF PRESENT ILLNESS
[FreeTextEntry1] : Patient is 78 year old female presenting for f/u s/p bunion surgery and hammer toe right foot with stable eschar. Patient denies any pain to the right foot.  Patient seen for right foot dorsal medial incision dehiscence with open wound down to skin, subcutaneous tissue, fat, healed with stable eschar.

## 2024-05-08 NOTE — ASSESSMENT
[Verbal] : Verbal [Demo] : Demo [Patient] : Patient [Good - alert, interested, motivated] : Good - alert, interested, motivated [Verbalizes knowledge/Understanding] : Verbalizes knowledge/understanding [Dressing changes] : dressing changes [Foot Care] : foot care [Skin Care] : skin care [Pressure relief] : pressure relief [Signs and symptoms of infection] : sign and symptoms of infection [Nutrition] : nutrition [How and When to Call] : how and when to call [Off-loading] : off-loading [Patient responsibility to plan of care] : patient responsibility to plan of care [Stable] : stable [Home] : Home [Ambulatory] : Ambulatory [Not Applicable - Long Term Care/Home Health Agency] : Long Term Care/Home Health Agency: Not Applicable [] : No [FreeTextEntry2] : Infection prevention Localized wound care Promote optimal skin integrity  Maintain acceptable level of pain with use of pharmacological and nonpharmacological interventions Offloading / Pressure relief  Foot care   [FreeTextEntry4] : follow up in 2 weeks No dressings needed, pt to transition to regular footwear with a wide toe box.

## 2024-05-08 NOTE — PLAN
[FreeTextEntry1] : Patient examined and evaluated at this time.  Continue local wound care and offloading.  Spent 20 minutes on patient care and medical decision making.  Patient to follow-up in 2 weeks.

## 2024-05-08 NOTE — REVIEW OF SYSTEMS
[Fever] : no fever [Chills] : no chills [Eye Pain] : no eye pain [Loss Of Hearing] : no hearing loss [Shortness Of Breath] : no shortness of breath [Abdominal Pain] : no abdominal pain [Joint Stiffness] : joint stiffness [Skin Lesions] : skin lesion [Confused] : confusion [Convulsions] : convulsions [Anxiety] : no anxiety [Easy Bleeding] : no tendency for easy bleeding [Negative] : Endocrine [FreeTextEntry5] : HTN, HLD [FreeTextEntry9] : Bunion and hammer toe deformity's of both feet , right worse then the left  [de-identified] :  s/p arthroplasty and hallux s/p [place] with staple , stable , no soi, suture line has eschar which was partially removed

## 2024-05-11 DIAGNOSIS — E78.5 HYPERLIPIDEMIA, UNSPECIFIED: ICD-10-CM

## 2024-05-11 DIAGNOSIS — J44.9 CHRONIC OBSTRUCTIVE PULMONARY DISEASE, UNSPECIFIED: ICD-10-CM

## 2024-05-11 DIAGNOSIS — Z80.1 FAMILY HISTORY OF MALIGNANT NEOPLASM OF TRACHEA, BRONCHUS AND LUNG: ICD-10-CM

## 2024-05-11 DIAGNOSIS — Z90.2 ACQUIRED ABSENCE OF LUNG [PART OF]: ICD-10-CM

## 2024-05-11 DIAGNOSIS — M06.9 RHEUMATOID ARTHRITIS, UNSPECIFIED: ICD-10-CM
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
## 2024-05-11 DIAGNOSIS — Z88.1 ALLERGY STATUS TO OTHER ANTIBIOTIC AGENTS STATUS: ICD-10-CM

## 2024-05-11 DIAGNOSIS — Z83.3 FAMILY HISTORY OF DIABETES MELLITUS: ICD-10-CM

## 2024-05-11 DIAGNOSIS — Z88.0 ALLERGY STATUS TO PENICILLIN: ICD-10-CM

## 2024-05-11 DIAGNOSIS — Z90.49 ACQUIRED ABSENCE OF OTHER SPECIFIED PARTS OF DIGESTIVE TRACT: ICD-10-CM

## 2024-05-11 DIAGNOSIS — I65.29 OCCLUSION AND STENOSIS OF UNSPECIFIED CAROTID ARTERY: ICD-10-CM

## 2024-05-11 DIAGNOSIS — Z87.891 PERSONAL HISTORY OF NICOTINE DEPENDENCE: ICD-10-CM

## 2024-05-11 DIAGNOSIS — Z79.82 LONG TERM (CURRENT) USE OF ASPIRIN: ICD-10-CM

## 2024-05-11 DIAGNOSIS — Z82.49 FAMILY HISTORY OF ISCHEMIC HEART DISEASE AND OTHER DISEASES OF THE CIRCULATORY SYSTEM: ICD-10-CM

## 2024-05-11 DIAGNOSIS — Z87.01 PERSONAL HISTORY OF PNEUMONIA (RECURRENT): ICD-10-CM

## 2024-05-11 DIAGNOSIS — Z79.899 OTHER LONG TERM (CURRENT) DRUG THERAPY: ICD-10-CM

## 2024-05-11 DIAGNOSIS — T81.31XD DISRUPTION OF EXTERNAL OPERATION (SURGICAL) WOUND, NOT ELSEWHERE CLASSIFIED, SUBSEQUENT ENCOUNTER: ICD-10-CM

## 2024-05-11 DIAGNOSIS — Z82.3 FAMILY HISTORY OF STROKE: ICD-10-CM

## 2024-05-11 DIAGNOSIS — N18.30 CHRONIC KIDNEY DISEASE, STAGE 3 UNSPECIFIED: ICD-10-CM

## 2024-05-11 DIAGNOSIS — Z88.8 ALLERGY STATUS TO OTHER DRUGS, MEDICAMENTS AND BIOLOGICAL SUBSTANCES: ICD-10-CM

## 2024-05-11 DIAGNOSIS — Z91.041 RADIOGRAPHIC DYE ALLERGY STATUS: ICD-10-CM

## 2024-05-11 DIAGNOSIS — Z85.118 PERSONAL HISTORY OF OTHER MALIGNANT NEOPLASM OF BRONCHUS AND LUNG: ICD-10-CM

## 2024-05-11 DIAGNOSIS — L97.515 NON-PRESSURE CHRONIC ULCER OF OTHER PART OF RIGHT FOOT WITH MUSCLE INVOLVEMENT WITHOUT EVIDENCE OF NECROSIS: ICD-10-CM
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
## 2024-05-11 DIAGNOSIS — I12.9 HYPERTENSIVE CHRONIC KIDNEY DISEASE WITH STAGE 1 THROUGH STAGE 4 CHRONIC KIDNEY DISEASE, OR UNSPECIFIED CHRONIC KIDNEY DISEASE: ICD-10-CM

## 2024-05-11 DIAGNOSIS — Z98.49 CATARACT EXTRACTION STATUS, UNSPECIFIED EYE: ICD-10-CM

## 2024-05-11 DIAGNOSIS — K21.9 GASTRO-ESOPHAGEAL REFLUX DISEASE WITHOUT ESOPHAGITIS: ICD-10-CM

## 2024-05-11 DIAGNOSIS — Z86.73 PERSONAL HISTORY OF TRANSIENT ISCHEMIC ATTACK (TIA), AND CEREBRAL INFARCTION WITHOUT RESIDUAL DEFICITS: ICD-10-CM

## 2024-05-16 ENCOUNTER — APPOINTMENT (OUTPATIENT)
Dept: OTOLARYNGOLOGY | Facility: CLINIC | Age: 79
End: 2024-05-16
Payer: MEDICARE

## 2024-05-16 VITALS
SYSTOLIC BLOOD PRESSURE: 132 MMHG | DIASTOLIC BLOOD PRESSURE: 68 MMHG | HEART RATE: 72 BPM | BODY MASS INDEX: 19.69 KG/M2 | WEIGHT: 107 LBS | HEIGHT: 62 IN

## 2024-05-16 DIAGNOSIS — H90.3 SENSORINEURAL HEARING LOSS, BILATERAL: ICD-10-CM

## 2024-05-16 DIAGNOSIS — H61.23 IMPACTED CERUMEN, BILATERAL: ICD-10-CM

## 2024-05-16 PROCEDURE — G0268 REMOVAL OF IMPACTED WAX MD: CPT

## 2024-05-16 PROCEDURE — 99213 OFFICE O/P EST LOW 20 MIN: CPT | Mod: 25

## 2024-05-16 PROCEDURE — 92557 COMPREHENSIVE HEARING TEST: CPT

## 2024-05-16 PROCEDURE — 92567 TYMPANOMETRY: CPT

## 2024-05-16 NOTE — ASSESSMENT
[FreeTextEntry1] : Antonella Thompson presents for follow-up. She has history of left mandibular swelling and possible dental cyst. She is following with an oral surgen for this. Bilateral cerumen impaction was removed today. 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 She will go for hearing aid evaluation.  - HAE - f/u in 1 yr w/ audio.

## 2024-05-16 NOTE — PHYSICAL EXAM
[] : septum deviated bilaterally [Midline] : trachea located in midline position [Normal] : no rashes [de-identified] : Bilateral cerumen [FreeTextEntry2] : Small area of swelling over left mandible, stable from prior.

## 2024-05-16 NOTE — HISTORY OF PRESENT ILLNESS
[de-identified] : Antonella Thompson is a 76 yo female with hx lung cancer s/p resection, TIA who presents for evaluation of dysphonia for the past few weeks. She denies vocal strain, but notes that she loses her voice frequently. She is not a stout. She notes vocal fatigue. She notes very rare trouble swallowing pastas or breads, but uses water to get these down. She denies aspiration episodes or odynophagia. She denies fevers, new neck masses/lumps and night sweats. She notes a 39 lb weight loss over the past year. She states that this was due to abdominal pain for which she was worked up by her GI physician and is improved. She is on pantoprazole. She had a UGI endoscopy per her report last year and this was normal. She notes recent nasal congestion and postnasal drainage. This leads to dryness of her throat. She is on azelastine nasal spray. SHe denies heartburn or food regurgitation. [FreeTextEntry1] : 1/10/22 - Patient presents for follow-up. She states that she has been using flonase and nasal saline sprays daily. She continues to have thick postnasal drainage leading to cough. She notes nasal congestion, but no sinus pressure. She notes that she continues to have intermittent dysphonia. She denies any fevers or chills. She notes some right ear pain when placing her hearing aid. She denies otorrhea.  10/7/22 - Antonella Thompson present for follow-up. She notes history of left jaw cyst, previously seen by a surgeon. She underwent a CT per her report but was unable to visualize it. She notes that she has had increased swelling and pain of the cyst recently in the past few weeks. She was seen by dentist and oral surgeon. They did X-rays and started her on antibiotic. She denies fevers or chills. She denies any overlying skin changes. she denies intraoral bleeding. She denies unintentional weight loss or any neck masses.  10/28/22 - Ms. Thompson presents for follow-up. She obtained her MRI. She notes recent increased swelling of the left mandible cyst requiring antibiotics. This has resolved. She denies pain, fevers, or chills.  12/13/23 - Antonella Thompson presents for follow-up. She is seeing oral surgeon for mandible cyst. She wears hearing aids. She notes that for the past year, she has had difficulty putting the right hearing aid in due to pain. She denies otorrhea, tinnitus,, or vertigo. She feels her hearing is diminished. She denies recent fevers or chills. She denies facial weakness or facial numbness. She denies hsitory of recurrent ear infections.  1/3/24 - Ms. Thompson presents for follow-up. She used debrox drops. She denies otalgia, otorrhea, tinnitus, or  vertigo. She denies change in hearing. No fevers or chills.  5/16/24 - Ms. Thompson presents for follow-up. She notes left ear discomfort compared to right. She denies otalgia, otorrhea ,tinnitus, vertigo. No fevers or chills. No recent ear infections.

## 2024-05-17 ENCOUNTER — APPOINTMENT (OUTPATIENT)
Dept: NEPHROLOGY | Facility: CLINIC | Age: 79
End: 2024-05-17
Payer: MEDICARE

## 2024-05-17 VITALS
HEART RATE: 78 BPM | DIASTOLIC BLOOD PRESSURE: 60 MMHG | TEMPERATURE: 96.6 F | HEIGHT: 62 IN | BODY MASS INDEX: 19.69 KG/M2 | WEIGHT: 107 LBS | SYSTOLIC BLOOD PRESSURE: 138 MMHG | OXYGEN SATURATION: 95 %

## 2024-05-17 DIAGNOSIS — N18.30 CHRONIC KIDNEY DISEASE, STAGE 3 UNSPECIFIED: ICD-10-CM

## 2024-05-17 DIAGNOSIS — I10 ESSENTIAL (PRIMARY) HYPERTENSION: ICD-10-CM

## 2024-05-17 PROCEDURE — 99214 OFFICE O/P EST MOD 30 MIN: CPT

## 2024-05-17 NOTE — PHYSICAL EXAM
[General Appearance - Alert] : alert [General Appearance - In No Acute Distress] : in no acute distress [Sclera] : the sclera and conjunctiva were normal [PERRL With Normal Accommodation] : pupils were equal in size, round, and reactive to light [Outer Ear] : the ears and nose were normal in appearance [Neck Appearance] : the appearance of the neck was normal [] : the neck was supple [Auscultation Breath Sounds / Voice Sounds] : lungs were clear to auscultation bilaterally [Heart Sounds] : normal S1 and S2 [Edema] : there was no peripheral edema [Bowel Sounds] : normal bowel sounds [Abdomen Soft] : soft

## 2024-05-17 NOTE — HISTORY OF PRESENT ILLNESS
[FreeTextEntry1] : Ms. Thompson presents for follow up of CKD today. Feels well and remains active. Has been eating well.

## 2024-05-17 NOTE — CONSULT LETTER
[Courtesy Letter:] : I had the pleasure of seeing your patient, [unfilled], in my office today. [Please see my note below.] : Please see my note below. [Consult Closing:] : Thank you very much for allowing me to participate in the care of this patient.  If you have any questions, please do not hesitate to contact me. [Sincerely,] : Sincerely, [DrGarcia  ___] : Dr. STYLES

## 2024-05-17 NOTE — ASSESSMENT
[FreeTextEntry1] : 77 yo woman with hx of HTN RA and lung CA. Work up c/w CKD rather than acute primary renal disease.  --CKD : remains farily stable.  BUN/CREAT :    28/1.25 ( 5/8/2024)  27/1.37 ( 11/7/2023),   27/1.58 ( 8/29/2023), 23/1.37( 10/28/2021), 26/1.35 (5/4/2021)  Renal USG ;  R 9.2 cm,  L 7.2 cm,   Bilateral echogenic kidneys suggesting medical renal disease  --HTN : bp stable, monitor off meds.  --Anemia : GI work up done with EGD and Colonoscopy.   H/H :   11.4/37.5 ( 5/8/2024)  11.4/36.9 ( 11/7/2023),  10.4/33.9 ( 8/29/2023), 10.9/33.8 ( 10/28/2021).  --Serum MARIAJOSE : positive for 2 gamma migrating paraproteins --Heme evaluation done with close follow ups.

## 2024-06-03 ENCOUNTER — EMERGENCY (EMERGENCY)
Facility: HOSPITAL | Age: 79
LOS: 1 days | Discharge: HOME | End: 2024-06-03
Attending: STUDENT IN AN ORGANIZED HEALTH CARE EDUCATION/TRAINING PROGRAM | Admitting: STUDENT IN AN ORGANIZED HEALTH CARE EDUCATION/TRAINING PROGRAM
Payer: MEDICARE

## 2024-06-03 VITALS
WEIGHT: 108.03 LBS | RESPIRATION RATE: 16 BRPM | DIASTOLIC BLOOD PRESSURE: 73 MMHG | HEART RATE: 82 BPM | HEIGHT: 62 IN | TEMPERATURE: 98 F | OXYGEN SATURATION: 96 % | SYSTOLIC BLOOD PRESSURE: 111 MMHG

## 2024-06-03 VITALS
TEMPERATURE: 98 F | SYSTOLIC BLOOD PRESSURE: 115 MMHG | HEART RATE: 80 BPM | DIASTOLIC BLOOD PRESSURE: 75 MMHG | RESPIRATION RATE: 18 BRPM | OXYGEN SATURATION: 98 %

## 2024-06-03 DIAGNOSIS — Z98.890 OTHER SPECIFIED POSTPROCEDURAL STATES: Chronic | ICD-10-CM

## 2024-06-03 DIAGNOSIS — Z98.89 OTHER SPECIFIED POSTPROCEDURAL STATES: Chronic | ICD-10-CM

## 2024-06-03 DIAGNOSIS — Z90.49 ACQUIRED ABSENCE OF OTHER SPECIFIED PARTS OF DIGESTIVE TRACT: Chronic | ICD-10-CM

## 2024-06-03 PROCEDURE — 99283 EMERGENCY DEPT VISIT LOW MDM: CPT | Mod: 25

## 2024-06-03 PROCEDURE — 73590 X-RAY EXAM OF LOWER LEG: CPT

## 2024-06-03 PROCEDURE — 90471 IMMUNIZATION ADMIN: CPT

## 2024-06-03 PROCEDURE — 99284 EMERGENCY DEPT VISIT MOD MDM: CPT | Mod: FS

## 2024-06-03 PROCEDURE — 73590 X-RAY EXAM OF LOWER LEG: CPT | Mod: 26,LT

## 2024-06-03 PROCEDURE — 90715 TDAP VACCINE 7 YRS/> IM: CPT

## 2024-06-03 RX ORDER — TETANUS TOXOID, REDUCED DIPHTHERIA TOXOID AND ACELLULAR PERTUSSIS VACCINE, ADSORBED 5; 2.5; 8; 8; 2.5 [IU]/.5ML; [IU]/.5ML; UG/.5ML; UG/.5ML; UG/.5ML
0.5 SUSPENSION INTRAMUSCULAR ONCE
Refills: 0 | Status: COMPLETED | OUTPATIENT
Start: 2024-06-03 | End: 2024-06-03

## 2024-06-03 RX ADMIN — TETANUS TOXOID, REDUCED DIPHTHERIA TOXOID AND ACELLULAR PERTUSSIS VACCINE, ADSORBED 0.5 MILLILITER(S): 5; 2.5; 8; 8; 2.5 SUSPENSION INTRAMUSCULAR at 18:01

## 2024-06-03 NOTE — ED ADULT NURSE NOTE - NS ED PATIENT SAFETY CONCERN
Anesthesia Post Evaluation    Patient: Esteban Carson    Procedure(s) Performed: Procedure(s) (LRB):  PHACOEMULSIFICATION, CATARACT (Right)  INSERTION, IOL PROSTHESIS (Right)    Final Anesthesia Type: MAC    Patient location during evaluation: PACU  Patient participation: Yes- Able to Participate  Level of consciousness: awake and alert and oriented  Post-procedure vital signs: reviewed and stable  Pain management: adequate  Airway patency: patent    PONV status at discharge: No PONV  Anesthetic complications: no      Cardiovascular status: blood pressure returned to baseline and hemodynamically stable  Respiratory status: unassisted  Hydration status: euvolemic  Follow-up not needed.          Vitals Value Taken Time   /88 3/4/2020  1:05 PM   Temp 36.8 °C (98.2 °F) 3/4/2020  1:05 PM   Pulse 60 3/4/2020  1:05 PM   Resp 16 3/4/2020  1:05 PM   SpO2 99 % 3/3/2020  4:34 PM         No case tracking events are documented in the log.      Pain/Sang Score: No data recorded      
No

## 2024-06-03 NOTE — ED ADULT NURSE NOTE - OBJECTIVE STATEMENT
Pt C/O wound to left shin. states she fell 5/31 scraping her left shin. denies LOC and denies head strike. takes a baby asprin daily.

## 2024-06-03 NOTE — ED PROVIDER NOTE - OBJECTIVE STATEMENT
77 y/o F PMH of Lung Ca s/p resection, CKD, COPD, HTN, TIA, presenting with avulsion of skin LLE    States occurred after fall on 5/31 with no head trauma, No fevers or chills. Unsure of Last Tdap. Has been using topical neosporin.

## 2024-06-03 NOTE — ED PROVIDER NOTE - NSFOLLOWUPINSTRUCTIONS_ED_ALL_ED_FT
Wound care as demonstrated  Apply bacitracin, keep covered  Follow up with wound care in 1-2 days.  Return to the ED immediately for new or worsening symptoms as we discussed.      English    Skin Tear  A skin tear is a wound in which the top layers of skin have peeled off from the deeper skin or tissues underneath. This is a common problem as people get older because the skin becomes thinner and more fragile. In addition, some medicines, such as oral corticosteroids, can lead to thinning skin if they are taken for long periods of time.    A skin tear is often repaired with tape or skin adhesive strips. Depending on the location of the wound, a bandage (dressing) may be applied over the tape or adhesive strips.    Follow these instructions at home:  Wound care      Clean the wound as told by your health care provider. You may be instructed to keep the wound dry for the first few days. If you are told to clean the wound:  Wash the wound as told by your health care provider. This may include using mild soap and water, a wound cleanser, or a salt–water (saline) solution.  If using soap, rinse the wound with water to remove all soap.  Do not rub the wound dry. Pat it gently with a clean towel or let it air-dry.  Change any dressings as told by your health care provider. This may include changing the dressing if it gets wet, gets dirty, or starts to smell bad.  Wash your hands with soap and water for at least 20 seconds before and after you change your bandage (dressing). If soap and water are not available, use hand .  Leave tape or skin adhesive strips in place. These skin closures may need to stay in place for 2 weeks or longer. If adhesive strip edges start to loosen and curl up, you may trim the loose edges. Do not remove adhesive strips completely unless your health care provider tells you to do that.  Check your wound every day for signs of infection. Check for:  Redness, swelling, or pain.  More fluid or blood.  Warmth.  Pus or a bad smell.  Do not scratch or pick at the wound.  Protect the injured area until it has healed.  Medicines    Take or apply over-the-counter and prescription medicines only as told by your health care provider.  If you were prescribed an antibiotic medicine, take or apply it as told by your health care provider. Do not stop using the antibiotic even if your condition improves.  General instructions      Keep the dressing dry as told by your health care provider.  Do not take baths, swim, use a hot tub, or do anything that puts your wound underwater until your health care provider approves. Ask your health care provider if you may take showers. You may only be allowed to take sponge baths.  Keep all follow-up visits. This is important.  Contact a health care provider if:  You have redness, swelling, or pain around your wound.  You have more fluid or blood coming from your wound.  Your wound, or the area around your wound, feels warm to the touch.  You have pus or a bad smell coming from your wound.  Get help right away if:  You have a red streak that goes away from the skin tear.  You have a fever and chills, and your symptoms suddenly get worse.  Summary  A skin tear is a wound in which the top layers of skin have peeled off from the deeper skin or tissues underneath.  A skin tear is often repaired with tape or skin adhesive strips, and a bandage (dressing) may be applied over the tape or the adhesive strips.  Change any dressings as told by your health care provider.  Take or apply over-the-counter and prescription medicines only as told by your health care provider.  Contact a health care provider if you have signs of infection.  This information is not intended to replace advice given to you by your health care provider. Make sure you discuss any questions you have with your health care provider.    Document Revised: 03/24/2021 Document Reviewed: 03/24/2021  [employer] Patient Education © 2024 [employer]  [employer] logo  Terms and Conditions  Privacy Policy  Editorial Policy  All content on this site: Copyright © 2024 [employer], its licensors, and contributors. All rights are reserved, including those for text and data mining, AI training, and similar technologies. For all open access content, the Creative Commons licensing terms apply.  Cookies are used by this site. To decline or learn more, visit our Cookies page.  [employer]

## 2024-06-03 NOTE — ED PROVIDER NOTE - PATIENT PORTAL LINK FT
You can access the FollowMyHealth Patient Portal offered by [facility] by registering at the following website: [email or web address]. By joining [email or web address]’s FollowMyHealth portal, you will also be able to view your health information using other applications (apps) compatible with our system.

## 2024-06-03 NOTE — ED ADULT TRIAGE NOTE - CHIEF COMPLAINT QUOTE
pt was wheeled into triage with , C/O wound to left shin. states she fell 5/31 scraping her left shin. denies LOC and denies head strike. takes a baby asprin daily.

## 2024-06-03 NOTE — ED ADULT NURSE NOTE - NSFALLHARMRISKINTERV_ED_ALL_ED

## 2024-06-03 NOTE — ED ADULT NURSE NOTE - CAS TRG GEN SKIN COLOR
11/1/2021       RE: Jose Luis Butts  [address]  [facility] [address]     Dear Colleague,    Thank you for referring your patient, Jose Luis Butts, to the [facility] INFECTI[facility] DISEASE [facility] [place] at [facility]. Please see a copy of my visit note below.    [facility]  Infectious Disease Clinic Note:  Post-Hospital Follow Up     Patient:  Jose Luis Butts, Date of birth 1946, Medical record number [id number]  Date of Visit:  11/01/2021         Assessment and Recommendations:   Problem List:  1. Driveline infection (superficial) - first episode  - Driveline drainage culture positive for MSSA   2. Fever and leukocytosis secondary to #1, now resolved   3. CAD s/p 4-vessel bypass on 4/28/2017   4. Atrial flutter s/p CRT-D placement on 9/2017   5. Ischemic cardiomyopathy s/p HeartMate 3 LVAD placement on 8/15/2019 complicated by RV failure  6. Moderate mitral regurgitation   7. Moderate TR s/p tricuspid valve repair with 34mm MC3 partial annuloplasty ring on 8/1/2019   8. History of LV thrombus   9. CKD3 (B/L Cr around 1.80-2.3), Cr improved from prior baseline at 1.3 range     Discussion:     Mr. Jose Luis Butts is a 74 year old  Male with PMHx of CAD s/p 4-vessel bypass on 4/28/2017, Atrial flutter s/p CRT-D placement on 9/2017, ischemic cardiomyopathy s/p HeartMate 3 LVAD placement on 8/15/2019 complicated by RV failure, moderate mitral regurgitation, moderate TR s/p tricuspid valve repair with 34mm MC3 partial annuloplasty ring on 8/1/2019, history of LV thrombus, HTN, CKD3 (B/L Cr around 1.80-2.3), and HLD who was recently admitted from 9/23 - 9/27 for MSSA superficial LVAD drive line infection     Presented 9/23 with 1-2 days of fever and small amount of bloody discharge from his driveline site. On arrival to the ED, he was afebrile but had white count elevated at 24.5 and CRP of 27. Blood cultures were obtained on  9/23/21 and they remained negative (cultures drawn 2 weeks earlier from 9/8 were also negative). Drive line dressing was changed in the ED and per reports there was thick, bloody mucus drainage. Culture was obtained from LVAD site and grew MSSA. CT Chest/abdomen/pelvis showed no fluid collection to suggest pelvic or intra-abdominal abscess and no fluid collection along drive line noted. He was started on Cefepime and vancomycin on 9/23/21, which on 9/24/21 was switched to Vancomycin and cefazolin, followed by switch to Cefadroxil 500mg BID on 9/26 - with recs for 4 weeks of PO antibiotics till 10/22/21     Given positive culture from driveline drainage and lack of deep or superficial collections on CT chest/abdomen/pelvis and negative blood cultures, patient met criteria for driveline superficial infection. Has completed 4 weeks of therapy and tolerated the same well. Reasonable to monitor off antibiotics and continue dressing changes and drive line exit site care  If however, recurrence of infection, especially with the same organism, he will require lifelong antimicrobial suppression    Patient raised concern for leukocytosis to 12k on labs from 10/27. At present, no clear symptoms to suggest local (driveline) or systemic infectious process. Reassured patient that given he is on Warfarin, he can sometimes see blood on clearing nose and that by itself should not imply bacterial infection. Reasonable to repeat CBC at next visit     Recommendations:     1. Monitor off antibiotics for now  2. Continue dressing changes at home and drive line/ VAD care  3. Recommended that patient call the co-ordinator if drive line exit site pain, swelling, redness, discharge  4. If fevers, shaking chills, recommended he go to the ER  5. If recurrent drive line infection with the same organism, or bacteremia/deeper infection, will necessitate long term antibiotic suppression  6. Follow up with VAD team. ID follow up as needed     I  spent 40 mins on day of encounter between chart review, patient visit (25 mins), documentation    ABDIFATAH Granados  Staff Physician, Infectious Diseases  Pager [phone]         Interval History:     Patient completed his antibiotics 10/22/21. Since then has been doing well  He denies fevers, chills, rigors, night sweats. Stable weight and appetite. His discharge from drive line site dried up soon after starting antibiotics and has not had recurrence since. His wife mentions that she changes his dressing every day and other than slight crusting at exit site (which has been seen previously as well) no other issues. No purulent drainage, no foul smell, no pain, swelling or tenderness  He reports some congestion, and bloody snot seen when he blows his nose - but no pain, purulence    He had repeat labs done 10/27 - CBC showed WBC count 12k        History of the Infectious Disease lllness:     74-year-old gentleman with a past medical history of CAD s/p four-vessel CABG on 4/2017, atrial flutter, CRT-D placement on 9/17, moderate MR, and moderate TR status post TVR, CKD stage III, LV thrombus, anemia, hyperlipidemia, gout, and ICM s/p HM III LVAD placement on 8/15/19 c/b RV failure    Recently admitted from 9/23 - 9/27 after he presented with fever and drainage from his LVAD driveline.   The patient reports that on the day before presentation in the ER, he exerted himself more than usual doing yard work and was frequently bending over to  sticks. On the morning of presentation, he woke up with a fever to 101.5 F. In addition, he noticed a small amount of bloody discharge from his driveline site. He took some Tylenol and he was supposed to see his Cardiologist that day but was asked to present to the ED.    On arrival to the ED, he was afebrile but had white count elevated at 24.5 and CRP of 27. His pro-calcitonin was 0.56. UA was normal. COVID PCR was negative. Blood cultures were obtained on 9/23/21  and they remained negative (cultures drawn 2 weeks earlier from 9/8 were also negative). Drive line dressing was changed in the ED and per reports there was thick, bloody mucus drainage. Culture was obtained from LVAD site and grew MSSA.     CXR obtained on admission was unremarkable. A CT Chest/abdomen/pelvis showed no clear etiology to explain patient s sepsis. No fluid collection to suggest pelvic or intra-abdominal abscess. No fluid collection along drive line noted. He was started on Cefepime and vancomycin on 9/23/21, which on 9/24/21 was switched to Vancomycin and cefazolin. Vanc stopped 9/25. On 9/26, he was switched to Cefadroxil 500mg BID - with recs for 4 weeks of PO antibiotics till 10/22/21    Review of Systems:  CONSTITUTIONAL:  No fevers or chills. No night sweats.  EYES: negative for icterus or acute vision changes.   ENT:  negative for hearing loss, tinnitus or sore throat, mild sinus congestion  RESPIRATORY:  negative for cough, sputum, dyspnea  CARDIOVASCULAR:  negative for chest pain, heart palpitations  GASTROINTESTINAL:  negative for nausea, vomiting, diarrhea or constipation  GENITOURINARY:  negative for dysuria or hematuria.  HEME:  No easy bruising or bleeding  INTEGUMENT:  negative for rash or pruritus  NEURO:  Negative for headache or tremor.    Past Medical History:   Diagnosis Date     Anemia      Atrial flutter (H)      Cerebrovascular accident (CVA) (H) 03/28/2016     Chronic anemia      CKD (chronic kidney disease)      Coronary artery disease      Gout      H/O four vessel coronary artery bypass graft      History of atrial flutter      Hyperlipidemia      Ischemic cardiomyopathy 7/5/2019     Ischemic cardiomyopathy      LV (left ventricular) mural thrombus      LVAD (left ventricular assist device) present (H)      Mitral regurgitation      NSTEMI (non-ST elevated myocardial infarction) (H) 04/23/2017    with acute systolic heart failure 4/23/17. S/p 4-vessel bypass 4/28/17. Bi-V  "ICD 9/2017     Protein calorie malnutrition (H)      RVF (right ventricular failure) (H)      Tricuspid regurgitation        Past Surgical History:   Procedure Laterality Date     CV RIGHT HEART CATH MEASUREMENTS RECORDED N/A 7/25/2019    Procedure: Right Heart Cath with leave in [place];  Surgeon: Epi Haley MD;  Location:  HEART CARDIAC CATH LAB     CV RIGHT HEART CATH MEASUREMENTS RECORDED N/A 8/21/2019    Procedure: Heart Cath Right Heart Cath;  Surgeon: Epi Haley MD;  Location:  HEART CARDIAC CATH LAB     CV RIGHT HEART CATH MEASUREMENTS RECORDED N/A 9/2/2020    Procedure: Right Heart Cath;  Surgeon: Epi Haley MD;  Location:  HEART CARDIAC CATH LAB     CV RIGHT HEART CATH MEASUREMENTS RECORDED N/A 1/4/2021    Procedure: Right Heart Cath;  Surgeon: Domenico Lieberman MD;  Location:  HEART CARDIAC CATH LAB     CV RIGHT HEART CATH MEASUREMENTS RECORDED N/A 4/16/2021    Procedure: Right Heart Cath;  Surgeon: Epi Haley MD;  Location:  HEART CARDIAC CATH LAB     History of CABG  1998     INSERT VENTRICULAR ASSIST DEVICE LEFT (HEARTMATE II) N/A 8/1/2019    Procedure: Redo Median Sternotomy, Lysis of Adhesions, On Cardiopulmonary Bypass, Heartmate III Left Ventricular Assist Device Insertion, Tricuspid Valve Repair Using Quintana MC3 34MM;  Surgeon: Edmundo Thorpe MD;  Location: [facility] OR     PICC INSERTION Right 08/17/2019    5Fr - 42cm, medial brachial vein, low SVC       Family History   Problem Relation Age of Onset     Heart Failure Mother      Heart Failure Father      Heart Failure Sister      Coronary Artery Disease Brother      Coronary Artery Disease Early Onset Brother 38        bypass at age 38       Social History     Social History Narrative    He was an  and retired in 2012. He is . He and his wife have no children.  He used to drink \"more than he should... but in recent years has been at most 1 to 2 glasses/week-if any drinking at " "all\".      Social History     Tobacco Use     Smoking status: Former Smoker     Smokeless tobacco: Never Used   Substance Use Topics     Alcohol use: Yes     Drug use: Never       Immunization History   Administered Date(s) Administered     COVID-19,PF,Pfizer (12+ Yrs) 03/01/2021, 03/22/2021, 09/28/2021     Flu, Unspecified 09/18/2010     Influenza (High Dose) 3 valent vaccine 09/27/2016, 10/05/2018     Influenza Vaccine IM > 6 months Valent IIV4 (Alfuria,Fluzone) 10/13/2013, 10/15/2015     Influenza Vaccine IM Ages 6-35 Months 4 Valent (PF) 10/13/2013     Pneumo Conj 13-V (2010&after) 09/27/2016     Pneumococcal 23 valent 03/13/2014     TDAP Vaccine (Adacel) 04/30/2008     Td (Adult), Adsorbed 01/01/1995     Tdap (Adult) Unspecified Formulation 04/12/2019     Zoster vaccine recombinant adjuvanted (SHINGRIX) 06/20/2019     Zoster vaccine, live 12/20/2012       Patient Active Problem List   Diagnosis     Chronic systolic heart failure (H)     CVA (cerebral vascular accident) (H)     YEYO (acute kidney injury) (H)     Biventricular implantable cardioverter-defibrillator (ICD) in situ     Chronic ischemic heart disease     NSTEMI (non-ST elevated myocardial infarction) (H)     Essential hypertension     History of cerebrovascular accident     History of coronary artery bypass surgery     Hyperlipidemia     Stage 3 chronic kidney disease (H)     Typical atrial flutter (H)     Ischemic cardiomyopathy     Heart failure (H)     Left ventricular assist device present (H)     Long term (current) use of anticoagulants     Alcohol abuse     LVAD (left ventricular assist device) present (H)     Acute on chronic heart failure (H)     Heart failure, systolic, acute on chronic (H)     Congestive heart failure, unspecified HF chronicity, unspecified heart failure type (H)     Generalized weakness     Fever, unspecified fever cause     Leukocytosis, unspecified type     History of basal cell carcinoma     Type 2 diabetes mellitus " with stage 3 chronic kidney disease (H)       No outpatient medications have been marked as taking for the 11/1/21 encounter (Appointment) with Daniel Hernández MD.       Allergies   Allergen Reactions     Amiodarone      Multiple side effects, including YEYO, abdominal discomfort     Lisinopril Cough     Chlorhexidine Rash              Physical Exam:     There were no vitals taken for this visit.  Wt Readings from Last 4 Encounters:   10/27/21 83.9 kg (185 lb)   10/20/21 83.6 kg (184 lb 6.4 oz)   10/07/21 84.8 kg (187 lb)   10/04/21 84.8 kg (187 lb)       Exam:  GENERAL: well-developed, well-nourished, alert, oriented, in no acute distress.  HEAD: Head is normocephalic, atraumatic   EYES: Eyes have anicteric sclerae.    ENT: Oropharynx is moist without exudates or ulcers.  NECK: Supple.  LUNGS: Clear to auscultation. On room air, no use of accessory muscles  CARDIOVASCULAR: LVAD hum +, driveline site with dressing intact, no drainage, no redness, no tenderness, some crusting seen at exit site  ABDOMEN: Normal bowel sounds, soft, nontender.  SKIN: No acute rashes. Line is in place without any surrounding erythema.  NEUROLOGIC: Grossly nonfocal. AAO x 3         Laboratory Data:     Inflammatory Markers    Recent Labs   Lab Test 10/20/21  1411 10/04/21  1403 09/25/21  0611 09/23/21  1318 09/08/21  1156 08/25/21  1109 11/25/20  1210 07/23/19  0415   CRP 15.7* 24.1* 110.0* 27.0* 12.5* 8.2* 3.7 15.0*       Metabolic Studies    Recent Labs   Lab Test 10/27/21  1254 10/20/21  1411 10/20/21  1411 10/12/21  1115 10/06/21  1300 10/04/21  1155 10/04/21  1155 09/24/21  0913 09/24/21  0632 09/24/21  0349 09/23/21  1318 09/23/21  1318     --  139  --   --   --  132*   < > 136  --    < > 130*   POTASSIUM 3.8   < > 3.7  --   --    < > 3.5   < > 3.7  --    < > 5.0   CHLORIDE 100   < > 103  --   --    < > 97   < > 99  --    < > 96   CO2 28   < > 30  --   --    < > 29   < > 27  --    < > 29   ANIONGAP 6   < > 6  --   --    < >  6   < > 10  --    < > 5   BUN 49*   < > 58*  --   --    < > 52*   < > 49*  --    < > 59*   CR 1.82*  --  1.78*  --   --   --  1.69*   < > 1.66*  --    < > 2.17*   82519  --   --   --  1.87*   < >  --   --   --   --   --   --   --    GFRESTIMATED 36*   < > 37*  --   --    < > 39*   < > 40*  --    < > 29*   GLC 93   < > 148*  --   --    < > 167*   < > 143*  --    < > 262*   RIDDHI 9.3   < > 9.3  --   --    < > 9.5   < > 8.8  --    < > 9.1   PHOS  --   --   --   --   --   --   --   --   --   --   --  2.2*   MAG  --   --   --   --   --   --   --   --  2.4*  --    < > 2.5*   URIC 11.1*  --   --   --   --   --   --   --   --   --   --   --    LACT  --   --   --   --   --   --   --   --   --  1.2   < > 1.8    < > = values in this interval not displayed.       Hepatic Studies    Recent Labs   Lab Test 10/27/21  1254 10/20/21  1411 10/04/21  1155 10/04/21  1155 05/29/21  0607 05/28/21  1848   BILITOTAL 0.7 0.6  --  0.8   < > 0.6   DBIL  --   --   --   --   --  0.3*   ALKPHOS 142 126  --  135   < > 130   PROTTOTAL 8.4 7.9   < > 8.0   < >  --    ALBUMIN 4.0 3.7  --  3.7   < >  --    AST 30 29  --  27   < > 12   ALT 33 26  --  27   < > 23   * 302*   < > 270*   < >  --     < > = values in this interval not displayed.       Pancreatitis testing    Recent Labs   Lab Test 04/14/21  0629 09/01/20  0532   TRIG 47 98       Lipid testing    Recent Labs   Lab Test 04/14/21  0629 09/01/20  0532 07/23/19  0415 07/23/19  0415   CHOL 136 132  --  82   HDL 60 44   < > 27*   LDL 67 69   < > 42   TRIG 47 98   < > 62    < > = values in this interval not displayed.       Gout Labs      Recent Labs   Lab Test 10/27/21  1254 05/29/21  0607 04/13/21  2207 08/21/20  0909 09/04/19  1227   URIC 11.1* 11.4* 11.0* 9.8* 7.8*       Hematology Studies   Recent Labs   Lab Test 10/27/21  1254 10/20/21  1411 10/12/21  1115 10/06/21  1300 10/04/21  1155 10/04/21  1155 09/27/21  0559 09/27/21  0559 09/25/21  0611 09/24/21  0349 09/24/21  0349 08/31/21  1859  08/25/21  1109 06/24/21  1153 06/13/21  0553   WBC 12.0* 9.6  --   --   --  14.4*  --  8.1   < >  --  17.3*   < > 14.1*   < > 7.1   [id number]  --   --  11.8*   < >  --   --   --   --   --   --   --    < >  --   --   --    ANEU  --   --   --   --   --   --   --   --   --   --   --   --  11.6*  --  4.9   ANEUTAUTO 8.7*  --   --   --   --   --   --   --   --   --  14.8*   < >  --   --   --    ALYM  --   --   --   --   --   --   --   --   --   --   --   --  1.4  --  0.9   ALYMPAUTO 1.4  --   --   --   --   --   --   --   --    < > 0.7*   < >  --   --   --    SLIM  --   --   --   --   --   --   --   --   --   --   --   --  0.8  --  1.1   AMONOAUTO 1.6*  --   --   --   --   --   --   --   --    < > 1.5*   < >  --   --   --    AEOS  --   --   --   --   --   --   --   --   --   --   --   --  0.1  --  0.2   AEOSAUTO 0.2  --   --   --   --   --   --   --   --    < > 0.1   < >  --   --   --    ABSBASO 0.0  --   --   --   --   --   --   --   --    < > 0.0   < >  --   --   --    HGB 11.9* 11.5*  --   --    < > 11.8*   < > 10.0*   < >  --  10.5*   < > 12.7*   < > 9.3*   67753  --   --  11.7*   < >  --   --   --   --   --   --   --    < >  --   --   --    HCT 36.7* 35.0*  --   --    < > 34.5*   < > 30.2*   < >  --  31.3*   < > 38.2*   < > 28.6*    152  --   --    < > 182   < > 149*   < >  --  126*   < > 199   < > 150   03657  --   --  206   < >  --   --   --   --   --   --   --    < >  --   --   --     < > = values in this interval not displayed.       Clotting Studies    Recent Labs   Lab Test 10/27/21  1254 10/20/21  1411 10/18/21  1200 10/15/21  1042 09/24/21  0914 09/23/21  1318   INR 3.76* 1.93* 2.1* 1.5*   < > 2.86*   PTT  --   --   --   --   --  48*    < > = values in this interval not displayed.       Iron Testing    Recent Labs   Lab Test 10/27/21  1254 11/25/20  1210 11/17/20  0959 08/31/20  1052 08/21/20  0909 07/13/20  0943 06/11/20  1101 07/24/19  0430 07/23/19  0415   IRON 85  --  51  --  90  --  68   < > 43   FEB  343  --  357  --  333  --  303   < > 455*   IRONSAT 25  --  14*  --  27  --  22   < > 10*   TOMMY 69  --   --   --  86  --  108   < > 47   MCV 90   < > 93   < > 96   < >  --    < > 86   B12  --   --   --   --  520  --   --   --  986    < > = values in this interval not displayed.       Thyroid Studies     Recent Labs   Lab Test 10/27/21  1254 04/13/21  2207 08/21/20  0909 07/23/19  0415   TSH 2.03 3.20 1.88 3.44       Urine Studies     Recent Labs   Lab Test 09/23/21  1557 01/08/21  1150 08/16/19  1730 08/05/19  0800 07/30/19  1235   URINEPH 5.5 6.5 5.5 7.0 5.5   NITRITE Negative Negative Negative Negative Negative   LEUKEST Negative Negative Negative Small* Negative   WBCU <1 <1 2 5 <1       Medication levels    Recent Labs   Lab Test 09/25/21  0611   VANCOMYCIN 13.1       Microbiology:  Last Culture results with specimen source  Culture Micro   Date Value Ref Range Status   03/08/2021 No growth  Final   03/08/2021 No growth  Final   01/08/2021 No growth  Final   11/25/2020 No growth  Final   11/25/2020 No growth  Final   08/10/2019 No growth  Final     Culture   Date Value Ref Range Status   10/04/2021 No Growth  Final   10/04/2021 No Growth  Final   09/23/2021 4+ Staphylococcus aureus (A)  Final   09/23/2021 No Growth  Final   09/23/2021 No Growth  Final   09/08/2021 No Growth  Final   09/08/2021 No Growth  Final    Specimen Description   Date Value Ref Range Status   03/08/2021 Blood Left Arm  Final   03/08/2021 Blood Right Arm  Final   01/08/2021 Midstream Urine  Final   11/25/2020 Blood Left Arm  Final   11/25/2020 Blood Right Arm  Final   08/10/2019 Pleural fluid  Final   08/10/2019 Pleural fluid  Final   08/01/2019 Nares  Final          Quantiferon testing   Recent Labs   Lab Test 10/27/21  1254 09/24/21  0349   LYMPH 12 4     Virology:  Coronavirus-19 testing    Recent Labs   Lab Test 06/08/21  0927 05/28/21 1954 04/13/21  1437 12/31/20  2100 08/31/20  1345   [id number] Nasopharyngeal   < > Nasopharyngeal    < >  --    [id number]  --   --  Nasopharyngeal  --  Nasopharyngeal    < > = values in this interval not displayed.       Imaging:    CT Chest/Abdomen/Pelvis 9/23/21:  IMPRESSION:  1.  No CT findings to account for patient's underlying sepsis. Lungs  are clear. No fluid collection to suggest an intra-abdominal or pelvic  abscess. No urinary tract calculi or hydronephrosis. No calcified  gallstones.     2.  Prior median sternotomy changes. Left ventricular assist device is  noted. No mediastinal fluid collection is appreciated.  Answers for HPI/ROS submitted by the patient on 10/28/2021  General Symptoms: No  Skin Symptoms: No  HENT Symptoms: No  EYE SYMPTOMS: No  HEART SYMPTOMS: No  LUNG SYMPTOMS: No  INTESTINAL SYMPTOMS: No  URINARY SYMPTOMS: No  REPRODUCTIVE SYMPTOMS: No  SKELETAL SYMPTOMS: No  BLOOD SYMPTOMS: No  NERVOUS SYSTEM SYMPTOMS: No  MENTAL HEALTH SYMPTOMS: No       Normal for race

## 2024-06-03 NOTE — ED PROVIDER NOTE - CARE PROVIDER_API CALL
Ricardo Dubois-Nilson  Foot Surgery  [address] [place] [id number]  Phone: [phone]  Fax: [phone]  Follow Up Time:

## 2024-06-03 NOTE — ED PROVIDER NOTE - ATTENDING APP SHARED VISIT CONTRIBUTION OF CARE
77 y/o F PMH of Lung Ca s/p resection, CKD, COPD, HTN, TIA, presenting with avulsion of skin LLE  No purulence or notable erythema  Xray without subcutaneous air  Would care  Update Tdap  Can follow at Wound Care @ [facility]

## 2024-06-05 ENCOUNTER — OUTPATIENT (OUTPATIENT)
Dept: OUTPATIENT SERVICES | Facility: HOSPITAL | Age: 79
LOS: 1 days | Discharge: HOME | End: 2024-06-05
Payer: MEDICARE

## 2024-06-05 ENCOUNTER — APPOINTMENT (OUTPATIENT)
Dept: WOUND CARE | Facility: HOSPITAL | Age: 79
End: 2024-06-05
Payer: MEDICARE

## 2024-06-05 VITALS
RESPIRATION RATE: 18 BRPM | OXYGEN SATURATION: 95 % | HEART RATE: 87 BPM | TEMPERATURE: 98.6 F | WEIGHT: 107 LBS | BODY MASS INDEX: 19.69 KG/M2 | DIASTOLIC BLOOD PRESSURE: 65 MMHG | SYSTOLIC BLOOD PRESSURE: 145 MMHG | HEIGHT: 62 IN

## 2024-06-05 DIAGNOSIS — Z83.3 FAMILY HISTORY OF DIABETES MELLITUS: ICD-10-CM

## 2024-06-05 DIAGNOSIS — Z98.89 OTHER SPECIFIED POSTPROCEDURAL STATES: Chronic | ICD-10-CM

## 2024-06-05 DIAGNOSIS — Z90.2 ACQUIRED ABSENCE OF LUNG [PART OF]: ICD-10-CM

## 2024-06-05 DIAGNOSIS — S81.802D UNSPECIFIED OPEN WOUND, LEFT LOWER LEG, SUBSEQUENT ENCOUNTER: ICD-10-CM

## 2024-06-05 DIAGNOSIS — S81.812D LACERATION WITHOUT FOREIGN BODY, LEFT LOWER LEG, SUBSEQUENT ENCOUNTER: ICD-10-CM

## 2024-06-05 DIAGNOSIS — Z98.49 CATARACT EXTRACTION STATUS, UNSPECIFIED EYE: ICD-10-CM

## 2024-06-05 DIAGNOSIS — Z90.49 ACQUIRED ABSENCE OF OTHER SPECIFIED PARTS OF DIGESTIVE TRACT: ICD-10-CM

## 2024-06-05 DIAGNOSIS — Z85.118 PERSONAL HISTORY OF OTHER MALIGNANT NEOPLASM OF BRONCHUS AND LUNG: ICD-10-CM

## 2024-06-05 DIAGNOSIS — Z79.82 LONG TERM (CURRENT) USE OF ASPIRIN: ICD-10-CM

## 2024-06-05 DIAGNOSIS — E78.5 HYPERLIPIDEMIA, UNSPECIFIED: ICD-10-CM

## 2024-06-05 DIAGNOSIS — Z98.890 OTHER SPECIFIED POSTPROCEDURAL STATES: Chronic | ICD-10-CM

## 2024-06-05 DIAGNOSIS — Z88.8 ALLERGY STATUS TO OTHER DRUGS, MEDICAMENTS AND BIOLOGICAL SUBSTANCES: ICD-10-CM

## 2024-06-05 DIAGNOSIS — Z88.0 ALLERGY STATUS TO PENICILLIN: ICD-10-CM
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
## 2024-06-05 DIAGNOSIS — N18.30 CHRONIC KIDNEY DISEASE, STAGE 3 UNSPECIFIED: ICD-10-CM

## 2024-06-05 DIAGNOSIS — Z87.891 PERSONAL HISTORY OF NICOTINE DEPENDENCE: ICD-10-CM

## 2024-06-05 DIAGNOSIS — M06.9 RHEUMATOID ARTHRITIS, UNSPECIFIED: ICD-10-CM

## 2024-06-05 DIAGNOSIS — Z87.01 PERSONAL HISTORY OF PNEUMONIA (RECURRENT): ICD-10-CM

## 2024-06-05 DIAGNOSIS — Z82.49 FAMILY HISTORY OF ISCHEMIC HEART DISEASE AND OTHER DISEASES OF THE CIRCULATORY SYSTEM: ICD-10-CM

## 2024-06-05 DIAGNOSIS — Z88.1 ALLERGY STATUS TO OTHER ANTIBIOTIC AGENTS STATUS: ICD-10-CM

## 2024-06-05 DIAGNOSIS — T81.31XD DISRUPTION OF EXTERNAL OPERATION (SURGICAL) WOUND, NOT ELSEWHERE CLASSIFIED, SUBSEQUENT ENCOUNTER: ICD-10-CM

## 2024-06-05 DIAGNOSIS — Z90.49 ACQUIRED ABSENCE OF OTHER SPECIFIED PARTS OF DIGESTIVE TRACT: Chronic | ICD-10-CM

## 2024-06-05 DIAGNOSIS — Z82.3 FAMILY HISTORY OF STROKE: ICD-10-CM
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
## 2024-06-05 DIAGNOSIS — Z86.73 PERSONAL HISTORY OF TRANSIENT ISCHEMIC ATTACK (TIA), AND CEREBRAL INFARCTION WITHOUT RESIDUAL DEFICITS: ICD-10-CM

## 2024-06-05 DIAGNOSIS — I12.9 HYPERTENSIVE CHRONIC KIDNEY DISEASE WITH STAGE 1 THROUGH STAGE 4 CHRONIC KIDNEY DISEASE, OR UNSPECIFIED CHRONIC KIDNEY DISEASE: ICD-10-CM

## 2024-06-05 DIAGNOSIS — Z79.899 OTHER LONG TERM (CURRENT) DRUG THERAPY: ICD-10-CM
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
## 2024-06-05 DIAGNOSIS — Z91.041 RADIOGRAPHIC DYE ALLERGY STATUS: ICD-10-CM

## 2024-06-05 DIAGNOSIS — J44.9 CHRONIC OBSTRUCTIVE PULMONARY DISEASE, UNSPECIFIED: ICD-10-CM
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
## 2024-06-05 DIAGNOSIS — Z80.1 FAMILY HISTORY OF MALIGNANT NEOPLASM OF TRACHEA, BRONCHUS AND LUNG: ICD-10-CM

## 2024-06-05 DIAGNOSIS — I65.29 OCCLUSION AND STENOSIS OF UNSPECIFIED CAROTID ARTERY: ICD-10-CM

## 2024-06-05 DIAGNOSIS — K21.9 GASTRO-ESOPHAGEAL REFLUX DISEASE WITHOUT ESOPHAGITIS: ICD-10-CM

## 2024-06-05 PROCEDURE — G0463: CPT

## 2024-06-05 PROCEDURE — 99214 OFFICE O/P EST MOD 30 MIN: CPT

## 2024-06-05 NOTE — ASSESSMENT
[Verbal] : Verbal [Demo] : Demo [Patient] : Patient [Good - alert, interested, motivated] : Good - alert, interested, motivated [Verbalizes knowledge/Understanding] : Verbalizes knowledge/understanding [Dressing changes] : dressing changes [Foot Care] : foot care [Skin Care] : skin care [Pressure relief] : pressure relief [Signs and symptoms of infection] : sign and symptoms of infection [Nutrition] : nutrition [How and When to Call] : how and when to call [Off-loading] : off-loading [Patient responsibility to plan of care] : patient responsibility to plan of care [Stable] : stable [Home] : Home [Ambulatory] : Ambulatory [Not Applicable - Long Term Care/Home Health Agency] : Long Term Care/Home Health Agency: Not Applicable [Pain Management] : pain management [] : No [FreeTextEntry2] : Infection prevention Localized wound care Promote optimal skin integrity  Maintain acceptable level of pain with use of pharmacological and nonpharmacological interventions Offloading / Pressure relief  Foot care   [FreeTextEntry3] : *New wound s/p fall [FreeTextEntry4] : Follow up in 1 week Patients  will be able to perform dressing changes, small amount of supplies provided.

## 2024-06-05 NOTE — REVIEW OF SYSTEMS
[Joint Stiffness] : joint stiffness [Skin Lesions] : skin lesion [Confused] : confusion [Convulsions] : convulsions [Negative] : Endocrine [Fever] : no fever [Chills] : no chills [Eye Pain] : no eye pain [Loss Of Hearing] : no hearing loss [Shortness Of Breath] : no shortness of breath [Abdominal Pain] : no abdominal pain [Anxiety] : no anxiety [Easy Bleeding] : no tendency for easy bleeding [FreeTextEntry5] : HTN, HLD [FreeTextEntry9] : Bunion and hammer toe deformity's of both feet , right worse then the left  [de-identified] :  s/p arthroplasty and hallux s/p [place] with staple , stable , no soi, suture line has eschar which was partially removed

## 2024-06-05 NOTE — PLAN
[FreeTextEntry1] : Patient examined and evaluated at this time. Continue local wound care and offloading. Spent 30 minutes for patient care and medical decision making. Patient to follow up in 1 week.

## 2024-06-05 NOTE — HISTORY OF PRESENT ILLNESS
[FreeTextEntry1] : Patient is 78 year old female presenting for f/u s/p bunion surgery and hammer toe right foot with stable eschar. Patient denies any pain to the right foot.  Patient seen for right foot dorsal medial incision dehiscence with open wound down to skin, subcutaneous tissue, fat, healed with stable eschar.  6/5/2024 patient seen for newly open left lower leg traumatic laceration.  Patient relates that she fell and hit her leg last Friday.  Patient relates that she went to [facility] and was advised to follow-up at the wound care center for further care.  No other complaints at this time.

## 2024-06-05 NOTE — PHYSICAL EXAM
[1+] : left 1+ [Ankle Swelling Bilaterally] : bilaterally  [Varicose Veins Of Lower Extremities] : bilaterally [Ankle Swelling On The Right] : mild [Alert] : alert [Oriented to Person] : oriented to person [Oriented to Place] : oriented to place [Oriented to Time] : oriented to time [Calm] : calm [Ankle Swelling (On Exam)] : not present [] : not present [Purpura] : no purpura  [Petechiae] : no petechiae [Skin Ulcer] : no ulcer [Skin Induration] : no induration [de-identified] : A&Ox3, NAD [de-identified] : HTN, HLD [de-identified] : Status post right foot [place] bunionectomy right second digit arthroplasty with K wire fixation [de-identified] : Left lower leg traumatic laceration down to skin, subcutaneous tissue, fat.  Right foot incision remains healed. [de-identified] : wnl [4 x 4] : 4 x 4  [FreeTextEntry1] : Right Foot Great Toe, s/p bunion surgery- scab/dried esha  [FreeTextEntry2] : 0.3 [FreeTextEntry3] : 0.2 [FreeTextEntry4] : 0.2 [de-identified] : none [de-identified] :  Mechanically cleansed with NS 0.9%, Sterile Gauze  [de-identified] : *New [FreeTextEntry7] : Anterior Leg [FreeTextEntry8] : 4.5 [FreeTextEntry9] : 3.5 [de-identified] : 0.1 [de-identified] : Sanguinous  [de-identified] : s/p Fall 5/31 [de-identified] : Xeroform [de-identified] :  Mechanically cleansed with NS 0.9%, Sterile Gauze Kerlix [TWNoteComboBox4] : None [TWNoteComboBox5] : No [TWNoteComboBox6] : Surgical [de-identified] : No [de-identified] : Normal [de-identified] : None [de-identified] : None [de-identified] : None [de-identified] : No [TWNoteComboBox9] : Left [de-identified] : Small [de-identified] : No [de-identified] : Traumatic [de-identified] : No [de-identified] : Erythema [de-identified] : None [de-identified] : Daily [de-identified] : Primary Dressing

## 2024-06-05 NOTE — VITALS
[] : No [de-identified] : 4/10 [FreeTextEntry3] : Left Shin [FreeTextEntry1] : Rest [FreeTextEntry2] : Walking, Touch

## 2024-06-09 ENCOUNTER — EMERGENCY (EMERGENCY)
Facility: HOSPITAL | Age: 79
LOS: 1 days | Discharge: HOME | End: 2024-06-09
Attending: STUDENT IN AN ORGANIZED HEALTH CARE EDUCATION/TRAINING PROGRAM | Admitting: STUDENT IN AN ORGANIZED HEALTH CARE EDUCATION/TRAINING PROGRAM
Payer: MEDICARE

## 2024-06-09 VITALS
DIASTOLIC BLOOD PRESSURE: 62 MMHG | SYSTOLIC BLOOD PRESSURE: 112 MMHG | RESPIRATION RATE: 18 BRPM | OXYGEN SATURATION: 95 % | HEART RATE: 90 BPM | TEMPERATURE: 98 F

## 2024-06-09 VITALS
TEMPERATURE: 98 F | SYSTOLIC BLOOD PRESSURE: 116 MMHG | OXYGEN SATURATION: 93 % | HEIGHT: 62 IN | WEIGHT: 108.03 LBS | HEART RATE: 94 BPM | DIASTOLIC BLOOD PRESSURE: 67 MMHG | RESPIRATION RATE: 18 BRPM

## 2024-06-09 DIAGNOSIS — Z98.890 OTHER SPECIFIED POSTPROCEDURAL STATES: Chronic | ICD-10-CM

## 2024-06-09 DIAGNOSIS — Z90.49 ACQUIRED ABSENCE OF OTHER SPECIFIED PARTS OF DIGESTIVE TRACT: Chronic | ICD-10-CM

## 2024-06-09 DIAGNOSIS — Z98.89 OTHER SPECIFIED POSTPROCEDURAL STATES: Chronic | ICD-10-CM

## 2024-06-09 LAB
ALBUMIN SERPL ELPH-MCNC: 2.8 G/DL — LOW (ref 3.3–5)
ALP SERPL-CCNC: 79 U/L — SIGNIFICANT CHANGE UP (ref 40–120)
ALT FLD-CCNC: 22 U/L — SIGNIFICANT CHANGE UP (ref 12–78)
ANION GAP SERPL CALC-SCNC: 1 MMOL/L — LOW (ref 5–17)
AST SERPL-CCNC: 26 U/L — SIGNIFICANT CHANGE UP (ref 15–37)
BASOPHILS # BLD AUTO: 0.07 K/UL — SIGNIFICANT CHANGE UP (ref 0–0.2)
BASOPHILS NFR BLD AUTO: 1 % — SIGNIFICANT CHANGE UP (ref 0–2)
BILIRUB SERPL-MCNC: 0.4 MG/DL — SIGNIFICANT CHANGE UP (ref 0.2–1.2)
BUN SERPL-MCNC: 35 MG/DL — HIGH (ref 7–23)
CALCIUM SERPL-MCNC: 9.4 MG/DL — SIGNIFICANT CHANGE UP (ref 8.5–10.1)
CHLORIDE SERPL-SCNC: 106 MMOL/L — SIGNIFICANT CHANGE UP (ref 96–108)
CO2 SERPL-SCNC: 33 MMOL/L — HIGH (ref 22–31)
CREAT SERPL-MCNC: 1.6 MG/DL — HIGH (ref 0.5–1.3)
EGFR: 33 ML/MIN/1.73M2 — LOW
EOSINOPHIL # BLD AUTO: 1.02 K/UL — HIGH (ref 0–0.5)
EOSINOPHIL NFR BLD AUTO: 14.7 % — HIGH (ref 0–6)
GLUCOSE SERPL-MCNC: 100 MG/DL — HIGH (ref 70–99)
HCT VFR BLD CALC: 35.2 % — SIGNIFICANT CHANGE UP (ref 34.5–45)
HGB BLD-MCNC: 11 G/DL — LOW (ref 11.5–15.5)
IMM GRANULOCYTES NFR BLD AUTO: 0.3 % — SIGNIFICANT CHANGE UP (ref 0–0.9)
LYMPHOCYTES # BLD AUTO: 1.27 K/UL — SIGNIFICANT CHANGE UP (ref 1–3.3)
LYMPHOCYTES # BLD AUTO: 18.3 % — SIGNIFICANT CHANGE UP (ref 13–44)
MCHC RBC-ENTMCNC: 30.6 PG — SIGNIFICANT CHANGE UP (ref 27–34)
MCHC RBC-ENTMCNC: 31.3 GM/DL — LOW (ref 32–36)
MCV RBC AUTO: 98.1 FL — SIGNIFICANT CHANGE UP (ref 80–100)
MONOCYTES # BLD AUTO: 0.58 K/UL — SIGNIFICANT CHANGE UP (ref 0–0.9)
MONOCYTES NFR BLD AUTO: 8.3 % — SIGNIFICANT CHANGE UP (ref 2–14)
NEUTROPHILS # BLD AUTO: 3.99 K/UL — SIGNIFICANT CHANGE UP (ref 1.8–7.4)
NEUTROPHILS NFR BLD AUTO: 57.4 % — SIGNIFICANT CHANGE UP (ref 43–77)
NRBC # BLD: 0 /100 WBCS — SIGNIFICANT CHANGE UP (ref 0–0)
PLATELET # BLD AUTO: 204 K/UL — SIGNIFICANT CHANGE UP (ref 150–400)
POTASSIUM SERPL-MCNC: 4.7 MMOL/L — SIGNIFICANT CHANGE UP (ref 3.5–5.3)
POTASSIUM SERPL-SCNC: 4.7 MMOL/L — SIGNIFICANT CHANGE UP (ref 3.5–5.3)
PROT SERPL-MCNC: 6.5 G/DL — SIGNIFICANT CHANGE UP (ref 6–8.3)
RBC # BLD: 3.59 M/UL — LOW (ref 3.8–5.2)
RBC # FLD: 13.2 % — SIGNIFICANT CHANGE UP (ref 10.3–14.5)
SODIUM SERPL-SCNC: 140 MMOL/L — SIGNIFICANT CHANGE UP (ref 135–145)
WBC # BLD: 6.95 K/UL — SIGNIFICANT CHANGE UP (ref 3.8–10.5)
WBC # FLD AUTO: 6.95 K/UL — SIGNIFICANT CHANGE UP (ref 3.8–10.5)

## 2024-06-09 PROCEDURE — 99284 EMERGENCY DEPT VISIT MOD MDM: CPT | Mod: 25

## 2024-06-09 PROCEDURE — 80053 COMPREHEN METABOLIC PANEL: CPT

## 2024-06-09 PROCEDURE — 93971 EXTREMITY STUDY: CPT | Mod: 26,LT

## 2024-06-09 PROCEDURE — 36415 COLL VENOUS BLD VENIPUNCTURE: CPT

## 2024-06-09 PROCEDURE — 99285 EMERGENCY DEPT VISIT HI MDM: CPT | Mod: FS

## 2024-06-09 PROCEDURE — 96374 THER/PROPH/DIAG INJ IV PUSH: CPT

## 2024-06-09 PROCEDURE — 93971 EXTREMITY STUDY: CPT

## 2024-06-09 PROCEDURE — 96375 TX/PRO/DX INJ NEW DRUG ADDON: CPT

## 2024-06-09 PROCEDURE — 85025 COMPLETE CBC W/AUTO DIFF WBC: CPT

## 2024-06-09 RX ORDER — ACETAMINOPHEN 500 MG
1000 TABLET ORAL ONCE
Refills: 0 | Status: COMPLETED | OUTPATIENT
Start: 2024-06-09 | End: 2024-06-09

## 2024-06-09 RX ORDER — CEPHALEXIN 500 MG
1 CAPSULE ORAL
Qty: 28 | Refills: 0
Start: 2024-06-09 | End: 2024-06-15

## 2024-06-09 RX ORDER — CEFAZOLIN SODIUM 1 G
2000 VIAL (EA) INJECTION ONCE
Refills: 0 | Status: COMPLETED | OUTPATIENT
Start: 2024-06-09 | End: 2024-06-09

## 2024-06-09 RX ADMIN — Medication 400 MILLIGRAM(S): at 20:49

## 2024-06-09 RX ADMIN — Medication 100 MILLIGRAM(S): at 20:49

## 2024-06-09 NOTE — ED ADULT NURSE NOTE - NSFALLRISKINTERV_ED_ALL_ED

## 2024-06-09 NOTE — ED PROVIDER NOTE - SKIN, MLM
left lower shin with partially healed avulsion with granulation tissue and surrounding erythema and warmth nvi normal pulses

## 2024-06-09 NOTE — ED PROVIDER NOTE - OBJECTIVE STATEMENT
Pt is a 78-year-old female past history of lung CA status postresection CKD COPD hypertension TIA coming in for left lower leg skin infection.  Patient was seen about 1 week ago for skin avulsion occurred while at store due to shelf.  Patient was given tetanus.  Patient followed up with wound clinic and has been dressing the wound.  Patient states today she developed worsening pain noticing redness so came to emergency room.  Patient denies any fever chills.  Patient has a follow-up wound clinic appointment in 3 days

## 2024-06-09 NOTE — ED PROVIDER NOTE - PATIENT PORTAL LINK FT
You can access the FollowMyHealth Patient Portal offered by [facility] by registering at the following website: [email or web address]. By joining [employer]’s FollowMyHealth portal, you will also be able to view your health information using other applications (apps) compatible with our system.

## 2024-06-09 NOTE — ED PROVIDER NOTE - ATTENDING APP SHARED VISIT CONTRIBUTION OF CARE
77 y/o F PMH of Lung Ca s/p resection, CKD, COPD, HTN, TIA, presenting with pain and swelling s/p avulsion injury LLE on 6/3  States today developed more erythema and swelling of leg  On exam, granulation tissue, no purulence but significant periwound erythema, likely cellulitis  r/o concurrent DVT  Check screening labs, LE duplex  IV Ancef  Likely can discharge with oral cephalexin.  Has wound care follow up on 6/12  Afebrile in ER 77 y/o F PMH of Lung Ca s/p resection, CKD, COPD, HTN, TIA, presenting with pain and swelling s/p avulsion injury LLE on 6/3  States today developed more erythema and swelling of leg  On exam, granulation tissue, no purulence but significant periwound erythema, likely cellulitis  r/o concurrent DVT  Check screening labs, LE duplex  IV Ancef  Likely can discharge with oral cephalexin of tolerates cefazolin   Has wound care follow up on 6/12  Afebrile in ER

## 2024-06-09 NOTE — ED PROVIDER NOTE - NSFOLLOWUPINSTRUCTIONS_ED_ALL_ED_FT
Follow up with wound clinic  take antibiotics as directed  change dressing daily clean with soap and water  return to er for any worsening symptoms fever     Cellulitis, Adult  A person's legs and feet. One leg is normal and the other leg is affected by cellulitis.  Cellulitis is a skin infection. The infected area is usually warm, red, swollen, and tender. It most commonly occurs on the lower body, such as the legs, feet, and toes, but this condition can occur on any part of the body. The infection can travel to the muscles, blood, and underlying tissue and become life-threatening without treatment. It is important to get medical treatment right away for this condition.    What are the causes?  Cellulitis is caused by bacteria. The bacteria enter through a break in the skin, such as a cut, burn, insect or animal bite, open sore, or crack.    What increases the risk?  This condition is more likely to occur in people who:  Have a weak body's defense system (immune system).  Are older than 60 years old.  Have diabetes.  Have a type of long-term (chronic) liver disease (cirrhosis) or kidney disease.  Are obese.  Have a skin condition such as:  An itchy rash, such as eczema or psoriasis.  A fungal rash on the feet or in skinfolds.  Blistering rashes, such as shingles or chickenpox.  Slow movement of blood in the veins (venous stasis).  Fluid buildup below the skin (edema).  Have open wounds on the skin, such as cuts, puncture wounds, burns, bites, scrapes, tattoos, piercings, or wounds from surgery.  Have had radiation therapy.  Use IV drugs.  What are the signs or symptoms?  Symptoms of this condition include:  Skin that looks red, purple, or slightly darker than your usual skin color.  Streaks or spots on the skin.  Swollen area of the skin.  Tenderness or pain when an area of the skin is touched.  Warm skin.  Fever or chills.  Blisters.  Tiredness (fatigue).  How is this diagnosed?  This condition is diagnosed based on a medical history and physical exam. You may also have tests, including:  Blood tests.  Imaging tests.  Tests on a sample of fluid taken from the wound (wound culture).  How is this treated?  Treatment for this condition may include:  Medicines. These may include antibiotics or medicines to treat allergies (antihistamines).  Rest.  Applying cold or warm wet cloths (compresses) to the skin.  If the condition is severe, you may need to stay in the hospital and get antibiotics through an IV.  The infection usually starts to get better within 1–2 days of treatment.    Follow these instructions at home:  Medicines    Take over-the-counter and prescription medicines only as told by your health care provider.  If you were prescribed antibiotics, take them as told by your provider. Do not stop using the antibiotic even if you start to feel better.  General instructions    Drink enough fluid to keep your pee (urine) pale yellow.  Do not touch or rub the infected area.  Raise (elevate) the infected area above the level of your heart while you are sitting or lying down.  Return to your normal activities as told by your provider. Ask your provider what activities are safe for you.  Apply warm or cold compresses to the affected area as told by your provider.  Keep all follow-up visits. Your provider will need to make sure that a more serious infection is not developing.  Contact a health care provider if:  You have a fever.  Your symptoms do not improve within 1–2 days of starting treatment or you develop new symptoms.  Your bone or joint underneath the infected area becomes painful after the skin has healed.  Your infection returns in the same area or another area. Signs of this may include:  You notice a swollen bump in the infected area.  Your red area gets larger, turns dark in color, or becomes more painful.  Drainage increases.  Pus or a bad smell develops in your infected area.  You have more pain.  You feel ill and have muscle aches and weakness.  You develop vomiting or diarrhea that will not go away.  Get help right away if:  You notice red streaks coming from the infected area.  You notice the skin turns purple or black and falls off.  This symptom may be an emergency. Get help right away. Call 911.  Do not wait to see if the symptom will go away.  Do not drive yourself to the hospital.  This information is not intended to replace advice given to you by your health care provider. Make sure you discuss any questions you have with your health care provider.

## 2024-06-09 NOTE — ED ADULT NURSE NOTE - NSSUHOSCREENINGYN_ED_ALL_ED
"Chief Complaint   Patient presents with     Hospital F/U       Initial /84  Pulse 60  Temp 98.5  F (36.9  C) (Oral)  Wt 153 lb (69.4 kg)  SpO2 96%  BMI 27.98 kg/m2 Estimated body mass index is 27.98 kg/(m^2) as calculated from the following:    Height as of 3/9/17: 5' 2\" (1.575 m).    Weight as of this encounter: 153 lb (69.4 kg).  Medication Reconciliation: complete  " Yes - the patient is able to be screened

## 2024-06-09 NOTE — ED ADULT NURSE NOTE - OBJECTIVE STATEMENT
pt a/o x 4 with a calm affect treated a week ago for left shin skin avulsion with continued wound care.  patient states today that area is more red and tender than it has been.  pending initial lab work and US pending

## 2024-06-12 ENCOUNTER — OUTPATIENT (OUTPATIENT)
Dept: OUTPATIENT SERVICES | Facility: HOSPITAL | Age: 79
LOS: 1 days | Discharge: HOME | End: 2024-06-12
Payer: MEDICARE

## 2024-06-12 ENCOUNTER — APPOINTMENT (OUTPATIENT)
Dept: WOUND CARE | Facility: HOSPITAL | Age: 79
End: 2024-06-12
Payer: MEDICARE

## 2024-06-12 DIAGNOSIS — Z90.49 ACQUIRED ABSENCE OF OTHER SPECIFIED PARTS OF DIGESTIVE TRACT: Chronic | ICD-10-CM

## 2024-06-12 DIAGNOSIS — Z98.890 OTHER SPECIFIED POSTPROCEDURAL STATES: Chronic | ICD-10-CM

## 2024-06-12 DIAGNOSIS — Z98.89 OTHER SPECIFIED POSTPROCEDURAL STATES: Chronic | ICD-10-CM

## 2024-06-12 DIAGNOSIS — T81.31XD DISRUPTION OF EXTERNAL OPERATION (SURGICAL) WOUND, NOT ELSEWHERE CLASSIFIED, SUBSEQUENT ENCOUNTER: ICD-10-CM

## 2024-06-12 PROCEDURE — G0463: CPT

## 2024-06-12 PROCEDURE — 99213 OFFICE O/P EST LOW 20 MIN: CPT

## 2024-06-12 PROCEDURE — 87070 CULTURE OTHR SPECIMN AEROBIC: CPT

## 2024-06-13 RX ORDER — TRAMADOL HYDROCHLORIDE 50 MG/1
50 TABLET, COATED ORAL
Qty: 14 | Refills: 0 | Status: COMPLETED | COMMUNITY
Start: 2024-06-13 | End: 2024-06-20
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
## 2024-06-14 DIAGNOSIS — Z85.118 PERSONAL HISTORY OF OTHER MALIGNANT NEOPLASM OF BRONCHUS AND LUNG: ICD-10-CM

## 2024-06-14 DIAGNOSIS — Z80.1 FAMILY HISTORY OF MALIGNANT NEOPLASM OF TRACHEA, BRONCHUS AND LUNG: ICD-10-CM

## 2024-06-14 DIAGNOSIS — S81.812D LACERATION WITHOUT FOREIGN BODY, LEFT LOWER LEG, SUBSEQUENT ENCOUNTER: ICD-10-CM

## 2024-06-14 DIAGNOSIS — K21.9 GASTRO-ESOPHAGEAL REFLUX DISEASE WITHOUT ESOPHAGITIS: ICD-10-CM

## 2024-06-14 DIAGNOSIS — Z88.8 ALLERGY STATUS TO OTHER DRUGS, MEDICAMENTS AND BIOLOGICAL SUBSTANCES: ICD-10-CM

## 2024-06-14 DIAGNOSIS — Z83.3 FAMILY HISTORY OF DIABETES MELLITUS: ICD-10-CM

## 2024-06-14 DIAGNOSIS — I12.9 HYPERTENSIVE CHRONIC KIDNEY DISEASE WITH STAGE 1 THROUGH STAGE 4 CHRONIC KIDNEY DISEASE, OR UNSPECIFIED CHRONIC KIDNEY DISEASE: ICD-10-CM

## 2024-06-14 DIAGNOSIS — Z87.01 PERSONAL HISTORY OF PNEUMONIA (RECURRENT): ICD-10-CM

## 2024-06-14 DIAGNOSIS — Z98.49 CATARACT EXTRACTION STATUS, UNSPECIFIED EYE: ICD-10-CM
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
## 2024-06-14 DIAGNOSIS — Z88.1 ALLERGY STATUS TO OTHER ANTIBIOTIC AGENTS STATUS: ICD-10-CM

## 2024-06-14 DIAGNOSIS — Z90.49 ACQUIRED ABSENCE OF OTHER SPECIFIED PARTS OF DIGESTIVE TRACT: ICD-10-CM

## 2024-06-14 DIAGNOSIS — M06.9 RHEUMATOID ARTHRITIS, UNSPECIFIED: ICD-10-CM

## 2024-06-14 DIAGNOSIS — Z91.041 RADIOGRAPHIC DYE ALLERGY STATUS: ICD-10-CM

## 2024-06-14 DIAGNOSIS — Z79.899 OTHER LONG TERM (CURRENT) DRUG THERAPY: ICD-10-CM

## 2024-06-14 DIAGNOSIS — I65.29 OCCLUSION AND STENOSIS OF UNSPECIFIED CAROTID ARTERY: ICD-10-CM

## 2024-06-14 DIAGNOSIS — Z87.891 PERSONAL HISTORY OF NICOTINE DEPENDENCE: ICD-10-CM

## 2024-06-14 DIAGNOSIS — Z90.2 ACQUIRED ABSENCE OF LUNG [PART OF]: ICD-10-CM

## 2024-06-14 DIAGNOSIS — E78.5 HYPERLIPIDEMIA, UNSPECIFIED: ICD-10-CM

## 2024-06-14 DIAGNOSIS — Z86.73 PERSONAL HISTORY OF TRANSIENT ISCHEMIC ATTACK (TIA), AND CEREBRAL INFARCTION WITHOUT RESIDUAL DEFICITS: ICD-10-CM

## 2024-06-14 DIAGNOSIS — Z82.3 FAMILY HISTORY OF STROKE: ICD-10-CM

## 2024-06-14 DIAGNOSIS — N18.30 CHRONIC KIDNEY DISEASE, STAGE 3 UNSPECIFIED: ICD-10-CM

## 2024-06-14 DIAGNOSIS — J44.9 CHRONIC OBSTRUCTIVE PULMONARY DISEASE, UNSPECIFIED: ICD-10-CM

## 2024-06-14 DIAGNOSIS — Z88.0 ALLERGY STATUS TO PENICILLIN: ICD-10-CM
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
## 2024-06-14 DIAGNOSIS — Z79.82 LONG TERM (CURRENT) USE OF ASPIRIN: ICD-10-CM

## 2024-06-14 DIAGNOSIS — Z82.49 FAMILY HISTORY OF ISCHEMIC HEART DISEASE AND OTHER DISEASES OF THE CIRCULATORY SYSTEM: ICD-10-CM
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
## 2024-06-14 LAB
CULTURE RESULTS: NO GROWTH — SIGNIFICANT CHANGE UP
SPECIMEN SOURCE: SIGNIFICANT CHANGE UP

## 2024-06-15 ENCOUNTER — OUTPATIENT (OUTPATIENT)
Dept: OUTPATIENT SERVICES | Facility: HOSPITAL | Age: 79
LOS: 1 days | Discharge: HOME | End: 2024-06-15
Payer: MEDICARE

## 2024-06-15 ENCOUNTER — APPOINTMENT (OUTPATIENT)
Dept: WOUND CARE | Facility: HOSPITAL | Age: 79
End: 2024-06-15
Payer: MEDICARE

## 2024-06-15 VITALS
RESPIRATION RATE: 18 BRPM | SYSTOLIC BLOOD PRESSURE: 137 MMHG | WEIGHT: 107 LBS | OXYGEN SATURATION: 93 % | DIASTOLIC BLOOD PRESSURE: 65 MMHG | HEART RATE: 84 BPM | HEIGHT: 62 IN | TEMPERATURE: 98 F | BODY MASS INDEX: 19.69 KG/M2

## 2024-06-15 DIAGNOSIS — Z79.899 OTHER LONG TERM (CURRENT) DRUG THERAPY: ICD-10-CM

## 2024-06-15 DIAGNOSIS — Z98.49 CATARACT EXTRACTION STATUS, UNSPECIFIED EYE: ICD-10-CM

## 2024-06-15 DIAGNOSIS — Z88.8 ALLERGY STATUS TO OTHER DRUGS, MEDICAMENTS AND BIOLOGICAL SUBSTANCES: ICD-10-CM

## 2024-06-15 DIAGNOSIS — Z90.2 ACQUIRED ABSENCE OF LUNG [PART OF]: ICD-10-CM

## 2024-06-15 DIAGNOSIS — S81.812D LACERATION WITHOUT FOREIGN BODY, LEFT LOWER LEG, SUBSEQUENT ENCOUNTER: ICD-10-CM
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
## 2024-06-15 DIAGNOSIS — E78.5 HYPERLIPIDEMIA, UNSPECIFIED: ICD-10-CM

## 2024-06-15 DIAGNOSIS — Z91.041 RADIOGRAPHIC DYE ALLERGY STATUS: ICD-10-CM

## 2024-06-15 DIAGNOSIS — M06.9 RHEUMATOID ARTHRITIS, UNSPECIFIED: ICD-10-CM

## 2024-06-15 DIAGNOSIS — I12.9 HYPERTENSIVE CHRONIC KIDNEY DISEASE WITH STAGE 1 THROUGH STAGE 4 CHRONIC KIDNEY DISEASE, OR UNSPECIFIED CHRONIC KIDNEY DISEASE: ICD-10-CM

## 2024-06-15 DIAGNOSIS — T81.31XD DISRUPTION OF EXTERNAL OPERATION (SURGICAL) WOUND, NOT ELSEWHERE CLASSIFIED, SUBSEQUENT ENCOUNTER: ICD-10-CM

## 2024-06-15 DIAGNOSIS — Z98.890 OTHER SPECIFIED POSTPROCEDURAL STATES: Chronic | ICD-10-CM
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
## 2024-06-15 DIAGNOSIS — Z90.49 ACQUIRED ABSENCE OF OTHER SPECIFIED PARTS OF DIGESTIVE TRACT: ICD-10-CM

## 2024-06-15 DIAGNOSIS — Z98.89 OTHER SPECIFIED POSTPROCEDURAL STATES: Chronic | ICD-10-CM

## 2024-06-15 DIAGNOSIS — Z82.49 FAMILY HISTORY OF ISCHEMIC HEART DISEASE AND OTHER DISEASES OF THE CIRCULATORY SYSTEM: ICD-10-CM

## 2024-06-15 DIAGNOSIS — N18.30 CHRONIC KIDNEY DISEASE, STAGE 3 UNSPECIFIED: ICD-10-CM

## 2024-06-15 DIAGNOSIS — J44.9 CHRONIC OBSTRUCTIVE PULMONARY DISEASE, UNSPECIFIED: ICD-10-CM

## 2024-06-15 DIAGNOSIS — Z85.118 PERSONAL HISTORY OF OTHER MALIGNANT NEOPLASM OF BRONCHUS AND LUNG: ICD-10-CM

## 2024-06-15 DIAGNOSIS — Z88.0 ALLERGY STATUS TO PENICILLIN: ICD-10-CM
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
## 2024-06-15 DIAGNOSIS — Z90.49 ACQUIRED ABSENCE OF OTHER SPECIFIED PARTS OF DIGESTIVE TRACT: Chronic | ICD-10-CM

## 2024-06-15 DIAGNOSIS — Z79.82 LONG TERM (CURRENT) USE OF ASPIRIN: ICD-10-CM

## 2024-06-15 DIAGNOSIS — Z80.1 FAMILY HISTORY OF MALIGNANT NEOPLASM OF TRACHEA, BRONCHUS AND LUNG: ICD-10-CM

## 2024-06-15 DIAGNOSIS — Z83.3 FAMILY HISTORY OF DIABETES MELLITUS: ICD-10-CM

## 2024-06-15 DIAGNOSIS — Z86.73 PERSONAL HISTORY OF TRANSIENT ISCHEMIC ATTACK (TIA), AND CEREBRAL INFARCTION WITHOUT RESIDUAL DEFICITS: ICD-10-CM

## 2024-06-15 DIAGNOSIS — Z82.3 FAMILY HISTORY OF STROKE: ICD-10-CM

## 2024-06-15 DIAGNOSIS — I65.29 OCCLUSION AND STENOSIS OF UNSPECIFIED CAROTID ARTERY: ICD-10-CM

## 2024-06-15 DIAGNOSIS — Z88.1 ALLERGY STATUS TO OTHER ANTIBIOTIC AGENTS STATUS: ICD-10-CM

## 2024-06-15 DIAGNOSIS — K21.9 GASTRO-ESOPHAGEAL REFLUX DISEASE WITHOUT ESOPHAGITIS: ICD-10-CM

## 2024-06-15 DIAGNOSIS — Z87.01 PERSONAL HISTORY OF PNEUMONIA (RECURRENT): ICD-10-CM

## 2024-06-15 DIAGNOSIS — Z87.891 PERSONAL HISTORY OF NICOTINE DEPENDENCE: ICD-10-CM

## 2024-06-15 PROCEDURE — G0463: CPT

## 2024-06-15 PROCEDURE — 99204 OFFICE O/P NEW MOD 45 MIN: CPT

## 2024-06-15 RX ORDER — SULFAMETHOXAZOLE AND TRIMETHOPRIM 800; 160 MG/1; MG/1
800-160 TABLET ORAL
Qty: 20 | Refills: 0 | Status: COMPLETED | COMMUNITY
Start: 2024-06-15 | End: 2024-06-25

## 2024-06-15 NOTE — PHYSICAL EXAM
[1+] : left 1+ [Ankle Swelling Bilaterally] : bilaterally  [Varicose Veins Of Lower Extremities] : bilaterally [Ankle Swelling On The Right] : mild [Alert] : alert [Oriented to Person] : oriented to person [Oriented to Place] : oriented to place [Oriented to Time] : oriented to time [Calm] : calm [4 x 4] : 4 x 4  [Ankle Swelling (On Exam)] : not present [] : not present [Purpura] : no purpura  [Petechiae] : no petechiae [Skin Ulcer] : no ulcer [Skin Induration] : no induration [de-identified] : A&Ox3, NAD [de-identified] : HTN, HLD [de-identified] : Status post right foot [place] bunionectomy right second digit arthroplasty with K wire fixation [de-identified] : Left lower leg traumatic laceration down to skin, subcutaneous tissue, fat.  Right foot incision remains healed. [de-identified] : wnl [FreeTextEntry1] : Left anterior lower leg [FreeTextEntry2] : 4.5 [FreeTextEntry3] : 3.5 [FreeTextEntry4] : 0.2 [de-identified] : serosanguinous drainage noted [de-identified] : silver alginate and adaptic  [de-identified] : Mechanically cleansed with 0.9% normal saline & sterile 4x4 gauze.  Kerlix.  [TWNoteComboBox4] : Moderate [de-identified] : No [de-identified] : Erythema [de-identified] : None [de-identified] : >75% [de-identified] : <20% [de-identified] : 3x Weekly [de-identified] : Primary Dressing

## 2024-06-15 NOTE — VITALS
[Sharp] : sharp [Aching] : aching [de-identified] : 10/10 [] : No [FreeTextEntry3] : Left lower leg  [FreeTextEntry1] : elevation & pain medication  [FreeTextEntry2] : walking  [FreeTextEntry4] : Tramadol

## 2024-06-15 NOTE — PHYSICAL EXAM
[4 x 4] : 4 x 4  [Abdominal Pad] : Abdominal Pad [1+] : left 1+ [Ankle Swelling Bilaterally] : bilaterally  [Varicose Veins Of Lower Extremities] : bilaterally [Ankle Swelling On The Right] : mild [Alert] : alert [Oriented to Person] : oriented to person [Oriented to Place] : oriented to place [Oriented to Time] : oriented to time [Calm] : calm [Ankle Swelling (On Exam)] : not present [] : not present [Purpura] : no purpura  [Petechiae] : no petechiae [Skin Ulcer] : no ulcer [Skin Induration] : no induration [de-identified] : A&Ox3, NAD [de-identified] : HTN, HLD [de-identified] : Status post right foot [place] bunionectomy right second digit arthroplasty with K wire fixation [de-identified] : Left lower leg traumatic laceration down to skin, subcutaneous tissue, fat.  Right foot incision remains healed. [de-identified] : wnl [FreeTextEntry1] : Right Foot Great Toe, s/p bunion surgery- scab/dried CLOSED [FreeTextEntry2] : 0 [FreeTextEntry3] : 0 [FreeTextEntry4] : 0 [de-identified] : none [de-identified] : Patient went to the hospital due to increased pain and redness [FreeTextEntry7] : Anterior Leg [FreeTextEntry8] : 4.5 [FreeTextEntry9] : 3.5 [de-identified] : 0.2 [de-identified] : Sanguinous  [de-identified] : s/p Fall 5/31 [de-identified] : worsened [de-identified] : Adaptec touch [de-identified] :  Mechanically cleansed with NS 0.9%, Sterile Gauze  Silver alginate then 4x4 Kerlix [place] [TWNoteComboBox4] : None [TWNoteComboBox5] : No [TWNoteComboBox6] : Surgical [de-identified] : No [de-identified] : Normal [de-identified] : None [de-identified] : None [de-identified] : No [de-identified] : None [TWNoteComboBox9] : Left [de-identified] : Small [de-identified] : No [de-identified] : Traumatic [de-identified] : No [de-identified] : Erythema [de-identified] : None [de-identified] : >75% [de-identified] : <20% [TWNoteComboBox8] : Mechanical [de-identified] : Daily [de-identified] : Primary Dressing

## 2024-06-15 NOTE — REVIEW OF SYSTEMS
[Joint Stiffness] : joint stiffness [Skin Lesions] : skin lesion [Confused] : confusion [Convulsions] : convulsions [Negative] : Endocrine [Fever] : no fever [Chills] : no chills [Eye Pain] : no eye pain [Loss Of Hearing] : no hearing loss [Shortness Of Breath] : no shortness of breath [Abdominal Pain] : no abdominal pain [Anxiety] : no anxiety [Easy Bleeding] : no tendency for easy bleeding [FreeTextEntry5] : HTN, HLD [FreeTextEntry9] : Bunion and hammer toe deformity's of both feet , right worse then the left  [de-identified] :  s/p arthroplasty and hallux s/p [place] with staple , stable , no soi, suture line has eschar which was partially removed

## 2024-06-15 NOTE — VITALS
[Pain related to present condition?] : The patient's  pain is related to present condition. [Sharp] : sharp [Burning] : burning [Stabbing] : stabbing [] : No [de-identified] : intermittent  [FreeTextEntry3] : left leg [FreeTextEntry1] : random hurts more when leg is down [FreeTextEntry2] : rest [FreeTextEntry4] : Tylenol but Dr Dubois to order Tramadol [FreeTextEntry5] : Cephalexin 500 mg 2 x a day

## 2024-06-15 NOTE — PLAN
[FreeTextEntry1] : Patient examined and evaluated at this time. Continue local wound care and offloading. AgAlginate,Adaptic,DD, QOD keflex started and Bactrim DS started Spent 30 minutes for patient care and medical decision making. Patient to follow up in 5 days

## 2024-06-15 NOTE — ASSESSMENT
[Verbal] : Verbal [Demo] : Demo [Patient] : Patient [Good - alert, interested, motivated] : Good - alert, interested, motivated [Dressing changes] : dressing changes [Foot Care] : foot care [Skin Care] : skin care [Pressure relief] : pressure relief [Signs and symptoms of infection] : sign and symptoms of infection [Nutrition] : nutrition [How and When to Call] : how and when to call [Pain Management] : pain management [Off-loading] : off-loading [Patient responsibility to plan of care] : patient responsibility to plan of care [Stable] : stable [Home] : Home [Ambulatory] : Ambulatory [Not Applicable - Long Term Care/Home Health Agency] : Long Term Care/Home Health Agency: Not Applicable [Spouse] : Spouse [Demonstrates independently] : demonstrates independently [Other: ____] : [unfilled] [] : No [FreeTextEntry2] : Infection monitoring Localized wound care Promote optimal skin integrity  Maintain acceptable level of pain with use of pharmacological and nonpharmacological interventions Offloading / Pressure relief  Foot care   [FreeTextEntry3] : Patient went to the hospital due to increased pain and redness put on Keflex. Area around left LE warm and red Patient states a doppler was done and xray and she received IV antibiotics and also had a tetanus shot [FreeTextEntry4] : Follow on Saturday -review culture if back. Taken today Patients  will be able to perform dressing changes, small amount of supplies provided. Please order supplies on Saturday if MD will continue same treatment. Patient instructed to go back to the ER if she gets fever or chills, increased pain or redness worsens Ultram to be ordered by Dr Dubois

## 2024-06-15 NOTE — HISTORY OF PRESENT ILLNESS
[FreeTextEntry1] : Patient is 78 year old female presenting for f/u s/p bunion surgery and hammer toe right foot with stable eschar. Patient denies any pain to the right foot.  Patient seen for right foot dorsal medial incision dehiscence with open wound down to skin, subcutaneous tissue, fat, healed with stable eschar.  6/13/2024 patient seen for newly open left lower leg traumatic laceration.  Patient relates that she went back to the hospital for increased redness and swelling, was prescribed oral antibiotics.  No other complaints at this time. 6/15/24 patient feels increased erythema but appears light pink. Edges again marked out. Culture from past visit has no growth. Will hold keflex and start bactrim DS. Stressed need to return to ER if the area gets worse with increased edema, pain, erythema, drainage or fever. Patient to return to wound care next wednesday

## 2024-06-15 NOTE — PLAN
[FreeTextEntry1] : Patient examined and evaluated at this time. Continue local wound care and offloading. Spent 20 minutes for patient care and medical decision making. Patient to follow up in 1 week.

## 2024-06-15 NOTE — ASSESSMENT
[Verbal] : Verbal [Patient] : Patient [Spouse] : Spouse [Good - alert, interested, motivated] : Good - alert, interested, motivated [Verbalizes knowledge/Understanding] : Verbalizes knowledge/understanding [Dressing changes] : dressing changes [Skin Care] : skin care [Pressure relief] : pressure relief [Signs and symptoms of infection] : sign and symptoms of infection [Nutrition] : nutrition [How and When to Call] : how and when to call [Pain Management] : pain management [Off-loading] : off-loading [Patient responsibility to plan of care] : patient responsibility to plan of care [Stable] : stable [Home] : Home [Ambulatory] : Ambulatory [] : No [FreeTextEntry2] : Infection monitoring Localized wound care Promote optimal skin integrity Maintain acceptable level of pain with use of pharmacological and nonpharmacological interventions Offloading / Pressure relief [FreeTextEntry3] : No change  [FreeTextEntry4] : Pt assessed by Dr. Gonsales. Md reviewed wound culture with pt. MD D/C's keflex and is to order Bactrim PO.  Continue silver alginate and DD to LLE wound TIW, pts  changes dressing.  Pt instructed to report to the ED if pain to LLE worsens or if she develops fever/chills, pt verbalized understanding.  Return visit next week.

## 2024-06-15 NOTE — REASON FOR VISIT
[Follow-Up: _____] : a [unfilled] follow-up visit [Spouse] : spouse [FreeTextEntry1] : went back to the hospital since last visit. Cellulitis

## 2024-06-15 NOTE — REVIEW OF SYSTEMS
[Joint Stiffness] : joint stiffness [Skin Lesions] : skin lesion [Confused] : confusion [Convulsions] : convulsions [Negative] : Endocrine [Fever] : no fever [Chills] : no chills [Eye Pain] : no eye pain [Loss Of Hearing] : no hearing loss [Shortness Of Breath] : no shortness of breath [Abdominal Pain] : no abdominal pain [Anxiety] : no anxiety [Easy Bleeding] : no tendency for easy bleeding [FreeTextEntry5] : HTN, HLD [FreeTextEntry9] : Bunion and hammer toe deformity's of both feet , right worse then the left  [de-identified] :  s/p arthroplasty and hallux s/p [place] with staple , stable , no soi, suture line has eschar which was partially removed

## 2024-06-15 NOTE — HISTORY OF PRESENT ILLNESS
[FreeTextEntry1] : Patient is 78 year old female presenting for f/u s/p bunion surgery and hammer toe right foot with stable eschar. Patient denies any pain to the right foot.  Patient seen for right foot dorsal medial incision dehiscence with open wound down to skin, subcutaneous tissue, fat, healed with stable eschar.  6/13/2024 patient seen for newly open left lower leg traumatic laceration.  Patient relates that she went back to the hospital for increased redness and swelling, was prescribed oral antibiotics.  No other complaints at this time.

## 2024-06-16 ENCOUNTER — EMERGENCY (EMERGENCY)
Facility: HOSPITAL | Age: 79
LOS: 1 days | Discharge: HOME | End: 2024-06-16
Attending: EMERGENCY MEDICINE | Admitting: EMERGENCY MEDICINE
Payer: MEDICARE

## 2024-06-16 VITALS
HEART RATE: 104 BPM | HEIGHT: 62 IN | WEIGHT: 108.03 LBS | SYSTOLIC BLOOD PRESSURE: 101 MMHG | RESPIRATION RATE: 20 BRPM | DIASTOLIC BLOOD PRESSURE: 60 MMHG | OXYGEN SATURATION: 92 % | TEMPERATURE: 96 F

## 2024-06-16 DIAGNOSIS — Z90.49 ACQUIRED ABSENCE OF OTHER SPECIFIED PARTS OF DIGESTIVE TRACT: Chronic | ICD-10-CM

## 2024-06-16 LAB
ALBUMIN SERPL ELPH-MCNC: 3.1 G/DL — LOW (ref 3.3–5)
ALP SERPL-CCNC: 101 U/L — SIGNIFICANT CHANGE UP (ref 40–120)
ALT FLD-CCNC: 18 U/L — SIGNIFICANT CHANGE UP (ref 12–78)
ANION GAP SERPL CALC-SCNC: 8 MMOL/L — SIGNIFICANT CHANGE UP (ref 5–17)
APTT BLD: 25 SEC — SIGNIFICANT CHANGE UP (ref 24.5–35.6)
AST SERPL-CCNC: 33 U/L — SIGNIFICANT CHANGE UP (ref 15–37)
BASOPHILS # BLD AUTO: 0.12 K/UL — SIGNIFICANT CHANGE UP (ref 0–0.2)
BASOPHILS NFR BLD AUTO: 1.4 % — SIGNIFICANT CHANGE UP (ref 0–2)
BILIRUB SERPL-MCNC: 0.4 MG/DL — SIGNIFICANT CHANGE UP (ref 0.2–1.2)
BUN SERPL-MCNC: 30 MG/DL — HIGH (ref 7–23)
CALCIUM SERPL-MCNC: 9.8 MG/DL — SIGNIFICANT CHANGE UP (ref 8.5–10.1)
CHLORIDE SERPL-SCNC: 98 MMOL/L — SIGNIFICANT CHANGE UP (ref 96–108)
CO2 SERPL-SCNC: 29 MMOL/L — SIGNIFICANT CHANGE UP (ref 22–31)
CREAT SERPL-MCNC: 1.5 MG/DL — HIGH (ref 0.5–1.3)
EGFR: 35 ML/MIN/1.73M2 — LOW
EOSINOPHIL # BLD AUTO: 1.23 K/UL — HIGH (ref 0–0.5)
EOSINOPHIL NFR BLD AUTO: 14.4 % — HIGH (ref 0–6)
GLUCOSE SERPL-MCNC: 56 MG/DL — LOW (ref 70–99)
HCT VFR BLD CALC: 37 % — SIGNIFICANT CHANGE UP (ref 34.5–45)
HGB BLD-MCNC: 11.7 G/DL — SIGNIFICANT CHANGE UP (ref 11.5–15.5)
IMM GRANULOCYTES NFR BLD AUTO: 0.4 % — SIGNIFICANT CHANGE UP (ref 0–0.9)
INR BLD: 1.02 RATIO — SIGNIFICANT CHANGE UP (ref 0.85–1.18)
LIDOCAIN IGE QN: 18 U/L — SIGNIFICANT CHANGE UP (ref 13–75)
LYMPHOCYTES # BLD AUTO: 0.91 K/UL — LOW (ref 1–3.3)
LYMPHOCYTES # BLD AUTO: 10.7 % — LOW (ref 13–44)
MCHC RBC-ENTMCNC: 30.5 PG — SIGNIFICANT CHANGE UP (ref 27–34)
MCHC RBC-ENTMCNC: 31.6 GM/DL — LOW (ref 32–36)
MCV RBC AUTO: 96.4 FL — SIGNIFICANT CHANGE UP (ref 80–100)
MONOCYTES # BLD AUTO: 0.85 K/UL — SIGNIFICANT CHANGE UP (ref 0–0.9)
MONOCYTES NFR BLD AUTO: 10 % — SIGNIFICANT CHANGE UP (ref 2–14)
NEUTROPHILS # BLD AUTO: 5.38 K/UL — SIGNIFICANT CHANGE UP (ref 1.8–7.4)
NEUTROPHILS NFR BLD AUTO: 63.1 % — SIGNIFICANT CHANGE UP (ref 43–77)
NRBC # BLD: 0 /100 WBCS — SIGNIFICANT CHANGE UP (ref 0–0)
PLATELET # BLD AUTO: 252 K/UL — SIGNIFICANT CHANGE UP (ref 150–400)
POTASSIUM SERPL-MCNC: 4.7 MMOL/L — SIGNIFICANT CHANGE UP (ref 3.5–5.3)
POTASSIUM SERPL-SCNC: 4.7 MMOL/L — SIGNIFICANT CHANGE UP (ref 3.5–5.3)
PROT SERPL-MCNC: 6.7 G/DL — SIGNIFICANT CHANGE UP (ref 6–8.3)
PROTHROM AB SERPL-ACNC: 11.9 SEC — SIGNIFICANT CHANGE UP (ref 9.5–13)
RBC # BLD: 3.84 M/UL — SIGNIFICANT CHANGE UP (ref 3.8–5.2)
RBC # FLD: 13.3 % — SIGNIFICANT CHANGE UP (ref 10.3–14.5)
SODIUM SERPL-SCNC: 135 MMOL/L — SIGNIFICANT CHANGE UP (ref 135–145)
WBC # BLD: 8.52 K/UL — SIGNIFICANT CHANGE UP (ref 3.8–10.5)
WBC # FLD AUTO: 8.52 K/UL — SIGNIFICANT CHANGE UP (ref 3.8–10.5)

## 2024-06-16 PROCEDURE — 36415 COLL VENOUS BLD VENIPUNCTURE: CPT

## 2024-06-16 PROCEDURE — 85610 PROTHROMBIN TIME: CPT

## 2024-06-16 PROCEDURE — 99284 EMERGENCY DEPT VISIT MOD MDM: CPT | Mod: 25

## 2024-06-16 PROCEDURE — 96375 TX/PRO/DX INJ NEW DRUG ADDON: CPT

## 2024-06-16 PROCEDURE — 71045 X-RAY EXAM CHEST 1 VIEW: CPT

## 2024-06-16 PROCEDURE — 85025 COMPLETE CBC W/AUTO DIFF WBC: CPT

## 2024-06-16 PROCEDURE — 74176 CT ABD & PELVIS W/O CONTRAST: CPT | Mod: 26,MC

## 2024-06-16 PROCEDURE — 99285 EMERGENCY DEPT VISIT HI MDM: CPT | Mod: FS

## 2024-06-16 PROCEDURE — 96374 THER/PROPH/DIAG INJ IV PUSH: CPT

## 2024-06-16 PROCEDURE — 93005 ELECTROCARDIOGRAM TRACING: CPT

## 2024-06-16 PROCEDURE — 85730 THROMBOPLASTIN TIME PARTIAL: CPT

## 2024-06-16 PROCEDURE — 83690 ASSAY OF LIPASE: CPT

## 2024-06-16 PROCEDURE — 71045 X-RAY EXAM CHEST 1 VIEW: CPT | Mod: 26

## 2024-06-16 PROCEDURE — 74176 CT ABD & PELVIS W/O CONTRAST: CPT | Mod: MC

## 2024-06-16 PROCEDURE — 93010 ELECTROCARDIOGRAM REPORT: CPT

## 2024-06-16 PROCEDURE — 80053 COMPREHEN METABOLIC PANEL: CPT

## 2024-06-16 RX ORDER — SODIUM CHLORIDE 9 MG/ML
1000 INJECTION INTRAMUSCULAR; INTRAVENOUS; SUBCUTANEOUS ONCE
Refills: 0 | Status: COMPLETED | OUTPATIENT
Start: 2024-06-16 | End: 2024-06-16

## 2024-06-16 RX ORDER — METOCLOPRAMIDE HCL 10 MG
1 TABLET ORAL
Qty: 9 | Refills: 0
Start: 2024-06-16 | End: 2024-06-18

## 2024-06-16 RX ORDER — METOCLOPRAMIDE HCL 10 MG
10 TABLET ORAL ONCE
Refills: 0 | Status: COMPLETED | OUTPATIENT
Start: 2024-06-16 | End: 2024-06-16

## 2024-06-16 RX ORDER — FAMOTIDINE 10 MG/ML
20 INJECTION INTRAVENOUS ONCE
Refills: 0 | Status: COMPLETED | OUTPATIENT
Start: 2024-06-16 | End: 2024-06-16

## 2024-06-16 RX ADMIN — SODIUM CHLORIDE 1000 MILLILITER(S): 9 INJECTION INTRAMUSCULAR; INTRAVENOUS; SUBCUTANEOUS at 21:35

## 2024-06-16 RX ADMIN — Medication 10 MILLIGRAM(S): at 21:38

## 2024-06-16 RX ADMIN — FAMOTIDINE 20 MILLIGRAM(S): 10 INJECTION INTRAVENOUS at 21:36

## 2024-06-16 NOTE — ED PROVIDER NOTE - MUSCULOSKELETAL, MLM
Spine appears normal, range of motion is not limited, no muscle or joint tenderness, LLE covered in gauze dressing (pt refused to have dressing unwrapped for evaluation)

## 2024-06-16 NOTE — ED PROVIDER NOTE - CARE PROVIDER_API CALL
Herrera Acuna  Gastroenterology  [address] [place] [id number]  Phone: [phone]  Fax: [phone]  Follow Up Time:

## 2024-06-16 NOTE — ED ADULT NURSE NOTE - OBJECTIVE STATEMENT
Pt is presenting with nausea and vomiting since friday. Denies diarrhea, no abdominal pain, no distention, has a sore throat, left leg wound from a previous fall. R20 placed, flushes without difficulty. Resting in bed, rails up and wheels locked in place.

## 2024-06-16 NOTE — ED PROVIDER NOTE - PROGRESS NOTE DETAILS
Labs and imaging explained to patient.  Patient feeling much better after IV fluids and Reglan.  Offered admission for IV hydration and GI evaluation however patient and son at bedside declining admission wants to go home.  Patient tolerating some juice here in the ED.  Understands to return if any worsening symptoms.  Patient is currently on Bactrim for her left lower leg infection for the past 2 days

## 2024-06-16 NOTE — ED ADULT NURSE NOTE - NSFALLHARMRISKINTERV_ED_ALL_ED
Assistance OOB with selected safe patient handling equipment if applicable/Communicate risk of Fall with Harm to all staff, patient, and family/Monitor gait and stability/Provide patient with walking aids/Provide visual cue: red socks, yellow wristband, yellow gown, etc/Reinforce activity limits and safety measures with patient and family/Bed in lowest position, wheels locked, appropriate side rails in place/Call bell, personal items and telephone in reach/Instruct patient to call for assistance before getting out of bed/chair/stretcher/Non-slip footwear applied when patient is off stretcher/[place] to call system/Physically safe environment - no spills, clutter or unnecessary equipment/Purposeful Proactive Rounding/Room/bathroom lighting operational, light cord in reach

## 2024-06-16 NOTE — ED PROVIDER NOTE - PATIENT PORTAL LINK FT
You can access the FollowMyHealth Patient Portal offered by [facility] by registering at the following website: [email or web address]. By joining [employer]’s FollowMyHealth portal, you will also be able to view your health information using other applications (apps) compatible with our system.

## 2024-06-16 NOTE — ED ADULT TRIAGE NOTE - CHIEF COMPLAINT QUOTE
Patient comes in with complaints of vomiting since Friday. patient noted with a wound on L leg. patient states that she has some burning in her chest that started a few hours ago.

## 2024-06-16 NOTE — ED PROVIDER NOTE - NSFOLLOWUPINSTRUCTIONS_ED_ALL_ED_FT
1) Follow-up with your Gastroenterologist or Dr. Acuna. Call today / next business day for prompt follow-up.  2) Return to Emergency room for any worsening or persistent pain, weakness, fever, or any other concerning symptoms.  3) See attached instruction sheets for additional information, including information regarding signs and symptoms to look out for, reasons to seek immediate care and other important instructions.  4) Follow-up with any specialists as discussed / noted as well.   5) Take Reglan 5mg every 12 hours as needed for nausea.    Colitis is a condition in which the colon is inflamed. It can cause diarrhea, blood in the stool, and abdominal pain. Colitis can last a short time (be acute), or it may last a long time (become chronic).    What are the causes?  This condition may be caused by:  Infections from viruses or bacteria.  A reaction to medicine.  Certain autoimmune diseases, such as Crohn's disease or ulcerative colitis.  Radiation treatment.  Decreased blood flow to the bowel (ischemia).  What are the signs or symptoms?  Symptoms of this condition include:  Diarrhea, blood in the stool, or black, tarry stool.  Pain in the joints or abdominal pain.  Fever or fatigue.  Vomiting.  Weight loss.  Bloating.  Having fewer bowel movements than usual.  A strong and sudden urge to have a bowel movement.  Feeling like the bowel is not empty after a bowel movement.  How is this diagnosed?  This condition may be diagnosed based on a stool test and a blood test. You may also have other tests, such as:  X-rays.  CT scan.  Colonoscopy.  Endoscopy.  Biopsy.  How is this treated?  Treatment for this condition depends on the cause. This condition may be treated with:  Steps to rest the bowel, such as not eating or drinking for a period of time.  Fluids that are given through an IV.  Medicine for pain and diarrhea.  Antibiotic medicines.  Cortisone medicines.  Surgery.  Follow these instructions at home:  Eating and drinking      Follow instructions from your health care provider about eating or drinking restrictions.  Drink enough fluid to keep your urine pale yellow.  Work with a dietitian to determine whether certain foods cause your condition to flare up.  Avoid foods or drinks that cause flare-ups.  Eat a well-balanced diet.  General instructions    If you were prescribed an antibiotic medicine, take it as told by your health care provider. Do not stop taking the antibiotic even if you start to feel better.  Take over-the-counter and prescription medicines only as told by your health care provider.  Keep all follow-up visits. This is important.  Contact a health care provider if:  Your symptoms do not go away.  You develop new symptoms.  Get help right away if:  You have a fever that does not go away with treatment.  You develop chills.  You have extreme weakness, fainting, or dehydration.  You vomit repeatedly.  You develop severe pain in your abdomen.  You pass bloody or tarry stool.  Summary  Colitis is a condition in which the colon is inflamed. Colitis can last a short time (be acute), or it may last a long time (become chronic).  Treatment for this condition depends on the cause and may include resting the bowel, taking medicines, or having surgery.  If you were prescribed an antibiotic medicine, take it as told by your health care provider. Do not stop taking the antibiotic even if you start to feel better.  Get help right away if you develop severe pain in your abdomen.  Keep all follow-up visits. This is important.  This information is not intended to replace advice given to you by your health care provider. Make sure you discuss any questions you have with your health care provider.

## 2024-06-16 NOTE — ED ADULT NURSE NOTE - NS TRANSFER PATIENT BELONGINGS
Body Location Override (Optional - Billing Will Still Be Based On Selected Body Map Location If Applicable): left mid chest Jewelry/Money (specify)/Clothing

## 2024-06-16 NOTE — ED PROVIDER NOTE - CLINICAL SUMMARY MEDICAL DECISION MAKING FREE TEXT BOX
78-year-old female history of lung cancer status post right lobectomy CKD hyperlipidemia rheumatoid arthritis COPD on 2 L of oxygen at night cholecystectomy complaining of nausea vomiting for the past 3 days.  States she has been having nausea and vomiting since June 14 having difficulty keeping any liquids or solids down.  Last bowel movement was approximately 2 to 3 days ago.  Denies any fever abdominal pain chest pain shortness of breath urinary symptoms.  Was started on Bactrim for the past 2 days for a left lower leg cellulitis.    Physical exam: Thin appearing no acute distress clear to auscultation bilaterally regular rate and rhythm abdomen soft nontender nondistended family does not want any provider to unwrap her lower leg dressings.  Patient and family insist that it is healing.    White blood cell count within normal limits made aware that her creatinine is 1.5 CT abdomen pelvis showing enterocolitis no bowel obstruction.  Labs and imaging explained to patient and family at bedside offered admission for IV hydration and GI evaluation but patient and family declined and would like to go home understands to return if any worsening symptoms.   has a GI doctor at [facility].

## 2024-06-16 NOTE — ED PROVIDER NOTE - OBJECTIVE STATEMENT
78-year-old female with history of lung CA status post right lobectomy, CKD, hyperlipidemia, RA, COPD on 2L O2 at night, cholecystectomy presents with complaint of nausea and vomiting x 3 days.  Patient states that she has had nausea and vomiting since 6/14 and is unable to keep even liquids down.  States that her last bowel movement was 2 to 3 days ago. Pt c/o generalized weakness. Denies fever, abdominal pain, chest pain, recent travel, sick contacts, urinary symptoms.  States that she was started on Bactrim for left lower leg infection 2 days ago.  PCP-Dr. Galan

## 2024-06-16 NOTE — ED PROVIDER NOTE - NEUROLOGICAL, MLM
Detail Level: Detailed Quality 431: Preventive Care And Screening: Unhealthy Alcohol Use - Screening: Patient not identified as an unhealthy alcohol user when screened for unhealthy alcohol use using a systematic screening method Quality 402: Tobacco Use And Help With Quitting Among Adolescents: Patient screened for tobacco and never smoked Quality 130: Documentation Of Current Medications In The Medical Record: Current Medications Documented Alert and oriented, no focal deficits, no motor or sensory deficits.

## 2024-06-19 ENCOUNTER — APPOINTMENT (OUTPATIENT)
Dept: WOUND CARE | Facility: HOSPITAL | Age: 79
End: 2024-06-19
Payer: MEDICARE

## 2024-06-19 ENCOUNTER — OUTPATIENT (OUTPATIENT)
Dept: OUTPATIENT SERVICES | Facility: HOSPITAL | Age: 79
LOS: 1 days | Discharge: HOME | End: 2024-06-19
Payer: MEDICARE

## 2024-06-19 VITALS
BODY MASS INDEX: 19.69 KG/M2 | OXYGEN SATURATION: 95 % | HEART RATE: 78 BPM | RESPIRATION RATE: 18 BRPM | DIASTOLIC BLOOD PRESSURE: 84 MMHG | WEIGHT: 107 LBS | TEMPERATURE: 98.1 F | SYSTOLIC BLOOD PRESSURE: 174 MMHG | HEIGHT: 62 IN

## 2024-06-19 DIAGNOSIS — T81.31XD DISRUPTION OF EXTERNAL OPERATION (SURGICAL) WOUND, NOT ELSEWHERE CLASSIFIED, SUBSEQUENT ENCOUNTER: ICD-10-CM

## 2024-06-19 DIAGNOSIS — Z98.890 OTHER SPECIFIED POSTPROCEDURAL STATES: Chronic | ICD-10-CM

## 2024-06-19 DIAGNOSIS — Z98.89 OTHER SPECIFIED POSTPROCEDURAL STATES: Chronic | ICD-10-CM

## 2024-06-19 DIAGNOSIS — Z90.49 ACQUIRED ABSENCE OF OTHER SPECIFIED PARTS OF DIGESTIVE TRACT: Chronic | ICD-10-CM

## 2024-06-19 PROCEDURE — 99213 OFFICE O/P EST LOW 20 MIN: CPT

## 2024-06-19 PROCEDURE — G0463: CPT

## 2024-06-19 RX ORDER — TRAMADOL HYDROCHLORIDE 50 MG/1
50 TABLET, COATED ORAL
Qty: 14 | Refills: 0 | Status: COMPLETED | COMMUNITY
Start: 2024-06-19 | End: 2024-06-26

## 2024-06-19 NOTE — VITALS
Rapid Response RN Note    Rapid response RN at bedside for RADAR score 6 due to the recent VS listed below:     Vitals:    05/30/24 1120 05/30/24 1125 05/30/24 1130 05/30/24 1140   BP:       Pulse:       Resp:       Temp:       TempSrc:       SpO2: (!) 88% (!) 89% 91% 94%   Weight:       Height:            Reviewed above VS with bedside RN.  Pt resting in bed.  No acute distress.  Pox rechecked and now lower than radar.  On 5 liters nasal cannula.  Lung sounds with coarse breath sounds.  Deep breathing and coughing encouraged.  Pox improved slightly with coughing.  Pharmacist notified regarding need for Albuterol treatment.  Pox did improve.  RT was at bedside to assess.  Staff to page rapid response for any concerns or acute change in condition/VS.     [Sharp] : sharp [Aching] : aching [] : No [de-identified] : 8/10 [FreeTextEntry3] : Left lower leg  [FreeTextEntry1] : elevation & pain medication  [FreeTextEntry2] : walking  [FreeTextEntry4] : Tramadol

## 2024-06-19 NOTE — PHYSICAL EXAM
[4 x 4] : 4 x 4  [1+] : left 1+ [Ankle Swelling (On Exam)] : not present [Ankle Swelling Bilaterally] : bilaterally  [Varicose Veins Of Lower Extremities] : bilaterally [Ankle Swelling On The Right] : mild [] : bilaterally [Purpura] : no purpura  [Petechiae] : no petechiae [Skin Ulcer] : no ulcer [Skin Induration] : no induration [Alert] : alert [Oriented to Person] : oriented to person [Oriented to Place] : oriented to place [Oriented to Time] : oriented to time [Calm] : calm [de-identified] : A&Ox3, NAD [de-identified] : HTN, HLD [de-identified] : 4 out of 5 strength in all quadrants bilaterally [de-identified] : Left lower leg traumatic laceration down to skin, subcutaneous tissue, fat. [de-identified] : wnl [FreeTextEntry1] : Left Anterior Lower Leg [FreeTextEntry2] : 4.0 [FreeTextEntry3] : 3.5 [FreeTextEntry4] : 0.2 [de-identified] : Sanguinous  [de-identified] : Adaptic Touch, Alginate Ag [de-identified] : Mechanically cleansed with 0.9% normal saline & sterile 4x4 gauze.  Kerlix.  Stockinette [TWNoteComboBox4] : Moderate [TWNoteComboBox5] : No [TWNoteComboBox6] : Traumatic [de-identified] : No [de-identified] : Erythema [de-identified] : None [de-identified] : >75% [de-identified] : <20% [de-identified] : 3x Weekly [de-identified] : Primary Dressing

## 2024-06-19 NOTE — HISTORY OF PRESENT ILLNESS
[FreeTextEntry1] : Patient is 78 year old female presenting for f/u s/p bunion surgery and hammer toe right foot with stable eschar. Patient denies any pain to the right foot.  Patient seen for right foot dorsal medial incision dehiscence with open wound down to skin, subcutaneous tissue, fat, healed with stable eschar.  6/19/2024 patient seen for open left lower leg traumatic laceration.  Relates that the dressings have been changed as advised since the last encounter.  No other complaints at this time.

## 2024-06-19 NOTE — ASSESSMENT
[Verbal] : Verbal [Demo] : Demo [Patient] : Patient [Spouse] : Spouse [Good - alert, interested, motivated] : Good - alert, interested, motivated [Dressing changes] : dressing changes [Skin Care] : skin care [Pressure relief] : pressure relief [Signs and symptoms of infection] : sign and symptoms of infection [Nutrition] : nutrition [How and When to Call] : how and when to call [Pain Management] : pain management [Off-loading] : off-loading [Patient responsibility to plan of care] : patient responsibility to plan of care [] : Yes [Stable] : stable [Home] : Home [Ambulatory] : Ambulatory [Not Applicable - Long Term Care/Home Health Agency] : Long Term Care/Home Health Agency: Not Applicable [Needs reinforcement] : needs reinforcement [FreeTextEntry2] : Infection monitoring Localized wound care Promote optimal skin integrity Maintain acceptable level of pain with use of pharmacological and nonpharmacological interventions Offloading / Pressure relief [FreeTextEntry3] : Decreased redness [FreeTextEntry4] : Patient presented with the dressing as Silver Alginate directly on the wound and then Adaptic touch on top of the silver, explained the purpose of Adaptic touch and Demonstrated to the patient and  the correct placement of the prescribed dressing. Both expressed understanding. Patients  is able to perform dressing changes.  Pt instructed to report to the ED if pain to LLE worsens or if she develops fever/chills, pt verbalized understanding.  Follow up in 1 week

## 2024-06-19 NOTE — REVIEW OF SYSTEMS
[Fever] : no fever [Chills] : no chills [Eye Pain] : no eye pain [Loss Of Hearing] : no hearing loss [Shortness Of Breath] : no shortness of breath [Abdominal Pain] : no abdominal pain [Joint Stiffness] : joint stiffness [Skin Lesions] : skin lesion [Confused] : confusion [Convulsions] : convulsions [Anxiety] : no anxiety [Easy Bleeding] : no tendency for easy bleeding [Negative] : Endocrine [FreeTextEntry5] : HTN, HLD [FreeTextEntry9] : Bunion and hammer toe deformity's of both feet , right worse then the left  [de-identified] :  s/p arthroplasty and hallux s/p [place] with staple , stable , no soi, suture line has eschar which was partially removed

## 2024-06-20 DIAGNOSIS — S81.812D LACERATION WITHOUT FOREIGN BODY, LEFT LOWER LEG, SUBSEQUENT ENCOUNTER: ICD-10-CM

## 2024-06-20 DIAGNOSIS — M06.9 RHEUMATOID ARTHRITIS, UNSPECIFIED: ICD-10-CM
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
## 2024-06-20 DIAGNOSIS — Z91.041 RADIOGRAPHIC DYE ALLERGY STATUS: ICD-10-CM

## 2024-06-20 DIAGNOSIS — Z90.2 ACQUIRED ABSENCE OF LUNG [PART OF]: ICD-10-CM

## 2024-06-20 DIAGNOSIS — E78.5 HYPERLIPIDEMIA, UNSPECIFIED: ICD-10-CM

## 2024-06-20 DIAGNOSIS — Z79.82 LONG TERM (CURRENT) USE OF ASPIRIN: ICD-10-CM

## 2024-06-20 DIAGNOSIS — I12.9 HYPERTENSIVE CHRONIC KIDNEY DISEASE WITH STAGE 1 THROUGH STAGE 4 CHRONIC KIDNEY DISEASE, OR UNSPECIFIED CHRONIC KIDNEY DISEASE: ICD-10-CM

## 2024-06-20 DIAGNOSIS — Z88.1 ALLERGY STATUS TO OTHER ANTIBIOTIC AGENTS STATUS: ICD-10-CM

## 2024-06-20 DIAGNOSIS — J44.9 CHRONIC OBSTRUCTIVE PULMONARY DISEASE, UNSPECIFIED: ICD-10-CM

## 2024-06-20 DIAGNOSIS — Z87.01 PERSONAL HISTORY OF PNEUMONIA (RECURRENT): ICD-10-CM

## 2024-06-20 DIAGNOSIS — Z80.1 FAMILY HISTORY OF MALIGNANT NEOPLASM OF TRACHEA, BRONCHUS AND LUNG: ICD-10-CM

## 2024-06-20 DIAGNOSIS — I65.29 OCCLUSION AND STENOSIS OF UNSPECIFIED CAROTID ARTERY: ICD-10-CM

## 2024-06-20 DIAGNOSIS — Z86.73 PERSONAL HISTORY OF TRANSIENT ISCHEMIC ATTACK (TIA), AND CEREBRAL INFARCTION WITHOUT RESIDUAL DEFICITS: ICD-10-CM

## 2024-06-20 DIAGNOSIS — Z90.49 ACQUIRED ABSENCE OF OTHER SPECIFIED PARTS OF DIGESTIVE TRACT: ICD-10-CM
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
## 2024-06-20 DIAGNOSIS — Z88.8 ALLERGY STATUS TO OTHER DRUGS, MEDICAMENTS AND BIOLOGICAL SUBSTANCES STATUS: ICD-10-CM

## 2024-06-20 DIAGNOSIS — Z87.891 PERSONAL HISTORY OF NICOTINE DEPENDENCE: ICD-10-CM

## 2024-06-20 DIAGNOSIS — Z98.49 CATARACT EXTRACTION STATUS, UNSPECIFIED EYE: ICD-10-CM

## 2024-06-20 DIAGNOSIS — Z83.3 FAMILY HISTORY OF DIABETES MELLITUS: ICD-10-CM

## 2024-06-20 DIAGNOSIS — Z79.899 OTHER LONG TERM (CURRENT) DRUG THERAPY: ICD-10-CM

## 2024-06-20 DIAGNOSIS — Z82.49 FAMILY HISTORY OF ISCHEMIC HEART DISEASE AND OTHER DISEASES OF THE CIRCULATORY SYSTEM: ICD-10-CM
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
## 2024-06-20 DIAGNOSIS — N18.30 CHRONIC KIDNEY DISEASE, STAGE 3 UNSPECIFIED: ICD-10-CM

## 2024-06-20 DIAGNOSIS — Z88.0 ALLERGY STATUS TO PENICILLIN: ICD-10-CM
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
## 2024-06-20 DIAGNOSIS — K21.9 GASTRO-ESOPHAGEAL REFLUX DISEASE WITHOUT ESOPHAGITIS: ICD-10-CM

## 2024-06-20 DIAGNOSIS — Z85.118 PERSONAL HISTORY OF OTHER MALIGNANT NEOPLASM OF BRONCHUS AND LUNG: ICD-10-CM

## 2024-06-20 DIAGNOSIS — Z82.3 FAMILY HISTORY OF STROKE: ICD-10-CM

## 2024-06-25 ENCOUNTER — APPOINTMENT (OUTPATIENT)
Dept: PHARMACY | Facility: CLINIC | Age: 79
End: 2024-06-25

## 2024-06-26 ENCOUNTER — APPOINTMENT (OUTPATIENT)
Age: 79
End: 2024-06-26

## 2024-06-26 ENCOUNTER — APPOINTMENT (OUTPATIENT)
Dept: WOUND CARE | Facility: HOSPITAL | Age: 79
End: 2024-06-26
Payer: MEDICARE

## 2024-06-26 ENCOUNTER — OUTPATIENT (OUTPATIENT)
Dept: OUTPATIENT SERVICES | Facility: HOSPITAL | Age: 79
LOS: 1 days | Discharge: HOME | End: 2024-06-26
Payer: MEDICARE

## 2024-06-26 VITALS
OXYGEN SATURATION: 96 % | HEART RATE: 84 BPM | RESPIRATION RATE: 18 BRPM | WEIGHT: 107 LBS | BODY MASS INDEX: 19.69 KG/M2 | DIASTOLIC BLOOD PRESSURE: 62 MMHG | HEIGHT: 62 IN | SYSTOLIC BLOOD PRESSURE: 154 MMHG | TEMPERATURE: 98 F

## 2024-06-26 DIAGNOSIS — Z98.890 OTHER SPECIFIED POSTPROCEDURAL STATES: Chronic | ICD-10-CM

## 2024-06-26 DIAGNOSIS — S81.812D LACERATION W/OUT FOREIGN BODY, LEFT LOWER LEG, SUBSEQUENT ENCOUNTER: ICD-10-CM

## 2024-06-26 DIAGNOSIS — T81.31XD DISRUPTION OF EXTERNAL OPERATION (SURGICAL) WOUND, NOT ELSEWHERE CLASSIFIED, SUBSEQUENT ENCOUNTER: ICD-10-CM

## 2024-06-26 DIAGNOSIS — Z98.89 OTHER SPECIFIED POSTPROCEDURAL STATES: Chronic | ICD-10-CM

## 2024-06-26 DIAGNOSIS — Z90.49 ACQUIRED ABSENCE OF OTHER SPECIFIED PARTS OF DIGESTIVE TRACT: Chronic | ICD-10-CM

## 2024-06-26 PROCEDURE — 99213 OFFICE O/P EST LOW 20 MIN: CPT

## 2024-06-26 PROCEDURE — G0463: CPT

## 2024-06-27 DIAGNOSIS — Z82.3 FAMILY HISTORY OF STROKE: ICD-10-CM

## 2024-06-27 DIAGNOSIS — Z87.891 PERSONAL HISTORY OF NICOTINE DEPENDENCE: ICD-10-CM

## 2024-06-27 DIAGNOSIS — Z90.2 ACQUIRED ABSENCE OF LUNG [PART OF]: ICD-10-CM

## 2024-06-27 DIAGNOSIS — Z88.8 ALLERGY STATUS TO OTHER DRUGS, MEDICAMENTS AND BIOLOGICAL SUBSTANCES: ICD-10-CM

## 2024-06-27 DIAGNOSIS — K21.9 GASTRO-ESOPHAGEAL REFLUX DISEASE WITHOUT ESOPHAGITIS: ICD-10-CM

## 2024-06-27 DIAGNOSIS — Z88.0 ALLERGY STATUS TO PENICILLIN: ICD-10-CM

## 2024-06-27 DIAGNOSIS — Z87.01 PERSONAL HISTORY OF PNEUMONIA (RECURRENT): ICD-10-CM

## 2024-06-27 DIAGNOSIS — S81.812D LACERATION WITHOUT FOREIGN BODY, LEFT LOWER LEG, SUBSEQUENT ENCOUNTER: ICD-10-CM

## 2024-06-27 DIAGNOSIS — Z91.041 RADIOGRAPHIC DYE ALLERGY STATUS: ICD-10-CM

## 2024-06-27 DIAGNOSIS — M06.9 RHEUMATOID ARTHRITIS, UNSPECIFIED: ICD-10-CM

## 2024-06-27 DIAGNOSIS — Z85.118 PERSONAL HISTORY OF OTHER MALIGNANT NEOPLASM OF BRONCHUS AND LUNG: ICD-10-CM

## 2024-06-27 DIAGNOSIS — Z83.3 FAMILY HISTORY OF DIABETES MELLITUS: ICD-10-CM

## 2024-06-27 DIAGNOSIS — Z82.49 FAMILY HISTORY OF ISCHEMIC HEART DISEASE AND OTHER DISEASES OF THE CIRCULATORY SYSTEM: ICD-10-CM

## 2024-06-27 DIAGNOSIS — Z79.82 LONG TERM (CURRENT) USE OF ASPIRIN: ICD-10-CM

## 2024-06-27 DIAGNOSIS — J44.9 CHRONIC OBSTRUCTIVE PULMONARY DISEASE, UNSPECIFIED: ICD-10-CM

## 2024-06-27 DIAGNOSIS — E78.5 HYPERLIPIDEMIA, UNSPECIFIED: ICD-10-CM

## 2024-06-27 DIAGNOSIS — Z98.49 CATARACT EXTRACTION STATUS, UNSPECIFIED EYE: ICD-10-CM

## 2024-06-27 DIAGNOSIS — I12.9 HYPERTENSIVE CHRONIC KIDNEY DISEASE WITH STAGE 1 THROUGH STAGE 4 CHRONIC KIDNEY DISEASE, OR UNSPECIFIED CHRONIC KIDNEY DISEASE: ICD-10-CM

## 2024-06-27 DIAGNOSIS — I65.29 OCCLUSION AND STENOSIS OF UNSPECIFIED CAROTID ARTERY: ICD-10-CM

## 2024-06-27 DIAGNOSIS — Z90.49 ACQUIRED ABSENCE OF OTHER SPECIFIED PARTS OF DIGESTIVE TRACT: ICD-10-CM
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
## 2024-06-27 DIAGNOSIS — Z88.1 ALLERGY STATUS TO OTHER ANTIBIOTIC AGENTS STATUS: ICD-10-CM

## 2024-06-27 DIAGNOSIS — N18.30 CHRONIC KIDNEY DISEASE, STAGE 3 UNSPECIFIED: ICD-10-CM
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
## 2024-06-27 DIAGNOSIS — Z80.1 FAMILY HISTORY OF MALIGNANT NEOPLASM OF TRACHEA, BRONCHUS AND LUNG: ICD-10-CM
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
## 2024-06-27 DIAGNOSIS — Z79.899 OTHER LONG TERM (CURRENT) DRUG THERAPY: ICD-10-CM

## 2024-06-27 DIAGNOSIS — Z86.73 PERSONAL HISTORY OF TRANSIENT ISCHEMIC ATTACK (TIA), AND CEREBRAL INFARCTION WITHOUT RESIDUAL DEFICITS: ICD-10-CM

## 2024-07-10 ENCOUNTER — OUTPATIENT (OUTPATIENT)
Dept: OUTPATIENT SERVICES | Facility: HOSPITAL | Age: 79
LOS: 1 days | Discharge: HOME | End: 2024-07-10
Payer: MEDICARE

## 2024-07-10 ENCOUNTER — APPOINTMENT (OUTPATIENT)
Dept: WOUND CARE | Facility: HOSPITAL | Age: 79
End: 2024-07-10
Payer: MEDICARE

## 2024-07-10 VITALS
BODY MASS INDEX: 19.69 KG/M2 | OXYGEN SATURATION: 96 % | WEIGHT: 107 LBS | HEIGHT: 62 IN | DIASTOLIC BLOOD PRESSURE: 80 MMHG | HEART RATE: 80 BPM | RESPIRATION RATE: 18 BRPM | TEMPERATURE: 97.3 F | SYSTOLIC BLOOD PRESSURE: 158 MMHG

## 2024-07-10 DIAGNOSIS — Z98.89 UNDEFINED: Chronic | ICD-10-CM

## 2024-07-10 DIAGNOSIS — Z98.890 OTHER SPECIFIED POSTPROCEDURAL STATES: Chronic | ICD-10-CM

## 2024-07-10 DIAGNOSIS — T81.31XD DISRUPTION OF EXTERNAL OPERATION (SURGICAL) WOUND, NOT ELSEWHERE CLASSIFIED, SUBSEQUENT ENCOUNTER: ICD-10-CM

## 2024-07-10 DIAGNOSIS — Z90.49 ACQUIRED ABSENCE OF OTHER SPECIFIED PARTS OF DIGESTIVE TRACT: Chronic | ICD-10-CM

## 2024-07-10 PROCEDURE — G0463: CPT

## 2024-07-10 PROCEDURE — 99213 OFFICE O/P EST LOW 20 MIN: CPT

## 2024-07-11 DIAGNOSIS — Z88.8 ALLERGY STATUS TO OTHER DRUGS, MEDICAMENTS AND BIOLOGICAL SUBSTANCES: ICD-10-CM

## 2024-07-11 DIAGNOSIS — Z86.73 PERSONAL HISTORY OF TRANSIENT ISCHEMIC ATTACK (TIA), AND CEREBRAL INFARCTION WITHOUT RESIDUAL DEFICITS: ICD-10-CM
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
## 2024-07-11 DIAGNOSIS — Z90.2 ACQUIRED ABSENCE OF LUNG [PART OF]: ICD-10-CM

## 2024-07-11 DIAGNOSIS — K21.9 GASTRO-ESOPHAGEAL REFLUX DISEASE WITHOUT ESOPHAGITIS: ICD-10-CM

## 2024-07-11 DIAGNOSIS — M06.9 RHEUMATOID ARTHRITIS, UNSPECIFIED: ICD-10-CM

## 2024-07-11 DIAGNOSIS — N18.30 CHRONIC KIDNEY DISEASE, STAGE 3 UNSPECIFIED: ICD-10-CM

## 2024-07-11 DIAGNOSIS — S81.812D LACERATION WITHOUT FOREIGN BODY, LEFT LOWER LEG, SUBSEQUENT ENCOUNTER: ICD-10-CM
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
## 2024-07-11 DIAGNOSIS — Z82.49 FAMILY HISTORY OF ISCHEMIC HEART DISEASE AND OTHER DISEASES OF THE CIRCULATORY SYSTEM: ICD-10-CM

## 2024-07-11 DIAGNOSIS — Z79.82 LONG TERM (CURRENT) USE OF ASPIRIN: ICD-10-CM

## 2024-07-11 DIAGNOSIS — I12.9 HYPERTENSIVE CHRONIC KIDNEY DISEASE WITH STAGE 1 THROUGH STAGE 4 CHRONIC KIDNEY DISEASE, OR UNSPECIFIED CHRONIC KIDNEY DISEASE: ICD-10-CM

## 2024-07-11 DIAGNOSIS — Z88.0 ALLERGY STATUS TO PENICILLIN: ICD-10-CM

## 2024-07-11 DIAGNOSIS — Z83.3 FAMILY HISTORY OF DIABETES MELLITUS: ICD-10-CM

## 2024-07-11 DIAGNOSIS — Z98.49 CATARACT EXTRACTION STATUS, UNSPECIFIED EYE: ICD-10-CM

## 2024-07-11 DIAGNOSIS — J44.9 CHRONIC OBSTRUCTIVE PULMONARY DISEASE, UNSPECIFIED: ICD-10-CM

## 2024-07-11 DIAGNOSIS — Z91.041 RADIOGRAPHIC DYE ALLERGY STATUS: ICD-10-CM

## 2024-07-11 DIAGNOSIS — I65.29 OCCLUSION AND STENOSIS OF UNSPECIFIED CAROTID ARTERY: ICD-10-CM
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
## 2024-07-11 DIAGNOSIS — Z87.01 PERSONAL HISTORY OF PNEUMONIA (RECURRENT): ICD-10-CM

## 2024-07-11 DIAGNOSIS — Z87.891 PERSONAL HISTORY OF NICOTINE DEPENDENCE: ICD-10-CM
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
## 2024-07-11 DIAGNOSIS — E78.5 HYPERLIPIDEMIA, UNSPECIFIED: ICD-10-CM

## 2024-07-11 DIAGNOSIS — Z80.1 FAMILY HISTORY OF MALIGNANT NEOPLASM OF TRACHEA, BRONCHUS AND LUNG: ICD-10-CM

## 2024-07-11 DIAGNOSIS — Z85.118 PERSONAL HISTORY OF OTHER MALIGNANT NEOPLASM OF BRONCHUS AND LUNG: ICD-10-CM

## 2024-07-11 DIAGNOSIS — Z79.899 OTHER LONG TERM (CURRENT) DRUG THERAPY: ICD-10-CM

## 2024-07-11 DIAGNOSIS — Z90.49 ACQUIRED ABSENCE OF OTHER SPECIFIED PARTS OF DIGESTIVE TRACT: ICD-10-CM

## 2024-07-11 DIAGNOSIS — Z88.1 ALLERGY STATUS TO OTHER ANTIBIOTIC AGENTS: ICD-10-CM

## 2024-07-11 DIAGNOSIS — Z82.3 FAMILY HISTORY OF STROKE: ICD-10-CM

## 2024-07-16 ENCOUNTER — OUTPATIENT (OUTPATIENT)
Dept: OUTPATIENT SERVICES | Facility: HOSPITAL | Age: 79
LOS: 1 days | Discharge: HOME | End: 2024-07-16
Payer: MEDICARE

## 2024-07-16 ENCOUNTER — APPOINTMENT (OUTPATIENT)
Dept: WOUND CARE | Facility: HOSPITAL | Age: 79
End: 2024-07-16
Payer: MEDICARE

## 2024-07-16 VITALS
RESPIRATION RATE: 16 BRPM | TEMPERATURE: 97.1 F | HEART RATE: 84 BPM | HEIGHT: 62 IN | WEIGHT: 107.06 LBS | BODY MASS INDEX: 19.7 KG/M2 | OXYGEN SATURATION: 94 %

## 2024-07-16 VITALS — SYSTOLIC BLOOD PRESSURE: 138 MMHG | DIASTOLIC BLOOD PRESSURE: 72 MMHG

## 2024-07-16 DIAGNOSIS — Z98.89 OTHER SPECIFIED POSTPROCEDURAL STATES: Chronic | ICD-10-CM

## 2024-07-16 DIAGNOSIS — Z98.890 OTHER SPECIFIED POSTPROCEDURAL STATES: Chronic | ICD-10-CM

## 2024-07-16 DIAGNOSIS — Z90.49 ACQUIRED ABSENCE OF OTHER SPECIFIED PARTS OF DIGESTIVE TRACT: Chronic | ICD-10-CM

## 2024-07-16 DIAGNOSIS — L97.801 NON-PRESSURE CHRONIC ULCER OF OTHER PART OF UNSPECIFIED LOWER LEG LIMITED TO BREAKDOWN OF SKIN: ICD-10-CM

## 2024-07-16 PROCEDURE — G0463: CPT

## 2024-07-16 PROCEDURE — 99213 OFFICE O/P EST LOW 20 MIN: CPT

## 2024-07-16 RX ORDER — MUPIROCIN 20 MG/G
2 OINTMENT TOPICAL DAILY
Qty: 1 | Refills: 0 | Status: ACTIVE | COMMUNITY
Start: 2024-07-16 | End: 2024-07-30

## 2024-07-17 DIAGNOSIS — Z86.73 PERSONAL HISTORY OF TRANSIENT ISCHEMIC ATTACK (TIA), AND CEREBRAL INFARCTION WITHOUT RESIDUAL DEFICITS: ICD-10-CM

## 2024-07-17 DIAGNOSIS — Z90.49 ACQUIRED ABSENCE OF OTHER SPECIFIED PARTS OF DIGESTIVE TRACT: ICD-10-CM

## 2024-07-17 DIAGNOSIS — Z87.891 PERSONAL HISTORY OF NICOTINE DEPENDENCE: ICD-10-CM

## 2024-07-17 DIAGNOSIS — S81.812D LACERATION WITHOUT FOREIGN BODY, LEFT LOWER LEG, SUBSEQUENT ENCOUNTER: ICD-10-CM

## 2024-07-17 DIAGNOSIS — Z91.041 RADIOGRAPHIC DYE ALLERGY STATUS: ICD-10-CM

## 2024-07-17 DIAGNOSIS — K21.9 GASTRO-ESOPHAGEAL REFLUX DISEASE WITHOUT ESOPHAGITIS: ICD-10-CM

## 2024-07-17 DIAGNOSIS — J44.9 CHRONIC OBSTRUCTIVE PULMONARY DISEASE, UNSPECIFIED: ICD-10-CM

## 2024-07-17 DIAGNOSIS — Z90.2 ACQUIRED ABSENCE OF LUNG [PART OF]: ICD-10-CM
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
## 2024-07-17 DIAGNOSIS — Z83.3 FAMILY HISTORY OF DIABETES MELLITUS: ICD-10-CM

## 2024-07-17 DIAGNOSIS — N18.30 CHRONIC KIDNEY DISEASE, STAGE 3 UNSPECIFIED: ICD-10-CM

## 2024-07-17 DIAGNOSIS — Z88.0 ALLERGY STATUS TO PENICILLIN: ICD-10-CM

## 2024-07-17 DIAGNOSIS — I65.29 OCCLUSION AND STENOSIS OF UNSPECIFIED CAROTID ARTERY: ICD-10-CM
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
## 2024-07-17 DIAGNOSIS — I12.9 HYPERTENSIVE CHRONIC KIDNEY DISEASE WITH STAGE 1 THROUGH STAGE 4 CHRONIC KIDNEY DISEASE, OR UNSPECIFIED CHRONIC KIDNEY DISEASE: ICD-10-CM

## 2024-07-17 DIAGNOSIS — Z82.49 FAMILY HISTORY OF ISCHEMIC HEART DISEASE AND OTHER DISEASES OF THE CIRCULATORY SYSTEM: ICD-10-CM

## 2024-07-17 DIAGNOSIS — Z87.01 PERSONAL HISTORY OF PNEUMONIA (RECURRENT): ICD-10-CM

## 2024-07-17 DIAGNOSIS — Z79.899 OTHER LONG TERM (CURRENT) DRUG THERAPY: ICD-10-CM
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
## 2024-07-17 DIAGNOSIS — Z79.82 LONG TERM (CURRENT) USE OF ASPIRIN: ICD-10-CM

## 2024-07-17 DIAGNOSIS — Z88.1 ALLERGY STATUS TO OTHER ANTIBIOTIC AGENTS: ICD-10-CM

## 2024-07-17 DIAGNOSIS — E78.5 HYPERLIPIDEMIA, UNSPECIFIED: ICD-10-CM

## 2024-07-17 DIAGNOSIS — Z88.8 ALLERGY STATUS TO OTHER DRUGS, MEDICAMENTS AND BIOLOGICAL SUBSTANCES: ICD-10-CM

## 2024-07-17 DIAGNOSIS — Z98.49 CATARACT EXTRACTION STATUS, UNSPECIFIED EYE: ICD-10-CM
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
## 2024-07-17 DIAGNOSIS — M06.9 RHEUMATOID ARTHRITIS, UNSPECIFIED: ICD-10-CM

## 2024-07-17 DIAGNOSIS — Z85.118 PERSONAL HISTORY OF OTHER MALIGNANT NEOPLASM OF BRONCHUS AND LUNG: ICD-10-CM

## 2024-07-17 DIAGNOSIS — Z80.1 FAMILY HISTORY OF MALIGNANT NEOPLASM OF TRACHEA, BRONCHUS AND LUNG: ICD-10-CM

## 2024-07-17 DIAGNOSIS — Z82.3 FAMILY HISTORY OF STROKE: ICD-10-CM

## 2024-07-18 ENCOUNTER — APPOINTMENT (OUTPATIENT)
Dept: PHARMACY | Facility: CLINIC | Age: 79
End: 2024-07-18
Payer: SELF-PAY

## 2024-07-18 PROCEDURE — V5010 ASSESSMENT FOR HEARING AID: CPT | Mod: NC

## 2024-07-22 ENCOUNTER — APPOINTMENT (OUTPATIENT)
Dept: WOUND CARE | Facility: HOSPITAL | Age: 79
End: 2024-07-22
Payer: MEDICARE

## 2024-07-22 VITALS
DIASTOLIC BLOOD PRESSURE: 73 MMHG | SYSTOLIC BLOOD PRESSURE: 150 MMHG | WEIGHT: 107 LBS | BODY MASS INDEX: 19.69 KG/M2 | RESPIRATION RATE: 18 BRPM | HEART RATE: 83 BPM | HEIGHT: 62 IN | OXYGEN SATURATION: 95 % | TEMPERATURE: 98.1 F

## 2024-07-22 PROCEDURE — 99213 OFFICE O/P EST LOW 20 MIN: CPT

## 2024-07-22 RX ORDER — CLOTRIMAZOLE AND BETAMETHASONE DIPROPIONATE 10; .5 MG/G; MG/G
1-0.05 CREAM TOPICAL
Qty: 45 | Refills: 9 | Status: ACTIVE | COMMUNITY
Start: 2024-07-22 | End: 1900-01-01

## 2024-07-22 NOTE — REVIEW OF SYSTEMS
[Fever] : no fever [Chills] : no chills [Eye Pain] : no eye pain [Loss Of Hearing] : no hearing loss [Shortness Of Breath] : no shortness of breath [Abdominal Pain] : no abdominal pain [Joint Stiffness] : joint stiffness [Skin Lesions] : skin lesion [Confused] : confusion [Convulsions] : convulsions [Anxiety] : no anxiety [Easy Bleeding] : no tendency for easy bleeding [Negative] : Endocrine [FreeTextEntry5] : HTN, HLD [FreeTextEntry9] : Bunion and hammer toe deformity's of both feet , right worse then the left  [de-identified] :  s/p arthroplasty and hallux s/p [place] with staple , stable , no soi, suture line has eschar which was partially removed

## 2024-07-22 NOTE — PLAN
[FreeTextEntry1] : Patient examined and evaluated at this time. Continue local wound care and offloading. elevate lower legs medihoney to necrotic tissue,lotrisone to other open areas,adaptic,CaAlginate,DD,QD Spent 20 minutes for patient care and medical decision making. Patient to follow up in 1 week

## 2024-07-22 NOTE — PHYSICAL EXAM
[4 x 4] : 4 x 4  [Abdominal Pad] : Abdominal Pad [1+] : left 1+ [Ankle Swelling (On Exam)] : not present [Ankle Swelling Bilaterally] : bilaterally  [Varicose Veins Of Lower Extremities] : bilaterally [Ankle Swelling On The Right] : mild [] : bilaterally [Purpura] : no purpura  [Petechiae] : no petechiae [Skin Ulcer] : no ulcer [Skin Induration] : no induration [Alert] : alert [Oriented to Person] : oriented to person [Oriented to Place] : oriented to place [Oriented to Time] : oriented to time [Calm] : calm [de-identified] : A&Ox3, NAD [de-identified] : HTN, HLD [de-identified] : 4 out of 5 strength in all quadrants bilaterally [de-identified] : Left lower leg traumatic laceration down to skin, subcutaneous tissue, fat, healing well [de-identified] : wnl [FreeTextEntry1] : Left Anterior Lower Leg [FreeTextEntry2] : 5.7 [FreeTextEntry3] : 5.5 [FreeTextEntry4] : 0.1 [de-identified] : Serosanguinous  [de-identified] : dermatitis like reaction * [de-identified] : 80% [de-identified] : 20% [de-identified] : félix [de-identified] : Medi honey  [de-identified] :  Mechanically cleansed with NS 0.9%, Sterile Gauze Cloth/Paper Tape Stockinette  Area of 12 cm by 8 cm redness with dots and swelling extending to foot Lotrisome ordered for surrounding area Test spot first [TWNoteComboBox4] : Moderate [TWNoteComboBox5] : No [TWNoteComboBox6] : Traumatic [de-identified] : No [de-identified] : Erythema [de-identified] : None [de-identified] : None [TWNoteComboBox7] : Mechanical [de-identified] : Other [de-identified] : 3x Weekly [de-identified] : Primary Dressing

## 2024-07-22 NOTE — VITALS
[Burning] : burning [] : No [de-identified] : 10/10 [FreeTextEntry3] : left lower leg [FreeTextEntry1] : nothing [FreeTextEntry2] : touching

## 2024-07-22 NOTE — ASU PREOP CHECKLIST - PATIENT'S PERSONAL PROPERTY GIVEN TO
Your Diagnosis is:  Near Syncope; Burning chest pain      Return to the Emergency Department for recurrent symptoms, lightheadedness, dizziness, ongoing chest pain/shortness of breath/difficulty breathing or, if symptoms worsen or for any other concerns.    Medications:   These are your new prescriptions: none  These medicines that you take now have been changed: none  Please refer to the medication section for instructions on how to take them.    Additional instructions:  As we discussed your laboratory studies were all within normal limits as were your EKG.  He had no evidence of any blood clots or acute coronary syndrome on your EKG labs chest x-ray.  Based on your description is possible you had a near syncopal episode secondary to a vasovagal event however this cannot be confirmed on the lab tests that were performed today.  Should you have any worsening symptoms recurrent symptoms lightheadedness dizziness ongoing chest pain shortness of breath or difficulty breathing please return immediately to the emergency department.  I would like you to follow-up with your primary care physician within the next 1 week for outpatient reevaluation.  
on unit

## 2024-07-22 NOTE — HISTORY OF PRESENT ILLNESS
[FreeTextEntry1] : Patient is 78 year old female presenting for f/u s/p bunion surgery and hammer toe right foot with stable eschar. Patient denies any pain to the right foot.  Patient seen for right foot dorsal medial incision dehiscence with open wound down to skin, subcutaneous tissue, fat, healed with stable eschar.  6/19/2024 patient seen for open left lower leg traumatic laceration.  Relates that the dressings have been changed as advised since the last encounter.  No other complaints at this time. 6/26/2024 patient seen for open left lower leg traumatic laceration.  Relates that the dressings have been changed as advised since the last encounter.  No other complaints at this time. 7/10/2024 patient seen for open left lower leg traumatic laceration, healing well 7/16/24 less necrotic tissue noted but wound appears larger. No SOI 7/22/24 left shin wound patient appears to have had a contact dermatitis from the bactroban. D/C'ed. Lotrisone to periwound.

## 2024-07-22 NOTE — ASSESSMENT
[Verbal] : Verbal [Demo] : Demo [Patient] : Patient [Spouse] : Spouse [Good - alert, interested, motivated] : Good - alert, interested, motivated [Verbalizes knowledge/Understanding] : Verbalizes knowledge/understanding [Dressing changes] : dressing changes [Skin Care] : skin care [Pressure relief] : pressure relief [Signs and symptoms of infection] : sign and symptoms of infection [Nutrition] : nutrition [How and When to Call] : how and when to call [Pain Management] : pain management [Off-loading] : off-loading [Patient responsibility to plan of care] : patient responsibility to plan of care [] : Yes [Stable] : stable [Home] : Home [Ambulatory] : Ambulatory [Not Applicable - Long Term Care/Home Health Agency] : Long Term Care/Home Health Agency: Not Applicable [FreeTextEntry2] : Infection monitoring Localized wound care Promote optimal skin integrity Maintain acceptable level of pain with use of pharmacological and nonpharmacological interventions Offloading / Pressure relief [FreeTextEntry3] : No [FreeTextEntry4] : - Pts  performs dressing changes, no supplies needed Patient appears to have had a dermatitis allergic reaction to Bactroban. MATHEUS Bactroban. Lotrisome test area applied posterior wound redness site.  to apply small amt of Medi honey, calcium alginate then 2x2 and kerlex. Elevate leg today as much as possible Beginning tomorrow apply Lotrisome liberally as shown today Belia wound and foot area  -Follow up in 1 week If temperature develops or chills go to ER

## 2024-07-24 ENCOUNTER — APPOINTMENT (OUTPATIENT)
Dept: WOUND CARE | Facility: HOSPITAL | Age: 79
End: 2024-07-24
Payer: MEDICARE

## 2024-07-24 VITALS
TEMPERATURE: 98.5 F | RESPIRATION RATE: 18 BRPM | HEIGHT: 62 IN | SYSTOLIC BLOOD PRESSURE: 152 MMHG | BODY MASS INDEX: 19.69 KG/M2 | HEART RATE: 91 BPM | DIASTOLIC BLOOD PRESSURE: 72 MMHG | WEIGHT: 107 LBS | OXYGEN SATURATION: 94 %

## 2024-07-24 PROCEDURE — 99213 OFFICE O/P EST LOW 20 MIN: CPT

## 2024-07-24 NOTE — HISTORY OF PRESENT ILLNESS
[FreeTextEntry1] : Patient is 78 year old female presenting for f/u s/p bunion surgery and hammer toe right foot with stable eschar. Patient denies any pain to the right foot.  Patient seen for right foot dorsal medial incision dehiscence with open wound down to skin, subcutaneous tissue, fat, healed with stable eschar.  6/19/2024 patient seen for open left lower leg traumatic laceration.  Relates that the dressings have been changed as advised since the last encounter.  No other complaints at this time. 6/26/2024 patient seen for open left lower leg traumatic laceration.  Relates that the dressings have been changed as advised since the last encounter.  No other complaints at this time. 7/10/2024 patient seen for open left lower leg traumatic laceration, healing well 7/16/24 less necrotic tissue noted but wound appears larger. No SOI 7/22/24 left shin wound patient appears to have had a contact dermatitis from the bactroban. D/C'ed. Lotrisone to periwound.  7/20/2024 patient seen for follow-up of left lower leg wound down to skin, subcutaneous tissue, fat

## 2024-07-24 NOTE — PHYSICAL EXAM
[4 x 4] : 4 x 4  [Abdominal Pad] : Abdominal Pad [1+] : left 1+ [Ankle Swelling (On Exam)] : not present [Ankle Swelling Bilaterally] : bilaterally  [Varicose Veins Of Lower Extremities] : bilaterally [Ankle Swelling On The Right] : mild [] : bilaterally [Purpura] : no purpura  [Petechiae] : no petechiae [Skin Ulcer] : no ulcer [Skin Induration] : no induration [Alert] : alert [Oriented to Person] : oriented to person [Oriented to Place] : oriented to place [Oriented to Time] : oriented to time [Calm] : calm [de-identified] : A&Ox3, NAD [de-identified] : HTN, HLD [de-identified] : 4 out of 5 strength in all quadrants bilaterally [de-identified] : Left lower leg traumatic laceration down to skin, subcutaneous tissue, fat [de-identified] : wnl [FreeTextEntry1] : Left Anterior Lower Leg [FreeTextEntry2] : 5.7 [FreeTextEntry3] : 5.5 [FreeTextEntry4] : 0.1 [de-identified] : Serosanguinous  [de-identified] : dermatitis like reaction *  [de-identified] : 80% [de-identified] : 20% [de-identified] : félix [de-identified] : Adaptic touch/ca alginate  [de-identified] :  Mechanically cleansed with NS 0.9%, Sterile Gauze Cloth/Paper Tape Ace wrap figure 8 Stockinette  Area of 12 cm by 8 cm redness with dots and swelling extending to foot Lotrisome ordered for surrounding area  [TWNoteComboBox4] : Moderate [TWNoteComboBox5] : No [TWNoteComboBox6] : Traumatic [de-identified] : No [de-identified] : Erythema [de-identified] : None [de-identified] : None [TWNoteComboBox7] : Mechanical [de-identified] : Ace wraps [de-identified] : 3x Weekly [de-identified] : Primary Dressing

## 2024-07-24 NOTE — PLAN
[FreeTextEntry1] : Patient examined and evaluated at this time. Continue local wound care and offloading. Patient sent for radiographs of the left lower leg at this time.  Will request authorization for an MRI and biopsy. Patient advised regarding the possible need for biopsy if symptoms do not improve. Spent 20 minutes for patient care and medical decision making. Patient to follow up in 1 week.

## 2024-07-24 NOTE — ASSESSMENT
[Verbal] : Verbal [Demo] : Demo [Patient] : Patient [Spouse] : Spouse [Good - alert, interested, motivated] : Good - alert, interested, motivated [Verbalizes knowledge/Understanding] : Verbalizes knowledge/understanding [Dressing changes] : dressing changes [Skin Care] : skin care [Pressure relief] : pressure relief [Signs and symptoms of infection] : sign and symptoms of infection [Nutrition] : nutrition [How and When to Call] : how and when to call [Pain Management] : pain management [Off-loading] : off-loading [Patient responsibility to plan of care] : patient responsibility to plan of care [] : Yes [Stable] : stable [Ambulatory] : Ambulatory [Not Applicable - Long Term Care/Home Health Agency] : Long Term Care/Home Health Agency: Not Applicable [Other: ____] : [unfilled] [FreeTextEntry2] : Infection monitoring Localized wound care Promote optimal skin integrity Maintain acceptable level of pain with use of pharmacological and nonpharmacological interventions Offloading / Pressure relief [FreeTextEntry3] : No [FreeTextEntry4] : - Pts  performs dressing changes, no supplies needed Patient appears to have had a dermatitis allergic reaction to Bactroban. MATHEUS Bactroban. Lotrisome applied to  pamela wound wound redness site.. Elevate leg today as much as possible  -Follow up Friday Xray tibia and tibia left lower leg ordered today C&S taken of left lower leg ulcer Auth submitted for bx and MRI of lt lower leg without contrast Return on Friday Do not remove dressing

## 2024-07-24 NOTE — VITALS
[Burning] : burning [Throbbing] : throbbing [] : No [de-identified] : 8/10 [FreeTextEntry3] : wound and left lateral leg [FreeTextEntry1] : rest [FreeTextEntry2] : walking [FreeTextEntry4] : Tylenol [FreeTextEntry5] : DC Bactroban last visit

## 2024-07-24 NOTE — REVIEW OF SYSTEMS
[Fever] : no fever [Chills] : no chills [Eye Pain] : no eye pain [Loss Of Hearing] : no hearing loss [Shortness Of Breath] : no shortness of breath [Abdominal Pain] : no abdominal pain [Joint Stiffness] : joint stiffness [Skin Lesions] : skin lesion [Confused] : confusion [Convulsions] : convulsions [Anxiety] : no anxiety [Easy Bleeding] : no tendency for easy bleeding [Negative] : Endocrine [FreeTextEntry5] : HTN, HLD [FreeTextEntry9] : Bunion and hammer toe deformity's of both feet , right worse then the left  [de-identified] :  s/p arthroplasty and hallux s/p [place] with staple , stable , no soi, suture line has eschar which was partially removed

## 2024-07-26 ENCOUNTER — APPOINTMENT (OUTPATIENT)
Dept: WOUND CARE | Facility: HOSPITAL | Age: 79
End: 2024-07-26
Payer: MEDICARE

## 2024-07-26 VITALS
DIASTOLIC BLOOD PRESSURE: 56 MMHG | HEART RATE: 98 BPM | HEIGHT: 62 IN | BODY MASS INDEX: 19.69 KG/M2 | WEIGHT: 107 LBS | SYSTOLIC BLOOD PRESSURE: 132 MMHG | OXYGEN SATURATION: 91 % | RESPIRATION RATE: 18 BRPM | TEMPERATURE: 98.9 F

## 2024-07-26 PROCEDURE — ZZZZZ: CPT

## 2024-07-29 ENCOUNTER — APPOINTMENT (OUTPATIENT)
Dept: WOUND CARE | Facility: HOSPITAL | Age: 79
End: 2024-07-29
Payer: MEDICARE

## 2024-07-29 ENCOUNTER — OUTPATIENT (OUTPATIENT)
Dept: OUTPATIENT SERVICES | Facility: HOSPITAL | Age: 79
LOS: 1 days | Discharge: HOME | End: 2024-07-29
Payer: MEDICARE

## 2024-07-29 VITALS
BODY MASS INDEX: 19.69 KG/M2 | DIASTOLIC BLOOD PRESSURE: 72 MMHG | SYSTOLIC BLOOD PRESSURE: 106 MMHG | HEART RATE: 98 BPM | HEIGHT: 62 IN | RESPIRATION RATE: 18 BRPM | WEIGHT: 107 LBS | OXYGEN SATURATION: 91 % | TEMPERATURE: 98.8 F

## 2024-07-29 DIAGNOSIS — Z90.49 ACQUIRED ABSENCE OF OTHER SPECIFIED PARTS OF DIGESTIVE TRACT: ICD-10-CM

## 2024-07-29 DIAGNOSIS — Z82.3 FAMILY HISTORY OF STROKE: ICD-10-CM

## 2024-07-29 DIAGNOSIS — Z86.73 PERSONAL HISTORY OF TRANSIENT ISCHEMIC ATTACK (TIA), AND CEREBRAL INFARCTION WITHOUT RESIDUAL DEFICITS: ICD-10-CM

## 2024-07-29 DIAGNOSIS — Z98.49 CATARACT EXTRACTION STATUS, UNSPECIFIED EYE: ICD-10-CM

## 2024-07-29 DIAGNOSIS — M06.9 RHEUMATOID ARTHRITIS, UNSPECIFIED: ICD-10-CM

## 2024-07-29 DIAGNOSIS — Z88.1 ALLERGY STATUS TO OTHER ANTIBIOTIC AGENTS: ICD-10-CM

## 2024-07-29 DIAGNOSIS — Z80.1 FAMILY HISTORY OF MALIGNANT NEOPLASM OF TRACHEA, BRONCHUS AND LUNG: ICD-10-CM

## 2024-07-29 DIAGNOSIS — Z87.891 PERSONAL HISTORY OF NICOTINE DEPENDENCE: ICD-10-CM

## 2024-07-29 DIAGNOSIS — S81.812D LACERATION WITHOUT FOREIGN BODY, LEFT LOWER LEG, SUBSEQUENT ENCOUNTER: ICD-10-CM
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
## 2024-07-29 DIAGNOSIS — Z87.01 PERSONAL HISTORY OF PNEUMONIA (RECURRENT): ICD-10-CM

## 2024-07-29 DIAGNOSIS — Z82.49 FAMILY HISTORY OF ISCHEMIC HEART DISEASE AND OTHER DISEASES OF THE CIRCULATORY SYSTEM: ICD-10-CM

## 2024-07-29 DIAGNOSIS — E78.5 HYPERLIPIDEMIA, UNSPECIFIED: ICD-10-CM

## 2024-07-29 DIAGNOSIS — Z91.041 RADIOGRAPHIC DYE ALLERGY STATUS: ICD-10-CM

## 2024-07-29 DIAGNOSIS — I12.9 HYPERTENSIVE CHRONIC KIDNEY DISEASE WITH STAGE 1 THROUGH STAGE 4 CHRONIC KIDNEY DISEASE, OR UNSPECIFIED CHRONIC KIDNEY DISEASE: ICD-10-CM

## 2024-07-29 DIAGNOSIS — Z98.890 OTHER SPECIFIED POSTPROCEDURAL STATES: Chronic | ICD-10-CM

## 2024-07-29 DIAGNOSIS — N18.30 CHRONIC KIDNEY DISEASE, STAGE 3 UNSPECIFIED: ICD-10-CM

## 2024-07-29 DIAGNOSIS — Z85.118 PERSONAL HISTORY OF OTHER MALIGNANT NEOPLASM OF BRONCHUS AND LUNG: ICD-10-CM

## 2024-07-29 DIAGNOSIS — K21.9 GASTRO-ESOPHAGEAL REFLUX DISEASE WITHOUT ESOPHAGITIS: ICD-10-CM

## 2024-07-29 DIAGNOSIS — Z90.2 ACQUIRED ABSENCE OF LUNG [PART OF]: ICD-10-CM

## 2024-07-29 DIAGNOSIS — Z90.49 ACQUIRED ABSENCE OF OTHER SPECIFIED PARTS OF DIGESTIVE TRACT: Chronic | ICD-10-CM

## 2024-07-29 DIAGNOSIS — Z79.82 LONG TERM (CURRENT) USE OF ASPIRIN: ICD-10-CM
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
## 2024-07-29 DIAGNOSIS — Z79.899 OTHER LONG TERM (CURRENT) DRUG THERAPY: ICD-10-CM
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
## 2024-07-29 DIAGNOSIS — Z88.0 ALLERGY STATUS TO PENICILLIN: ICD-10-CM

## 2024-07-29 DIAGNOSIS — J44.9 CHRONIC OBSTRUCTIVE PULMONARY DISEASE, UNSPECIFIED: ICD-10-CM

## 2024-07-29 DIAGNOSIS — Z83.3 FAMILY HISTORY OF DIABETES MELLITUS: ICD-10-CM

## 2024-07-29 DIAGNOSIS — I65.29 OCCLUSION AND STENOSIS OF UNSPECIFIED CAROTID ARTERY: ICD-10-CM
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
## 2024-07-29 DIAGNOSIS — Z88.8 ALLERGY STATUS TO OTHER DRUGS, MEDICAMENTS AND BIOLOGICAL SUBSTANCES: ICD-10-CM

## 2024-07-29 DIAGNOSIS — Z98.89 OTHER SPECIFIED POSTPROCEDURAL STATES: Chronic | ICD-10-CM

## 2024-07-29 PROCEDURE — 99213 OFFICE O/P EST LOW 20 MIN: CPT

## 2024-07-29 PROCEDURE — G0463: CPT

## 2024-07-29 NOTE — HISTORY OF PRESENT ILLNESS
[FreeTextEntry1] : Patient is 78 year old female presenting for f/u s/p bunion surgery and hammer toe right foot with stable eschar. Patient denies any pain to the right foot.  Patient seen for right foot dorsal medial incision dehiscence with open wound down to skin, subcutaneous tissue, fat, healed with stable eschar.  6/19/2024 patient seen for open left lower leg traumatic laceration.  Relates that the dressings have been changed as advised since the last encounter.  No other complaints at this time. 6/26/2024 patient seen for open left lower leg traumatic laceration.  Relates that the dressings have been changed as advised since the last encounter.  No other complaints at this time. 7/10/2024 patient seen for open left lower leg traumatic laceration, healing well 7/16/24 less necrotic tissue noted but wound appears larger. No SOI 7/22/24 left shin wound patient appears to have had a contact dermatitis from the bactroban. D/C'ed. Lotrisone to periwound.  7/20/2024 patient seen for follow-up of left lower leg wound down to skin, subcutaneous tissue, fat 7/29/24 patient notes area very tender and painful. Scheduled for MRI to R/O osteomyelitis. Area of erythema smaller with lotrisone

## 2024-07-29 NOTE — PHYSICAL EXAM
[4 x 4] : 4 x 4  [1+] : left 1+ [Ankle Swelling (On Exam)] : not present [Ankle Swelling Bilaterally] : bilaterally  [Varicose Veins Of Lower Extremities] : bilaterally [Ankle Swelling On The Right] : mild [Purpura] : no purpura  [] : bilaterally [Petechiae] : no petechiae [Skin Ulcer] : no ulcer [Skin Induration] : no induration [Alert] : alert [Oriented to Person] : oriented to person [Oriented to Place] : oriented to place [Oriented to Time] : oriented to time [Calm] : calm [de-identified] : A&Ox3, NAD [de-identified] : HTN, HLD [de-identified] : 4 out of 5 strength in all quadrants bilaterally [de-identified] : wnl [de-identified] : Left lower leg traumatic laceration down to skin, subcutaneous tissue, fat [de-identified] : Lotrisone to pamela wound [FreeTextEntry1] : Left Anterior Leg [FreeTextEntry2] : 1.9 [FreeTextEntry3] : 2.6 [FreeTextEntry4] : 0.2 [de-identified] : moderate serosanguineous [de-identified] : none [de-identified] : other [de-identified] : none [de-identified] : mild erythema [de-identified] : none [de-identified] : none [de-identified] : none [de-identified] : none [de-identified] : 100% [de-identified] : ACE For support [de-identified] : Medihoney, Calcium Alginate [de-identified] : Mechanically cleansed with sterile gauze and normal saline 0.9% Dry Dressing [de-identified] : 3x Weekly [de-identified] : Primary Dressing

## 2024-07-29 NOTE — REVIEW OF SYSTEMS
[Fever] : no fever [Chills] : no chills [Eye Pain] : no eye pain [Loss Of Hearing] : no hearing loss [Shortness Of Breath] : no shortness of breath [Abdominal Pain] : no abdominal pain [Joint Stiffness] : joint stiffness [Skin Lesions] : skin lesion [Confused] : confusion [Convulsions] : convulsions [Anxiety] : no anxiety [Easy Bleeding] : no tendency for easy bleeding [Negative] : Endocrine [FreeTextEntry5] : HTN, HLD [FreeTextEntry9] : Bunion and hammer toe deformity's of both feet , right worse then the left  [de-identified] :  s/p arthroplasty and hallux s/p [place] with staple , stable , no soi, suture line has eschar which was partially removed

## 2024-07-29 NOTE — ASSESSMENT
[Verbal] : Verbal [Written] : Written [Demo] : Demo [Patient] : Patient [Spouse] : Spouse [Good - alert, interested, motivated] : Good - alert, interested, motivated [Demonstrates independently] : demonstrates independently [Dressing changes] : dressing changes [Skin Care] : skin care [Signs and symptoms of infection] : sign and symptoms of infection [Venous Disease] : venous disease [Nutrition] : nutrition [How and When to Call] : how and when to call [Off-loading] : off-loading [Patient responsibility to plan of care] : patient responsibility to plan of care [Stable] : stable [Home] : Home [Ambulatory] : Ambulatory [Not Applicable - Long Term Care/Home Health Agency] : Long Term Care/Home Health Agency: Not Applicable [] : No [FreeTextEntry2] : Infection Prevention Foot and nail care Nutrition and wound healing Pt Demonstrates use of both nonpharmacological and pharmacological pain relief strategies. Autolytic debridement [FreeTextEntry3] : Wound unchanged [FreeTextEntry4] : Patient was scheduled for an MRI today but did not remove their own dressing, and MRI department would not proceed with MRI.  Patient states she was not told she needed to take the dressing off and is reluctant to do so. Patient also states that she had prior appointments and "did not have time" to remove the dressing. Patient seen by MD Gonsales today. Pain management discussed. Patient states "only Tylenol works for me" F/U 1 Week

## 2024-07-29 NOTE — VITALS
[Pain related to present condition?] : The patient's  pain is related to present condition. [] : No [de-identified] : "Stinging" 10/10 [FreeTextEntry3] : Left Anterior Leg Wound [FreeTextEntry1] : Medi Honey [FreeTextEntry2] : "Touching" the wound [FreeTextEntry4] : MD Advised, acceptable pain tolerance < 9/10

## 2024-07-29 NOTE — PLAN
[FreeTextEntry1] : Patient examined and evaluated at this time. Continue local wound care and offloading. Javi griffin,LUIS, QD awaiting MRI Patient advised regarding the possible need for biopsy if symptoms do not improve. Spent 20 minutes for patient care and medical decision making. Patient to follow up in 1 week.

## 2024-07-30 ENCOUNTER — NON-APPOINTMENT (OUTPATIENT)
Age: 79
End: 2024-07-30

## 2024-07-31 ENCOUNTER — OUTPATIENT (OUTPATIENT)
Dept: OUTPATIENT SERVICES | Facility: HOSPITAL | Age: 79
LOS: 1 days | Discharge: HOME | End: 2024-07-31
Payer: MEDICARE

## 2024-07-31 ENCOUNTER — APPOINTMENT (OUTPATIENT)
Dept: WOUND CARE | Facility: HOSPITAL | Age: 79
End: 2024-07-31
Payer: MEDICARE

## 2024-07-31 VITALS
HEART RATE: 91 BPM | OXYGEN SATURATION: 94 % | HEIGHT: 62 IN | RESPIRATION RATE: 18 BRPM | TEMPERATURE: 99.2 F | WEIGHT: 107 LBS | DIASTOLIC BLOOD PRESSURE: 60 MMHG | SYSTOLIC BLOOD PRESSURE: 145 MMHG | BODY MASS INDEX: 19.69 KG/M2

## 2024-07-31 DIAGNOSIS — S81.812D LACERATION WITHOUT FOREIGN BODY, LEFT LOWER LEG, SUBSEQUENT ENCOUNTER: ICD-10-CM

## 2024-07-31 DIAGNOSIS — E78.5 HYPERLIPIDEMIA, UNSPECIFIED: ICD-10-CM

## 2024-07-31 DIAGNOSIS — Z87.01 PERSONAL HISTORY OF PNEUMONIA (RECURRENT): ICD-10-CM

## 2024-07-31 DIAGNOSIS — Z79.82 LONG TERM (CURRENT) USE OF ASPIRIN: ICD-10-CM

## 2024-07-31 DIAGNOSIS — I65.29 OCCLUSION AND STENOSIS OF UNSPECIFIED CAROTID ARTERY: ICD-10-CM

## 2024-07-31 DIAGNOSIS — Z82.3 FAMILY HISTORY OF STROKE: ICD-10-CM

## 2024-07-31 DIAGNOSIS — Z88.0 ALLERGY STATUS TO PENICILLIN: ICD-10-CM

## 2024-07-31 DIAGNOSIS — Z83.3 FAMILY HISTORY OF DIABETES MELLITUS: ICD-10-CM

## 2024-07-31 DIAGNOSIS — Z88.1 ALLERGY STATUS TO OTHER ANTIBIOTIC AGENTS: ICD-10-CM
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
## 2024-07-31 DIAGNOSIS — N18.30 CHRONIC KIDNEY DISEASE, STAGE 3 UNSPECIFIED: ICD-10-CM

## 2024-07-31 DIAGNOSIS — Z85.118 PERSONAL HISTORY OF OTHER MALIGNANT NEOPLASM OF BRONCHUS AND LUNG: ICD-10-CM

## 2024-07-31 DIAGNOSIS — M06.9 RHEUMATOID ARTHRITIS, UNSPECIFIED: ICD-10-CM

## 2024-07-31 DIAGNOSIS — Z79.899 OTHER LONG TERM (CURRENT) DRUG THERAPY: ICD-10-CM

## 2024-07-31 DIAGNOSIS — Z98.890 OTHER SPECIFIED POSTPROCEDURAL STATES: Chronic | ICD-10-CM
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
## 2024-07-31 DIAGNOSIS — Z80.1 FAMILY HISTORY OF MALIGNANT NEOPLASM OF TRACHEA, BRONCHUS AND LUNG: ICD-10-CM

## 2024-07-31 DIAGNOSIS — Z90.49 ACQUIRED ABSENCE OF OTHER SPECIFIED PARTS OF DIGESTIVE TRACT: Chronic | ICD-10-CM

## 2024-07-31 DIAGNOSIS — Z82.49 FAMILY HISTORY OF ISCHEMIC HEART DISEASE AND OTHER DISEASES OF THE CIRCULATORY SYSTEM: ICD-10-CM

## 2024-07-31 DIAGNOSIS — I12.0 HYPERTENSIVE CHRONIC KIDNEY DISEASE WITH STAGE 5 CHRONIC KIDNEY DISEASE OR END STAGE RENAL DISEASE: ICD-10-CM

## 2024-07-31 DIAGNOSIS — Z90.2 ACQUIRED ABSENCE OF LUNG [PART OF]: ICD-10-CM

## 2024-07-31 DIAGNOSIS — Z88.8 ALLERGY STATUS TO OTHER DRUGS, MEDICAMENTS AND BIOLOGICAL SUBSTANCES: ICD-10-CM

## 2024-07-31 DIAGNOSIS — Z90.49 ACQUIRED ABSENCE OF OTHER SPECIFIED PARTS OF DIGESTIVE TRACT: ICD-10-CM

## 2024-07-31 DIAGNOSIS — J44.9 CHRONIC OBSTRUCTIVE PULMONARY DISEASE, UNSPECIFIED: ICD-10-CM

## 2024-07-31 DIAGNOSIS — Z87.891 PERSONAL HISTORY OF NICOTINE DEPENDENCE: ICD-10-CM

## 2024-07-31 DIAGNOSIS — K21.9 GASTRO-ESOPHAGEAL REFLUX DISEASE WITHOUT ESOPHAGITIS: ICD-10-CM

## 2024-07-31 DIAGNOSIS — Z86.73 PERSONAL HISTORY OF TRANSIENT ISCHEMIC ATTACK (TIA), AND CEREBRAL INFARCTION WITHOUT RESIDUAL DEFICITS: ICD-10-CM

## 2024-07-31 DIAGNOSIS — Z98.49 CATARACT EXTRACTION STATUS, UNSPECIFIED EYE: ICD-10-CM
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
## 2024-07-31 DIAGNOSIS — Z91.041 RADIOGRAPHIC DYE ALLERGY STATUS: ICD-10-CM

## 2024-07-31 DIAGNOSIS — S81.812D LACERATION W/OUT FOREIGN BODY, LEFT LOWER LEG, SUBSEQUENT ENCOUNTER: ICD-10-CM

## 2024-07-31 DIAGNOSIS — Z98.89 OTHER SPECIFIED POSTPROCEDURAL STATES: Chronic | ICD-10-CM

## 2024-07-31 DIAGNOSIS — T81.31XD DISRUPTION OF EXTERNAL OPERATION (SURGICAL) WOUND, NOT ELSEWHERE CLASSIFIED, SUBSEQUENT ENCOUNTER: ICD-10-CM
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
## 2024-07-31 PROCEDURE — 99214 OFFICE O/P EST MOD 30 MIN: CPT

## 2024-07-31 PROCEDURE — G0463: CPT

## 2024-08-01 NOTE — ASSESSMENT
[Verbal] : Verbal [Written] : Written [Demo] : Demo [Patient] : Patient [Spouse] : Spouse [Good - alert, interested, motivated] : Good - alert, interested, motivated [Demonstrates independently] : demonstrates independently [Dressing changes] : dressing changes [Skin Care] : skin care [Signs and symptoms of infection] : sign and symptoms of infection [Venous Disease] : venous disease [Nutrition] : nutrition [How and When to Call] : how and when to call [Labs and Tests] : labs and tests [Off-loading] : off-loading [Patient responsibility to plan of care] : patient responsibility to plan of care [Stable] : stable [Home] : Home [Ambulatory] : Ambulatory [Not Applicable - Long Term Care/Home Health Agency] : Long Term Care/Home Health Agency: Not Applicable [] : No [FreeTextEntry2] : Infection Prevention Foot and nail care Nutrition and wound healing Pt Demonstrates use of both nonpharmacological and pharmacological pain relief strategies. Autolytic debridement [FreeTextEntry3] : Wound unchanged [FreeTextEntry4] : F/U 7/5/24 for hospitalization   MRI pending, DPM discussed hospitalization for a biopsy and wound debridement under IV sedation

## 2024-08-01 NOTE — VITALS
[Pain related to present condition?] : The patient's  pain is related to present condition. [] : No [de-identified] : "Stinging" 10/10 [FreeTextEntry3] : Left Anterior Leg Wound [FreeTextEntry1] : Medi Honey [FreeTextEntry2] : "Touching" the wound [FreeTextEntry4] : MD Advised, acceptable pain tolerance < 9/10

## 2024-08-01 NOTE — REVIEW OF SYSTEMS
[Fever] : no fever [Chills] : no chills [Eye Pain] : no eye pain [Loss Of Hearing] : no hearing loss [Shortness Of Breath] : no shortness of breath [Abdominal Pain] : no abdominal pain [Joint Stiffness] : joint stiffness [Skin Lesions] : skin lesion [Confused] : confusion [Convulsions] : convulsions [Anxiety] : no anxiety [Easy Bleeding] : no tendency for easy bleeding [Negative] : Endocrine [FreeTextEntry5] : HTN, HLD [FreeTextEntry9] : Bunion and hammer toe deformity's of both feet , right worse then the left  [de-identified] :  s/p arthroplasty and hallux s/p [place] with staple , stable , no soi, suture line has eschar which was partially removed

## 2024-08-01 NOTE — REVIEW OF SYSTEMS
[Fever] : no fever [Chills] : no chills [Eye Pain] : no eye pain [Loss Of Hearing] : no hearing loss [Shortness Of Breath] : no shortness of breath [Abdominal Pain] : no abdominal pain [Joint Stiffness] : joint stiffness [Skin Lesions] : skin lesion [Confused] : confusion [Convulsions] : convulsions [Anxiety] : no anxiety [Easy Bleeding] : no tendency for easy bleeding [Negative] : Endocrine [FreeTextEntry5] : HTN, HLD [FreeTextEntry9] : Bunion and hammer toe deformity's of both feet , right worse then the left  [de-identified] :  s/p arthroplasty and hallux s/p [place] with staple , stable , no soi, suture line has eschar which was partially removed

## 2024-08-01 NOTE — HISTORY OF PRESENT ILLNESS
[FreeTextEntry1] : Patient is 78 year old female presenting for f/u s/p bunion surgery and hammer toe right foot with stable eschar. Patient denies any pain to the right foot.  Patient seen for right foot dorsal medial incision dehiscence with open wound down to skin, subcutaneous tissue, fat, healed with stable eschar.  6/19/2024 patient seen for open left lower leg traumatic laceration.  Relates that the dressings have been changed as advised since the last encounter.  No other complaints at this time. 6/26/2024 patient seen for open left lower leg traumatic laceration.  Relates that the dressings have been changed as advised since the last encounter.  No other complaints at this time. 7/10/2024 patient seen for open left lower leg traumatic laceration, healing well 7/16/24 less necrotic tissue noted but wound appears larger. No SOI 7/22/24 left shin wound patient appears to have had a contact dermatitis from the bactroban. D/C'ed. Lotrisone to periwound.  7/20/2024 patient seen for follow-up of left lower leg wound down to skin, subcutaneous tissue, fat 8/1/2024 patient seen for follow-up of left lower leg wound down to skin, subcutaneous tissue, fat

## 2024-08-01 NOTE — PHYSICAL EXAM
[4 x 4] : 4 x 4  [1+] : left 1+ [Ankle Swelling (On Exam)] : not present [Ankle Swelling Bilaterally] : bilaterally  [Varicose Veins Of Lower Extremities] : bilaterally [Ankle Swelling On The Right] : mild [] : bilaterally [Purpura] : no purpura  [Petechiae] : no petechiae [Skin Ulcer] : no ulcer [Skin Induration] : no induration [Alert] : alert [Oriented to Person] : oriented to person [Oriented to Place] : oriented to place [Oriented to Time] : oriented to time [Calm] : calm [de-identified] : A&Ox3, NAD [de-identified] : HTN, HLD [de-identified] : 4 out of 5 strength in all quadrants bilaterally [de-identified] : Left lower leg traumatic laceration down to skin, subcutaneous tissue, fat [de-identified] : wnl [de-identified] : Lotrisone to pamela wound [FreeTextEntry1] : Left Anterior Leg [FreeTextEntry2] : 1.9 [FreeTextEntry3] : 2.6 [FreeTextEntry4] : 0.2 [de-identified] : moderate serosanguineous [de-identified] : none [de-identified] : other [de-identified] : none [de-identified] : mild erythema [de-identified] : none [de-identified] : none [de-identified] : none [de-identified] : 100% [de-identified] : none [de-identified] : ACE For support [de-identified] : Medihoney, Calcium Alginate [de-identified] : Mechanically cleansed with sterile gauze and normal saline 0.9% Dry Dressing [de-identified] : 3x Weekly [de-identified] : Primary Dressing

## 2024-08-01 NOTE — PHYSICAL EXAM
[4 x 4] : 4 x 4  [1+] : left 1+ [Ankle Swelling (On Exam)] : not present [Ankle Swelling Bilaterally] : bilaterally  [Varicose Veins Of Lower Extremities] : bilaterally [Ankle Swelling On The Right] : mild [] : bilaterally [Purpura] : no purpura  [Petechiae] : no petechiae [Skin Ulcer] : no ulcer [Skin Induration] : no induration [Alert] : alert [Oriented to Person] : oriented to person [Oriented to Place] : oriented to place [Oriented to Time] : oriented to time [Calm] : calm [de-identified] : A&Ox3, NAD [de-identified] : HTN, HLD [de-identified] : 4 out of 5 strength in all quadrants bilaterally [de-identified] : Left lower leg traumatic laceration down to skin, subcutaneous tissue, fat [de-identified] : wnl [de-identified] : Lotrisone to pamela wound [FreeTextEntry1] : Left Anterior Leg [FreeTextEntry2] : 1.9 [FreeTextEntry3] : 2.6 [FreeTextEntry4] : 0.2 [de-identified] : moderate serosanguineous [de-identified] : none [de-identified] : other [de-identified] : none [de-identified] : mild erythema [de-identified] : none [de-identified] : none [de-identified] : none [de-identified] : 100% [de-identified] : none [de-identified] : ACE For support [de-identified] : Medihoney, Calcium Alginate [de-identified] : Mechanically cleansed with sterile gauze and normal saline 0.9% Dry Dressing [de-identified] : 3x Weekly [de-identified] : Primary Dressing

## 2024-08-01 NOTE — PLAN
[FreeTextEntry1] : Patient examined and evaluated at this time. Patient was regarding the need to go to the hospital for further workup including antibiotics, biopsy, as well as MRI to rule out osteomyelitis. Patient and patient's  in agreement at this time. Patient remains at risk for infection, sepsis, limb loss, death. Spent 30 minutes for patient care and medical decision making.

## 2024-08-01 NOTE — VITALS
[Pain related to present condition?] : The patient's  pain is related to present condition. [] : No [de-identified] : "Stinging" 10/10 [FreeTextEntry3] : Left Anterior Leg Wound [FreeTextEntry1] : Medi Honey [FreeTextEntry2] : "Touching" the wound [FreeTextEntry4] : MD Advised, acceptable pain tolerance < 9/10

## 2024-08-02 ENCOUNTER — APPOINTMENT (OUTPATIENT)
Dept: WOUND CARE | Facility: HOSPITAL | Age: 79
End: 2024-08-02
Payer: MEDICARE

## 2024-08-02 ENCOUNTER — OUTPATIENT (OUTPATIENT)
Dept: OUTPATIENT SERVICES | Facility: HOSPITAL | Age: 79
LOS: 1 days | Discharge: HOME | End: 2024-08-02
Payer: MEDICARE

## 2024-08-02 VITALS
OXYGEN SATURATION: 96 % | WEIGHT: 107 LBS | HEIGHT: 62 IN | SYSTOLIC BLOOD PRESSURE: 137 MMHG | TEMPERATURE: 98.3 F | DIASTOLIC BLOOD PRESSURE: 60 MMHG | RESPIRATION RATE: 18 BRPM | BODY MASS INDEX: 19.69 KG/M2 | HEART RATE: 89 BPM
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
## 2024-08-02 DIAGNOSIS — Z90.49 ACQUIRED ABSENCE OF OTHER SPECIFIED PARTS OF DIGESTIVE TRACT: Chronic | ICD-10-CM

## 2024-08-02 DIAGNOSIS — Z98.89 OTHER SPECIFIED POSTPROCEDURAL STATES: Chronic | ICD-10-CM

## 2024-08-02 DIAGNOSIS — T81.31XD DISRUPTION OF EXTERNAL OPERATION (SURGICAL) WOUND, NOT ELSEWHERE CLASSIFIED, SUBSEQUENT ENCOUNTER: ICD-10-CM
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
## 2024-08-02 DIAGNOSIS — S81.812D LACERATION WITHOUT FOREIGN BODY, LEFT LOWER LEG, SUBSEQUENT ENCOUNTER: ICD-10-CM

## 2024-08-02 DIAGNOSIS — Z98.890 OTHER SPECIFIED POSTPROCEDURAL STATES: Chronic | ICD-10-CM
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
## 2024-08-02 PROCEDURE — ZZZZZ: CPT

## 2024-08-02 RX ORDER — OXYCODONE AND ACETAMINOPHEN 5; 325 MG/1; MG/1
5-325 TABLET ORAL
Qty: 14 | Refills: 0 | Status: COMPLETED | COMMUNITY
Start: 2024-08-02 | End: 2024-08-09

## 2024-08-05 ENCOUNTER — TRANSCRIPTION ENCOUNTER (OUTPATIENT)
Age: 79
End: 2024-08-05

## 2024-08-05 ENCOUNTER — INPATIENT (INPATIENT)
Facility: HOSPITAL | Age: 79
LOS: 3 days | Discharge: HOME | DRG: 570 | End: 2024-08-09
Attending: FAMILY MEDICINE | Admitting: FAMILY MEDICINE
Payer: MEDICARE

## 2024-08-05 VITALS
WEIGHT: 104.94 LBS | HEIGHT: 62 IN | SYSTOLIC BLOOD PRESSURE: 136 MMHG | HEART RATE: 73 BPM | RESPIRATION RATE: 18 BRPM | OXYGEN SATURATION: 93 % | TEMPERATURE: 98 F | DIASTOLIC BLOOD PRESSURE: 58 MMHG

## 2024-08-05 DIAGNOSIS — Z98.890 OTHER SPECIFIED POSTPROCEDURAL STATES: Chronic | ICD-10-CM

## 2024-08-05 DIAGNOSIS — Z90.49 ACQUIRED ABSENCE OF OTHER SPECIFIED PARTS OF DIGESTIVE TRACT: Chronic | ICD-10-CM

## 2024-08-05 DIAGNOSIS — Z98.89 OTHER SPECIFIED POSTPROCEDURAL STATES: Chronic | ICD-10-CM

## 2024-08-05 DIAGNOSIS — L97.929 NON-PRESSURE CHRONIC ULCER OF UNSPECIFIED PART OF LEFT LOWER LEG WITH UNSPECIFIED SEVERITY: ICD-10-CM

## 2024-08-05 DIAGNOSIS — Z29.9 ENCOUNTER FOR PROPHYLACTIC MEASURES, UNSPECIFIED: ICD-10-CM

## 2024-08-05 DIAGNOSIS — S81.802A UNSPECIFIED OPEN WOUND, LEFT LOWER LEG, INITIAL ENCOUNTER: ICD-10-CM

## 2024-08-05 LAB
ALBUMIN SERPL ELPH-MCNC: 3 G/DL — LOW (ref 3.3–5)
ALP SERPL-CCNC: 94 U/L — SIGNIFICANT CHANGE UP (ref 40–120)
ALT FLD-CCNC: 19 U/L — SIGNIFICANT CHANGE UP (ref 12–78)
ANION GAP SERPL CALC-SCNC: 4 MMOL/L — LOW (ref 5–17)
APTT BLD: 29.2 SEC — SIGNIFICANT CHANGE UP (ref 24.5–35.6)
AST SERPL-CCNC: 21 U/L — SIGNIFICANT CHANGE UP (ref 15–37)
BASOPHILS # BLD AUTO: 0.05 K/UL — SIGNIFICANT CHANGE UP (ref 0–0.2)
BASOPHILS NFR BLD AUTO: 0.6 % — SIGNIFICANT CHANGE UP (ref 0–2)
BILIRUB SERPL-MCNC: 0.5 MG/DL — SIGNIFICANT CHANGE UP (ref 0.2–1.2)
BUN SERPL-MCNC: 23 MG/DL — SIGNIFICANT CHANGE UP (ref 7–23)
CALCIUM SERPL-MCNC: 9.5 MG/DL — SIGNIFICANT CHANGE UP (ref 8.5–10.1)
CHLORIDE SERPL-SCNC: 104 MMOL/L — SIGNIFICANT CHANGE UP (ref 96–108)
CO2 SERPL-SCNC: 31 MMOL/L — SIGNIFICANT CHANGE UP (ref 22–31)
CREAT SERPL-MCNC: 1.1 MG/DL — SIGNIFICANT CHANGE UP (ref 0.5–1.3)
EGFR: 51 ML/MIN/1.73M2 — LOW
EOSINOPHIL # BLD AUTO: 1.03 K/UL — HIGH (ref 0–0.5)
EOSINOPHIL NFR BLD AUTO: 11.5 % — HIGH (ref 0–6)
ERYTHROCYTE [SEDIMENTATION RATE] IN BLOOD: 26 MM/HR — HIGH (ref 0–20)
ERYTHROCYTE [SEDIMENTATION RATE] IN BLOOD: 71 MM/HR — HIGH (ref 0–20)
GLUCOSE SERPL-MCNC: 102 MG/DL — HIGH (ref 70–99)
HCT VFR BLD CALC: 36 % — SIGNIFICANT CHANGE UP (ref 34.5–45)
HGB BLD-MCNC: 11 G/DL — LOW (ref 11.5–15.5)
IMM GRANULOCYTES NFR BLD AUTO: 0.4 % — SIGNIFICANT CHANGE UP (ref 0–0.9)
INR BLD: 0.97 RATIO — SIGNIFICANT CHANGE UP (ref 0.85–1.18)
LACTATE SERPL-SCNC: 0.4 MMOL/L — LOW (ref 0.7–2)
LACTATE SERPL-SCNC: 1 MMOL/L — SIGNIFICANT CHANGE UP (ref 0.7–2)
LYMPHOCYTES # BLD AUTO: 0.91 K/UL — LOW (ref 1–3.3)
LYMPHOCYTES # BLD AUTO: 10.2 % — LOW (ref 13–44)
MCHC RBC-ENTMCNC: 30.5 PG — SIGNIFICANT CHANGE UP (ref 27–34)
MCHC RBC-ENTMCNC: 30.6 GM/DL — LOW (ref 32–36)
MCV RBC AUTO: 99.7 FL — SIGNIFICANT CHANGE UP (ref 80–100)
MONOCYTES # BLD AUTO: 0.88 K/UL — SIGNIFICANT CHANGE UP (ref 0–0.9)
MONOCYTES NFR BLD AUTO: 9.9 % — SIGNIFICANT CHANGE UP (ref 2–14)
NEUTROPHILS # BLD AUTO: 6.01 K/UL — SIGNIFICANT CHANGE UP (ref 1.8–7.4)
NEUTROPHILS NFR BLD AUTO: 67.4 % — SIGNIFICANT CHANGE UP (ref 43–77)
NRBC # BLD: 0 /100 WBCS — SIGNIFICANT CHANGE UP (ref 0–0)
PLATELET # BLD AUTO: 201 K/UL — SIGNIFICANT CHANGE UP (ref 150–400)
POTASSIUM SERPL-MCNC: 4.2 MMOL/L — SIGNIFICANT CHANGE UP (ref 3.5–5.3)
POTASSIUM SERPL-SCNC: 4.2 MMOL/L — SIGNIFICANT CHANGE UP (ref 3.5–5.3)
PROT SERPL-MCNC: 6.6 G/DL — SIGNIFICANT CHANGE UP (ref 6–8.3)
PROTHROM AB SERPL-ACNC: 11.4 SEC — SIGNIFICANT CHANGE UP (ref 9.5–13)
RBC # BLD: 3.61 M/UL — LOW (ref 3.8–5.2)
RBC # FLD: 14.3 % — SIGNIFICANT CHANGE UP (ref 10.3–14.5)
SODIUM SERPL-SCNC: 139 MMOL/L — SIGNIFICANT CHANGE UP (ref 135–145)
WBC # BLD: 8.92 K/UL — SIGNIFICANT CHANGE UP (ref 3.8–10.5)
WBC # FLD AUTO: 8.92 K/UL — SIGNIFICANT CHANGE UP (ref 3.8–10.5)

## 2024-08-05 PROCEDURE — 99222 1ST HOSP IP/OBS MODERATE 55: CPT

## 2024-08-05 PROCEDURE — 99221 1ST HOSP IP/OBS SF/LOW 40: CPT

## 2024-08-05 PROCEDURE — 99285 EMERGENCY DEPT VISIT HI MDM: CPT | Mod: FS

## 2024-08-05 PROCEDURE — 71045 X-RAY EXAM CHEST 1 VIEW: CPT | Mod: 26

## 2024-08-05 PROCEDURE — 73590 X-RAY EXAM OF LOWER LEG: CPT | Mod: 26,LT

## 2024-08-05 RX ORDER — CYANOCOBALAMIN/FOLIC AC/VIT B6 2-2.5-25MG
1 TABLET ORAL DAILY
Refills: 0 | Status: DISCONTINUED | OUTPATIENT
Start: 2024-08-05 | End: 2024-08-09

## 2024-08-05 RX ORDER — ACETAMINOPHEN 500 MG
1000 TABLET ORAL ONCE
Refills: 0 | Status: COMPLETED | OUTPATIENT
Start: 2024-08-05 | End: 2024-08-06

## 2024-08-05 RX ORDER — CYANOCOBALAMIN/FOLIC AC/VIT B6 2-2.5-25MG
1 TABLET ORAL
Refills: 0 | DISCHARGE

## 2024-08-05 RX ORDER — VANCOMYCIN HYDROCHLORIDE 5 G/100ML
1000 INJECTION, POWDER, LYOPHILIZED, FOR SOLUTION INTRAVENOUS ONCE
Refills: 0 | Status: COMPLETED | OUTPATIENT
Start: 2024-08-05 | End: 2024-08-05

## 2024-08-05 RX ORDER — VANCOMYCIN HYDROCHLORIDE 5 G/100ML
750 INJECTION, POWDER, LYOPHILIZED, FOR SOLUTION INTRAVENOUS EVERY 24 HOURS
Refills: 0 | Status: DISCONTINUED | OUTPATIENT
Start: 2024-08-06 | End: 2024-08-09

## 2024-08-05 RX ORDER — MELATONIN 3 MG
3 TABLET ORAL AT BEDTIME
Refills: 0 | Status: DISCONTINUED | OUTPATIENT
Start: 2024-08-05 | End: 2024-08-09

## 2024-08-05 RX ORDER — FAMOTIDINE 40 MG/1
20 TABLET, FILM COATED ORAL DAILY
Refills: 0 | Status: DISCONTINUED | OUTPATIENT
Start: 2024-08-05 | End: 2024-08-09

## 2024-08-05 RX ORDER — ATORVASTATIN CALCIUM 40 MG/1
20 TABLET, FILM COATED ORAL AT BEDTIME
Refills: 0 | Status: DISCONTINUED | OUTPATIENT
Start: 2024-08-05 | End: 2024-08-09

## 2024-08-05 RX ORDER — FAMOTIDINE 40 MG/1
1 TABLET, FILM COATED ORAL
Refills: 0 | DISCHARGE

## 2024-08-05 RX ORDER — ROPINIROLE 1 MG/1
1 TABLET ORAL
Refills: 0 | Status: DISCONTINUED | OUTPATIENT
Start: 2024-08-05 | End: 2024-08-09

## 2024-08-05 RX ORDER — ASPIRIN 500 MG
81 TABLET ORAL DAILY
Refills: 0 | Status: DISCONTINUED | OUTPATIENT
Start: 2024-08-05 | End: 2024-08-09

## 2024-08-05 RX ORDER — DEXTROSE MONOHYDRATE, SODIUM CHLORIDE, SODIUM LACTATE, CALCIUM CHLORIDE, MAGNESIUM CHLORIDE 1.5; 538; 448; 18.4; 5.08 G/100ML; MG/100ML; MG/100ML; MG/100ML; MG/100ML
1000 SOLUTION INTRAPERITONEAL
Refills: 0 | Status: DISCONTINUED | OUTPATIENT
Start: 2024-08-06 | End: 2024-08-08

## 2024-08-05 RX ORDER — FLUTICASONE PROPION/SALMETEROL 500-50 MCG
1 BLISTER, WITH INHALATION DEVICE INHALATION
Refills: 0 | DISCHARGE

## 2024-08-05 RX ORDER — HYDROXYCHLOROQUINE SULFATE 200 MG/1
200 TABLET ORAL
Refills: 0 | Status: DISCONTINUED | OUTPATIENT
Start: 2024-08-05 | End: 2024-08-09

## 2024-08-05 RX ORDER — ACETAMINOPHEN 500 MG
725 TABLET ORAL ONCE
Refills: 0 | Status: COMPLETED | OUTPATIENT
Start: 2024-08-05 | End: 2024-08-05

## 2024-08-05 RX ADMIN — HYDROXYCHLOROQUINE SULFATE 200 MILLIGRAM(S): 200 TABLET ORAL at 19:09

## 2024-08-05 RX ADMIN — ROPINIROLE 1 MILLIGRAM(S): 1 TABLET ORAL at 19:16

## 2024-08-05 RX ADMIN — Medication 290 MILLIGRAM(S): at 14:16

## 2024-08-05 RX ADMIN — VANCOMYCIN HYDROCHLORIDE 250 MILLIGRAM(S): 5 INJECTION, POWDER, LYOPHILIZED, FOR SOLUTION INTRAVENOUS at 12:05

## 2024-08-05 RX ADMIN — Medication 10 MILLIGRAM(S): at 19:09

## 2024-08-05 RX ADMIN — ATORVASTATIN CALCIUM 20 MILLIGRAM(S): 40 TABLET, FILM COATED ORAL at 19:09

## 2024-08-05 NOTE — H&P ADULT - NSHPPHYSICALEXAM_GEN_ALL_CORE
· CONSTITUTIONAL: awake, alert, oriented to person, place, time/situation and in no apparent distress.  · ENMT: Airway patent  · EYES: Clear bilaterally  · CARDIAC: Normal rate, regular rhythm.  Heart sounds S1, S2.  · RESPIRATORY: Breath sounds clear and equal bilaterally.  · MUSCULOSKELETAL: L leg: +tender nonhealing ulceration noted to left shin, no active drainage noted, no surrounding erythema or streaking noted, no edema of LE noted, FROM LLE, toes warm & mobile, distal pulses and sensation intact, NVI  · NEUROLOGICAL: Alert and oriented, no focal deficits, no motor or sensory deficits.  · SKIN: +nonhealing ulceration noted to left shin, no active drainage noted, no surrounding erythema or streaking noted

## 2024-08-05 NOTE — PATIENT PROFILE ADULT - FALL HARM RISK - HARM RISK INTERVENTIONS

## 2024-08-05 NOTE — ED ADULT TRIAGE NOTE - NS ED TRIAGE AVPU SCALE
[facility] Medicine Services  PROGRESS NOTE    Patient Name: Buster Velasco  : 1964  MRN: [id number]    Date of Admission: 2022  Primary Care Physician: Ludivina Bowers MD    Subjective   Subjective     CC:  F/U s/p right BKA    HPI:  Pt resting in bed, A&OX3, very pleasant and talkative. He states his pain is well controlled. He had a BM today.     Copied text in this note has been reviewed and is accurate as of 22.     ROS:  Gen- No fevers, chills  CV- No chest pain, palpitations  Resp- No cough, dyspnea  GI- No N/V/D, abd pain      Objective   Objective     Vital Signs:   Temp:  [97.8 °F (36.6 °C)-98.4 °F (36.9 °C)] 97.9 °F (36.6 °C)  Heart Rate:  [63-88] 64  Resp:  [17-19] 18  BP: (113-129)/(73-81) 129/81     Physical Exam:  Constitutional: Awake, alert, NAD  HENT: NCAT, mucous membranes moist  Respiratory: Clear/dim in bases, nonlabored respirations   Cardiovascular: RRR, no murmurs, rubs, or gallops  Gastrointestinal: Positive bowel sounds, soft, nontender, nondistended. PEG tube  Musculoskeletal: L BKA () R BKA with dsg CDI.   Psychiatric: Appropriate affect, cooperative  Neurologic: Oriented x 3, strength symmetric in all extremities, Cranial Nerves grossly intact to confrontation, speech clear  Skin: No rashes    Results Reviewed:  LAB RESULTS:      Lab 22  0615 22  0806 12/15/22  0419   WBC 7.37 7.38 6.86   HEMOGLOBIN 10.2* 10.1* 9.3*   HEMATOCRIT 33.0* 33.4* 30.3*   PLATELETS 410 414 379   NEUTROS ABS 5.00 4.62  --    IMMATURE GRANS (ABS) 0.04 0.07*  --    LYMPHS ABS 1.68 1.90  --    MONOS ABS 0.47 0.60  --    EOS ABS 0.15 0.16  --    MCV 85.1 85.2 85.4         Lab 22  0615 22  0806 12/15/22  0419   SODIUM 140 138 139   POTASSIUM 4.1 4.5 4.0   CHLORIDE 102 101 103   CO2 27.0 27.0 24.0   ANION GAP 11.0 10.0 12.0   BUN 15 17 13   CREATININE 0.59* 0.73* 0.65*   EGFR 112.5 105.5 109.2   GLUCOSE 153* 162* 118*   CALCIUM 10.0  9.5 9.6         Brief Urine Lab Results  (Last result in the past 365 days)      Color   Clarity   Blood   Leuk Est   Nitrite   Protein   CREAT   Urine HCG        08/20/22 0108 Yellow   Clear   Negative   Negative     Negative                 Microbiology Results Abnormal     Procedure Component Value - Date/Time    Blood Culture - Blood, Wrist, Left [[id number]]  (Normal) Collected: 12/06/22 1402    Lab Status: Final result Specimen: Blood from Wrist, Left Updated: 12/11/22 1545     Blood Culture No growth at 5 days    Blood Culture - Blood, Arm, Left [[id number]]  (Normal) Collected: 12/06/22 1402    Lab Status: Final result Specimen: Blood from Arm, Left Updated: 12/11/22 1545     Blood Culture No growth at 5 days          FL Video Swallow With Speech Single Contrast    Result Date: 12/19/2022  EXAMINATION: FL VIDEO SWALLOW W SPEECH SINGLE-CONTRAST-  INDICATION: Assess oropharyngeal swallow; L08.9-Local infection of the skin and subcutaneous tissue, unspecified; E16.2-Hypoglycemia, unspecified; L89.159-Pressure ulcer of sacral region, unspecified stage; R13.13-Dysphagia, pharyngeal phase  TECHNIQUE: 1 minute and 30 seconds of fluoroscopic time was used for this exam. 13 associated fluoroscopic loops were saved. The patient was evaluated in the seated lateral position while taking a variety of consistencies of barium by mouth under the direction of speech pathology.  COMPARISON: NONE  FINDINGS: 1 minute and 30 seconds of fluoroscopy provided for a modified barium swallow. Please see speech therapy report for full details and recommendations.       Impression: Fluoroscopy provided for a modified barium swallow. Please see speech therapy report for full details and recommendations.    This report was finalized on 12/19/2022 9:33 PM by Dr. Yobany Dickey MD.        Results for orders placed during the hospital encounter of 11/30/22    Adult Transesophageal Echo (JERALD) W/ Cont if Necessary Per Protocol    Interpretation  Summary  •  Left ventricular systolic function is normal. Estimated left ventricular EF = 55%  •  The aortic valve is structurally normal with no stenosis present. Trace aortic valve regurgitation is present. There is no evidence of an aortic valve mass is present.  •  There is mild, anterior mitral leaflet thickening present. No evidence of a mitral valve mass is present. Trace mitral valve regurgitation is present. No significant mitral valve stenosis is present  •  The tricuspid valve is normal in structure. There is no evidence of a mass on the tricuspid valve. Mild tricuspid valve regurgitation is present.  •  There is mild thickening of the pulmonic valve. There is trace pulmonic valve regurgitation present. There is no pulmonic valve stenosis present.      I have reviewed the medications:  Scheduled Meds:atorvastatin, 20 mg, Oral, Nightly  DAPTOmycin, 8 mg/kg, Intravenous, Q24H  insulin detemir, 10 Units, Subcutaneous, Nightly  insulin lispro, 0-7 Units, Subcutaneous, TID AC  Insulin Lispro, 2 Units, Subcutaneous, TID With Meals  methadone, 10 mg, Oral, Q6H  metoprolol tartrate, 25 mg, Oral, Q12H  senna-docusate sodium, 2 tablet, Oral, BID  sodium chloride, 10 mL, Intravenous, Q12H  sodium chloride, 10 mL, Intravenous, Q12H  tamsulosin, 0.4 mg, Oral, Nightly      Continuous Infusions:   PRN Meds:.•  acetaminophen  •  senna-docusate sodium **AND** polyethylene glycol **AND** bisacodyl **AND** bisacodyl  •  calcium carbonate  •  dextrose  •  dextrose  •  glucagon (human recombinant)  •  hydrOXYzine  •  magnesium hydroxide  •  melatonin  •  ondansetron **OR** ondansetron  •  sennosides-docusate  •  sodium chloride    Assessment & Plan   Assessment & Plan     Active Hospital Problems    Diagnosis  POA   • **Right foot infection [L08.9]  Yes   • MRSA bacteremia [R78.81, B95.62]  Unknown   • Sepsis (HCC) [A41.9]  Yes   • Hypoglycemia [E16.2]  Yes   • Anemia [D64.9]  Yes   • Hyponatremia [E87.1]  Yes   •  Hypoalbuminemia [E88.09]  Yes   • Essential hypertension [I10]  Yes   • Hyperlipidemia [E78.5]  Yes   • Paroxysmal atrial fibrillation (HCC) [I48.0]  Yes      Resolved Hospital Problems   No resolved problems to display.     Brief Hospital Course to date:  Buster Velasco is a 58 y.o. male with hx of prior osteomyelitis s/p left BKA, s/p right transmetatarsal amputation, left hand 3rd metacarpal resection (April 2022 at OSH, had 6 weeks of IV antibiotics for MRSA bacteremia), remote hx of IVDA, more recent hx of polysubstance abuse (meth), and DVT s/p IVC filter who presents with right foot wounds. S/p right BKA on 12/2/22 with Dr. Peguero. His blood cultures grew MRSA. ID following and recommend at least 6 weeks of IV ATBx.      Sepsis secondary to acute right foot non-healing wound and likely osteomyelitis, resolved  MRSA bacteremia  - s/p right BKA 12/2/22 with Dr. Peguero  - ID following, currently on Daptomycin monotherapy  - Blood cultures with coag negative Staph and MRSA, have discussed with Dr. George, he will require IV antibiotics at discharge for at least 6 weeks but final duration TBD   - Repeat blood cultures NGTD x5d  - TTE no acute findings, JERALD 12/9/22 negative     Recurrent hypoglycemia in setting of DMII, resolved  - HbA1c 6.9%  - High dose Tresiba qAM before arrival which has been held  - also on metformin at home  - continue levemir 10u qhs.+low dose SSI, mealtime humalog    Heartburn  -PRN milk of magnesia, tums.  -improved.      Chronic pain on opioids   Previous IVDA/substance abuse  - Continue home methadone 10 mg QID     Reported hx of Afib   - Patient denies any hx of Afib. Not previously on anticoagulation and given murky history will not start.   - On Metoprolol at home which is continued  - No evidence of Afib on telemetry     Chronic vocal cord paralysis s/p PEG  Dysphagia  - SLP evaluation: regular diet but with nectar thick liquids recommended, patient initially wanted thin  liquids and understood risk of aspiration, however currently has been drinking the nectar thick liquids; could change to pure comfort diet if he absolutely refuses nectar thick  - Speech re-evaluation today  - Unclear where/when PEG was placed, will eventually need it removed but defer to outpatient setting as it was not placed here; routine PEG care/flushing per nursing as needed     Hypotension with hx of HTN--resolved  - BP stable  - restart losartan/bumex as needed-hasn't required it thus far.     HLD   - Continue statin     DVT s/p IVC filter   - Placed in spring 2022 per patient. Defer to PCP for further assessment and time of removal.      Hx of seizure in setting of meth use      BPH   - restarted home Flomax     Expected Discharge Location and Transportation: rehab  Expected Discharge Date: 12/22/22 anytime rehab placement is found    DVT prophylaxis:  Mechanical DVT prophylaxis orders are present.     AM-PAC 6 Clicks Score (PT): 9 (12/21/22 0821)    CODE STATUS:   Code Status and Medical Interventions:   Ordered at: 12/01/22 0035     Code Status (Patient has no pulse and is not breathing):    CPR (Attempt to Resuscitate)     Medical Interventions (Patient has pulse or is breathing):    Full Support       Shanti Branch, PITER  12/21/22               Alert-The patient is alert, awake and responds to voice. The patient is oriented to time, place, and person. The triage nurse is able to obtain subjective information.

## 2024-08-05 NOTE — CONSULT NOTE ADULT - ASSESSMENT
72-year-old female with history of lung CA status post right lobectomy, CKD, hyperlipidemia, RA, COPD on 2L O2 at night, cholecystectomy, who was sent from wound care for evaluation of nonhealing left lower leg wound x 2 months.    cardiac optimization, HTN  - presents w/ non healing LLE wound, plan for debridement 8/6. podiatry following    - Denies chest pain, palpitation, SOB, syncope, dizziness, lightheadedness, orthopnea, PND, CHEN and edema  - EKG demonstrates NSR. No acute ischemic changes noted.   -Cardiac Cath/PCI: 12/11/2013. Minimal nonobstructive CAD. NST 2017 normal,  - Echo 4/2024 LV systolic function normal, EF 55-60%, mild grade 1 LVDD, mild NC.   - Patient euvolemic on examination with no overt signs of heart failure or cardiac ischemia.   - No severe valvular abnormalities noted on examination  - Would check routine ECHO to r/o valvular abnormalities and to assess for pulmonary hypertension and heart function. Previous TTE in 2020  - Pt has no active ischemia, decompensated heart failure, unstable arrythmia, or severe stenotic valvular disease. Patient is optimized from cardiovascular standpoint to proceed with planned procedure with routine hemodynamic monitoring.     - continue ASA and statin, hx of mild carotid plaque with a history of TIA  - Follows with Dr. Oh   - Monitor and replete lytes, keep K>4, Mg>2.  - Will continue to follow.    Essie Moore NP  Nurse Practitioner- Cardiology   Call TEAMS 72-year-old female with history of lung CA status post right lobectomy, CKD, hyperlipidemia, RA, COPD on 2L O2 at night, cholecystectomy, who was sent from wound care for evaluation of nonhealing left lower leg wound x 2 months.    cardiac optimization, HTN  - presents w/ non healing LLE wound, plan for debridement 8/6. podiatry following    - Denies chest pain, palpitation, SOB, syncope, dizziness, lightheadedness, orthopnea, PND, CHEN and edema  - EKG demonstrates NSR. No acute ischemic changes noted.   - Cardiac Cath/PCI: 12/11/2013. Minimal nonobstructive CAD. NST 2017 normal,  - Echo 4/2024 LV systolic function normal, EF 55-60%, mild grade 1 LVDD, mild IL.   - Patient euvolemic on examination with no overt signs of heart failure or cardiac ischemia.   - No severe valvular abnormalities noted on examination  - Pt has no active ischemia, decompensated heart failure, unstable arrythmia, or severe stenotic valvular disease. Patient is optimized from cardiovascular standpoint to proceed with planned procedure with routine hemodynamic monitoring.     - continue ASA and statin, hx of mild carotid plaque with a history of TIA  - Follows with Dr. Oh   - Monitor and replete lytes, keep K>4, Mg>2.  - Will continue to follow.    Essie Moore NP  Nurse Practitioner- Cardiology   Call TEAMS

## 2024-08-05 NOTE — CONSULT NOTE ADULT - ASSESSMENT
Forehead Units: 12 Lcl Root Units: 0 Price (Use Numbers Only, No Special Characters Or $): 376 Show Additional Area 3: Yes 72-year-old female with history of lung CA status post right lobectomy, CKD, hyperlipidemia, RA, COPD on 2L O2 at night, cholecystectomy, who was sent from wound care for evaluation of nonhealing left lower leg wound x 2 months.  Plan for IV antibiotics to treat for wound infection and wound debridement. Denies any fevers, no leukocytosis. Has remote hx of penicillin allergy, thinks she had itching but no SOB.    #Nonhealing L lower leg wound  #MRSA infection  #Hx of penicillin allergy    -continue vancomycin (renally dosed)  -send OR cultures and path from debridement  -follow blood cultures    Thank you for courtesy of this consult.     Will follow.  Discussed with the primary team.     Hilda Carrillo MD  Division of Infectious Diseases   Cell [phone] between 8am and 6pm   After 6pm and weekends please call ID service at [phone].     55 minutes spent on total encounter assessing patient, examination, chart review, counseling and coordinating care by the attending physician/nurse/care manager.    Glabellar Complex Units: 24 Consent: Written consent obtained. Risks include but not limited to lid/brow ptosis, bruising, swelling, diplopia, temporary effect, incomplete chemical denervation. Show Ucl Units: No Post-Care Instructions: Patient instructed to not lie down for 4 hours and limit physical activity for 24 hours. Dilution (U/0.1 Cc): 4 Detail Level: Detailed

## 2024-08-05 NOTE — H&P ADULT - NSHPLABSRESULTS_GEN_ALL_CORE
08-05    139  |  104  |  23  ----------------------------<  102<H>  4.2   |  31  |  1.10    Ca    9.5      05 Aug 2024 12:15    TPro  6.6  /  Alb  3.0<L>  /  TBili  0.5  /  DBili  x   /  AST  21  /  ALT  19  /  AlkPhos  94  08-05                            11.0   8.92  )-----------( 201      ( 05 Aug 2024 12:15 )             36.0             LIVER FUNCTIONS - ( 05 Aug 2024 12:15 )  Alb: 3.0 g/dL / Pro: 6.6 g/dL / ALK PHOS: 94 U/L / ALT: 19 U/L / AST: 21 U/L / GGT: x             PT/INR - ( 05 Aug 2024 12:15 )   PT: 11.4 sec;   INR: 0.97 ratio         PTT - ( 05 Aug 2024 12:15 )  PTT:29.2 sec    Urinalysis Basic - ( 05 Aug 2024 12:15 )    Color: x / Appearance: x / SG: x / pH: x  Gluc: 102 mg/dL / Ketone: x  / Bili: x / Urobili: x   Blood: x / Protein: x / Nitrite: x   Leuk Esterase: x / RBC: x / WBC x   Sq Epi: x / Non Sq Epi: x / Bacteria: x

## 2024-08-05 NOTE — CONSULT NOTE ADULT - NS ATTEND AMEND GEN_ALL_CORE FT
72-year-old female with history of lung CA status post right lobectomy, CKD, hyperlipidemia, RA, COPD on 2L O2 at night, cholecystectomy, who was sent from wound care for evaluation of nonhealing left lower leg wound x 2 months.    - no sign of acute ischemia or volume overload  - echo in 4/2024 with normal LV function  - ekg in sr, and unremarkable  - trend creatinine and electrolytes. Keep K>4, Mg>2  - no cardiac contraindication to proceeding with wound debridement tomorrow. Routine cardiac monitoring is recommended.  - will follow with you

## 2024-08-05 NOTE — H&P ADULT - NSHPREVIEWOFSYSTEMS_GEN_ALL_CORE
· CONSTITUTIONAL: no fever and no chills.  · CARDIOVASCULAR: no chest pain and no edema.  · MUSCULOSKELETAL: - - -  · Musculoskeletal [+]: LLE nonhealing wound/pain  · SKIN: - - -  · Skin [+]: LLE nonhealing wound  · ROS STATEMENT: all other ROS negative except as per HPI

## 2024-08-05 NOTE — ED PROVIDER NOTE - SKIN, MLM
+nonhealing ulceration noted to left shin, no active drainage noted, no surrounding erythema or streaking noted

## 2024-08-05 NOTE — PHARMACOTHERAPY INTERVENTION NOTE - COMMENTS
Medication reconciliation completed on admission by pharmacy representative. Discussed w/ Dr. Velazquez who will review and update per clinical discretion.      Home Medications:  Arava 20 mg oral tablet: 1 tab(s) orally once a day (in the morning) (05 Aug 2024 15:40)  Aspir 81 oral delayed release tablet: 1 tab(s) orally once a day (in the morning) (05 Aug 2024 15:40)  azelastine 137 mcg/inh (0.1%) nasal spray: 2 spray(s) intranasally 2 times a day (05 Aug 2024 15:40)  Crestor 5 mg oral tablet: 1 tab(s) orally once a day (at bedtime) (05 Aug 2024 15:40)  famotidine 40 mg oral tablet: 1 tab(s) orally once a day (05 Aug 2024 15:40)  Multiple Vitamins oral tablet: 1 tab(s) orally once a day (05 Aug 2024 15:40)  Neurevia: 1 tablet daily (05 Aug 2024 15:40)  Plaquenil 200 mg oral tablet: 1 tab(s) orally 2 times a day (05 Aug 2024 15:40)  Requip 2 mg oral tablet: 1 tablet orally 2 times a day (05 Aug 2024 15:39)  Singulair 10 mg oral tablet: 1 tab(s) orally once a day (in the evening) (05 Aug 2024 15:40)  Wixela Inhub 250 mcg-50 mcg inhalation powder: 1 puff(s) inhaled 2 times a day (05 Aug 2024 15:40)

## 2024-08-05 NOTE — ED ADULT NURSE NOTE - NSFALLHARMRISKINTERV_ED_ALL_ED

## 2024-08-05 NOTE — H&P ADULT - HISTORY OF PRESENT ILLNESS
72-year-old female with history of lung CA status post right lobectomy, CKD, hyperlipidemia, RA, COPD on 2L O2 at night, cholecystectomy sent from wound care for evaluation of nonhealing left lower leg wound x 2 months.  Patient states that she has had wound to left lower leg for the past 2 months which has not been healing.  States the wound was infected recently and was on antibiotics 2 weeks ago.  States that she was seen at wound care today by Dr. Dubois and sent to ER for admission for IV antibiotics, MRI and wound debridement.  Patient denies fever, chills, chest pain, shortness of breath.  In ER patient was seen.  patient is being admitted for further work up and treatment

## 2024-08-05 NOTE — CONSULT NOTE ADULT - PROBLEM SELECTOR RECOMMENDATION 9
Chart reviewed and Patient evaluated.  Discussed diagnosis and treatment with patient.  Plan for surgical debridement tomorrow, 8/6.  Request medical/cardiac clearance prior to surgery.  WBAT.   Offloading to bilateral Heels.   Podiatry will follow while in house.  Will discuss care plan with attending.

## 2024-08-05 NOTE — ED PROVIDER NOTE - TOBACCO USE
Former smoker
Sore Throat      A sore throat is pain, burning, irritation, or scratchiness in the throat. When you have a sore throat, you may feel pain or tenderness in your throat when you swallow or talk.    Many things can cause a sore throat, including:  •An infection.      •Seasonal allergies.      •Dryness in the air.      •Irritants, such as smoke or pollution.      •Radiation treatment for cancer.      •Gastroesophageal reflux disease (GERD).      •A tumor.      A sore throat is often the first sign of another sickness. It may happen with other symptoms, such as coughing, sneezing, fever, and swollen neck glands. Most sore throats go away without medical treatment.      Follow these instructions at home:      Juice, water, and tea.        A do not smoke cigarettes sign.      Medicines     •Take over-the-counter and prescription medicines only as told by your health care provider.      •Children often get sore throats. Do not give your child aspirin because of the association with Reye's syndrome.      •Use throat sprays to soothe your throat as told by your health care provider.      Managing pain     To help with pain, try:  •Sipping warm liquids, such as broth, herbal tea, or warm water.      •Eating or drinking cold or frozen liquids, such as frozen ice pops.      •Gargling with a mixture of salt and water 3–4 times a day or as needed. To make salt water, completely dissolve ½–1 tsp (3–6 g) of salt in 1 cup (237 mL) of warm water.      •Sucking on hard candy or throat lozenges.      •Putting a cool-mist humidifier in your bedroom at night to moisten the air.      •Sitting in the bathroom with the door closed for 5–10 minutes while you run hot water in the shower.      General instructions    •Do not use any products that contain nicotine or tobacco. These products include cigarettes, chewing tobacco, and vaping devices, such as e-cigarettes. If you need help quitting, ask your health care provider.      •Rest as needed.      •Drink enough fluid to keep your urine pale yellow.      •Wash your hands often with soap and water for at least 20 seconds. If soap and water are not available, use hand .        Contact a health care provider if:    •You have a fever for more than 2–3 days.      •You have symptoms that last for more than 2–3 days.      •Your throat does not get better within 7 days.      •You have a fever and your symptoms suddenly get worse.        Get help right away if:    •You have difficulty breathing.      •You cannot swallow fluids, soft foods, or your saliva.      •You have increased swelling in your throat or neck.      •You have persistent nausea and vomiting.      These symptoms may represent a serious problem that is an emergency. Do not wait to see if the symptoms will go away. Get medical help right away. Call your local emergency services (911 in the [place] Do not drive yourself to the hospital.       Summary    •A sore throat is pain, burning, irritation, or scratchiness in the throat. Many things can cause a sore throat.      •Take over-the-counter medicines only as told by your health care provider.      •Rest as needed.      •Drink enough fluid to keep your urine pale yellow.      •Contact a health care provider if your throat does not get better within 7 days.

## 2024-08-05 NOTE — ED PROVIDER NOTE - CLINICAL SUMMARY MEDICAL DECISION MAKING FREE TEXT BOX
78-year-old female with significant past medical history for lung CA, TIA, hyperlipidemia, CKD, hypertension presents to the ED from the wound care center for chronic left anterior shin wound x 2 months.  Patient states patient has been on antibiotics and wound care with no resolution of symptoms.  Patient sent in for wound debridement and MRI inpatient.  Labs, IV antibiotics, admit for further evaluation and treatment by wound care.

## 2024-08-05 NOTE — ED PROVIDER NOTE - OBJECTIVE STATEMENT
72-year-old female with history of lung CA status post right lobectomy, CKD, hyperlipidemia, RA, COPD on 2L O2 at night, cholecystectomy sent from wound care for evaluation of nonhealing left lower leg wound x 2 months.  Patient states that she has had wound to left lower leg for the past 2 months which has not been healing.  States the wound was infected recently and was on antibiotics 2 weeks ago.  States that she was seen at wound care today by Dr. Dubois and sent to ER for admission for IV antibiotics, MRI and wound debridement.  Patient denies fever, chills, chest pain, shortness of breath.  PCP-Dr. Galan

## 2024-08-06 DIAGNOSIS — I10 ESSENTIAL (PRIMARY) HYPERTENSION: ICD-10-CM

## 2024-08-06 LAB
CRP SERPL-MCNC: 18 MG/L — HIGH
CRP SERPL-MCNC: 19 MG/L — HIGH

## 2024-08-06 PROCEDURE — 11042 DBRDMT SUBQ TIS 1ST 20SQCM/<: CPT

## 2024-08-06 PROCEDURE — 88304 TISSUE EXAM BY PATHOLOGIST: CPT | Mod: 26

## 2024-08-06 PROCEDURE — 99232 SBSQ HOSP IP/OBS MODERATE 35: CPT

## 2024-08-06 RX ORDER — ENOXAPARIN SODIUM 120 MG/.8ML
30 INJECTION SUBCUTANEOUS EVERY 24 HOURS
Refills: 0 | Status: DISCONTINUED | OUTPATIENT
Start: 2024-08-06 | End: 2024-08-09

## 2024-08-06 RX ORDER — ENOXAPARIN SODIUM 120 MG/.8ML
40 INJECTION SUBCUTANEOUS EVERY 24 HOURS
Refills: 0 | Status: DISCONTINUED | OUTPATIENT
Start: 2024-08-06 | End: 2024-08-06

## 2024-08-06 RX ORDER — BUPIVACAINE 13.3 MG/ML
20 INJECTION, SUSPENSION, LIPOSOMAL INFILTRATION ONCE
Refills: 0 | Status: DISCONTINUED | OUTPATIENT
Start: 2024-08-06 | End: 2024-08-09

## 2024-08-06 RX ORDER — ENALAPRIL MALEATE 10 MG/1
0.62 TABLET ORAL ONCE
Refills: 0 | Status: COMPLETED | OUTPATIENT
Start: 2024-08-06 | End: 2024-08-06

## 2024-08-06 RX ORDER — ACETAMINOPHEN 500 MG
650 TABLET ORAL ONCE
Refills: 0 | Status: COMPLETED | OUTPATIENT
Start: 2024-08-06 | End: 2024-08-09

## 2024-08-06 RX ORDER — BUDESONIDE AND FORMOTEROL FUMARATE DIHYDRATE 80; 4.5 UG/1; UG/1
2 AEROSOL RESPIRATORY (INHALATION)
Refills: 0 | Status: DISCONTINUED | OUTPATIENT
Start: 2024-08-06 | End: 2024-08-09

## 2024-08-06 RX ORDER — ACETAMINOPHEN 500 MG
650 TABLET ORAL EVERY 6 HOURS
Refills: 0 | Status: DISCONTINUED | OUTPATIENT
Start: 2024-08-06 | End: 2024-08-09

## 2024-08-06 RX ORDER — METOPROLOL TARTRATE 100 MG
2.5 TABLET ORAL ONCE
Refills: 0 | Status: COMPLETED | OUTPATIENT
Start: 2024-08-06 | End: 2024-08-06

## 2024-08-06 RX ORDER — DEXTROSE MONOHYDRATE, SODIUM CHLORIDE, SODIUM LACTATE, CALCIUM CHLORIDE, MAGNESIUM CHLORIDE 1.5; 538; 448; 18.4; 5.08 G/100ML; MG/100ML; MG/100ML; MG/100ML; MG/100ML
1000 SOLUTION INTRAPERITONEAL
Refills: 0 | Status: DISCONTINUED | OUTPATIENT
Start: 2024-08-06 | End: 2024-08-06

## 2024-08-06 RX ORDER — METOPROLOL TARTRATE 100 MG
12.5 TABLET ORAL THREE TIMES A DAY
Refills: 0 | Status: ACTIVE | OUTPATIENT
Start: 2024-08-06 | End: 2025-07-05

## 2024-08-06 RX ORDER — TRAMADOL HCL 50 MG
25 TABLET ORAL EVERY 4 HOURS
Refills: 0 | Status: DISCONTINUED | OUTPATIENT
Start: 2024-08-06 | End: 2024-08-09

## 2024-08-06 RX ORDER — HYDROMORPHONE HCL IN 0.9% NACL 0.2 MG/ML
0.5 PLASTIC BAG, INJECTION (ML) INTRAVENOUS
Refills: 0 | Status: DISCONTINUED | OUTPATIENT
Start: 2024-08-06 | End: 2024-08-06

## 2024-08-06 RX ADMIN — ATORVASTATIN CALCIUM 20 MILLIGRAM(S): 40 TABLET, FILM COATED ORAL at 22:09

## 2024-08-06 RX ADMIN — DEXTROSE MONOHYDRATE, SODIUM CHLORIDE, SODIUM LACTATE, CALCIUM CHLORIDE, MAGNESIUM CHLORIDE 75 MILLILITER(S): 1.5; 538; 448; 18.4; 5.08 SOLUTION INTRAPERITONEAL at 14:45

## 2024-08-06 RX ADMIN — DEXTROSE MONOHYDRATE, SODIUM CHLORIDE, SODIUM LACTATE, CALCIUM CHLORIDE, MAGNESIUM CHLORIDE 60 MILLILITER(S): 1.5; 538; 448; 18.4; 5.08 SOLUTION INTRAPERITONEAL at 06:42

## 2024-08-06 RX ADMIN — HYDROXYCHLOROQUINE SULFATE 200 MILLIGRAM(S): 200 TABLET ORAL at 06:41

## 2024-08-06 RX ADMIN — ENOXAPARIN SODIUM 30 MILLIGRAM(S): 120 INJECTION SUBCUTANEOUS at 17:15

## 2024-08-06 RX ADMIN — ROPINIROLE 1 MILLIGRAM(S): 1 TABLET ORAL at 06:41

## 2024-08-06 RX ADMIN — Medication 400 MILLIGRAM(S): at 00:23

## 2024-08-06 RX ADMIN — DEXTROSE MONOHYDRATE, SODIUM CHLORIDE, SODIUM LACTATE, CALCIUM CHLORIDE, MAGNESIUM CHLORIDE 60 MILLILITER(S): 1.5; 538; 448; 18.4; 5.08 SOLUTION INTRAPERITONEAL at 00:23

## 2024-08-06 RX ADMIN — Medication 2 MILLIGRAM(S): at 09:30

## 2024-08-06 RX ADMIN — Medication 3 MILLIGRAM(S): at 00:23

## 2024-08-06 RX ADMIN — Medication 1000 MILLIGRAM(S): at 01:05

## 2024-08-06 RX ADMIN — HYDROXYCHLOROQUINE SULFATE 200 MILLIGRAM(S): 200 TABLET ORAL at 17:15

## 2024-08-06 RX ADMIN — ENALAPRIL MALEATE 0.62 MILLIGRAM(S): 10 TABLET ORAL at 06:42

## 2024-08-06 RX ADMIN — Medication 2.5 MILLIGRAM(S): at 11:47

## 2024-08-06 RX ADMIN — VANCOMYCIN HYDROCHLORIDE 250 MILLIGRAM(S): 5 INJECTION, POWDER, LYOPHILIZED, FOR SOLUTION INTRAVENOUS at 11:10

## 2024-08-06 RX ADMIN — ROPINIROLE 1 MILLIGRAM(S): 1 TABLET ORAL at 17:15

## 2024-08-06 RX ADMIN — Medication 12.5 MILLIGRAM(S): at 22:09

## 2024-08-06 RX ADMIN — Medication 2 MILLIGRAM(S): at 09:15

## 2024-08-06 NOTE — CONSULT NOTE ADULT - SUBJECTIVE AND OBJECTIVE BOX
[facility]  INFECTIOUS DISEASES - [address], [place] [address]  Tel: [phone]     Fax: [phone]  =======================================================    [facility]-[id number]  GINNY VASQUEZ     CC: Patient is a 78y old  Female who presents with a chief complaint of LLE wound  HPI:  72-year-old female with history of lung CA status post right lobectomy, CKD, hyperlipidemia, RA, COPD on 2L O2 at night, cholecystectomy, who was sent from wound care for evaluation of nonhealing left lower leg wound x 2 months.  Patient states that she has had wound to left lower leg since May 2024 from a fall which has not been healing.  States the wound was infected recently and was on antibiotics 2 weeks ago.  States that she was seen at wound care today by Dr. Dubois and sent to ER for admission for IV antibiotics and wound debridement.  Patient denies fever, chills, chest pain, shortness of breath.    PAST MEDICAL & SURGICAL HISTORY:  HLD (hyperlipidemia)      Rheumatoid arthritis      TIA (transient ischemic attack)  2012      Lung cancer  Right lung.      Chronic obstructive pulmonary disease, unspecified COPD type  O2 at night      Hiatal hernia with GERD      Essential hypertension      Childhood asthma      Stage 3 chronic kidney disease      Carotid artery plaque      IBS (irritable bowel syndrome)      Psoriatic arthritis      S/P lobectomy of lung  Right lung in 1996.      H/O colonoscopy      History of endoscopy  Sept 2017      S/P cholecystectomy          Social Hx:     FAMILY HISTORY:  FH: type 2 diabetes (Mother)    FH: CAD (coronary artery disease) (Father, Sibling)    FH: colon cancer (Sibling)    FH: myocardial infarction (Father)    FH: stroke (Sibling)    FH: lung cancer (Sibling)        Allergies    IV Contrast (Hives; Rash)  Zofran (Other)  minocycline (Other)  Enbrel (Unknown)  Levaquin (Other)  penicillin (Other)    Intolerances        Antibiotics:  MEDICATIONS  (STANDING):  aspirin enteric coated 81 milliGRAM(s) Oral daily  atorvastatin 20 milliGRAM(s) Oral at bedtime  hydroxychloroquine 200 milliGRAM(s) Oral two times a day  montelukast 10 milliGRAM(s) Oral daily  multivitamin 1 Tablet(s) Oral daily  rOPINIRole 1 milliGRAM(s) Oral two times a day    MEDICATIONS  (PRN):       REVIEW OF SYSTEMS:  CONSTITUTIONAL:  No Fever or chills  HEENT:  No sore throat or runny nose.  CARDIOVASCULAR:  No chest pain or SOB.  RESPIRATORY:  No cough, shortness of breath  GASTROINTESTINAL:  (+) recent nausea and vomiting--resolved, no diarrhea  GENITOURINARY:  No dysuria, frequency or urgency  MUSCULOSKELETAL:  (+) L lower leg pain  SKIN:  see history  NEUROLOGIC:  No headache or dizziness  PSYCHIATRIC:  No disorder of thought or mood.    Physical Exam:  Vital Signs Last 24 Hrs  T(C): 36.7 (05 Aug 2024 10:00), Max: 36.7 (05 Aug 2024 10:00)  T(F): 98.1 (05 Aug 2024 10:00), Max: 98.1 (05 Aug 2024 10:00)  HR: 73 (05 Aug 2024 10:00) (73 - 73)  BP: 136/58 (05 Aug 2024 10:00) (136/58 - 136/58)  BP(mean): --  RR: 18 (05 Aug 2024 10:00) (18 - 18)  SpO2: 93% (05 Aug 2024 10:00) (93% - 93%)    Parameters below as of 05 Aug 2024 10:00  Patient On (Oxygen Delivery Method): room air      Height (cm): 157.5 (08-05 @ 10:00)  Weight (kg): 47.6 (08-05 @ 10:00)  BMI (kg/m2): 19.2 (08-05 @ 10:00)  BSA (m2): 1.45 (08-05 @ 10:00)  GEN: NAD  HEENT: normocephalic and atraumatic.   NECK: Supple.   LUNGS: Normal respiratory effort  HEART: Regular rate and rhythm   ABDOMEN: Soft, nontender, and nondistended.    EXTREMITIES: No leg edema.  NEUROLOGIC: Answering questions appropriately  PSYCHIATRIC: Appropriate affect .  SKIN: (+) Left lower leg wound    Labs:  08-05    139  |  104  |  23  ----------------------------<  102<H>  4.2   |  31  |  1.10    Ca    9.5      05 Aug 2024 12:15    TPro  6.6  /  Alb  3.0<L>  /  TBili  0.5  /  DBili  x   /  AST  21  /  ALT  19  /  AlkPhos  94  08-05                          11.0   8.92  )-----------( 201      ( 05 Aug 2024 12:15 )             36.0     PT/INR - ( 05 Aug 2024 12:15 )   PT: 11.4 sec;   INR: 0.97 ratio      < from: Xray Tibia + Fibula 2 Views, Left (08.05.24 @ 11:38) >  IMPRESSION:    Chest August 5 at 11:47 AM: Exam is compared to June 16, 2024. Again   deformity of the right upper chest noted with some increased density   overlying the right upper chest compatible with scarring and/or   atelectatic change. An acute consolidative process cannot absolutely BE   excluded given the slightly more prominent density in the right upper   chest. Loss of lung volume on the right noted with some increased density   also seen at the right CP angle as before. Left chest is clear. Old   trauma related to the left humeral head neck region as before. Follow-up   suggested.    Left tibia-fibula: No acute fracture or dislocation. Some superficial   deformity in the anterior mid calf region incidentally noted related to   the soft tissues on the lateral view. Bandage artifact related to the   distal tibia-fibula and ankle region limits exam. Patient has a history   of nonhealing ulcer. Soft tissue swelling seen in the mid calf on July 24   has significantly improved. Follow-up suggested as indicated clinically.    < end of copied text >     PTT - ( 05 Aug 2024 12:15 )  PTT:29.2 sec  Urinalysis Basic - ( 05 Aug 2024 12:15 )    Color: x / Appearance: x / SG: x / pH: x  Gluc: 102 mg/dL / Ketone: x  / Bili: x / Urobili: x   Blood: x / Protein: x / Nitrite: x   Leuk Esterase: x / RBC: x / WBC x   Sq Epi: x / Non Sq Epi: x / Bacteria: x      LIVER FUNCTIONS - ( 05 Aug 2024 12:15 )  Alb: 3.0 g/dL / Pro: 6.6 g/dL / ALK PHOS: 94 U/L / ALT: 19 U/L / AST: 21 U/L / GGT: x                     Sedimentation Rate, Erythrocyte: 71 mm/hr (08-05-24 @ 12:15)        RECENT CULTURES:  07-24 @ 13:45 Wound Wound Methicillin resistant Staphylococcus aureus    Numerous Methicillin Resistant Staphylococcus aureus              All imaging and other data have been reviewed.      
    Chief Complaint : Patient is a 78y old  Female who presents with a chief complaint of left leg wound.  HPI : Patient is a 78y old  Female who presents with a chief complaint of left leg wound. She is well known to the wound care center and has been treated by Dr. Dubois for a non-healing wound to her left lower leg. Patient is presenting today for hospital admission prior to surgical debridement tomorrow.      Patient admits to  (-) Fevers, (-) Chills, (-) Nausea, (-) Vomiting, (-) Shortness of Breath      PMH: Asthma    HLD (hyperlipidemia)    Rheumatoid arthritis    TIA (transient ischemic attack)    Esophageal reflux    Lung cancer    Chronic obstructive pulmonary disease, unspecified COPD type    Hiatal hernia with GERD    Essential hypertension    Hyperlipidemia, unspecified hyperlipidemia type    Calculus of gallbladder with chronic cholecystitis without obstruction    Childhood asthma    Stage 3 chronic kidney disease    Carotid artery plaque    IBS (irritable bowel syndrome)    Psoriatic arthritis      PSH:S/P lobectomy of lung    H/O colonoscopy    History of endoscopy    S/P cholecystectomy    CAD (coronary artery disease)        Allergies:IV Contrast (Hives; Rash)  Zofran (Other)  minocycline (Other)  Enbrel (Unknown)  Levaquin (Other)  penicillin (Other)      Labs:                          11.0   8.92  )-----------( 201      ( 05 Aug 2024 12:15 )             36.0     WBC Trend  8.92 Date (08-05 @ 12:15)      Chem  08-05    139  |  104  |  23  ----------------------------<  102<H>  4.2   |  31  |  1.10    Ca    9.5      05 Aug 2024 12:15    TPro  6.6  /  Alb  3.0<L>  /  TBili  0.5  /  DBili  x   /  AST  21  /  ALT  19  /  AlkPhos  94  08-05          T(F): 98.1 (08-05-24 @ 10:00), Max: 98.1 (08-05-24 @ 10:00)  HR: 73 (08-05-24 @ 10:00) (73 - 73)  BP: 136/58 (08-05-24 @ 10:00) (136/58 - 136/58)  RR: 18 (08-05-24 @ 10:00) (18 - 18)  SpO2: 93% (08-05-24 @ 10:00) (93% - 93%)  Wt(kg): --    O:   General: Pleasant  female NAD & AOX3.    Integument:  Non-healing wound to left lower leg, with rolled in borders and fibrogranular wound bed.   Vascular: Dorsalis Pedis and Posterior Tibial pulses palpable b/l.  Capillary re-fill time less then 3 seconds digits 1-5 bilateral.    Neuro: Protective sensation intact to the level of the digits bilateral.  MSK: Muscle strength 5/5 all major muscle groups bilateral.      
[facility] - Neema Park,  Emanuel, Anish Gordillo Goodger, Teddy Gorman  Office Number: [phone]    Initial Consult Note    CHIEF COMPLAINT: Patient is a 78y old  Female who presents with a chief complaint of cellulitis (05 Aug 2024 17:04)      HPI:72-year-old female with history of lung CA status post right lobectomy, CKD, hyperlipidemia, RA, COPD on 2L O2 at night, cholecystectomy, who was sent from wound care for evaluation of nonhealing left lower leg wound x 2 months. Seen by podiatry, plan for surgical debridement. Cardiology consulted for cardiac optimization.  Denies chest pain, palpitation, SOB, syncope, dizziness, lightheadedness, orthopnea, PND, CHEN and edema.      PAST MEDICAL & SURGICAL HISTORY:  HLD (hyperlipidemia)      Rheumatoid arthritis      TIA (transient ischemic attack)  2012      Lung cancer  Right lung.      Chronic obstructive pulmonary disease, unspecified COPD type  O2 at night      Hiatal hernia with GERD      Essential hypertension      Childhood asthma      Stage 3 chronic kidney disease      Carotid artery plaque      IBS (irritable bowel syndrome)      Psoriatic arthritis      S/P lobectomy of lung  Right lung in 1996.      H/O colonoscopy      History of endoscopy  Sept 2017      S/P cholecystectomy        SOCIAL HISTORY:  No tobacco, ethanol, or drug abuse.  FAMILY HISTORY:  FH: type 2 diabetes (Mother)    FH: CAD (coronary artery disease) (Father, Sibling)    FH: colon cancer (Sibling)    FH: myocardial infarction (Father)    FH: stroke (Sibling)    FH: lung cancer (Sibling)      No family history of acute MI or sudden cardiac death.  MEDICATIONS  (STANDING):  aspirin enteric coated 81 milliGRAM(s) Oral daily  atorvastatin 20 milliGRAM(s) Oral at bedtime  hydroxychloroquine 200 milliGRAM(s) Oral two times a day  montelukast 10 milliGRAM(s) Oral daily  multivitamin 1 Tablet(s) Oral daily  rOPINIRole 1 milliGRAM(s) Oral two times a day    MEDICATIONS  (PRN):    Allergies    IV Contrast (Hives; Rash)  Zofran (Other)  minocycline (Other)  Enbrel (Unknown)  Levaquin (Other)  penicillin (Other)    Intolerances      REVIEW OF SYSTEMS:  All other review of systems is negative unless indicated above  VITAL SIGNS:   Vital Signs Last 24 Hrs  T(C): 36.7 (05 Aug 2024 10:00), Max: 36.7 (05 Aug 2024 10:00)  T(F): 98.1 (05 Aug 2024 10:00), Max: 98.1 (05 Aug 2024 10:00)  HR: 73 (05 Aug 2024 10:00) (73 - 73)  BP: 136/58 (05 Aug 2024 10:00) (136/58 - 136/58)  BP(mean): --  RR: 18 (05 Aug 2024 10:00) (18 - 18)  SpO2: 93% (05 Aug 2024 10:00) (93% - 93%)    Parameters below as of 05 Aug 2024 10:00  Patient On (Oxygen Delivery Method): room air      I&O's Summary    On Exam:  Constitutional: NAD, alert and oriented x 3  Lungs:  Non-labored, breath sounds are clear bilaterally, No wheezing, rales or rhonchi  Cardiovascular: RRR.  S1 and S2 positive.  No murmurs, rubs, gallops or clicks  Gastrointestinal: Bowel Sounds present, soft, nontender.   Extremities: No peripheral edema.   Neurological: Alert, no focal deficits  Skin: +LLE wound  Psych:  Mood & affect appropriate.    LABS: All Labs Reviewed:                        11.0   8.92  )-----------( 201      ( 05 Aug 2024 12:15 )             36.0     05 Aug 2024 12:15    139    |  104    |  23     ----------------------------<  102    4.2     |  31     |  1.10     Ca    9.5        05 Aug 2024 12:15    TPro  6.6    /  Alb  3.0    /  TBili  0.5    /  DBili  x      /  AST  21     /  ALT  19     /  AlkPhos  94     05 Aug 2024 12:15    PT/INR - ( 05 Aug 2024 12:15 )   PT: 11.4 sec;   INR: 0.97 ratio         PTT - ( 05 Aug 2024 12:15 )  PTT:29.2 sec      Blood Culture:         RADIOLOGY:    EKG: NSR      
PULMONARY/CRITICAL CARE    Pt. well known to me.    Patient is a 78y old  Female who presents with a chief complaint of cellulitis (05 Aug 2024 17:18)    BRIEF HOSPITAL COURSE: ***72-year-old female with history of lung CA status post right lobectomy, CKD, hyperlipidemia, RA, COPD on 2L O2 at night, cholecystectomy, who was sent from wound care for evaluation of nonhealing left lower leg wound x 2 months.  Patient states that she has had wound to left lower leg since May 2024 from a fall which has not been healing.  States the wound was infected recently and was on antibiotics 2 weeks ago.  States that she was seen at wound care today by Dr. Dubois and sent to ER for admission for IV antibiotics and wound debridement.  Patient denies fever, chills, chest pain, shortness of breath.    Events last 24 hours: ***    PAST MEDICAL & SURGICAL HISTORY:  HLD (hyperlipidemia)      Rheumatoid arthritis      TIA (transient ischemic attack)  2012      Lung cancer  Right lung.      Chronic obstructive pulmonary disease, unspecified COPD type  O2 at night      Hiatal hernia with GERD      Essential hypertension      Childhood asthma      Stage 3 chronic kidney disease      Carotid artery plaque      IBS (irritable bowel syndrome)      Psoriatic arthritis      S/P lobectomy of lung  Right lung in 1996.      H/O colonoscopy      History of endoscopy  Sept 2017      S/P cholecystectomy        Allergies    IV Contrast (Hives; Rash)  Zofran (Other)  minocycline (Other)  Enbrel (Unknown)  Levaquin (Other)  penicillin (Other)    Intolerances      FAMILY HISTORY: No cigs, etoh  FH: type 2 diabetes (Mother)    FH: CAD (coronary artery disease) (Father, Sibling)    FH: colon cancer (Sibling)    FH: myocardial infarction (Father)    FH: stroke (Sibling)    FH: lung cancer (Sibling)        Review of Systems:  CONSTITUTIONAL: No fever, chills, or fatigue  EYES: No eye pain, visual disturbances, or discharge  ENMT:  No difficulty hearing, tinnitus, vertigo; No sinus or throat pain  NECK: No pain or stiffness  RESPIRATORY: No cough, wheezing, chills or hemoptysis; No shortness of breath  CARDIOVASCULAR: No chest pain, palpitations, dizziness, or leg swelling  GASTROINTESTINAL: No abdominal or epigastric pain. No nausea, vomiting, or hematemesis; No diarrhea or constipation. No melena or hematochezia.  GENITOURINARY: No dysuria, frequency, hematuria, or incontinence  NEUROLOGICAL: No headaches, memory loss, loss of strength, numbness, or tremors  SKIN: No itching, burning, rashes, or lesions   MUSCULOSKELETAL: left lower extremity pain  PSYCHIATRIC: No depression, anxiety, mood swings, or difficulty sleeping      Medications:  hydroxychloroquine 200 milliGRAM(s) Oral two times a day  vancomycin  IVPB 750 milliGRAM(s) IV Intermittent every 24 hours      montelukast 10 milliGRAM(s) Oral daily    melatonin 3 milliGRAM(s) Oral at bedtime PRN  rOPINIRole 1 milliGRAM(s) Oral two times a day      aspirin enteric coated 81 milliGRAM(s) Oral daily    famotidine    Tablet 20 milliGRAM(s) Oral daily      atorvastatin 20 milliGRAM(s) Oral at bedtime    lactated ringers. 1000 milliLiter(s) IV Continuous <Continuous>  multivitamin 1 Tablet(s) Oral daily                ICU Vital Signs Last 24 Hrs  T(C): 36.3 (06 Aug 2024 05:15), Max: 36.7 (05 Aug 2024 10:00)  T(F): 97.4 (06 Aug 2024 05:15), Max: 98.1 (05 Aug 2024 10:00)  HR: 73 (06 Aug 2024 05:15) (73 - 88)  BP: 152/78 (06 Aug 2024 07:30) (136/58 - 224/86)  BP(mean): --  ABP: --  ABP(mean): --  RR: 18 (06 Aug 2024 05:15) (18 - 18)  SpO2: 92% (06 Aug 2024 05:15) (92% - 93%)    O2 Parameters below as of 06 Aug 2024 05:15  Patient On (Oxygen Delivery Method): room air          Vital Signs Last 24 Hrs  T(C): 36.3 (06 Aug 2024 05:15), Max: 36.7 (05 Aug 2024 10:00)  T(F): 97.4 (06 Aug 2024 05:15), Max: 98.1 (05 Aug 2024 10:00)  HR: 73 (06 Aug 2024 05:15) (73 - 88)  BP: 152/78 (06 Aug 2024 07:30) (136/58 - 224/86)  BP(mean): --  RR: 18 (06 Aug 2024 05:15) (18 - 18)  SpO2: 92% (06 Aug 2024 05:15) (92% - 93%)    Parameters below as of 06 Aug 2024 05:15  Patient On (Oxygen Delivery Method): room air            I&O's Detail    05 Aug 2024 07:01  -  06 Aug 2024 07:00  --------------------------------------------------------  IN:  Total IN: 0 mL    OUT:    Voided (mL): 250 mL  Total OUT: 250 mL    Total NET: -250 mL            LABS:                        11.0   8.92  )-----------( 201      ( 05 Aug 2024 12:15 )             36.0     08-05    139  |  104  |  23  ----------------------------<  102<H>  4.2   |  31  |  1.10    Ca    9.5      05 Aug 2024 12:15    TPro  6.6  /  Alb  3.0<L>  /  TBili  0.5  /  DBili  x   /  AST  21  /  ALT  19  /  AlkPhos  94  08-05          CAPILLARY BLOOD GLUCOSE        PT/INR - ( 05 Aug 2024 12:15 )   PT: 11.4 sec;   INR: 0.97 ratio         PTT - ( 05 Aug 2024 12:15 )  PTT:29.2 sec  Urinalysis Basic - ( 05 Aug 2024 12:15 )    Color: x / Appearance: x / SG: x / pH: x  Gluc: 102 mg/dL / Ketone: x  / Bili: x / Urobili: x   Blood: x / Protein: x / Nitrite: x   Leuk Esterase: x / RBC: x / WBC x   Sq Epi: x / Non Sq Epi: x / Bacteria: x      CULTURES:      Physical Examination:    General: No acute distress.  thin female    HEENT: Pupils equal, reactive to light.  Symmetric.    PULM: Clear to auscultation bilaterally, no wheeze rhonchi rales no change percussion    CVS: Regular rate and rhythm, no murmurs, rubs, or gallops    ABD: Soft, nondistended, nontender, normoactive bowel sounds, no masses    EXT: No edema, nontender calves; large wound left ant shin bandaged.    SKIN: Warm and well perfused, no rashes noted.    NEURO: Alert, oriented, interactive, nonfocal    RADIOLOGY: ***rd< from: Xray Tibia + Fibula 2 Views, Left (08.05.24 @ 11:38) >  ACC: [id number] EXAM:  XR TIB FIB AP LAT 2 VIEWS LT   ORDERED BY: DANIELLA RAMIREZ     ACC: [id number] EXAM:  XR CHEST AP OR PA 1V   ORDERED BY: DANIELLA RAMIREZ     PROCEDURE DATE:  08/05/2024          INTERPRETATION:  EXAM: XR CHEST, XR TIBIA FIBULA AP AND LATERAL 2 VIEWS   LEFT    INDICATION: leg ulcer PXR    COMPARISON: See below    IMPRESSION:    Chest August 5 at 11:47 AM: Exam is compared to June 16, 2024. Again   deformity of the right upper chest noted with some increased density   overlying the right upper chest compatible with scarring and/or   atelectatic change. An acute consolidative process cannot absolutely BE   excluded given the slightly more prominent density in the right upper   chest. Loss of lung volume on the right noted with some increased density   also seen at the right CP angle as before. Left chest is clear. Old   trauma related to the left humeral head neck region as before. Follow-up   suggested.    Left tibia-fibula: No acute fracture or dislocation. Some superficial   deformity in the anterior mid calf region incidentally noted related to   the soft tissues on the lateral view. Bandage artifact related to the   distal tibia-fibula and ankle region limits exam. Patient has a history   of nonhealing ulcer. Soft tissue swelling seen in the mid calf on July 24   has significantly improved. Follow-up suggested as indicated clinically.    --- End of Report ---            JUSTIN RENDON MD; Attending Radiologist  This document has been electronically signed. Aug  5 2024 12:04PM    < end of copied text >      CRITICAL CARE TIME SPENT: ***

## 2024-08-06 NOTE — CARE COORDINATION ASSESSMENT. - NSCAREPROVIDERS_GEN_ALL_CORE_FT
CARE PROVIDERS:  Accepting Physician: Brannon Velazquez  Administration: Casper Chirinos  Administration: Chad Dunbar  Admitting: Brannon Velazquez  Attending: Brannon Velazquez  Case Management: Aliya Grier  Consultant: Essie Moore  Consultant: Kate Allison  Consultant: Petros Gorman  Consultant: Bebe Buchanan  Consultant: Adriel Galan  Consultant: Jenn Horton  Consultant: Ricardo Dubois  Consultant: Hilda Carrillo  Consultant: Sven Arreola  Covering Team: Patricia Tate ED ACP: Aliya Pena ED Attending: Keila Carrasco ED Nurse: Pascual Reese  Nurse: Mi Diaz  Nurse: Bebe Andujar  Nurse: Chidi Fong  Nurse: Bull Kinney  Outpatient Provider: Lukas Fernandez  Outpatient Provider: Hugo Oh  Outpatient Provider: Adriel Galan  Outpatient Provider: Hon Will  Override: Marlys Mclaughlin  Override: Bebe Andujar  Primary Team: Jean Mckeon  Primary Team: Ernst Albarado  Primary Team: Brannon Velazquez  Registered Dietitian: eLslie Monet

## 2024-08-06 NOTE — CONSULT NOTE ADULT - ASSESSMENT
Pt. well known to me admitted for nonhealing wound/cellulitis left lower leg following laceration.  Suggest ID FU.  Antibiotics  Wound care.  COPD  Htn

## 2024-08-06 NOTE — CARE COORDINATION ASSESSMENT. - LIVES WITH, PROFILE
spouse PT shops and cooks for self, eats 2 meals a day. He tries to include vegetables and protein.   For past 2 weeks, pt reports he lost his appetite. PO intake estimated < 75% ENN > one weak.

## 2024-08-06 NOTE — CARE COORDINATION ASSESSMENT. - ASSESSMENT CONCERNS TO BE ADDRESSED
Per EMR: 72-year-old female with history of lung CA status post right lobectomy, CKD, hyperlipidemia, RA, COPD on 2L O2 at night, cholecystectomy sent from wound care for evaluation of nonhealing left lower leg wound x 2 months./discharge planning

## 2024-08-06 NOTE — PHYSICAL THERAPY INITIAL EVALUATION ADULT - PERTINENT HX OF CURRENT PROBLEM, REHAB EVAL
72-year-old female with history of lung CA status post right lobectomy, CKD, hyperlipidemia, RA, COPD on 2L O2 at night, cholecystectomy sent from wound care for evaluation of nonhealing left lower leg wound x 2 months.  Patient states that she has had wound to left lower leg for the past 2 months which has not been healing.  States the wound was infected recently and was on antibiotics 2 weeks ago.  States that she was seen at wound care today by Dr. Dubois and sent to ER for admission for IV antibiotics, MRI and wound debridement

## 2024-08-06 NOTE — PATIENT CHOICE NOTE. - NSPTCHOICESTATE_GEN_ALL_CORE

## 2024-08-06 NOTE — PROGRESS NOTE ADULT - ASSESSMENT
72-year-old female with history of lung CA status post right lobectomy, CKD, hyperlipidemia, RA, COPD on 2L O2 at night, cholecystectomy, who was sent from wound care for evaluation of nonhealing left lower leg wound x 2 months.      Plan for MRI and IV antibiotics to treat for wound infection. S/p operative wound debridement today. She has no fevers and no leukocytosis. Recent wound culture grew MRSA. Has remote hx of penicillin allergy, thinks she had itching but no SOB.    #Nonhealing L lower leg wound  #MRSA infection  #Hx of penicillin allergy    -continue vancomycin (renally dosed)  -follow blood and OR cultures  -MRI L tib-fib pending  -discussed with Dr. Caroline Carrillo MD  Division of Infectious Diseases   Cell [phone] between 8am and 6pm   After 6pm and weekends please call ID service at [phone].     35 minutes spent on total encounter assessing patient, examination, chart review, counseling and coordinating care by the attending physician/nurse/care manager.

## 2024-08-06 NOTE — CHART NOTE - NSCHARTNOTEFT_GEN_A_CORE
RN called to report /84  Pt feels well - no acute complaints, no pain  Ordered vasotec 0.625mg IVP x1  Repeat manual was 188/90 prior to administration of medication  Day team to address further

## 2024-08-06 NOTE — CARE COORDINATION ASSESSMENT. - OTHER PERTINENT DISCHARGE PLANNING INFORMATION:
CM met with the patient and her spouse at the bedside and explained role of CM and transition planning. Patient verbalized understanding. Patient lives with her spouse in a private home. No stairs outside; then on the main level. Patient stated she is independent with ADL's/ambulation/driving/medication management PTA. Patient stated she has home oxygen stationary only as she uses 2LNC at night only, denies portable oxygen. Patient has a RW. Patient stated she follows at [facility] more recently every other day and that her  assist with dressing changes of the lower extremity. Denies home care services PTA. CM provided direct contact/resource folder and remains available.

## 2024-08-06 NOTE — PHYSICAL THERAPY INITIAL EVALUATION ADULT - ADDITIONAL COMMENTS
Patient lives in private home, 0 [address]ALIA and resides on main floor. Patient was independent in all ADLs and ambulated independently without device.

## 2024-08-06 NOTE — PROGRESS NOTE ADULT - ASSESSMENT
72-year-old female with history of lung CA status post right lobectomy, CKD, hyperlipidemia, RA, COPD on 2L O2 at night, cholecystectomy, who was sent from wound care for evaluation of nonhealing left lower leg wound x 2 months.    cardiac optimization, HTN  - presents w/ non healing LLE wound, plan for debridement 8/6. podiatry following  - Hypertensive overnight treated with Vasotec.  Not on anti-hypertensives at home.  Most likely pain related.  - BP: 152/78 (06 Aug 2024 07:30) (148/75 - 224/86) now .   - Consider starting Norvasc if SBP remains elevated despite pain control.     - Denies chest pain, palpitation, SOB, syncope, dizziness, lightheadedness, orthopnea, PND, CHEN and edema  - EKG demonstrates NSR. No acute ischemic changes noted.   - Cardiac Cath/PCI: 12/11/2013. Minimal nonobstructive CAD. NST 2017 normal,  - Echo 4/2024 LV systolic function normal, EF 55-60%, mild grade 1 LVDD, mild W[place].   - Patient euvolemic on examination with no overt signs of heart failure or cardiac ischemia.   - No severe valvular abnormalities noted on examination  - Pt has no active ischemia, decompensated heart failure, unstable arrythmia, or severe stenotic valvular disease. Patient is optimized from cardiovascular standpoint to proceed with planned procedure with routine hemodynamic monitoring.     - continue ASA and statin, hx of mild carotid plaque with a history of TIA  - Follows with Dr. hO   - Monitor and replete lytes, keep K>4, Mg>2.  - Will continue to follow.    BALA Madrigal- BC  Nurse Practitioner- Cardiology   Call TEAMS

## 2024-08-06 NOTE — PROGRESS NOTE ADULT - SUBJECTIVE AND OBJECTIVE BOX
Date of Service 08-06-24 @ 15:37    Patient is a 78y old  Female who presents with a chief complaint of cellulitis (06 Aug 2024 10:02)      INTERVAL /OVERNIGHT EVENTS: denies HA    MEDICATIONS  (STANDING):  acetaminophen   IVPB .. 650 milliGRAM(s) IV Intermittent once  aspirin enteric coated 81 milliGRAM(s) Oral daily  atorvastatin 20 milliGRAM(s) Oral at bedtime  budesonide 160 MICROgram(s)/formoterol 4.5 MICROgram(s) Inhaler 2 Puff(s) Inhalation two times a day  BUpivacaine liposome 1.3% Injectable 20 milliLiter(s) Local Injection once  enoxaparin Injectable 30 milliGRAM(s) SubCutaneous every 24 hours  famotidine    Tablet 20 milliGRAM(s) Oral daily  hydroxychloroquine 200 milliGRAM(s) Oral two times a day  lactated ringers. 1000 milliLiter(s) (75 mL/Hr) IV Continuous <Continuous>  lactated ringers. 1000 milliLiter(s) (60 mL/Hr) IV Continuous <Continuous>  montelukast 10 milliGRAM(s) Oral daily  multivitamin 1 Tablet(s) Oral daily  rOPINIRole 1 milliGRAM(s) Oral two times a day  vancomycin  IVPB 750 milliGRAM(s) IV Intermittent every 24 hours    MEDICATIONS  (PRN):  acetaminophen     Tablet .. 650 milliGRAM(s) Oral every 6 hours PRN Temp greater or equal to 38C (100.4F), Mild Pain (1 - 3)  HYDROmorphone  Injectable 0.5 milliGRAM(s) IV Push every 10 minutes PRN Severe Pain (7 - 10)  melatonin 3 milliGRAM(s) Oral at bedtime PRN Insomnia  morphine  - Injectable 2 milliGRAM(s) IV Push every 6 hours PRN Severe Pain (7 - 10)  traMADol 25 milliGRAM(s) Oral every 4 hours PRN Moderate Pain (4 - 6)      Allergies    IV Contrast (Hives; Rash)  Zofran (Other)  minocycline (Other)  Enbrel (Unknown)  Levaquin (Other)  penicillin (Other)    Intolerances        REVIEW OF SYSTEMS:  CONSTITUTIONAL: No fever, weight loss, or fatigue  EYES: No eye pain, visual disturbances, or discharge  ENMT:  No difficulty hearing, tinnitus, vertigo; No sinus or throat pain  NECK: No pain or stiffness  RESPIRATORY: No cough, wheezing, chills or hemoptysis; No shortness of breath  CARDIOVASCULAR: No chest pain, palpitations, dizziness, or leg swelling  GASTROINTESTINAL: No abdominal or epigastric pain. No nausea, vomiting, or hematemesis; No diarrhea or constipation. No melena or hematochezia.  GENITOURINARY: No dysuria, frequency, hematuria, or incontinence  NEUROLOGICAL: No headaches, memory loss, loss of strength, numbness, or tremors  SKIN: leg wound  LYMPH NODES: No enlarged glands  ENDOCRINE: No heat or cold intolerance; No hair loss; No polydipsia or polyuria  MUSCULOSKELETAL: No joint pain or swelling; No muscle, back, or extremity pain  PSYCHIATRIC: No depression, anxiety, mood swings, or difficulty sleeping  HEME/LYMPH: No easy bruising, or bleeding gums  ALLERGY AND IMMUNOLOGIC: No hives or eczema    Vital Signs Last 24 Hrs  T(C): 36.7 (06 Aug 2024 14:59), Max: 37.1 (06 Aug 2024 14:29)  T(F): 98.1 (06 Aug 2024 14:59), Max: 98.8 (06 Aug 2024 14:29)  HR: 72 (06 Aug 2024 14:59) (70 - 88)  BP: 125/69 (06 Aug 2024 14:59) (125/69 - 224/86)  BP(mean): 88 (06 Aug 2024 14:59) (78 - 112)  RR: 14 (06 Aug 2024 14:59) (14 - 20)  SpO2: 97% (06 Aug 2024 14:59) (92% - 99%)    Parameters below as of 06 Aug 2024 14:29  Patient On (Oxygen Delivery Method): nasal cannula  O2 Flow (L/min): 3      PHYSICAL EXAM:  GENERAL: NAD, well-groomed, well-developed  HEAD:  Atraumatic, Normocephalic  EYES: EOMI, PERRLA, conjunctiva and sclera clear  ENMT: No tonsillar erythema, exudates, or enlargement; Moist mucous membranes, Good dentition, No lesions  NECK: Supple, No JVD, Normal thyroid  NERVOUS SYSTEM:  Alert & Oriented X3, Good concentration; Motor Strength 5/5 B/L upper and lower extremities; DTRs 2+ intact and symmetric  CHEST/LUNG: Clear to auscultation bilaterally; No rales, rhonchi, wheezing, or rubs  HEART: Regular rate and rhythm; No murmurs, rubs, or gallops  ABDOMEN: Soft, Nontender, Nondistended; Bowel sounds present  EXTREMITIES:  2+ Peripheral Pulses, No clubbing, cyanosis, or edema  LYMPH: No lymphadenopathy noted  SKIN: LLE wound    LABS:      Ca    9.5        05 Aug 2024 12:15      PT/INR - ( 05 Aug 2024 12:15 )   PT: 11.4 sec;   INR: 0.97 ratio         PTT - ( 05 Aug 2024 12:15 )  PTT:29.2 sec  Urinalysis Basic - ( 05 Aug 2024 12:15 )    Color: x / Appearance: x / SG: x / pH: x  Gluc: 102 mg/dL / Ketone: x  / Bili: x / Urobili: x   Blood: x / Protein: x / Nitrite: x   Leuk Esterase: x / RBC: x / WBC x   Sq Epi: x / Non Sq Epi: x / Bacteria: x      CAPILLARY BLOOD GLUCOSE          RADIOLOGY & ADDITIONAL TESTS:    Notes Reviewed:  [x ] YES  [ ] NO    Care Discussed with Consultants/Other Providers [x ] YES  [ ] NO

## 2024-08-06 NOTE — PROGRESS NOTE ADULT - SUBJECTIVE AND OBJECTIVE BOX
[facility]  INFECTIOUS DISEASES - [address], [place] [address]  Tel: [phone]     Fax: [phone]  =======================================================    GINNY VASQUEZ [id number]    Follow up: No fevers. Seen after procedure, reports feeling fine.    Allergies:  IV Contrast (Hives; Rash)  Zofran (Other)  minocycline (Other)  Enbrel (Unknown)  Levaquin (Other)  penicillin (Other)      Antibiotics:  acetaminophen     Tablet .. 650 milliGRAM(s) Oral every 6 hours PRN  acetaminophen   IVPB .. 650 milliGRAM(s) IV Intermittent once  aspirin enteric coated 81 milliGRAM(s) Oral daily  atorvastatin 20 milliGRAM(s) Oral at bedtime  budesonide 160 MICROgram(s)/formoterol 4.5 MICROgram(s) Inhaler 2 Puff(s) Inhalation two times a day  BUpivacaine liposome 1.3% Injectable 20 milliLiter(s) Local Injection once  enoxaparin Injectable 30 milliGRAM(s) SubCutaneous every 24 hours  famotidine    Tablet 20 milliGRAM(s) Oral daily  hydroxychloroquine 200 milliGRAM(s) Oral two times a day  lactated ringers. 1000 milliLiter(s) IV Continuous <Continuous>  melatonin 3 milliGRAM(s) Oral at bedtime PRN  metoprolol tartrate 12.5 milliGRAM(s) Oral three times a day  montelukast 10 milliGRAM(s) Oral daily  morphine  - Injectable 2 milliGRAM(s) IV Push every 6 hours PRN  multivitamin 1 Tablet(s) Oral daily  rOPINIRole 1 milliGRAM(s) Oral two times a day  traMADol 25 milliGRAM(s) Oral every 4 hours PRN  vancomycin  IVPB 750 milliGRAM(s) IV Intermittent every 24 hours       REVIEW OF SYSTEMS:  CONSTITUTIONAL:  No Fever or chills  HEENT:  No sore throat or runny nose.  CARDIOVASCULAR:  No chest pain or SOB.  RESPIRATORY:  No cough, shortness of breath  GASTROINTESTINAL: No nausea, vomiting, or diarrhea  GENITOURINARY:  No dysuria, frequency or urgency  MUSCULOSKELETAL:  (+) L lower leg pain  SKIN: (+) LLE wound  NEUROLOGIC:  No headache or dizziness  PSYCHIATRIC:  No disorder of thought or mood.     Physical Exam:  ICU Vital Signs Last 24 Hrs  T(C): 36.7 (06 Aug 2024 16:58), Max: 37.1 (06 Aug 2024 14:29)  T(F): 98 (06 Aug 2024 16:58), Max: 98.8 (06 Aug 2024 14:29)  HR: 76 (06 Aug 2024 16:58) (70 - 88)  BP: 154/82 (06 Aug 2024 16:58) (125/69 - 224/86)  BP(mean): 88 (06 Aug 2024 14:59) (78 - 112)  ABP: --  ABP(mean): --  RR: 18 (06 Aug 2024 16:58) (14 - 20)  SpO2: 95% (06 Aug 2024 16:58) (92% - 99%)    O2 Parameters below as of 06 Aug 2024 16:58  Patient On (Oxygen Delivery Method): room air      GEN: NAD  HEENT: normocephalic and atraumatic.   NECK: Supple.   LUNGS: Normal respiratory effort  HEART: Regular rate and rhythm   ABDOMEN: Soft, nontender, and nondistended.    EXTREMITIES: No leg edema.  NEUROLOGIC: Answering questions appropriately  PSYCHIATRIC: Appropriate affect .      Labs:  08-05    139  |  104  |  23  ----------------------------<  102<H>  4.2   |  31  |  1.10    Ca    9.5      05 Aug 2024 12:15    TPro  6.6  /  Alb  3.0<L>  /  TBili  0.5  /  DBili  x   /  AST  21  /  ALT  19  /  AlkPhos  94  08-05                          11.0   8.92  )-----------( 201      ( 05 Aug 2024 12:15 )             36.0     PT/INR - ( 05 Aug 2024 12:15 )   PT: 11.4 sec;   INR: 0.97 ratio         PTT - ( 05 Aug 2024 12:15 )  PTT:29.2 sec  Urinalysis Basic - ( 05 Aug 2024 12:15 )    Color: x / Appearance: x / SG: x / pH: x  Gluc: 102 mg/dL / Ketone: x  / Bili: x / Urobili: x   Blood: x / Protein: x / Nitrite: x   Leuk Esterase: x / RBC: x / WBC x   Sq Epi: x / Non Sq Epi: x / Bacteria: x      LIVER FUNCTIONS - ( 05 Aug 2024 12:15 )  Alb: 3.0 g/dL / Pro: 6.6 g/dL / ALK PHOS: 94 U/L / ALT: 19 U/L / AST: 21 U/L / GGT: x             RECENT CULTURES:  07-24 @ 13:45 Wound Wound Methicillin resistant Staphylococcus aureus    Numerous Methicillin Resistant Staphylococcus aureus              All imaging and data are reviewed.

## 2024-08-06 NOTE — CARE COORDINATION ASSESSMENT. - NSPASTMEDSURGHISTORY_GEN_ALL_CORE_FT
PAST MEDICAL & SURGICAL HISTORY:  Lung cancer  Right lung.      TIA (transient ischemic attack)  2012      Rheumatoid arthritis      HLD (hyperlipidemia)      S/P lobectomy of lung  Right lung in 1996.      Essential hypertension      Hiatal hernia with GERD      Chronic obstructive pulmonary disease, unspecified COPD type  O2 at night      History of endoscopy  Sept 2017      H/O colonoscopy      S/P cholecystectomy      Psoriatic arthritis      IBS (irritable bowel syndrome)      Carotid artery plaque      Stage 3 chronic kidney disease      Childhood asthma

## 2024-08-06 NOTE — PROGRESS NOTE ADULT - SUBJECTIVE AND OBJECTIVE BOX
[facility] [place] [facility] -- Emanuel Bethea Pannella, Patel, Savella, Goodger, Cohen  Office # [phone]      Follow Up:  cardiac optimization, HTN    Subjective/Observations: Seen and examined.  Lying in bed with  at bedside.  Reports pain in her leg but denies cp, sob or palpitations.  No signs of orthopnea or PND.  NAD.       REVIEW OF SYSTEMS: All other review of systems is negative unless indicated above    PAST MEDICAL & SURGICAL HISTORY:  HLD (hyperlipidemia)      Rheumatoid arthritis      TIA (transient ischemic attack)  2012      Lung cancer  Right lung.      Chronic obstructive pulmonary disease, unspecified COPD type  O2 at night      Hiatal hernia with GERD      Essential hypertension      Childhood asthma      Stage 3 chronic kidney disease      Carotid artery plaque      IBS (irritable bowel syndrome)      Psoriatic arthritis      S/P lobectomy of lung  Right lung in 1996.      H/O colonoscopy      History of endoscopy  Sept 2017      S/P cholecystectomy          MEDICATIONS  (STANDING):  acetaminophen   IVPB .. 650 milliGRAM(s) IV Intermittent once  aspirin enteric coated 81 milliGRAM(s) Oral daily  atorvastatin 20 milliGRAM(s) Oral at bedtime  budesonide 160 MICROgram(s)/formoterol 4.5 MICROgram(s) Inhaler 2 Puff(s) Inhalation two times a day  enoxaparin Injectable 40 milliGRAM(s) SubCutaneous every 24 hours  famotidine    Tablet 20 milliGRAM(s) Oral daily  hydroxychloroquine 200 milliGRAM(s) Oral two times a day  lactated ringers. 1000 milliLiter(s) (60 mL/Hr) IV Continuous <Continuous>  montelukast 10 milliGRAM(s) Oral daily  multivitamin 1 Tablet(s) Oral daily  rOPINIRole 1 milliGRAM(s) Oral two times a day  vancomycin  IVPB 750 milliGRAM(s) IV Intermittent every 24 hours    MEDICATIONS  (PRN):  acetaminophen     Tablet .. 650 milliGRAM(s) Oral every 6 hours PRN Temp greater or equal to 38C (100.4F), Mild Pain (1 - 3)  melatonin 3 milliGRAM(s) Oral at bedtime PRN Insomnia  morphine  - Injectable 2 milliGRAM(s) IV Push every 6 hours PRN Severe Pain (7 - 10)  traMADol 25 milliGRAM(s) Oral every 4 hours PRN Moderate Pain (4 - 6)      Allergies    IV Contrast (Hives; Rash)  Zofran (Other)  minocycline (Other)  Enbrel (Unknown)  Levaquin (Other)  penicillin (Other)    Intolerances            Vital Signs Last 24 Hrs  T(C): 36.3 (06 Aug 2024 05:15), Max: 36.7 (05 Aug 2024 21:11)  T(F): 97.4 (06 Aug 2024 05:15), Max: 98.1 (05 Aug 2024 21:11)  HR: 73 (06 Aug 2024 05:15) (73 - 88)  BP: 152/78 (06 Aug 2024 07:30) (148/75 - 224/86)  BP(mean): --  RR: 18 (06 Aug 2024 05:15) (18 - 18)  SpO2: 92% (06 Aug 2024 05:15) (92% - 92%)    Parameters below as of 06 Aug 2024 05:15  Patient On (Oxygen Delivery Method): room air        I&O's Summary    05 Aug 2024 07:01  -  06 Aug 2024 07:00  --------------------------------------------------------  IN: 780 mL / OUT: 250 mL / NET: 530 mL      Weight (kg): 42.9 (08-06 @ 08:49)    PHYSICAL EXAM:  TELE: Not on tele  Constitutional: NAD, awake and alert, well-developed  HEENT: Moist Mucous Membranes, Anicteric  Pulmonary: Non-labored, breath sounds are clear bilaterally, No wheezing, rales or rhonchi  Cardiovascular: Regular, S1 and S2, No murmurs, rubs, gallops or clicks  Gastrointestinal: Bowel Sounds present, soft, nontender.   Lymph: No peripheral edema. No lymphadenopathy.  Skin: No visible rashes or ulcers.  B/L LE with dressings in place  Psych:  Mood & affect appropriate    LABS: All Labs Reviewed:                        11.0   8.92  )-----------( 201      ( 05 Aug 2024 12:15 )             36.0     05 Aug 2024 12:15    139    |  104    |  23     ----------------------------<  102    4.2     |  31     |  1.10     Ca    9.5        05 Aug 2024 12:15    TPro  6.6    /  Alb  3.0    /  TBili  0.5    /  DBili  x      /  AST  21     /  ALT  19     /  AlkPhos  94     05 Aug 2024 12:15    PT/INR - ( 05 Aug 2024 12:15 )   PT: 11.4 sec;   INR: 0.97 ratio         PTT - ( 05 Aug 2024 12:15 )  PTT:29.2 sec    cardiac optimization, HTN       EXAM:  ECHO TTE WO CON COMP W DOPP         PROCEDURE DATE:  09/07/2020        INTERPRETATION:  INDICATION: Syncope  Sonographer AS    Blood Pressure 120/78    Height 157.48 cm     Weight 52.6 kg       BSA 1.5 sq m    Dimensions:  LA 2.6       Normal Values: 2.0 - 4.0 cm  Ao 3.2        Normal Values: 2.0 - 3.8 cm  SEPTUM 1.2       Normal Values: 0.6 - 1.2 cm  PWT 1.1       Normal Values: 0.6 - 1.1 cm  LVIDd 3.9         Normal Values: 3.0 - 5.6 cm  LVIDs 2.6         Normal Values: 1.8 - 4.0 cm      OBSERVATIONS:  Technically difficult and limited study  Mitral Valve: Thickened leaflets, mild MR.  Aortic Valve/Aorta: Aortic valve is not well-visualized, no signs of severe valvular pathology  Tricuspid Valve: Mild TR.  Pulmonic Valve: Not well-visualized  Left Atrium: normal  Right Atrium: Not well-visualized  Left Ventricle: Grossly normal LV size and systolic function, estimated LVEF of 65%. Endocardium is not well-visualized, cannot rule out segmental dysfunction  Right Ventricle: The right ventricle is not well-visualized  Pericardium/Pleura: Trace pericardial effusion.  Pulmonary/RV Pressure: estimated PA systolic pressure of 27.7 mmHg assuming an RA pressure of 3 mmHg.  LV diastolic dysfunction is present    Conclusion:  Technically difficult and limited study  Grossly normal left ventricular internal dimensions and systolic function, estimated LVEF of 65%.  The right ventricle is not well-visualized  Aortic valve is not well-visualized, no signs of severe valvular pathology  Mild MR and TR.  No significant pericardial effusion.              ARUN MORENO   This document has been electronically signed. Sep  8 2020  8:57AM     ACC: [id number] EXAM:  XR TIB FIB AP LAT 2 VIEWS LT   ORDERED BY: DANIELLA RAMIREZ     ACC: [id number] EXAM:  XR CHEST AP OR PA 1V   ORDERED BY: DANIELLA RAMIREZ     PROCEDURE DATE:  08/05/2024          INTERPRETATION:  EXAM: XR CHEST, XR TIBIA FIBULA AP AND LATERAL 2 VIEWS   LEFT    INDICATION: leg ulcer PXR    COMPARISON: See below    IMPRESSION:    Chest August 5 at 11:47 AM: Exam is compared to June 16, 2024. Again   deformity of the right upper chest noted with some increased density   overlying the right upper chest compatible with scarring and/or   atelectatic change. An acute consolidative process cannot absolutely BE   excluded given the slightly more prominent density in the right upper   chest. Loss of lung volume on the right noted with some increased density   also seen at the right CP angle as before. Left chest is clear. Old   trauma related to the left humeral head neck region as before. Follow-up   suggested.    Left tibia-fibula: No acute fracture or dislocation. Some superficial   deformity in the anterior mid calf region incidentally noted related to   the soft tissues on the lateral view. Bandage artifact related to the   distal tibia-fibula and ankle region limits exam. Patient has a history   of nonhealing ulcer. Soft tissue swelling seen in the mid calf on July 24   has significantly improved. Follow-up suggested as indicated clinically.    --- End of Report ---            JUSTIN RENDON MD; Attending Radiologist  This document has been electronically signed. Aug  5 2024 12:04PM           [facility] -- Emanuel Bethea Pannella, Patel, Savella, Goodger, Cohen  Office # [phone]      Follow Up:  cardiac optimization, HTN    Subjective/Observations: Seen and examined.  Lying in bed with  at bedside.  Reports pain in her leg but denies cp, sob or palpitations.  No signs of orthopnea or PND.  NAD.       REVIEW OF SYSTEMS: All other review of systems is negative unless indicated above    PAST MEDICAL & SURGICAL HISTORY:  HLD (hyperlipidemia)      Rheumatoid arthritis      TIA (transient ischemic attack)  2012      Lung cancer  Right lung.      Chronic obstructive pulmonary disease, unspecified COPD type  O2 at night      Hiatal hernia with GERD      Essential hypertension      Childhood asthma      Stage 3 chronic kidney disease      Carotid artery plaque      IBS (irritable bowel syndrome)      Psoriatic arthritis      S/P lobectomy of lung  Right lung in 1996.      H/O colonoscopy      History of endoscopy  Sept 2017      S/P cholecystectomy          MEDICATIONS  (STANDING):  acetaminophen   IVPB .. 650 milliGRAM(s) IV Intermittent once  aspirin enteric coated 81 milliGRAM(s) Oral daily  atorvastatin 20 milliGRAM(s) Oral at bedtime  budesonide 160 MICROgram(s)/formoterol 4.5 MICROgram(s) Inhaler 2 Puff(s) Inhalation two times a day  enoxaparin Injectable 40 milliGRAM(s) SubCutaneous every 24 hours  famotidine    Tablet 20 milliGRAM(s) Oral daily  hydroxychloroquine 200 milliGRAM(s) Oral two times a day  lactated ringers. 1000 milliLiter(s) (60 mL/Hr) IV Continuous <Continuous>  montelukast 10 milliGRAM(s) Oral daily  multivitamin 1 Tablet(s) Oral daily  rOPINIRole 1 milliGRAM(s) Oral two times a day  vancomycin  IVPB 750 milliGRAM(s) IV Intermittent every 24 hours    MEDICATIONS  (PRN):  acetaminophen     Tablet .. 650 milliGRAM(s) Oral every 6 hours PRN Temp greater or equal to 38C (100.4F), Mild Pain (1 - 3)  melatonin 3 milliGRAM(s) Oral at bedtime PRN Insomnia  morphine  - Injectable 2 milliGRAM(s) IV Push every 6 hours PRN Severe Pain (7 - 10)  traMADol 25 milliGRAM(s) Oral every 4 hours PRN Moderate Pain (4 - 6)      Allergies    IV Contrast (Hives; Rash)  Zofran (Other)  minocycline (Other)  Enbrel (Unknown)  Levaquin (Other)  penicillin (Other)    Intolerances            Vital Signs Last 24 Hrs  T(C): 36.3 (06 Aug 2024 05:15), Max: 36.7 (05 Aug 2024 21:11)  T(F): 97.4 (06 Aug 2024 05:15), Max: 98.1 (05 Aug 2024 21:11)  HR: 73 (06 Aug 2024 05:15) (73 - 88)  BP: 152/78 (06 Aug 2024 07:30) (148/75 - 224/86)  BP(mean): --  RR: 18 (06 Aug 2024 05:15) (18 - 18)  SpO2: 92% (06 Aug 2024 05:15) (92% - 92%)    Parameters below as of 06 Aug 2024 05:15  Patient On (Oxygen Delivery Method): room air        I&O's Summary    05 Aug 2024 07:01  -  06 Aug 2024 07:00  --------------------------------------------------------  IN: 780 mL / OUT: 250 mL / NET: 530 mL      Weight (kg): 42.9 (08-06 @ 08:49)    PHYSICAL EXAM:  TELE: Not on tele  Constitutional: NAD, awake and alert, well-developed  HEENT: Moist Mucous Membranes, Anicteric  Pulmonary: Non-labored, breath sounds are clear bilaterally, No wheezing, rales or rhonchi  Cardiovascular: Regular, S1 and S2, No murmurs, rubs, gallops or clicks  Gastrointestinal: Bowel Sounds present, soft, nontender.   Lymph: No peripheral edema. No lymphadenopathy.  Skin: No visible rashes or ulcers.  B/L LE with dressings in place  Psych:  Mood & affect appropriate    LABS: All Labs Reviewed:                        11.0   8.92  )-----------( 201      ( 05 Aug 2024 12:15 )             36.0     05 Aug 2024 12:15    139    |  104    |  23     ----------------------------<  102    4.2     |  31     |  1.10     Ca    9.5        05 Aug 2024 12:15    TPro  6.6    /  Alb  3.0    /  TBili  0.5    /  DBili  x      /  AST  21     /  ALT  19     /  AlkPhos  94     05 Aug 2024 12:15    PT/INR - ( 05 Aug 2024 12:15 )   PT: 11.4 sec;   INR: 0.97 ratio         PTT - ( 05 Aug 2024 12:15 )  PTT:29.2 sec    cardiac optimization, HTN       EXAM:  ECHO TTE WO CON COMP W DOPP         PROCEDURE DATE:  09/07/2020        INTERPRETATION:  INDICATION: Syncope  Sonographer AS    Blood Pressure 120/78    Height 157.48 cm     Weight 52.6 kg       BSA 1.5 sq m    Dimensions:  LA 2.6       Normal Values: 2.0 - 4.0 cm  Ao 3.2        Normal Values: 2.0 - 3.8 cm  SEPTUM 1.2       Normal Values: 0.6 - 1.2 cm  PWT 1.1       Normal Values: 0.6 - 1.1 cm  LVIDd 3.9         Normal Values: 3.0 - 5.6 cm  LVIDs 2.6         Normal Values: 1.8 - 4.0 cm      OBSERVATIONS:  Technically difficult and limited study  Mitral Valve: Thickened leaflets, mild MR.  Aortic Valve/Aorta: Aortic valve is not well-visualized, no signs of severe valvular pathology  Tricuspid Valve: Mild TR.  Pulmonic Valve: Not well-visualized  Left Atrium: normal  Right Atrium: Not well-visualized  Left Ventricle: Grossly normal LV size and systolic function, estimated LVEF of 65%. Endocardium is not well-visualized, cannot rule out segmental dysfunction  Right Ventricle: The right ventricle is not well-visualized  Pericardium/Pleura: Trace pericardial effusion.  Pulmonary/RV Pressure: estimated PA systolic pressure of 27.7 mmHg assuming an RA pressure of 3 mmHg.  LV diastolic dysfunction is present    Conclusion:  Technically difficult and limited study  Grossly normal left ventricular internal dimensions and systolic function, estimated LVEF of 65%.  The right ventricle is not well-visualized  Aortic valve is not well-visualized, no signs of severe valvular pathology  Mild MR and TR.  No significant pericardial effusion.              ARUN MORENO   This document has been electronically signed. Sep  8 2020  8:57AM     ACC: [id number] EXAM:  XR TIB FIB AP LAT 2 VIEWS LT   ORDERED BY: DANIELLA RAMIREZ     ACC: [id number] EXAM:  XR CHEST AP OR PA 1V   ORDERED BY: DANIELLA RAMIREZ     PROCEDURE DATE:  08/05/2024          INTERPRETATION:  EXAM: XR CHEST, XR TIBIA FIBULA AP AND LATERAL 2 VIEWS   LEFT    INDICATION: leg ulcer PXR    COMPARISON: See below    IMPRESSION:    Chest August 5 at 11:47 AM: Exam is compared to June 16, 2024. Again   deformity of the right upper chest noted with some increased density   overlying the right upper chest compatible with scarring and/or   atelectatic change. An acute consolidative process cannot absolutely BE   excluded given the slightly more prominent density in the right upper   chest. Loss of lung volume on the right noted with some increased density   also seen at the right CP angle as before. Left chest is clear. Old   trauma related to the left humeral head neck region as before. Follow-up   suggested.    Left tibia-fibula: No acute fracture or dislocation. Some superficial   deformity in the anterior mid calf region incidentally noted related to   the soft tissues on the lateral view. Bandage artifact related to the   distal tibia-fibula and ankle region limits exam. Patient has a history   of nonhealing ulcer. Soft tissue swelling seen in the mid calf on July 24   has significantly improved. Follow-up suggested as indicated clinically.    --- End of Report ---            JUSTIN RENDON MD; Attending Radiologist  This document has been electronically signed. Aug  5 2024 12:04PM

## 2024-08-07 LAB
ALBUMIN SERPL ELPH-MCNC: 2.6 G/DL — LOW (ref 3.3–5)
ALP SERPL-CCNC: 96 U/L — SIGNIFICANT CHANGE UP (ref 40–120)
ALT FLD-CCNC: 18 U/L — SIGNIFICANT CHANGE UP (ref 12–78)
ANION GAP SERPL CALC-SCNC: 5 MMOL/L — SIGNIFICANT CHANGE UP (ref 5–17)
AST SERPL-CCNC: 26 U/L — SIGNIFICANT CHANGE UP (ref 15–37)
BILIRUB SERPL-MCNC: 0.3 MG/DL — SIGNIFICANT CHANGE UP (ref 0.2–1.2)
BUN SERPL-MCNC: 18 MG/DL — SIGNIFICANT CHANGE UP (ref 7–23)
CALCIUM SERPL-MCNC: 9.3 MG/DL — SIGNIFICANT CHANGE UP (ref 8.5–10.1)
CHLORIDE SERPL-SCNC: 102 MMOL/L — SIGNIFICANT CHANGE UP (ref 96–108)
CO2 SERPL-SCNC: 31 MMOL/L — SIGNIFICANT CHANGE UP (ref 22–31)
CREAT SERPL-MCNC: 1.1 MG/DL — SIGNIFICANT CHANGE UP (ref 0.5–1.3)
EGFR: 51 ML/MIN/1.73M2 — LOW
GLUCOSE SERPL-MCNC: 125 MG/DL — HIGH (ref 70–99)
GRAM STN FLD: ABNORMAL
HCT VFR BLD CALC: 34.9 % — SIGNIFICANT CHANGE UP (ref 34.5–45)
HGB BLD-MCNC: 11.1 G/DL — LOW (ref 11.5–15.5)
MCHC RBC-ENTMCNC: 30.7 PG — SIGNIFICANT CHANGE UP (ref 27–34)
MCHC RBC-ENTMCNC: 31.8 GM/DL — LOW (ref 32–36)
MCV RBC AUTO: 96.4 FL — SIGNIFICANT CHANGE UP (ref 80–100)
NRBC # BLD: 0 /100 WBCS — SIGNIFICANT CHANGE UP (ref 0–0)
PLATELET # BLD AUTO: 224 K/UL — SIGNIFICANT CHANGE UP (ref 150–400)
POTASSIUM SERPL-MCNC: 4.1 MMOL/L — SIGNIFICANT CHANGE UP (ref 3.5–5.3)
POTASSIUM SERPL-SCNC: 4.1 MMOL/L — SIGNIFICANT CHANGE UP (ref 3.5–5.3)
PROT SERPL-MCNC: 5.9 G/DL — LOW (ref 6–8.3)
RBC # BLD: 3.62 M/UL — LOW (ref 3.8–5.2)
RBC # FLD: 14 % — SIGNIFICANT CHANGE UP (ref 10.3–14.5)
SODIUM SERPL-SCNC: 138 MMOL/L — SIGNIFICANT CHANGE UP (ref 135–145)
SPECIMEN SOURCE: SIGNIFICANT CHANGE UP
WBC # BLD: 8.03 K/UL — SIGNIFICANT CHANGE UP (ref 3.8–10.5)
WBC # FLD AUTO: 8.03 K/UL — SIGNIFICANT CHANGE UP (ref 3.8–10.5)

## 2024-08-07 PROCEDURE — 99232 SBSQ HOSP IP/OBS MODERATE 35: CPT

## 2024-08-07 RX ORDER — LORAZEPAM 1 MG/1
1 TABLET ORAL ONCE
Refills: 0 | Status: DISCONTINUED | OUTPATIENT
Start: 2024-08-07 | End: 2024-08-09

## 2024-08-07 RX ORDER — AMLODIPINE BESYLATE 2.5 MG/1
5 TABLET ORAL DAILY
Refills: 0 | Status: DISCONTINUED | OUTPATIENT
Start: 2024-08-07 | End: 2024-08-08

## 2024-08-07 RX ADMIN — VANCOMYCIN HYDROCHLORIDE 250 MILLIGRAM(S): 5 INJECTION, POWDER, LYOPHILIZED, FOR SOLUTION INTRAVENOUS at 12:12

## 2024-08-07 RX ADMIN — Medication 12.5 MILLIGRAM(S): at 12:12

## 2024-08-07 RX ADMIN — Medication 81 MILLIGRAM(S): at 12:12

## 2024-08-07 RX ADMIN — ROPINIROLE 1 MILLIGRAM(S): 1 TABLET ORAL at 17:51

## 2024-08-07 RX ADMIN — Medication 12.5 MILLIGRAM(S): at 06:24

## 2024-08-07 RX ADMIN — FAMOTIDINE 20 MILLIGRAM(S): 40 TABLET, FILM COATED ORAL at 12:12

## 2024-08-07 RX ADMIN — Medication 12.5 MILLIGRAM(S): at 21:45

## 2024-08-07 RX ADMIN — ENOXAPARIN SODIUM 30 MILLIGRAM(S): 120 INJECTION SUBCUTANEOUS at 17:52

## 2024-08-07 RX ADMIN — ROPINIROLE 1 MILLIGRAM(S): 1 TABLET ORAL at 06:24

## 2024-08-07 RX ADMIN — Medication 1 TABLET(S): at 12:12

## 2024-08-07 RX ADMIN — HYDROXYCHLOROQUINE SULFATE 200 MILLIGRAM(S): 200 TABLET ORAL at 06:24

## 2024-08-07 RX ADMIN — ATORVASTATIN CALCIUM 20 MILLIGRAM(S): 40 TABLET, FILM COATED ORAL at 21:45

## 2024-08-07 RX ADMIN — Medication 10 MILLIGRAM(S): at 12:12

## 2024-08-07 RX ADMIN — HYDROXYCHLOROQUINE SULFATE 200 MILLIGRAM(S): 200 TABLET ORAL at 17:52

## 2024-08-07 NOTE — PROGRESS NOTE ADULT - SUBJECTIVE AND OBJECTIVE BOX
78y year old Female seen at [facility] 2NOR 234 D1 s/p left lower extremity wound debridement and soft tissue biopsy. Patient relates no overnight events and states that they are doing well at this time.  Denies any fever, chills, nausea, vomiting, chest pain, shortness of breath, or calf pain at this time.    Allergies    IV Contrast (Hives; Rash)  Zofran (Other)  minocycline (Other)  Enbrel (Unknown)  Levaquin (Other)  penicillin (Other)    Intolerances        MEDICATIONS  (STANDING):  acetaminophen   IVPB .. 650 milliGRAM(s) IV Intermittent once  aspirin enteric coated 81 milliGRAM(s) Oral daily  atorvastatin 20 milliGRAM(s) Oral at bedtime  budesonide 160 MICROgram(s)/formoterol 4.5 MICROgram(s) Inhaler 2 Puff(s) Inhalation two times a day  BUpivacaine liposome 1.3% Injectable 20 milliLiter(s) Local Injection once  enoxaparin Injectable 30 milliGRAM(s) SubCutaneous every 24 hours  famotidine    Tablet 20 milliGRAM(s) Oral daily  hydroxychloroquine 200 milliGRAM(s) Oral two times a day  lactated ringers. 1000 milliLiter(s) (60 mL/Hr) IV Continuous <Continuous>  metoprolol tartrate 12.5 milliGRAM(s) Oral three times a day  montelukast 10 milliGRAM(s) Oral daily  multivitamin 1 Tablet(s) Oral daily  rOPINIRole 1 milliGRAM(s) Oral two times a day  vancomycin  IVPB 750 milliGRAM(s) IV Intermittent every 24 hours    MEDICATIONS  (PRN):  acetaminophen     Tablet .. 650 milliGRAM(s) Oral every 6 hours PRN Temp greater or equal to 38C (100.4F), Mild Pain (1 - 3)  melatonin 3 milliGRAM(s) Oral at bedtime PRN Insomnia  morphine  - Injectable 2 milliGRAM(s) IV Push every 6 hours PRN Severe Pain (7 - 10)  traMADol 25 milliGRAM(s) Oral every 4 hours PRN Moderate Pain (4 - 6)      Vital Signs Last 24 Hrs  T(C): 36.5 (07 Aug 2024 06:21), Max: 37.1 (06 Aug 2024 14:29)  T(F): 97.7 (07 Aug 2024 06:21), Max: 98.8 (06 Aug 2024 14:29)  HR: 75 (07 Aug 2024 06:21) (70 - 90)  BP: 184/90 (07 Aug 2024 06:21) (125/69 - 184/96)  BP(mean): 88 (06 Aug 2024 14:59) (78 - 112)  RR: 17 (07 Aug 2024 06:21) (14 - 20)  SpO2: 93% (07 Aug 2024 06:21) (93% - 99%)    Parameters below as of 07 Aug 2024 06:21  Patient On (Oxygen Delivery Method): room air        PHYSICAL EXAM:  Vascular: Palpable pulses b/l. CFT <3 sec x10.  Neurological: Protective sensation intact b/l.  Musculoskeletal: Muscle strength wnl b/l.  Dermatological: Surgical site is without any signs of post-operative infection. Mild periwound erythema. No active bleeding.                           11.1   8.03  )-----------( 224      ( 07 Aug 2024 09:35 )             34.9       08-07    138  |  102  |  18  ----------------------------<  125<H>  4.1   |  31  |  1.10    Ca    9.3      07 Aug 2024 09:35    TPro  5.9<L>  /  Alb  2.6<L>  /  TBili  0.3  /  DBili  x   /  AST  26  /  ALT  18  /  AlkPhos  96  08-07      PT/INR - ( 05 Aug 2024 12:15 )   PT: 11.4 sec;   INR: 0.97 ratio         PTT - ( 05 Aug 2024 12:15 )  PTT:29.2 sec      Culture - Tissue with Gram Stain (collected 06 Aug 2024 14:20)  Source: .Tissue Other, LEFT LOWER LEG TISSUE CULTURE  Gram Stain (07 Aug 2024 00:26):    No polymorphonuclear cells seen per low power field    Rare Gram positive cocci in pairs per oil power field    Culture - Blood (collected 05 Aug 2024 12:15)  Source: .Blood Blood  Preliminary Report (06 Aug 2024 19:02):    No growth at 24 hours    Culture - Blood (collected 05 Aug 2024 12:00)  Source: .Blood Blood  Preliminary Report (06 Aug 2024 19:02):    No growth at 24 hours        Imaging: ----------

## 2024-08-07 NOTE — DIETITIAN INITIAL EVALUATION ADULT - ETIOLOGY
related to inadequate energy intake in setting of chronic disease  related to presumed inadequate energy intake

## 2024-08-07 NOTE — DIETITIAN INITIAL EVALUATION ADULT - PERTINENT MEDS FT
MEDICATIONS  (STANDING):  acetaminophen   IVPB .. 650 milliGRAM(s) IV Intermittent once  aspirin enteric coated 81 milliGRAM(s) Oral daily  atorvastatin 20 milliGRAM(s) Oral at bedtime  budesonide 160 MICROgram(s)/formoterol 4.5 MICROgram(s) Inhaler 2 Puff(s) Inhalation two times a day  BUpivacaine liposome 1.3% Injectable 20 milliLiter(s) Local Injection once  enoxaparin Injectable 30 milliGRAM(s) SubCutaneous every 24 hours  famotidine    Tablet 20 milliGRAM(s) Oral daily  hydroxychloroquine 200 milliGRAM(s) Oral two times a day  lactated ringers. 1000 milliLiter(s) (60 mL/Hr) IV Continuous <Continuous>  metoprolol tartrate 12.5 milliGRAM(s) Oral three times a day  montelukast 10 milliGRAM(s) Oral daily  multivitamin 1 Tablet(s) Oral daily  rOPINIRole 1 milliGRAM(s) Oral two times a day  vancomycin  IVPB 750 milliGRAM(s) IV Intermittent every 24 hours    MEDICATIONS  (PRN):  acetaminophen     Tablet .. 650 milliGRAM(s) Oral every 6 hours PRN Temp greater or equal to 38C (100.4F), Mild Pain (1 - 3)  melatonin 3 milliGRAM(s) Oral at bedtime PRN Insomnia  morphine  - Injectable 2 milliGRAM(s) IV Push every 6 hours PRN Severe Pain (7 - 10)  traMADol 25 milliGRAM(s) Oral every 4 hours PRN Moderate Pain (4 - 6)

## 2024-08-07 NOTE — DIETITIAN NUTRITION RISK NOTIFICATION - ADDITIONAL COMMENTS/DIETITIAN RECOMMENDATIONS
recommend gideon BID   add mighty shake BID   recommend Vit C 500mg BID  most likely acute on chronic malnutrition

## 2024-08-07 NOTE — PROGRESS NOTE ADULT - SUBJECTIVE AND OBJECTIVE BOX
PULMONARY/CRITICAL CARE  DOS 8/7/24  Had debridement. No sob. No fever.   Pt. well known to me.    Patient is a 78y old  Female who presents with a chief complaint of cellulitis (05 Aug 2024 17:18)    BRIEF HOSPITAL COURSE: ***72-year-old female with history of lung CA status post right lobectomy, CKD, hyperlipidemia, RA, COPD on 2L O2 at night, cholecystectomy, who was sent from wound care for evaluation of nonhealing left lower leg wound x 2 months.  Patient states that she has had wound to left lower leg since May 2024 from a fall which has not been healing.  States the wound was infected recently and was on antibiotics 2 weeks ago.  States that she was seen at wound care today by Dr. Dubois and sent to ER for admission for IV antibiotics and wound debridement.  Patient denies fever, chills, chest pain, shortness of breath.    Events last 24 hours: ***    PAST MEDICAL & SURGICAL HISTORY:  HLD (hyperlipidemia)      Rheumatoid arthritis      TIA (transient ischemic attack)  2012      Lung cancer  Right lung.      Chronic obstructive pulmonary disease, unspecified COPD type  O2 at night      Hiatal hernia with GERD      Essential hypertension      Childhood asthma      Stage 3 chronic kidney disease      Carotid artery plaque      IBS (irritable bowel syndrome)      Psoriatic arthritis      S/P lobectomy of lung  Right lung in 1996.      H/O colonoscopy      History of endoscopy  Sept 2017      S/P cholecystectomy        Allergies    IV Contrast (Hives; Rash)  Zofran (Other)  minocycline (Other)  Enbrel (Unknown)  Levaquin (Other)  penicillin (Other)    Intolerances      FAMILY HISTORY: No cigs, etoh  FH: type 2 diabetes (Mother)    FH: CAD (coronary artery disease) (Father, Sibling)    FH: colon cancer (Sibling)    FH: myocardial infarction (Father)    FH: stroke (Sibling)    FH: lung cancer (Sibling)      Medications:  hydroxychloroquine 200 milliGRAM(s) Oral two times a day  vancomycin  IVPB 750 milliGRAM(s) IV Intermittent every 24 hours      montelukast 10 milliGRAM(s) Oral daily    melatonin 3 milliGRAM(s) Oral at bedtime PRN  rOPINIRole 1 milliGRAM(s) Oral two times a day      aspirin enteric coated 81 milliGRAM(s) Oral daily    famotidine    Tablet 20 milliGRAM(s) Oral daily      atorvastatin 20 milliGRAM(s) Oral at bedtime    lactated ringers. 1000 milliLiter(s) IV Continuous <Continuous>  multivitamin 1 Tablet(s) Oral daily                ICU Vital Signs Last 24 Hrs  T(C): 36.3 (06 Aug 2024 05:15), Max: 36.7 (05 Aug 2024 10:00)  T(F): 97.4 (06 Aug 2024 05:15), Max: 98.1 (05 Aug 2024 10:00)  HR: 73 (06 Aug 2024 05:15) (73 - 88)  BP: 152/78 (06 Aug 2024 07:30) (136/58 - 224/86)  BP(mean): --  ABP: --  ABP(mean): --  RR: 18 (06 Aug 2024 05:15) (18 - 18)  SpO2: 92% (06 Aug 2024 05:15) (92% - 93%)    O2 Parameters below as of 06 Aug 2024 05:15  Patient On (Oxygen Delivery Method): room air          Vital Signs Last 24 Hrs  T(C): 36.3 (06 Aug 2024 05:15), Max: 36.7 (05 Aug 2024 10:00)  T(F): 97.4 (06 Aug 2024 05:15), Max: 98.1 (05 Aug 2024 10:00)  HR: 73 (06 Aug 2024 05:15) (73 - 88)  BP: 152/78 (06 Aug 2024 07:30) (136/58 - 224/86)  BP(mean): --  RR: 18 (06 Aug 2024 05:15) (18 - 18)  SpO2: 92% (06 Aug 2024 05:15) (92% - 93%)    Parameters below as of 06 Aug 2024 05:15  Patient On (Oxygen Delivery Method): room air            I&O's Detail    05 Aug 2024 07:01  -  06 Aug 2024 07:00  --------------------------------------------------------  IN:  Total IN: 0 mL    OUT:    Voided (mL): 250 mL  Total OUT: 250 mL    Total NET: -250 mL            LABS:                        11.0   8.92  )-----------( 201      ( 05 Aug 2024 12:15 )             36.0     08-05    139  |  104  |  23  ----------------------------<  102<H>  4.2   |  31  |  1.10    Ca    9.5      05 Aug 2024 12:15    TPro  6.6  /  Alb  3.0<L>  /  TBili  0.5  /  DBili  x   /  AST  21  /  ALT  19  /  AlkPhos  94  08-05          CAPILLARY BLOOD GLUCOSE        PT/INR - ( 05 Aug 2024 12:15 )   PT: 11.4 sec;   INR: 0.97 ratio         PTT - ( 05 Aug 2024 12:15 )  PTT:29.2 sec  Urinalysis Basic - ( 05 Aug 2024 12:15 )    Color: x / Appearance: x / SG: x / pH: x  Gluc: 102 mg/dL / Ketone: x  / Bili: x / Urobili: x   Blood: x / Protein: x / Nitrite: x   Leuk Esterase: x / RBC: x / WBC x   Sq Epi: x / Non Sq Epi: x / Bacteria: x      CULTURES:      Physical Examination:    General: No acute distress.  thin female    HEENT: Pupils equal, reactive to light.  Symmetric.    PULM: Clear to auscultation bilaterally, no wheeze rhonchi rales no change percussion    CVS: Regular rate and rhythm, no murmurs, rubs, or gallops    ABD: Soft, nondistended, nontender, normoactive bowel sounds, no masses    EXT: No edema, nontender calves; large wound left ant shin bandaged.    SKIN: Warm and well perfused, no rashes noted.    NEURO: Alert, oriented, interactive, nonfocal    RADIOLOGY: ***rd< from: Xray Tibia + Fibula 2 Views, Left (08.05.24 @ 11:38) >  ACC: [id number] EXAM:  XR TIB FIB AP LAT 2 VIEWS LT   ORDERED BY: DANIELLA RAMIREZ     ACC: [id number] EXAM:  XR CHEST AP OR PA 1V   ORDERED BY: DANIELLA RAMIREZ     PROCEDURE DATE:  08/05/2024          INTERPRETATION:  EXAM: XR CHEST, XR TIBIA FIBULA AP AND LATERAL 2 VIEWS   LEFT    INDICATION: leg ulcer PXR    COMPARISON: See below    IMPRESSION:    Chest August 5 at 11:47 AM: Exam is compared to June 16, 2024. Again   deformity of the right upper chest noted with some increased density   overlying the right upper chest compatible with scarring and/or   atelectatic change. An acute consolidative process cannot absolutely BE   excluded given the slightly more prominent density in the right upper   chest. Loss of lung volume on the right noted with some increased density   also seen at the right CP angle as before. Left chest is clear. Old   trauma related to the left humeral head neck region as before. Follow-up   suggested.    Left tibia-fibula: No acute fracture or dislocation. Some superficial   deformity in the anterior mid calf region incidentally noted related to   the soft tissues on the lateral view. Bandage artifact related to the   distal tibia-fibula and ankle region limits exam. Patient has a history   of nonhealing ulcer. Soft tissue swelling seen in the mid calf on July 24   has significantly improved. Follow-up suggested as indicated clinically.    --- End of Report ---            JUSTIN RENDON MD; Attending Radiologist  This document has been electronically signed. Aug  5 2024 12:04PM    < end of copied text >      CRITICAL CARE TIME SPENT: ***

## 2024-08-07 NOTE — PROGRESS NOTE ADULT - SUBJECTIVE AND OBJECTIVE BOX
[facility] -- Emanuel Bethea Pannella, Patel, Savella, Goodger, Cohen  Office # [phone]    Follow Up:  cardiac optimization, HTN    Subjective/Observations: seen and examined, awake, alert, resting comfortably in bed, denies chest pain, dyspnea, palpitations or dizziness, orthopnea and PND. Tolerating room air. no events overnight     REVIEW OF SYSTEMS: All other review of systems is negative unless indicated above  PAST MEDICAL & SURGICAL HISTORY:  HLD (hyperlipidemia)      Rheumatoid arthritis      TIA (transient ischemic attack)  2012      Lung cancer  Right lung.      Chronic obstructive pulmonary disease, unspecified COPD type  O2 at night      Hiatal hernia with GERD      Essential hypertension      Childhood asthma      Stage 3 chronic kidney disease      Carotid artery plaque      IBS (irritable bowel syndrome)      Psoriatic arthritis      S/P lobectomy of lung  Right lung in 1996.      H/O colonoscopy      History of endoscopy  Sept 2017      S/P cholecystectomy        MEDICATIONS  (STANDING):  acetaminophen   IVPB .. 650 milliGRAM(s) IV Intermittent once  aspirin enteric coated 81 milliGRAM(s) Oral daily  atorvastatin 20 milliGRAM(s) Oral at bedtime  budesonide 160 MICROgram(s)/formoterol 4.5 MICROgram(s) Inhaler 2 Puff(s) Inhalation two times a day  BUpivacaine liposome 1.3% Injectable 20 milliLiter(s) Local Injection once  enoxaparin Injectable 30 milliGRAM(s) SubCutaneous every 24 hours  famotidine    Tablet 20 milliGRAM(s) Oral daily  hydroxychloroquine 200 milliGRAM(s) Oral two times a day  lactated ringers. 1000 milliLiter(s) (60 mL/Hr) IV Continuous <Continuous>  metoprolol tartrate 12.5 milliGRAM(s) Oral three times a day  montelukast 10 milliGRAM(s) Oral daily  multivitamin 1 Tablet(s) Oral daily  rOPINIRole 1 milliGRAM(s) Oral two times a day  vancomycin  IVPB 750 milliGRAM(s) IV Intermittent every 24 hours    MEDICATIONS  (PRN):  acetaminophen     Tablet .. 650 milliGRAM(s) Oral every 6 hours PRN Temp greater or equal to 38C (100.4F), Mild Pain (1 - 3)  melatonin 3 milliGRAM(s) Oral at bedtime PRN Insomnia  morphine  - Injectable 2 milliGRAM(s) IV Push every 6 hours PRN Severe Pain (7 - 10)  traMADol 25 milliGRAM(s) Oral every 4 hours PRN Moderate Pain (4 - 6)    Allergies    IV Contrast (Hives; Rash)  Zofran (Other)  minocycline (Other)  Enbrel (Unknown)  Levaquin (Other)  penicillin (Other)    Intolerances      Vital Signs Last 24 Hrs  T(C): 36.5 (07 Aug 2024 06:21), Max: 37.1 (06 Aug 2024 14:29)  T(F): 97.7 (07 Aug 2024 06:21), Max: 98.8 (06 Aug 2024 14:29)  HR: 75 (07 Aug 2024 06:21) (70 - 90)  BP: 184/90 (07 Aug 2024 06:21) (125/69 - 184/96)  BP(mean): 88 (06 Aug 2024 14:59) (78 - 112)  RR: 17 (07 Aug 2024 06:21) (14 - 20)  SpO2: 93% (07 Aug 2024 06:21) (93% - 99%)    Parameters below as of 07 Aug 2024 06:21  Patient On (Oxygen Delivery Method): room air      I&O's Summary    06 Aug 2024 07:01  -  07 Aug 2024 07:00  --------------------------------------------------------  IN: 720 mL / OUT: 200 mL / NET: 520 mL          TELE: Not on telemetry   PHYSICAL EXAM:  Constitutional: NAD, awake and alert  HEENT: Moist Mucous Membranes, Anicteric  Pulmonary: Non-labored, breath sounds are clear bilaterally, No wheezing, rales or rhonchi  Cardiovascular: Regular, S1 and S2, No murmurs, rubs, gallops or clicks  Gastrointestinal: Bowel Sounds present, soft, nontender.   Lymph: No peripheral edema. No lymphadenopathy.  Skin: No visible rashes or ulcers. B/L LE with dressings in place   Psych:  Mood & affect appropriate  LABS: All Labs Reviewed:                        11.1   8.03  )-----------( 224      ( 07 Aug 2024 09:35 )             34.9                         11.0   8.92  )-----------( 201      ( 05 Aug 2024 12:15 )             36.0     07 Aug 2024 09:35    138    |  102    |  18     ----------------------------<  125    4.1     |  31     |  1.10   05 Aug 2024 12:15    139    |  104    |  23     ----------------------------<  102    4.2     |  31     |  1.10     Ca    9.3        07 Aug 2024 09:35  Ca    9.5        05 Aug 2024 12:15    TPro  5.9    /  Alb  2.6    /  TBili  0.3    /  DBili  x      /  AST  26     /  ALT  18     /  AlkPhos  96     07 Aug 2024 09:35  TPro  6.6    /  Alb  3.0    /  TBili  0.5    /  DBili  x      /  AST  21     /  ALT  19     /  AlkPhos  94     05 Aug 2024 12:15    PT/INR - ( 05 Aug 2024 12:15 )   PT: 11.4 sec;   INR: 0.97 ratio         PTT - ( 05 Aug 2024 12:15 )  PTT:29.2 sec      12 Lead ECG:   Ventricular Rate 77 BPM    Atrial Rate 77 BPM    P-R Interval 144 ms    QRS Duration 88 ms    Q-T Interval 384 ms    QTC Calculation(Bazett) 434 ms    P Axis 68 degrees    R Axis 37 degrees    T Axis 64 degrees    Diagnosis Line Normal sinus rhythm  Possible Left atrial enlargement  Borderline ECG  Confirmed by kareen Gorman ([id number]) on 8/5/2024 2:33:35 PM (08-05-24 @ 11:40)       EXAM:  ECHO TTE WO CON COMP W DOPP         PROCEDURE DATE:  09/07/2020        INTERPRETATION:  INDICATION: Syncope  Sonographer AS    Blood Pressure 120/78    Height 157.48 cm     Weight 52.6 kg       BSA 1.5 sq m    Dimensions:  LA 2.6       Normal Values: 2.0 - 4.0 cm  Ao 3.2        Normal Values: 2.0 - 3.8 cm  SEPTUM 1.2       Normal Values: 0.6 - 1.2 cm  PWT 1.1       Normal Values: 0.6 - 1.1 cm  LVIDd 3.9         Normal Values: 3.0 - 5.6 cm  LVIDs 2.6         Normal Values: 1.8 - 4.0 cm      OBSERVATIONS:  Technically difficult and limited study  Mitral Valve: Thickened leaflets, mild MR.  Aortic Valve/Aorta: Aortic valve is not well-visualized, no signs of severe valvular pathology  Tricuspid Valve: Mild TR.  Pulmonic Valve: Not well-visualized  Left Atrium: normal  Right Atrium: Not well-visualized  Left Ventricle: Grossly normal LV size and systolic function, estimated LVEF of 65%. Endocardium is not well-visualized, cannot rule out segmental dysfunction  Right Ventricle: The right ventricle is not well-visualized  Pericardium/Pleura: Trace pericardial effusion.  Pulmonary/RV Pressure: estimated PA systolic pressure of 27.7 mmHg assuming an RA pressure of 3 mmHg.  LV diastolic dysfunction is present    Conclusion:  Technically difficult and limited study  Grossly normal left ventricular internal dimensions and systolic function, estimated LVEF of 65%.  The right ventricle is not well-visualized  Aortic valve is not well-visualized, no signs of severe valvular pathology  Mild MR and TR.  No significant pericardial effusion.              ARUN MORENO   This document has been electronically signed. Sep  8 2020  8:57AM

## 2024-08-07 NOTE — PROGRESS NOTE ADULT - SUBJECTIVE AND OBJECTIVE BOX
Patient is a 78y old  Female who presents with a chief complaint of cellulitis (07 Aug 2024 10:59)  less pain      INTERVAL HPI/OVERNIGHT EVENTS:  T(C): 36.7 (08-07-24 @ 12:07), Max: 37.1 (08-06-24 @ 14:29)  HR: 73 (08-07-24 @ 12:07) (70 - 90)  BP: 182/88 (08-07-24 @ 12:07) (125/69 - 184/90)  RR: 18 (08-07-24 @ 12:07) (14 - 18)  SpO2: 93% (08-07-24 @ 12:07) (93% - 99%)  Wt(kg): --  I&O's Summary    06 Aug 2024 07:01  -  07 Aug 2024 07:00  --------------------------------------------------------  IN: 720 mL / OUT: 200 mL / NET: 520 mL        LABS:                        11.1   8.03  )-----------( 224      ( 07 Aug 2024 09:35 )             34.9     08-07    138  |  102  |  18  ----------------------------<  125<H>  4.1   |  31  |  1.10    Ca    9.3      07 Aug 2024 09:35    TPro  5.9<L>  /  Alb  2.6<L>  /  TBili  0.3  /  DBili  x   /  AST  26  /  ALT  18  /  AlkPhos  96  08-07      Urinalysis Basic - ( 07 Aug 2024 09:35 )    Color: x / Appearance: x / SG: x / pH: x  Gluc: 125 mg/dL / Ketone: x  / Bili: x / Urobili: x   Blood: x / Protein: x / Nitrite: x   Leuk Esterase: x / RBC: x / WBC x   Sq Epi: x / Non Sq Epi: x / Bacteria: x      CAPILLARY BLOOD GLUCOSE            Urinalysis Basic - ( 07 Aug 2024 09:35 )    Color: x / Appearance: x / SG: x / pH: x  Gluc: 125 mg/dL / Ketone: x  / Bili: x / Urobili: x   Blood: x / Protein: x / Nitrite: x   Leuk Esterase: x / RBC: x / WBC x   Sq Epi: x / Non Sq Epi: x / Bacteria: x        MEDICATIONS  (STANDING):  acetaminophen   IVPB .. 650 milliGRAM(s) IV Intermittent once  amLODIPine   Tablet 5 milliGRAM(s) Oral daily  aspirin enteric coated 81 milliGRAM(s) Oral daily  atorvastatin 20 milliGRAM(s) Oral at bedtime  budesonide 160 MICROgram(s)/formoterol 4.5 MICROgram(s) Inhaler 2 Puff(s) Inhalation two times a day  BUpivacaine liposome 1.3% Injectable 20 milliLiter(s) Local Injection once  enoxaparin Injectable 30 milliGRAM(s) SubCutaneous every 24 hours  famotidine    Tablet 20 milliGRAM(s) Oral daily  hydroxychloroquine 200 milliGRAM(s) Oral two times a day  lactated ringers. 1000 milliLiter(s) (60 mL/Hr) IV Continuous <Continuous>  metoprolol tartrate 12.5 milliGRAM(s) Oral three times a day  montelukast 10 milliGRAM(s) Oral daily  multivitamin 1 Tablet(s) Oral daily  rOPINIRole 1 milliGRAM(s) Oral two times a day  vancomycin  IVPB 750 milliGRAM(s) IV Intermittent every 24 hours    MEDICATIONS  (PRN):  acetaminophen     Tablet .. 650 milliGRAM(s) Oral every 6 hours PRN Temp greater or equal to 38C (100.4F), Mild Pain (1 - 3)  melatonin 3 milliGRAM(s) Oral at bedtime PRN Insomnia  morphine  - Injectable 2 milliGRAM(s) IV Push every 6 hours PRN Severe Pain (7 - 10)  traMADol 25 milliGRAM(s) Oral every 4 hours PRN Moderate Pain (4 - 6)      REVIEW OF SYSTEMS:  CONSTITUTIONAL: No fever, weight loss, or fatigue  EYES: No eye pain, visual disturbances, or discharge  ENMT:  No difficulty hearing, tinnitus, vertigo; No sinus or throat pain  NECK: No pain or stiffness  RESPIRATORY: No cough, wheezing, chills or hemoptysis; No shortness of breath  CARDIOVASCULAR: No chest pain, palpitations, dizziness, or leg swelling  GASTROINTESTINAL: No abdominal or epigastric pain. No nausea, vomiting, or hematemesis; No diarrhea or constipation. No melena or hematochezia.  GENITOURINARY: No dysuria, frequency, hematuria, or incontinence  NEUROLOGICAL: No headaches, memory loss, loss of strength, numbness, or tremors  SKIN: No itching, burning, rashes, or lesions   LYMPH NODES: No enlarged glands  ENDOCRINE: No heat or cold intolerance; No hair loss  MUSCULOSKELETAL: No joint pain or swelling; No muscle, back, or extremity pain  PSYCHIATRIC: No depression, anxiety, mood swings, or difficulty sleeping  HEME/LYMPH: No easy bruising, or bleeding gums  ALLERY AND IMMUNOLOGIC: No hives or eczema    RADIOLOGY & ADDITIONAL TESTS:    Imaging Personally Reviewed:  [x ] YES  [ ] NO    Consultant(s) Notes Reviewed:  [x ] YES  [ ] NO    PHYSICAL EXAM:  GENERAL: NAD, well-groomed, well-developed  HEAD:  Atraumatic, Normocephalic  EYES: EOMI, PERRLA, conjunctiva and sclera clear  ENMT: No tonsillar erythema, exudates, or enlargement; Moist mucous membranes, Good dentition, No lesions  NECK: Supple, No JVD, Normal thyroid  NERVOUS SYSTEM:  Alert & Oriented X3, Good concentration; Motor Strength 5/5 B/L upper and lower extremities; DTRs 2+ intact and symmetric  CHEST/LUNG: Clear to percussion bilaterally; No rales, rhonchi, wheezing, or rubs  HEART: Regular rate and rhythm; No murmurs, rubs, or gallops  ABDOMEN: Soft, Nontender, Nondistended; Bowel sounds present  EXTREMITIES:  2+ Peripheral Pulses, No clubbing, cyanosis, or edema  LYMPH: No lymphadenopathy noted  SKIN: No rashes or lesions    Care Discussed with Consultants/Other Providers [x ] YES  [ ] NO    advance care planning and advance directives discussed, including but not limited to long term care planning, and all forms reviewed [x]YES  [ ]NO  family caregiver training provided [x]YES  [ ]NO  time spent greater than 45min

## 2024-08-07 NOTE — PROGRESS NOTE ADULT - ASSESSMENT
Pt. well known to me admitted for nonhealing wound/cellulitis left lower leg following laceration.  S/p debridement Dr. Bay.  Suggest ID FU.  Antibiotics  Ambulate.  Wound care.  COPD  Htn

## 2024-08-07 NOTE — DIETITIAN INITIAL EVALUATION ADULT - OTHER INFO
Reason for Admission: cellulitis  History of Present Illness:   72-year-old female with history of lung CA status post right lobectomy, CKD, hyperlipidemia, RA, COPD on 2L O2 at night, cholecystectomy sent from wound care for evaluation of nonhealing left lower leg wound x 2 months.  Patient states that she has had wound to left lower leg for the past 2 months which has not been healing.  States the wound was infected recently and was on antibiotics 2 weeks ago.  States that she was seen at wound care today by Dr. Dubois and sent to ER for admission for IV antibiotics, MRI and wound debridement.  Patient denies fever, chills, chest pain, shortness of breath.  weight 94# patient states -108#   LR 60 ml hr  8/6 BM

## 2024-08-07 NOTE — PROGRESS NOTE ADULT - ASSESSMENT
72-year-old female with history of lung CA status post right lobectomy, CKD, hyperlipidemia, RA, COPD on 2L O2 at night, cholecystectomy, who was sent from wound care for evaluation of nonhealing left lower leg wound x 2 months.    cardiac optimization, HTN  - presents w/ non healing LLE wound  - s/p debridement (8/6) tolerated well from CV POV, podiatry following    - BP labile and uncontrolled 150-180's systolics, likely reactive from pain   - not on anti hypertensives at home  - pain management per primary   - start Uufolei21 mg PO daily   - Monitor and replete Lytes. Keep K > 4 and Mg > 2    - EKG demonstrates NSR  - No acute ischemic changes noted.   - Cardiac Cath/PCI: 12/11/2013. Minimal nonobstructive CAD. NST 2017 normal  - continue ASA and statin (hx of mild carotid plaque with a history of TIA)    - Echo (4/2024) LVSF normal, EF 55-60%, mild grade 1 LVDD, mild NC.   - euvolemic on exam   - no O2 requirement     - Follows with Dr. Oh (outpaitient cardiologist)   - Will continue to follow.    Kate Allison [facility]  Nurse Practitioner - Cardiology   call TEAMS   72-year-old female with history of lung CA status post right lobectomy, CKD, hyperlipidemia, RA, COPD on 2L O2 at night, cholecystectomy, who was sent from wound care for evaluation of nonhealing left lower leg wound x 2 months.    cardiac optimization, HTN  - presents w/ non healing LLE wound  - s/p debridement (8/6) tolerated well from CV POV, podiatry following    - BP labile and uncontrolled 150-180's systolics, likely 2/2 to pain   - not on anti hypertensives at home  - pain management per primary   - start Norvasc 5 mg PO daily, uptitrate if BP remains uncontrolled   - Monitor and replete Lytes. Keep K > 4 and Mg > 2    - EKG demonstrates NSR  - No acute ischemic changes noted.   - Cardiac Cath/PCI: 12/11/2013. Minimal nonobstructive CAD. NST 2017 normal  - continue ASA and statin (hx of mild carotid plaque with a history of TIA)    - Echo (4/2024) LVSF normal, EF 55-60%, mild grade 1 LVDD, mild NM.   - euvolemic on exam   - no O2 requirement     - [facility]   - Will continue to follow.    Kate Allison [facility]  Nurse Practitioner - Cardiology   call TEAMS

## 2024-08-07 NOTE — PROVIDER CONTACT NOTE (CRITICAL VALUE NOTIFICATION) - TEST AND RESULT REPORTED:
Tissue culture collected on 8/6 gram stain positive for no polymorphonuclear cells seen per low powerfield  rare gram positive cocci in pairs per oil powerfield

## 2024-08-07 NOTE — DIETITIAN INITIAL EVALUATION ADULT - PERTINENT LABORATORY DATA
08-05    139  |  104  |  23  ----------------------------<  102<H>  4.2   |  31  |  1.10    Ca    9.5      05 Aug 2024 12:15    TPro  6.6  /  Alb  3.0<L>  /  TBili  0.5  /  DBili  x   /  AST  21  /  ALT  19  /  AlkPhos  94  08-05

## 2024-08-07 NOTE — DIETITIAN INITIAL EVALUATION ADULT - ORAL INTAKE PTA/DIET HISTORY
patient seen in bed. patient reports not a breakfast eater PTA. wakes up early but no appetite. reports for a couple of months low PO intake with loss of weight, food preferences listed. no food allergies.   patient states drinks boost supplement at home . states ensure to sweet. willing to try mighty shake chocolate and gideon supplements   education deferred in this patient on regular diet discussed continuing boost supplement at home

## 2024-08-07 NOTE — PROGRESS NOTE ADULT - PROBLEM SELECTOR PLAN 3
add beta blocker  cardio follow up add beta blocker  cardio follow up  norvasc added this am  will cont to monitor

## 2024-08-08 LAB
-  CLINDAMYCIN: SIGNIFICANT CHANGE UP
-  DAPTOMYCIN: SIGNIFICANT CHANGE UP
-  ERYTHROMYCIN: SIGNIFICANT CHANGE UP
-  GENTAMICIN: SIGNIFICANT CHANGE UP
-  LINEZOLID: SIGNIFICANT CHANGE UP
-  OXACILLIN: SIGNIFICANT CHANGE UP
-  PENICILLIN: SIGNIFICANT CHANGE UP
-  RIFAMPIN: SIGNIFICANT CHANGE UP
-  TETRACYCLINE: SIGNIFICANT CHANGE UP
-  TRIMETHOPRIM/SULFAMETHOXAZOLE: SIGNIFICANT CHANGE UP
-  VANCOMYCIN: SIGNIFICANT CHANGE UP
METHOD TYPE: SIGNIFICANT CHANGE UP
PROCALCITONIN SERPL-MCNC: 0.04 NG/ML — SIGNIFICANT CHANGE UP
VANCOMYCIN TROUGH SERPL-MCNC: 9.2 UG/ML — LOW (ref 10–20)

## 2024-08-08 PROCEDURE — 99232 SBSQ HOSP IP/OBS MODERATE 35: CPT

## 2024-08-08 PROCEDURE — 73718 MRI LOWER EXTREMITY W/O DYE: CPT | Mod: 26,LT

## 2024-08-08 RX ORDER — AMLODIPINE BESYLATE 2.5 MG/1
5 TABLET ORAL ONCE
Refills: 0 | Status: COMPLETED | OUTPATIENT
Start: 2024-08-08 | End: 2024-08-08

## 2024-08-08 RX ORDER — METOPROLOL TARTRATE 100 MG
25 TABLET ORAL
Refills: 0 | Status: DISCONTINUED | OUTPATIENT
Start: 2024-08-08 | End: 2024-08-09

## 2024-08-08 RX ORDER — AMLODIPINE BESYLATE 2.5 MG/1
10 TABLET ORAL DAILY
Refills: 0 | Status: DISCONTINUED | OUTPATIENT
Start: 2024-08-09 | End: 2024-08-09

## 2024-08-08 RX ADMIN — BUDESONIDE AND FORMOTEROL FUMARATE DIHYDRATE 2 PUFF(S): 80; 4.5 AEROSOL RESPIRATORY (INHALATION) at 11:15

## 2024-08-08 RX ADMIN — ROPINIROLE 1 MILLIGRAM(S): 1 TABLET ORAL at 05:57

## 2024-08-08 RX ADMIN — AMLODIPINE BESYLATE 5 MILLIGRAM(S): 2.5 TABLET ORAL at 05:57

## 2024-08-08 RX ADMIN — Medication 25 MILLIGRAM(S): at 17:17

## 2024-08-08 RX ADMIN — ENOXAPARIN SODIUM 30 MILLIGRAM(S): 120 INJECTION SUBCUTANEOUS at 17:18

## 2024-08-08 RX ADMIN — ATORVASTATIN CALCIUM 20 MILLIGRAM(S): 40 TABLET, FILM COATED ORAL at 22:55

## 2024-08-08 RX ADMIN — Medication 12.5 MILLIGRAM(S): at 05:57

## 2024-08-08 RX ADMIN — ROPINIROLE 1 MILLIGRAM(S): 1 TABLET ORAL at 17:17

## 2024-08-08 RX ADMIN — AMLODIPINE BESYLATE 5 MILLIGRAM(S): 2.5 TABLET ORAL at 17:17

## 2024-08-08 RX ADMIN — VANCOMYCIN HYDROCHLORIDE 250 MILLIGRAM(S): 5 INJECTION, POWDER, LYOPHILIZED, FOR SOLUTION INTRAVENOUS at 15:16

## 2024-08-08 RX ADMIN — HYDROXYCHLOROQUINE SULFATE 200 MILLIGRAM(S): 200 TABLET ORAL at 05:57

## 2024-08-08 RX ADMIN — HYDROXYCHLOROQUINE SULFATE 200 MILLIGRAM(S): 200 TABLET ORAL at 17:17

## 2024-08-08 NOTE — PROGRESS NOTE ADULT - SUBJECTIVE AND OBJECTIVE BOX
PULMONARY/CRITICAL CARE  DOS 8/8/24  Has MRSA in wound. On Vanco.  Feels well, ambulated.    No sob. No fever.   Pt. well known to me.    Patient is a 78y old  Female who presents with a chief complaint of cellulitis (05 Aug 2024 17:18)    BRIEF HOSPITAL COURSE: ***72-year-old female with history of lung CA status post right lobectomy, CKD, hyperlipidemia, RA, COPD on 2L O2 at night, cholecystectomy, who was sent from wound care for evaluation of nonhealing left lower leg wound x 2 months.  Patient states that she has had wound to left lower leg since May 2024 from a fall which has not been healing.  States the wound was infected recently and was on antibiotics 2 weeks ago.  States that she was seen at wound care today by Dr. Dubois and sent to ER for admission for IV antibiotics and wound debridement.  Patient denies fever, chills, chest pain, shortness of breath.    Events last 24 hours: ***    PAST MEDICAL & SURGICAL HISTORY:  HLD (hyperlipidemia)      Rheumatoid arthritis      TIA (transient ischemic attack)  2012      Lung cancer  Right lung.      Chronic obstructive pulmonary disease, unspecified COPD type  O2 at night      Hiatal hernia with GERD      Essential hypertension      Childhood asthma      Stage 3 chronic kidney disease      Carotid artery plaque      IBS (irritable bowel syndrome)      Psoriatic arthritis      S/P lobectomy of lung  Right lung in 1996.      H/O colonoscopy      History of endoscopy  Sept 2017      S/P cholecystectomy        Allergies    IV Contrast (Hives; Rash)  Zofran (Other)  minocycline (Other)  Enbrel (Unknown)  Levaquin (Other)  penicillin (Other)    Intolerances      FAMILY HISTORY: No cigs, etoh  FH: type 2 diabetes (Mother)    FH: CAD (coronary artery disease) (Father, Sibling)    FH: colon cancer (Sibling)    FH: myocardial infarction (Father)    FH: stroke (Sibling)    FH: lung cancer (Sibling)      Medications:  hydroxychloroquine 200 milliGRAM(s) Oral two times a day  vancomycin  IVPB 750 milliGRAM(s) IV Intermittent every 24 hours      montelukast 10 milliGRAM(s) Oral daily    melatonin 3 milliGRAM(s) Oral at bedtime PRN  rOPINIRole 1 milliGRAM(s) Oral two times a day      aspirin enteric coated 81 milliGRAM(s) Oral daily    famotidine    Tablet 20 milliGRAM(s) Oral daily      atorvastatin 20 milliGRAM(s) Oral at bedtime    lactated ringers. 1000 milliLiter(s) IV Continuous <Continuous>  multivitamin 1 Tablet(s) Oral daily                ICU Vital Signs Last 24 Hrs  T(C): 36.3 (06 Aug 2024 05:15), Max: 36.7 (05 Aug 2024 10:00)  T(F): 97.4 (06 Aug 2024 05:15), Max: 98.1 (05 Aug 2024 10:00)  HR: 73 (06 Aug 2024 05:15) (73 - 88)  BP: 152/78 (06 Aug 2024 07:30) (136/58 - 224/86)  BP(mean): --  ABP: --  ABP(mean): --  RR: 18 (06 Aug 2024 05:15) (18 - 18)  SpO2: 92% (06 Aug 2024 05:15) (92% - 93%)    O2 Parameters below as of 06 Aug 2024 05:15  Patient On (Oxygen Delivery Method): room air          Vital Signs Last 24 Hrs  T(C): 36.3 (06 Aug 2024 05:15), Max: 36.7 (05 Aug 2024 10:00)  T(F): 97.4 (06 Aug 2024 05:15), Max: 98.1 (05 Aug 2024 10:00)  HR: 73 (06 Aug 2024 05:15) (73 - 88)  BP: 152/78 (06 Aug 2024 07:30) (136/58 - 224/86)  BP(mean): --  RR: 18 (06 Aug 2024 05:15) (18 - 18)  SpO2: 92% (06 Aug 2024 05:15) (92% - 93%)    Parameters below as of 06 Aug 2024 05:15  Patient On (Oxygen Delivery Method): room air            I&O's Detail    05 Aug 2024 07:01  -  06 Aug 2024 07:00  --------------------------------------------------------  IN:  Total IN: 0 mL    OUT:    Voided (mL): 250 mL  Total OUT: 250 mL    Total NET: -250 mL            LABS:                        11.0   8.92  )-----------( 201      ( 05 Aug 2024 12:15 )             36.0     08-05    139  |  104  |  23  ----------------------------<  102<H>  4.2   |  31  |  1.10    Ca    9.5      05 Aug 2024 12:15    TPro  6.6  /  Alb  3.0<L>  /  TBili  0.5  /  DBili  x   /  AST  21  /  ALT  19  /  AlkPhos  94  08-05          CAPILLARY BLOOD GLUCOSE        PT/INR - ( 05 Aug 2024 12:15 )   PT: 11.4 sec;   INR: 0.97 ratio         PTT - ( 05 Aug 2024 12:15 )  PTT:29.2 sec  Urinalysis Basic - ( 05 Aug 2024 12:15 )    Color: x / Appearance: x / SG: x / pH: x  Gluc: 102 mg/dL / Ketone: x  / Bili: x / Urobili: x   Blood: x / Protein: x / Nitrite: x   Leuk Esterase: x / RBC: x / WBC x   Sq Epi: x / Non Sq Epi: x / Bacteria: x      CULTURES:      Physical Examination:    General: No acute distress.  thin female    HEENT: Pupils equal, reactive to light.  Symmetric.    PULM: Clear to auscultation bilaterally, no wheeze rhonchi rales no change percussion    CVS: Regular rate and rhythm, no murmurs, rubs, or gallops    ABD: Soft, nondistended, nontender, normoactive bowel sounds, no masses    EXT: No edema, nontender calves; left shin bandaged.    SKIN: Warm and well perfused, no rashes noted.    NEURO: Alert, oriented, interactive, nonfocal    RADIOLOGY: ***rd< from: Xray Tibia + Fibula 2 Views, Left (08.05.24 @ 11:38) >  ACC: [id number] EXAM:  XR TIB FIB AP LAT 2 VIEWS LT   ORDERED BY: DANIELLA RAMIREZ     ACC: [id number] EXAM:  XR CHEST AP OR PA 1V   ORDERED BY: DANIELLA RAMIREZ     PROCEDURE DATE:  08/05/2024          INTERPRETATION:  EXAM: XR CHEST, XR TIBIA FIBULA AP AND LATERAL 2 VIEWS   LEFT    INDICATION: leg ulcer PXR    COMPARISON: See below    IMPRESSION:    Chest August 5 at 11:47 AM: Exam is compared to June 16, 2024. Again   deformity of the right upper chest noted with some increased density   overlying the right upper chest compatible with scarring and/or   atelectatic change. An acute consolidative process cannot absolutely BE   excluded given the slightly more prominent density in the right upper   chest. Loss of lung volume on the right noted with some increased density   also seen at the right CP angle as before. Left chest is clear. Old   trauma related to the left humeral head neck region as before. Follow-up   suggested.    Left tibia-fibula: No acute fracture or dislocation. Some superficial   deformity in the anterior mid calf region incidentally noted related to   the soft tissues on the lateral view. Bandage artifact related to the   distal tibia-fibula and ankle region limits exam. Patient has a history   of nonhealing ulcer. Soft tissue swelling seen in the mid calf on July 24   has significantly improved. Follow-up suggested as indicated clinically.    --- End of Report ---            JUSTIN RENDON MD; Attending Radiologist  This document has been electronically signed. Aug  5 2024 12:04PM    < end of copied text >      CRITICAL CARE TIME SPENT: ***

## 2024-08-08 NOTE — PROGRESS NOTE ADULT - ASSESSMENT
72-year-old female with history of lung CA status post right lobectomy, CKD, hyperlipidemia, RA, COPD on 2L O2 at night, cholecystectomy, who was sent from wound care for evaluation of nonhealing left lower leg wound x 2 months.      S/p operative wound debridement on 8/6. OR culture growing staph aureus, sensitivities pending but probably MRSA. MRI showed no evidence of osteomyelitis. She has no fevers and no leukocytosis. Blood cultures currently no growth.    #Nonhealing L lower leg wound  #MRSA infection  #Hx of penicillin allergy    -continue vancomycin pending staph aureus sensitivities  -path pending  -follow cultures to completion  -wound care  -contact isolation  -discussed with Dr. Caroline Carrillo MD  Division of Infectious Diseases   Cell [phone] between 8am and 6pm   After 6pm and weekends please call ID service at [phone].     35 minutes spent on total encounter assessing patient, examination, chart review, counseling and coordinating care by the attending physician/nurse/care manager.

## 2024-08-08 NOTE — OCCUPATIONAL THERAPY INITIAL EVALUATION ADULT - GENERAL OBSERVATIONS, REHAB EVAL
Pt and spouse initially hesitant re: OT evaluation. Explained role of OT and purpose of eval. Pt independent in functional mobility and in grooming, UE ADLs. Pt does require minimal assist for left sock due to discomfort from biopsy, ace wrap. Pt stated her  will assist and home and declined adaptive dressing. Pt left supine in bed, in NAD, on RA,  present, all needs in reach. Nursing made aware of pt status. No further skilled acute OT needs identified. Pt to be discharged from skilled OT services at this time.

## 2024-08-08 NOTE — OCCUPATIONAL THERAPY INITIAL EVALUATION ADULT - LEVEL OF INDEPENDENCE: DRESS LOWER BODY, OT EVAL
assist L LE due to discomfort from biopsy, ace bandage- pt reports spouse can assist/minimum assist (75% patients effort)

## 2024-08-08 NOTE — PROGRESS NOTE ADULT - PROBLEM SELECTOR PLAN 1
Patient seen and evaluated.  Discussed post operative course and patient demonstrated verbal understanding.  Awaiting soft tissue biopsy results.  Soft tissue culture grew MRSA.  Rec ID consult.  MRI does not demonstrate any signs of tibial osteo  Surgical site is without any signs of post-operative infection. Mild periwound erythema. No active bleeding.  Podiatry will continue to follow while in-house.   Plan will be discussed with attending.
local wound care  s/p debridement with dr Dubois, pod 2  follow up cx's  await MRI
Patient seen and evaluated.  Discussed post operative course and patient demonstrated verbal understanding.  Awaiting soft tissue biopsy results.  Soft tissue culture grew MRSA.  Rec ID consult.  Patient to get MRI today.  Surgical site is without any signs of post-operative infection. Mild periwound erythema. No active bleeding.  Podiatry will continue to follow while in-house.   Plan will be discussed with attending.
local wound care  ? debridement  surgical eval
local wound care  s/p debridement w dr Dubois, pod 1  follow up cx's  await MRI

## 2024-08-08 NOTE — PROGRESS NOTE ADULT - ASSESSMENT
72-year-old female with history of lung CA status post right lobectomy, CKD, hyperlipidemia, RA, COPD on 2L O2 at night, cholecystectomy, who was sent from wound care for evaluation of nonhealing left lower leg wound x 2 months.    cardiac optimization, HTN  - presents w/ non healing LLE wound  - s/p debridement (8/6) tolerated well from CV POV, podiatry following    - BP labile and uncontrolled 150-180's systolics, likely 2/2 to pain   - not on anti hypertensives at home  - pain management per primary   - Continue Norvasc 5 mg PO daily, up-titrate if BP remains uncontrolled   - Monitor and replete Lytes. Keep K > 4 and Mg > 2    - EKG demonstrates NSR  - No acute ischemic changes noted.   - Cardiac Cath/PCI: 12/11/2013. Minimal nonobstructive CAD. NST 2017 normal  - continue ASA and statin (hx of mild carotid plaque with a history of TIA)    - Echo (4/2024) LVSF normal, EF 55-60%, mild grade 1 LVDD, mild CA.   - euvolemic on exam   - no O2 requirement     - Follows with Dr. Oh (outpaitient cardiologist)   - Will continue to follow.    Zuleyka Pearl PA-C  Cardiology  Available on TEAMS   72-year-old female with history of lung CA status post right lobectomy, CKD, hyperlipidemia, RA, COPD on 2L O2 at night, cholecystectomy, who was sent from wound care for evaluation of nonhealing left lower leg wound x 2 months.    cardiac optimization, HTN  - presents w/ non healing LLE wound  - s/p debridement (8/6) tolerated well from CV POV, podiatry following    - BP labile and uncontrolled 150-180's systolics, likely 2/2 to pain   - not on anti hypertensives at home  - pain management per primary   - Continue Norvasc 5 mg PO daily, up-titrate if BP remains uncontrolled   - Monitor and replete Lytes. Keep K > 4 and Mg > 2    - EKG demonstrates NSR  - No acute ischemic changes noted.   - Cardiac Cath/PCI: 12/11/2013. Minimal nonobstructive CAD. NST 2017 normal  - continue ASA and statin (hx of mild carotid plaque with a history of TIA)    - Echo (4/2024) LVSF normal, EF 55-60%, mild grade 1 LVDD, mild FL.   - euvolemic on exam   - no O2 requirement     - Follows with Dr. Oh (outpaitient cardiologist)   - Rest of care per primary team  - Will continue to follow.    Zuleyka Pearl PA-C  Cardiology  Available on TEAMS

## 2024-08-08 NOTE — PROGRESS NOTE ADULT - NUTRITIONAL ASSESSMENT
This patient has been assessed with a concern for Malnutrition and has been determined to have a diagnosis/diagnoses of Severe protein-calorie malnutrition and Underweight (BMI < 19).    This patient is being managed with:   Diet Regular-  Entered: Aug  5 2024  2:32PM    The following pending diet order is being considered for treatment of Severe protein-calorie malnutrition and Underweight (BMI < 19):  Diet Regular-  Markos(7 Gm Arginine/7 Gm Glut/1.2 Gm HMB     Qty per Day:  BID  Supplement Feeding Modality:  Oral  Health Shake Cans or Servings Per Day:  1       Frequency:  Two Times a day  Entered: Aug  7 2024 10:25AM  

## 2024-08-08 NOTE — PROGRESS NOTE ADULT - SUBJECTIVE AND OBJECTIVE BOX
78y year old Female seen at [facility] D1 s/p left lower extremity wound debridement and soft tissue biopsy. Patient relates no overnight events and states that they are doing well at this time.  Denies any fever, chills, nausea, vomiting, chest pain, shortness of breath, or calf pain at this time.    Allergies    IV Contrast (Hives; Rash)  Zofran (Other)  minocycline (Other)  Enbrel (Unknown)  Levaquin (Other)  penicillin (Other)    Intolerances        MEDICATIONS  (STANDING):  acetaminophen   IVPB .. 650 milliGRAM(s) IV Intermittent once  amLODIPine   Tablet 5 milliGRAM(s) Oral daily  aspirin enteric coated 81 milliGRAM(s) Oral daily  atorvastatin 20 milliGRAM(s) Oral at bedtime  budesonide 160 MICROgram(s)/formoterol 4.5 MICROgram(s) Inhaler 2 Puff(s) Inhalation two times a day  BUpivacaine liposome 1.3% Injectable 20 milliLiter(s) Local Injection once  enoxaparin Injectable 30 milliGRAM(s) SubCutaneous every 24 hours  famotidine    Tablet 20 milliGRAM(s) Oral daily  hydroxychloroquine 200 milliGRAM(s) Oral two times a day  LORazepam   Injectable 1 milliGRAM(s) IV Push once  metoprolol tartrate 25 milliGRAM(s) Oral two times a day  montelukast 10 milliGRAM(s) Oral daily  multivitamin 1 Tablet(s) Oral daily  rOPINIRole 1 milliGRAM(s) Oral two times a day  vancomycin  IVPB 750 milliGRAM(s) IV Intermittent every 24 hours    MEDICATIONS  (PRN):  acetaminophen     Tablet .. 650 milliGRAM(s) Oral every 6 hours PRN Temp greater or equal to 38C (100.4F), Mild Pain (1 - 3)  melatonin 3 milliGRAM(s) Oral at bedtime PRN Insomnia  morphine  - Injectable 2 milliGRAM(s) IV Push every 6 hours PRN Severe Pain (7 - 10)  traMADol 25 milliGRAM(s) Oral every 4 hours PRN Moderate Pain (4 - 6)      Vital Signs Last 24 Hrs  T(C): 36.7 (08 Aug 2024 12:01), Max: 36.8 (07 Aug 2024 21:05)  T(F): 98.1 (08 Aug 2024 12:01), Max: 98.2 (07 Aug 2024 21:05)  HR: 79 (08 Aug 2024 12:01) (69 - 79)  BP: 196/78 (08 Aug 2024 12:01) (156/82 - 196/78)  BP(mean): --  RR: 20 (08 Aug 2024 12:01) (18 - 20)  SpO2: 91% (08 Aug 2024 12:01) (91% - 92%)    Parameters below as of 08 Aug 2024 12:01  Patient On (Oxygen Delivery Method): room air        PHYSICAL EXAM:  Vascular: Palpable pulses b/l. CFT <3 sec x10.  Neurological: Protective sensation intact b/l.  Musculoskeletal: Muscle strength wnl b/l.  Dermatological: Surgical site is without any signs of post-operative infection. Mild periwound erythema. No active bleeding.                         11.1   8.03  )-----------( 224      ( 07 Aug 2024 09:35 )             34.9       08-07    138  |  102  |  18  ----------------------------<  125<H>  4.1   |  31  |  1.10    Ca    9.3      07 Aug 2024 09:35    TPro  5.9<L>  /  Alb  2.6<L>  /  TBili  0.3  /  DBili  x   /  AST  26  /  ALT  18  /  AlkPhos  96  08-07            Culture - Tissue with Gram Stain (collected 06 Aug 2024 14:20)  Source: .Tissue Other, LEFT LOWER LEG TISSUE CULTURE  Gram Stain (07 Aug 2024 00:26):    No polymorphonuclear cells seen per low power field    Rare Gram positive cocci in pairs per oil power field  Preliminary Report (07 Aug 2024 14:57):    Moderate Staphylococcus aureus        Imaging: ----------

## 2024-08-08 NOTE — PROGRESS NOTE ADULT - PROBLEM SELECTOR PLAN 3
added beta blocker and increase dose today  cardio follow up  norvasc added this am  will cont to monitor

## 2024-08-08 NOTE — OCCUPATIONAL THERAPY INITIAL EVALUATION ADULT - NSACTIVITYREC_GEN_A_OT
N/A- initial evaluation only. Pt independent in ADLs and functional mobility, with the exception of donning left sock due to discomfort from ace wrap. Pt declined education re: sock aid, stating her  will assist as needed. No further acute skilled OT needs identified.

## 2024-08-08 NOTE — PROGRESS NOTE ADULT - SUBJECTIVE AND OBJECTIVE BOX
Date of Service 08-08-24 @ 15:33    Patient is a 78y old  Female who presents with a chief complaint of cellulitis (08 Aug 2024 14:51)      INTERVAL /OVERNIGHT EVENTS: denies headache    MEDICATIONS  (STANDING):  acetaminophen   IVPB .. 650 milliGRAM(s) IV Intermittent once  amLODIPine   Tablet 5 milliGRAM(s) Oral daily  aspirin enteric coated 81 milliGRAM(s) Oral daily  atorvastatin 20 milliGRAM(s) Oral at bedtime  budesonide 160 MICROgram(s)/formoterol 4.5 MICROgram(s) Inhaler 2 Puff(s) Inhalation two times a day  BUpivacaine liposome 1.3% Injectable 20 milliLiter(s) Local Injection once  enoxaparin Injectable 30 milliGRAM(s) SubCutaneous every 24 hours  famotidine    Tablet 20 milliGRAM(s) Oral daily  hydroxychloroquine 200 milliGRAM(s) Oral two times a day  LORazepam   Injectable 1 milliGRAM(s) IV Push once  metoprolol tartrate 25 milliGRAM(s) Oral two times a day  montelukast 10 milliGRAM(s) Oral daily  multivitamin 1 Tablet(s) Oral daily  rOPINIRole 1 milliGRAM(s) Oral two times a day  vancomycin  IVPB 750 milliGRAM(s) IV Intermittent every 24 hours    MEDICATIONS  (PRN):  acetaminophen     Tablet .. 650 milliGRAM(s) Oral every 6 hours PRN Temp greater or equal to 38C (100.4F), Mild Pain (1 - 3)  melatonin 3 milliGRAM(s) Oral at bedtime PRN Insomnia  morphine  - Injectable 2 milliGRAM(s) IV Push every 6 hours PRN Severe Pain (7 - 10)  traMADol 25 milliGRAM(s) Oral every 4 hours PRN Moderate Pain (4 - 6)      Allergies    IV Contrast (Hives; Rash)  Zofran (Other)  minocycline (Other)  Enbrel (Unknown)  Levaquin (Other)  penicillin (Other)    Intolerances        REVIEW OF SYSTEMS:  CONSTITUTIONAL: No fever, weight loss, or fatigue  EYES: No eye pain, visual disturbances, or discharge  ENMT:  No difficulty hearing, tinnitus, vertigo; No sinus or throat pain  NECK: No pain or stiffness  RESPIRATORY: No cough, wheezing, chills or hemoptysis; No shortness of breath  CARDIOVASCULAR: No chest pain, palpitations, dizziness, or leg swelling  GASTROINTESTINAL: No abdominal or epigastric pain. No nausea, vomiting, or hematemesis; No diarrhea or constipation. No melena or hematochezia.  GENITOURINARY: No dysuria, frequency, hematuria, or incontinence  NEUROLOGICAL: No headaches, memory loss, loss of strength, numbness, or tremors  SKIN: No itching, burning, rashes, or lesions   LYMPH NODES: No enlarged glands  ENDOCRINE: No heat or cold intolerance; No hair loss; No polydipsia or polyuria  MUSCULOSKELETAL: No joint pain or swelling; No muscle, back, or extremity pain  PSYCHIATRIC: No depression, anxiety, mood swings, or difficulty sleeping  HEME/LYMPH: No easy bruising, or bleeding gums  ALLERGY AND IMMUNOLOGIC: No hives or eczema    Vital Signs Last 24 Hrs  T(C): 36.7 (08 Aug 2024 12:01), Max: 36.8 (07 Aug 2024 21:05)  T(F): 98.1 (08 Aug 2024 12:01), Max: 98.2 (07 Aug 2024 21:05)  HR: 79 (08 Aug 2024 12:01) (69 - 79)  BP: 196/78 (08 Aug 2024 12:01) (156/82 - 196/78)  BP(mean): --  RR: 20 (08 Aug 2024 12:01) (18 - 20)  SpO2: 91% (08 Aug 2024 12:01) (91% - 92%)    Parameters below as of 08 Aug 2024 12:01  Patient On (Oxygen Delivery Method): room air        PHYSICAL EXAM:  GENERAL: NAD, well-groomed, well-developed  HEAD:  Atraumatic, Normocephalic  EYES: EOMI, PERRLA, conjunctiva and sclera clear  ENMT: No tonsillar erythema, exudates, or enlargement; Moist mucous membranes, Good dentition, No lesions  NECK: Supple, No JVD, Normal thyroid  NERVOUS SYSTEM:  Alert & Oriented X3, Good concentration; Motor Strength 5/5 B/L upper and lower extremities; DTRs 2+ intact and symmetric  CHEST/LUNG: Clear to auscultation bilaterally; No rales, rhonchi, wheezing, or rubs  HEART: Regular rate and rhythm; No murmurs, rubs, or gallops  ABDOMEN: Soft, Nontender, Nondistended; Bowel sounds present  EXTREMITIES:  2+ Peripheral Pulses, No clubbing, cyanosis, or edema  LYMPH: No lymphadenopathy noted  SKIN: No rashes or lesions    LABS:      Ca    9.3        07 Aug 2024 09:35        Urinalysis Basic - ( 07 Aug 2024 09:35 )    Color: x / Appearance: x / SG: x / pH: x  Gluc: 125 mg/dL / Ketone: x  / Bili: x / Urobili: x   Blood: x / Protein: x / Nitrite: x   Leuk Esterase: x / RBC: x / WBC x   Sq Epi: x / Non Sq Epi: x / Bacteria: x      CAPILLARY BLOOD GLUCOSE          RADIOLOGY & ADDITIONAL TESTS:    Notes Reviewed:  [x ] YES  [ ] NO    Care Discussed with Consultants/Other Providers [x ] YES  [ ] NO

## 2024-08-08 NOTE — PROGRESS NOTE ADULT - ASSESSMENT
Pt. well known to me admitted for nonhealing wound/cellulitis left lower leg following laceration.  S/p debridement Dr. Bay.  MRSA in wound.  Suggest ID FU.  MRI leg  Vanco. Watch Creat.   Ambulate.  Wound care.  COPD  Htn

## 2024-08-08 NOTE — OCCUPATIONAL THERAPY INITIAL EVALUATION ADULT - PERTINENT HX OF CURRENT PROBLEM, REHAB EVAL
s/p left lower extremity wound debridement and soft tissue biopsy. Pt with MRI to r/o osteomyelitis, as per podiatry note: MRI does not demonstrate any signs of tibial osteo

## 2024-08-08 NOTE — PROGRESS NOTE ADULT - SUBJECTIVE AND OBJECTIVE BOX
[facility]  INFECTIOUS DISEASES - [address]orent[address] Odell, [place] [address]  Tel: [phone]     Fax: [phone]  =======================================================    GINNY VASQUEZ [id number]    Follow up: No fevers. Denies any pain, nausea or diarrhea.    Allergies:  IV Contrast (Hives; Rash)  Zofran (Other)  minocycline (Other)  Enbrel (Unknown)  Levaquin (Other)  penicillin (Other)      Antibiotics:  acetaminophen     Tablet .. 650 milliGRAM(s) Oral every 6 hours PRN  acetaminophen   IVPB .. 650 milliGRAM(s) IV Intermittent once  amLODIPine   Tablet 5 milliGRAM(s) Oral daily  aspirin enteric coated 81 milliGRAM(s) Oral daily  atorvastatin 20 milliGRAM(s) Oral at bedtime  budesonide 160 MICROgram(s)/formoterol 4.5 MICROgram(s) Inhaler 2 Puff(s) Inhalation two times a day  BUpivacaine liposome 1.3% Injectable 20 milliLiter(s) Local Injection once  enoxaparin Injectable 30 milliGRAM(s) SubCutaneous every 24 hours  famotidine    Tablet 20 milliGRAM(s) Oral daily  hydroxychloroquine 200 milliGRAM(s) Oral two times a day  LORazepam   Injectable 1 milliGRAM(s) IV Push once  melatonin 3 milliGRAM(s) Oral at bedtime PRN  metoprolol tartrate 25 milliGRAM(s) Oral two times a day  montelukast 10 milliGRAM(s) Oral daily  morphine  - Injectable 2 milliGRAM(s) IV Push every 6 hours PRN  multivitamin 1 Tablet(s) Oral daily  rOPINIRole 1 milliGRAM(s) Oral two times a day  traMADol 25 milliGRAM(s) Oral every 4 hours PRN  vancomycin  IVPB 750 milliGRAM(s) IV Intermittent every 24 hours       REVIEW OF SYSTEMS:  CONSTITUTIONAL:  No Fever or chills  HEENT:  No sore throat or runny nose.  CARDIOVASCULAR:  No chest pain or SOB.  RESPIRATORY:  No cough, shortness of breath  GASTROINTESTINAL: No nausea, vomiting, or diarrhea  GENITOURINARY:  No dysuria, frequency or urgency  MUSCULOSKELETAL:  (+) L lower leg pain  SKIN: (+) LLE wound  NEUROLOGIC:  No headache or dizziness  PSYCHIATRIC:  No disorder of thought or mood.     Physical Exam:  ICU Vital Signs Last 24 Hrs  T(C): 36.7 (08 Aug 2024 12:01), Max: 36.8 (07 Aug 2024 21:05)  T(F): 98.1 (08 Aug 2024 12:01), Max: 98.2 (07 Aug 2024 21:05)  HR: 79 (08 Aug 2024 12:01) (69 - 79)  BP: 196/78 (08 Aug 2024 12:01) (156/82 - 196/78)  BP(mean): --  ABP: --  ABP(mean): --  RR: 20 (08 Aug 2024 12:01) (18 - 20)  SpO2: 91% (08 Aug 2024 12:01) (91% - 92%)    O2 Parameters below as of 08 Aug 2024 12:01  Patient On (Oxygen Delivery Method): room air        GEN: NAD  HEENT: normocephalic and atraumatic.   NECK: Supple.   LUNGS: Normal respiratory effort  HEART: Regular rate and rhythm   ABDOMEN: Soft, nontender, and nondistended.    EXTREMITIES: No leg edema. L lower leg covered with dressing  NEUROLOGIC: Answering questions appropriately  PSYCHIATRIC: Appropriate affect .    Labs:  08-07    138  |  102  |  18  ----------------------------<  125<H>  4.1   |  31  |  1.10    Ca    9.3      07 Aug 2024 09:35    TPro  5.9<L>  /  Alb  2.6<L>  /  TBili  0.3  /  DBili  x   /  AST  26  /  ALT  18  /  AlkPhos  96  08-07                          11.1   8.03  )-----------( 224      ( 07 Aug 2024 09:35 )             34.9       Urinalysis Basic - ( 07 Aug 2024 09:35 )    Color: x / Appearance: x / SG: x / pH: x  Gluc: 125 mg/dL / Ketone: x  / Bili: x / Urobili: x   Blood: x / Protein: x / Nitrite: x   Leuk Esterase: x / RBC: x / WBC x   Sq Epi: x / Non Sq Epi: x / Bacteria: x      LIVER FUNCTIONS - ( 07 Aug 2024 09:35 )  Alb: 2.6 g/dL / Pro: 5.9 g/dL / ALK PHOS: 96 U/L / ALT: 18 U/L / AST: 26 U/L / GGT: x             RECENT CULTURES:  08-06 @ 14:20 .Tissue Other, LEFT LOWER LEG TISSUE CULTURE     Moderate Staphylococcus aureus    No polymorphonuclear cells seen per low power field  Rare Gram positive cocci in pairs per oil power field      08-05 @ 12:15 .Blood Blood     No growth at 48 Hours        08-05 @ 12:00 .Blood Blood     No growth at 48 Hours              All imaging and data are reviewed.     < from: MR Tib/Fib No Cont, Left (08.08.24 @ 12:26) >  FINDINGS:  External skin markers are present along the anterior margins of the   proximal and distal leg. Within this region, there is thickening of the   skin with an ill-defined soft tissue wound present. Inflammatory changes   are seen within thesubcutaneous tissues surrounding the wound. There is   no drainable fluid collection or subcutaneous tracking gas collection.   There is no altered T1/STIR signal within the tibia to suggest   osteomyelitis. The fibula is normal in appearance. No fracture or   dislocation. Imaged joint spaces are maintained. There is high STIR   signal identified within the anterolateral leg muscles, in particular the   anterior tibialis muscle. Findings are felt to be reactive in etiology   with spread of infection i.e. infectious myositis not excluded. The   imaged tendons are intact.      IMPRESSION:  1.  Soft tissue wound about the mid leg without associated underlying   osteomyelitis or a drainable subcutaneous fluid collection.  2.  High STIR signal within the anterolateral leg muscles, in particular   the anterior tibialis muscle. Findings are felt to be reactive in   etiology with spread of infection i.e. infectious myositis not excluded.    --- End of Report ---    < end of copied text >

## 2024-08-08 NOTE — OCCUPATIONAL THERAPY INITIAL EVALUATION ADULT - ADDITIONAL COMMENTS
Pt lives with her spouse in a house with 0 AMALIA, pt then resides on main floor. Pt owns a stall shower with doors and a shower bench. Pt owns standard height toilets. Pt's spouse able to assist as needed.

## 2024-08-08 NOTE — PROGRESS NOTE ADULT - SUBJECTIVE AND OBJECTIVE BOX
[facility] -- Emanuel Bethea, Anish Gordillo Savella, , Kathryn Espinal  Office # [phone]    Follow Up:      Subjective/Observations:     REVIEW OF SYSTEMS: All other review of systems is negative unless indicated above  PAST MEDICAL & SURGICAL HISTORY:  HLD (hyperlipidemia)      Rheumatoid arthritis      TIA (transient ischemic attack)  2012      Lung cancer  Right lung.      Chronic obstructive pulmonary disease, unspecified COPD type  O2 at night      Hiatal hernia with GERD      Essential hypertension      Childhood asthma      Stage 3 chronic kidney disease      Carotid artery plaque      IBS (irritable bowel syndrome)      Psoriatic arthritis      S/P lobectomy of lung  Right lung in 1996.      H/O colonoscopy      History of endoscopy  Sept 2017      S/P cholecystectomy        MEDICATIONS  (STANDING):  acetaminophen   IVPB .. 650 milliGRAM(s) IV Intermittent once  amLODIPine   Tablet 5 milliGRAM(s) Oral daily  aspirin enteric coated 81 milliGRAM(s) Oral daily  atorvastatin 20 milliGRAM(s) Oral at bedtime  budesonide 160 MICROgram(s)/formoterol 4.5 MICROgram(s) Inhaler 2 Puff(s) Inhalation two times a day  BUpivacaine liposome 1.3% Injectable 20 milliLiter(s) Local Injection once  enoxaparin Injectable 30 milliGRAM(s) SubCutaneous every 24 hours  famotidine    Tablet 20 milliGRAM(s) Oral daily  hydroxychloroquine 200 milliGRAM(s) Oral two times a day  lactated ringers. 1000 milliLiter(s) (60 mL/Hr) IV Continuous <Continuous>  LORazepam   Injectable 1 milliGRAM(s) IV Push once  metoprolol tartrate 12.5 milliGRAM(s) Oral three times a day  montelukast 10 milliGRAM(s) Oral daily  multivitamin 1 Tablet(s) Oral daily  rOPINIRole 1 milliGRAM(s) Oral two times a day  vancomycin  IVPB 750 milliGRAM(s) IV Intermittent every 24 hours    MEDICATIONS  (PRN):  acetaminophen     Tablet .. 650 milliGRAM(s) Oral every 6 hours PRN Temp greater or equal to 38C (100.4F), Mild Pain (1 - 3)  melatonin 3 milliGRAM(s) Oral at bedtime PRN Insomnia  morphine  - Injectable 2 milliGRAM(s) IV Push every 6 hours PRN Severe Pain (7 - 10)  traMADol 25 milliGRAM(s) Oral every 4 hours PRN Moderate Pain (4 - 6)    Allergies    IV Contrast (Hives; Rash)  Zofran (Other)  minocycline (Other)  Enbrel (Unknown)  Levaquin (Other)  penicillin (Other)    Intolerances      Vital Signs Last 24 Hrs  T(C): 36.7 (08 Aug 2024 05:33), Max: 36.8 (07 Aug 2024 21:05)  T(F): 98.1 (08 Aug 2024 05:33), Max: 98.2 (07 Aug 2024 21:05)  HR: 69 (08 Aug 2024 05:33) (69 - 75)  BP: 172/71 (08 Aug 2024 05:33) (156/82 - 182/88)  BP(mean): --  RR: 18 (08 Aug 2024 05:33) (18 - 18)  SpO2: 92% (08 Aug 2024 05:33) (92% - 93%)    Parameters below as of 08 Aug 2024 05:33  Patient On (Oxygen Delivery Method): room air      I&O's Summary    07 Aug 2024 07:01  -  08 Aug 2024 07:00  --------------------------------------------------------  IN: 600 mL / OUT: 0 mL / NET: 600 mL        PHYSICAL EXAM:  TELE:   Constitutional: NAD, awake and alert, well-developed  HEENT: Moist Mucous Membranes, Anicteric  Pulmonary: Non-labored, breath sounds are clear bilaterally, No wheezing, rales or rhonchi  Cardiovascular: Regular, S1 and S2, No murmurs, rubs, gallops or clicks  Gastrointestinal: Bowel Sounds present, soft, nontender.   Lymph: No peripheral edema. No lymphadenopathy.  Skin: No visible rashes or ulcers.  Psych:  Mood & affect appropriate  LABS: All Labs Reviewed:                        11.1   8.03  )-----------( 224      ( 07 Aug 2024 09:35 )             34.9                         11.0   8.92  )-----------( 201      ( 05 Aug 2024 12:15 )             36.0     07 Aug 2024 09:35    138    |  102    |  18     ----------------------------<  125    4.1     |  31     |  1.10   05 Aug 2024 12:15    139    |  104    |  23     ----------------------------<  102    4.2     |  31     |  1.10     Ca    9.3        07 Aug 2024 09:35  Ca    9.5        05 Aug 2024 12:15    TPro  5.9    /  Alb  2.6    /  TBili  0.3    /  DBili  x      /  AST  26     /  ALT  18     /  AlkPhos  96     07 Aug 2024 09:35  TPro  6.6    /  Alb  3.0    /  TBili  0.5    /  DBili  x      /  AST  21     /  ALT  19     /  AlkPhos  94     05 Aug 2024 12:15          12 Lead ECG:   Ventricular Rate 77 BPM    Atrial Rate 77 BPM    P-R Interval 144 ms    QRS Duration 88 ms    Q-T Interval 384 ms    QTC Calculation(Bazett) 434 ms    P Axis 68 degrees    R Axis 37 degrees    T Axis 64 degrees    Diagnosis Line Normal sinus rhythm  Possible Left atrial enlargement  Borderline ECG  Confirmed by kareen Gorman ([id number]) on 8/5/2024 2:33:35 PM (08-05-24 @ 11:40)       EXAM:  ECHO TTE WO CON COMP W DOPP         PROCEDURE DATE:  09/07/2020        INTERPRETATION:  INDICATION: Syncope  Sonographer AS    Blood Pressure 120/78    Height 157.48 cm     Weight 52.6 kg       BSA 1.5 sq m    Dimensions:  LA 2.6       Normal Values: 2.0 - 4.0 cm  Ao 3.2        Normal Values: 2.0 - 3.8 cm  SEPTUM 1.2       Normal Values: 0.6 - 1.2 cm  PWT 1.1       Normal Values: 0.6 - 1.1 cm  LVIDd 3.9         Normal Values: 3.0 - 5.6 cm  LVIDs 2.6         Normal Values: 1.8 - 4.0 cm      OBSERVATIONS:  Technically difficult and limited study  Mitral Valve: Thickened leaflets, mild MR.  Aortic Valve/Aorta: Aortic valve is not well-visualized, no signs of severe valvular pathology  Tricuspid Valve: Mild TR.  Pulmonic Valve: Not well-visualized  Left Atrium: normal  Right Atrium: Not well-visualized  Left Ventricle: Grossly normal LV size and systolic function, estimated LVEF of 65%. Endocardium is not well-visualized, cannot rule out segmental dysfunction  Right Ventricle: The right ventricle is not well-visualized  Pericardium/Pleura: Trace pericardial effusion.  Pulmonary/RV Pressure: estimated PA systolic pressure of 27.7 mmHg assuming an RA pressure of 3 mmHg.  LV diastolic dysfunction is present    Conclusion:  Technically difficult and limited study  Grossly normal left ventricular internal dimensions and systolic function, estimated LVEF of 65%.  The right ventricle is not well-visualized  Aortic valve is not well-visualized, no signs of severe valvular pathology  Mild MR and TR.  No significant pericardial effusion.              ARUN MORENO   This document has been electronically signed. Sep  8 2020  8:57AM      [facility] -- Emanuel Bethea, Anish Gordillo Savella, , Kathryn Espinal  Office # [phone]    Follow Up:  cardiac optimization, HTN    Subjective/Observations: Patient seen and examined at bedside. Endorses feeling well. Denies chest pain, palpitations, SOB/CHEN, dizziness or any other complaints. No acute events overnight.    REVIEW OF SYSTEMS: All other review of systems is negative unless indicated above  PAST MEDICAL & SURGICAL HISTORY:  HLD (hyperlipidemia)      Rheumatoid arthritis      TIA (transient ischemic attack)  2012      Lung cancer  Right lung.      Chronic obstructive pulmonary disease, unspecified COPD type  O2 at night      Hiatal hernia with GERD      Essential hypertension      Childhood asthma      Stage 3 chronic kidney disease      Carotid artery plaque      IBS (irritable bowel syndrome)      Psoriatic arthritis      S/P lobectomy of lung  Right lung in 1996.      H/O colonoscopy      History of endoscopy  Sept 2017      S/P cholecystectomy        MEDICATIONS  (STANDING):  acetaminophen   IVPB .. 650 milliGRAM(s) IV Intermittent once  amLODIPine   Tablet 5 milliGRAM(s) Oral daily  aspirin enteric coated 81 milliGRAM(s) Oral daily  atorvastatin 20 milliGRAM(s) Oral at bedtime  budesonide 160 MICROgram(s)/formoterol 4.5 MICROgram(s) Inhaler 2 Puff(s) Inhalation two times a day  BUpivacaine liposome 1.3% Injectable 20 milliLiter(s) Local Injection once  enoxaparin Injectable 30 milliGRAM(s) SubCutaneous every 24 hours  famotidine    Tablet 20 milliGRAM(s) Oral daily  hydroxychloroquine 200 milliGRAM(s) Oral two times a day  lactated ringers. 1000 milliLiter(s) (60 mL/Hr) IV Continuous <Continuous>  LORazepam   Injectable 1 milliGRAM(s) IV Push once  metoprolol tartrate 12.5 milliGRAM(s) Oral three times a day  montelukast 10 milliGRAM(s) Oral daily  multivitamin 1 Tablet(s) Oral daily  rOPINIRole 1 milliGRAM(s) Oral two times a day  vancomycin  IVPB 750 milliGRAM(s) IV Intermittent every 24 hours    MEDICATIONS  (PRN):  acetaminophen     Tablet .. 650 milliGRAM(s) Oral every 6 hours PRN Temp greater or equal to 38C (100.4F), Mild Pain (1 - 3)  melatonin 3 milliGRAM(s) Oral at bedtime PRN Insomnia  morphine  - Injectable 2 milliGRAM(s) IV Push every 6 hours PRN Severe Pain (7 - 10)  traMADol 25 milliGRAM(s) Oral every 4 hours PRN Moderate Pain (4 - 6)    Allergies    IV Contrast (Hives; Rash)  Zofran (Other)  minocycline (Other)  Enbrel (Unknown)  Levaquin (Other)  penicillin (Other)    Intolerances      Vital Signs Last 24 Hrs  T(C): 36.7 (08 Aug 2024 05:33), Max: 36.8 (07 Aug 2024 21:05)  T(F): 98.1 (08 Aug 2024 05:33), Max: 98.2 (07 Aug 2024 21:05)  HR: 69 (08 Aug 2024 05:33) (69 - 75)  BP: 172/71 (08 Aug 2024 05:33) (156/82 - 182/88)  BP(mean): --  RR: 18 (08 Aug 2024 05:33) (18 - 18)  SpO2: 92% (08 Aug 2024 05:33) (92% - 93%)    Parameters below as of 08 Aug 2024 05:33  Patient On (Oxygen Delivery Method): room air      I&O's Summary    07 Aug 2024 07:01  -  08 Aug 2024 07:00  --------------------------------------------------------  IN: 600 mL / OUT: 0 mL / NET: 600 mL        PHYSICAL EXAM:  TELE:   Constitutional: NAD, awake and alert, well-developed  HEENT: Moist Mucous Membranes, Anicteric  Pulmonary: Non-labored, breath sounds are clear bilaterally, No wheezing, rales or rhonchi  Cardiovascular: Regular, S1 and S2, No murmurs, rubs, gallops or clicks  Gastrointestinal: Bowel Sounds present, soft, nontender.   Lymph: No peripheral edema. No lymphadenopathy.  Skin: No visible rashes or ulcers.  Psych:  Mood & affect appropriate  LABS: All Labs Reviewed:                        11.1   8.03  )-----------( 224      ( 07 Aug 2024 09:35 )             34.9                         11.0   8.92  )-----------( 201      ( 05 Aug 2024 12:15 )             36.0     07 Aug 2024 09:35    138    |  102    |  18     ----------------------------<  125    4.1     |  31     |  1.10   05 Aug 2024 12:15    139    |  104    |  23     ----------------------------<  102    4.2     |  31     |  1.10     Ca    9.3        07 Aug 2024 09:35  Ca    9.5        05 Aug 2024 12:15    TPro  5.9    /  Alb  2.6    /  TBili  0.3    /  DBili  x      /  AST  26     /  ALT  18     /  AlkPhos  96     07 Aug 2024 09:35  TPro  6.6    /  Alb  3.0    /  TBili  0.5    /  DBili  x      /  AST  21     /  ALT  19     /  AlkPhos  94     05 Aug 2024 12:15          12 Lead ECG:   Ventricular Rate 77 BPM    Atrial Rate 77 BPM    P-R Interval 144 ms    QRS Duration 88 ms    Q-T Interval 384 ms    QTC Calculation(Bazett) 434 ms    P Axis 68 degrees    R Axis 37 degrees    T Axis 64 degrees    Diagnosis Line Normal sinus rhythm  Possible Left atrial enlargement  Borderline ECG  Confirmed by kareen Gorman ([id number]) on 8/5/2024 2:33:35 PM (08-05-24 @ 11:40)       EXAM:  ECHO TTE WO CON COMP W DOPP         PROCEDURE DATE:  09/07/2020        INTERPRETATION:  INDICATION: Syncope  Sonographer AS    Blood Pressure 120/78    Height 157.48 cm     Weight 52.6 kg       BSA 1.5 sq m    Dimensions:  LA 2.6       Normal Values: 2.0 - 4.0 cm  Ao 3.2        Normal Values: 2.0 - 3.8 cm  SEPTUM 1.2       Normal Values: 0.6 - 1.2 cm  PWT 1.1       Normal Values: 0.6 - 1.1 cm  LVIDd 3.9         Normal Values: 3.0 - 5.6 cm  LVIDs 2.6         Normal Values: 1.8 - 4.0 cm      OBSERVATIONS:  Technically difficult and limited study  Mitral Valve: Thickened leaflets, mild MR.  Aortic Valve/Aorta: Aortic valve is not well-visualized, no signs of severe valvular pathology  Tricuspid Valve: Mild TR.  Pulmonic Valve: Not well-visualized  Left Atrium: normal  Right Atrium: Not well-visualized  Left Ventricle: Grossly normal LV size and systolic function, estimated LVEF of 65%. Endocardium is not well-visualized, cannot rule out segmental dysfunction  Right Ventricle: The right ventricle is not well-visualized  Pericardium/Pleura: Trace pericardial effusion.  Pulmonary/RV Pressure: estimated PA systolic pressure of 27.7 mmHg assuming an RA pressure of 3 mmHg.  LV diastolic dysfunction is present    Conclusion:  Technically difficult and limited study  Grossly normal left ventricular internal dimensions and systolic function, estimated LVEF of 65%.  The right ventricle is not well-visualized  Aortic valve is not well-visualized, no signs of severe valvular pathology  Mild MR and TR.  No significant pericardial effusion.              ARUN MORENO   This document has been electronically signed. Sep  8 2020  8:57AM

## 2024-08-09 ENCOUNTER — TRANSCRIPTION ENCOUNTER (OUTPATIENT)
Age: 79
End: 2024-08-09

## 2024-08-09 VITALS
DIASTOLIC BLOOD PRESSURE: 62 MMHG | SYSTOLIC BLOOD PRESSURE: 115 MMHG | OXYGEN SATURATION: 92 % | TEMPERATURE: 98 F | RESPIRATION RATE: 20 BRPM | HEART RATE: 63 BPM

## 2024-08-09 LAB
ALBUMIN SERPL ELPH-MCNC: 2.7 G/DL — LOW (ref 3.3–5)
ALP SERPL-CCNC: 92 U/L — SIGNIFICANT CHANGE UP (ref 40–120)
ALT FLD-CCNC: 19 U/L — SIGNIFICANT CHANGE UP (ref 12–78)
ANION GAP SERPL CALC-SCNC: 5 MMOL/L — SIGNIFICANT CHANGE UP (ref 5–17)
AST SERPL-CCNC: 22 U/L — SIGNIFICANT CHANGE UP (ref 15–37)
BILIRUB SERPL-MCNC: 0.4 MG/DL — SIGNIFICANT CHANGE UP (ref 0.2–1.2)
BUN SERPL-MCNC: 17 MG/DL — SIGNIFICANT CHANGE UP (ref 7–23)
CALCIUM SERPL-MCNC: 9.3 MG/DL — SIGNIFICANT CHANGE UP (ref 8.5–10.1)
CHLORIDE SERPL-SCNC: 102 MMOL/L — SIGNIFICANT CHANGE UP (ref 96–108)
CO2 SERPL-SCNC: 31 MMOL/L — SIGNIFICANT CHANGE UP (ref 22–31)
CREAT SERPL-MCNC: 1.1 MG/DL — SIGNIFICANT CHANGE UP (ref 0.5–1.3)
EGFR: 51 ML/MIN/1.73M2 — LOW
GLUCOSE SERPL-MCNC: 123 MG/DL — HIGH (ref 70–99)
POTASSIUM SERPL-MCNC: 4 MMOL/L — SIGNIFICANT CHANGE UP (ref 3.5–5.3)
POTASSIUM SERPL-SCNC: 4 MMOL/L — SIGNIFICANT CHANGE UP (ref 3.5–5.3)
PROT SERPL-MCNC: 6.2 G/DL — SIGNIFICANT CHANGE UP (ref 6–8.3)
SODIUM SERPL-SCNC: 138 MMOL/L — SIGNIFICANT CHANGE UP (ref 135–145)
SURGICAL PATHOLOGY STUDY: SIGNIFICANT CHANGE UP

## 2024-08-09 PROCEDURE — 87075 CULTR BACTERIA EXCEPT BLOOD: CPT

## 2024-08-09 PROCEDURE — 99232 SBSQ HOSP IP/OBS MODERATE 35: CPT

## 2024-08-09 PROCEDURE — 87070 CULTURE OTHR SPECIMN AEROBIC: CPT

## 2024-08-09 PROCEDURE — 99285 EMERGENCY DEPT VISIT HI MDM: CPT

## 2024-08-09 PROCEDURE — 85025 COMPLETE CBC W/AUTO DIFF WBC: CPT

## 2024-08-09 PROCEDURE — 87186 SC STD MICRODIL/AGAR DIL: CPT

## 2024-08-09 PROCEDURE — 94640 AIRWAY INHALATION TREATMENT: CPT

## 2024-08-09 PROCEDURE — 86140 C-REACTIVE PROTEIN: CPT

## 2024-08-09 PROCEDURE — 86850 RBC ANTIBODY SCREEN: CPT

## 2024-08-09 PROCEDURE — 85652 RBC SED RATE AUTOMATED: CPT

## 2024-08-09 PROCEDURE — G0463: CPT

## 2024-08-09 PROCEDURE — 97162 PT EVAL MOD COMPLEX 30 MIN: CPT

## 2024-08-09 PROCEDURE — 36415 COLL VENOUS BLD VENIPUNCTURE: CPT

## 2024-08-09 PROCEDURE — 71045 X-RAY EXAM CHEST 1 VIEW: CPT

## 2024-08-09 PROCEDURE — 85610 PROTHROMBIN TIME: CPT

## 2024-08-09 PROCEDURE — 73590 X-RAY EXAM OF LOWER LEG: CPT

## 2024-08-09 PROCEDURE — 88304 TISSUE EXAM BY PATHOLOGIST: CPT

## 2024-08-09 PROCEDURE — 86900 BLOOD TYPING SEROLOGIC ABO: CPT

## 2024-08-09 PROCEDURE — 73718 MRI LOWER EXTREMITY W/O DYE: CPT | Mod: MC

## 2024-08-09 PROCEDURE — 80053 COMPREHEN METABOLIC PANEL: CPT

## 2024-08-09 PROCEDURE — 85730 THROMBOPLASTIN TIME PARTIAL: CPT

## 2024-08-09 PROCEDURE — 84145 PROCALCITONIN (PCT): CPT

## 2024-08-09 PROCEDURE — 86901 BLOOD TYPING SEROLOGIC RH(D): CPT

## 2024-08-09 PROCEDURE — 87040 BLOOD CULTURE FOR BACTERIA: CPT

## 2024-08-09 PROCEDURE — 83605 ASSAY OF LACTIC ACID: CPT

## 2024-08-09 PROCEDURE — 97165 OT EVAL LOW COMPLEX 30 MIN: CPT

## 2024-08-09 PROCEDURE — 85027 COMPLETE CBC AUTOMATED: CPT

## 2024-08-09 PROCEDURE — 80202 ASSAY OF VANCOMYCIN: CPT

## 2024-08-09 RX ORDER — GABAPENTIN 400 MG/1
1 CAPSULE ORAL
Qty: 90 | Refills: 0
Start: 2024-08-09 | End: 2024-09-07

## 2024-08-09 RX ORDER — OXYCODONE HYDROCHLORIDE 30 MG/1
1 TABLET ORAL
Qty: 9 | Refills: 0
Start: 2024-08-09 | End: 2024-08-11

## 2024-08-09 RX ORDER — OXYCODONE HYDROCHLORIDE 30 MG/1
2.5 TABLET ORAL EVERY 6 HOURS
Refills: 0 | Status: DISCONTINUED | OUTPATIENT
Start: 2024-08-09 | End: 2024-08-09

## 2024-08-09 RX ORDER — LINEZOLID 600 MG/300ML
1 INJECTION, SOLUTION INTRAVENOUS
Qty: 14 | Refills: 0
Start: 2024-08-09 | End: 2024-08-15

## 2024-08-09 RX ORDER — GABAPENTIN 400 MG/1
100 CAPSULE ORAL THREE TIMES A DAY
Refills: 0 | Status: DISCONTINUED | OUTPATIENT
Start: 2024-08-09 | End: 2024-08-09

## 2024-08-09 RX ORDER — ACETAMINOPHEN 500 MG
2 TABLET ORAL
Qty: 0 | Refills: 0 | DISCHARGE
Start: 2024-08-09

## 2024-08-09 RX ORDER — METOPROLOL TARTRATE 100 MG
1 TABLET ORAL
Qty: 60 | Refills: 0
Start: 2024-08-09 | End: 2024-09-07

## 2024-08-09 RX ORDER — AMLODIPINE BESYLATE 2.5 MG/1
1 TABLET ORAL
Qty: 30 | Refills: 0
Start: 2024-08-09 | End: 2024-09-07

## 2024-08-09 RX ORDER — LINEZOLID 600 MG/300ML
600 INJECTION, SOLUTION INTRAVENOUS EVERY 12 HOURS
Refills: 0 | Status: DISCONTINUED | OUTPATIENT
Start: 2024-08-09 | End: 2024-08-09

## 2024-08-09 RX ADMIN — Medication 81 MILLIGRAM(S): at 11:14

## 2024-08-09 RX ADMIN — Medication 10 MILLIGRAM(S): at 11:14

## 2024-08-09 RX ADMIN — FAMOTIDINE 20 MILLIGRAM(S): 40 TABLET, FILM COATED ORAL at 11:14

## 2024-08-09 RX ADMIN — Medication 260 MILLIGRAM(S): at 07:20

## 2024-08-09 RX ADMIN — Medication 1 TABLET(S): at 11:14

## 2024-08-09 RX ADMIN — HYDROXYCHLOROQUINE SULFATE 200 MILLIGRAM(S): 200 TABLET ORAL at 06:59

## 2024-08-09 RX ADMIN — AMLODIPINE BESYLATE 10 MILLIGRAM(S): 2.5 TABLET ORAL at 06:59

## 2024-08-09 RX ADMIN — ROPINIROLE 1 MILLIGRAM(S): 1 TABLET ORAL at 06:59

## 2024-08-09 RX ADMIN — GABAPENTIN 100 MILLIGRAM(S): 400 CAPSULE ORAL at 13:22

## 2024-08-09 RX ADMIN — VANCOMYCIN HYDROCHLORIDE 250 MILLIGRAM(S): 5 INJECTION, POWDER, LYOPHILIZED, FOR SOLUTION INTRAVENOUS at 12:11

## 2024-08-09 NOTE — PROGRESS NOTE ADULT - PROBLEM SELECTOR PROBLEM 3
R/O Essential hypertension
HTN (hypertension)

## 2024-08-09 NOTE — PROGRESS NOTE ADULT - NS ATTEND AMEND GEN_ALL_CORE FT
72-year-old female with history of lung CA status post right lobectomy, CKD, hyperlipidemia, RA, COPD on 2L O2 at night, cholecystectomy, who was sent from wound care for evaluation of nonhealing left lower leg wound x 2 months.     - presents w/ non healing LLE wound  - s/p debridement (8/6) tolerated well from CV POV   - bp improved   - Continue Norvasc 5 mg PO daily, up-titrate if BP remains uncontrolled    - No acute ischemia   - continue ASA and statin (hx of mild carotid plaque with a history of TIA)   - euvolemic on exam
72-year-old female with history of lung CA status post right lobectomy, CKD, hyperlipidemia, RA, COPD on 2L O2 at night, cholecystectomy, who was sent from wound care for evaluation of nonhealing left lower leg wound x 2 months.    - no sign of acute ischemia or volume overload  - echo in 4/2024 with normal LV function  - ekg in sr, and unremarkable  - trend creatinine and electrolytes. Keep K>4, Mg>2  - BP uncontrolled overnight s/p Vasotec. In the setting of pain  - Place on Amlodipine 5mg if BPs remain uncontrolled if pain controlled.   - no cardiac contraindication to proceeding with wound debridement today. Routine cardiac monitoring is recommended.  - will follow with you.
72-year-old female with history of lung CA status post right lobectomy, CKD, hyperlipidemia, RA, COPD on 2L O2 at night, cholecystectomy, who was sent from wound care for evaluation of nonhealing left lower leg wound x 2 months.    - presents w/ non healing LLE wound  - s/p debridement (8/6) tolerated well from CV POV, podiatry following  - BP labile and uncontrolled 150-180's systolics, likely 2/2 to pain   - not on anti hypertensives at home  - start Norvasc 5 mg PO daily, uptitrate if BP remains uncontrolled   - Cardiac Cath/PCI: 12/11/2013. Minimal nonobstructive CAD. NST 2017 normal  - continue ASA and statin (hx of mild carotid plaque with a history of TIA)
72-year-old female with history of lung CA status post right lobectomy, CKD, hyperlipidemia, RA, COPD on 2L O2 at night, cholecystectomy, who was sent from wound care for evaluation of nonhealing left lower leg wound x 2 months.     - presents w/ non healing LLE wound  - s/p debridement (8/6) tolerated well from CV POV   - BP labile and uncontrolled 150-180's systolics, likely 2/2 to pain   - not on anti hypertensives at home   - Continue Norvasc 5 mg PO daily, up-titrate if BP remains uncontrolled    - No acute ischemia   - continue ASA and statin (hx of mild carotid plaque with a history of TIA)   - euvolemic on exam

## 2024-08-09 NOTE — DISCHARGE NOTE PROVIDER - NSDCFUSCHEDAPPT_GEN_ALL_CORE_FT
Yun, Suk-Hyeon  [facility]  NEPHRO [address]  Scheduled Appointment: 11/01/2024

## 2024-08-09 NOTE — GOALS OF CARE CONVERSATION - ADVANCED CARE PLANNING - CONVERSATION DETAILS
Do you have Advance Directives (HCP / LV / Organ donation / Documentation of oral advance Directive):   (  x  )  yes    (      )    NO                                                                            Do you have LV - Living will :                                                                                                                               ( x  )  yes    (      )   No    Do you have HCP - Health Care Proxy:                                                                                                        (  x  )  yes   (       ) N0    Do you have DNR- Do Not Resuscitate :                                                                                                    (      )  yes  (    x    )  No    Do you have DNI- Do Not intubate  :                                                                                                           (      )  yes   (    x   ) No    Do you have MOLST - Medical orders for Life sustaining treatment  :                                                   (      ) yes    (    x   ) No    Decision Maker :  (   x  ) Patient     (      )  HCA   (     ) Public Health Law Surrogate     (      ) Surrogate  (       ) Guardian    Goals of Care :  (   x   )   Complete Care     (       ) No Limitations                              (       )   Comfort Care       (       )  Hospice                               (      )   Limited medical Intervention / s    Medical Interventions :   (  x      )   CPR       (        )  DNR                                               (      x  )  Intubation with MV - Mechanical Ventilation  (  x    ) BIPAP/CPAP    (         )   DNI                                               (     x    )  Artificial Nutrition -  IVF, TPN / PPN, Tube Feeds             (         )   No Feeding Tube                                                (      x  ) Use Antibiotics                         (          ) No Antibiotics                                                (     x    ) Blood and Blood Products     (         )   No Blood or Blood products                                                (     x     )  Dialysis                                    (         )  No Dialysis                                                (          )  Medical Management only  (         )  No Invasive Interventions or Surgery  Time spent :                        ( x      ) upto 30 minutes                       (           )   more than 30 minutes
Writer met with .pt  Reviewed patient's medical and social history as well as events leading to patient's hospitalization. Writer discussed patient's current diagnosis wound left lower leg . medical condition and management  prognosis, and life expectancy. Assisted pt with completion of HCP . Educated her regarding conversations to have with her agent regarding her wishes if she is seriously.

## 2024-08-09 NOTE — PROGRESS NOTE ADULT - ASSESSMENT
Pt. well known to me admitted for nonhealing wound/cellulitis left lower leg following laceration.  S/p debridement Dr. Bay.  MRSA in wound.  Suggest ID FU. Transition to po meds and dc soon?  MRI leg  Vanco. Watch Creat.   Ambulate.  Wound care.  COPD  Htn

## 2024-08-09 NOTE — DISCHARGE NOTE PROVIDER - PROVIDER TOKENS
PROVIDER:[TOKEN:[[id number]]],PROVIDER:[TOKEN:[[id number]]],PROVIDER:[TOKEN:[[id number]]],PROVIDER:[TOKEN:[[id number]]]

## 2024-08-09 NOTE — DISCHARGE NOTE PROVIDER - NSDCCPCAREPLAN_GEN_ALL_CORE_FT
PRINCIPAL DISCHARGE DIAGNOSIS  Diagnosis: Ulcer of left lower leg  Assessment and Plan of Treatment: follow up with podiatry Dr. Dubois

## 2024-08-09 NOTE — PROGRESS NOTE ADULT - SUBJECTIVE AND OBJECTIVE BOX
PULMONARY/CRITICAL CARE  DOS 8/9/24  Doing well.   Has MRSA in wound. On Vanco.  Feels well, ambulated.    No sob. No fever.   Pt. well known to me.    Patient is a 78y old  Female who presents with a chief complaint of cellulitis (05 Aug 2024 17:18)    BRIEF HOSPITAL COURSE: ***72-year-old female with history of lung CA status post right lobectomy, CKD, hyperlipidemia, RA, COPD on 2L O2 at night, cholecystectomy, who was sent from wound care for evaluation of nonhealing left lower leg wound x 2 months.  Patient states that she has had wound to left lower leg since May 2024 from a fall which has not been healing.  States the wound was infected recently and was on antibiotics 2 weeks ago.  States that she was seen at wound care today by Dr. Dubois and sent to ER for admission for IV antibiotics and wound debridement.  Patient denies fever, chills, chest pain, shortness of breath.    Events last 24 hours: ***    PAST MEDICAL & SURGICAL HISTORY:  HLD (hyperlipidemia)      Rheumatoid arthritis      TIA (transient ischemic attack)  2012      Lung cancer  Right lung.      Chronic obstructive pulmonary disease, unspecified COPD type  O2 at night      Hiatal hernia with GERD      Essential hypertension      Childhood asthma      Stage 3 chronic kidney disease      Carotid artery plaque      IBS (irritable bowel syndrome)      Psoriatic arthritis      S/P lobectomy of lung  Right lung in 1996.      H/O colonoscopy      History of endoscopy  Sept 2017      S/P cholecystectomy        Allergies    IV Contrast (Hives; Rash)  Zofran (Other)  minocycline (Other)  Enbrel (Unknown)  Levaquin (Other)  penicillin (Other)    Intolerances      FAMILY HISTORY: No cigs, etoh  FH: type 2 diabetes (Mother)    FH: CAD (coronary artery disease) (Father, Sibling)    FH: colon cancer (Sibling)    FH: myocardial infarction (Father)    FH: stroke (Sibling)    FH: lung cancer (Sibling)      Medications:  hydroxychloroquine 200 milliGRAM(s) Oral two times a day  vancomycin  IVPB 750 milliGRAM(s) IV Intermittent every 24 hours      montelukast 10 milliGRAM(s) Oral daily    melatonin 3 milliGRAM(s) Oral at bedtime PRN  rOPINIRole 1 milliGRAM(s) Oral two times a day      aspirin enteric coated 81 milliGRAM(s) Oral daily    famotidine    Tablet 20 milliGRAM(s) Oral daily      atorvastatin 20 milliGRAM(s) Oral at bedtime    lactated ringers. 1000 milliLiter(s) IV Continuous <Continuous>  multivitamin 1 Tablet(s) Oral daily                ICU Vital Signs Last 24 Hrs  T(C): 36.3 (06 Aug 2024 05:15), Max: 36.7 (05 Aug 2024 10:00)  T(F): 97.4 (06 Aug 2024 05:15), Max: 98.1 (05 Aug 2024 10:00)  HR: 73 (06 Aug 2024 05:15) (73 - 88)  BP: 152/78 (06 Aug 2024 07:30) (136/58 - 224/86)  BP(mean): --  ABP: --  ABP(mean): --  RR: 18 (06 Aug 2024 05:15) (18 - 18)  SpO2: 92% (06 Aug 2024 05:15) (92% - 93%)    O2 Parameters below as of 06 Aug 2024 05:15  Patient On (Oxygen Delivery Method): room air          Vital Signs Last 24 Hrs  T(C): 36.3 (06 Aug 2024 05:15), Max: 36.7 (05 Aug 2024 10:00)  T(F): 97.4 (06 Aug 2024 05:15), Max: 98.1 (05 Aug 2024 10:00)  HR: 73 (06 Aug 2024 05:15) (73 - 88)  BP: 152/78 (06 Aug 2024 07:30) (136/58 - 224/86)  BP(mean): --  RR: 18 (06 Aug 2024 05:15) (18 - 18)  SpO2: 92% (06 Aug 2024 05:15) (92% - 93%)    Parameters below as of 06 Aug 2024 05:15  Patient On (Oxygen Delivery Method): room air            I&O's Detail    05 Aug 2024 07:01  -  06 Aug 2024 07:00  --------------------------------------------------------  IN:  Total IN: 0 mL    OUT:    Voided (mL): 250 mL  Total OUT: 250 mL    Total NET: -250 mL            LABS:                        11.0   8.92  )-----------( 201      ( 05 Aug 2024 12:15 )             36.0     08-05    139  |  104  |  23  ----------------------------<  102<H>  4.2   |  31  |  1.10    Ca    9.5      05 Aug 2024 12:15    TPro  6.6  /  Alb  3.0<L>  /  TBili  0.5  /  DBili  x   /  AST  21  /  ALT  19  /  AlkPhos  94  08-05          CAPILLARY BLOOD GLUCOSE        PT/INR - ( 05 Aug 2024 12:15 )   PT: 11.4 sec;   INR: 0.97 ratio         PTT - ( 05 Aug 2024 12:15 )  PTT:29.2 sec  Urinalysis Basic - ( 05 Aug 2024 12:15 )    Color: x / Appearance: x / SG: x / pH: x  Gluc: 102 mg/dL / Ketone: x  / Bili: x / Urobili: x   Blood: x / Protein: x / Nitrite: x   Leuk Esterase: x / RBC: x / WBC x   Sq Epi: x / Non Sq Epi: x / Bacteria: x      CULTURES:      Physical Examination:    General: No acute distress.  thin female    HEENT: Pupils equal, reactive to light.  Symmetric.    PULM: Clear to auscultation bilaterally, no wheeze rhonchi rales no change percussion    CVS: Regular rate and rhythm, no murmurs, rubs, or gallops    ABD: Soft, nondistended, nontender, normoactive bowel sounds, no masses    EXT: No edema, nontender calves; left shin bandaged.    SKIN: Warm and well perfused, no rashes noted.    NEURO: Alert, oriented, interactive, nonfocal    RADIOLOGY: ***rd< from: Xray Tibia + Fibula 2 Views, Left (08.05.24 @ 11:38) >  ACC: [id number] EXAM:  XR TIB FIB AP LAT 2 VIEWS LT   ORDERED BY: DANIELLA RAMIREZ     ACC: [id number] EXAM:  XR CHEST AP OR PA 1V   ORDERED BY: DANIELLA RAMIREZ     PROCEDURE DATE:  08/05/2024          INTERPRETATION:  EXAM: XR CHEST, XR TIBIA FIBULA AP AND LATERAL 2 VIEWS   LEFT    INDICATION: leg ulcer PXR    COMPARISON: See below    IMPRESSION:    Chest August 5 at 11:47 AM: Exam is compared to June 16, 2024. Again   deformity of the right upper chest noted with some increased density   overlying the right upper chest compatible with scarring and/or   atelectatic change. An acute consolidative process cannot absolutely BE   excluded given the slightly more prominent density in the right upper   chest. Loss of lung volume on the right noted with some increased density   also seen at the right CP angle as before. Left chest is clear. Old   trauma related to the left humeral head neck region as before. Follow-up   suggested.    Left tibia-fibula: No acute fracture or dislocation. Some superficial   deformity in the anterior mid calf region incidentally noted related to   the soft tissues on the lateral view. Bandage artifact related to the   distal tibia-fibula and ankle region limits exam. Patient has a history   of nonhealing ulcer. Soft tissue swelling seen in the mid calf on July 24   has significantly improved. Follow-up suggested as indicated clinically.    --- End of Report ---            JUSTIN RENDON MD; Attending Radiologist  This document has been electronically signed. Aug  5 2024 12:04PM    < end of copied text >      CRITICAL CARE TIME SPENT: ***

## 2024-08-09 NOTE — PROGRESS NOTE ADULT - PROVIDER SPECIALTY LIST ADULT
Cardiology
Infectious Disease
Pulmonology
Cardiology
Infectious Disease
Infectious Disease
Podiatry
Cardiology
Pulmonology
Cardiology
Pulmonology
Family Medicine
Podiatry
Family Medicine
Hospitalist

## 2024-08-09 NOTE — DISCHARGE NOTE PROVIDER - CARE PROVIDER_API CALL
Adriel Galan  Pulmonary Disease  [address] [place] [address]  Phone: [phone]  Fax: [phone]  Follow Up Time:     Ricardo Dubois  [facility] Rearfoot/Ankle Surgery  [address] [place] [address]  Phone: [phone]  Fax: [phone]  Follow Up Time:     Hugo Oh  Cardiovascular Disease  [address], [address]  [place] [address]  Phone: [phone]  Fax: [phone]  Follow Up Time:     Hilda Carrillo  Internal Medicine  [address] [place] [address]  Phone: [phone]  Fax: [phone]  Follow Up Time:

## 2024-08-09 NOTE — DISCHARGE NOTE PROVIDER - CARE PROVIDERS DIRECT ADDRESSES
,DirectAddress_Unknown,DirectAddress_Unknown,[email or web address]

## 2024-08-09 NOTE — DISCHARGE NOTE NURSING/CASE MANAGEMENT/SOCIAL WORK - NSDCVIVACCINE_GEN_ALL_CORE_FT
Tdap; 06-Sep-2020 10:17; Staci Rose (RN); [employer]; [id number] (Exp. Date: 09-Jul-2022); IntraMuscular; Deltoid Left.; 0.5 milliLiter(s); VIS (VIS Published: 09-May-2013, VIS Presented: 06-Sep-2020);   Tdap; 03-Jun-2024 18:01; Rosalie Contreras (PAOLA); [employer]; [id number] (Exp. Date: 03-Dec-2025); IntraMuscular; Deltoid Right.; 0.5 milliLiter(s); VIS (VIS Published: 09-May-2013, VIS Presented: 03-Jun-2024);    W Plasty Text: The lesion was extirpated to the level of the fat with a #15 scalpel blade.  Given the location of the defect, shape of the defect and the proximity to free margins a W-plasty was deemed most appropriate for repair.  Using a sterile surgical marker, the appropriate transposition arms of the W-plasty were drawn incorporating the defect and placing the expected incisions within the relaxed skin tension lines where possible.    The area thus outlined was incised deep to adipose tissue with a #15 scalpel blade.  The skin margins were undermined to an appropriate distance in all directions utilizing iris scissors.  The opposing transposition arms were then transposed into place in opposite direction and anchored with interrupted buried subcutaneous sutures.

## 2024-08-09 NOTE — CASE MANAGEMENT PROGRESS NOTE - NSCMPROGRESSNOTE_GEN_ALL_CORE
Offered home care services for wound care patient and spouse declined. They stated patient will go to the wound care center for F/U care

## 2024-08-09 NOTE — PROGRESS NOTE ADULT - PROBLEM SELECTOR PROBLEM 1
Leg wound, left
R/O Cellulitis of left leg
R/O Cellulitis of left leg
Leg wound, left
R/O Cellulitis of left leg
Leg wound, left

## 2024-08-09 NOTE — DISCHARGE NOTE NURSING/CASE MANAGEMENT/SOCIAL WORK - PATIENT PORTAL LINK FT
You can access the FollowMyHealth Patient Portal offered by [facility] by registering at the following website: [email or web address]. By joining [employer]’s FollowMyHealth portal, you will also be able to view your health information using other applications (apps) compatible with our system.

## 2024-08-09 NOTE — DISCHARGE NOTE NURSING/CASE MANAGEMENT/SOCIAL WORK - NSDCPEFALRISK_GEN_ALL_CORE
For information on Fall & Injury Prevention, visit: [email or web address] OR  [email or web address] OR  https://www.cdc.gov/steadi/patient.html

## 2024-08-09 NOTE — PROGRESS NOTE ADULT - SUBJECTIVE AND OBJECTIVE BOX
[facility] -- Emanuel Bethea, Anish Gordillo Savella, Teddy Peralta: Office # [phone]    Follow Up:  cardiac optimization, HTN    Subjective/Observations: Patient seen and examined at bedside. Patient awake, alert, resting in bed. Denies chest pain, palpitations, SOB/CHEN, dizziness or any other complaints. Tolerating room air.     REVIEW OF SYSTEMS: All other review of systems are negative unless indicated above    PAST MEDICAL & SURGICAL HISTORY:  HLD (hyperlipidemia)      Rheumatoid arthritis      TIA (transient ischemic attack)  2012      Lung cancer  Right lung.      Chronic obstructive pulmonary disease, unspecified COPD type  O2 at night      Hiatal hernia with GERD      Essential hypertension      Childhood asthma      Stage 3 chronic kidney disease      Carotid artery plaque      IBS (irritable bowel syndrome)      Psoriatic arthritis      S/P lobectomy of lung  Right lung in 1996.      H/O colonoscopy      History of endoscopy  Sept 2017      S/P cholecystectomy    MEDICATIONS  (STANDING):  amLODIPine   Tablet 10 milliGRAM(s) Oral daily  aspirin enteric coated 81 milliGRAM(s) Oral daily  atorvastatin 20 milliGRAM(s) Oral at bedtime  budesonide 160 MICROgram(s)/formoterol 4.5 MICROgram(s) Inhaler 2 Puff(s) Inhalation two times a day  BUpivacaine liposome 1.3% Injectable 20 milliLiter(s) Local Injection once  enoxaparin Injectable 30 milliGRAM(s) SubCutaneous every 24 hours  famotidine    Tablet 20 milliGRAM(s) Oral daily  hydroxychloroquine 200 milliGRAM(s) Oral two times a day  metoprolol tartrate 25 milliGRAM(s) Oral two times a day  montelukast 10 milliGRAM(s) Oral daily  multivitamin 1 Tablet(s) Oral daily  rOPINIRole 1 milliGRAM(s) Oral two times a day  vancomycin  IVPB 750 milliGRAM(s) IV Intermittent every 24 hours    MEDICATIONS  (PRN):  acetaminophen     Tablet .. 650 milliGRAM(s) Oral every 6 hours PRN Temp greater or equal to 38C (100.4F), Mild Pain (1 - 3)  melatonin 3 milliGRAM(s) Oral at bedtime PRN Insomnia  oxyCODONE    IR 2.5 milliGRAM(s) Oral every 6 hours PRN Moderate Pain (4 - 6)    Allergies    IV Contrast (Hives; Rash)  Zofran (Other)  minocycline (Other)  Enbrel (Unknown)  Levaquin (Other)  penicillin (Other)    Intolerances      Vital Signs Last 24 Hrs  T(C): 36.8 (09 Aug 2024 12:04), Max: 36.9 (08 Aug 2024 21:10)  T(F): 98.3 (09 Aug 2024 12:04), Max: 98.4 (08 Aug 2024 21:10)  HR: 63 (09 Aug 2024 12:04) (63 - 76)  BP: 115/62 (09 Aug 2024 12:04) (115/62 - 149/70)  BP(mean): --  RR: 20 (09 Aug 2024 12:04) (18 - 20)  SpO2: 92% (09 Aug 2024 12:04) (92% - 92%)    Parameters below as of 09 Aug 2024 12:04  Patient On (Oxygen Delivery Method): room air      I&O's Summary        TELE: Not on telemetry   PHYSICAL EXAM:  Constitutional: NAD, awake and alert  HEENT: Moist Mucous Membranes, Anicteric  Pulmonary: Non-labored, breath sounds are clear bilaterally, No wheezing, rales or rhonchi  Cardiovascular: Regular, S1 and S2, No murmurs, No rubs, gallops or clicks  Gastrointestinal:  soft, nontender, nondistended   Lymph: No peripheral edema. No lymphadenopathy.   Skin: No visible rashes + LLE DSD  Psych:  Mood & affect appropriate      LABS: All Labs Reviewed:                        11.1   8.03  )-----------( 224      ( 07 Aug 2024 09:35 )             34.9     09 Aug 2024 07:10    138    |  102    |  17     ----------------------------<  123    4.0     |  31     |  1.10   07 Aug 2024 09:35    138    |  102    |  18     ----------------------------<  125    4.1     |  31     |  1.10     Ca    9.3        09 Aug 2024 07:10  Ca    9.3        07 Aug 2024 09:35    TPro  6.2    /  Alb  2.7    /  TBili  0.4    /  DBili  x      /  AST  22     /  ALT  19     /  AlkPhos  92     09 Aug 2024 07:10  TPro  5.9    /  Alb  2.6    /  TBili  0.3    /  DBili  x      /  AST  26     /  ALT  18     /  AlkPhos  96     07 Aug 2024 09:35   LIVER FUNCTIONS - ( 09 Aug 2024 07:10 )  Alb: 2.7 g/dL / Pro: 6.2 g/dL / ALK PHOS: 92 U/L / ALT: 19 U/L / AST: 22 U/L / GGT: x             12 Lead ECG:   Ventricular Rate 77 BPM    Atrial Rate 77 BPM    P-R Interval 144 ms    QRS Duration 88 ms    Q-T Interval 384 ms    QTC Calculation(Bazett) 434 ms    P Axis 68 degrees    R Axis 37 degrees    T Axis 64 degrees    Diagnosis Line Normal sinus rhythm  Possible Left atrial enlargement  Borderline ECG  Confirmed by kareen Gorman ([id number]) on 8/5/2024 2:33:35 PM (08-05-24 @ 11:40)       EXAM:  ECHO TTE WO CON COMP W DOPP         PROCEDURE DATE:  09/07/2020        INTERPRETATION:  INDICATION: Syncope  Sonographer AS    Blood Pressure 120/78    Height 157.48 cm     Weight 52.6 kg       BSA 1.5 sq m    Dimensions:  LA 2.6       Normal Values: 2.0 - 4.0 cm  Ao 3.2        Normal Values: 2.0 - 3.8 cm  SEPTUM 1.2       Normal Values: 0.6 - 1.2 cm  PWT 1.1       Normal Values: 0.6 - 1.1 cm  LVIDd 3.9         Normal Values: 3.0 - 5.6 cm  LVIDs 2.6         Normal Values: 1.8 - 4.0 cm      OBSERVATIONS:  Technically difficult and limited study  Mitral Valve: Thickened leaflets, mild MR.  Aortic Valve/Aorta: Aortic valve is not well-visualized, no signs of severe valvular pathology  Tricuspid Valve: Mild TR.  Pulmonic Valve: Not well-visualized  Left Atrium: normal  Right Atrium: Not well-visualized  Left Ventricle: Grossly normal LV size and systolic function, estimated LVEF of 65%. Endocardium is not well-visualized, cannot rule out segmental dysfunction  Right Ventricle: The right ventricle is not well-visualized  Pericardium/Pleura: Trace pericardial effusion.  Pulmonary/RV Pressure: estimated PA systolic pressure of 27.7 mmHg assuming an RA pressure of 3 mmHg.  LV diastolic dysfunction is present    Conclusion:  Technically difficult and limited study  Grossly normal left ventricular internal dimensions and systolic function, estimated LVEF of 65%.  The right ventricle is not well-visualized  Aortic valve is not well-visualized, no signs of severe valvular pathology  Mild MR and TR.  No significant pericardial effusion.      ARUN PERALTA   This document has been electronically signed. Sep  8 2020  8:57AM

## 2024-08-09 NOTE — PROGRESS NOTE ADULT - REASON FOR ADMISSION
cellulitis

## 2024-08-09 NOTE — DISCHARGE NOTE PROVIDER - NSDCHHENCOUNTER_GEN_ALL_CORE
Problem: Diabetes Comorbidity  Goal: Blood Glucose Level Within Desired Range  Outcome: Ongoing (interventions implemented as appropriate)  Monitor blood sugar. Insulin as needed       09-Aug-2024

## 2024-08-09 NOTE — PROGRESS NOTE ADULT - ASSESSMENT
72-year-old female with history of lung CA status post right lobectomy, CKD, hyperlipidemia, RA, COPD on 2L O2 at night, cholecystectomy, who was sent from wound care for evaluation of nonhealing left lower leg wound x 2 months.    Cardiac optimization, HTN  - presents w/ non healing LLE wound  - s/p debridement (8/6) tolerated well from CV POV, podiatry following    - BP stable and controlled with -140s   - pain management per primary   - Continue Norvasc 5 mg PO daily, up-titrate if BP remains uncontrolled   - Continue BB     - EKG demonstrates NSR  - No acute ischemic changes noted.   - Cardiac Cath/PCI: 12/11/2013. Minimal nonobstructive CAD. NST 2017 normal  - continue ASA and statin (hx of mild carotid plaque with a history of TIA)    - Echo (4/2024) LVSF normal, EF 55-60%, mild grade 1 LVDD, mild UT.   - euvolemic on exam   - no O2 requirement     - Follows with Dr. Oh (outpaitient cardiologist)   - Monitor and replete lytes, keep K>4, Mg>2.  - Will continue to follow.    Errol Holland, MS FNP, AGACNP  Nurse Practitioner- Cardiology   Please call on TEAMS

## 2024-08-09 NOTE — DISCHARGE NOTE PROVIDER - NSDCFUADDINST_GEN_ALL_CORE_FT
Wound Care Instructions:  -Keep dressing clean, dry, and intact to the left leg  -Apply adaptic, gauze, ABD pad, rebeka, and Ace wrap, change every other day   -Patient weight bearing as tolerated   -Monitor for any signs of infection including redness, swelling in the leg above the bandage, nausea/vomiting/fever/chills/chest pain/shortness of breath, if any are present patient must report to the emergency department immediately  -Patient is to follow up with Dr. Laird/Dr. Dubois/ Dr. London  within 5 days after discharge at [facility] [place]. Please call to make an appointment [phone]

## 2024-08-09 NOTE — DISCHARGE NOTE PROVIDER - DETAILS OF MALNUTRITION DIAGNOSIS/DIAGNOSES
Pt called asking for his blood work to be faxed to [facility]  Faxed to   This patient has been assessed with a concern for Malnutrition and was treated during this hospitalization for the following Nutrition diagnosis/diagnoses:     -  08/07/2024: Severe protein-calorie malnutrition   -  08/07/2024: Underweight (BMI < 19)

## 2024-08-09 NOTE — DISCHARGE NOTE PROVIDER - NSDCMRMEDTOKEN_GEN_ALL_CORE_FT
acetaminophen 325 mg oral tablet: 2 tab(s) orally every 6 hours As needed Temp greater or equal to 38C (100.4F), Mild Pain (1 - 3)  amLODIPine 10 mg oral tablet: 1 tab(s) orally once a day  Arava 20 mg oral tablet: 1 tab(s) orally once a day (in the morning)  Aspir 81 oral delayed release tablet: 1 tab(s) orally once a day (in the morning)  azelastine 137 mcg/inh (0.1%) nasal spray: 2 spray(s) intranasally 2 times a day  Crestor 5 mg oral tablet: 1 tab(s) orally once a day (at bedtime)  famotidine 40 mg oral tablet: 1 tab(s) orally once a day  metoprolol tartrate 25 mg oral tablet: 1 tab(s) orally 2 times a day  Multiple Vitamins oral tablet: 1 tab(s) orally once a day  Neurevia: 1 tablet daily  Plaquenil 200 mg oral tablet: 1 tab(s) orally 2 times a day  Requip 2 mg oral tablet: 1 tab(s) orally 2 times a day 1 tablet orally 2 times a day  Singulair 10 mg oral tablet: 1 tab(s) orally once a day (in the evening)  Wixela Inhub 250 mcg-50 mcg inhalation powder: 1 puff(s) inhaled 2 times a day  Zyvox 600 mg oral tablet: 1 tab(s) orally 2 times a day   acetaminophen 325 mg oral tablet: 2 tab(s) orally every 6 hours As needed Temp greater or equal to 38C (100.4F), Mild Pain (1 - 3)  amLODIPine 10 mg oral tablet: 1 tab(s) orally once a day  Arava 20 mg oral tablet: 1 tab(s) orally once a day (in the morning)  Aspir 81 oral delayed release tablet: 1 tab(s) orally once a day (in the morning)  azelastine 137 mcg/inh (0.1%) nasal spray: 2 spray(s) intranasally 2 times a day  Crestor 5 mg oral tablet: 1 tab(s) orally once a day (at bedtime)  famotidine 40 mg oral tablet: 1 tab(s) orally once a day  gabapentin 100 mg oral capsule: 1 cap(s) orally 3 times a day  metoprolol tartrate 25 mg oral tablet: 1 tab(s) orally 2 times a day  Multiple Vitamins oral tablet: 1 tab(s) orally once a day  Neurevia: 1 tablet daily  Plaquenil 200 mg oral tablet: 1 tab(s) orally 2 times a day  Requip 2 mg oral tablet: 1 tab(s) orally 2 times a day 1 tablet orally 2 times a day  Singulair 10 mg oral tablet: 1 tab(s) orally once a day (in the evening)  Wixela Inhub 250 mcg-50 mcg inhalation powder: 1 puff(s) inhaled 2 times a day  Zyvox 600 mg oral tablet: 1 tab(s) orally 2 times a day

## 2024-08-09 NOTE — DISCHARGE NOTE PROVIDER - HOSPITAL COURSE
admitted for LLE wound with cellulitis  ABX per ID  underwent surgical debridement  OM ruled out  HTN - uncontrolled  added beta blocker and NORVASC  DC after ID and podiatry clearance

## 2024-08-09 NOTE — PROGRESS NOTE ADULT - SUBJECTIVE AND OBJECTIVE BOX
[facility]  INFECTIOUS DISEASES - [address], [place] [address]  Tel: [phone]     Fax: [phone]  =======================================================    GINNY VASQUEZ [id number]    Follow up: No fevers. Had increased left leg pain after dressing change.    Allergies:  IV Contrast (Hives; Rash)  Zofran (Other)  minocycline (Other)  Enbrel (Unknown)  Levaquin (Other)  penicillin (Other)      Antibiotics:  acetaminophen     Tablet .. 650 milliGRAM(s) Oral every 6 hours PRN  amLODIPine   Tablet 10 milliGRAM(s) Oral daily  aspirin enteric coated 81 milliGRAM(s) Oral daily  atorvastatin 20 milliGRAM(s) Oral at bedtime  budesonide 160 MICROgram(s)/formoterol 4.5 MICROgram(s) Inhaler 2 Puff(s) Inhalation two times a day  BUpivacaine liposome 1.3% Injectable 20 milliLiter(s) Local Injection once  enoxaparin Injectable 30 milliGRAM(s) SubCutaneous every 24 hours  famotidine    Tablet 20 milliGRAM(s) Oral daily  gabapentin 100 milliGRAM(s) Oral three times a day  hydroxychloroquine 200 milliGRAM(s) Oral two times a day  linezolid    Tablet 600 milliGRAM(s) Oral every 12 hours  melatonin 3 milliGRAM(s) Oral at bedtime PRN  metoprolol tartrate 25 milliGRAM(s) Oral two times a day  montelukast 10 milliGRAM(s) Oral daily  multivitamin 1 Tablet(s) Oral daily  rOPINIRole 1 milliGRAM(s) Oral two times a day       REVIEW OF SYSTEMS:  CONSTITUTIONAL:  No Fever or chills  HEENT:  No sore throat or runny nose.  CARDIOVASCULAR:  No chest pain or SOB.  RESPIRATORY:  No cough, shortness of breath  GASTROINTESTINAL: No nausea, vomiting, or diarrhea  GENITOURINARY:  No dysuria, frequency or urgency  MUSCULOSKELETAL:  (+) L lower leg pain  SKIN: (+) LLE wound  NEUROLOGIC:  No headache or dizziness  PSYCHIATRIC:  No disorder of thought or mood.     Physical Exam:  ICU Vital Signs Last 24 Hrs  T(C): 36.8 (09 Aug 2024 12:04), Max: 36.9 (08 Aug 2024 21:10)  T(F): 98.3 (09 Aug 2024 12:04), Max: 98.4 (08 Aug 2024 21:10)  HR: 63 (09 Aug 2024 12:04) (63 - 76)  BP: 115/62 (09 Aug 2024 12:04) (115/62 - 149/70)  BP(mean): --  ABP: --  ABP(mean): --  RR: 20 (09 Aug 2024 12:04) (18 - 20)  SpO2: 92% (09 Aug 2024 12:04) (92% - 92%)    O2 Parameters below as of 09 Aug 2024 12:04  Patient On (Oxygen Delivery Method): room air          GEN: NAD  HEENT: normocephalic and atraumatic.   NECK: Supple.   LUNGS: Normal respiratory effort  HEART: Regular rate and rhythm   ABDOMEN: Soft, nontender, and nondistended.    EXTREMITIES: No leg edema. L lower leg covered with dressing  NEUROLOGIC: Answering questions appropriately  PSYCHIATRIC: Appropriate affect .    Labs:  08-09    138  |  102  |  17  ----------------------------<  123<H>  4.0   |  31  |  1.10    Ca    9.3      09 Aug 2024 07:10    TPro  6.2  /  Alb  2.7<L>  /  TBili  0.4  /  DBili  x   /  AST  22  /  ALT  19  /  AlkPhos  92  08-09        Urinalysis Basic - ( 09 Aug 2024 07:10 )    Color: x / Appearance: x / SG: x / pH: x  Gluc: 123 mg/dL / Ketone: x  / Bili: x / Urobili: x   Blood: x / Protein: x / Nitrite: x   Leuk Esterase: x / RBC: x / WBC x   Sq Epi: x / Non Sq Epi: x / Bacteria: x      LIVER FUNCTIONS - ( 09 Aug 2024 07:10 )  Alb: 2.7 g/dL / Pro: 6.2 g/dL / ALK PHOS: 92 U/L / ALT: 19 U/L / AST: 22 U/L / GGT: x             RECENT CULTURES:  08-06 @ 14:20 .Tissue Other, LEFT LOWER LEG TISSUE CULTURE Methicillin resistant Staphylococcus aureus    Moderate Staphylococcus aureus    No polymorphonuclear cells seen per low power field  Rare Gram positive cocci in pairs per oil power field      08-05 @ 12:15 .Blood Blood     No growth at 72 Hours        08-05 @ 12:00 .Blood Blood     No growth at 72 Hours              All imaging and data are reviewed.

## 2024-08-09 NOTE — PROGRESS NOTE ADULT - PROBLEM SELECTOR PROBLEM 2
R/O Chronic obstructive pulmonary disease, unspecified COPD type
Preventive measure
R/O Chronic obstructive pulmonary disease, unspecified COPD type
R/O Chronic obstructive pulmonary disease, unspecified COPD type
Preventive measure
Preventive measure

## 2024-08-09 NOTE — PROGRESS NOTE ADULT - ASSESSMENT
72-year-old female with history of lung CA status post right lobectomy, CKD, hyperlipidemia, RA, COPD on 2L O2 at night, cholecystectomy, who was sent from wound care for evaluation of nonhealing left lower leg wound x 2 months.      S/p operative wound debridement on 8/6. OR culture growing MRSA, sensitivities reviewed. MRSA only susceptible to linezolid for PO antibiotics. Last given morphine 3 days ago, but would avoid linezolid if patient needs Tramadol or any opioid related pain medication given risk of drug interaction.    Otherwise path showed no definitive evidence of malignancy. MRI showed no evidence of osteomyelitis. She has no fevers and blood cultures remain no growth.    #Nonhealing L lower leg wound  #MRSA infection  #Hx of penicillin allergy    -continue vancomycin until 8/13 to complete 7 days post op, but if ready for discharge can be switched to linezolid 600mg PO q12h if no drug interaction with discharge meds  -follow cultures to completion  -wound care  -contact isolation  -discussed with Dr. Velazquez  -I will be covered by Dr. Sadie Camacho this weekend, 8/10-8/11/24    Hilda Carrillo MD  Division of Infectious Diseases   Cell [phone] between 8am and 6pm   After 6pm and weekends please call ID service at [phone].     35 minutes spent on total encounter assessing patient, examination, chart review, counseling and coordinating care by the attending physician/nurse/care manager.

## 2024-08-11 LAB
CULTURE RESULTS: ABNORMAL
ORGANISM # SPEC MICROSCOPIC CNT: ABNORMAL
ORGANISM # SPEC MICROSCOPIC CNT: SIGNIFICANT CHANGE UP
SPECIMEN SOURCE: SIGNIFICANT CHANGE UP

## 2024-08-14 ENCOUNTER — APPOINTMENT (OUTPATIENT)
Dept: WOUND CARE | Facility: HOSPITAL | Age: 79
End: 2024-08-14
Payer: MEDICARE

## 2024-08-14 VITALS
OXYGEN SATURATION: 93 % | RESPIRATION RATE: 18 BRPM | BODY MASS INDEX: 19.69 KG/M2 | HEART RATE: 90 BPM | DIASTOLIC BLOOD PRESSURE: 60 MMHG | SYSTOLIC BLOOD PRESSURE: 161 MMHG | WEIGHT: 107 LBS | HEIGHT: 62 IN

## 2024-08-14 LAB
CULTURE RESULTS: SIGNIFICANT CHANGE UP
SPECIMEN SOURCE: SIGNIFICANT CHANGE UP

## 2024-08-14 PROCEDURE — 99213 OFFICE O/P EST LOW 20 MIN: CPT

## 2024-08-14 NOTE — PHYSICAL EXAM
[4 x 4] : 4 x 4  [Abdominal Pad] : Abdominal Pad [1+] : left 1+ [Ankle Swelling (On Exam)] : not present [Ankle Swelling Bilaterally] : bilaterally  [Varicose Veins Of Lower Extremities] : bilaterally [Ankle Swelling On The Right] : mild [] : bilaterally [Purpura] : no purpura  [Petechiae] : no petechiae [Skin Ulcer] : no ulcer [Skin Induration] : no induration [Alert] : alert [Oriented to Person] : oriented to person [Oriented to Place] : oriented to place [Oriented to Time] : oriented to time [Calm] : calm [de-identified] : A&Ox3, NAD [de-identified] : HTN, HLD [de-identified] : 4 out of 5 strength in all quadrants bilaterally [de-identified] : Left lower leg traumatic laceration down to skin, subcutaneous tissue, fat [de-identified] : wnl [de-identified] : Lotrisone to pamela wound [FreeTextEntry1] : Left Anterior Leg s/p debridement in the OR 8.6.24 [FreeTextEntry2] : 2.0 [FreeTextEntry3] : 2.5 [FreeTextEntry4] : 0.2 [de-identified] : moderate serosanguineous [de-identified] : none [de-identified] : other [de-identified] : none [de-identified] : mild erythema  [de-identified] : none [de-identified] : none [de-identified] : 20% [de-identified] : refused ace [de-identified] : adaptic touch [de-identified] : Mechanically cleansed with sterile gauze and normal saline 0.9% Dry Dressing- kerlix  Lotrisone to pamela wound area [de-identified] : >75% [de-identified] : Yes [de-identified] : 3x Weekly [de-identified] : Primary Dressing

## 2024-08-14 NOTE — REVIEW OF SYSTEMS
[Fever] : no fever [Chills] : no chills [Eye Pain] : no eye pain [Loss Of Hearing] : no hearing loss [Shortness Of Breath] : no shortness of breath [Abdominal Pain] : no abdominal pain [Joint Stiffness] : joint stiffness [Skin Lesions] : skin lesion [Confused] : confusion [Convulsions] : convulsions [Anxiety] : no anxiety [Easy Bleeding] : no tendency for easy bleeding [Negative] : Endocrine [FreeTextEntry5] : HTN, HLD [FreeTextEntry9] : Bunion and hammer toe deformity's of both feet , right worse then the left  [de-identified] :  s/p arthroplasty and hallux s/p [place] with staple , stable , no soi, suture line has eschar which was partially removed

## 2024-08-14 NOTE — PLAN
[FreeTextEntry1] : Patient examined and evaluated at this time. S/P debridement Adaptic,LUIS,3X/week lotrisone to periwound PRN Spent 20 minutes for patient care and medical decision making. return 1 week

## 2024-08-14 NOTE — HISTORY OF PRESENT ILLNESS
[FreeTextEntry1] : Patient is 78 year old female presenting for f/u s/p bunion surgery and hammer toe right foot with stable eschar. Patient denies any pain to the right foot.  Patient seen for right foot dorsal medial incision dehiscence with open wound down to skin, subcutaneous tissue, fat, healed with stable eschar.  6/19/2024 patient seen for open left lower leg traumatic laceration.  Relates that the dressings have been changed as advised since the last encounter.  No other complaints at this time. 6/26/2024 patient seen for open left lower leg traumatic laceration.  Relates that the dressings have been changed as advised since the last encounter.  No other complaints at this time. 7/10/2024 patient seen for open left lower leg traumatic laceration, healing well 7/16/24 less necrotic tissue noted but wound appears larger. No SOI 7/22/24 left shin wound patient appears to have had a contact dermatitis from the bactroban. D/C'ed. Lotrisone to periwound.  7/20/2024 patient seen for follow-up of left lower leg wound down to skin, subcutaneous tissue, fat 8/1/2024 patient seen for follow-up of left lower leg wound down to skin, subcutaneous tissue, fat 8/14/24 left shin wound S/P debridement and biopsy last week. Wound is  and smaller. Periwound much improved. No SOI

## 2024-08-14 NOTE — ASSESSMENT
[Verbal] : Verbal [Written] : Written [Demo] : Demo [Patient] : Patient [Spouse] : Spouse [Good - alert, interested, motivated] : Good - alert, interested, motivated [Dressing changes] : dressing changes [Skin Care] : skin care [Signs and symptoms of infection] : sign and symptoms of infection [Venous Disease] : venous disease [How and When to Call] : how and when to call [Off-loading] : off-loading [Patient responsibility to plan of care] : patient responsibility to plan of care [Stable] : stable [Home] : Home [Ambulatory] : Ambulatory [Not Applicable - Long Term Care/Home Health Agency] : Long Term Care/Home Health Agency: Not Applicable [Other: ____] : [unfilled] [] : No [FreeTextEntry2] : Infection Prevention Foot and nail care Nutrition and wound healing Pt Demonstrates use of both nonpharmacological and pharmacological pain relief strategies. Autolytic debridement [FreeTextEntry3] : s/p OR debridement by Dr Dubois 8.6.24 at [facility]. Patient was hospitalized 5 days Now on zyvox  [FreeTextEntry4] : s/p OR debridement by Dr Dubois 8.6.24 at [facility]. Patient was hospitalized 5 days Now on zyvox   taught dressing changes today to perform 3x a week Return next Wednesday to see Dr Dubois

## 2024-08-14 NOTE — ASSESSMENT
[Verbal] : Verbal [Written] : Written [Demo] : Demo [Patient] : Patient [Spouse] : Spouse [Good - alert, interested, motivated] : Good - alert, interested, motivated [Dressing changes] : dressing changes [Skin Care] : skin care [Signs and symptoms of infection] : sign and symptoms of infection [Venous Disease] : venous disease [How and When to Call] : how and when to call [Off-loading] : off-loading [Patient responsibility to plan of care] : patient responsibility to plan of care [Stable] : stable [Home] : Home [Ambulatory] : Ambulatory [Not Applicable - Long Term Care/Home Health Agency] : Long Term Care/Home Health Agency: Not Applicable [Other: ____] : [unfilled] [] : No [FreeTextEntry2] : Infection Prevention Foot and nail care Nutrition and wound healing Pt Demonstrates use of both nonpharmacological and pharmacological pain relief strategies. Autolytic debridement [FreeTextEntry3] : s/p OR debridement by Dr Dubois 8.6.24 at [facility]. Patient was hospitalized 5 days Now on zyvox  [FreeTextEntry4] : s/p OR debridement by Dr Dubois 8.6.24 at [facility]. Patient was hospitalized 5 days Now on zyvox   taught dressing changes today to perform 3x a week Return next Wednesday to see Dr Dubois

## 2024-08-14 NOTE — REVIEW OF SYSTEMS
[Fever] : no fever [Chills] : no chills [Eye Pain] : no eye pain [Loss Of Hearing] : no hearing loss [Shortness Of Breath] : no shortness of breath [Abdominal Pain] : no abdominal pain [Joint Stiffness] : joint stiffness [Skin Lesions] : skin lesion [Confused] : confusion [Convulsions] : convulsions [Anxiety] : no anxiety [Easy Bleeding] : no tendency for easy bleeding [Negative] : Endocrine [FreeTextEntry5] : HTN, HLD [FreeTextEntry9] : Bunion and hammer toe deformity's of both feet , right worse then the left  [de-identified] :  s/p arthroplasty and hallux s/p [place] with staple , stable , no soi, suture line has eschar which was partially removed

## 2024-08-14 NOTE — PHYSICAL EXAM
[4 x 4] : 4 x 4  [Abdominal Pad] : Abdominal Pad [1+] : left 1+ [Ankle Swelling (On Exam)] : not present [Ankle Swelling Bilaterally] : bilaterally  [Varicose Veins Of Lower Extremities] : bilaterally [Ankle Swelling On The Right] : mild [] : bilaterally [Purpura] : no purpura  [Petechiae] : no petechiae [Skin Ulcer] : no ulcer [Skin Induration] : no induration [Alert] : alert [Oriented to Person] : oriented to person [Oriented to Place] : oriented to place [Oriented to Time] : oriented to time [Calm] : calm [de-identified] : A&Ox3, NAD [de-identified] : HTN, HLD [de-identified] : 4 out of 5 strength in all quadrants bilaterally [de-identified] : Left lower leg traumatic laceration down to skin, subcutaneous tissue, fat [de-identified] : wnl [de-identified] : Lotrisone to pamela wound [FreeTextEntry1] : Left Anterior Leg s/p debridement in the OR 8.6.24 [FreeTextEntry2] : 2.0 [FreeTextEntry3] : 2.5 [FreeTextEntry4] : 0.2 [de-identified] : moderate serosanguineous [de-identified] : none [de-identified] : other [de-identified] : none [de-identified] : mild erythema  [de-identified] : none [de-identified] : none [de-identified] : 20% [de-identified] : refused ace [de-identified] : adaptic touch [de-identified] : Mechanically cleansed with sterile gauze and normal saline 0.9% Dry Dressing- kerlix  Lotrisone to pamela wound area [de-identified] : >75% [de-identified] : Yes [de-identified] : 3x Weekly [de-identified] : Primary Dressing

## 2024-08-21 ENCOUNTER — APPOINTMENT (OUTPATIENT)
Dept: WOUND CARE | Facility: HOSPITAL | Age: 79
End: 2024-08-21
Payer: MEDICARE

## 2024-08-21 VITALS
HEIGHT: 62 IN | BODY MASS INDEX: 19.69 KG/M2 | WEIGHT: 107 LBS | HEART RATE: 77 BPM | TEMPERATURE: 98 F | DIASTOLIC BLOOD PRESSURE: 88 MMHG | SYSTOLIC BLOOD PRESSURE: 118 MMHG | OXYGEN SATURATION: 94 % | RESPIRATION RATE: 18 BRPM

## 2024-08-21 DIAGNOSIS — S81.812D LACERATION W/OUT FOREIGN BODY, LEFT LOWER LEG, SUBSEQUENT ENCOUNTER: ICD-10-CM

## 2024-08-21 PROCEDURE — 99213 OFFICE O/P EST LOW 20 MIN: CPT

## 2024-08-22 PROBLEM — S81.812D LACERATION OF LEFT LEG EXCLUDING THIGH, SUBSEQUENT ENCOUNTER: Status: ACTIVE | Noted: 2024-08-14

## 2024-08-22 NOTE — REVIEW OF SYSTEMS
[Fever] : no fever [Chills] : no chills [Eye Pain] : no eye pain [Loss Of Hearing] : no hearing loss [Shortness Of Breath] : no shortness of breath [Abdominal Pain] : no abdominal pain [Joint Stiffness] : joint stiffness [Skin Lesions] : skin lesion [Confused] : confusion [Convulsions] : convulsions [Anxiety] : no anxiety [Easy Bleeding] : no tendency for easy bleeding [Negative] : Endocrine [FreeTextEntry5] : HTN, HLD [FreeTextEntry9] : Bunion and hammer toe deformity's of both feet , right worse then the left  [de-identified] :  s/p arthroplasty and hallux s/p [place] with staple , stable , no soi, suture line has eschar which was partially removed

## 2024-08-22 NOTE — ASSESSMENT
[Verbal] : Verbal [Written] : Written [Demo] : Demo [Patient] : Patient [Good - alert, interested, motivated] : Good - alert, interested, motivated [Verbalizes knowledge/Understanding] : Verbalizes knowledge/understanding [Dressing changes] : dressing changes [Skin Care] : skin care [Signs and symptoms of infection] : sign and symptoms of infection [How and When to Call] : how and when to call [Pain Management] : pain management [Patient responsibility to plan of care] : patient responsibility to plan of care [Stable] : stable [Home] : Home [Ambulatory] : Ambulatory [Not Applicable - Long Term Care/Home Health Agency] : Long Term Care/Home Health Agency: Not Applicable [] : No [FreeTextEntry2] : Infection prevention Localized wound care  Goal remaining pain free regarding wounds [FreeTextEntry4] : Patients spouse preforms dressing changes and has supplies at home.  Follow up in 2 weeks

## 2024-08-22 NOTE — PHYSICAL EXAM
[4 x 4] : 4 x 4  [1+] : left 1+ [Ankle Swelling (On Exam)] : not present [Ankle Swelling Bilaterally] : bilaterally  [Varicose Veins Of Lower Extremities] : bilaterally [Ankle Swelling On The Right] : mild [] : bilaterally [Purpura] : no purpura  [Petechiae] : no petechiae [Skin Ulcer] : no ulcer [Skin Induration] : no induration [Alert] : alert [Oriented to Person] : oriented to person [Oriented to Place] : oriented to place [Oriented to Time] : oriented to time [Calm] : calm [de-identified] : A&Ox3, NAD [de-identified] : 4 out of 5 strength in all quadrants bilaterally [de-identified] : HTN, HLD [de-identified] : Left lower leg traumatic laceration down to skin, subcutaneous tissue, fat [de-identified] : wnl [FreeTextEntry1] : Left Anterior Leg s/p debridement in the OR 8.6.24   [FreeTextEntry2] : 1.5 [FreeTextEntry3] : 1.4 [FreeTextEntry4] : 0.2 [de-identified] : Serous/sanguinous [de-identified] : 5% [de-identified] : adaptic touch  [de-identified] : Mechanically cleansed with sterile gauze and normal saline. Kerlix   Lotrisone to pamela wound  [TWNoteComboBox4] : Small [de-identified] : Normal [de-identified] : None [de-identified] : None [de-identified] : >75% [de-identified] : Yes [de-identified] : 3x Weekly [de-identified] : Primary Dressing

## 2024-08-22 NOTE — HISTORY OF PRESENT ILLNESS
[FreeTextEntry1] : Patient is 78 year old female presenting for f/u s/p bunion surgery and hammer toe right foot with stable eschar. Patient denies any pain to the right foot.  Patient seen for right foot dorsal medial incision dehiscence with open wound down to skin, subcutaneous tissue, fat, healed with stable eschar.  6/19/2024 patient seen for open left lower leg traumatic laceration.  Relates that the dressings have been changed as advised since the last encounter.  No other complaints at this time. 6/26/2024 patient seen for open left lower leg traumatic laceration.  Relates that the dressings have been changed as advised since the last encounter.  No other complaints at this time. 7/10/2024 patient seen for open left lower leg traumatic laceration, healing well 7/16/24 less necrotic tissue noted but wound appears larger. No SOI 7/22/24 left shin wound patient appears to have had a contact dermatitis from the bactroban. D/C'ed. Lotrisone to periwound.  7/20/2024 patient seen for follow-up of left lower leg wound down to skin, subcutaneous tissue, fat 8/1/2024 patient seen for follow-up of left lower leg wound down to skin, subcutaneous tissue, fat 8/14/24 left shin wound S/P debridement and biopsy last week. Wound is  and smaller. Periwound much improved. No SOI 8/231/24 left shin wound S/P debridement and biopsy. Wound is  and smaller, improving. No SOI

## 2024-08-22 NOTE — PHYSICAL EXAM
[4 x 4] : 4 x 4  [1+] : left 1+ [Ankle Swelling (On Exam)] : not present [Ankle Swelling Bilaterally] : bilaterally  [Varicose Veins Of Lower Extremities] : bilaterally [Ankle Swelling On The Right] : mild [] : bilaterally [Purpura] : no purpura  [Petechiae] : no petechiae [Skin Ulcer] : no ulcer [Skin Induration] : no induration [Alert] : alert [Oriented to Person] : oriented to person [Oriented to Place] : oriented to place [Oriented to Time] : oriented to time [Calm] : calm [de-identified] : A&Ox3, NAD [de-identified] : 4 out of 5 strength in all quadrants bilaterally [de-identified] : HTN, HLD [de-identified] : Left lower leg traumatic laceration down to skin, subcutaneous tissue, fat [de-identified] : wnl [FreeTextEntry1] : Left Anterior Leg s/p debridement in the OR 8.6.24   [FreeTextEntry2] : 1.5 [FreeTextEntry3] : 1.4 [FreeTextEntry4] : 0.2 [de-identified] : Serous/sanguinous [de-identified] : 5% [de-identified] : adaptic touch  [de-identified] : Mechanically cleansed with sterile gauze and normal saline. Kerlix   Lotrisone to pamela wound  [TWNoteComboBox4] : Small [de-identified] : Normal [de-identified] : None [de-identified] : None [de-identified] : >75% [de-identified] : Yes [de-identified] : 3x Weekly [de-identified] : Primary Dressing

## 2024-08-22 NOTE — REVIEW OF SYSTEMS
[Fever] : no fever [Chills] : no chills [Eye Pain] : no eye pain [Loss Of Hearing] : no hearing loss [Shortness Of Breath] : no shortness of breath [Abdominal Pain] : no abdominal pain [Joint Stiffness] : joint stiffness [Skin Lesions] : skin lesion [Confused] : confusion [Convulsions] : convulsions [Anxiety] : no anxiety [Easy Bleeding] : no tendency for easy bleeding [Negative] : Endocrine [FreeTextEntry5] : HTN, HLD [FreeTextEntry9] : Bunion and hammer toe deformity's of both feet , right worse then the left  [de-identified] :  s/p arthroplasty and hallux s/p [place] with staple , stable , no soi, suture line has eschar which was partially removed

## 2024-08-30 ENCOUNTER — APPOINTMENT (OUTPATIENT)
Dept: WOUND CARE | Facility: HOSPITAL | Age: 79
End: 2024-08-30
Payer: MEDICARE

## 2024-08-30 ENCOUNTER — OUTPATIENT (OUTPATIENT)
Dept: OUTPATIENT SERVICES | Facility: HOSPITAL | Age: 79
LOS: 1 days | Discharge: HOME | End: 2024-08-30
Payer: MEDICARE

## 2024-08-30 VITALS
OXYGEN SATURATION: 89 % | SYSTOLIC BLOOD PRESSURE: 163 MMHG | TEMPERATURE: 98.1 F | WEIGHT: 107 LBS | HEART RATE: 88 BPM | BODY MASS INDEX: 19.69 KG/M2 | DIASTOLIC BLOOD PRESSURE: 55 MMHG | RESPIRATION RATE: 18 BRPM | HEIGHT: 62 IN

## 2024-08-30 DIAGNOSIS — Z98.89 OTHER SPECIFIED POSTPROCEDURAL STATES: Chronic | ICD-10-CM

## 2024-08-30 DIAGNOSIS — S81.812D LACERATION W/OUT FOREIGN BODY, LEFT LOWER LEG, SUBSEQUENT ENCOUNTER: ICD-10-CM

## 2024-08-30 DIAGNOSIS — Z98.890 OTHER SPECIFIED POSTPROCEDURAL STATES: Chronic | ICD-10-CM

## 2024-08-30 DIAGNOSIS — S81.812D LACERATION WITHOUT FOREIGN BODY, LEFT LOWER LEG, SUBSEQUENT ENCOUNTER: ICD-10-CM

## 2024-08-30 DIAGNOSIS — Z90.49 ACQUIRED ABSENCE OF OTHER SPECIFIED PARTS OF DIGESTIVE TRACT: Chronic | ICD-10-CM

## 2024-08-30 PROCEDURE — G0463: CPT

## 2024-08-30 PROCEDURE — 99213 OFFICE O/P EST LOW 20 MIN: CPT

## 2024-08-30 NOTE — ASSESSMENT
[Verbal] : Verbal [Written] : Written [Demo] : Demo [Patient] : Patient [Good - alert, interested, motivated] : Good - alert, interested, motivated [Verbalizes knowledge/Understanding] : Verbalizes knowledge/understanding [Dressing changes] : dressing changes [Skin Care] : skin care [Signs and symptoms of infection] : sign and symptoms of infection [How and When to Call] : how and when to call [Pain Management] : pain management [Patient responsibility to plan of care] : patient responsibility to plan of care [] : Yes [Stable] : stable [Home] : Home [Ambulatory] : Ambulatory [Not Applicable - Long Term Care/Home Health Agency] : Long Term Care/Home Health Agency: Not Applicable [FreeTextEntry2] : Infection prevention Localized wound care  Goal remaining pain free regarding wounds [FreeTextEntry4] : Patients spouse preforms dressing changes and has supplies at home.  Follow up in 1-2 weeks

## 2024-08-30 NOTE — REVIEW OF SYSTEMS
[Joint Stiffness] : joint stiffness [Skin Lesions] : skin lesion [Confused] : confusion [Convulsions] : convulsions [Negative] : Endocrine [Fever] : no fever [Chills] : no chills [Eye Pain] : no eye pain [Loss Of Hearing] : no hearing loss [Shortness Of Breath] : no shortness of breath [Abdominal Pain] : no abdominal pain [Anxiety] : no anxiety [Easy Bleeding] : no tendency for easy bleeding [FreeTextEntry5] : HTN, HLD [FreeTextEntry9] : Bunion and hammer toe deformity's of both feet , right worse then the left  [de-identified] :  s/p arthroplasty and hallux s/p [place] with staple , stable , no soi, suture line has eschar which was partially removed

## 2024-08-30 NOTE — HISTORY OF PRESENT ILLNESS
[FreeTextEntry1] : Patient is 78 year old female presenting for f/u s/p bunion surgery and hammer toe right foot with stable eschar. Patient denies any pain to the right foot.  Patient seen for right foot dorsal medial incision dehiscence with open wound down to skin, subcutaneous tissue, fat, healed with stable eschar.  6/19/2024 patient seen for open left lower leg traumatic laceration.  Relates that the dressings have been changed as advised since the last encounter.  No other complaints at this time. 6/26/2024 patient seen for open left lower leg traumatic laceration.  Relates that the dressings have been changed as advised since the last encounter.  No other complaints at this time. 7/10/2024 patient seen for open left lower leg traumatic laceration, healing well 7/16/24 less necrotic tissue noted but wound appears larger. No SOI 7/22/24 left shin wound patient appears to have had a contact dermatitis from the bactroban. D/C'ed. Lotrisone to periwound.  7/20/2024 patient seen for follow-up of left lower leg wound down to skin, subcutaneous tissue, fat 8/1/2024 patient seen for follow-up of left lower leg wound down to skin, subcutaneous tissue, fat 8/14/24 left shin wound S/P debridement and biopsy last week. Wound is  and smaller. Periwound much improved. No SOI 8/23/24 left shin wound S/P debridement and biopsy. Wound is  and smaller, improving. No SOI

## 2024-08-30 NOTE — PHYSICAL EXAM
[4 x 4] : 4 x 4  [1+] : left 1+ [Ankle Swelling Bilaterally] : bilaterally  [Varicose Veins Of Lower Extremities] : bilaterally [Ankle Swelling On The Right] : mild [Alert] : alert [Oriented to Person] : oriented to person [Oriented to Place] : oriented to place [Oriented to Time] : oriented to time [Calm] : calm [Ankle Swelling (On Exam)] : not present [] : not present [Purpura] : no purpura  [Petechiae] : no petechiae [Skin Ulcer] : no ulcer [Skin Induration] : no induration [de-identified] : A&Ox3, NAD [de-identified] : HTN, HLD [de-identified] : 4 out of 5 strength in all quadrants bilaterally [de-identified] : Left lower leg traumatic laceration down to skin, subcutaneous tissue, fat [de-identified] : wnl [FreeTextEntry1] : Left Anterior Leg  [FreeTextEntry2] : 1.1 [FreeTextEntry3] : 1.3 [FreeTextEntry4] : 0.2 [de-identified] : Serous/sanguinous [de-identified] : Debridement 8/6/24 [de-identified] : 5% [de-identified] : Adaptic touch  [de-identified] : Mechanically cleansed with sterile gauze and normal saline. Kerlix  Lotrisone to pamela wound    *Laura applied for today only [TWNoteComboBox4] : Small [TWNoteComboBox5] : No [TWNoteComboBox6] : Surgical [de-identified] : No [de-identified] : Normal [de-identified] : None [de-identified] : None [de-identified] : >75% [de-identified] : Yes [de-identified] : 3x Weekly [de-identified] : Primary Dressing

## 2024-08-31 DIAGNOSIS — E78.5 HYPERLIPIDEMIA, UNSPECIFIED: ICD-10-CM
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
## 2024-08-31 DIAGNOSIS — N18.30 CHRONIC KIDNEY DISEASE, STAGE 3 UNSPECIFIED: ICD-10-CM

## 2024-08-31 DIAGNOSIS — Z98.49 CATARACT EXTRACTION STATUS, UNSPECIFIED EYE: ICD-10-CM
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
## 2024-08-31 DIAGNOSIS — I65.29 OCCLUSION AND STENOSIS OF UNSPECIFIED CAROTID ARTERY: ICD-10-CM

## 2024-08-31 DIAGNOSIS — Z88.1 ALLERGY STATUS TO OTHER ANTIBIOTIC AGENTS: ICD-10-CM

## 2024-08-31 DIAGNOSIS — I12.9 HYPERTENSIVE CHRONIC KIDNEY DISEASE WITH STAGE 1 THROUGH STAGE 4 CHRONIC KIDNEY DISEASE, OR UNSPECIFIED CHRONIC KIDNEY DISEASE: ICD-10-CM

## 2024-08-31 DIAGNOSIS — Z83.3 FAMILY HISTORY OF DIABETES MELLITUS: ICD-10-CM

## 2024-08-31 DIAGNOSIS — Z88.8 ALLERGY STATUS TO OTHER DRUGS, MEDICAMENTS AND BIOLOGICAL SUBSTANCES: ICD-10-CM

## 2024-08-31 DIAGNOSIS — S81.812D LACERATION WITHOUT FOREIGN BODY, LEFT LOWER LEG, SUBSEQUENT ENCOUNTER: ICD-10-CM

## 2024-08-31 DIAGNOSIS — Z91.041 RADIOGRAPHIC DYE ALLERGY STATUS: ICD-10-CM

## 2024-08-31 DIAGNOSIS — Z82.49 FAMILY HISTORY OF ISCHEMIC HEART DISEASE AND OTHER DISEASES OF THE CIRCULATORY SYSTEM: ICD-10-CM

## 2024-08-31 DIAGNOSIS — Z80.1 FAMILY HISTORY OF MALIGNANT NEOPLASM OF TRACHEA, BRONCHUS AND LUNG: ICD-10-CM

## 2024-08-31 DIAGNOSIS — Z79.82 LONG TERM (CURRENT) USE OF ASPIRIN: ICD-10-CM
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
## 2024-08-31 DIAGNOSIS — Z90.2 ACQUIRED ABSENCE OF LUNG [PART OF]: ICD-10-CM

## 2024-08-31 DIAGNOSIS — Z86.73 PERSONAL HISTORY OF TRANSIENT ISCHEMIC ATTACK (TIA), AND CEREBRAL INFARCTION WITHOUT RESIDUAL DEFICITS: ICD-10-CM

## 2024-08-31 DIAGNOSIS — J44.9 CHRONIC OBSTRUCTIVE PULMONARY DISEASE, UNSPECIFIED: ICD-10-CM

## 2024-08-31 DIAGNOSIS — Z87.01 PERSONAL HISTORY OF PNEUMONIA (RECURRENT): ICD-10-CM

## 2024-08-31 DIAGNOSIS — Z82.3 FAMILY HISTORY OF STROKE: ICD-10-CM

## 2024-08-31 DIAGNOSIS — K21.9 GASTRO-ESOPHAGEAL REFLUX DISEASE WITHOUT ESOPHAGITIS: ICD-10-CM

## 2024-08-31 DIAGNOSIS — Z87.891 PERSONAL HISTORY OF NICOTINE DEPENDENCE: ICD-10-CM

## 2024-08-31 DIAGNOSIS — Z88.0 ALLERGY STATUS TO PENICILLIN: ICD-10-CM

## 2024-08-31 DIAGNOSIS — Z90.49 ACQUIRED ABSENCE OF OTHER SPECIFIED PARTS OF DIGESTIVE TRACT: ICD-10-CM

## 2024-08-31 DIAGNOSIS — M06.9 RHEUMATOID ARTHRITIS, UNSPECIFIED: ICD-10-CM

## 2024-08-31 DIAGNOSIS — Z85.118 PERSONAL HISTORY OF OTHER MALIGNANT NEOPLASM OF BRONCHUS AND LUNG: ICD-10-CM

## 2024-08-31 DIAGNOSIS — Z79.899 OTHER LONG TERM (CURRENT) DRUG THERAPY: ICD-10-CM

## 2024-09-09 DIAGNOSIS — F41.9 ANXIETY DISORDER, UNSPECIFIED: ICD-10-CM

## 2024-09-09 RX ORDER — BUSPIRONE HYDROCHLORIDE 5 MG/1
5 TABLET ORAL TWICE DAILY
Qty: 90 | Refills: 0 | Status: ACTIVE | COMMUNITY
Start: 2024-09-09 | End: 1900-01-01

## 2024-09-11 ENCOUNTER — OUTPATIENT (OUTPATIENT)
Dept: OUTPATIENT SERVICES | Facility: HOSPITAL | Age: 79
LOS: 1 days | Discharge: HOME | End: 2024-09-11
Payer: MEDICARE

## 2024-09-11 ENCOUNTER — APPOINTMENT (OUTPATIENT)
Dept: WOUND CARE | Facility: HOSPITAL | Age: 79
End: 2024-09-11
Payer: MEDICARE

## 2024-09-11 VITALS
DIASTOLIC BLOOD PRESSURE: 61 MMHG | HEIGHT: 62 IN | RESPIRATION RATE: 18 BRPM | TEMPERATURE: 97.6 F | SYSTOLIC BLOOD PRESSURE: 121 MMHG | OXYGEN SATURATION: 94 % | HEART RATE: 83 BPM | BODY MASS INDEX: 19.69 KG/M2 | WEIGHT: 107 LBS

## 2024-09-11 DIAGNOSIS — Z98.890 OTHER SPECIFIED POSTPROCEDURAL STATES: Chronic | ICD-10-CM

## 2024-09-11 DIAGNOSIS — Z98.89 OTHER SPECIFIED POSTPROCEDURAL STATES: Chronic | ICD-10-CM

## 2024-09-11 DIAGNOSIS — S81.812D LACERATION WITHOUT FOREIGN BODY, LEFT LOWER LEG, SUBSEQUENT ENCOUNTER: ICD-10-CM

## 2024-09-11 DIAGNOSIS — S81.812D LACERATION W/OUT FOREIGN BODY, LEFT LOWER LEG, SUBSEQUENT ENCOUNTER: ICD-10-CM

## 2024-09-11 DIAGNOSIS — Z90.49 ACQUIRED ABSENCE OF OTHER SPECIFIED PARTS OF DIGESTIVE TRACT: Chronic | ICD-10-CM

## 2024-09-11 PROCEDURE — 99213 OFFICE O/P EST LOW 20 MIN: CPT

## 2024-09-11 PROCEDURE — G0463: CPT

## 2024-09-11 NOTE — VITALS
[Pain related to present condition?] : The patient's  pain is not related to present condition. [] : No [de-identified] : 0/10

## 2024-09-11 NOTE — REVIEW OF SYSTEMS
[Joint Stiffness] : joint stiffness [Skin Lesions] : skin lesion [Confused] : confusion [Convulsions] : convulsions [Negative] : Endocrine [Fever] : no fever [Chills] : no chills [Eye Pain] : no eye pain [Loss Of Hearing] : no hearing loss [Shortness Of Breath] : no shortness of breath [Abdominal Pain] : no abdominal pain [Anxiety] : no anxiety [Easy Bleeding] : no tendency for easy bleeding [FreeTextEntry5] : HTN, HLD [FreeTextEntry9] : Bunion and hammer toe deformity's of both feet , right worse then the left  [de-identified] :  s/p arthroplasty and hallux s/p [place] with staple , stable , no soi, suture line has eschar which was partially removed

## 2024-09-11 NOTE — PHYSICAL EXAM
[4 x 4] : 4 x 4  [1+] : left 1+ [Ankle Swelling Bilaterally] : bilaterally  [Varicose Veins Of Lower Extremities] : bilaterally [Ankle Swelling On The Right] : mild [Alert] : alert [Oriented to Person] : oriented to person [Oriented to Place] : oriented to place [Oriented to Time] : oriented to time [Calm] : calm [Ankle Swelling (On Exam)] : not present [] : not present [Purpura] : no purpura  [Petechiae] : no petechiae [Skin Ulcer] : no ulcer [Skin Induration] : no induration [de-identified] : A&Ox3, NAD [de-identified] : HTN, HLD [de-identified] : 4 out of 5 strength in all quadrants bilaterally [de-identified] : Left lower leg traumatic laceration down to skin, subcutaneous tissue, fat [de-identified] : wnl [FreeTextEntry1] : Left Anterior Leg- Debrided 8/6 [FreeTextEntry2] : 1.0 [FreeTextEntry3] : 0.1 [FreeTextEntry4] : 0.2 [de-identified] : Serous/sanguinous [de-identified] : contact dermatitis [de-identified] : 5% [de-identified] : Laura, Adaptic Touch [de-identified] : Mechanically cleansed with sterile gauze and normal saline. Kerlix   [TWNoteComboBox4] : Small [TWNoteComboBox5] : No [TWNoteComboBox6] : Other [de-identified] : No [de-identified] : Normal [de-identified] : None [de-identified] : None [de-identified] : >75% [de-identified] : Yes [de-identified] : 3x Weekly [de-identified] : Primary Dressing

## 2024-09-11 NOTE — PLAN
[FreeTextEntry1] : Patient examined and evaluated at this time. Continue local wound care and offloading. left shin esha,adaptic DD, 3X/week Spent 20 minutes for patient care and medical decision making. Patient to follow up in 1 week.

## 2024-09-11 NOTE — HISTORY OF PRESENT ILLNESS
[FreeTextEntry1] : Patient is 78 year old female presenting for f/u s/p bunion surgery and hammer toe right foot with stable eschar. Patient denies any pain to the right foot.  Patient seen for right foot dorsal medial incision dehiscence with open wound down to skin, subcutaneous tissue, fat, healed with stable eschar.  6/19/2024 patient seen for open left lower leg traumatic laceration.  Relates that the dressings have been changed as advised since the last encounter.  No other complaints at this time. 6/26/2024 patient seen for open left lower leg traumatic laceration.  Relates that the dressings have been changed as advised since the last encounter.  No other complaints at this time. 7/10/2024 patient seen for open left lower leg traumatic laceration, healing well 7/16/24 less necrotic tissue noted but wound appears larger. No SOI 7/22/24 left shin wound patient appears to have had a contact dermatitis from the bactroban. D/C'ed. Lotrisone to periwound.  7/20/2024 patient seen for follow-up of left lower leg wound down to skin, subcutaneous tissue, fat 8/1/2024 patient seen for follow-up of left lower leg wound down to skin, subcutaneous tissue, fat 8/14/24 left shin wound S/P debridement and biopsy last week. Wound is  and smaller. Periwound much improved. No SOI 8/23/24 left shin wound S/P debridement and biopsy. Wound is  and smaller, improving. No SOI 9/11/24 left shin much improved especially the periwound. wound smaller. No SOI

## 2024-09-11 NOTE — ASSESSMENT
[Verbal] : Verbal [Demo] : Demo [Patient] : Patient [Good - alert, interested, motivated] : Good - alert, interested, motivated [Verbalizes knowledge/Understanding] : Verbalizes knowledge/understanding [Dressing changes] : dressing changes [Skin Care] : skin care [Signs and symptoms of infection] : sign and symptoms of infection [How and When to Call] : how and when to call [Pain Management] : pain management [Patient responsibility to plan of care] : patient responsibility to plan of care [] : Yes [Stable] : stable [Home] : Home [Ambulatory] : Ambulatory [Not Applicable - Long Term Care/Home Health Agency] : Long Term Care/Home Health Agency: Not Applicable [FreeTextEntry2] : Infection prevention Wound care (dressing changes) Maintain optimal skin integrity to high pressure areas Nutrition and wound healing Collagen Matrix [FreeTextEntry4] : Patients spouse preforms dressing changes and has supplies at home.  Follow up in 1-2 weeks

## 2024-09-12 ENCOUNTER — APPOINTMENT (OUTPATIENT)
Facility: CLINIC | Age: 79
End: 2024-09-12

## 2024-09-12 ENCOUNTER — NON-APPOINTMENT (OUTPATIENT)
Age: 79
End: 2024-09-12

## 2024-09-12 DIAGNOSIS — L03.116 CELLULITIS OF LEFT LOWER LIMB: ICD-10-CM

## 2024-09-12 DIAGNOSIS — A49.02 METHICILLIN RESISTANT STAPHYLOCOCCUS AUREUS INFECTION, UNSPECIFIED SITE: ICD-10-CM

## 2024-09-12 RX ORDER — FLUTICASONE PROPIONATE AND SALMETEROL 500; 50 UG/1; UG/1
POWDER RESPIRATORY (INHALATION)
Refills: 0 | Status: ACTIVE | COMMUNITY

## 2024-09-15 DIAGNOSIS — Z82.3 FAMILY HISTORY OF STROKE: ICD-10-CM
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
## 2024-09-15 DIAGNOSIS — I65.29 OCCLUSION AND STENOSIS OF UNSPECIFIED CAROTID ARTERY: ICD-10-CM

## 2024-09-15 DIAGNOSIS — Z82.49 FAMILY HISTORY OF ISCHEMIC HEART DISEASE AND OTHER DISEASES OF THE CIRCULATORY SYSTEM: ICD-10-CM

## 2024-09-15 DIAGNOSIS — Z80.1 FAMILY HISTORY OF MALIGNANT NEOPLASM OF TRACHEA, BRONCHUS AND LUNG: ICD-10-CM

## 2024-09-15 DIAGNOSIS — Z86.73 PERSONAL HISTORY OF TRANSIENT ISCHEMIC ATTACK (TIA), AND CEREBRAL INFARCTION WITHOUT RESIDUAL DEFICITS: ICD-10-CM

## 2024-09-15 DIAGNOSIS — Z83.3 FAMILY HISTORY OF DIABETES MELLITUS: ICD-10-CM

## 2024-09-15 DIAGNOSIS — Z88.1 ALLERGY STATUS TO OTHER ANTIBIOTIC AGENTS: ICD-10-CM

## 2024-09-15 DIAGNOSIS — Z79.899 OTHER LONG TERM (CURRENT) DRUG THERAPY: ICD-10-CM

## 2024-09-15 DIAGNOSIS — Z90.2 ACQUIRED ABSENCE OF LUNG [PART OF]: ICD-10-CM

## 2024-09-15 DIAGNOSIS — Z98.49 CATARACT EXTRACTION STATUS, UNSPECIFIED EYE: ICD-10-CM

## 2024-09-15 DIAGNOSIS — Z85.118 PERSONAL HISTORY OF OTHER MALIGNANT NEOPLASM OF BRONCHUS AND LUNG: ICD-10-CM

## 2024-09-15 DIAGNOSIS — J44.9 CHRONIC OBSTRUCTIVE PULMONARY DISEASE, UNSPECIFIED: ICD-10-CM

## 2024-09-15 DIAGNOSIS — Z87.01 PERSONAL HISTORY OF PNEUMONIA (RECURRENT): ICD-10-CM

## 2024-09-15 DIAGNOSIS — Z90.49 ACQUIRED ABSENCE OF OTHER SPECIFIED PARTS OF DIGESTIVE TRACT: ICD-10-CM

## 2024-09-15 DIAGNOSIS — Z79.82 LONG TERM (CURRENT) USE OF ASPIRIN: ICD-10-CM
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
## 2024-09-15 DIAGNOSIS — S81.812D LACERATION WITHOUT FOREIGN BODY, LEFT LOWER LEG, SUBSEQUENT ENCOUNTER: ICD-10-CM
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
## 2024-09-15 DIAGNOSIS — Z88.0 ALLERGY STATUS TO PENICILLIN: ICD-10-CM

## 2024-09-15 DIAGNOSIS — N18.30 CHRONIC KIDNEY DISEASE, STAGE 3 UNSPECIFIED: ICD-10-CM

## 2024-09-15 DIAGNOSIS — I12.9 HYPERTENSIVE CHRONIC KIDNEY DISEASE WITH STAGE 1 THROUGH STAGE 4 CHRONIC KIDNEY DISEASE, OR UNSPECIFIED CHRONIC KIDNEY DISEASE: ICD-10-CM
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
## 2024-09-15 DIAGNOSIS — E78.5 HYPERLIPIDEMIA, UNSPECIFIED: ICD-10-CM

## 2024-09-15 DIAGNOSIS — K21.9 GASTRO-ESOPHAGEAL REFLUX DISEASE WITHOUT ESOPHAGITIS: ICD-10-CM

## 2024-09-15 DIAGNOSIS — Z88.8 ALLERGY STATUS TO OTHER DRUGS, MEDICAMENTS AND BIOLOGICAL SUBSTANCES: ICD-10-CM

## 2024-09-15 DIAGNOSIS — M06.9 RHEUMATOID ARTHRITIS, UNSPECIFIED: ICD-10-CM

## 2024-09-15 DIAGNOSIS — Z91.041 RADIOGRAPHIC DYE ALLERGY STATUS: ICD-10-CM

## 2024-09-15 DIAGNOSIS — Z87.891 PERSONAL HISTORY OF NICOTINE DEPENDENCE: ICD-10-CM

## 2024-09-16 RX ORDER — BUSPIRONE HYDROCHLORIDE 5 MG/1
5 TABLET ORAL
Qty: 90 | Refills: 3 | Status: ACTIVE | COMMUNITY
Start: 2024-09-09 | End: 1900-01-01

## 2024-09-26 ENCOUNTER — OUTPATIENT (OUTPATIENT)
Dept: OUTPATIENT SERVICES | Facility: HOSPITAL | Age: 79
LOS: 1 days | Discharge: HOME | End: 2024-09-26
Payer: MEDICARE

## 2024-09-26 ENCOUNTER — APPOINTMENT (OUTPATIENT)
Dept: WOUND CARE | Facility: HOSPITAL | Age: 79
End: 2024-09-26
Payer: MEDICARE

## 2024-09-26 VITALS
OXYGEN SATURATION: 95 % | WEIGHT: 107 LBS | HEART RATE: 87 BPM | RESPIRATION RATE: 18 BRPM | HEIGHT: 62 IN | SYSTOLIC BLOOD PRESSURE: 118 MMHG | BODY MASS INDEX: 19.69 KG/M2 | TEMPERATURE: 97.8 F | DIASTOLIC BLOOD PRESSURE: 68 MMHG

## 2024-09-26 DIAGNOSIS — Z98.89 OTHER SPECIFIED POSTPROCEDURAL STATES: Chronic | ICD-10-CM

## 2024-09-26 DIAGNOSIS — S81.812D LACERATION W/OUT FOREIGN BODY, LEFT LOWER LEG, SUBSEQUENT ENCOUNTER: ICD-10-CM

## 2024-09-26 DIAGNOSIS — Z98.890 OTHER SPECIFIED POSTPROCEDURAL STATES: Chronic | ICD-10-CM

## 2024-09-26 DIAGNOSIS — S81.812D LACERATION WITHOUT FOREIGN BODY, LEFT LOWER LEG, SUBSEQUENT ENCOUNTER: ICD-10-CM

## 2024-09-26 DIAGNOSIS — Z90.49 ACQUIRED ABSENCE OF OTHER SPECIFIED PARTS OF DIGESTIVE TRACT: Chronic | ICD-10-CM

## 2024-09-26 PROCEDURE — G0463: CPT

## 2024-09-26 PROCEDURE — 99213 OFFICE O/P EST LOW 20 MIN: CPT

## 2024-09-26 NOTE — HISTORY OF PRESENT ILLNESS
[FreeTextEntry1] : Patient is 78 year old female presenting for f/u s/p bunion surgery and hammer toe right foot with stable eschar. Patient denies any pain to the right foot.  Patient seen for right foot dorsal medial incision dehiscence with open wound down to skin, subcutaneous tissue, fat, healed with stable eschar.  6/19/2024 patient seen for open left lower leg traumatic laceration.  Relates that the dressings have been changed as advised since the last encounter.  No other complaints at this time. 6/26/2024 patient seen for open left lower leg traumatic laceration.  Relates that the dressings have been changed as advised since the last encounter.  No other complaints at this time. 7/10/2024 patient seen for open left lower leg traumatic laceration, healing well 7/16/24 less necrotic tissue noted but wound appears larger. No SOI 7/22/24 left shin wound patient appears to have had a contact dermatitis from the bactroban. D/C'ed. Lotrisone to periwound.  7/20/2024 patient seen for follow-up of left lower leg wound down to skin, subcutaneous tissue, fat 8/1/2024 patient seen for follow-up of left lower leg wound down to skin, subcutaneous tissue, fat 8/14/24 left shin wound S/P debridement and biopsy last week. Wound is  and smaller. Periwound much improved. No SOI 8/23/24 left shin wound S/P debridement and biopsy. Wound is  and smaller, improving. No SOI 9/26/24 left shin wound S/P debridement and biopsy. Wound is healed

## 2024-09-26 NOTE — REVIEW OF SYSTEMS
[Joint Stiffness] : joint stiffness [Skin Lesions] : skin lesion [Confused] : confusion [Convulsions] : convulsions [Negative] : Endocrine [Fever] : no fever [Chills] : no chills [Eye Pain] : no eye pain [Loss Of Hearing] : no hearing loss [Shortness Of Breath] : no shortness of breath [Abdominal Pain] : no abdominal pain [Anxiety] : no anxiety [Easy Bleeding] : no tendency for easy bleeding [FreeTextEntry5] : HTN, HLD [FreeTextEntry9] : Bunion and hammer toe deformity's of both feet , right worse then the left  [de-identified] :  s/p arthroplasty and hallux s/p [place] with staple , stable , no soi, suture line has eschar which was partially removed

## 2024-09-26 NOTE — REVIEW OF SYSTEMS
[Joint Stiffness] : joint stiffness [Skin Lesions] : skin lesion [Confused] : confusion [Convulsions] : convulsions [Negative] : Endocrine [Fever] : no fever [Chills] : no chills [Eye Pain] : no eye pain [Loss Of Hearing] : no hearing loss [Shortness Of Breath] : no shortness of breath [Abdominal Pain] : no abdominal pain [Anxiety] : no anxiety [Easy Bleeding] : no tendency for easy bleeding [FreeTextEntry5] : HTN, HLD [FreeTextEntry9] : Bunion and hammer toe deformity's of both feet , right worse then the left  [de-identified] :  s/p arthroplasty and hallux s/p [place] with staple , stable , no soi, suture line has eschar which was partially removed

## 2024-09-26 NOTE — PHYSICAL EXAM
[1+] : left 1+ [Ankle Swelling Bilaterally] : bilaterally  [Varicose Veins Of Lower Extremities] : bilaterally [Ankle Swelling On The Right] : mild [Alert] : alert [Oriented to Person] : oriented to person [Oriented to Place] : oriented to place [Oriented to Time] : oriented to time [Calm] : calm [Ankle Swelling (On Exam)] : not present [] : not present [Purpura] : no purpura  [Petechiae] : no petechiae [Skin Ulcer] : no ulcer [Skin Induration] : no induration [de-identified] : A&Ox3, NAD [de-identified] : HTN, HLD [de-identified] : 4 out of 5 strength in all quadrants bilaterally [de-identified] : Left lower leg traumatic laceration down to skin, subcutaneous tissue, fat, healed [de-identified] : wnl [FreeTextEntry1] : Left anterior leg - dry scab - no open wounds  [de-identified] : Mechanically cleansed with sterile gauze and normal saline. no other treatment  [TWNoteComboBox4] : None [de-identified] : Normal [de-identified] : None [de-identified] : None [de-identified] : No

## 2024-09-26 NOTE — PLAN
[FreeTextEntry1] : Patient examined and evaluated at this time. Spent 20 minutes for patient care and medical decision making. Patient to follow up in as needed.

## 2024-09-26 NOTE — ASSESSMENT
[Verbal] : Verbal [Written] : Written [Demo] : Demo [Patient] : Patient [Spouse] : Spouse [Good - alert, interested, motivated] : Good - alert, interested, motivated [Verbalizes knowledge/Understanding] : Verbalizes knowledge/understanding [Skin Care] : skin care [Signs and symptoms of infection] : sign and symptoms of infection [Nutrition] : nutrition [How and When to Call] : how and when to call [Pain Management] : pain management [Patient responsibility to plan of care] : patient responsibility to plan of care [] : Yes [FreeTextEntry2] : Infection prevention Localized wound care  Goal remaining pain free regarding wounds  Promote optimal skin care and nutrition. [FreeTextEntry4] : Follow up as needed  [Stable] : stable [Home] : Home [Ambulatory] : Ambulatory [Not Applicable - Long Term Care/Home Health Agency] : Long Term Care/Home Health Agency: Not Applicable

## 2024-09-26 NOTE — PHYSICAL EXAM
[1+] : left 1+ [Ankle Swelling Bilaterally] : bilaterally  [Varicose Veins Of Lower Extremities] : bilaterally [Ankle Swelling On The Right] : mild [Alert] : alert [Oriented to Person] : oriented to person [Oriented to Place] : oriented to place [Oriented to Time] : oriented to time [Calm] : calm [Ankle Swelling (On Exam)] : not present [] : not present [Purpura] : no purpura  [Petechiae] : no petechiae [Skin Ulcer] : no ulcer [Skin Induration] : no induration [de-identified] : A&Ox3, NAD [de-identified] : HTN, HLD [de-identified] : 4 out of 5 strength in all quadrants bilaterally [de-identified] : Left lower leg traumatic laceration down to skin, subcutaneous tissue, fat, healed [de-identified] : wnl [FreeTextEntry1] : Left anterior leg - dry scab - no open wounds  [de-identified] : Mechanically cleansed with sterile gauze and normal saline. no other treatment  [TWNoteComboBox4] : None [de-identified] : Normal [de-identified] : None [de-identified] : None [de-identified] : No

## 2024-09-29 DIAGNOSIS — Z87.01 PERSONAL HISTORY OF PNEUMONIA (RECURRENT): ICD-10-CM

## 2024-09-29 DIAGNOSIS — Z88.1 ALLERGY STATUS TO OTHER ANTIBIOTIC AGENTS: ICD-10-CM

## 2024-09-29 DIAGNOSIS — Z90.2 ACQUIRED ABSENCE OF LUNG [PART OF]: ICD-10-CM

## 2024-09-29 DIAGNOSIS — M06.9 RHEUMATOID ARTHRITIS, UNSPECIFIED: ICD-10-CM

## 2024-09-29 DIAGNOSIS — S81.812D LACERATION WITHOUT FOREIGN BODY, LEFT LOWER LEG, SUBSEQUENT ENCOUNTER: ICD-10-CM

## 2024-09-29 DIAGNOSIS — Z79.899 OTHER LONG TERM (CURRENT) DRUG THERAPY: ICD-10-CM

## 2024-09-29 DIAGNOSIS — Z79.82 LONG TERM (CURRENT) USE OF ASPIRIN: ICD-10-CM

## 2024-09-29 DIAGNOSIS — Z85.118 PERSONAL HISTORY OF OTHER MALIGNANT NEOPLASM OF BRONCHUS AND LUNG: ICD-10-CM

## 2024-09-29 DIAGNOSIS — Z86.73 PERSONAL HISTORY OF TRANSIENT ISCHEMIC ATTACK (TIA), AND CEREBRAL INFARCTION WITHOUT RESIDUAL DEFICITS: ICD-10-CM

## 2024-09-29 DIAGNOSIS — Z82.49 FAMILY HISTORY OF ISCHEMIC HEART DISEASE AND OTHER DISEASES OF THE CIRCULATORY SYSTEM: ICD-10-CM

## 2024-09-29 DIAGNOSIS — Z88.8 ALLERGY STATUS TO OTHER DRUGS, MEDICAMENTS AND BIOLOGICAL SUBSTANCES: ICD-10-CM

## 2024-09-29 DIAGNOSIS — K21.9 GASTRO-ESOPHAGEAL REFLUX DISEASE WITHOUT ESOPHAGITIS: ICD-10-CM
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
## 2024-09-29 DIAGNOSIS — Z82.3 FAMILY HISTORY OF STROKE: ICD-10-CM

## 2024-09-29 DIAGNOSIS — Z83.3 FAMILY HISTORY OF DIABETES MELLITUS: ICD-10-CM

## 2024-09-29 DIAGNOSIS — J44.9 CHRONIC OBSTRUCTIVE PULMONARY DISEASE, UNSPECIFIED: ICD-10-CM
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
## 2024-09-29 DIAGNOSIS — I12.9 HYPERTENSIVE CHRONIC KIDNEY DISEASE WITH STAGE 1 THROUGH STAGE 4 CHRONIC KIDNEY DISEASE, OR UNSPECIFIED CHRONIC KIDNEY DISEASE: ICD-10-CM
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
## 2024-09-29 DIAGNOSIS — N18.30 CHRONIC KIDNEY DISEASE, STAGE 3 UNSPECIFIED: ICD-10-CM

## 2024-09-29 DIAGNOSIS — E78.5 HYPERLIPIDEMIA, UNSPECIFIED: ICD-10-CM

## 2024-09-29 DIAGNOSIS — Z91.041 RADIOGRAPHIC DYE ALLERGY STATUS: ICD-10-CM

## 2024-09-29 DIAGNOSIS — Z88.0 ALLERGY STATUS TO PENICILLIN: ICD-10-CM

## 2024-09-29 DIAGNOSIS — Z87.891 PERSONAL HISTORY OF NICOTINE DEPENDENCE: ICD-10-CM

## 2024-09-29 DIAGNOSIS — Z80.1 FAMILY HISTORY OF MALIGNANT NEOPLASM OF TRACHEA, BRONCHUS AND LUNG: ICD-10-CM

## 2024-09-29 DIAGNOSIS — Z90.49 ACQUIRED ABSENCE OF OTHER SPECIFIED PARTS OF DIGESTIVE TRACT: ICD-10-CM

## 2024-09-29 DIAGNOSIS — Z98.49 CATARACT EXTRACTION STATUS, UNSPECIFIED EYE: ICD-10-CM
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
## 2024-09-29 DIAGNOSIS — I65.29 OCCLUSION AND STENOSIS OF UNSPECIFIED CAROTID ARTERY: ICD-10-CM

## 2024-10-24 ENCOUNTER — APPOINTMENT (OUTPATIENT)
Facility: CLINIC | Age: 79
End: 2024-10-24
Payer: MEDICARE

## 2024-10-24 PROCEDURE — 94727 GAS DIL/WSHOT DETER LNG VOL: CPT

## 2024-10-24 PROCEDURE — 94729 DIFFUSING CAPACITY: CPT

## 2024-10-24 PROCEDURE — 94060 EVALUATION OF WHEEZING: CPT

## 2024-11-01 ENCOUNTER — APPOINTMENT (OUTPATIENT)
Dept: NEPHROLOGY | Facility: CLINIC | Age: 79
End: 2024-11-01
Payer: MEDICARE

## 2024-11-01 VITALS
HEIGHT: 62 IN | HEART RATE: 93 BPM | DIASTOLIC BLOOD PRESSURE: 52 MMHG | TEMPERATURE: 95.3 F | BODY MASS INDEX: 19.69 KG/M2 | OXYGEN SATURATION: 92 % | WEIGHT: 107 LBS | SYSTOLIC BLOOD PRESSURE: 112 MMHG

## 2024-11-01 DIAGNOSIS — N18.30 CHRONIC KIDNEY DISEASE, STAGE 3 UNSPECIFIED: ICD-10-CM

## 2024-11-01 PROCEDURE — 99214 OFFICE O/P EST MOD 30 MIN: CPT

## 2024-11-01 RX ORDER — AMLODIPINE BESYLATE 10 MG/1
10 TABLET ORAL
Refills: 0 | Status: ACTIVE | COMMUNITY

## 2024-11-01 RX ORDER — PANTOPRAZOLE SODIUM 40 MG/1
40 GRANULE, DELAYED RELEASE ORAL
Refills: 0 | Status: ACTIVE | COMMUNITY

## 2024-11-08 ENCOUNTER — NON-APPOINTMENT (OUTPATIENT)
Age: 79
End: 2024-11-08

## 2024-11-09 RX ORDER — AZITHROMYCIN 250 MG/1
250 TABLET, FILM COATED ORAL
Qty: 1 | Refills: 1 | Status: ACTIVE | COMMUNITY
Start: 2024-11-09 | End: 1900-01-01

## 2024-11-11 ENCOUNTER — APPOINTMENT (OUTPATIENT)
Facility: CLINIC | Age: 79
End: 2024-11-11
Payer: MEDICARE

## 2024-11-11 VITALS
HEIGHT: 62 IN | BODY MASS INDEX: 19.69 KG/M2 | RESPIRATION RATE: 19 BRPM | DIASTOLIC BLOOD PRESSURE: 67 MMHG | TEMPERATURE: 98.4 F | OXYGEN SATURATION: 88 % | SYSTOLIC BLOOD PRESSURE: 177 MMHG | WEIGHT: 107 LBS | HEART RATE: 115 BPM

## 2024-11-11 DIAGNOSIS — J20.9 ACUTE BRONCHITIS, UNSPECIFIED: ICD-10-CM

## 2024-11-11 DIAGNOSIS — N18.30 CHRONIC KIDNEY DISEASE, STAGE 3 UNSPECIFIED: ICD-10-CM

## 2024-11-11 PROCEDURE — 99212 OFFICE O/P EST SF 10 MIN: CPT

## 2024-11-11 RX ORDER — PREDNISONE 20 MG/1
20 TABLET ORAL DAILY
Qty: 14 | Refills: 1 | Status: ACTIVE | COMMUNITY
Start: 2024-11-11 | End: 1900-01-01

## 2024-11-18 RX ORDER — DOXYCYCLINE HYCLATE 100 MG/1
100 TABLET ORAL
Qty: 14 | Refills: 5 | Status: ACTIVE | COMMUNITY
Start: 2024-11-18 | End: 1900-01-01

## 2024-11-25 ENCOUNTER — RX RENEWAL (OUTPATIENT)
Age: 79
End: 2024-11-25

## 2024-11-25 RX ORDER — FLUTICASONE PROPIONATE AND SALMETEROL 250; 50 UG/1; UG/1
250-50 POWDER RESPIRATORY (INHALATION)
Qty: 60 | Refills: 5 | Status: ACTIVE | COMMUNITY
Start: 2024-11-25 | End: 1900-01-01

## 2024-12-09 ENCOUNTER — APPOINTMENT (OUTPATIENT)
Facility: CLINIC | Age: 79
End: 2024-12-09

## 2024-12-09 NOTE — HISTORY OF PRESENT ILLNESS
Patient identification verified with 2 identifiers.    Location: [facility] Patient Self-Testing Program [phone]    Referring Physician: NAVEED JAIN  Enrollment/ Re-enrollment date: 8/26/2025   INR Goal: 2.0-3.0  INR monitoring is per [facility] protocol.  Anticoagulation Medication: warfarin  Indication: Atrial Fibrillation/Atrial Flutter and Aortic Valve Replacement    Subjective   Bleeding signs/symptoms: No    Bruising: No   Major bleeding event: No  Thrombosis signs/symptoms: No  Thromboembolic event: No  Missed doses: No  Extra doses: No  Medication changes: No  Dietary changes: Yes  Patient states that she had not changed her Vitamin K intake  Change in health: No  Change in activity: No  Alcohol: No  Other concerns: No    Upcoming Procedures:  Does the Patient Have any upcoming procedures that require interruption in anticoagulation therapy? no  Does the patient require bridging? no      Anticoagulation Summary  As of 12/9/2024      INR goal:  2.0-3.0   TTR:  75.0% (1.2 y)   INR used for dosing:  3.40 (12/9/2024)   Weekly warfarin total:  42.5 mg               Assessment/Plan   Supratherapeutic     1. New dose:  Will decrease dose per protocol     2. Next INR: 1 week      Education provided to patient during the visit:  Patient instructed to call in interim with questions, concerns and changes.   Patient educated on compliance with dosing, follow up appointments, and prescribed plan of care.        
[FreeTextEntry1] : Right knee wound is clean, no C/O\par 11/3/20 wound  and smaller

## 2025-01-01 ENCOUNTER — INPATIENT (INPATIENT)
Facility: HOSPITAL | Age: 80
LOS: 11 days | DRG: 395 | End: 2025-01-21
Attending: STUDENT IN AN ORGANIZED HEALTH CARE EDUCATION/TRAINING PROGRAM | Admitting: SPECIALIST
Payer: MEDICARE

## 2025-01-01 ENCOUNTER — RESULT REVIEW (OUTPATIENT)
Age: 80
End: 2025-01-01

## 2025-01-01 ENCOUNTER — INPATIENT (INPATIENT)
Facility: HOSPITAL | Age: 80
LOS: 0 days | Discharge: ANOTHER HOSPITAL | DRG: 395 | End: 2025-01-09
Attending: SPECIALIST | Admitting: SPECIALIST
Payer: MEDICARE

## 2025-01-01 ENCOUNTER — TRANSCRIPTION ENCOUNTER (OUTPATIENT)
Age: 80
End: 2025-01-01

## 2025-01-01 ENCOUNTER — INPATIENT (INPATIENT)
Facility: HOSPITAL | Age: 80
LOS: 1 days | Discharge: HOME | DRG: 195 | End: 2025-01-03
Attending: FAMILY MEDICINE | Admitting: FAMILY MEDICINE
Payer: MEDICARE

## 2025-01-01 VITALS — HEART RATE: 93 BPM | OXYGEN SATURATION: 100 % | RESPIRATION RATE: 25 BRPM | TEMPERATURE: 97 F

## 2025-01-01 VITALS
RESPIRATION RATE: 18 BRPM | TEMPERATURE: 98 F | WEIGHT: 103.62 LBS | OXYGEN SATURATION: 94 % | HEIGHT: 62 IN | HEART RATE: 89 BPM | SYSTOLIC BLOOD PRESSURE: 155 MMHG | DIASTOLIC BLOOD PRESSURE: 81 MMHG

## 2025-01-01 VITALS
TEMPERATURE: 97 F | WEIGHT: 104.94 LBS | RESPIRATION RATE: 22 BRPM | OXYGEN SATURATION: 82 % | SYSTOLIC BLOOD PRESSURE: 84 MMHG | DIASTOLIC BLOOD PRESSURE: 53 MMHG | HEART RATE: 106 BPM | HEIGHT: 62 IN

## 2025-01-01 VITALS — HEART RATE: 100 BPM | OXYGEN SATURATION: 100 %

## 2025-01-01 VITALS — OXYGEN SATURATION: 93 %

## 2025-01-01 DIAGNOSIS — J44.9 CHRONIC OBSTRUCTIVE PULMONARY DISEASE, UNSPECIFIED: ICD-10-CM

## 2025-01-01 DIAGNOSIS — Z90.49 ACQUIRED ABSENCE OF OTHER SPECIFIED PARTS OF DIGESTIVE TRACT: Chronic | ICD-10-CM

## 2025-01-01 DIAGNOSIS — D64.9 ANEMIA, UNSPECIFIED: ICD-10-CM

## 2025-01-01 DIAGNOSIS — I10 ESSENTIAL (PRIMARY) HYPERTENSION: ICD-10-CM

## 2025-01-01 DIAGNOSIS — Z98.890 OTHER SPECIFIED POSTPROCEDURAL STATES: Chronic | ICD-10-CM

## 2025-01-01 DIAGNOSIS — Z98.89 OTHER SPECIFIED POSTPROCEDURAL STATES: Chronic | ICD-10-CM

## 2025-01-01 DIAGNOSIS — J96.21 ACUTE AND CHRONIC RESPIRATORY FAILURE WITH HYPOXIA: ICD-10-CM

## 2025-01-01 DIAGNOSIS — J18.9 PNEUMONIA, UNSPECIFIED ORGANISM: ICD-10-CM

## 2025-01-01 DIAGNOSIS — J96.02 ACUTE RESPIRATORY FAILURE WITH HYPERCAPNIA: ICD-10-CM

## 2025-01-01 DIAGNOSIS — K25.5 CHRONIC OR UNSPECIFIED GASTRIC ULCER WITH PERFORATION: ICD-10-CM

## 2025-01-01 DIAGNOSIS — R19.7 DIARRHEA, UNSPECIFIED: ICD-10-CM

## 2025-01-01 DIAGNOSIS — K63.1 PERFORATION OF INTESTINE (NONTRAUMATIC): ICD-10-CM

## 2025-01-01 DIAGNOSIS — M06.9 RHEUMATOID ARTHRITIS, UNSPECIFIED: ICD-10-CM

## 2025-01-01 DIAGNOSIS — N17.9 ACUTE KIDNEY FAILURE, UNSPECIFIED: ICD-10-CM

## 2025-01-01 DIAGNOSIS — E78.5 HYPERLIPIDEMIA, UNSPECIFIED: ICD-10-CM

## 2025-01-01 LAB
-  AZTREONAM: SIGNIFICANT CHANGE UP
-  CEFEPIME: SIGNIFICANT CHANGE UP
-  CEFTAZIDIME/AVIBACTAM: SIGNIFICANT CHANGE UP
-  CEFTAZIDIME: SIGNIFICANT CHANGE UP
-  CEFTOLOZANE/TAZOBACTAM: SIGNIFICANT CHANGE UP
-  CIPROFLOXACIN: SIGNIFICANT CHANGE UP
-  IMIPENEM: SIGNIFICANT CHANGE UP
-  LEVOFLOXACIN: SIGNIFICANT CHANGE UP
-  MEROPENEM: SIGNIFICANT CHANGE UP
-  PIPERACILLIN/TAZOBACTAM: SIGNIFICANT CHANGE UP
A1C WITH ESTIMATED AVERAGE GLUCOSE RESULT: 5.6 % — SIGNIFICANT CHANGE UP (ref 4–5.6)
ALBUMIN SERPL ELPH-MCNC: 1.4 G/DL — LOW (ref 3.3–5)
ALBUMIN SERPL ELPH-MCNC: 1.5 G/DL — LOW (ref 3.3–5)
ALBUMIN SERPL ELPH-MCNC: 1.6 G/DL — LOW (ref 3.3–5)
ALBUMIN SERPL ELPH-MCNC: 1.6 G/DL — LOW (ref 3.3–5)
ALBUMIN SERPL ELPH-MCNC: 1.7 G/DL — LOW (ref 3.3–5)
ALBUMIN SERPL ELPH-MCNC: 1.7 G/DL — LOW (ref 3.3–5)
ALBUMIN SERPL ELPH-MCNC: 1.8 G/DL — LOW (ref 3.3–5)
ALBUMIN SERPL ELPH-MCNC: 1.9 G/DL — LOW (ref 3.3–5)
ALBUMIN SERPL ELPH-MCNC: 2.2 G/DL — LOW (ref 3.3–5)
ALBUMIN SERPL ELPH-MCNC: 2.2 G/DL — LOW (ref 3.3–5)
ALBUMIN SERPL ELPH-MCNC: 2.5 G/DL — LOW (ref 3.3–5)
ALBUMIN SERPL ELPH-MCNC: 2.6 G/DL — LOW (ref 3.3–5)
ALBUMIN SERPL ELPH-MCNC: 2.6 G/DL — LOW (ref 3.3–5)
ALBUMIN SERPL ELPH-MCNC: 2.7 G/DL — LOW (ref 3.3–5)
ALBUMIN SERPL ELPH-MCNC: 2.8 G/DL — LOW (ref 3.3–5)
ALBUMIN SERPL ELPH-MCNC: 3.2 G/DL — LOW (ref 3.3–5)
ALP SERPL-CCNC: 102 U/L — SIGNIFICANT CHANGE UP (ref 30–120)
ALP SERPL-CCNC: 116 U/L — SIGNIFICANT CHANGE UP (ref 40–120)
ALP SERPL-CCNC: 117 U/L — SIGNIFICANT CHANGE UP (ref 40–120)
ALP SERPL-CCNC: 1234 U/L — HIGH (ref 40–120)
ALP SERPL-CCNC: 1279 U/L — HIGH (ref 40–120)
ALP SERPL-CCNC: 1409 U/L — HIGH (ref 40–120)
ALP SERPL-CCNC: 1537 U/L — HIGH (ref 40–120)
ALP SERPL-CCNC: 176 U/L — HIGH (ref 40–120)
ALP SERPL-CCNC: 1799 U/L — HIGH (ref 40–120)
ALP SERPL-CCNC: 319 U/L — HIGH (ref 40–120)
ALP SERPL-CCNC: 387 U/L — HIGH (ref 40–120)
ALP SERPL-CCNC: 45 U/L — SIGNIFICANT CHANGE UP (ref 40–120)
ALP SERPL-CCNC: 47 U/L — SIGNIFICANT CHANGE UP (ref 30–120)
ALP SERPL-CCNC: 490 U/L — HIGH (ref 40–120)
ALP SERPL-CCNC: 54 U/L — SIGNIFICANT CHANGE UP (ref 40–120)
ALP SERPL-CCNC: 77 U/L — SIGNIFICANT CHANGE UP (ref 40–120)
ALP SERPL-CCNC: 815 U/L — HIGH (ref 40–120)
ALP SERPL-CCNC: 876 U/L — HIGH (ref 40–120)
ALT FLD-CCNC: 20 U/L — SIGNIFICANT CHANGE UP (ref 12–78)
ALT FLD-CCNC: 30 U/L — SIGNIFICANT CHANGE UP (ref 12–78)
ALT FLD-CCNC: 30 U/L — SIGNIFICANT CHANGE UP (ref 12–78)
ALT FLD-CCNC: 322 U/L — HIGH (ref 12–78)
ALT FLD-CCNC: 35 U/L — SIGNIFICANT CHANGE UP (ref 10–60)
ALT FLD-CCNC: 37 U/L — SIGNIFICANT CHANGE UP (ref 12–78)
ALT FLD-CCNC: 39 U/L — SIGNIFICANT CHANGE UP (ref 12–78)
ALT FLD-CCNC: 41 U/L — SIGNIFICANT CHANGE UP (ref 12–78)
ALT FLD-CCNC: 41 U/L — SIGNIFICANT CHANGE UP (ref 12–78)
ALT FLD-CCNC: 43 U/L — SIGNIFICANT CHANGE UP (ref 12–78)
ALT FLD-CCNC: 44 U/L — SIGNIFICANT CHANGE UP (ref 10–60)
ALT FLD-CCNC: 44 U/L — SIGNIFICANT CHANGE UP (ref 12–78)
ALT FLD-CCNC: 45 U/L — SIGNIFICANT CHANGE UP (ref 12–78)
ALT FLD-CCNC: 46 U/L — SIGNIFICANT CHANGE UP (ref 12–78)
ALT FLD-CCNC: 52 U/L — SIGNIFICANT CHANGE UP (ref 12–78)
ALT FLD-CCNC: 59 U/L — SIGNIFICANT CHANGE UP (ref 12–78)
ALT FLD-CCNC: 64 U/L — SIGNIFICANT CHANGE UP (ref 12–78)
ALT FLD-CCNC: 66 U/L — SIGNIFICANT CHANGE UP (ref 12–78)
ANION GAP SERPL CALC-SCNC: 10 MMOL/L — SIGNIFICANT CHANGE UP (ref 5–17)
ANION GAP SERPL CALC-SCNC: 11 MMOL/L — SIGNIFICANT CHANGE UP (ref 5–17)
ANION GAP SERPL CALC-SCNC: 12 MMOL/L — SIGNIFICANT CHANGE UP (ref 5–17)
ANION GAP SERPL CALC-SCNC: 14 MMOL/L — SIGNIFICANT CHANGE UP (ref 5–17)
ANION GAP SERPL CALC-SCNC: 15 MMOL/L — SIGNIFICANT CHANGE UP (ref 5–17)
ANION GAP SERPL CALC-SCNC: 4 MMOL/L — LOW (ref 5–17)
ANION GAP SERPL CALC-SCNC: 5 MMOL/L — SIGNIFICANT CHANGE UP (ref 5–17)
ANION GAP SERPL CALC-SCNC: 5 MMOL/L — SIGNIFICANT CHANGE UP (ref 5–17)
ANION GAP SERPL CALC-SCNC: 6 MMOL/L — SIGNIFICANT CHANGE UP (ref 5–17)
ANION GAP SERPL CALC-SCNC: 7 MMOL/L — SIGNIFICANT CHANGE UP (ref 5–17)
ANION GAP SERPL CALC-SCNC: 7 MMOL/L — SIGNIFICANT CHANGE UP (ref 5–17)
ANION GAP SERPL CALC-SCNC: 8 MMOL/L — SIGNIFICANT CHANGE UP (ref 5–17)
ANION GAP SERPL CALC-SCNC: 9 MMOL/L — SIGNIFICANT CHANGE UP (ref 5–17)
ANION GAP SERPL CALC-SCNC: 9 MMOL/L — SIGNIFICANT CHANGE UP (ref 5–17)
APPEARANCE UR: ABNORMAL
APPEARANCE UR: CLEAR — SIGNIFICANT CHANGE UP
APTT BLD: 116.3 SEC — HIGH (ref 24.5–35.6)
APTT BLD: 151.4 SEC — CRITICAL HIGH (ref 24.5–35.6)
APTT BLD: 162 SEC — CRITICAL HIGH (ref 24.5–35.6)
APTT BLD: 199.3 SEC — CRITICAL HIGH (ref 24.5–35.6)
APTT BLD: 23.3 SEC — LOW (ref 24.5–35.6)
APTT BLD: 40.5 SEC — HIGH (ref 24.5–35.6)
APTT BLD: 96 SEC — HIGH (ref 24.5–35.6)
APTT BLD: 97.6 SEC — HIGH (ref 24.5–35.6)
AST SERPL-CCNC: 101 U/L — HIGH (ref 15–37)
AST SERPL-CCNC: 104 U/L — HIGH (ref 15–37)
AST SERPL-CCNC: 107 U/L — HIGH (ref 15–37)
AST SERPL-CCNC: 114 U/L — HIGH (ref 15–37)
AST SERPL-CCNC: 134 U/L — HIGH (ref 15–37)
AST SERPL-CCNC: 134 U/L — HIGH (ref 15–37)
AST SERPL-CCNC: 1738 U/L — HIGH (ref 15–37)
AST SERPL-CCNC: 185 U/L — HIGH (ref 15–37)
AST SERPL-CCNC: 194 U/L — HIGH (ref 15–37)
AST SERPL-CCNC: 49 U/L — HIGH (ref 15–37)
AST SERPL-CCNC: 53 U/L — HIGH (ref 15–37)
AST SERPL-CCNC: 56 U/L — HIGH (ref 10–40)
AST SERPL-CCNC: 59 U/L — HIGH (ref 15–37)
AST SERPL-CCNC: 61 U/L — HIGH (ref 15–37)
AST SERPL-CCNC: 67 U/L — HIGH (ref 10–40)
AST SERPL-CCNC: 74 U/L — HIGH (ref 15–37)
AST SERPL-CCNC: 87 U/L — HIGH (ref 15–37)
AST SERPL-CCNC: 89 U/L — HIGH (ref 15–37)
B PERT DNA SPEC QL NAA+PROBE: SIGNIFICANT CHANGE UP
B PERT+PARAPERT DNA PNL NPH: SIGNIFICANT CHANGE UP
BASE EXCESS BLDA CALC-SCNC: -0.3 MMOL/L — SIGNIFICANT CHANGE UP (ref -2–3)
BASE EXCESS BLDA CALC-SCNC: -0.8 MMOL/L — SIGNIFICANT CHANGE UP (ref -2–3)
BASE EXCESS BLDA CALC-SCNC: -1.3 MMOL/L — SIGNIFICANT CHANGE UP (ref -2–3)
BASE EXCESS BLDA CALC-SCNC: -3.9 MMOL/L — LOW (ref -2–3)
BASE EXCESS BLDA CALC-SCNC: -6.7 MMOL/L — LOW (ref -2–3)
BASE EXCESS BLDA CALC-SCNC: -7.7 MMOL/L — LOW (ref -2–3)
BASE EXCESS BLDA CALC-SCNC: -8.3 MMOL/L — LOW (ref -2–3)
BASE EXCESS BLDA CALC-SCNC: 0.2 MMOL/L — SIGNIFICANT CHANGE UP (ref -2–3)
BASE EXCESS BLDA CALC-SCNC: 2.1 MMOL/L — SIGNIFICANT CHANGE UP (ref -2–3)
BASE EXCESS BLDA CALC-SCNC: 3.5 MMOL/L — HIGH (ref -2–3)
BASE EXCESS BLDA CALC-SCNC: 3.6 MMOL/L — HIGH (ref -2–3)
BASE EXCESS BLDA CALC-SCNC: 4.4 MMOL/L — HIGH (ref -2–3)
BASE EXCESS BLDA CALC-SCNC: 4.9 MMOL/L — HIGH (ref -2–3)
BASE EXCESS BLDV CALC-SCNC: 1 MMOL/L — SIGNIFICANT CHANGE UP (ref -2–3)
BASE EXCESS BLDV CALC-SCNC: 3.9 MMOL/L — HIGH (ref -2–3)
BASE EXCESS BLDV CALC-SCNC: 6 MMOL/L — HIGH (ref -2–3)
BASOPHILS # BLD AUTO: 0 K/UL — SIGNIFICANT CHANGE UP (ref 0–0.2)
BASOPHILS # BLD AUTO: 0.02 K/UL — SIGNIFICANT CHANGE UP (ref 0–0.2)
BASOPHILS # BLD AUTO: 0.03 K/UL — SIGNIFICANT CHANGE UP (ref 0–0.2)
BASOPHILS # BLD AUTO: 0.08 K/UL — SIGNIFICANT CHANGE UP (ref 0–0.2)
BASOPHILS # BLD AUTO: 0.14 K/UL — SIGNIFICANT CHANGE UP (ref 0–0.2)
BASOPHILS NFR BLD AUTO: 0 % — SIGNIFICANT CHANGE UP (ref 0–2)
BASOPHILS NFR BLD AUTO: 0.1 % — SIGNIFICANT CHANGE UP (ref 0–2)
BASOPHILS NFR BLD AUTO: 0.2 % — SIGNIFICANT CHANGE UP (ref 0–2)
BASOPHILS NFR BLD AUTO: 0.3 % — SIGNIFICANT CHANGE UP (ref 0–2)
BASOPHILS NFR BLD AUTO: 1.3 % — SIGNIFICANT CHANGE UP (ref 0–2)
BILIRUB SERPL-MCNC: 0.3 MG/DL — SIGNIFICANT CHANGE UP (ref 0.2–1.2)
BILIRUB SERPL-MCNC: 0.4 MG/DL — SIGNIFICANT CHANGE UP (ref 0.2–1.2)
BILIRUB SERPL-MCNC: 0.4 MG/DL — SIGNIFICANT CHANGE UP (ref 0.2–1.2)
BILIRUB SERPL-MCNC: 0.5 MG/DL — SIGNIFICANT CHANGE UP (ref 0.2–1.2)
BILIRUB SERPL-MCNC: 0.6 MG/DL — SIGNIFICANT CHANGE UP (ref 0.2–1.2)
BILIRUB SERPL-MCNC: 0.6 MG/DL — SIGNIFICANT CHANGE UP (ref 0.2–1.2)
BILIRUB SERPL-MCNC: 0.7 MG/DL — SIGNIFICANT CHANGE UP (ref 0.2–1.2)
BILIRUB SERPL-MCNC: 0.8 MG/DL — SIGNIFICANT CHANGE UP (ref 0.2–1.2)
BILIRUB SERPL-MCNC: 0.9 MG/DL — SIGNIFICANT CHANGE UP (ref 0.2–1.2)
BILIRUB SERPL-MCNC: 0.9 MG/DL — SIGNIFICANT CHANGE UP (ref 0.2–1.2)
BILIRUB SERPL-MCNC: 1 MG/DL — SIGNIFICANT CHANGE UP (ref 0.2–1.2)
BILIRUB SERPL-MCNC: 1.3 MG/DL — HIGH (ref 0.2–1.2)
BILIRUB UR-MCNC: ABNORMAL
BILIRUB UR-MCNC: NEGATIVE — SIGNIFICANT CHANGE UP
BLANDM BLD POS QL PROBE: SIGNIFICANT CHANGE UP
BLOOD GAS COMMENTS ARTERIAL: SIGNIFICANT CHANGE UP
BLOOD GAS COMMENTS, VENOUS: SIGNIFICANT CHANGE UP
BUN SERPL-MCNC: 16 MG/DL — SIGNIFICANT CHANGE UP (ref 7–23)
BUN SERPL-MCNC: 18 MG/DL — SIGNIFICANT CHANGE UP (ref 7–23)
BUN SERPL-MCNC: 21 MG/DL — SIGNIFICANT CHANGE UP (ref 7–23)
BUN SERPL-MCNC: 24 MG/DL — HIGH (ref 7–23)
BUN SERPL-MCNC: 28 MG/DL — HIGH (ref 7–23)
BUN SERPL-MCNC: 34 MG/DL — HIGH (ref 7–23)
BUN SERPL-MCNC: 34 MG/DL — HIGH (ref 7–23)
BUN SERPL-MCNC: 35 MG/DL — HIGH (ref 7–23)
BUN SERPL-MCNC: 35 MG/DL — HIGH (ref 7–23)
BUN SERPL-MCNC: 38 MG/DL — HIGH (ref 7–23)
BUN SERPL-MCNC: 38 MG/DL — HIGH (ref 7–23)
BUN SERPL-MCNC: 39 MG/DL — HIGH (ref 7–23)
BUN SERPL-MCNC: 40 MG/DL — HIGH (ref 7–23)
BUN SERPL-MCNC: 43 MG/DL — HIGH (ref 7–23)
BUN SERPL-MCNC: 45 MG/DL — HIGH (ref 7–23)
BUN SERPL-MCNC: 49 MG/DL — HIGH (ref 7–23)
BUN SERPL-MCNC: 52 MG/DL — HIGH (ref 7–23)
BUN SERPL-MCNC: 63 MG/DL — HIGH (ref 7–23)
BUN SERPL-MCNC: 78 MG/DL — HIGH (ref 7–23)
BUN SERPL-MCNC: 85 MG/DL — HIGH (ref 7–23)
CALCIUM SERPL-MCNC: 7.5 MG/DL — LOW (ref 8.4–10.5)
CALCIUM SERPL-MCNC: 7.5 MG/DL — LOW (ref 8.5–10.1)
CALCIUM SERPL-MCNC: 7.5 MG/DL — LOW (ref 8.5–10.1)
CALCIUM SERPL-MCNC: 7.8 MG/DL — LOW (ref 8.5–10.1)
CALCIUM SERPL-MCNC: 8.2 MG/DL — LOW (ref 8.5–10.1)
CALCIUM SERPL-MCNC: 8.3 MG/DL — LOW (ref 8.5–10.1)
CALCIUM SERPL-MCNC: 8.3 MG/DL — LOW (ref 8.5–10.1)
CALCIUM SERPL-MCNC: 8.5 MG/DL — SIGNIFICANT CHANGE UP (ref 8.5–10.1)
CALCIUM SERPL-MCNC: 8.5 MG/DL — SIGNIFICANT CHANGE UP (ref 8.5–10.1)
CALCIUM SERPL-MCNC: 8.6 MG/DL — SIGNIFICANT CHANGE UP (ref 8.5–10.1)
CALCIUM SERPL-MCNC: 8.7 MG/DL — SIGNIFICANT CHANGE UP (ref 8.5–10.1)
CALCIUM SERPL-MCNC: 8.7 MG/DL — SIGNIFICANT CHANGE UP (ref 8.5–10.1)
CALCIUM SERPL-MCNC: 8.8 MG/DL — SIGNIFICANT CHANGE UP (ref 8.5–10.1)
CALCIUM SERPL-MCNC: 8.8 MG/DL — SIGNIFICANT CHANGE UP (ref 8.5–10.1)
CALCIUM SERPL-MCNC: 8.9 MG/DL — SIGNIFICANT CHANGE UP (ref 8.5–10.1)
CALCIUM SERPL-MCNC: 9.3 MG/DL — SIGNIFICANT CHANGE UP (ref 8.4–10.5)
CALCIUM SERPL-MCNC: 9.4 MG/DL — SIGNIFICANT CHANGE UP (ref 8.5–10.1)
CALCIUM SERPL-MCNC: 9.6 MG/DL — SIGNIFICANT CHANGE UP (ref 8.5–10.1)
CHLORIDE SERPL-SCNC: 101 MMOL/L — SIGNIFICANT CHANGE UP (ref 96–108)
CHLORIDE SERPL-SCNC: 101 MMOL/L — SIGNIFICANT CHANGE UP (ref 96–108)
CHLORIDE SERPL-SCNC: 102 MMOL/L — SIGNIFICANT CHANGE UP (ref 96–108)
CHLORIDE SERPL-SCNC: 103 MMOL/L — SIGNIFICANT CHANGE UP (ref 96–108)
CHLORIDE SERPL-SCNC: 103 MMOL/L — SIGNIFICANT CHANGE UP (ref 96–108)
CHLORIDE SERPL-SCNC: 104 MMOL/L — SIGNIFICANT CHANGE UP (ref 96–108)
CHLORIDE SERPL-SCNC: 104 MMOL/L — SIGNIFICANT CHANGE UP (ref 96–108)
CHLORIDE SERPL-SCNC: 106 MMOL/L — SIGNIFICANT CHANGE UP (ref 96–108)
CHLORIDE SERPL-SCNC: 106 MMOL/L — SIGNIFICANT CHANGE UP (ref 96–108)
CHLORIDE SERPL-SCNC: 107 MMOL/L — SIGNIFICANT CHANGE UP (ref 96–108)
CHLORIDE SERPL-SCNC: 108 MMOL/L — SIGNIFICANT CHANGE UP (ref 96–108)
CHLORIDE SERPL-SCNC: 108 MMOL/L — SIGNIFICANT CHANGE UP (ref 96–108)
CHLORIDE SERPL-SCNC: 109 MMOL/L — HIGH (ref 96–108)
CHLORIDE SERPL-SCNC: 111 MMOL/L — HIGH (ref 96–108)
CHLORIDE SERPL-SCNC: 112 MMOL/L — HIGH (ref 96–108)
CHLORIDE SERPL-SCNC: 113 MMOL/L — HIGH (ref 96–108)
CHLORIDE SERPL-SCNC: 96 MMOL/L — SIGNIFICANT CHANGE UP (ref 96–108)
CHLORIDE SERPL-SCNC: 97 MMOL/L — SIGNIFICANT CHANGE UP (ref 96–108)
CHLORIDE SERPL-SCNC: 98 MMOL/L — SIGNIFICANT CHANGE UP (ref 96–108)
CO2 SERPL-SCNC: 17 MMOL/L — LOW (ref 22–31)
CO2 SERPL-SCNC: 21 MMOL/L — LOW (ref 22–31)
CO2 SERPL-SCNC: 23 MMOL/L — SIGNIFICANT CHANGE UP (ref 22–31)
CO2 SERPL-SCNC: 24 MMOL/L — SIGNIFICANT CHANGE UP (ref 22–31)
CO2 SERPL-SCNC: 25 MMOL/L — SIGNIFICANT CHANGE UP (ref 22–31)
CO2 SERPL-SCNC: 25 MMOL/L — SIGNIFICANT CHANGE UP (ref 22–31)
CO2 SERPL-SCNC: 26 MMOL/L — SIGNIFICANT CHANGE UP (ref 22–31)
CO2 SERPL-SCNC: 27 MMOL/L — SIGNIFICANT CHANGE UP (ref 22–31)
CO2 SERPL-SCNC: 28 MMOL/L — SIGNIFICANT CHANGE UP (ref 22–31)
CO2 SERPL-SCNC: 28 MMOL/L — SIGNIFICANT CHANGE UP (ref 22–31)
CO2 SERPL-SCNC: 29 MMOL/L — SIGNIFICANT CHANGE UP (ref 22–31)
CO2 SERPL-SCNC: 29 MMOL/L — SIGNIFICANT CHANGE UP (ref 22–31)
CO2 SERPL-SCNC: 31 MMOL/L — SIGNIFICANT CHANGE UP (ref 22–31)
CO2 SERPL-SCNC: 33 MMOL/L — HIGH (ref 22–31)
COLOR SPEC: SIGNIFICANT CHANGE UP
COLOR SPEC: YELLOW — SIGNIFICANT CHANGE UP
CREAT SERPL-MCNC: 0.99 MG/DL — SIGNIFICANT CHANGE UP (ref 0.5–1.3)
CREAT SERPL-MCNC: 1 MG/DL — SIGNIFICANT CHANGE UP (ref 0.5–1.3)
CREAT SERPL-MCNC: 1.2 MG/DL — SIGNIFICANT CHANGE UP (ref 0.5–1.3)
CREAT SERPL-MCNC: 1.29 MG/DL — SIGNIFICANT CHANGE UP (ref 0.5–1.3)
CREAT SERPL-MCNC: 1.43 MG/DL — HIGH (ref 0.5–1.3)
CREAT SERPL-MCNC: 1.7 MG/DL — HIGH (ref 0.5–1.3)
CREAT SERPL-MCNC: 1.9 MG/DL — HIGH (ref 0.5–1.3)
CREAT SERPL-MCNC: 2 MG/DL — HIGH (ref 0.5–1.3)
CREAT SERPL-MCNC: 2.1 MG/DL — HIGH (ref 0.5–1.3)
CREAT SERPL-MCNC: 2.2 MG/DL — HIGH (ref 0.5–1.3)
CREAT SERPL-MCNC: 2.2 MG/DL — HIGH (ref 0.5–1.3)
CREAT SERPL-MCNC: 2.5 MG/DL — HIGH (ref 0.5–1.3)
CREAT SERPL-MCNC: 2.6 MG/DL — HIGH (ref 0.5–1.3)
CREAT SERPL-MCNC: 2.9 MG/DL — HIGH (ref 0.5–1.3)
CREAT SERPL-MCNC: 3.4 MG/DL — HIGH (ref 0.5–1.3)
CRP SERPL-MCNC: 26 MG/L — HIGH
CRP SERPL-MCNC: 30 MG/L — HIGH
CULTURE RESULTS: ABNORMAL
CULTURE RESULTS: SIGNIFICANT CHANGE UP
D DIMER BLD IA.RAPID-MCNC: 865 NG/ML DDU — HIGH
DIFF PNL FLD: ABNORMAL
DIFF PNL FLD: NEGATIVE — SIGNIFICANT CHANGE UP
EGFR: 13 ML/MIN/1.73M2 — LOW
EGFR: 16 ML/MIN/1.73M2 — LOW
EGFR: 18 ML/MIN/1.73M2 — LOW
EGFR: 19 ML/MIN/1.73M2 — LOW
EGFR: 22 ML/MIN/1.73M2 — LOW
EGFR: 22 ML/MIN/1.73M2 — LOW
EGFR: 24 ML/MIN/1.73M2 — LOW
EGFR: 25 ML/MIN/1.73M2 — LOW
EGFR: 27 ML/MIN/1.73M2 — LOW
EGFR: 30 ML/MIN/1.73M2 — LOW
EGFR: 37 ML/MIN/1.73M2 — LOW
EGFR: 42 ML/MIN/1.73M2 — LOW
EGFR: 46 ML/MIN/1.73M2 — LOW
EGFR: 57 ML/MIN/1.73M2 — LOW
EGFR: 58 ML/MIN/1.73M2 — LOW
EOSINOPHIL # BLD AUTO: 0 K/UL — SIGNIFICANT CHANGE UP (ref 0–0.5)
EOSINOPHIL # BLD AUTO: 0.01 K/UL — SIGNIFICANT CHANGE UP (ref 0–0.5)
EOSINOPHIL # BLD AUTO: 0.01 K/UL — SIGNIFICANT CHANGE UP (ref 0–0.5)
EOSINOPHIL # BLD AUTO: 0.03 K/UL — SIGNIFICANT CHANGE UP (ref 0–0.5)
EOSINOPHIL # BLD AUTO: 0.18 K/UL — SIGNIFICANT CHANGE UP (ref 0–0.5)
EOSINOPHIL # BLD AUTO: 0.18 K/UL — SIGNIFICANT CHANGE UP (ref 0–0.5)
EOSINOPHIL # BLD AUTO: 0.2 K/UL — SIGNIFICANT CHANGE UP (ref 0–0.5)
EOSINOPHIL # BLD AUTO: 0.23 K/UL — SIGNIFICANT CHANGE UP (ref 0–0.5)
EOSINOPHIL # BLD AUTO: 0.27 K/UL — SIGNIFICANT CHANGE UP (ref 0–0.5)
EOSINOPHIL # BLD AUTO: 2.74 K/UL — HIGH (ref 0–0.5)
EOSINOPHIL NFR BLD AUTO: 0 % — SIGNIFICANT CHANGE UP (ref 0–6)
EOSINOPHIL NFR BLD AUTO: 0.1 % — SIGNIFICANT CHANGE UP (ref 0–6)
EOSINOPHIL NFR BLD AUTO: 0.1 % — SIGNIFICANT CHANGE UP (ref 0–6)
EOSINOPHIL NFR BLD AUTO: 0.2 % — SIGNIFICANT CHANGE UP (ref 0–6)
EOSINOPHIL NFR BLD AUTO: 1 % — SIGNIFICANT CHANGE UP (ref 0–6)
EOSINOPHIL NFR BLD AUTO: 1 % — SIGNIFICANT CHANGE UP (ref 0–6)
EOSINOPHIL NFR BLD AUTO: 1.1 % — SIGNIFICANT CHANGE UP (ref 0–6)
EOSINOPHIL NFR BLD AUTO: 24.8 % — HIGH (ref 0–6)
ESTIMATED AVERAGE GLUCOSE: 114 MG/DL — SIGNIFICANT CHANGE UP (ref 68–114)
FLUAV AG NPH QL: SIGNIFICANT CHANGE UP
FLUBV AG NPH QL: SIGNIFICANT CHANGE UP
FUNGITELL B-D-GLUCAN,  BRONCHIAL LAVAGE: SIGNIFICANT CHANGE UP
GAS PNL BLDA: SIGNIFICANT CHANGE UP
GAS PNL BLDV: SIGNIFICANT CHANGE UP
GAS PNL BLDV: SIGNIFICANT CHANGE UP
GGT SERPL-CCNC: 931 U/L — HIGH (ref 8–40)
GLUCOSE BLDC GLUCOMTR-MCNC: 104 MG/DL — HIGH (ref 70–99)
GLUCOSE BLDC GLUCOMTR-MCNC: 106 MG/DL — HIGH (ref 70–99)
GLUCOSE BLDC GLUCOMTR-MCNC: 107 MG/DL — HIGH (ref 70–99)
GLUCOSE BLDC GLUCOMTR-MCNC: 108 MG/DL — HIGH (ref 70–99)
GLUCOSE BLDC GLUCOMTR-MCNC: 109 MG/DL — HIGH (ref 70–99)
GLUCOSE BLDC GLUCOMTR-MCNC: 113 MG/DL — HIGH (ref 70–99)
GLUCOSE BLDC GLUCOMTR-MCNC: 116 MG/DL — HIGH (ref 70–99)
GLUCOSE BLDC GLUCOMTR-MCNC: 120 MG/DL — HIGH (ref 70–99)
GLUCOSE BLDC GLUCOMTR-MCNC: 123 MG/DL — HIGH (ref 70–99)
GLUCOSE BLDC GLUCOMTR-MCNC: 124 MG/DL — HIGH (ref 70–99)
GLUCOSE BLDC GLUCOMTR-MCNC: 125 MG/DL — HIGH (ref 70–99)
GLUCOSE BLDC GLUCOMTR-MCNC: 126 MG/DL — HIGH (ref 70–99)
GLUCOSE BLDC GLUCOMTR-MCNC: 128 MG/DL — HIGH (ref 70–99)
GLUCOSE BLDC GLUCOMTR-MCNC: 133 MG/DL — HIGH (ref 70–99)
GLUCOSE BLDC GLUCOMTR-MCNC: 137 MG/DL — HIGH (ref 70–99)
GLUCOSE BLDC GLUCOMTR-MCNC: 142 MG/DL — HIGH (ref 70–99)
GLUCOSE BLDC GLUCOMTR-MCNC: 145 MG/DL — HIGH (ref 70–99)
GLUCOSE BLDC GLUCOMTR-MCNC: 162 MG/DL — HIGH (ref 70–99)
GLUCOSE BLDC GLUCOMTR-MCNC: 163 MG/DL — HIGH (ref 70–99)
GLUCOSE BLDC GLUCOMTR-MCNC: 165 MG/DL — HIGH (ref 70–99)
GLUCOSE BLDC GLUCOMTR-MCNC: 176 MG/DL — HIGH (ref 70–99)
GLUCOSE BLDC GLUCOMTR-MCNC: 181 MG/DL — HIGH (ref 70–99)
GLUCOSE BLDC GLUCOMTR-MCNC: 21 MG/DL — CRITICAL LOW (ref 70–99)
GLUCOSE BLDC GLUCOMTR-MCNC: 21 MG/DL — CRITICAL LOW (ref 70–99)
GLUCOSE BLDC GLUCOMTR-MCNC: 233 MG/DL — HIGH (ref 70–99)
GLUCOSE BLDC GLUCOMTR-MCNC: 282 MG/DL — HIGH (ref 70–99)
GLUCOSE BLDC GLUCOMTR-MCNC: 46 MG/DL — CRITICAL LOW (ref 70–99)
GLUCOSE BLDC GLUCOMTR-MCNC: 51 MG/DL — CRITICAL LOW (ref 70–99)
GLUCOSE BLDC GLUCOMTR-MCNC: 52 MG/DL — CRITICAL LOW (ref 70–99)
GLUCOSE BLDC GLUCOMTR-MCNC: 54 MG/DL — CRITICAL LOW (ref 70–99)
GLUCOSE BLDC GLUCOMTR-MCNC: 67 MG/DL — LOW (ref 70–99)
GLUCOSE BLDC GLUCOMTR-MCNC: 70 MG/DL — SIGNIFICANT CHANGE UP (ref 70–99)
GLUCOSE BLDC GLUCOMTR-MCNC: 73 MG/DL — SIGNIFICANT CHANGE UP (ref 70–99)
GLUCOSE BLDC GLUCOMTR-MCNC: 76 MG/DL — SIGNIFICANT CHANGE UP (ref 70–99)
GLUCOSE BLDC GLUCOMTR-MCNC: 81 MG/DL — SIGNIFICANT CHANGE UP (ref 70–99)
GLUCOSE BLDC GLUCOMTR-MCNC: 81 MG/DL — SIGNIFICANT CHANGE UP (ref 70–99)
GLUCOSE BLDC GLUCOMTR-MCNC: 82 MG/DL — SIGNIFICANT CHANGE UP (ref 70–99)
GLUCOSE BLDC GLUCOMTR-MCNC: 85 MG/DL — SIGNIFICANT CHANGE UP (ref 70–99)
GLUCOSE BLDC GLUCOMTR-MCNC: 85 MG/DL — SIGNIFICANT CHANGE UP (ref 70–99)
GLUCOSE BLDC GLUCOMTR-MCNC: 86 MG/DL — SIGNIFICANT CHANGE UP (ref 70–99)
GLUCOSE BLDC GLUCOMTR-MCNC: 86 MG/DL — SIGNIFICANT CHANGE UP (ref 70–99)
GLUCOSE BLDC GLUCOMTR-MCNC: 90 MG/DL — SIGNIFICANT CHANGE UP (ref 70–99)
GLUCOSE BLDC GLUCOMTR-MCNC: 91 MG/DL — SIGNIFICANT CHANGE UP (ref 70–99)
GLUCOSE BLDC GLUCOMTR-MCNC: 91 MG/DL — SIGNIFICANT CHANGE UP (ref 70–99)
GLUCOSE BLDC GLUCOMTR-MCNC: 92 MG/DL — SIGNIFICANT CHANGE UP (ref 70–99)
GLUCOSE BLDC GLUCOMTR-MCNC: 93 MG/DL — SIGNIFICANT CHANGE UP (ref 70–99)
GLUCOSE BLDC GLUCOMTR-MCNC: 95 MG/DL — SIGNIFICANT CHANGE UP (ref 70–99)
GLUCOSE BLDC GLUCOMTR-MCNC: 98 MG/DL — SIGNIFICANT CHANGE UP (ref 70–99)
GLUCOSE SERPL-MCNC: 103 MG/DL — HIGH (ref 70–99)
GLUCOSE SERPL-MCNC: 110 MG/DL — HIGH (ref 70–99)
GLUCOSE SERPL-MCNC: 112 MG/DL — HIGH (ref 70–99)
GLUCOSE SERPL-MCNC: 114 MG/DL — HIGH (ref 70–99)
GLUCOSE SERPL-MCNC: 116 MG/DL — HIGH (ref 70–99)
GLUCOSE SERPL-MCNC: 117 MG/DL — HIGH (ref 70–99)
GLUCOSE SERPL-MCNC: 120 MG/DL — HIGH (ref 70–99)
GLUCOSE SERPL-MCNC: 124 MG/DL — HIGH (ref 70–99)
GLUCOSE SERPL-MCNC: 127 MG/DL — HIGH (ref 70–99)
GLUCOSE SERPL-MCNC: 132 MG/DL — HIGH (ref 70–99)
GLUCOSE SERPL-MCNC: 141 MG/DL — HIGH (ref 70–99)
GLUCOSE SERPL-MCNC: 141 MG/DL — HIGH (ref 70–99)
GLUCOSE SERPL-MCNC: 159 MG/DL — HIGH (ref 70–99)
GLUCOSE SERPL-MCNC: 168 MG/DL — HIGH (ref 70–99)
GLUCOSE SERPL-MCNC: 173 MG/DL — HIGH (ref 70–99)
GLUCOSE SERPL-MCNC: 43 MG/DL — CRITICAL LOW (ref 70–99)
GLUCOSE SERPL-MCNC: 56 MG/DL — LOW (ref 70–99)
GLUCOSE SERPL-MCNC: 64 MG/DL — LOW (ref 70–99)
GLUCOSE SERPL-MCNC: 86 MG/DL — SIGNIFICANT CHANGE UP (ref 70–99)
GLUCOSE SERPL-MCNC: 90 MG/DL — SIGNIFICANT CHANGE UP (ref 70–99)
GLUCOSE SERPL-MCNC: 97 MG/DL — SIGNIFICANT CHANGE UP (ref 70–99)
GLUCOSE SERPL-MCNC: 98 MG/DL — SIGNIFICANT CHANGE UP (ref 70–99)
GLUCOSE UR QL: NEGATIVE MG/DL — SIGNIFICANT CHANGE UP
GLUCOSE UR QL: NEGATIVE MG/DL — SIGNIFICANT CHANGE UP
GRAM STN FLD: ABNORMAL
GRAM STN FLD: SIGNIFICANT CHANGE UP
HCO3 BLDA-SCNC: 22 MMOL/L — SIGNIFICANT CHANGE UP (ref 21–28)
HCO3 BLDA-SCNC: 22 MMOL/L — SIGNIFICANT CHANGE UP (ref 21–28)
HCO3 BLDA-SCNC: 23 MMOL/L — SIGNIFICANT CHANGE UP (ref 21–28)
HCO3 BLDA-SCNC: 25 MMOL/L — SIGNIFICANT CHANGE UP (ref 21–28)
HCO3 BLDA-SCNC: 26 MMOL/L — SIGNIFICANT CHANGE UP (ref 21–28)
HCO3 BLDA-SCNC: 28 MMOL/L — SIGNIFICANT CHANGE UP (ref 21–28)
HCO3 BLDA-SCNC: 29 MMOL/L — HIGH (ref 21–28)
HCO3 BLDA-SCNC: 30 MMOL/L — HIGH (ref 21–28)
HCO3 BLDA-SCNC: 30 MMOL/L — HIGH (ref 21–28)
HCO3 BLDA-SCNC: 31 MMOL/L — HIGH (ref 21–28)
HCO3 BLDV-SCNC: 26 MMOL/L — SIGNIFICANT CHANGE UP (ref 22–29)
HCO3 BLDV-SCNC: 30 MMOL/L — HIGH (ref 22–29)
HCO3 BLDV-SCNC: 30 MMOL/L — HIGH (ref 22–29)
HCT VFR BLD CALC: 20.4 % — CRITICAL LOW (ref 34.5–45)
HCT VFR BLD CALC: 21.6 % — LOW (ref 34.5–45)
HCT VFR BLD CALC: 22.2 % — LOW (ref 34.5–45)
HCT VFR BLD CALC: 22.5 % — LOW (ref 34.5–45)
HCT VFR BLD CALC: 22.7 % — LOW (ref 34.5–45)
HCT VFR BLD CALC: 22.7 % — LOW (ref 34.5–45)
HCT VFR BLD CALC: 23 % — LOW (ref 34.5–45)
HCT VFR BLD CALC: 24.3 % — LOW (ref 34.5–45)
HCT VFR BLD CALC: 24.4 % — LOW (ref 34.5–45)
HCT VFR BLD CALC: 27.3 % — LOW (ref 34.5–45)
HCT VFR BLD CALC: 27.5 % — LOW (ref 34.5–45)
HCT VFR BLD CALC: 28.3 % — LOW (ref 34.5–45)
HCT VFR BLD CALC: 28.8 % — LOW (ref 34.5–45)
HCT VFR BLD CALC: 29.1 % — LOW (ref 34.5–45)
HCT VFR BLD CALC: 31 % — LOW (ref 34.5–45)
HCT VFR BLD CALC: 32.9 % — LOW (ref 34.5–45)
HCT VFR BLD CALC: 35.8 % — SIGNIFICANT CHANGE UP (ref 34.5–45)
HCT VFR BLD CALC: 36.3 % — SIGNIFICANT CHANGE UP (ref 34.5–45)
HCT VFR BLD CALC: 36.8 % — SIGNIFICANT CHANGE UP (ref 34.5–45)
HCT VFR BLD CALC: 42.8 % — SIGNIFICANT CHANGE UP (ref 34.5–45)
HGB BLD-MCNC: 11 G/DL — LOW (ref 11.5–15.5)
HGB BLD-MCNC: 11.2 G/DL — LOW (ref 11.5–15.5)
HGB BLD-MCNC: 11.5 G/DL — SIGNIFICANT CHANGE UP (ref 11.5–15.5)
HGB BLD-MCNC: 13.3 G/DL — SIGNIFICANT CHANGE UP (ref 11.5–15.5)
HGB BLD-MCNC: 6.7 G/DL — CRITICAL LOW (ref 11.5–15.5)
HGB BLD-MCNC: 6.9 G/DL — CRITICAL LOW (ref 11.5–15.5)
HGB BLD-MCNC: 7 G/DL — CRITICAL LOW (ref 11.5–15.5)
HGB BLD-MCNC: 7.1 G/DL — LOW (ref 11.5–15.5)
HGB BLD-MCNC: 7.3 G/DL — LOW (ref 11.5–15.5)
HGB BLD-MCNC: 7.4 G/DL — LOW (ref 11.5–15.5)
HGB BLD-MCNC: 7.5 G/DL — LOW (ref 11.5–15.5)
HGB BLD-MCNC: 8.2 G/DL — LOW (ref 11.5–15.5)
HGB BLD-MCNC: 8.3 G/DL — LOW (ref 11.5–15.5)
HGB BLD-MCNC: 8.4 G/DL — LOW (ref 11.5–15.5)
HGB BLD-MCNC: 8.7 G/DL — LOW (ref 11.5–15.5)
HGB BLD-MCNC: 9 G/DL — LOW (ref 11.5–15.5)
HGB BLD-MCNC: 9.2 G/DL — LOW (ref 11.5–15.5)
HGB BLD-MCNC: 9.2 G/DL — LOW (ref 11.5–15.5)
HGB BLD-MCNC: 9.7 G/DL — LOW (ref 11.5–15.5)
HGB BLD-MCNC: 9.8 G/DL — LOW (ref 11.5–15.5)
HOROWITZ INDEX BLDA+IHG-RTO: 100 — SIGNIFICANT CHANGE UP
HOROWITZ INDEX BLDA+IHG-RTO: 35 — SIGNIFICANT CHANGE UP
HOROWITZ INDEX BLDA+IHG-RTO: 40 — SIGNIFICANT CHANGE UP
HOROWITZ INDEX BLDA+IHG-RTO: 60 — SIGNIFICANT CHANGE UP
HOROWITZ INDEX BLDA+IHG-RTO: 60 — SIGNIFICANT CHANGE UP
IMM GRANULOCYTES NFR BLD AUTO: 0.5 % — SIGNIFICANT CHANGE UP (ref 0–0.9)
IMM GRANULOCYTES NFR BLD AUTO: 1.3 % — HIGH (ref 0–0.9)
IMM GRANULOCYTES NFR BLD AUTO: 2.4 % — HIGH (ref 0–0.9)
IMM GRANULOCYTES NFR BLD AUTO: 2.6 % — HIGH (ref 0–0.9)
IMM GRANULOCYTES NFR BLD AUTO: 2.8 % — HIGH (ref 0–0.9)
IMM GRANULOCYTES NFR BLD AUTO: 4.2 % — HIGH (ref 0–0.9)
IMM GRANULOCYTES NFR BLD AUTO: 4.6 % — HIGH (ref 0–0.9)
IMM GRANULOCYTES NFR BLD AUTO: 4.7 % — HIGH (ref 0–0.9)
IMM GRANULOCYTES NFR BLD AUTO: 5.5 % — HIGH (ref 0–0.9)
IMM GRANULOCYTES NFR BLD AUTO: 5.5 % — HIGH (ref 0–0.9)
INR BLD: 1.05 RATIO — SIGNIFICANT CHANGE UP (ref 0.85–1.16)
INR BLD: 1.13 RATIO — SIGNIFICANT CHANGE UP (ref 0.85–1.16)
INR BLD: 1.35 RATIO — HIGH (ref 0.85–1.16)
KETONES UR-MCNC: ABNORMAL MG/DL
KETONES UR-MCNC: NEGATIVE MG/DL — SIGNIFICANT CHANGE UP
LACTATE SERPL-SCNC: 0.9 MMOL/L — SIGNIFICANT CHANGE UP (ref 0.7–2)
LACTATE SERPL-SCNC: 0.9 MMOL/L — SIGNIFICANT CHANGE UP (ref 0.7–2)
LACTATE SERPL-SCNC: 1.5 MMOL/L — SIGNIFICANT CHANGE UP (ref 0.7–2)
LACTATE SERPL-SCNC: 1.6 MMOL/L — SIGNIFICANT CHANGE UP (ref 0.7–2)
LACTATE SERPL-SCNC: 1.9 MMOL/L — SIGNIFICANT CHANGE UP (ref 0.7–2)
LACTATE SERPL-SCNC: 3.5 MMOL/L — HIGH (ref 0.7–2)
LACTATE SERPL-SCNC: 3.8 MMOL/L — HIGH (ref 0.7–2)
LACTATE SERPL-SCNC: 3.9 MMOL/L — HIGH (ref 0.7–2)
LACTATE SERPL-SCNC: 4 MMOL/L — CRITICAL HIGH (ref 0.7–2)
LACTATE SERPL-SCNC: 6.8 MMOL/L — CRITICAL HIGH (ref 0.7–2)
LACTATE SERPL-SCNC: 8.8 MMOL/L — CRITICAL HIGH (ref 0.7–2)
LEGIONELLA AG UR QL: NEGATIVE — SIGNIFICANT CHANGE UP
LEUKOCYTE ESTERASE UR-ACNC: NEGATIVE — SIGNIFICANT CHANGE UP
LEUKOCYTE ESTERASE UR-ACNC: NEGATIVE — SIGNIFICANT CHANGE UP
LIDOCAIN IGE QN: 31 U/L — SIGNIFICANT CHANGE UP (ref 16–77)
LYMPHOCYTES # BLD AUTO: 0.24 K/UL — LOW (ref 1–3.3)
LYMPHOCYTES # BLD AUTO: 0.27 K/UL — LOW (ref 1–3.3)
LYMPHOCYTES # BLD AUTO: 0.41 K/UL — LOW (ref 1–3.3)
LYMPHOCYTES # BLD AUTO: 0.45 K/UL — LOW (ref 1–3.3)
LYMPHOCYTES # BLD AUTO: 0.47 K/UL — LOW (ref 1–3.3)
LYMPHOCYTES # BLD AUTO: 0.53 K/UL — LOW (ref 1–3.3)
LYMPHOCYTES # BLD AUTO: 0.62 K/UL — LOW (ref 1–3.3)
LYMPHOCYTES # BLD AUTO: 0.73 K/UL — LOW (ref 1–3.3)
LYMPHOCYTES # BLD AUTO: 0.77 K/UL — LOW (ref 1–3.3)
LYMPHOCYTES # BLD AUTO: 0.78 K/UL — LOW (ref 1–3.3)
LYMPHOCYTES # BLD AUTO: 0.89 K/UL — LOW (ref 1–3.3)
LYMPHOCYTES # BLD AUTO: 1.13 K/UL — SIGNIFICANT CHANGE UP (ref 1–3.3)
LYMPHOCYTES # BLD AUTO: 1.15 K/UL — SIGNIFICANT CHANGE UP (ref 1–3.3)
LYMPHOCYTES # BLD AUTO: 1.16 K/UL — SIGNIFICANT CHANGE UP (ref 1–3.3)
LYMPHOCYTES # BLD AUTO: 1.18 K/UL — SIGNIFICANT CHANGE UP (ref 1–3.3)
LYMPHOCYTES # BLD AUTO: 1.5 % — LOW (ref 13–44)
LYMPHOCYTES # BLD AUTO: 10 % — LOW (ref 13–44)
LYMPHOCYTES # BLD AUTO: 10.2 % — LOW (ref 13–44)
LYMPHOCYTES # BLD AUTO: 2 % — LOW (ref 13–44)
LYMPHOCYTES # BLD AUTO: 3 % — LOW (ref 13–44)
LYMPHOCYTES # BLD AUTO: 3.2 % — LOW (ref 13–44)
LYMPHOCYTES # BLD AUTO: 3.2 % — LOW (ref 13–44)
LYMPHOCYTES # BLD AUTO: 3.6 % — LOW (ref 13–44)
LYMPHOCYTES # BLD AUTO: 5 % — LOW (ref 13–44)
LYMPHOCYTES # BLD AUTO: 5.1 % — LOW (ref 13–44)
LYMPHOCYTES # BLD AUTO: 5.5 % — LOW (ref 13–44)
LYMPHOCYTES # BLD AUTO: 6 % — LOW (ref 13–44)
LYMPHOCYTES # BLD AUTO: 7.1 % — LOW (ref 13–44)
M PNEUMO IGM SER-ACNC: 0.17 INDEX — SIGNIFICANT CHANGE UP (ref 0–0.9)
MAGNESIUM SERPL-MCNC: 1.2 MG/DL — LOW (ref 1.6–2.6)
MAGNESIUM SERPL-MCNC: 1.6 MG/DL — SIGNIFICANT CHANGE UP (ref 1.6–2.6)
MAGNESIUM SERPL-MCNC: 1.7 MG/DL — SIGNIFICANT CHANGE UP (ref 1.6–2.6)
MAGNESIUM SERPL-MCNC: 1.8 MG/DL — SIGNIFICANT CHANGE UP (ref 1.6–2.6)
MAGNESIUM SERPL-MCNC: 1.9 MG/DL — SIGNIFICANT CHANGE UP (ref 1.6–2.6)
MAGNESIUM SERPL-MCNC: 1.9 MG/DL — SIGNIFICANT CHANGE UP (ref 1.6–2.6)
MAGNESIUM SERPL-MCNC: 2.1 MG/DL — SIGNIFICANT CHANGE UP (ref 1.6–2.6)
MAGNESIUM SERPL-MCNC: 2.2 MG/DL — SIGNIFICANT CHANGE UP (ref 1.6–2.6)
MAGNESIUM SERPL-MCNC: 2.2 MG/DL — SIGNIFICANT CHANGE UP (ref 1.6–2.6)
MAGNESIUM SERPL-MCNC: 2.3 MG/DL — SIGNIFICANT CHANGE UP (ref 1.6–2.6)
MAGNESIUM SERPL-MCNC: 2.4 MG/DL — SIGNIFICANT CHANGE UP (ref 1.6–2.6)
MAGNESIUM SERPL-MCNC: 2.6 MG/DL — SIGNIFICANT CHANGE UP (ref 1.6–2.6)
MAGNESIUM SERPL-MCNC: 2.6 MG/DL — SIGNIFICANT CHANGE UP (ref 1.6–2.6)
MAGNESIUM SERPL-MCNC: 2.8 MG/DL — HIGH (ref 1.6–2.6)
MAGNESIUM SERPL-MCNC: 2.9 MG/DL — HIGH (ref 1.6–2.6)
MCHC RBC-ENTMCNC: 29.2 PG — SIGNIFICANT CHANGE UP (ref 27–34)
MCHC RBC-ENTMCNC: 29.5 PG — SIGNIFICANT CHANGE UP (ref 27–34)
MCHC RBC-ENTMCNC: 29.6 PG — SIGNIFICANT CHANGE UP (ref 27–34)
MCHC RBC-ENTMCNC: 29.6 PG — SIGNIFICANT CHANGE UP (ref 27–34)
MCHC RBC-ENTMCNC: 29.7 PG — SIGNIFICANT CHANGE UP (ref 27–34)
MCHC RBC-ENTMCNC: 29.7 PG — SIGNIFICANT CHANGE UP (ref 27–34)
MCHC RBC-ENTMCNC: 29.8 G/DL — LOW (ref 32–36)
MCHC RBC-ENTMCNC: 29.8 PG — SIGNIFICANT CHANGE UP (ref 27–34)
MCHC RBC-ENTMCNC: 29.9 PG — SIGNIFICANT CHANGE UP (ref 27–34)
MCHC RBC-ENTMCNC: 29.9 PG — SIGNIFICANT CHANGE UP (ref 27–34)
MCHC RBC-ENTMCNC: 30 PG — SIGNIFICANT CHANGE UP (ref 27–34)
MCHC RBC-ENTMCNC: 30.1 PG — SIGNIFICANT CHANGE UP (ref 27–34)
MCHC RBC-ENTMCNC: 30.1 PG — SIGNIFICANT CHANGE UP (ref 27–34)
MCHC RBC-ENTMCNC: 30.2 PG — SIGNIFICANT CHANGE UP (ref 27–34)
MCHC RBC-ENTMCNC: 30.2 PG — SIGNIFICANT CHANGE UP (ref 27–34)
MCHC RBC-ENTMCNC: 30.3 G/DL — LOW (ref 32–36)
MCHC RBC-ENTMCNC: 30.3 PG — SIGNIFICANT CHANGE UP (ref 27–34)
MCHC RBC-ENTMCNC: 30.3 PG — SIGNIFICANT CHANGE UP (ref 27–34)
MCHC RBC-ENTMCNC: 30.4 G/DL — LOW (ref 32–36)
MCHC RBC-ENTMCNC: 30.8 G/DL — LOW (ref 32–36)
MCHC RBC-ENTMCNC: 31.1 G/DL — LOW (ref 32–36)
MCHC RBC-ENTMCNC: 31.3 G/DL — LOW (ref 32–36)
MCHC RBC-ENTMCNC: 31.6 G/DL — LOW (ref 32–36)
MCHC RBC-ENTMCNC: 31.6 G/DL — LOW (ref 32–36)
MCHC RBC-ENTMCNC: 32 G/DL — SIGNIFICANT CHANGE UP (ref 32–36)
MCHC RBC-ENTMCNC: 32.2 G/DL — SIGNIFICANT CHANGE UP (ref 32–36)
MCHC RBC-ENTMCNC: 32.4 G/DL — SIGNIFICANT CHANGE UP (ref 32–36)
MCHC RBC-ENTMCNC: 32.4 G/DL — SIGNIFICANT CHANGE UP (ref 32–36)
MCHC RBC-ENTMCNC: 32.5 G/DL — SIGNIFICANT CHANGE UP (ref 32–36)
MCHC RBC-ENTMCNC: 32.8 G/DL — SIGNIFICANT CHANGE UP (ref 32–36)
MCHC RBC-ENTMCNC: 33 G/DL — SIGNIFICANT CHANGE UP (ref 32–36)
MCHC RBC-ENTMCNC: 33.6 G/DL — SIGNIFICANT CHANGE UP (ref 32–36)
MCHC RBC-ENTMCNC: 34.2 G/DL — SIGNIFICANT CHANGE UP (ref 32–36)
MCV RBC AUTO: 88 FL — SIGNIFICANT CHANGE UP (ref 80–100)
MCV RBC AUTO: 89.4 FL — SIGNIFICANT CHANGE UP (ref 80–100)
MCV RBC AUTO: 90.4 FL — SIGNIFICANT CHANGE UP (ref 80–100)
MCV RBC AUTO: 91.1 FL — SIGNIFICANT CHANGE UP (ref 80–100)
MCV RBC AUTO: 91.5 FL — SIGNIFICANT CHANGE UP (ref 80–100)
MCV RBC AUTO: 91.8 FL — SIGNIFICANT CHANGE UP (ref 80–100)
MCV RBC AUTO: 92.8 FL — SIGNIFICANT CHANGE UP (ref 80–100)
MCV RBC AUTO: 93.5 FL — SIGNIFICANT CHANGE UP (ref 80–100)
MCV RBC AUTO: 93.7 FL — SIGNIFICANT CHANGE UP (ref 80–100)
MCV RBC AUTO: 93.9 FL — SIGNIFICANT CHANGE UP (ref 80–100)
MCV RBC AUTO: 94.8 FL — SIGNIFICANT CHANGE UP (ref 80–100)
MCV RBC AUTO: 94.9 FL — SIGNIFICANT CHANGE UP (ref 80–100)
MCV RBC AUTO: 95.3 FL — SIGNIFICANT CHANGE UP (ref 80–100)
MCV RBC AUTO: 95.4 FL — SIGNIFICANT CHANGE UP (ref 80–100)
MCV RBC AUTO: 95.8 FL — SIGNIFICANT CHANGE UP (ref 80–100)
MCV RBC AUTO: 96 FL — SIGNIFICANT CHANGE UP (ref 80–100)
MCV RBC AUTO: 96.8 FL — SIGNIFICANT CHANGE UP (ref 80–100)
MCV RBC AUTO: 97.4 FL — SIGNIFICANT CHANGE UP (ref 80–100)
MCV RBC AUTO: 99.4 FL — SIGNIFICANT CHANGE UP (ref 80–100)
MCV RBC AUTO: 99.7 FL — SIGNIFICANT CHANGE UP (ref 80–100)
METHOD TYPE: SIGNIFICANT CHANGE UP
METHOD TYPE: SIGNIFICANT CHANGE UP
MONOCYTES # BLD AUTO: 0.21 K/UL — SIGNIFICANT CHANGE UP (ref 0–0.9)
MONOCYTES # BLD AUTO: 0.27 K/UL — SIGNIFICANT CHANGE UP (ref 0–0.9)
MONOCYTES # BLD AUTO: 0.29 K/UL — SIGNIFICANT CHANGE UP (ref 0–0.9)
MONOCYTES # BLD AUTO: 0.32 K/UL — SIGNIFICANT CHANGE UP (ref 0–0.9)
MONOCYTES # BLD AUTO: 0.33 K/UL — SIGNIFICANT CHANGE UP (ref 0–0.9)
MONOCYTES # BLD AUTO: 0.35 K/UL — SIGNIFICANT CHANGE UP (ref 0–0.9)
MONOCYTES # BLD AUTO: 0.35 K/UL — SIGNIFICANT CHANGE UP (ref 0–0.9)
MONOCYTES # BLD AUTO: 0.38 K/UL — SIGNIFICANT CHANGE UP (ref 0–0.9)
MONOCYTES # BLD AUTO: 0.39 K/UL — SIGNIFICANT CHANGE UP (ref 0–0.9)
MONOCYTES # BLD AUTO: 0.42 K/UL — SIGNIFICANT CHANGE UP (ref 0–0.9)
MONOCYTES # BLD AUTO: 0.56 K/UL — SIGNIFICANT CHANGE UP (ref 0–0.9)
MONOCYTES # BLD AUTO: 0.62 K/UL — SIGNIFICANT CHANGE UP (ref 0–0.9)
MONOCYTES # BLD AUTO: 0.79 K/UL — SIGNIFICANT CHANGE UP (ref 0–0.9)
MONOCYTES # BLD AUTO: 0.86 K/UL — SIGNIFICANT CHANGE UP (ref 0–0.9)
MONOCYTES # BLD AUTO: 0.93 K/UL — HIGH (ref 0–0.9)
MONOCYTES NFR BLD AUTO: 1.3 % — LOW (ref 2–14)
MONOCYTES NFR BLD AUTO: 1.9 % — LOW (ref 2–14)
MONOCYTES NFR BLD AUTO: 2 % — SIGNIFICANT CHANGE UP (ref 2–14)
MONOCYTES NFR BLD AUTO: 2.2 % — SIGNIFICANT CHANGE UP (ref 2–14)
MONOCYTES NFR BLD AUTO: 2.3 % — SIGNIFICANT CHANGE UP (ref 2–14)
MONOCYTES NFR BLD AUTO: 2.5 % — SIGNIFICANT CHANGE UP (ref 2–14)
MONOCYTES NFR BLD AUTO: 2.6 % — SIGNIFICANT CHANGE UP (ref 2–14)
MONOCYTES NFR BLD AUTO: 3 % — SIGNIFICANT CHANGE UP (ref 2–14)
MONOCYTES NFR BLD AUTO: 3 % — SIGNIFICANT CHANGE UP (ref 2–14)
MONOCYTES NFR BLD AUTO: 4 % — SIGNIFICANT CHANGE UP (ref 2–14)
MONOCYTES NFR BLD AUTO: 4.2 % — SIGNIFICANT CHANGE UP (ref 2–14)
MONOCYTES NFR BLD AUTO: 5 % — SIGNIFICANT CHANGE UP (ref 2–14)
MONOCYTES NFR BLD AUTO: 7.8 % — SIGNIFICANT CHANGE UP (ref 2–14)
MRSA PCR RESULT.: SIGNIFICANT CHANGE UP
MYCOPLASMA AG SPEC QL: NEGATIVE — SIGNIFICANT CHANGE UP
NEUTROPHILS # BLD AUTO: 10.97 K/UL — HIGH (ref 1.8–7.4)
NEUTROPHILS # BLD AUTO: 11.69 K/UL — HIGH (ref 1.8–7.4)
NEUTROPHILS # BLD AUTO: 12.45 K/UL — HIGH (ref 1.8–7.4)
NEUTROPHILS # BLD AUTO: 12.47 K/UL — HIGH (ref 1.8–7.4)
NEUTROPHILS # BLD AUTO: 13.05 K/UL — HIGH (ref 1.8–7.4)
NEUTROPHILS # BLD AUTO: 14.14 K/UL — HIGH (ref 1.8–7.4)
NEUTROPHILS # BLD AUTO: 14.7 K/UL — HIGH (ref 1.8–7.4)
NEUTROPHILS # BLD AUTO: 15.19 K/UL — HIGH (ref 1.8–7.4)
NEUTROPHILS # BLD AUTO: 17.1 K/UL — HIGH (ref 1.8–7.4)
NEUTROPHILS # BLD AUTO: 21.62 K/UL — HIGH (ref 1.8–7.4)
NEUTROPHILS # BLD AUTO: 22.15 K/UL — HIGH (ref 1.8–7.4)
NEUTROPHILS # BLD AUTO: 5.75 K/UL — SIGNIFICANT CHANGE UP (ref 1.8–7.4)
NEUTROPHILS # BLD AUTO: 6.1 K/UL — SIGNIFICANT CHANGE UP (ref 1.8–7.4)
NEUTROPHILS # BLD AUTO: 7.54 K/UL — HIGH (ref 1.8–7.4)
NEUTROPHILS # BLD AUTO: 9.82 K/UL — HIGH (ref 1.8–7.4)
NEUTROPHILS NFR BLD AUTO: 55.4 % — SIGNIFICANT CHANGE UP (ref 43–77)
NEUTROPHILS NFR BLD AUTO: 78 % — HIGH (ref 43–77)
NEUTROPHILS NFR BLD AUTO: 83 % — HIGH (ref 43–77)
NEUTROPHILS NFR BLD AUTO: 84 % — HIGH (ref 43–77)
NEUTROPHILS NFR BLD AUTO: 84.9 % — HIGH (ref 43–77)
NEUTROPHILS NFR BLD AUTO: 85 % — HIGH (ref 43–77)
NEUTROPHILS NFR BLD AUTO: 87 % — HIGH (ref 43–77)
NEUTROPHILS NFR BLD AUTO: 88.2 % — HIGH (ref 43–77)
NEUTROPHILS NFR BLD AUTO: 88.5 % — HIGH (ref 43–77)
NEUTROPHILS NFR BLD AUTO: 88.6 % — HIGH (ref 43–77)
NEUTROPHILS NFR BLD AUTO: 89.5 % — HIGH (ref 43–77)
NEUTROPHILS NFR BLD AUTO: 90.6 % — HIGH (ref 43–77)
NEUTROPHILS NFR BLD AUTO: 92.4 % — HIGH (ref 43–77)
NITRITE UR-MCNC: NEGATIVE — SIGNIFICANT CHANGE UP
NITRITE UR-MCNC: NEGATIVE — SIGNIFICANT CHANGE UP
NRBC # BLD: 0 /100 WBCS — SIGNIFICANT CHANGE UP (ref 0–0)
NRBC # BLD: 2 /100 WBCS — HIGH (ref 0–0)
NRBC # BLD: 8 /100 WBCS — HIGH (ref 0–0)
NRBC # BLD: SIGNIFICANT CHANGE UP /100 WBCS (ref 0–0)
NRBC BLD-RTO: 0 /100 WBCS — SIGNIFICANT CHANGE UP (ref 0–0)
NRBC BLD-RTO: 2 /100 WBCS — HIGH (ref 0–0)
NRBC BLD-RTO: 2 /100 WBCS — HIGH (ref 0–0)
NRBC BLD-RTO: 8 /100 WBCS — HIGH (ref 0–0)
NRBC BLD-RTO: SIGNIFICANT CHANGE UP /100 WBCS (ref 0–0)
NT-PROBNP SERPL-SCNC: 779 PG/ML — HIGH (ref 0–450)
ORGANISM # SPEC MICROSCOPIC CNT: ABNORMAL
ORGANISM # SPEC MICROSCOPIC CNT: ABNORMAL
ORGANISM # SPEC MICROSCOPIC CNT: SIGNIFICANT CHANGE UP
PCO2 BLDA: 119 MMHG — CRITICAL HIGH (ref 32–35)
PCO2 BLDA: 44 MMHG — HIGH (ref 32–35)
PCO2 BLDA: 47 MMHG — HIGH (ref 32–35)
PCO2 BLDA: 50 MMHG — HIGH (ref 32–35)
PCO2 BLDA: 50 MMHG — HIGH (ref 32–35)
PCO2 BLDA: 51 MMHG — HIGH (ref 32–35)
PCO2 BLDA: 54 MMHG — HIGH (ref 32–35)
PCO2 BLDA: 54 MMHG — HIGH (ref 32–35)
PCO2 BLDA: 56 MMHG — HIGH (ref 32–35)
PCO2 BLDA: 61 MMHG — HIGH (ref 32–35)
PCO2 BLDA: 73 MMHG — CRITICAL HIGH (ref 32–35)
PCO2 BLDA: 75 MMHG — CRITICAL HIGH (ref 32–35)
PCO2 BLDA: 85 MMHG — CRITICAL HIGH (ref 32–35)
PCO2 BLDV: 43 MMHG — HIGH (ref 39–42)
PCO2 BLDV: 47 MMHG — HIGH (ref 39–42)
PCO2 BLDV: 55 MMHG — HIGH (ref 39–42)
PH BLDA: 6.94 — CRITICAL LOW (ref 7.35–7.45)
PH BLDA: 7.03 — CRITICAL LOW (ref 7.35–7.45)
PH BLDA: 7.09 — CRITICAL LOW (ref 7.35–7.45)
PH BLDA: 7.21 — LOW (ref 7.35–7.45)
PH BLDA: 7.27 — LOW (ref 7.35–7.45)
PH BLDA: 7.28 — LOW (ref 7.35–7.45)
PH BLDA: 7.29 — LOW (ref 7.35–7.45)
PH BLDA: 7.31 — LOW (ref 7.35–7.45)
PH BLDA: 7.32 — LOW (ref 7.35–7.45)
PH BLDA: 7.32 — LOW (ref 7.35–7.45)
PH BLDA: 7.33 — LOW (ref 7.35–7.45)
PH BLDA: 7.39 — SIGNIFICANT CHANGE UP (ref 7.35–7.45)
PH BLDA: 7.43 — SIGNIFICANT CHANGE UP (ref 7.35–7.45)
PH BLDV: 7.34 — SIGNIFICANT CHANGE UP (ref 7.32–7.43)
PH BLDV: 7.39 — SIGNIFICANT CHANGE UP (ref 7.32–7.43)
PH BLDV: 7.42 — SIGNIFICANT CHANGE UP (ref 7.32–7.43)
PH UR: 5 — SIGNIFICANT CHANGE UP (ref 5–8)
PH UR: 5 — SIGNIFICANT CHANGE UP (ref 5–8)
PHOSPHATE SERPL-MCNC: 3.3 MG/DL — SIGNIFICANT CHANGE UP (ref 2.5–4.5)
PHOSPHATE SERPL-MCNC: 3.4 MG/DL — SIGNIFICANT CHANGE UP (ref 2.5–4.5)
PHOSPHATE SERPL-MCNC: 3.6 MG/DL — SIGNIFICANT CHANGE UP (ref 2.5–4.5)
PHOSPHATE SERPL-MCNC: 4 MG/DL — SIGNIFICANT CHANGE UP (ref 2.5–4.5)
PHOSPHATE SERPL-MCNC: 4 MG/DL — SIGNIFICANT CHANGE UP (ref 2.5–4.5)
PHOSPHATE SERPL-MCNC: 4.1 MG/DL — SIGNIFICANT CHANGE UP (ref 2.5–4.5)
PHOSPHATE SERPL-MCNC: 4.3 MG/DL — SIGNIFICANT CHANGE UP (ref 2.5–4.5)
PHOSPHATE SERPL-MCNC: 4.7 MG/DL — HIGH (ref 2.5–4.5)
PHOSPHATE SERPL-MCNC: 4.8 MG/DL — HIGH (ref 2.5–4.5)
PHOSPHATE SERPL-MCNC: 4.8 MG/DL — HIGH (ref 2.5–4.5)
PHOSPHATE SERPL-MCNC: 5 MG/DL — HIGH (ref 2.5–4.5)
PHOSPHATE SERPL-MCNC: 5.1 MG/DL — HIGH (ref 2.5–4.5)
PHOSPHATE SERPL-MCNC: 5.2 MG/DL — HIGH (ref 2.5–4.5)
PHOSPHATE SERPL-MCNC: 6.6 MG/DL — HIGH (ref 2.5–4.5)
PHOSPHATE SERPL-MCNC: 7.3 MG/DL — HIGH (ref 2.5–4.5)
PHOSPHATE SERPL-MCNC: 9 MG/DL — SIGNIFICANT CHANGE UP (ref 2.5–4.5)
PLATELET # BLD AUTO: 101 K/UL — LOW (ref 150–400)
PLATELET # BLD AUTO: 109 K/UL — LOW (ref 150–400)
PLATELET # BLD AUTO: 120 K/UL — LOW (ref 150–400)
PLATELET # BLD AUTO: 132 K/UL — LOW (ref 150–400)
PLATELET # BLD AUTO: 134 K/UL — LOW (ref 150–400)
PLATELET # BLD AUTO: 150 K/UL — SIGNIFICANT CHANGE UP (ref 150–400)
PLATELET # BLD AUTO: 164 K/UL — SIGNIFICANT CHANGE UP (ref 150–400)
PLATELET # BLD AUTO: 170 K/UL — SIGNIFICANT CHANGE UP (ref 150–400)
PLATELET # BLD AUTO: 176 K/UL — SIGNIFICANT CHANGE UP (ref 150–400)
PLATELET # BLD AUTO: 216 K/UL — SIGNIFICANT CHANGE UP (ref 150–400)
PLATELET # BLD AUTO: 218 K/UL — SIGNIFICANT CHANGE UP (ref 150–400)
PLATELET # BLD AUTO: 219 K/UL — SIGNIFICANT CHANGE UP (ref 150–400)
PLATELET # BLD AUTO: 223 K/UL — SIGNIFICANT CHANGE UP (ref 150–400)
PLATELET # BLD AUTO: 230 K/UL — SIGNIFICANT CHANGE UP (ref 150–400)
PLATELET # BLD AUTO: 297 K/UL — SIGNIFICANT CHANGE UP (ref 150–400)
PLATELET # BLD AUTO: 64 K/UL — LOW (ref 150–400)
PLATELET # BLD AUTO: 75 K/UL — LOW (ref 150–400)
PLATELET # BLD AUTO: 80 K/UL — LOW (ref 150–400)
PLATELET # BLD AUTO: 83 K/UL — LOW (ref 150–400)
PLATELET # BLD AUTO: 89 K/UL — LOW (ref 150–400)
PO2 BLDA: 103 MMHG — SIGNIFICANT CHANGE UP (ref 83–108)
PO2 BLDA: 125 MMHG — HIGH (ref 83–108)
PO2 BLDA: 142 MMHG — HIGH (ref 83–108)
PO2 BLDA: 158 MMHG — HIGH (ref 83–108)
PO2 BLDA: 192 MMHG — HIGH (ref 83–108)
PO2 BLDA: 235 MMHG — HIGH (ref 83–108)
PO2 BLDA: 278 MMHG — HIGH (ref 83–108)
PO2 BLDA: 70 MMHG — LOW (ref 83–108)
PO2 BLDA: 72 MMHG — LOW (ref 83–108)
PO2 BLDA: 72 MMHG — LOW (ref 83–108)
PO2 BLDA: 80 MMHG — LOW (ref 83–108)
PO2 BLDA: 85 MMHG — SIGNIFICANT CHANGE UP (ref 83–108)
PO2 BLDA: 94 MMHG — SIGNIFICANT CHANGE UP (ref 83–108)
PO2 BLDV: 138 MMHG — HIGH (ref 25–45)
PO2 BLDV: 186 MMHG — HIGH (ref 25–45)
PO2 BLDV: 58 MMHG — HIGH (ref 25–45)
POTASSIUM SERPL-MCNC: 2.7 MMOL/L — CRITICAL LOW (ref 3.5–5.3)
POTASSIUM SERPL-MCNC: 2.7 MMOL/L — CRITICAL LOW (ref 3.5–5.3)
POTASSIUM SERPL-MCNC: 3.5 MMOL/L — SIGNIFICANT CHANGE UP (ref 3.5–5.3)
POTASSIUM SERPL-MCNC: 3.6 MMOL/L — SIGNIFICANT CHANGE UP (ref 3.5–5.3)
POTASSIUM SERPL-MCNC: 3.6 MMOL/L — SIGNIFICANT CHANGE UP (ref 3.5–5.3)
POTASSIUM SERPL-MCNC: 3.8 MMOL/L — SIGNIFICANT CHANGE UP (ref 3.5–5.3)
POTASSIUM SERPL-MCNC: 3.9 MMOL/L — SIGNIFICANT CHANGE UP (ref 3.5–5.3)
POTASSIUM SERPL-MCNC: 4 MMOL/L — SIGNIFICANT CHANGE UP (ref 3.5–5.3)
POTASSIUM SERPL-MCNC: 4.2 MMOL/L — SIGNIFICANT CHANGE UP (ref 3.5–5.3)
POTASSIUM SERPL-MCNC: 4.3 MMOL/L — SIGNIFICANT CHANGE UP (ref 3.5–5.3)
POTASSIUM SERPL-MCNC: 4.6 MMOL/L — SIGNIFICANT CHANGE UP (ref 3.5–5.3)
POTASSIUM SERPL-MCNC: 4.6 MMOL/L — SIGNIFICANT CHANGE UP (ref 3.5–5.3)
POTASSIUM SERPL-MCNC: 4.7 MMOL/L — SIGNIFICANT CHANGE UP (ref 3.5–5.3)
POTASSIUM SERPL-MCNC: 4.8 MMOL/L — SIGNIFICANT CHANGE UP (ref 3.5–5.3)
POTASSIUM SERPL-MCNC: 4.8 MMOL/L — SIGNIFICANT CHANGE UP (ref 3.5–5.3)
POTASSIUM SERPL-MCNC: 5.1 MMOL/L — SIGNIFICANT CHANGE UP (ref 3.5–5.3)
POTASSIUM SERPL-MCNC: 5.4 MMOL/L — HIGH (ref 3.5–5.3)
POTASSIUM SERPL-MCNC: 5.8 MMOL/L — HIGH (ref 3.5–5.3)
POTASSIUM SERPL-MCNC: 6.5 MMOL/L — CRITICAL HIGH (ref 3.5–5.3)
POTASSIUM SERPL-SCNC: 2.7 MMOL/L — CRITICAL LOW (ref 3.5–5.3)
POTASSIUM SERPL-SCNC: 2.7 MMOL/L — CRITICAL LOW (ref 3.5–5.3)
POTASSIUM SERPL-SCNC: 3.5 MMOL/L — SIGNIFICANT CHANGE UP (ref 3.5–5.3)
POTASSIUM SERPL-SCNC: 3.6 MMOL/L — SIGNIFICANT CHANGE UP (ref 3.5–5.3)
POTASSIUM SERPL-SCNC: 3.6 MMOL/L — SIGNIFICANT CHANGE UP (ref 3.5–5.3)
POTASSIUM SERPL-SCNC: 3.8 MMOL/L — SIGNIFICANT CHANGE UP (ref 3.5–5.3)
POTASSIUM SERPL-SCNC: 3.9 MMOL/L — SIGNIFICANT CHANGE UP (ref 3.5–5.3)
POTASSIUM SERPL-SCNC: 4 MMOL/L — SIGNIFICANT CHANGE UP (ref 3.5–5.3)
POTASSIUM SERPL-SCNC: 4.2 MMOL/L — SIGNIFICANT CHANGE UP (ref 3.5–5.3)
POTASSIUM SERPL-SCNC: 4.3 MMOL/L — SIGNIFICANT CHANGE UP (ref 3.5–5.3)
POTASSIUM SERPL-SCNC: 4.6 MMOL/L — SIGNIFICANT CHANGE UP (ref 3.5–5.3)
POTASSIUM SERPL-SCNC: 4.6 MMOL/L — SIGNIFICANT CHANGE UP (ref 3.5–5.3)
POTASSIUM SERPL-SCNC: 4.7 MMOL/L — SIGNIFICANT CHANGE UP (ref 3.5–5.3)
POTASSIUM SERPL-SCNC: 4.8 MMOL/L — SIGNIFICANT CHANGE UP (ref 3.5–5.3)
POTASSIUM SERPL-SCNC: 4.8 MMOL/L — SIGNIFICANT CHANGE UP (ref 3.5–5.3)
POTASSIUM SERPL-SCNC: 5.1 MMOL/L — SIGNIFICANT CHANGE UP (ref 3.5–5.3)
POTASSIUM SERPL-SCNC: 5.4 MMOL/L — HIGH (ref 3.5–5.3)
POTASSIUM SERPL-SCNC: 5.8 MMOL/L — HIGH (ref 3.5–5.3)
POTASSIUM SERPL-SCNC: 6.5 MMOL/L — CRITICAL HIGH (ref 3.5–5.3)
PROCALCITONIN SERPL-MCNC: 0.09 NG/ML — HIGH
PROT SERPL-MCNC: 4.6 G/DL — LOW (ref 6–8.3)
PROT SERPL-MCNC: 4.7 G/DL — LOW (ref 6–8.3)
PROT SERPL-MCNC: 4.8 G/DL — LOW (ref 6–8.3)
PROT SERPL-MCNC: 4.8 G/DL — LOW (ref 6–8.3)
PROT SERPL-MCNC: 4.9 G/DL — LOW (ref 6–8.3)
PROT SERPL-MCNC: 4.9 G/DL — LOW (ref 6–8.3)
PROT SERPL-MCNC: 5 G/DL — LOW (ref 6–8.3)
PROT SERPL-MCNC: 5 G/DL — LOW (ref 6–8.3)
PROT SERPL-MCNC: 5.1 G/DL — LOW (ref 6–8.3)
PROT SERPL-MCNC: 5.1 G/DL — LOW (ref 6–8.3)
PROT SERPL-MCNC: 5.3 G/DL — LOW (ref 6–8.3)
PROT SERPL-MCNC: 5.3 G/DL — LOW (ref 6–8.3)
PROT SERPL-MCNC: 5.4 G/DL — LOW (ref 6–8.3)
PROT SERPL-MCNC: 5.4 G/DL — LOW (ref 6–8.3)
PROT SERPL-MCNC: 5.6 G/DL — LOW (ref 6–8.3)
PROT SERPL-MCNC: 5.7 G/DL — LOW (ref 6–8.3)
PROT SERPL-MCNC: 6.4 G/DL — SIGNIFICANT CHANGE UP (ref 6–8.3)
PROT SERPL-MCNC: 6.5 G/DL — SIGNIFICANT CHANGE UP (ref 6–8.3)
PROT UR-MCNC: 30 MG/DL
PROT UR-MCNC: 300 MG/DL
PROTHROM AB SERPL-ACNC: 12.2 SEC — SIGNIFICANT CHANGE UP (ref 9.9–13.4)
PROTHROM AB SERPL-ACNC: 13.2 SEC — SIGNIFICANT CHANGE UP (ref 9.9–13.4)
PROTHROM AB SERPL-ACNC: 15.7 SEC — HIGH (ref 9.9–13.4)
RAPID RVP RESULT: SIGNIFICANT CHANGE UP
RBC # BLD: 2.24 M/UL — LOW (ref 3.8–5.2)
RBC # BLD: 2.33 M/UL — LOW (ref 3.8–5.2)
RBC # BLD: 2.36 M/UL — LOW (ref 3.8–5.2)
RBC # BLD: 2.37 M/UL — LOW (ref 3.8–5.2)
RBC # BLD: 2.45 M/UL — LOW (ref 3.8–5.2)
RBC # BLD: 2.46 M/UL — LOW (ref 3.8–5.2)
RBC # BLD: 2.51 M/UL — LOW (ref 3.8–5.2)
RBC # BLD: 2.73 M/UL — LOW (ref 3.8–5.2)
RBC # BLD: 2.76 M/UL — LOW (ref 3.8–5.2)
RBC # BLD: 2.87 M/UL — LOW (ref 3.8–5.2)
RBC # BLD: 2.88 M/UL — LOW (ref 3.8–5.2)
RBC # BLD: 3 M/UL — LOW (ref 3.8–5.2)
RBC # BLD: 3.05 M/UL — LOW (ref 3.8–5.2)
RBC # BLD: 3.1 M/UL — LOW (ref 3.8–5.2)
RBC # BLD: 3.25 M/UL — LOW (ref 3.8–5.2)
RBC # BLD: 3.31 M/UL — LOW (ref 3.8–5.2)
RBC # BLD: 3.64 M/UL — LOW (ref 3.8–5.2)
RBC # BLD: 3.7 M/UL — LOW (ref 3.8–5.2)
RBC # BLD: 3.86 M/UL — SIGNIFICANT CHANGE UP (ref 3.8–5.2)
RBC # BLD: 4.51 M/UL — SIGNIFICANT CHANGE UP (ref 3.8–5.2)
RBC # FLD: 15.8 % — HIGH (ref 10.3–14.5)
RBC # FLD: 15.9 % — HIGH (ref 10.3–14.5)
RBC # FLD: 16 % — HIGH (ref 10.3–14.5)
RBC # FLD: 16.1 % — HIGH (ref 10.3–14.5)
RBC # FLD: 16.2 % — HIGH (ref 10.3–14.5)
RBC # FLD: 16.3 % — HIGH (ref 10.3–14.5)
RBC # FLD: 16.3 % — HIGH (ref 10.3–14.5)
RBC # FLD: 16.5 % — HIGH (ref 10.3–14.5)
RBC # FLD: 16.5 % — HIGH (ref 10.3–14.5)
RBC # FLD: 16.6 % — HIGH (ref 10.3–14.5)
RBC # FLD: 16.6 % — HIGH (ref 10.3–14.5)
RBC # FLD: 17 % — HIGH (ref 10.3–14.5)
RBC # FLD: 17 % — HIGH (ref 10.3–14.5)
RBC # FLD: 17.2 % — HIGH (ref 10.3–14.5)
RBC # FLD: 17.9 % — HIGH (ref 10.3–14.5)
RBC # FLD: 18.2 % — HIGH (ref 10.3–14.5)
RSV RNA NPH QL NAA+NON-PROBE: SIGNIFICANT CHANGE UP
S AUREUS DNA NOSE QL NAA+PROBE: SIGNIFICANT CHANGE UP
S PNEUM AG UR QL: NEGATIVE — SIGNIFICANT CHANGE UP
SAO2 % BLDA: 93.1 % — LOW (ref 94–98)
SAO2 % BLDA: 93.2 % — LOW (ref 94–98)
SAO2 % BLDA: 94.8 % — SIGNIFICANT CHANGE UP (ref 94–98)
SAO2 % BLDA: 96.3 % — SIGNIFICANT CHANGE UP (ref 94–98)
SAO2 % BLDA: 96.5 % — SIGNIFICANT CHANGE UP (ref 94–98)
SAO2 % BLDA: 97.2 % — SIGNIFICANT CHANGE UP (ref 94–98)
SAO2 % BLDA: 97.5 % — SIGNIFICANT CHANGE UP (ref 94–98)
SAO2 % BLDA: 97.8 % — SIGNIFICANT CHANGE UP (ref 94–98)
SAO2 % BLDA: 97.8 % — SIGNIFICANT CHANGE UP (ref 94–98)
SAO2 % BLDA: 97.9 % — SIGNIFICANT CHANGE UP (ref 94–98)
SAO2 % BLDA: 97.9 % — SIGNIFICANT CHANGE UP (ref 94–98)
SAO2 % BLDV: 86.9 % — SIGNIFICANT CHANGE UP (ref 67–88)
SAO2 % BLDV: 98.6 % — HIGH (ref 67–88)
SAO2 % BLDV: 98.8 % — HIGH (ref 67–88)
SARS-COV-2 RNA SPEC QL NAA+PROBE: SIGNIFICANT CHANGE UP
SARS-COV-2 RNA SPEC QL NAA+PROBE: SIGNIFICANT CHANGE UP
SODIUM SERPL-SCNC: 136 MMOL/L — SIGNIFICANT CHANGE UP (ref 135–145)
SODIUM SERPL-SCNC: 137 MMOL/L — SIGNIFICANT CHANGE UP (ref 135–145)
SODIUM SERPL-SCNC: 138 MMOL/L — SIGNIFICANT CHANGE UP (ref 135–145)
SODIUM SERPL-SCNC: 139 MMOL/L — SIGNIFICANT CHANGE UP (ref 135–145)
SODIUM SERPL-SCNC: 140 MMOL/L — SIGNIFICANT CHANGE UP (ref 135–145)
SODIUM SERPL-SCNC: 140 MMOL/L — SIGNIFICANT CHANGE UP (ref 135–145)
SODIUM SERPL-SCNC: 141 MMOL/L — SIGNIFICANT CHANGE UP (ref 135–145)
SODIUM SERPL-SCNC: 141 MMOL/L — SIGNIFICANT CHANGE UP (ref 135–145)
SODIUM SERPL-SCNC: 143 MMOL/L — SIGNIFICANT CHANGE UP (ref 135–145)
SODIUM SERPL-SCNC: 144 MMOL/L — SIGNIFICANT CHANGE UP (ref 135–145)
SODIUM SERPL-SCNC: 145 MMOL/L — SIGNIFICANT CHANGE UP (ref 135–145)
SP GR SPEC: 1.02 — SIGNIFICANT CHANGE UP (ref 1–1.03)
SP GR SPEC: 1.02 — SIGNIFICANT CHANGE UP (ref 1–1.03)
SPECIMEN SOURCE: SIGNIFICANT CHANGE UP
TROPONIN I, HIGH SENSITIVITY RESULT: 22.3 NG/L — SIGNIFICANT CHANGE UP
UROBILINOGEN FLD QL: 0.2 MG/DL — SIGNIFICANT CHANGE UP (ref 0.2–1)
UROBILINOGEN FLD QL: 1 MG/DL — SIGNIFICANT CHANGE UP (ref 0.2–1)
WBC # BLD: 11.03 K/UL — HIGH (ref 3.8–10.5)
WBC # BLD: 11.57 K/UL — HIGH (ref 3.8–10.5)
WBC # BLD: 12.33 K/UL — HIGH (ref 3.8–10.5)
WBC # BLD: 12.99 K/UL — HIGH (ref 3.8–10.5)
WBC # BLD: 13.57 K/UL — HIGH (ref 3.8–10.5)
WBC # BLD: 13.94 K/UL — HIGH (ref 3.8–10.5)
WBC # BLD: 14.07 K/UL — HIGH (ref 3.8–10.5)
WBC # BLD: 14.41 K/UL — HIGH (ref 3.8–10.5)
WBC # BLD: 14.66 K/UL — HIGH (ref 3.8–10.5)
WBC # BLD: 16.26 K/UL — HIGH (ref 3.8–10.5)
WBC # BLD: 16.43 K/UL — HIGH (ref 3.8–10.5)
WBC # BLD: 16.66 K/UL — HIGH (ref 3.8–10.5)
WBC # BLD: 16.95 K/UL — HIGH (ref 3.8–10.5)
WBC # BLD: 19.65 K/UL — HIGH (ref 3.8–10.5)
WBC # BLD: 23.25 K/UL — HIGH (ref 3.8–10.5)
WBC # BLD: 24.99 K/UL — HIGH (ref 3.8–10.5)
WBC # BLD: 5.6 K/UL — SIGNIFICANT CHANGE UP (ref 3.8–10.5)
WBC # BLD: 6.85 K/UL — SIGNIFICANT CHANGE UP (ref 3.8–10.5)
WBC # BLD: 8.87 K/UL — SIGNIFICANT CHANGE UP (ref 3.8–10.5)
WBC # BLD: 9.97 K/UL — SIGNIFICANT CHANGE UP (ref 3.8–10.5)
WBC # FLD AUTO: 11.03 K/UL — HIGH (ref 3.8–10.5)
WBC # FLD AUTO: 11.57 K/UL — HIGH (ref 3.8–10.5)
WBC # FLD AUTO: 12.33 K/UL — HIGH (ref 3.8–10.5)
WBC # FLD AUTO: 12.99 K/UL — HIGH (ref 3.8–10.5)
WBC # FLD AUTO: 13.57 K/UL — HIGH (ref 3.8–10.5)
WBC # FLD AUTO: 13.94 K/UL — HIGH (ref 3.8–10.5)
WBC # FLD AUTO: 14.07 K/UL — HIGH (ref 3.8–10.5)
WBC # FLD AUTO: 14.41 K/UL — HIGH (ref 3.8–10.5)
WBC # FLD AUTO: 14.66 K/UL — HIGH (ref 3.8–10.5)
WBC # FLD AUTO: 16.26 K/UL — HIGH (ref 3.8–10.5)
WBC # FLD AUTO: 16.43 K/UL — HIGH (ref 3.8–10.5)
WBC # FLD AUTO: 16.66 K/UL — HIGH (ref 3.8–10.5)
WBC # FLD AUTO: 16.95 K/UL — HIGH (ref 3.8–10.5)
WBC # FLD AUTO: 19.65 K/UL — HIGH (ref 3.8–10.5)
WBC # FLD AUTO: 23.25 K/UL — HIGH (ref 3.8–10.5)
WBC # FLD AUTO: 24.99 K/UL — HIGH (ref 3.8–10.5)
WBC # FLD AUTO: 5.6 K/UL — SIGNIFICANT CHANGE UP (ref 3.8–10.5)
WBC # FLD AUTO: 6.85 K/UL — SIGNIFICANT CHANGE UP (ref 3.8–10.5)
WBC # FLD AUTO: 8.87 K/UL — SIGNIFICANT CHANGE UP (ref 3.8–10.5)
WBC # FLD AUTO: 9.97 K/UL — SIGNIFICANT CHANGE UP (ref 3.8–10.5)

## 2025-01-01 PROCEDURE — 82803 BLOOD GASES ANY COMBINATION: CPT

## 2025-01-01 PROCEDURE — 86850 RBC ANTIBODY SCREEN: CPT

## 2025-01-01 PROCEDURE — 85379 FIBRIN DEGRADATION QUANT: CPT

## 2025-01-01 PROCEDURE — 99285 EMERGENCY DEPT VISIT HI MDM: CPT

## 2025-01-01 PROCEDURE — 97116 GAIT TRAINING THERAPY: CPT

## 2025-01-01 PROCEDURE — 74177 CT ABD & PELVIS W/CONTRAST: CPT | Mod: MC

## 2025-01-01 PROCEDURE — 74176 CT ABD & PELVIS W/O CONTRAST: CPT | Mod: MC

## 2025-01-01 PROCEDURE — 94003 VENT MGMT INPAT SUBQ DAY: CPT

## 2025-01-01 PROCEDURE — 99222 1ST HOSP IP/OBS MODERATE 55: CPT

## 2025-01-01 PROCEDURE — 80053 COMPREHEN METABOLIC PANEL: CPT

## 2025-01-01 PROCEDURE — 94640 AIRWAY INHALATION TREATMENT: CPT

## 2025-01-01 PROCEDURE — 71045 X-RAY EXAM CHEST 1 VIEW: CPT

## 2025-01-01 PROCEDURE — 99232 SBSQ HOSP IP/OBS MODERATE 35: CPT

## 2025-01-01 PROCEDURE — 94660 CPAP INITIATION&MGMT: CPT

## 2025-01-01 PROCEDURE — 99291 CRITICAL CARE FIRST HOUR: CPT

## 2025-01-01 PROCEDURE — 87637 SARSCOV2&INF A&B&RSV AMP PRB: CPT

## 2025-01-01 PROCEDURE — 87070 CULTURE OTHR SPECIMN AEROBIC: CPT

## 2025-01-01 PROCEDURE — G0545: CPT

## 2025-01-01 PROCEDURE — 87077 CULTURE AEROBIC IDENTIFY: CPT

## 2025-01-01 PROCEDURE — 86140 C-REACTIVE PROTEIN: CPT

## 2025-01-01 PROCEDURE — 84145 PROCALCITONIN (PCT): CPT

## 2025-01-01 PROCEDURE — 99231 SBSQ HOSP IP/OBS SF/LOW 25: CPT

## 2025-01-01 PROCEDURE — 83036 HEMOGLOBIN GLYCOSYLATED A1C: CPT

## 2025-01-01 PROCEDURE — 81001 URINALYSIS AUTO W/SCOPE: CPT

## 2025-01-01 PROCEDURE — 99024 POSTOP FOLLOW-UP VISIT: CPT

## 2025-01-01 PROCEDURE — 88112 CYTOPATH CELL ENHANCE TECH: CPT | Mod: 26

## 2025-01-01 PROCEDURE — 85027 COMPLETE CBC AUTOMATED: CPT

## 2025-01-01 PROCEDURE — 87205 SMEAR GRAM STAIN: CPT

## 2025-01-01 PROCEDURE — 86901 BLOOD TYPING SEROLOGIC RH(D): CPT

## 2025-01-01 PROCEDURE — 93306 TTE W/DOPPLER COMPLETE: CPT

## 2025-01-01 PROCEDURE — 99285 EMERGENCY DEPT VISIT HI MDM: CPT | Mod: 25

## 2025-01-01 PROCEDURE — 93931 UPPER EXTREMITY STUDY: CPT | Mod: 26,LT

## 2025-01-01 PROCEDURE — 80048 BASIC METABOLIC PNL TOTAL CA: CPT

## 2025-01-01 PROCEDURE — 85025 COMPLETE CBC W/AUTO DIFF WBC: CPT

## 2025-01-01 PROCEDURE — 93971 EXTREMITY STUDY: CPT

## 2025-01-01 PROCEDURE — 93005 ELECTROCARDIOGRAM TRACING: CPT

## 2025-01-01 PROCEDURE — 84080 ASSAY ALKALINE PHOSPHATASES: CPT

## 2025-01-01 PROCEDURE — 71045 X-RAY EXAM CHEST 1 VIEW: CPT | Mod: 26

## 2025-01-01 PROCEDURE — 43659 UNLISTED LAPS PX STOMACH: CPT

## 2025-01-01 PROCEDURE — 85730 THROMBOPLASTIN TIME PARTIAL: CPT

## 2025-01-01 PROCEDURE — 84484 ASSAY OF TROPONIN QUANT: CPT

## 2025-01-01 PROCEDURE — 36415 COLL VENOUS BLD VENIPUNCTURE: CPT

## 2025-01-01 PROCEDURE — 93306 TTE W/DOPPLER COMPLETE: CPT | Mod: 26

## 2025-01-01 PROCEDURE — 87449 NOS EACH ORGANISM AG IA: CPT

## 2025-01-01 PROCEDURE — 83605 ASSAY OF LACTIC ACID: CPT

## 2025-01-01 PROCEDURE — 83880 ASSAY OF NATRIURETIC PEPTIDE: CPT

## 2025-01-01 PROCEDURE — 94760 N-INVAS EAR/PLS OXIMETRY 1: CPT

## 2025-01-01 PROCEDURE — 96375 TX/PRO/DX INJ NEW DRUG ADDON: CPT

## 2025-01-01 PROCEDURE — 96374 THER/PROPH/DIAG INJ IV PUSH: CPT

## 2025-01-01 PROCEDURE — 74177 CT ABD & PELVIS W/CONTRAST: CPT | Mod: 26,MC

## 2025-01-01 PROCEDURE — 88305 TISSUE EXAM BY PATHOLOGIST: CPT | Mod: 26

## 2025-01-01 PROCEDURE — 87641 MR-STAPH DNA AMP PROBE: CPT

## 2025-01-01 PROCEDURE — 71260 CT THORAX DX C+: CPT | Mod: MC

## 2025-01-01 PROCEDURE — 87798 DETECT AGENT NOS DNA AMP: CPT

## 2025-01-01 PROCEDURE — ZZZZZ: CPT

## 2025-01-01 PROCEDURE — 93931 UPPER EXTREMITY STUDY: CPT

## 2025-01-01 PROCEDURE — 87186 SC STD MICRODIL/AGAR DIL: CPT

## 2025-01-01 PROCEDURE — 71045 X-RAY EXAM CHEST 1 VIEW: CPT | Mod: 26,76

## 2025-01-01 PROCEDURE — 97161 PT EVAL LOW COMPLEX 20 MIN: CPT

## 2025-01-01 PROCEDURE — 84100 ASSAY OF PHOSPHORUS: CPT

## 2025-01-01 PROCEDURE — 87102 FUNGUS ISOLATION CULTURE: CPT

## 2025-01-01 PROCEDURE — 83690 ASSAY OF LIPASE: CPT

## 2025-01-01 PROCEDURE — 82962 GLUCOSE BLOOD TEST: CPT

## 2025-01-01 PROCEDURE — 97530 THERAPEUTIC ACTIVITIES: CPT

## 2025-01-01 PROCEDURE — 71275 CT ANGIOGRAPHY CHEST: CPT | Mod: MC

## 2025-01-01 PROCEDURE — 76604 US EXAM CHEST: CPT | Mod: 26

## 2025-01-01 PROCEDURE — 31645 BRNCHSC W/THER ASPIR 1ST: CPT | Mod: GC

## 2025-01-01 PROCEDURE — 71275 CT ANGIOGRAPHY CHEST: CPT | Mod: 26,MC

## 2025-01-01 PROCEDURE — P9016: CPT

## 2025-01-01 PROCEDURE — 86738 MYCOPLASMA ANTIBODY: CPT

## 2025-01-01 PROCEDURE — 88112 CYTOPATH CELL ENHANCE TECH: CPT

## 2025-01-01 PROCEDURE — 87305 ASPERGILLUS AG IA: CPT

## 2025-01-01 PROCEDURE — 83735 ASSAY OF MAGNESIUM: CPT

## 2025-01-01 PROCEDURE — 94002 VENT MGMT INPAT INIT DAY: CPT

## 2025-01-01 PROCEDURE — 87040 BLOOD CULTURE FOR BACTERIA: CPT

## 2025-01-01 PROCEDURE — 71260 CT THORAX DX C+: CPT | Mod: 26

## 2025-01-01 PROCEDURE — 93308 TTE F-UP OR LMTD: CPT | Mod: 26

## 2025-01-01 PROCEDURE — 86923 COMPATIBILITY TEST ELECTRIC: CPT

## 2025-01-01 PROCEDURE — 87640 STAPH A DNA AMP PROBE: CPT

## 2025-01-01 PROCEDURE — 36430 TRANSFUSION BLD/BLD COMPNT: CPT

## 2025-01-01 PROCEDURE — 87899 AGENT NOS ASSAY W/OPTIC: CPT

## 2025-01-01 PROCEDURE — 36600 WITHDRAWAL OF ARTERIAL BLOOD: CPT

## 2025-01-01 PROCEDURE — 85610 PROTHROMBIN TIME: CPT

## 2025-01-01 PROCEDURE — 88312 SPECIAL STAINS GROUP 1: CPT | Mod: 26

## 2025-01-01 PROCEDURE — 94799 UNLISTED PULMONARY SVC/PX: CPT

## 2025-01-01 PROCEDURE — 71045 X-RAY EXAM CHEST 1 VIEW: CPT | Mod: 26,77,76

## 2025-01-01 PROCEDURE — 74176 CT ABD & PELVIS W/O CONTRAST: CPT | Mod: 26

## 2025-01-01 PROCEDURE — 87581 M.PNEUMON DNA AMP PROBE: CPT

## 2025-01-01 PROCEDURE — 99291 CRITICAL CARE FIRST HOUR: CPT | Mod: 25

## 2025-01-01 PROCEDURE — 96361 HYDRATE IV INFUSION ADD-ON: CPT

## 2025-01-01 PROCEDURE — 88305 TISSUE EXAM BY PATHOLOGIST: CPT

## 2025-01-01 PROCEDURE — 86900 BLOOD TYPING SEROLOGIC ABO: CPT

## 2025-01-01 PROCEDURE — P9047: CPT

## 2025-01-01 PROCEDURE — 93971 EXTREMITY STUDY: CPT | Mod: 26,LT

## 2025-01-01 PROCEDURE — 93010 ELECTROCARDIOGRAM REPORT: CPT

## 2025-01-01 PROCEDURE — 99292 CRITICAL CARE ADDL 30 MIN: CPT

## 2025-01-01 PROCEDURE — 93010 ELECTROCARDIOGRAM REPORT: CPT | Mod: 76

## 2025-01-01 PROCEDURE — 74177 CT ABD & PELVIS W/CONTRAST: CPT | Mod: 26

## 2025-01-01 PROCEDURE — 82977 ASSAY OF GGT: CPT

## 2025-01-01 PROCEDURE — 71045 X-RAY EXAM CHEST 1 VIEW: CPT | Mod: 26,77

## 2025-01-01 PROCEDURE — 88312 SPECIAL STAINS GROUP 1: CPT

## 2025-01-01 PROCEDURE — 84132 ASSAY OF SERUM POTASSIUM: CPT

## 2025-01-01 PROCEDURE — 0225U NFCT DS DNA&RNA 21 SARSCOV2: CPT

## 2025-01-01 RX ORDER — DM/PSEUDOEPHED/ACETAMINOPHEN 10-30-250
50 CAPSULE ORAL ONCE
Refills: 0 | Status: COMPLETED | OUTPATIENT
Start: 2025-01-01 | End: 2025-01-01

## 2025-01-01 RX ORDER — CEFTRIAXONE SODIUM 1 G/1
1000 INJECTION, POWDER, FOR SOLUTION INTRAMUSCULAR; INTRAVENOUS ONCE
Refills: 0 | Status: DISCONTINUED | OUTPATIENT
Start: 2025-01-01 | End: 2025-01-01

## 2025-01-01 RX ORDER — ENOXAPARIN SODIUM 60 MG/.6ML
30 INJECTION INTRAVENOUS; SUBCUTANEOUS EVERY 24 HOURS
Refills: 0 | Status: DISCONTINUED | OUTPATIENT
Start: 2025-01-01 | End: 2025-01-01

## 2025-01-01 RX ORDER — ROPINIROLE HYDROCHLORIDE 1 MG/1
2 TABLET, FILM COATED ORAL
Refills: 0 | Status: DISCONTINUED | OUTPATIENT
Start: 2025-01-01 | End: 2025-01-01

## 2025-01-01 RX ORDER — DIPHENHYDRAMINE HCL 25 MG
50 CAPSULE ORAL ONCE
Refills: 0 | Status: COMPLETED | OUTPATIENT
Start: 2025-01-01 | End: 2025-01-01

## 2025-01-01 RX ORDER — SODIUM CHLORIDE 9 G/ML
500 INJECTION, SOLUTION INTRAVENOUS ONCE
Refills: 0 | Status: COMPLETED | OUTPATIENT
Start: 2025-01-01 | End: 2025-01-01

## 2025-01-01 RX ORDER — SODIUM CHLORIDE 9 MG/ML
1000 INJECTION, SOLUTION INTRAMUSCULAR; INTRAVENOUS; SUBCUTANEOUS ONCE
Refills: 0 | Status: COMPLETED | OUTPATIENT
Start: 2025-01-01 | End: 2025-01-01

## 2025-01-01 RX ORDER — POTASSIUM CHLORIDE 750 MG/1
10 TABLET, EXTENDED RELEASE ORAL
Refills: 0 | Status: COMPLETED | OUTPATIENT
Start: 2025-01-01 | End: 2025-01-01

## 2025-01-01 RX ORDER — CASPOFUNGIN ACETATE 5 MG/ML
50 INJECTION, POWDER, LYOPHILIZED, FOR SOLUTION INTRAVENOUS EVERY 24 HOURS
Refills: 0 | Status: DISCONTINUED | OUTPATIENT
Start: 2025-01-01 | End: 2025-01-01

## 2025-01-01 RX ORDER — GABAPENTIN 300 MG/1
100 CAPSULE ORAL THREE TIMES A DAY
Refills: 0 | Status: DISCONTINUED | OUTPATIENT
Start: 2025-01-01 | End: 2025-01-01

## 2025-01-01 RX ORDER — FAMOTIDINE 20 MG/1
20 TABLET, FILM COATED ORAL DAILY
Refills: 0 | Status: DISCONTINUED | OUTPATIENT
Start: 2025-01-01 | End: 2025-01-01

## 2025-01-01 RX ORDER — NOREPINEPHRINE BITARTRATE 1 MG/ML
0.05 INJECTION, SOLUTION, CONCENTRATE INTRAVENOUS
Qty: 8 | Refills: 0 | Status: DISCONTINUED | OUTPATIENT
Start: 2025-01-01 | End: 2025-01-01

## 2025-01-01 RX ORDER — SODIUM CHLORIDE 9 MG/ML
1500 INJECTION, SOLUTION INTRAVENOUS ONCE
Refills: 0 | Status: COMPLETED | OUTPATIENT
Start: 2025-01-01 | End: 2025-01-01

## 2025-01-01 RX ORDER — ASPIRIN 81 MG
81 TABLET, DELAYED RELEASE (ENTERIC COATED) ORAL DAILY
Refills: 0 | Status: DISCONTINUED | OUTPATIENT
Start: 2025-01-01 | End: 2025-01-01

## 2025-01-01 RX ORDER — FENTANYL CITRATE 50 UG/ML
0.5 INJECTION INTRAMUSCULAR; INTRAVENOUS
Qty: 2500 | Refills: 0 | Status: DISCONTINUED | OUTPATIENT
Start: 2025-01-01 | End: 2025-01-01

## 2025-01-01 RX ORDER — DM/PSEUDOEPHED/ACETAMINOPHEN 10-30-250
25 CAPSULE ORAL ONCE
Refills: 0 | Status: DISCONTINUED | OUTPATIENT
Start: 2025-01-01 | End: 2025-01-01

## 2025-01-01 RX ORDER — ROPINIROLE HYDROCHLORIDE 1 MG/1
2 TABLET, FILM COATED ORAL
Refills: 0 | DISCHARGE

## 2025-01-01 RX ORDER — PANTOPRAZOLE 40 MG/1
40 TABLET, DELAYED RELEASE ORAL
Refills: 0 | Status: DISCONTINUED | OUTPATIENT
Start: 2025-01-01 | End: 2025-01-01

## 2025-01-01 RX ORDER — CEFTRIAXONE 250 MG/1
1000 INJECTION, POWDER, FOR SOLUTION INTRAMUSCULAR; INTRAVENOUS EVERY 24 HOURS
Refills: 0 | Status: DISCONTINUED | OUTPATIENT
Start: 2025-01-01 | End: 2025-01-01

## 2025-01-01 RX ORDER — BENZONATATE 100 MG
100 CAPSULE ORAL THREE TIMES A DAY
Refills: 0 | Status: DISCONTINUED | OUTPATIENT
Start: 2025-01-01 | End: 2025-01-01

## 2025-01-01 RX ORDER — SODIUM CHLORIDE 9 G/ML
1000 INJECTION, SOLUTION INTRAVENOUS
Refills: 0 | Status: DISCONTINUED | OUTPATIENT
Start: 2025-01-01 | End: 2025-01-01

## 2025-01-01 RX ORDER — HYDROMORPHONE HCL 4 MG
0.5 TABLET ORAL ONCE
Refills: 0 | Status: DISCONTINUED | OUTPATIENT
Start: 2025-01-01 | End: 2025-01-01

## 2025-01-01 RX ORDER — IPRATROPIUM BROMIDE AND ALBUTEROL SULFATE .5; 2.5 MG/3ML; MG/3ML
3 SOLUTION RESPIRATORY (INHALATION) EVERY 6 HOURS
Refills: 0 | Status: DISCONTINUED | OUTPATIENT
Start: 2025-01-01 | End: 2025-01-01

## 2025-01-01 RX ORDER — SODIUM BICARBONATE 84 MG/ML
25 INJECTION, SOLUTION INTRAVENOUS ONCE
Refills: 0 | Status: COMPLETED | OUTPATIENT
Start: 2025-01-01 | End: 2025-01-01

## 2025-01-01 RX ORDER — PROPOFOL 10 MG/ML
10 INJECTION, EMULSION INTRAVENOUS
Qty: 500 | Refills: 0 | Status: DISCONTINUED | OUTPATIENT
Start: 2025-01-01 | End: 2025-01-01

## 2025-01-01 RX ORDER — ACETAMINOPHEN 160 MG/5ML
1000 SUSPENSION ORAL ONCE
Refills: 0 | Status: COMPLETED | OUTPATIENT
Start: 2025-01-01 | End: 2025-01-01

## 2025-01-01 RX ORDER — CEFTRIAXONE SODIUM 1 G/1
1000 INJECTION, POWDER, FOR SOLUTION INTRAMUSCULAR; INTRAVENOUS EVERY 24 HOURS
Refills: 0 | Status: DISCONTINUED | OUTPATIENT
Start: 2025-01-01 | End: 2025-01-01

## 2025-01-01 RX ORDER — CEFPODOXIME PROXETIL 100 MG/5ML
1 GRANULE, FOR SUSPENSION ORAL
Qty: 8 | Refills: 0
Start: 2025-01-01 | End: 2025-01-01

## 2025-01-01 RX ORDER — PANTOPRAZOLE 20 MG/1
40 TABLET, DELAYED RELEASE ORAL
Refills: 0 | Status: DISCONTINUED | OUTPATIENT
Start: 2025-01-01 | End: 2025-01-01

## 2025-01-01 RX ORDER — CASPOFUNGIN ACETATE 5 MG/ML
70 INJECTION, POWDER, LYOPHILIZED, FOR SOLUTION INTRAVENOUS ONCE
Refills: 0 | Status: COMPLETED | OUTPATIENT
Start: 2025-01-01 | End: 2025-01-01

## 2025-01-01 RX ORDER — CEFTRIAXONE SODIUM 1 G/1
1000 INJECTION, POWDER, FOR SOLUTION INTRAMUSCULAR; INTRAVENOUS EVERY 24 HOURS
Refills: 0 | Status: CANCELLED | OUTPATIENT
Start: 2025-01-01 | End: 2025-01-01

## 2025-01-01 RX ORDER — HYDROCORTISONE 100 MG/60ML
50 ENEMA RECTAL EVERY 8 HOURS
Refills: 0 | Status: DISCONTINUED | OUTPATIENT
Start: 2025-01-01 | End: 2025-01-01

## 2025-01-01 RX ORDER — KETAMINE HCL IN NACL, ISO-OSM 100MG/10ML
0.25 SYRINGE (ML) INJECTION
Qty: 1000 | Refills: 0 | Status: DISCONTINUED | OUTPATIENT
Start: 2025-01-01 | End: 2025-01-01

## 2025-01-01 RX ORDER — HEPARIN SODIUM,PORCINE 10000/ML
5000 VIAL (ML) INJECTION EVERY 8 HOURS
Refills: 0 | Status: DISCONTINUED | OUTPATIENT
Start: 2025-01-01 | End: 2025-01-01

## 2025-01-01 RX ORDER — SODIUM BICARBONATE 42 MG/ML
50 INJECTION, SOLUTION INTRAVENOUS ONCE
Refills: 0 | Status: COMPLETED | OUTPATIENT
Start: 2025-01-01 | End: 2025-01-01

## 2025-01-01 RX ORDER — CEFTRIAXONE SODIUM 1 G/1
1000 INJECTION, POWDER, FOR SOLUTION INTRAMUSCULAR; INTRAVENOUS ONCE
Refills: 0 | Status: COMPLETED | OUTPATIENT
Start: 2025-01-01 | End: 2025-01-01

## 2025-01-01 RX ORDER — PHENYLEPHRINE HYDROCHLORIDE 10 MG/ML
400 INJECTION INTRAVENOUS ONCE
Refills: 0 | Status: COMPLETED | OUTPATIENT
Start: 2025-01-01 | End: 2025-01-01

## 2025-01-01 RX ORDER — AZELASTINE HYDROCHLORIDE 137 UG/1
0 SPRAY, METERED NASAL
Refills: 0 | DISCHARGE

## 2025-01-01 RX ORDER — B COMPLEX, C NO.20/FOLIC ACID 1 MG
1 CAPSULE ORAL DAILY
Refills: 0 | Status: DISCONTINUED | OUTPATIENT
Start: 2025-01-01 | End: 2025-01-01

## 2025-01-01 RX ORDER — OXYCODONE HYDROCHLORIDE 30 MG/1
10 TABLET ORAL EVERY 4 HOURS
Refills: 0 | Status: DISCONTINUED | OUTPATIENT
Start: 2025-01-01 | End: 2025-01-01

## 2025-01-01 RX ORDER — B COMPLEX, C NO.20/FOLIC ACID 1 MG
0 CAPSULE ORAL
Refills: 0 | DISCHARGE

## 2025-01-01 RX ORDER — IPRATROPIUM BROMIDE AND ALBUTEROL SULFATE .5; 2.5 MG/3ML; MG/3ML
3 SOLUTION RESPIRATORY (INHALATION) EVERY 4 HOURS
Refills: 0 | Status: DISCONTINUED | OUTPATIENT
Start: 2025-01-01 | End: 2025-01-01

## 2025-01-01 RX ORDER — MAGNESIUM SULFATE 0.8 MEQ/ML
2 AMPUL (ML) INJECTION
Refills: 0 | Status: COMPLETED | OUTPATIENT
Start: 2025-01-01 | End: 2025-01-01

## 2025-01-01 RX ORDER — LABETALOL HYDROCHLORIDE 300 MG/1
10 TABLET, FILM COATED ORAL DAILY
Refills: 0 | Status: DISCONTINUED | OUTPATIENT
Start: 2025-01-01 | End: 2025-01-01

## 2025-01-01 RX ORDER — ASPIRIN 81 MG
1 TABLET, DELAYED RELEASE (ENTERIC COATED) ORAL
Refills: 0 | DISCHARGE

## 2025-01-01 RX ORDER — ACETAMINOPHEN 160 MG/5ML
650 SUSPENSION ORAL EVERY 6 HOURS
Refills: 0 | Status: DISCONTINUED | OUTPATIENT
Start: 2025-01-01 | End: 2025-01-01

## 2025-01-01 RX ORDER — PIPERACILLIN SODIUM AND TAZOBACTAM SODIUM 2; 250 G/50ML; MG/50ML
3.38 INJECTION, POWDER, FOR SOLUTION INTRAVENOUS ONCE
Refills: 0 | Status: COMPLETED | OUTPATIENT
Start: 2025-01-01 | End: 2025-01-01

## 2025-01-01 RX ORDER — DEXTROMETHORPHAN HBR AND GUAIFENESIN ORAL SOLUTION 10; 100 MG/5ML; MG/5ML
20 LIQUID ORAL EVERY 4 HOURS
Refills: 0 | Status: DISCONTINUED | OUTPATIENT
Start: 2025-01-01 | End: 2025-01-01

## 2025-01-01 RX ORDER — B COMPLEX, C NO.20/FOLIC ACID 1 MG
1 CAPSULE ORAL
Refills: 0 | DISCHARGE

## 2025-01-01 RX ORDER — MAG HYDROX/ALUMINUM HYD/SIMETH 200-200-20
30 SUSPENSION, ORAL (FINAL DOSE FORM) ORAL EVERY 4 HOURS
Refills: 0 | Status: DISCONTINUED | OUTPATIENT
Start: 2025-01-01 | End: 2025-01-01

## 2025-01-01 RX ORDER — MONTELUKAST SODIUM 10 MG/1
1 TABLET, FILM COATED ORAL
Refills: 0 | DISCHARGE

## 2025-01-01 RX ORDER — QUETIAPINE FUMARATE 300 MG/1
12.5 TABLET ORAL AT BEDTIME
Refills: 0 | Status: DISCONTINUED | OUTPATIENT
Start: 2025-01-01 | End: 2025-01-01

## 2025-01-01 RX ORDER — PIPERACILLIN SODIUM AND TAZOBACTAM SODIUM 2; 250 G/50ML; MG/50ML
3.38 INJECTION, POWDER, FOR SOLUTION INTRAVENOUS EVERY 12 HOURS
Refills: 0 | Status: DISCONTINUED | OUTPATIENT
Start: 2025-01-01 | End: 2025-01-01

## 2025-01-01 RX ORDER — DM/PSEUDOEPHED/ACETAMINOPHEN 10-30-250
15 CAPSULE ORAL ONCE
Refills: 0 | Status: DISCONTINUED | OUTPATIENT
Start: 2025-01-01 | End: 2025-01-01

## 2025-01-01 RX ORDER — PANTOPRAZOLE 40 MG/1
40 TABLET, DELAYED RELEASE ORAL
Qty: 0 | Refills: 0 | DISCHARGE
Start: 2025-01-01

## 2025-01-01 RX ORDER — ROSUVASTATIN 40 MG/1
1 TABLET, FILM COATED ORAL
Refills: 0 | DISCHARGE

## 2025-01-01 RX ORDER — HEPARIN SODIUM,PORCINE 10000/ML
2000 VIAL (ML) INJECTION EVERY 6 HOURS
Refills: 0 | Status: DISCONTINUED | OUTPATIENT
Start: 2025-01-01 | End: 2025-01-01

## 2025-01-01 RX ORDER — IOHEXOL 350 MG/ML
30 INJECTION, SOLUTION INTRAVENOUS ONCE
Refills: 0 | Status: COMPLETED | OUTPATIENT
Start: 2025-01-01 | End: 2025-01-01

## 2025-01-01 RX ORDER — PREDNISONE 5 MG
1 TABLET ORAL
Qty: 4 | Refills: 0
Start: 2025-01-01 | End: 2025-01-01

## 2025-01-01 RX ORDER — METHYLPREDNISOLONE ACETATE 40 MG/ML
40 VIAL (ML) INJECTION ONCE
Refills: 0 | Status: COMPLETED | OUTPATIENT
Start: 2025-01-01 | End: 2025-01-01

## 2025-01-01 RX ORDER — PANTOPRAZOLE 20 MG/1
40 TABLET, DELAYED RELEASE ORAL DAILY
Refills: 0 | Status: DISCONTINUED | OUTPATIENT
Start: 2025-01-01 | End: 2025-01-01

## 2025-01-01 RX ORDER — POTASSIUM CHLORIDE 750 MG/1
10 TABLET, EXTENDED RELEASE ORAL ONCE
Refills: 0 | Status: COMPLETED | OUTPATIENT
Start: 2025-01-01 | End: 2025-01-01

## 2025-01-01 RX ORDER — FLUCONAZOLE 100 MG/1
200 TABLET ORAL EVERY 24 HOURS
Refills: 0 | Status: DISCONTINUED | OUTPATIENT
Start: 2025-01-01 | End: 2025-01-01

## 2025-01-01 RX ORDER — PROPOFOL 10 MG/ML
10 INJECTION, EMULSION INTRAVENOUS
Qty: 1000 | Refills: 0 | Status: DISCONTINUED | OUTPATIENT
Start: 2025-01-01 | End: 2025-01-01

## 2025-01-01 RX ORDER — MIDODRINE HYDROCHLORIDE 5 MG/1
10 TABLET ORAL EVERY 8 HOURS
Refills: 0 | Status: DISCONTINUED | OUTPATIENT
Start: 2025-01-01 | End: 2025-01-01

## 2025-01-01 RX ORDER — ALBUMIN HUMAN 50 G/1000ML
50 SOLUTION INTRAVENOUS EVERY 8 HOURS
Refills: 0 | Status: DISCONTINUED | OUTPATIENT
Start: 2025-01-01 | End: 2025-01-01

## 2025-01-01 RX ORDER — DEXTROMETHORPHAN HBR AND GUAIFENESIN ORAL SOLUTION 10; 100 MG/5ML; MG/5ML
20 LIQUID ORAL EVERY 8 HOURS
Refills: 0 | Status: DISCONTINUED | OUTPATIENT
Start: 2025-01-01 | End: 2025-01-01

## 2025-01-01 RX ORDER — ETOMIDATE 2 MG/ML
16 INJECTION, SOLUTION INTRAVENOUS ONCE
Refills: 0 | Status: COMPLETED | OUTPATIENT
Start: 2025-01-01 | End: 2025-01-01

## 2025-01-01 RX ORDER — BUDESONIDE 9 MG/1
0.5 TABLET, EXTENDED RELEASE ORAL
Refills: 0 | Status: DISCONTINUED | OUTPATIENT
Start: 2025-01-01 | End: 2025-01-01

## 2025-01-01 RX ORDER — ANTISEPTIC SURGICAL SCRUB 0.04 MG/ML
1 SOLUTION TOPICAL
Refills: 0 | Status: DISCONTINUED | OUTPATIENT
Start: 2025-01-01 | End: 2025-01-01

## 2025-01-01 RX ORDER — ALBUMIN HUMAN 50 G/1000ML
50 SOLUTION INTRAVENOUS EVERY 6 HOURS
Refills: 0 | Status: COMPLETED | OUTPATIENT
Start: 2025-01-01 | End: 2025-01-01

## 2025-01-01 RX ORDER — ACETAMINOPHEN 160 MG/5ML
650 SUSPENSION ORAL ONCE
Refills: 0 | Status: COMPLETED | OUTPATIENT
Start: 2025-01-01 | End: 2025-01-01

## 2025-01-01 RX ORDER — SODIUM CHLORIDE 9 MG/ML
1000 INJECTION, SOLUTION INTRAVENOUS ONCE
Refills: 0 | Status: COMPLETED | OUTPATIENT
Start: 2025-01-01 | End: 2025-01-01

## 2025-01-01 RX ORDER — DM/PSEUDOEPHED/ACETAMINOPHEN 10-30-250
25 CAPSULE ORAL ONCE
Refills: 0 | Status: COMPLETED | OUTPATIENT
Start: 2025-01-01 | End: 2025-01-01

## 2025-01-01 RX ORDER — PANTOPRAZOLE 40 MG/1
1 TABLET, DELAYED RELEASE ORAL
Refills: 0 | DISCHARGE

## 2025-01-01 RX ORDER — FENTANYL CITRATE 50 UG/ML
25 INJECTION INTRAMUSCULAR; INTRAVENOUS ONCE
Refills: 0 | Status: DISCONTINUED | OUTPATIENT
Start: 2025-01-01 | End: 2025-01-01

## 2025-01-01 RX ORDER — LORAZEPAM 1 MG/1
0.5 TABLET ORAL ONCE
Refills: 0 | Status: DISCONTINUED | OUTPATIENT
Start: 2025-01-01 | End: 2025-01-01

## 2025-01-01 RX ORDER — OXYCODONE HYDROCHLORIDE 30 MG/1
5 TABLET ORAL EVERY 4 HOURS
Refills: 0 | Status: DISCONTINUED | OUTPATIENT
Start: 2025-01-01 | End: 2025-01-01

## 2025-01-01 RX ORDER — SODIUM CHLORIDE 9 MG/ML
1000 INJECTION, SOLUTION INTRAMUSCULAR; INTRAVENOUS; SUBCUTANEOUS
Refills: 0 | Status: DISCONTINUED | OUTPATIENT
Start: 2025-01-01 | End: 2025-01-01

## 2025-01-01 RX ORDER — DEXMEDETOMIDINE HYDROCHLORIDE 4 UG/ML
0.4 INJECTION, SOLUTION INTRAVENOUS
Qty: 200 | Refills: 0 | Status: DISCONTINUED | OUTPATIENT
Start: 2025-01-01 | End: 2025-01-01

## 2025-01-01 RX ORDER — AZITHROMYCIN MONOHYDRATE 200 MG/5ML
500 POWDER, FOR SUSPENSION ORAL EVERY 24 HOURS
Refills: 0 | Status: DISCONTINUED | OUTPATIENT
Start: 2025-01-01 | End: 2025-01-01

## 2025-01-01 RX ORDER — CASPOFUNGIN ACETATE 5 MG/ML
INJECTION, POWDER, LYOPHILIZED, FOR SOLUTION INTRAVENOUS
Refills: 0 | Status: DISCONTINUED | OUTPATIENT
Start: 2025-01-01 | End: 2025-01-01

## 2025-01-01 RX ORDER — POTASSIUM CHLORIDE 750 MG/1
20 TABLET, EXTENDED RELEASE ORAL ONCE
Refills: 0 | Status: COMPLETED | OUTPATIENT
Start: 2025-01-01 | End: 2025-01-01

## 2025-01-01 RX ORDER — HYDROXYCHLOROQUINE SULFATE 200 MG/1
200 TABLET ORAL
Refills: 0 | Status: DISCONTINUED | OUTPATIENT
Start: 2025-01-01 | End: 2025-01-01

## 2025-01-01 RX ORDER — METHYLPREDNISOLONE 4 MG/1
125 TABLET ORAL ONCE
Refills: 0 | Status: COMPLETED | OUTPATIENT
Start: 2025-01-01 | End: 2025-01-01

## 2025-01-01 RX ORDER — DEXMEDETOMIDINE HYDROCHLORIDE 4 UG/ML
0.2 INJECTION, SOLUTION INTRAVENOUS
Qty: 200 | Refills: 0 | Status: DISCONTINUED | OUTPATIENT
Start: 2025-01-01 | End: 2025-01-01

## 2025-01-01 RX ORDER — MAGNESIUM SULFATE 0.8 MEQ/ML
2 AMPUL (ML) INJECTION ONCE
Refills: 0 | Status: COMPLETED | OUTPATIENT
Start: 2025-01-01 | End: 2025-01-01

## 2025-01-01 RX ORDER — FLUTICASONE PROPIONATE 44 MCG
0 AEROSOL WITH ADAPTER (GRAM) INHALATION
Refills: 0 | DISCHARGE

## 2025-01-01 RX ORDER — CEFTOLOZANE AND TAZOBACTAM 1; .5 G/10ML; G/10ML
450 INJECTION, POWDER, LYOPHILIZED, FOR SOLUTION INTRAVENOUS EVERY 8 HOURS
Refills: 0 | Status: DISCONTINUED | OUTPATIENT
Start: 2025-01-01 | End: 2025-01-01

## 2025-01-01 RX ORDER — SODIUM CHLORIDE 9 MG/ML
1000 INJECTION, SOLUTION INTRAVENOUS
Refills: 0 | Status: DISCONTINUED | OUTPATIENT
Start: 2025-01-01 | End: 2025-01-01

## 2025-01-01 RX ORDER — AZITHROMYCIN MONOHYDRATE 200 MG/5ML
500 POWDER, FOR SUSPENSION ORAL ONCE
Refills: 0 | Status: COMPLETED | OUTPATIENT
Start: 2025-01-01 | End: 2025-01-01

## 2025-01-01 RX ORDER — HYDROMORPHONE HYDROCHLORIDE 4 MG/ML
0.5 INJECTION, SOLUTION INTRAMUSCULAR; INTRAVENOUS; SUBCUTANEOUS EVERY 4 HOURS
Refills: 0 | Status: DISCONTINUED | OUTPATIENT
Start: 2025-01-01 | End: 2025-01-01

## 2025-01-01 RX ORDER — GINKGO BILOBA 40 MG
3 CAPSULE ORAL AT BEDTIME
Refills: 0 | Status: DISCONTINUED | OUTPATIENT
Start: 2025-01-01 | End: 2025-01-01

## 2025-01-01 RX ORDER — MONTELUKAST SODIUM 10 MG/1
10 TABLET, FILM COATED ORAL DAILY
Refills: 0 | Status: DISCONTINUED | OUTPATIENT
Start: 2025-01-01 | End: 2025-01-01

## 2025-01-01 RX ORDER — POTASSIUM CHLORIDE 750 MG/1
10 TABLET, EXTENDED RELEASE ORAL
Refills: 0 | Status: DISCONTINUED | OUTPATIENT
Start: 2025-01-01 | End: 2025-01-01

## 2025-01-01 RX ORDER — PREDNISONE 5 MG
20 TABLET ORAL DAILY
Refills: 0 | Status: DISCONTINUED | OUTPATIENT
Start: 2025-01-01 | End: 2025-01-01

## 2025-01-01 RX ORDER — FENTANYL CITRATE 50 UG/ML
50 INJECTION INTRAMUSCULAR; INTRAVENOUS
Refills: 0 | Status: DISCONTINUED | OUTPATIENT
Start: 2025-01-01 | End: 2025-01-01

## 2025-01-01 RX ORDER — MORPHINE SULFATE 15 MG
2 TABLET, EXTENDED RELEASE ORAL ONCE
Refills: 0 | Status: DISCONTINUED | OUTPATIENT
Start: 2025-01-01 | End: 2025-01-01

## 2025-01-01 RX ORDER — GLUCAGON 3 MG/1
1 POWDER NASAL ONCE
Refills: 0 | Status: DISCONTINUED | OUTPATIENT
Start: 2025-01-01 | End: 2025-01-01

## 2025-01-01 RX ORDER — HEPARIN SODIUM,PORCINE 10000/ML
VIAL (ML) INJECTION
Qty: 25000 | Refills: 0 | Status: DISCONTINUED | OUTPATIENT
Start: 2025-01-01 | End: 2025-01-01

## 2025-01-01 RX ORDER — METRONIDAZOLE 250 MG/1
500 TABLET ORAL ONCE
Refills: 0 | Status: COMPLETED | OUTPATIENT
Start: 2025-01-01 | End: 2025-01-01

## 2025-01-01 RX ORDER — PIPERACILLIN SODIUM AND TAZOBACTAM SODIUM 2; 250 G/50ML; MG/50ML
3.38 INJECTION, POWDER, FOR SOLUTION INTRAVENOUS EVERY 8 HOURS
Refills: 0 | Status: DISCONTINUED | OUTPATIENT
Start: 2025-01-01 | End: 2025-01-01

## 2025-01-01 RX ORDER — ALBUTEROL SULFATE 90 UG/1
2 INHALANT RESPIRATORY (INHALATION) EVERY 6 HOURS
Refills: 0 | Status: DISCONTINUED | OUTPATIENT
Start: 2025-01-01 | End: 2025-01-01

## 2025-01-01 RX ORDER — DEXMEDETOMIDINE HYDROCHLORIDE 4 UG/ML
1.2 INJECTION, SOLUTION INTRAVENOUS
Qty: 400 | Refills: 0 | Status: DISCONTINUED | OUTPATIENT
Start: 2025-01-01 | End: 2025-01-01

## 2025-01-01 RX ORDER — DM/PSEUDOEPHED/ACETAMINOPHEN 10-30-250
12.5 CAPSULE ORAL ONCE
Refills: 0 | Status: DISCONTINUED | OUTPATIENT
Start: 2025-01-01 | End: 2025-01-01

## 2025-01-01 RX ORDER — ANTISEPTIC SURGICAL SCRUB 0.04 MG/ML
15 SOLUTION TOPICAL EVERY 12 HOURS
Refills: 0 | Status: DISCONTINUED | OUTPATIENT
Start: 2025-01-01 | End: 2025-01-01

## 2025-01-01 RX ORDER — PROPOFOL 10 MG/ML
20 INJECTION, EMULSION INTRAVENOUS
Qty: 1000 | Refills: 0 | Status: DISCONTINUED | OUTPATIENT
Start: 2025-01-01 | End: 2025-01-01

## 2025-01-01 RX ORDER — BENZONATATE 100 MG
1 CAPSULE ORAL
Qty: 30 | Refills: 0
Start: 2025-01-01

## 2025-01-01 RX ORDER — LEFLUNOMIDE 20 MG/1
1 TABLET ORAL
Refills: 0 | DISCHARGE

## 2025-01-01 RX ORDER — ACETAMINOPHEN 160 MG/5ML
1000 SUSPENSION ORAL EVERY 8 HOURS
Refills: 0 | Status: COMPLETED | OUTPATIENT
Start: 2025-01-01 | End: 2025-01-01

## 2025-01-01 RX ORDER — METOPROLOL TARTRATE 50 MG
25 TABLET ORAL EVERY 12 HOURS
Refills: 0 | Status: DISCONTINUED | OUTPATIENT
Start: 2025-01-01 | End: 2025-01-01

## 2025-01-01 RX ORDER — FLUTICASONE PROPIONATE AND SALMETEROL 50; 500 UG/1; UG/1
1 POWDER ORAL; RESPIRATORY (INHALATION)
Refills: 0 | DISCHARGE

## 2025-01-01 RX ORDER — METRONIDAZOLE 500 MG
500 TABLET ORAL EVERY 12 HOURS
Refills: 0 | Status: DISCONTINUED | OUTPATIENT
Start: 2025-01-01 | End: 2025-01-01

## 2025-01-01 RX ORDER — DIPHENHYDRAMINE HCL 25 MG
25 TABLET ORAL ONCE
Refills: 0 | Status: COMPLETED | OUTPATIENT
Start: 2025-01-01 | End: 2025-01-01

## 2025-01-01 RX ORDER — CEFTOLOZANE AND TAZOBACTAM 1; .5 G/10ML; G/10ML
2250 INJECTION, POWDER, LYOPHILIZED, FOR SOLUTION INTRAVENOUS ONCE
Refills: 0 | Status: COMPLETED | OUTPATIENT
Start: 2025-01-01 | End: 2025-01-01

## 2025-01-01 RX ORDER — CEFEPIME HCL 1 G
1000 IV SOLUTION, PIGGYBACK, BOTTLE (EA) INTRAVENOUS EVERY 24 HOURS
Refills: 0 | Status: DISCONTINUED | OUTPATIENT
Start: 2025-01-01 | End: 2025-01-01

## 2025-01-01 RX ORDER — ALBUMIN HUMAN 50 G/1000ML
50 SOLUTION INTRAVENOUS ONCE
Refills: 0 | Status: COMPLETED | OUTPATIENT
Start: 2025-01-01 | End: 2025-01-01

## 2025-01-01 RX ORDER — METRONIDAZOLE 250 MG/1
500 TABLET ORAL EVERY 12 HOURS
Refills: 0 | Status: DISCONTINUED | OUTPATIENT
Start: 2025-01-01 | End: 2025-01-01

## 2025-01-01 RX ORDER — FLUCONAZOLE 100 MG/1
400 TABLET ORAL ONCE
Refills: 0 | Status: COMPLETED | OUTPATIENT
Start: 2025-01-01 | End: 2025-01-01

## 2025-01-01 RX ORDER — HYDRALAZINE HCL 100 MG
10 TABLET ORAL ONCE
Refills: 0 | Status: COMPLETED | OUTPATIENT
Start: 2025-01-01 | End: 2025-01-01

## 2025-01-01 RX ORDER — IPRATROPIUM BROMIDE AND ALBUTEROL SULFATE .5; 2.5 MG/3ML; MG/3ML
3 SOLUTION RESPIRATORY (INHALATION)
Refills: 0 | Status: COMPLETED | OUTPATIENT
Start: 2025-01-01 | End: 2025-01-01

## 2025-01-01 RX ORDER — ROSUVASTATIN 40 MG/1
5 TABLET, FILM COATED ORAL AT BEDTIME
Refills: 0 | Status: DISCONTINUED | OUTPATIENT
Start: 2025-01-01 | End: 2025-01-01

## 2025-01-01 RX ORDER — SODIUM BICARBONATE 84 MG/ML
0.32 INJECTION, SOLUTION INTRAVENOUS
Qty: 150 | Refills: 0 | Status: DISCONTINUED | OUTPATIENT
Start: 2025-01-01 | End: 2025-01-01

## 2025-01-01 RX ORDER — GUAIFENESIN 100 MG/5ML
600 SYRUP ORAL EVERY 12 HOURS
Refills: 0 | Status: DISCONTINUED | OUTPATIENT
Start: 2025-01-01 | End: 2025-01-01

## 2025-01-01 RX ORDER — MEROPENEM 500 MG/20ML
500 INJECTION INTRAVENOUS EVERY 12 HOURS
Refills: 0 | Status: DISCONTINUED | OUTPATIENT
Start: 2025-01-01 | End: 2025-01-01

## 2025-01-01 RX ORDER — ACETAMINOPHEN 80 MG/.8ML
650 SOLUTION/ DROPS ORAL ONCE
Refills: 0 | Status: COMPLETED | OUTPATIENT
Start: 2025-01-01 | End: 2025-01-01

## 2025-01-01 RX ORDER — HYDROXYCHLOROQUINE SULFATE 200 MG/1
1 TABLET ORAL
Refills: 0 | DISCHARGE

## 2025-01-01 RX ORDER — HEPARIN SODIUM,PORCINE 10000/ML
4000 VIAL (ML) INJECTION EVERY 6 HOURS
Refills: 0 | Status: DISCONTINUED | OUTPATIENT
Start: 2025-01-01 | End: 2025-01-01

## 2025-01-01 RX ORDER — ACETAMINOPHEN 80 MG/.8ML
650 SOLUTION/ DROPS ORAL EVERY 6 HOURS
Refills: 0 | Status: DISCONTINUED | OUTPATIENT
Start: 2025-01-01 | End: 2025-01-01

## 2025-01-01 RX ORDER — FENTANYL CITRATE 50 UG/ML
75 INJECTION INTRAMUSCULAR; INTRAVENOUS
Refills: 0 | Status: DISCONTINUED | OUTPATIENT
Start: 2025-01-01 | End: 2025-01-01

## 2025-01-01 RX ORDER — METOCLOPRAMIDE 10 MG/1
10 TABLET ORAL ONCE
Refills: 0 | Status: COMPLETED | OUTPATIENT
Start: 2025-01-01 | End: 2025-01-01

## 2025-01-01 RX ORDER — ETOMIDATE 2 MG/ML
20 INJECTION, SOLUTION INTRAVENOUS ONCE
Refills: 0 | Status: DISCONTINUED | OUTPATIENT
Start: 2025-01-01 | End: 2025-01-01

## 2025-01-01 RX ORDER — IPRATROPIUM BROMIDE AND ALBUTEROL SULFATE .5; 2.5 MG/3ML; MG/3ML
3 SOLUTION RESPIRATORY (INHALATION) ONCE
Refills: 0 | Status: DISCONTINUED | OUTPATIENT
Start: 2025-01-01 | End: 2025-01-01

## 2025-01-01 RX ORDER — ACETAMINOPHEN 80 MG/.8ML
700 SOLUTION/ DROPS ORAL EVERY 6 HOURS
Refills: 0 | Status: DISCONTINUED | OUTPATIENT
Start: 2025-01-01 | End: 2025-01-01

## 2025-01-01 RX ORDER — CHLORHEXIDINE GLUCONATE 1.2 MG/ML
15 RINSE ORAL EVERY 12 HOURS
Refills: 0 | Status: DISCONTINUED | OUTPATIENT
Start: 2025-01-01 | End: 2025-01-01

## 2025-01-01 RX ORDER — DEXTROMETHORPHAN HBR AND GUAIFENESIN ORAL SOLUTION 10; 100 MG/5ML; MG/5ML
10 LIQUID ORAL EVERY 6 HOURS
Refills: 0 | Status: DISCONTINUED | OUTPATIENT
Start: 2025-01-01 | End: 2025-01-01

## 2025-01-01 RX ORDER — GUAIFENESIN 100 MG/5ML
1 SYRUP ORAL
Qty: 20 | Refills: 0
Start: 2025-01-01

## 2025-01-01 RX ORDER — FLUTICASONE PROPIONATE AND SALMETEROL 50; 500 UG/1; UG/1
1 POWDER ORAL; RESPIRATORY (INHALATION)
Refills: 0 | Status: DISCONTINUED | OUTPATIENT
Start: 2025-01-01 | End: 2025-01-01

## 2025-01-01 RX ADMIN — ACETAMINOPHEN 1000 MILLIGRAM(S): 160 SUSPENSION ORAL at 08:30

## 2025-01-01 RX ADMIN — ANTISEPTIC SURGICAL SCRUB 15 MILLILITER(S): 0.04 SOLUTION TOPICAL at 05:45

## 2025-01-01 RX ADMIN — Medication 1.3 MG/KG/HR: at 10:13

## 2025-01-01 RX ADMIN — IPRATROPIUM BROMIDE AND ALBUTEROL SULFATE 3 MILLILITER(S): .5; 2.5 SOLUTION RESPIRATORY (INHALATION) at 16:12

## 2025-01-01 RX ADMIN — Medication 1000 UNIT(S)/HR: at 07:18

## 2025-01-01 RX ADMIN — IPRATROPIUM BROMIDE AND ALBUTEROL SULFATE 3 MILLILITER(S): .5; 2.5 SOLUTION RESPIRATORY (INHALATION) at 13:32

## 2025-01-01 RX ADMIN — Medication 40 MILLIGRAM(S): at 13:05

## 2025-01-01 RX ADMIN — METOCLOPRAMIDE 10 MILLIGRAM(S): 10 TABLET ORAL at 23:29

## 2025-01-01 RX ADMIN — FENTANYL CITRATE 50 MICROGRAM(S): 50 INJECTION INTRAMUSCULAR; INTRAVENOUS at 17:19

## 2025-01-01 RX ADMIN — SODIUM CHLORIDE 500 MILLILITER(S): 9 INJECTION, SOLUTION INTRAVENOUS at 18:13

## 2025-01-01 RX ADMIN — BUDESONIDE 0.5 MILLIGRAM(S): 9 TABLET, EXTENDED RELEASE ORAL at 19:06

## 2025-01-01 RX ADMIN — SODIUM CHLORIDE 1000 MILLILITER(S): 9 INJECTION, SOLUTION INTRAMUSCULAR; INTRAVENOUS; SUBCUTANEOUS at 23:28

## 2025-01-01 RX ADMIN — Medication 1200 MILLIGRAM(S): at 13:16

## 2025-01-01 RX ADMIN — IPRATROPIUM BROMIDE AND ALBUTEROL SULFATE 3 MILLILITER(S): .5; 2.5 SOLUTION RESPIRATORY (INHALATION) at 04:50

## 2025-01-01 RX ADMIN — DEXMEDETOMIDINE HYDROCHLORIDE 15.6 MICROGRAM(S)/KG/HR: 4 INJECTION, SOLUTION INTRAVENOUS at 21:14

## 2025-01-01 RX ADMIN — ROSUVASTATIN 5 MILLIGRAM(S): 40 TABLET, FILM COATED ORAL at 23:16

## 2025-01-01 RX ADMIN — FENTANYL CITRATE 50 MICROGRAM(S): 50 INJECTION INTRAMUSCULAR; INTRAVENOUS at 08:04

## 2025-01-01 RX ADMIN — PIPERACILLIN SODIUM AND TAZOBACTAM SODIUM 25 GRAM(S): 2; 250 INJECTION, POWDER, FOR SOLUTION INTRAVENOUS at 21:10

## 2025-01-01 RX ADMIN — ANTISEPTIC SURGICAL SCRUB 1 APPLICATION(S): 0.04 SOLUTION TOPICAL at 05:06

## 2025-01-01 RX ADMIN — ACETAMINOPHEN 650 MILLIGRAM(S): 160 SUSPENSION ORAL at 17:50

## 2025-01-01 RX ADMIN — Medication 600 UNIT(S)/HR: at 06:18

## 2025-01-01 RX ADMIN — CASPOFUNGIN ACETATE 260 MILLIGRAM(S): 5 INJECTION, POWDER, LYOPHILIZED, FOR SOLUTION INTRAVENOUS at 22:25

## 2025-01-01 RX ADMIN — Medication 50 MILLIGRAM(S): at 16:43

## 2025-01-01 RX ADMIN — Medication 5000 UNIT(S): at 05:34

## 2025-01-01 RX ADMIN — FENTANYL CITRATE 75 MICROGRAM(S): 50 INJECTION INTRAMUSCULAR; INTRAVENOUS at 03:09

## 2025-01-01 RX ADMIN — DEXMEDETOMIDINE HYDROCHLORIDE 15.6 MICROGRAM(S)/KG/HR: 4 INJECTION, SOLUTION INTRAVENOUS at 21:35

## 2025-01-01 RX ADMIN — ALBUMIN HUMAN 50 MILLILITER(S): 50 SOLUTION INTRAVENOUS at 23:06

## 2025-01-01 RX ADMIN — ANTISEPTIC SURGICAL SCRUB 15 MILLILITER(S): 0.04 SOLUTION TOPICAL at 06:38

## 2025-01-01 RX ADMIN — IPRATROPIUM BROMIDE AND ALBUTEROL SULFATE 3 MILLILITER(S): .5; 2.5 SOLUTION RESPIRATORY (INHALATION) at 07:31

## 2025-01-01 RX ADMIN — DEXTROMETHORPHAN HBR AND GUAIFENESIN ORAL SOLUTION 10 MILLILITER(S): 10; 100 LIQUID ORAL at 05:26

## 2025-01-01 RX ADMIN — ACETAMINOPHEN 1000 MILLIGRAM(S): 160 SUSPENSION ORAL at 07:37

## 2025-01-01 RX ADMIN — IPRATROPIUM BROMIDE AND ALBUTEROL SULFATE 3 MILLILITER(S): .5; 2.5 SOLUTION RESPIRATORY (INHALATION) at 06:37

## 2025-01-01 RX ADMIN — SODIUM CHLORIDE 50 MILLILITER(S): 9 INJECTION, SOLUTION INTRAMUSCULAR; INTRAVENOUS; SUBCUTANEOUS at 04:44

## 2025-01-01 RX ADMIN — FENTANYL CITRATE 50 MICROGRAM(S): 50 INJECTION INTRAMUSCULAR; INTRAVENOUS at 20:11

## 2025-01-01 RX ADMIN — Medication 100 MILLIGRAM(S): at 18:52

## 2025-01-01 RX ADMIN — ANTISEPTIC SURGICAL SCRUB 15 MILLILITER(S): 0.04 SOLUTION TOPICAL at 19:15

## 2025-01-01 RX ADMIN — IPRATROPIUM BROMIDE AND ALBUTEROL SULFATE 3 MILLILITER(S): .5; 2.5 SOLUTION RESPIRATORY (INHALATION) at 08:54

## 2025-01-01 RX ADMIN — Medication 5000 UNIT(S): at 21:06

## 2025-01-01 RX ADMIN — Medication 6 UNIT(S)/MIN: at 09:50

## 2025-01-01 RX ADMIN — Medication 1 TABLET(S): at 12:50

## 2025-01-01 RX ADMIN — Medication 600 UNIT(S)/HR: at 16:45

## 2025-01-01 RX ADMIN — DEXMEDETOMIDINE HYDROCHLORIDE 15.6 MICROGRAM(S)/KG/HR: 4 INJECTION, SOLUTION INTRAVENOUS at 16:13

## 2025-01-01 RX ADMIN — FLUTICASONE PROPIONATE AND SALMETEROL 1 DOSE(S): 50; 500 POWDER ORAL; RESPIRATORY (INHALATION) at 23:15

## 2025-01-01 RX ADMIN — DEXTROMETHORPHAN HBR AND GUAIFENESIN ORAL SOLUTION 20 MILLILITER(S): 10; 100 LIQUID ORAL at 16:45

## 2025-01-01 RX ADMIN — BUDESONIDE 0.5 MILLIGRAM(S): 9 TABLET, EXTENDED RELEASE ORAL at 19:29

## 2025-01-01 RX ADMIN — IPRATROPIUM BROMIDE AND ALBUTEROL SULFATE 3 MILLILITER(S): .5; 2.5 SOLUTION RESPIRATORY (INHALATION) at 00:38

## 2025-01-01 RX ADMIN — IPRATROPIUM BROMIDE AND ALBUTEROL SULFATE 3 MILLILITER(S): .5; 2.5 SOLUTION RESPIRATORY (INHALATION) at 08:01

## 2025-01-01 RX ADMIN — MIDODRINE HYDROCHLORIDE 10 MILLIGRAM(S): 5 TABLET ORAL at 05:20

## 2025-01-01 RX ADMIN — POTASSIUM CHLORIDE 100 MILLIEQUIVALENT(S): 750 TABLET, EXTENDED RELEASE ORAL at 10:52

## 2025-01-01 RX ADMIN — FENTANYL CITRATE 50 MICROGRAM(S): 50 INJECTION INTRAMUSCULAR; INTRAVENOUS at 10:50

## 2025-01-01 RX ADMIN — DEXMEDETOMIDINE HYDROCHLORIDE 15.6 MICROGRAM(S)/KG/HR: 4 INJECTION, SOLUTION INTRAVENOUS at 09:31

## 2025-01-01 RX ADMIN — FLUCONAZOLE 100 MILLIGRAM(S): 100 TABLET ORAL at 18:05

## 2025-01-01 RX ADMIN — Medication 25 GRAM(S): at 11:55

## 2025-01-01 RX ADMIN — ANTISEPTIC SURGICAL SCRUB 1 APPLICATION(S): 0.04 SOLUTION TOPICAL at 05:20

## 2025-01-01 RX ADMIN — DEXTROMETHORPHAN HBR AND GUAIFENESIN ORAL SOLUTION 20 MILLILITER(S): 10; 100 LIQUID ORAL at 07:19

## 2025-01-01 RX ADMIN — Medication 2 MILLIGRAM(S): at 23:14

## 2025-01-01 RX ADMIN — ANTISEPTIC SURGICAL SCRUB 1 APPLICATION(S): 0.04 SOLUTION TOPICAL at 05:15

## 2025-01-01 RX ADMIN — SODIUM CHLORIDE 75 MILLILITER(S): 9 INJECTION, SOLUTION INTRAVENOUS at 13:19

## 2025-01-01 RX ADMIN — Medication 5000 UNIT(S): at 05:19

## 2025-01-01 RX ADMIN — BUDESONIDE 0.5 MILLIGRAM(S): 9 TABLET, EXTENDED RELEASE ORAL at 19:15

## 2025-01-01 RX ADMIN — FENTANYL CITRATE 50 MICROGRAM(S): 50 INJECTION INTRAMUSCULAR; INTRAVENOUS at 17:37

## 2025-01-01 RX ADMIN — IPRATROPIUM BROMIDE AND ALBUTEROL SULFATE 3 MILLILITER(S): .5; 2.5 SOLUTION RESPIRATORY (INHALATION) at 19:34

## 2025-01-01 RX ADMIN — Medication 800 UNIT(S)/HR: at 16:54

## 2025-01-01 RX ADMIN — ENOXAPARIN SODIUM 30 MILLIGRAM(S): 60 INJECTION INTRAVENOUS; SUBCUTANEOUS at 12:44

## 2025-01-01 RX ADMIN — Medication 600 UNIT(S)/HR: at 07:04

## 2025-01-01 RX ADMIN — IPRATROPIUM BROMIDE AND ALBUTEROL SULFATE 3 MILLILITER(S): .5; 2.5 SOLUTION RESPIRATORY (INHALATION) at 08:30

## 2025-01-01 RX ADMIN — ACETAMINOPHEN 400 MILLIGRAM(S): 160 SUSPENSION ORAL at 22:36

## 2025-01-01 RX ADMIN — ALBUMIN HUMAN 50 MILLILITER(S): 50 SOLUTION INTRAVENOUS at 01:29

## 2025-01-01 RX ADMIN — FENTANYL CITRATE 50 MICROGRAM(S): 50 INJECTION INTRAMUSCULAR; INTRAVENOUS at 10:34

## 2025-01-01 RX ADMIN — Medication 600 MILLIGRAM(S): at 16:48

## 2025-01-01 RX ADMIN — MEROPENEM 100 MILLIGRAM(S): 500 INJECTION INTRAVENOUS at 05:08

## 2025-01-01 RX ADMIN — ANTISEPTIC SURGICAL SCRUB 1 APPLICATION(S): 0.04 SOLUTION TOPICAL at 05:18

## 2025-01-01 RX ADMIN — FLUTICASONE PROPIONATE AND SALMETEROL 1 DOSE(S): 50; 500 POWDER ORAL; RESPIRATORY (INHALATION) at 17:49

## 2025-01-01 RX ADMIN — FENTANYL CITRATE 50 MICROGRAM(S): 50 INJECTION INTRAMUSCULAR; INTRAVENOUS at 17:56

## 2025-01-01 RX ADMIN — Medication 600 UNIT(S)/HR: at 19:22

## 2025-01-01 RX ADMIN — Medication 5000 UNIT(S): at 22:19

## 2025-01-01 RX ADMIN — Medication 20 MILLIGRAM(S): at 13:09

## 2025-01-01 RX ADMIN — Medication 1.3 MG/KG/HR: at 15:50

## 2025-01-01 RX ADMIN — Medication 5000 UNIT(S): at 21:40

## 2025-01-01 RX ADMIN — Medication 1.3 MG/KG/HR: at 03:20

## 2025-01-01 RX ADMIN — Medication 5000 UNIT(S): at 16:12

## 2025-01-01 RX ADMIN — Medication 1.3 MG/KG/HR: at 07:29

## 2025-01-01 RX ADMIN — IPRATROPIUM BROMIDE AND ALBUTEROL SULFATE 3 MILLILITER(S): .5; 2.5 SOLUTION RESPIRATORY (INHALATION) at 23:32

## 2025-01-01 RX ADMIN — Medication 25 GRAM(S): at 21:05

## 2025-01-01 RX ADMIN — PANTOPRAZOLE 40 MILLIGRAM(S): 20 TABLET, DELAYED RELEASE ORAL at 13:30

## 2025-01-01 RX ADMIN — NOREPINEPHRINE BITARTRATE 4.87 MICROGRAM(S)/KG/MIN: 1 INJECTION, SOLUTION, CONCENTRATE INTRAVENOUS at 20:25

## 2025-01-01 RX ADMIN — PIPERACILLIN SODIUM AND TAZOBACTAM SODIUM 200 GRAM(S): 2; 250 INJECTION, POWDER, FOR SOLUTION INTRAVENOUS at 12:33

## 2025-01-01 RX ADMIN — DEXTROMETHORPHAN HBR AND GUAIFENESIN ORAL SOLUTION 10 MILLILITER(S): 10; 100 LIQUID ORAL at 11:03

## 2025-01-01 RX ADMIN — IPRATROPIUM BROMIDE AND ALBUTEROL SULFATE 3 MILLILITER(S): .5; 2.5 SOLUTION RESPIRATORY (INHALATION) at 00:39

## 2025-01-01 RX ADMIN — Medication 2 MILLIGRAM(S): at 16:36

## 2025-01-01 RX ADMIN — IPRATROPIUM BROMIDE AND ALBUTEROL SULFATE 3 MILLILITER(S): .5; 2.5 SOLUTION RESPIRATORY (INHALATION) at 00:53

## 2025-01-01 RX ADMIN — IPRATROPIUM BROMIDE AND ALBUTEROL SULFATE 3 MILLILITER(S): .5; 2.5 SOLUTION RESPIRATORY (INHALATION) at 13:01

## 2025-01-01 RX ADMIN — DEXMEDETOMIDINE HYDROCHLORIDE 15.6 MICROGRAM(S)/KG/HR: 4 INJECTION, SOLUTION INTRAVENOUS at 18:37

## 2025-01-01 RX ADMIN — IPRATROPIUM BROMIDE AND ALBUTEROL SULFATE 3 MILLILITER(S): .5; 2.5 SOLUTION RESPIRATORY (INHALATION) at 21:10

## 2025-01-01 RX ADMIN — FAMOTIDINE 20 MILLIGRAM(S): 20 TABLET, FILM COATED ORAL at 12:44

## 2025-01-01 RX ADMIN — Medication 1.3 MG/KG/HR: at 23:22

## 2025-01-01 RX ADMIN — FENTANYL CITRATE 50 MICROGRAM(S): 50 INJECTION INTRAMUSCULAR; INTRAVENOUS at 07:57

## 2025-01-01 RX ADMIN — DEXMEDETOMIDINE HYDROCHLORIDE 15.6 MICROGRAM(S)/KG/HR: 4 INJECTION, SOLUTION INTRAVENOUS at 06:26

## 2025-01-01 RX ADMIN — SODIUM CHLORIDE 50 MILLILITER(S): 9 INJECTION, SOLUTION INTRAVENOUS at 05:19

## 2025-01-01 RX ADMIN — ACETAMINOPHEN 1000 MILLIGRAM(S): 160 SUSPENSION ORAL at 01:30

## 2025-01-01 RX ADMIN — MONTELUKAST SODIUM 10 MILLIGRAM(S): 10 TABLET, FILM COATED ORAL at 12:44

## 2025-01-01 RX ADMIN — Medication 0 UNIT(S)/HR: at 15:48

## 2025-01-01 RX ADMIN — ENOXAPARIN SODIUM 30 MILLIGRAM(S): 60 INJECTION INTRAVENOUS; SUBCUTANEOUS at 13:08

## 2025-01-01 RX ADMIN — FENTANYL CITRATE 50 MICROGRAM(S): 50 INJECTION INTRAMUSCULAR; INTRAVENOUS at 08:10

## 2025-01-01 RX ADMIN — IPRATROPIUM BROMIDE AND ALBUTEROL SULFATE 3 MILLILITER(S): .5; 2.5 SOLUTION RESPIRATORY (INHALATION) at 19:31

## 2025-01-01 RX ADMIN — ACETAMINOPHEN 650 MILLIGRAM(S): 160 SUSPENSION ORAL at 10:13

## 2025-01-01 RX ADMIN — DEXTROMETHORPHAN HBR AND GUAIFENESIN ORAL SOLUTION 10 MILLILITER(S): 10; 100 LIQUID ORAL at 13:05

## 2025-01-01 RX ADMIN — SODIUM CHLORIDE 500 MILLILITER(S): 9 INJECTION, SOLUTION INTRAVENOUS at 10:56

## 2025-01-01 RX ADMIN — Medication 600 UNIT(S)/HR: at 07:10

## 2025-01-01 RX ADMIN — Medication 0 UNIT(S)/HR: at 22:33

## 2025-01-01 RX ADMIN — FENTANYL CITRATE 50 MICROGRAM(S): 50 INJECTION INTRAMUSCULAR; INTRAVENOUS at 06:09

## 2025-01-01 RX ADMIN — FLUCONAZOLE 100 MILLIGRAM(S): 100 TABLET ORAL at 19:16

## 2025-01-01 RX ADMIN — POTASSIUM CHLORIDE 100 MILLIEQUIVALENT(S): 750 TABLET, EXTENDED RELEASE ORAL at 09:43

## 2025-01-01 RX ADMIN — FENTANYL CITRATE 50 MICROGRAM(S): 50 INJECTION INTRAMUSCULAR; INTRAVENOUS at 03:54

## 2025-01-01 RX ADMIN — DEXTROMETHORPHAN HBR AND GUAIFENESIN ORAL SOLUTION 10 MILLILITER(S): 10; 100 LIQUID ORAL at 17:41

## 2025-01-01 RX ADMIN — Medication 81 MILLIGRAM(S): at 12:44

## 2025-01-01 RX ADMIN — DEXMEDETOMIDINE HYDROCHLORIDE 15.6 MICROGRAM(S)/KG/HR: 4 INJECTION, SOLUTION INTRAVENOUS at 09:35

## 2025-01-01 RX ADMIN — ANTISEPTIC SURGICAL SCRUB 15 MILLILITER(S): 0.04 SOLUTION TOPICAL at 06:23

## 2025-01-01 RX ADMIN — SODIUM CHLORIDE 1000 MILLILITER(S): 9 INJECTION, SOLUTION INTRAVENOUS at 01:53

## 2025-01-01 RX ADMIN — Medication 0.5 MILLIGRAM(S): at 01:51

## 2025-01-01 RX ADMIN — NOREPINEPHRINE BITARTRATE 4.87 MICROGRAM(S)/KG/MIN: 1 INJECTION, SOLUTION, CONCENTRATE INTRAVENOUS at 17:58

## 2025-01-01 RX ADMIN — MEROPENEM 100 MILLIGRAM(S): 500 INJECTION INTRAVENOUS at 05:55

## 2025-01-01 RX ADMIN — FAMOTIDINE 20 MILLIGRAM(S): 20 TABLET, FILM COATED ORAL at 12:50

## 2025-01-01 RX ADMIN — Medication 5000 UNIT(S): at 14:08

## 2025-01-01 RX ADMIN — BUDESONIDE 0.5 MILLIGRAM(S): 9 TABLET, EXTENDED RELEASE ORAL at 19:20

## 2025-01-01 RX ADMIN — Medication 600 MILLIGRAM(S): at 06:06

## 2025-01-01 RX ADMIN — FENTANYL CITRATE 50 MICROGRAM(S): 50 INJECTION INTRAMUSCULAR; INTRAVENOUS at 22:39

## 2025-01-01 RX ADMIN — ALBUMIN HUMAN 50 MILLILITER(S): 50 SOLUTION INTRAVENOUS at 05:47

## 2025-01-01 RX ADMIN — CEFTRIAXONE SODIUM 100 MILLIGRAM(S): 1 INJECTION, POWDER, FOR SOLUTION INTRAMUSCULAR; INTRAVENOUS at 18:40

## 2025-01-01 RX ADMIN — HYDROMORPHONE HYDROCHLORIDE 0.5 MILLIGRAM(S): 4 INJECTION, SOLUTION INTRAMUSCULAR; INTRAVENOUS; SUBCUTANEOUS at 09:13

## 2025-01-01 RX ADMIN — PIPERACILLIN SODIUM AND TAZOBACTAM SODIUM 25 GRAM(S): 2; 250 INJECTION, POWDER, FOR SOLUTION INTRAVENOUS at 17:42

## 2025-01-01 RX ADMIN — IPRATROPIUM BROMIDE AND ALBUTEROL SULFATE 3 MILLILITER(S): .5; 2.5 SOLUTION RESPIRATORY (INHALATION) at 13:18

## 2025-01-01 RX ADMIN — PANTOPRAZOLE 40 MILLIGRAM(S): 20 TABLET, DELAYED RELEASE ORAL at 12:34

## 2025-01-01 RX ADMIN — GABAPENTIN 100 MILLIGRAM(S): 300 CAPSULE ORAL at 06:02

## 2025-01-01 RX ADMIN — Medication 5000 UNIT(S): at 21:05

## 2025-01-01 RX ADMIN — IPRATROPIUM BROMIDE AND ALBUTEROL SULFATE 3 MILLILITER(S): .5; 2.5 SOLUTION RESPIRATORY (INHALATION) at 03:30

## 2025-01-01 RX ADMIN — NOREPINEPHRINE BITARTRATE 4.87 MICROGRAM(S)/KG/MIN: 1 INJECTION, SOLUTION, CONCENTRATE INTRAVENOUS at 19:12

## 2025-01-01 RX ADMIN — ETOMIDATE 16 MILLIGRAM(S): 2 INJECTION, SOLUTION INTRAVENOUS at 19:00

## 2025-01-01 RX ADMIN — ACETAMINOPHEN 400 MILLIGRAM(S): 160 SUSPENSION ORAL at 01:11

## 2025-01-01 RX ADMIN — Medication 5000 UNIT(S): at 13:06

## 2025-01-01 RX ADMIN — PIPERACILLIN SODIUM AND TAZOBACTAM SODIUM 25 GRAM(S): 2; 250 INJECTION, POWDER, FOR SOLUTION INTRAVENOUS at 05:14

## 2025-01-01 RX ADMIN — PANTOPRAZOLE 40 MILLIGRAM(S): 20 TABLET, DELAYED RELEASE ORAL at 18:32

## 2025-01-01 RX ADMIN — IPRATROPIUM BROMIDE AND ALBUTEROL SULFATE 3 MILLILITER(S): .5; 2.5 SOLUTION RESPIRATORY (INHALATION) at 13:51

## 2025-01-01 RX ADMIN — PROPOFOL 5.64 MICROGRAM(S)/KG/MIN: 10 INJECTION, EMULSION INTRAVENOUS at 07:50

## 2025-01-01 RX ADMIN — ANTISEPTIC SURGICAL SCRUB 15 MILLILITER(S): 0.04 SOLUTION TOPICAL at 05:49

## 2025-01-01 RX ADMIN — Medication 20 MILLIGRAM(S): at 06:07

## 2025-01-01 RX ADMIN — IPRATROPIUM BROMIDE AND ALBUTEROL SULFATE 3 MILLILITER(S): .5; 2.5 SOLUTION RESPIRATORY (INHALATION) at 14:15

## 2025-01-01 RX ADMIN — CEFTRIAXONE SODIUM 100 MILLIGRAM(S): 1 INJECTION, POWDER, FOR SOLUTION INTRAMUSCULAR; INTRAVENOUS at 13:00

## 2025-01-01 RX ADMIN — Medication 0.5 MILLIGRAM(S): at 02:20

## 2025-01-01 RX ADMIN — BUDESONIDE 0.5 MILLIGRAM(S): 9 TABLET, EXTENDED RELEASE ORAL at 19:08

## 2025-01-01 RX ADMIN — IPRATROPIUM BROMIDE AND ALBUTEROL SULFATE 3 MILLILITER(S): .5; 2.5 SOLUTION RESPIRATORY (INHALATION) at 13:11

## 2025-01-01 RX ADMIN — Medication 100 MILLIGRAM(S): at 13:00

## 2025-01-01 RX ADMIN — ACETAMINOPHEN 400 MILLIGRAM(S): 160 SUSPENSION ORAL at 04:09

## 2025-01-01 RX ADMIN — IPRATROPIUM BROMIDE AND ALBUTEROL SULFATE 3 MILLILITER(S): .5; 2.5 SOLUTION RESPIRATORY (INHALATION) at 08:16

## 2025-01-01 RX ADMIN — IPRATROPIUM BROMIDE AND ALBUTEROL SULFATE 3 MILLILITER(S): .5; 2.5 SOLUTION RESPIRATORY (INHALATION) at 01:33

## 2025-01-01 RX ADMIN — GABAPENTIN 100 MILLIGRAM(S): 300 CAPSULE ORAL at 13:08

## 2025-01-01 RX ADMIN — PANTOPRAZOLE 40 MILLIGRAM(S): 20 TABLET, DELAYED RELEASE ORAL at 11:35

## 2025-01-01 RX ADMIN — Medication 25 MILLIGRAM(S): at 20:15

## 2025-01-01 RX ADMIN — NOREPINEPHRINE BITARTRATE 4.87 MICROGRAM(S)/KG/MIN: 1 INJECTION, SOLUTION, CONCENTRATE INTRAVENOUS at 15:05

## 2025-01-01 RX ADMIN — ALBUMIN HUMAN 50 MILLILITER(S): 50 SOLUTION INTRAVENOUS at 21:19

## 2025-01-01 RX ADMIN — ACETAMINOPHEN 1000 MILLIGRAM(S): 160 SUSPENSION ORAL at 09:45

## 2025-01-01 RX ADMIN — ACETAMINOPHEN 400 MILLIGRAM(S): 160 SUSPENSION ORAL at 10:00

## 2025-01-01 RX ADMIN — BUDESONIDE 0.5 MILLIGRAM(S): 9 TABLET, EXTENDED RELEASE ORAL at 08:06

## 2025-01-01 RX ADMIN — IPRATROPIUM BROMIDE AND ALBUTEROL SULFATE 3 MILLILITER(S): .5; 2.5 SOLUTION RESPIRATORY (INHALATION) at 00:45

## 2025-01-01 RX ADMIN — Medication 1.3 MG/KG/HR: at 12:01

## 2025-01-01 RX ADMIN — POTASSIUM CHLORIDE 100 MILLIEQUIVALENT(S): 750 TABLET, EXTENDED RELEASE ORAL at 09:49

## 2025-01-01 RX ADMIN — FENTANYL CITRATE 50 MICROGRAM(S): 50 INJECTION INTRAMUSCULAR; INTRAVENOUS at 10:53

## 2025-01-01 RX ADMIN — PROPOFOL 3.11 MICROGRAM(S)/KG/MIN: 10 INJECTION, EMULSION INTRAVENOUS at 17:38

## 2025-01-01 RX ADMIN — BUDESONIDE 0.5 MILLIGRAM(S): 9 TABLET, EXTENDED RELEASE ORAL at 07:54

## 2025-01-01 RX ADMIN — PANTOPRAZOLE 40 MILLIGRAM(S): 20 TABLET, DELAYED RELEASE ORAL at 11:55

## 2025-01-01 RX ADMIN — SODIUM CHLORIDE 30 MILLILITER(S): 9 INJECTION, SOLUTION INTRAVENOUS at 10:16

## 2025-01-01 RX ADMIN — NOREPINEPHRINE BITARTRATE 4.87 MICROGRAM(S)/KG/MIN: 1 INJECTION, SOLUTION, CONCENTRATE INTRAVENOUS at 05:13

## 2025-01-01 RX ADMIN — DEXMEDETOMIDINE HYDROCHLORIDE 15.6 MICROGRAM(S)/KG/HR: 4 INJECTION, SOLUTION INTRAVENOUS at 05:55

## 2025-01-01 RX ADMIN — FENTANYL CITRATE 50 MICROGRAM(S): 50 INJECTION INTRAMUSCULAR; INTRAVENOUS at 13:48

## 2025-01-01 RX ADMIN — SODIUM CHLORIDE 1500 MILLILITER(S): 9 INJECTION, SOLUTION INTRAVENOUS at 16:13

## 2025-01-01 RX ADMIN — PANTOPRAZOLE 40 MILLIGRAM(S): 20 TABLET, DELAYED RELEASE ORAL at 17:44

## 2025-01-01 RX ADMIN — Medication 1.3 MG/KG/HR: at 10:16

## 2025-01-01 RX ADMIN — ANTISEPTIC SURGICAL SCRUB 15 MILLILITER(S): 0.04 SOLUTION TOPICAL at 05:22

## 2025-01-01 RX ADMIN — FENTANYL CITRATE 50 MICROGRAM(S): 50 INJECTION INTRAMUSCULAR; INTRAVENOUS at 20:08

## 2025-01-01 RX ADMIN — Medication 50 MILLILITER(S): at 06:28

## 2025-01-01 RX ADMIN — IPRATROPIUM BROMIDE AND ALBUTEROL SULFATE 3 MILLILITER(S): .5; 2.5 SOLUTION RESPIRATORY (INHALATION) at 04:30

## 2025-01-01 RX ADMIN — ACETAMINOPHEN 1000 MILLIGRAM(S): 160 SUSPENSION ORAL at 02:00

## 2025-01-01 RX ADMIN — Medication 2 MILLIGRAM(S): at 23:28

## 2025-01-01 RX ADMIN — BUDESONIDE 0.5 MILLIGRAM(S): 9 TABLET, EXTENDED RELEASE ORAL at 19:48

## 2025-01-01 RX ADMIN — Medication 5000 UNIT(S): at 13:10

## 2025-01-01 RX ADMIN — PIPERACILLIN SODIUM AND TAZOBACTAM SODIUM 25 GRAM(S): 2; 250 INJECTION, POWDER, FOR SOLUTION INTRAVENOUS at 22:10

## 2025-01-01 RX ADMIN — PIPERACILLIN SODIUM AND TAZOBACTAM SODIUM 25 GRAM(S): 2; 250 INJECTION, POWDER, FOR SOLUTION INTRAVENOUS at 05:45

## 2025-01-01 RX ADMIN — DEXMEDETOMIDINE HYDROCHLORIDE 15.6 MICROGRAM(S)/KG/HR: 4 INJECTION, SOLUTION INTRAVENOUS at 00:23

## 2025-01-01 RX ADMIN — FLUCONAZOLE 100 MILLIGRAM(S): 100 TABLET ORAL at 16:49

## 2025-01-01 RX ADMIN — MIDODRINE HYDROCHLORIDE 10 MILLIGRAM(S): 5 TABLET ORAL at 13:06

## 2025-01-01 RX ADMIN — ACETAMINOPHEN 400 MILLIGRAM(S): 160 SUSPENSION ORAL at 01:16

## 2025-01-01 RX ADMIN — IPRATROPIUM BROMIDE AND ALBUTEROL SULFATE 3 MILLILITER(S): .5; 2.5 SOLUTION RESPIRATORY (INHALATION) at 20:50

## 2025-01-01 RX ADMIN — ACETAMINOPHEN 650 MILLIGRAM(S): 160 SUSPENSION ORAL at 00:27

## 2025-01-01 RX ADMIN — PIPERACILLIN SODIUM AND TAZOBACTAM SODIUM 25 GRAM(S): 2; 250 INJECTION, POWDER, FOR SOLUTION INTRAVENOUS at 14:18

## 2025-01-01 RX ADMIN — IPRATROPIUM BROMIDE AND ALBUTEROL SULFATE 3 MILLILITER(S): .5; 2.5 SOLUTION RESPIRATORY (INHALATION) at 19:08

## 2025-01-01 RX ADMIN — HYDROMORPHONE HYDROCHLORIDE 0.5 MILLIGRAM(S): 4 INJECTION, SOLUTION INTRAMUSCULAR; INTRAVENOUS; SUBCUTANEOUS at 11:40

## 2025-01-01 RX ADMIN — Medication 200 GRAM(S): at 16:02

## 2025-01-01 RX ADMIN — DEXMEDETOMIDINE HYDROCHLORIDE 15.6 MICROGRAM(S)/KG/HR: 4 INJECTION, SOLUTION INTRAVENOUS at 22:43

## 2025-01-01 RX ADMIN — CASPOFUNGIN ACETATE 260 MILLIGRAM(S): 5 INJECTION, POWDER, LYOPHILIZED, FOR SOLUTION INTRAVENOUS at 23:15

## 2025-01-01 RX ADMIN — Medication 6 UNIT(S)/MIN: at 14:03

## 2025-01-01 RX ADMIN — BUDESONIDE 0.5 MILLIGRAM(S): 9 TABLET, EXTENDED RELEASE ORAL at 08:30

## 2025-01-01 RX ADMIN — DEXTROMETHORPHAN HBR AND GUAIFENESIN ORAL SOLUTION 10 MILLILITER(S): 10; 100 LIQUID ORAL at 11:52

## 2025-01-01 RX ADMIN — FENTANYL CITRATE 50 MICROGRAM(S): 50 INJECTION INTRAMUSCULAR; INTRAVENOUS at 07:00

## 2025-01-01 RX ADMIN — Medication 2 MILLIGRAM(S): at 23:50

## 2025-01-01 RX ADMIN — ALBUMIN HUMAN 50 MILLILITER(S): 50 SOLUTION INTRAVENOUS at 16:43

## 2025-01-01 RX ADMIN — ACETAMINOPHEN 400 MILLIGRAM(S): 160 SUSPENSION ORAL at 07:21

## 2025-01-01 RX ADMIN — DEXTROMETHORPHAN HBR AND GUAIFENESIN ORAL SOLUTION 10 MILLILITER(S): 10; 100 LIQUID ORAL at 23:47

## 2025-01-01 RX ADMIN — BUDESONIDE 0.5 MILLIGRAM(S): 9 TABLET, EXTENDED RELEASE ORAL at 20:59

## 2025-01-01 RX ADMIN — Medication 0.5 MILLIGRAM(S): at 21:05

## 2025-01-01 RX ADMIN — Medication 600 MILLIGRAM(S): at 17:53

## 2025-01-01 RX ADMIN — Medication 40 MILLIGRAM(S): at 12:27

## 2025-01-01 RX ADMIN — Medication 6 UNIT(S)/MIN: at 18:38

## 2025-01-01 RX ADMIN — DEXMEDETOMIDINE HYDROCHLORIDE 15.6 MICROGRAM(S)/KG/HR: 4 INJECTION, SOLUTION INTRAVENOUS at 14:00

## 2025-01-01 RX ADMIN — POTASSIUM CHLORIDE 100 MILLIEQUIVALENT(S): 750 TABLET, EXTENDED RELEASE ORAL at 13:05

## 2025-01-01 RX ADMIN — OXYCODONE HYDROCHLORIDE 5 MILLIGRAM(S): 30 TABLET ORAL at 13:14

## 2025-01-01 RX ADMIN — IPRATROPIUM BROMIDE AND ALBUTEROL SULFATE 3 MILLILITER(S): .5; 2.5 SOLUTION RESPIRATORY (INHALATION) at 19:06

## 2025-01-01 RX ADMIN — AZITHROMYCIN MONOHYDRATE 255 MILLIGRAM(S): 200 POWDER, FOR SUSPENSION ORAL at 17:50

## 2025-01-01 RX ADMIN — BUDESONIDE 0.5 MILLIGRAM(S): 9 TABLET, EXTENDED RELEASE ORAL at 21:11

## 2025-01-01 RX ADMIN — FENTANYL CITRATE 50 MICROGRAM(S): 50 INJECTION INTRAMUSCULAR; INTRAVENOUS at 15:07

## 2025-01-01 RX ADMIN — DEXTROMETHORPHAN HBR AND GUAIFENESIN ORAL SOLUTION 20 MILLILITER(S): 10; 100 LIQUID ORAL at 17:32

## 2025-01-01 RX ADMIN — DEXTROMETHORPHAN HBR AND GUAIFENESIN ORAL SOLUTION 10 MILLILITER(S): 10; 100 LIQUID ORAL at 00:22

## 2025-01-01 RX ADMIN — DEXMEDETOMIDINE HYDROCHLORIDE 5.19 MICROGRAM(S)/KG/HR: 4 INJECTION, SOLUTION INTRAVENOUS at 19:42

## 2025-01-01 RX ADMIN — Medication 100 MILLIGRAM(S): at 06:06

## 2025-01-01 RX ADMIN — PIPERACILLIN SODIUM AND TAZOBACTAM SODIUM 25 GRAM(S): 2; 250 INJECTION, POWDER, FOR SOLUTION INTRAVENOUS at 05:08

## 2025-01-01 RX ADMIN — ACETAMINOPHEN 400 MILLIGRAM(S): 160 SUSPENSION ORAL at 11:02

## 2025-01-01 RX ADMIN — GABAPENTIN 100 MILLIGRAM(S): 300 CAPSULE ORAL at 06:06

## 2025-01-01 RX ADMIN — DEXMEDETOMIDINE HYDROCHLORIDE 5.19 MICROGRAM(S)/KG/HR: 4 INJECTION, SOLUTION INTRAVENOUS at 11:31

## 2025-01-01 RX ADMIN — Medication 5000 UNIT(S): at 22:25

## 2025-01-01 RX ADMIN — Medication 10 MILLIGRAM(S): at 22:16

## 2025-01-01 RX ADMIN — ACETAMINOPHEN 650 MILLIGRAM(S): 160 SUSPENSION ORAL at 12:50

## 2025-01-01 RX ADMIN — ACETAMINOPHEN 1000 MILLIGRAM(S): 160 SUSPENSION ORAL at 18:32

## 2025-01-01 RX ADMIN — ACETAMINOPHEN 1000 MILLIGRAM(S): 160 SUSPENSION ORAL at 17:30

## 2025-01-01 RX ADMIN — Medication 5000 UNIT(S): at 05:11

## 2025-01-01 RX ADMIN — Medication 40 MILLIGRAM(S): at 05:12

## 2025-01-01 RX ADMIN — Medication 600 UNIT(S)/HR: at 13:06

## 2025-01-01 RX ADMIN — IPRATROPIUM BROMIDE AND ALBUTEROL SULFATE 3 MILLILITER(S): .5; 2.5 SOLUTION RESPIRATORY (INHALATION) at 08:00

## 2025-01-01 RX ADMIN — Medication 50 MILLIGRAM(S): at 18:20

## 2025-01-01 RX ADMIN — DEXTROMETHORPHAN HBR AND GUAIFENESIN ORAL SOLUTION 10 MILLILITER(S): 10; 100 LIQUID ORAL at 17:01

## 2025-01-01 RX ADMIN — DEXMEDETOMIDINE HYDROCHLORIDE 15.6 MICROGRAM(S)/KG/HR: 4 INJECTION, SOLUTION INTRAVENOUS at 05:18

## 2025-01-01 RX ADMIN — METRONIDAZOLE 100 MILLIGRAM(S): 250 TABLET ORAL at 02:38

## 2025-01-01 RX ADMIN — IPRATROPIUM BROMIDE AND ALBUTEROL SULFATE 3 MILLILITER(S): .5; 2.5 SOLUTION RESPIRATORY (INHALATION) at 19:49

## 2025-01-01 RX ADMIN — BUDESONIDE 0.5 MILLIGRAM(S): 9 TABLET, EXTENDED RELEASE ORAL at 19:33

## 2025-01-01 RX ADMIN — BUDESONIDE 0.5 MILLIGRAM(S): 9 TABLET, EXTENDED RELEASE ORAL at 08:13

## 2025-01-01 RX ADMIN — DEXMEDETOMIDINE HYDROCHLORIDE 15.6 MICROGRAM(S)/KG/HR: 4 INJECTION, SOLUTION INTRAVENOUS at 02:27

## 2025-01-01 RX ADMIN — PANTOPRAZOLE 40 MILLIGRAM(S): 20 TABLET, DELAYED RELEASE ORAL at 05:11

## 2025-01-01 RX ADMIN — DEXMEDETOMIDINE HYDROCHLORIDE 15.6 MICROGRAM(S)/KG/HR: 4 INJECTION, SOLUTION INTRAVENOUS at 02:10

## 2025-01-01 RX ADMIN — BUDESONIDE 0.5 MILLIGRAM(S): 9 TABLET, EXTENDED RELEASE ORAL at 19:09

## 2025-01-01 RX ADMIN — DEXTROMETHORPHAN HBR AND GUAIFENESIN ORAL SOLUTION 10 MILLILITER(S): 10; 100 LIQUID ORAL at 18:04

## 2025-01-01 RX ADMIN — PHENYLEPHRINE HYDROCHLORIDE 400 MICROGRAM(S): 10 INJECTION INTRAVENOUS at 15:04

## 2025-01-01 RX ADMIN — DEXTROMETHORPHAN HBR AND GUAIFENESIN ORAL SOLUTION 10 MILLILITER(S): 10; 100 LIQUID ORAL at 05:20

## 2025-01-01 RX ADMIN — DEXMEDETOMIDINE HYDROCHLORIDE 2.6 MICROGRAM(S)/KG/HR: 4 INJECTION, SOLUTION INTRAVENOUS at 01:10

## 2025-01-01 RX ADMIN — Medication 80 MILLIGRAM(S): at 09:12

## 2025-01-01 RX ADMIN — POTASSIUM CHLORIDE 50 MILLIEQUIVALENT(S): 750 TABLET, EXTENDED RELEASE ORAL at 11:53

## 2025-01-01 RX ADMIN — DEXMEDETOMIDINE HYDROCHLORIDE 15.6 MICROGRAM(S)/KG/HR: 4 INJECTION, SOLUTION INTRAVENOUS at 08:23

## 2025-01-01 RX ADMIN — PIPERACILLIN SODIUM AND TAZOBACTAM SODIUM 25 GRAM(S): 2; 250 INJECTION, POWDER, FOR SOLUTION INTRAVENOUS at 05:34

## 2025-01-01 RX ADMIN — NOREPINEPHRINE BITARTRATE 4.87 MICROGRAM(S)/KG/MIN: 1 INJECTION, SOLUTION, CONCENTRATE INTRAVENOUS at 01:53

## 2025-01-01 RX ADMIN — DEXMEDETOMIDINE HYDROCHLORIDE 15.6 MICROGRAM(S)/KG/HR: 4 INJECTION, SOLUTION INTRAVENOUS at 14:17

## 2025-01-01 RX ADMIN — Medication 2 MILLIGRAM(S): at 20:25

## 2025-01-01 RX ADMIN — FENTANYL CITRATE 75 MICROGRAM(S): 50 INJECTION INTRAMUSCULAR; INTRAVENOUS at 01:15

## 2025-01-01 RX ADMIN — IPRATROPIUM BROMIDE AND ALBUTEROL SULFATE 3 MILLILITER(S): .5; 2.5 SOLUTION RESPIRATORY (INHALATION) at 13:40

## 2025-01-01 RX ADMIN — FENTANYL CITRATE 50 MICROGRAM(S): 50 INJECTION INTRAMUSCULAR; INTRAVENOUS at 22:43

## 2025-01-01 RX ADMIN — PANTOPRAZOLE 40 MILLIGRAM(S): 20 TABLET, DELAYED RELEASE ORAL at 19:41

## 2025-01-01 RX ADMIN — IPRATROPIUM BROMIDE AND ALBUTEROL SULFATE 3 MILLILITER(S): .5; 2.5 SOLUTION RESPIRATORY (INHALATION) at 07:50

## 2025-01-01 RX ADMIN — Medication 1.3 MG/KG/HR: at 02:51

## 2025-01-01 RX ADMIN — PIPERACILLIN SODIUM AND TAZOBACTAM SODIUM 25 GRAM(S): 2; 250 INJECTION, POWDER, FOR SOLUTION INTRAVENOUS at 14:31

## 2025-01-01 RX ADMIN — LORAZEPAM 0.5 MILLIGRAM(S): 1 TABLET ORAL at 21:00

## 2025-01-01 RX ADMIN — ANTISEPTIC SURGICAL SCRUB 15 MILLILITER(S): 0.04 SOLUTION TOPICAL at 18:16

## 2025-01-01 RX ADMIN — Medication 1.3 MG/KG/HR: at 19:09

## 2025-01-01 RX ADMIN — NOREPINEPHRINE BITARTRATE 4.87 MICROGRAM(S)/KG/MIN: 1 INJECTION, SOLUTION, CONCENTRATE INTRAVENOUS at 19:42

## 2025-01-01 RX ADMIN — POTASSIUM CHLORIDE 100 MILLIEQUIVALENT(S): 750 TABLET, EXTENDED RELEASE ORAL at 07:46

## 2025-01-01 RX ADMIN — Medication 5000 UNIT(S): at 13:32

## 2025-01-01 RX ADMIN — PANTOPRAZOLE 40 MILLIGRAM(S): 20 TABLET, DELAYED RELEASE ORAL at 05:54

## 2025-01-01 RX ADMIN — ACETAMINOPHEN 400 MILLIGRAM(S): 160 SUSPENSION ORAL at 17:36

## 2025-01-01 RX ADMIN — IPRATROPIUM BROMIDE AND ALBUTEROL SULFATE 3 MILLILITER(S): .5; 2.5 SOLUTION RESPIRATORY (INHALATION) at 04:20

## 2025-01-01 RX ADMIN — FLUCONAZOLE 100 MILLIGRAM(S): 100 TABLET ORAL at 18:10

## 2025-01-01 RX ADMIN — DEXMEDETOMIDINE HYDROCHLORIDE 15.6 MICROGRAM(S)/KG/HR: 4 INJECTION, SOLUTION INTRAVENOUS at 19:34

## 2025-01-01 RX ADMIN — DEXTROMETHORPHAN HBR AND GUAIFENESIN ORAL SOLUTION 20 MILLILITER(S): 10; 100 LIQUID ORAL at 21:40

## 2025-01-01 RX ADMIN — ANTISEPTIC SURGICAL SCRUB 15 MILLILITER(S): 0.04 SOLUTION TOPICAL at 18:14

## 2025-01-01 RX ADMIN — DEXMEDETOMIDINE HYDROCHLORIDE 15.6 MICROGRAM(S)/KG/HR: 4 INJECTION, SOLUTION INTRAVENOUS at 10:17

## 2025-01-01 RX ADMIN — IPRATROPIUM BROMIDE AND ALBUTEROL SULFATE 3 MILLILITER(S): .5; 2.5 SOLUTION RESPIRATORY (INHALATION) at 08:23

## 2025-01-01 RX ADMIN — Medication 40 MILLIGRAM(S): at 05:14

## 2025-01-01 RX ADMIN — IPRATROPIUM BROMIDE AND ALBUTEROL SULFATE 3 MILLILITER(S): .5; 2.5 SOLUTION RESPIRATORY (INHALATION) at 12:22

## 2025-01-01 RX ADMIN — ANTISEPTIC SURGICAL SCRUB 1 APPLICATION(S): 0.04 SOLUTION TOPICAL at 05:45

## 2025-01-01 RX ADMIN — MEROPENEM 100 MILLIGRAM(S): 500 INJECTION INTRAVENOUS at 05:05

## 2025-01-01 RX ADMIN — GABAPENTIN 100 MILLIGRAM(S): 300 CAPSULE ORAL at 23:16

## 2025-01-01 RX ADMIN — GABAPENTIN 100 MILLIGRAM(S): 300 CAPSULE ORAL at 13:00

## 2025-01-01 RX ADMIN — MIDODRINE HYDROCHLORIDE 10 MILLIGRAM(S): 5 TABLET ORAL at 21:28

## 2025-01-01 RX ADMIN — HYDROXYCHLOROQUINE SULFATE 200 MILLIGRAM(S): 200 TABLET ORAL at 17:53

## 2025-01-01 RX ADMIN — Medication 5000 UNIT(S): at 21:10

## 2025-01-01 RX ADMIN — PIPERACILLIN SODIUM AND TAZOBACTAM SODIUM 25 GRAM(S): 2; 250 INJECTION, POWDER, FOR SOLUTION INTRAVENOUS at 21:05

## 2025-01-01 RX ADMIN — DEXMEDETOMIDINE HYDROCHLORIDE 5.19 MICROGRAM(S)/KG/HR: 4 INJECTION, SOLUTION INTRAVENOUS at 00:24

## 2025-01-01 RX ADMIN — Medication 25 MILLIGRAM(S): at 06:06

## 2025-01-01 RX ADMIN — ANTISEPTIC SURGICAL SCRUB 15 MILLILITER(S): 0.04 SOLUTION TOPICAL at 18:31

## 2025-01-01 RX ADMIN — IPRATROPIUM BROMIDE AND ALBUTEROL SULFATE 3 MILLILITER(S): .5; 2.5 SOLUTION RESPIRATORY (INHALATION) at 08:06

## 2025-01-01 RX ADMIN — DEXMEDETOMIDINE HYDROCHLORIDE 15.6 MICROGRAM(S)/KG/HR: 4 INJECTION, SOLUTION INTRAVENOUS at 12:09

## 2025-01-01 RX ADMIN — ACETAMINOPHEN 650 MILLIGRAM(S): 80 SOLUTION/ DROPS ORAL at 19:59

## 2025-01-01 RX ADMIN — FENTANYL CITRATE 50 MICROGRAM(S): 50 INJECTION INTRAMUSCULAR; INTRAVENOUS at 09:34

## 2025-01-01 RX ADMIN — Medication 600 MILLIGRAM(S): at 09:31

## 2025-01-01 RX ADMIN — DEXTROMETHORPHAN HBR AND GUAIFENESIN ORAL SOLUTION 10 MILLILITER(S): 10; 100 LIQUID ORAL at 12:10

## 2025-01-01 RX ADMIN — IPRATROPIUM BROMIDE AND ALBUTEROL SULFATE 3 MILLILITER(S): .5; 2.5 SOLUTION RESPIRATORY (INHALATION) at 11:28

## 2025-01-01 RX ADMIN — Medication 5000 UNIT(S): at 21:26

## 2025-01-01 RX ADMIN — DEXTROMETHORPHAN HBR AND GUAIFENESIN ORAL SOLUTION 10 MILLILITER(S): 10; 100 LIQUID ORAL at 05:18

## 2025-01-01 RX ADMIN — IPRATROPIUM BROMIDE AND ALBUTEROL SULFATE 3 MILLILITER(S): .5; 2.5 SOLUTION RESPIRATORY (INHALATION) at 08:13

## 2025-01-01 RX ADMIN — SODIUM CHLORIDE 50 MILLILITER(S): 9 INJECTION, SOLUTION INTRAVENOUS at 12:01

## 2025-01-01 RX ADMIN — ACETAMINOPHEN 650 MILLIGRAM(S): 160 SUSPENSION ORAL at 09:43

## 2025-01-01 RX ADMIN — BUDESONIDE 0.5 MILLIGRAM(S): 9 TABLET, EXTENDED RELEASE ORAL at 08:54

## 2025-01-01 RX ADMIN — CASPOFUNGIN ACETATE 260 MILLIGRAM(S): 5 INJECTION, POWDER, LYOPHILIZED, FOR SOLUTION INTRAVENOUS at 00:18

## 2025-01-01 RX ADMIN — DEXTROMETHORPHAN HBR AND GUAIFENESIN ORAL SOLUTION 10 MILLILITER(S): 10; 100 LIQUID ORAL at 05:08

## 2025-01-01 RX ADMIN — SODIUM CHLORIDE 1000 MILLILITER(S): 9 INJECTION, SOLUTION INTRAMUSCULAR; INTRAVENOUS; SUBCUTANEOUS at 01:30

## 2025-01-01 RX ADMIN — MEROPENEM 100 MILLIGRAM(S): 500 INJECTION INTRAVENOUS at 17:07

## 2025-01-01 RX ADMIN — ACETAMINOPHEN 650 MILLIGRAM(S): 160 SUSPENSION ORAL at 23:10

## 2025-01-01 RX ADMIN — Medication 1000 UNIT(S)/HR: at 05:16

## 2025-01-01 RX ADMIN — Medication 81 MILLIGRAM(S): at 12:50

## 2025-01-01 RX ADMIN — Medication 100 MILLIGRAM(S): at 06:03

## 2025-01-01 RX ADMIN — FENTANYL CITRATE 50 MICROGRAM(S): 50 INJECTION INTRAMUSCULAR; INTRAVENOUS at 05:42

## 2025-01-01 RX ADMIN — PANTOPRAZOLE 40 MILLIGRAM(S): 20 TABLET, DELAYED RELEASE ORAL at 05:18

## 2025-01-01 RX ADMIN — FENTANYL CITRATE 50 MICROGRAM(S): 50 INJECTION INTRAMUSCULAR; INTRAVENOUS at 12:10

## 2025-01-01 RX ADMIN — ROPINIROLE HYDROCHLORIDE 2 MILLIGRAM(S): 1 TABLET, FILM COATED ORAL at 06:04

## 2025-01-01 RX ADMIN — ACETAMINOPHEN 400 MILLIGRAM(S): 160 SUSPENSION ORAL at 09:16

## 2025-01-01 RX ADMIN — Medication 5000 UNIT(S): at 13:05

## 2025-01-01 RX ADMIN — SODIUM CHLORIDE 50 MILLILITER(S): 9 INJECTION, SOLUTION INTRAMUSCULAR; INTRAVENOUS; SUBCUTANEOUS at 03:55

## 2025-01-01 RX ADMIN — ACETAMINOPHEN 1000 MILLIGRAM(S): 160 SUSPENSION ORAL at 10:15

## 2025-01-01 RX ADMIN — IPRATROPIUM BROMIDE AND ALBUTEROL SULFATE 3 MILLILITER(S): .5; 2.5 SOLUTION RESPIRATORY (INHALATION) at 12:34

## 2025-01-01 RX ADMIN — MONTELUKAST SODIUM 10 MILLIGRAM(S): 10 TABLET, FILM COATED ORAL at 12:50

## 2025-01-01 RX ADMIN — DEXMEDETOMIDINE HYDROCHLORIDE 15.6 MICROGRAM(S)/KG/HR: 4 INJECTION, SOLUTION INTRAVENOUS at 13:53

## 2025-01-01 RX ADMIN — SODIUM CHLORIDE 75 MILLILITER(S): 9 INJECTION, SOLUTION INTRAVENOUS at 16:20

## 2025-01-01 RX ADMIN — Medication 5000 UNIT(S): at 21:20

## 2025-01-01 RX ADMIN — DEXTROMETHORPHAN HBR AND GUAIFENESIN ORAL SOLUTION 10 MILLILITER(S): 10; 100 LIQUID ORAL at 17:06

## 2025-01-01 RX ADMIN — ACETAMINOPHEN 650 MILLIGRAM(S): 80 SOLUTION/ DROPS ORAL at 18:59

## 2025-01-01 RX ADMIN — DEXTROMETHORPHAN HBR AND GUAIFENESIN ORAL SOLUTION 10 MILLILITER(S): 10; 100 LIQUID ORAL at 23:15

## 2025-01-01 RX ADMIN — FENTANYL CITRATE 50 MICROGRAM(S): 50 INJECTION INTRAMUSCULAR; INTRAVENOUS at 14:54

## 2025-01-01 RX ADMIN — Medication 25 GRAM(S): at 12:20

## 2025-01-01 RX ADMIN — CEFTRIAXONE SODIUM 100 MILLIGRAM(S): 1 INJECTION, POWDER, FOR SOLUTION INTRAMUSCULAR; INTRAVENOUS at 01:55

## 2025-01-01 RX ADMIN — ACETAMINOPHEN 400 MILLIGRAM(S): 160 SUSPENSION ORAL at 06:03

## 2025-01-01 RX ADMIN — IOHEXOL 30 MILLILITER(S): 350 INJECTION, SOLUTION INTRAVENOUS at 15:39

## 2025-01-01 RX ADMIN — Medication 0.5 MILLIGRAM(S): at 00:15

## 2025-01-01 RX ADMIN — ALBUMIN HUMAN 50 MILLILITER(S): 50 SOLUTION INTRAVENOUS at 22:05

## 2025-01-01 RX ADMIN — ANTISEPTIC SURGICAL SCRUB 15 MILLILITER(S): 0.04 SOLUTION TOPICAL at 17:57

## 2025-01-01 RX ADMIN — IPRATROPIUM BROMIDE AND ALBUTEROL SULFATE 3 MILLILITER(S): .5; 2.5 SOLUTION RESPIRATORY (INHALATION) at 19:20

## 2025-01-01 RX ADMIN — ACETAMINOPHEN 650 MILLIGRAM(S): 160 SUSPENSION ORAL at 11:50

## 2025-01-01 RX ADMIN — DEXTROMETHORPHAN HBR AND GUAIFENESIN ORAL SOLUTION 10 MILLILITER(S): 10; 100 LIQUID ORAL at 17:44

## 2025-01-01 RX ADMIN — CEFTOLOZANE AND TAZOBACTAM 100 MILLIGRAM(S): 1; .5 INJECTION, POWDER, LYOPHILIZED, FOR SOLUTION INTRAVENOUS at 19:35

## 2025-01-01 RX ADMIN — FENTANYL CITRATE 25 MICROGRAM(S): 50 INJECTION INTRAMUSCULAR; INTRAVENOUS at 17:51

## 2025-01-01 RX ADMIN — PIPERACILLIN SODIUM AND TAZOBACTAM SODIUM 25 GRAM(S): 2; 250 INJECTION, POWDER, FOR SOLUTION INTRAVENOUS at 13:06

## 2025-01-01 RX ADMIN — IPRATROPIUM BROMIDE AND ALBUTEROL SULFATE 3 MILLILITER(S): .5; 2.5 SOLUTION RESPIRATORY (INHALATION) at 01:25

## 2025-01-01 RX ADMIN — POTASSIUM CHLORIDE 100 MILLIEQUIVALENT(S): 750 TABLET, EXTENDED RELEASE ORAL at 12:18

## 2025-01-01 RX ADMIN — Medication 50 MILLILITER(S): at 16:01

## 2025-01-01 RX ADMIN — PANTOPRAZOLE 40 MILLIGRAM(S): 20 TABLET, DELAYED RELEASE ORAL at 05:20

## 2025-01-01 RX ADMIN — Medication 40 MILLIGRAM(S): at 01:11

## 2025-01-01 RX ADMIN — IPRATROPIUM BROMIDE AND ALBUTEROL SULFATE 3 MILLILITER(S): .5; 2.5 SOLUTION RESPIRATORY (INHALATION) at 19:15

## 2025-01-01 RX ADMIN — DEXMEDETOMIDINE HYDROCHLORIDE 5.19 MICROGRAM(S)/KG/HR: 4 INJECTION, SOLUTION INTRAVENOUS at 02:52

## 2025-01-01 RX ADMIN — Medication 600 MILLIGRAM(S): at 06:03

## 2025-01-01 RX ADMIN — FENTANYL CITRATE 50 MICROGRAM(S): 50 INJECTION INTRAMUSCULAR; INTRAVENOUS at 17:42

## 2025-01-01 RX ADMIN — CEFTRIAXONE SODIUM 100 MILLIGRAM(S): 1 INJECTION, POWDER, FOR SOLUTION INTRAMUSCULAR; INTRAVENOUS at 17:47

## 2025-01-01 RX ADMIN — SODIUM CHLORIDE 500 MILLILITER(S): 9 INJECTION, SOLUTION INTRAVENOUS at 13:50

## 2025-01-01 RX ADMIN — FENTANYL CITRATE 25 MICROGRAM(S): 50 INJECTION INTRAMUSCULAR; INTRAVENOUS at 18:15

## 2025-01-01 RX ADMIN — HYDROXYCHLOROQUINE SULFATE 200 MILLIGRAM(S): 200 TABLET ORAL at 06:07

## 2025-01-01 RX ADMIN — ACETAMINOPHEN 400 MILLIGRAM(S): 160 SUSPENSION ORAL at 16:56

## 2025-01-01 RX ADMIN — PIPERACILLIN SODIUM AND TAZOBACTAM SODIUM 25 GRAM(S): 2; 250 INJECTION, POWDER, FOR SOLUTION INTRAVENOUS at 13:18

## 2025-01-01 RX ADMIN — ROPINIROLE HYDROCHLORIDE 2 MILLIGRAM(S): 1 TABLET, FILM COATED ORAL at 17:49

## 2025-01-01 RX ADMIN — ACETAMINOPHEN 1000 MILLIGRAM(S): 160 SUSPENSION ORAL at 05:05

## 2025-01-01 RX ADMIN — Medication 600 MILLIGRAM(S): at 05:14

## 2025-01-01 RX ADMIN — Medication 300 UNIT(S)/HR: at 16:54

## 2025-01-01 RX ADMIN — ACETAMINOPHEN 400 MILLIGRAM(S): 160 SUSPENSION ORAL at 07:36

## 2025-01-01 RX ADMIN — IOHEXOL 30 MILLILITER(S): 350 INJECTION, SOLUTION INTRAVENOUS at 23:29

## 2025-01-01 RX ADMIN — FENTANYL CITRATE 75 MICROGRAM(S): 50 INJECTION INTRAMUSCULAR; INTRAVENOUS at 05:15

## 2025-01-01 RX ADMIN — SODIUM BICARBONATE 50 MILLIEQUIVALENT(S): 42 INJECTION, SOLUTION INTRAVENOUS at 16:18

## 2025-01-01 RX ADMIN — Medication 25 MILLIGRAM(S): at 17:53

## 2025-01-01 RX ADMIN — POTASSIUM CHLORIDE 100 MILLIEQUIVALENT(S): 750 TABLET, EXTENDED RELEASE ORAL at 08:44

## 2025-01-01 RX ADMIN — QUETIAPINE FUMARATE 12.5 MILLIGRAM(S): 300 TABLET ORAL at 22:20

## 2025-01-01 RX ADMIN — DEXTROMETHORPHAN HBR AND GUAIFENESIN ORAL SOLUTION 10 MILLILITER(S): 10; 100 LIQUID ORAL at 13:11

## 2025-01-01 RX ADMIN — PANTOPRAZOLE 40 MILLIGRAM(S): 20 TABLET, DELAYED RELEASE ORAL at 05:09

## 2025-01-01 RX ADMIN — ANTISEPTIC SURGICAL SCRUB 15 MILLILITER(S): 0.04 SOLUTION TOPICAL at 19:11

## 2025-01-01 RX ADMIN — FENTANYL CITRATE 50 MICROGRAM(S): 50 INJECTION INTRAMUSCULAR; INTRAVENOUS at 23:32

## 2025-01-01 RX ADMIN — ANTISEPTIC SURGICAL SCRUB 15 MILLILITER(S): 0.04 SOLUTION TOPICAL at 05:57

## 2025-01-01 RX ADMIN — DEXMEDETOMIDINE HYDROCHLORIDE 15.6 MICROGRAM(S)/KG/HR: 4 INJECTION, SOLUTION INTRAVENOUS at 18:16

## 2025-01-01 RX ADMIN — Medication 5000 UNIT(S): at 05:12

## 2025-01-01 RX ADMIN — ANTISEPTIC SURGICAL SCRUB 15 MILLILITER(S): 0.04 SOLUTION TOPICAL at 18:32

## 2025-01-01 RX ADMIN — PANTOPRAZOLE 40 MILLIGRAM(S): 20 TABLET, DELAYED RELEASE ORAL at 17:06

## 2025-01-01 RX ADMIN — Medication 25 GRAM(S): at 01:16

## 2025-01-01 RX ADMIN — BUDESONIDE 0.5 MILLIGRAM(S): 9 TABLET, EXTENDED RELEASE ORAL at 20:19

## 2025-01-01 RX ADMIN — DEXTROMETHORPHAN HBR AND GUAIFENESIN ORAL SOLUTION 10 MILLILITER(S): 10; 100 LIQUID ORAL at 18:10

## 2025-01-01 RX ADMIN — Medication 5000 UNIT(S): at 05:14

## 2025-01-01 RX ADMIN — DEXTROMETHORPHAN HBR AND GUAIFENESIN ORAL SOLUTION 10 MILLILITER(S): 10; 100 LIQUID ORAL at 05:54

## 2025-01-01 RX ADMIN — ACETAMINOPHEN 400 MILLIGRAM(S): 160 SUSPENSION ORAL at 17:27

## 2025-01-01 RX ADMIN — ANTISEPTIC SURGICAL SCRUB 1 APPLICATION(S): 0.04 SOLUTION TOPICAL at 05:21

## 2025-01-01 RX ADMIN — ALBUMIN HUMAN 50 MILLILITER(S): 50 SOLUTION INTRAVENOUS at 17:29

## 2025-01-01 RX ADMIN — IPRATROPIUM BROMIDE AND ALBUTEROL SULFATE 3 MILLILITER(S): .5; 2.5 SOLUTION RESPIRATORY (INHALATION) at 07:54

## 2025-01-01 RX ADMIN — ANTISEPTIC SURGICAL SCRUB 1 APPLICATION(S): 0.04 SOLUTION TOPICAL at 05:55

## 2025-01-01 RX ADMIN — Medication 600 UNIT(S)/HR: at 23:36

## 2025-01-01 RX ADMIN — IPRATROPIUM BROMIDE AND ALBUTEROL SULFATE 3 MILLILITER(S): .5; 2.5 SOLUTION RESPIRATORY (INHALATION) at 19:44

## 2025-01-01 RX ADMIN — POTASSIUM CHLORIDE 100 MILLIEQUIVALENT(S): 750 TABLET, EXTENDED RELEASE ORAL at 10:55

## 2025-01-01 RX ADMIN — PIPERACILLIN SODIUM AND TAZOBACTAM SODIUM 25 GRAM(S): 2; 250 INJECTION, POWDER, FOR SOLUTION INTRAVENOUS at 16:20

## 2025-01-01 RX ADMIN — ANTISEPTIC SURGICAL SCRUB 1 APPLICATION(S): 0.04 SOLUTION TOPICAL at 05:08

## 2025-01-01 RX ADMIN — ALBUMIN HUMAN 50 MILLILITER(S): 50 SOLUTION INTRAVENOUS at 12:32

## 2025-01-01 RX ADMIN — DEXTROMETHORPHAN HBR AND GUAIFENESIN ORAL SOLUTION 10 MILLILITER(S): 10; 100 LIQUID ORAL at 23:42

## 2025-01-01 RX ADMIN — Medication 5000 UNIT(S): at 05:08

## 2025-01-01 RX ADMIN — FENTANYL CITRATE 50 MICROGRAM(S): 50 INJECTION INTRAMUSCULAR; INTRAVENOUS at 05:05

## 2025-01-01 RX ADMIN — IPRATROPIUM BROMIDE AND ALBUTEROL SULFATE 3 MILLILITER(S): .5; 2.5 SOLUTION RESPIRATORY (INHALATION) at 23:34

## 2025-01-01 RX ADMIN — Medication 0 UNIT(S)/HR: at 15:52

## 2025-01-01 RX ADMIN — Medication 25 MILLIGRAM(S): at 06:02

## 2025-01-01 RX ADMIN — SODIUM CHLORIDE 125 MILLILITER(S): 9 INJECTION, SOLUTION INTRAVENOUS at 22:31

## 2025-01-01 RX ADMIN — FENTANYL CITRATE 50 MICROGRAM(S): 50 INJECTION INTRAMUSCULAR; INTRAVENOUS at 01:56

## 2025-01-01 RX ADMIN — Medication 5000 UNIT(S): at 13:42

## 2025-01-01 RX ADMIN — ACETAMINOPHEN 1000 MILLIGRAM(S): 160 SUSPENSION ORAL at 18:30

## 2025-01-01 RX ADMIN — Medication 300 UNIT(S)/HR: at 19:33

## 2025-01-01 RX ADMIN — FENTANYL CITRATE 50 MICROGRAM(S): 50 INJECTION INTRAMUSCULAR; INTRAVENOUS at 04:54

## 2025-01-01 RX ADMIN — SODIUM CHLORIDE 50 MILLILITER(S): 9 INJECTION, SOLUTION INTRAVENOUS at 15:00

## 2025-01-01 RX ADMIN — PANTOPRAZOLE 40 MILLIGRAM(S): 20 TABLET, DELAYED RELEASE ORAL at 18:04

## 2025-01-01 RX ADMIN — IPRATROPIUM BROMIDE AND ALBUTEROL SULFATE 3 MILLILITER(S): .5; 2.5 SOLUTION RESPIRATORY (INHALATION) at 00:52

## 2025-01-01 RX ADMIN — ANTISEPTIC SURGICAL SCRUB 15 MILLILITER(S): 0.04 SOLUTION TOPICAL at 06:29

## 2025-01-01 RX ADMIN — Medication 5000 UNIT(S): at 14:18

## 2025-01-01 RX ADMIN — Medication 500 UNIT(S)/HR: at 08:39

## 2025-01-01 RX ADMIN — MEROPENEM 100 MILLIGRAM(S): 500 INJECTION INTRAVENOUS at 17:44

## 2025-01-01 RX ADMIN — PANTOPRAZOLE 40 MILLIGRAM(S): 20 TABLET, DELAYED RELEASE ORAL at 17:41

## 2025-01-01 RX ADMIN — Medication 1.3 MG/KG/HR: at 01:28

## 2025-01-01 RX ADMIN — PIPERACILLIN SODIUM AND TAZOBACTAM SODIUM 25 GRAM(S): 2; 250 INJECTION, POWDER, FOR SOLUTION INTRAVENOUS at 21:40

## 2025-01-01 RX ADMIN — ACETAMINOPHEN 400 MILLIGRAM(S): 160 SUSPENSION ORAL at 18:54

## 2025-01-01 RX ADMIN — FLUCONAZOLE 100 MILLIGRAM(S): 100 TABLET ORAL at 20:05

## 2025-01-01 RX ADMIN — Medication 100 MILLIGRAM(S): at 13:09

## 2025-01-01 RX ADMIN — FLUCONAZOLE 100 MILLIGRAM(S): 100 TABLET ORAL at 18:21

## 2025-01-01 RX ADMIN — PROPOFOL 6.23 MICROGRAM(S)/KG/MIN: 10 INJECTION, EMULSION INTRAVENOUS at 19:25

## 2025-01-01 RX ADMIN — NOREPINEPHRINE BITARTRATE 4.87 MICROGRAM(S)/KG/MIN: 1 INJECTION, SOLUTION, CONCENTRATE INTRAVENOUS at 22:19

## 2025-01-01 RX ADMIN — Medication 1 TABLET(S): at 12:44

## 2025-01-01 RX ADMIN — ANTISEPTIC SURGICAL SCRUB 15 MILLILITER(S): 0.04 SOLUTION TOPICAL at 05:06

## 2025-01-01 RX ADMIN — ACETAMINOPHEN 1000 MILLIGRAM(S): 160 SUSPENSION ORAL at 06:36

## 2025-01-01 RX ADMIN — NOREPINEPHRINE BITARTRATE 4.87 MICROGRAM(S)/KG/MIN: 1 INJECTION, SOLUTION, CONCENTRATE INTRAVENOUS at 09:50

## 2025-01-01 RX ADMIN — PANTOPRAZOLE 40 MILLIGRAM(S): 20 TABLET, DELAYED RELEASE ORAL at 05:08

## 2025-01-01 RX ADMIN — PROPOFOL 6.23 MICROGRAM(S)/KG/MIN: 10 INJECTION, EMULSION INTRAVENOUS at 05:13

## 2025-01-01 RX ADMIN — PIPERACILLIN SODIUM AND TAZOBACTAM SODIUM 25 GRAM(S): 2; 250 INJECTION, POWDER, FOR SOLUTION INTRAVENOUS at 05:12

## 2025-01-01 RX ADMIN — Medication 6 UNIT(S)/MIN: at 21:01

## 2025-01-01 RX ADMIN — OXYCODONE HYDROCHLORIDE 5 MILLIGRAM(S): 30 TABLET ORAL at 01:40

## 2025-01-01 RX ADMIN — SODIUM CHLORIDE 75 MILLILITER(S): 9 INJECTION, SOLUTION INTRAVENOUS at 04:10

## 2025-01-01 RX ADMIN — DEXMEDETOMIDINE HYDROCHLORIDE 5.19 MICROGRAM(S)/KG/HR: 4 INJECTION, SOLUTION INTRAVENOUS at 18:13

## 2025-01-01 RX ADMIN — FENTANYL CITRATE 50 MICROGRAM(S): 50 INJECTION INTRAMUSCULAR; INTRAVENOUS at 16:57

## 2025-01-01 RX ADMIN — FENTANYL CITRATE 50 MICROGRAM(S): 50 INJECTION INTRAMUSCULAR; INTRAVENOUS at 19:37

## 2025-01-01 RX ADMIN — ALBUMIN HUMAN 50 MILLILITER(S): 50 SOLUTION INTRAVENOUS at 05:13

## 2025-01-01 RX ADMIN — PIPERACILLIN SODIUM AND TAZOBACTAM SODIUM 25 GRAM(S): 2; 250 INJECTION, POWDER, FOR SOLUTION INTRAVENOUS at 22:19

## 2025-01-01 RX ADMIN — PANTOPRAZOLE 40 MILLIGRAM(S): 20 TABLET, DELAYED RELEASE ORAL at 16:49

## 2025-01-01 RX ADMIN — Medication 25 GRAM(S): at 11:50

## 2025-01-01 RX ADMIN — FENTANYL CITRATE 2.6 MICROGRAM(S)/KG/HR: 50 INJECTION INTRAMUSCULAR; INTRAVENOUS at 10:57

## 2025-01-01 RX ADMIN — HYDROMORPHONE HYDROCHLORIDE 0.5 MILLIGRAM(S): 4 INJECTION, SOLUTION INTRAMUSCULAR; INTRAVENOUS; SUBCUTANEOUS at 09:45

## 2025-01-01 RX ADMIN — MEROPENEM 100 MILLIGRAM(S): 500 INJECTION INTRAVENOUS at 05:26

## 2025-01-01 RX ADMIN — IPRATROPIUM BROMIDE AND ALBUTEROL SULFATE 3 MILLILITER(S): .5; 2.5 SOLUTION RESPIRATORY (INHALATION) at 12:56

## 2025-01-01 RX ADMIN — ACETAMINOPHEN 1000 MILLIGRAM(S): 160 SUSPENSION ORAL at 23:30

## 2025-01-01 RX ADMIN — SODIUM CHLORIDE 1000 MILLILITER(S): 9 INJECTION, SOLUTION INTRAVENOUS at 10:08

## 2025-01-01 RX ADMIN — ANTISEPTIC SURGICAL SCRUB 1 APPLICATION(S): 0.04 SOLUTION TOPICAL at 05:19

## 2025-01-01 RX ADMIN — FLUCONAZOLE 100 MILLIGRAM(S): 100 TABLET ORAL at 17:55

## 2025-01-01 RX ADMIN — Medication 25 GRAM(S): at 12:31

## 2025-01-01 RX ADMIN — FENTANYL CITRATE 50 MICROGRAM(S): 50 INJECTION INTRAMUSCULAR; INTRAVENOUS at 17:39

## 2025-01-01 RX ADMIN — ACETAMINOPHEN 650 MILLIGRAM(S): 160 SUSPENSION ORAL at 16:48

## 2025-01-01 RX ADMIN — SODIUM BICARBONATE 25 MILLIEQUIVALENT(S): 84 INJECTION, SOLUTION INTRAVENOUS at 08:07

## 2025-01-01 RX ADMIN — ACETAMINOPHEN 1000 MILLIGRAM(S): 160 SUSPENSION ORAL at 11:54

## 2025-01-01 RX ADMIN — ACETAMINOPHEN 1000 MILLIGRAM(S): 160 SUSPENSION ORAL at 19:15

## 2025-01-01 RX ADMIN — DEXMEDETOMIDINE HYDROCHLORIDE 5.19 MICROGRAM(S)/KG/HR: 4 INJECTION, SOLUTION INTRAVENOUS at 09:20

## 2025-01-01 RX ADMIN — Medication 25 GRAM(S): at 09:21

## 2025-01-01 RX ADMIN — DEXTROMETHORPHAN HBR AND GUAIFENESIN ORAL SOLUTION 10 MILLILITER(S): 10; 100 LIQUID ORAL at 05:07

## 2025-01-01 RX ADMIN — QUETIAPINE FUMARATE 12.5 MILLIGRAM(S): 300 TABLET ORAL at 21:40

## 2025-01-01 RX ADMIN — FLUCONAZOLE 100 MILLIGRAM(S): 100 TABLET ORAL at 18:53

## 2025-01-01 RX ADMIN — IPRATROPIUM BROMIDE AND ALBUTEROL SULFATE 3 MILLILITER(S): .5; 2.5 SOLUTION RESPIRATORY (INHALATION) at 16:38

## 2025-01-01 RX ADMIN — Medication 100 MILLIGRAM(S): at 23:17

## 2025-01-01 RX ADMIN — Medication 0.5 MILLIGRAM(S): at 23:55

## 2025-01-01 RX ADMIN — DEXMEDETOMIDINE HYDROCHLORIDE 2.6 MICROGRAM(S)/KG/HR: 4 INJECTION, SOLUTION INTRAVENOUS at 05:19

## 2025-01-01 RX ADMIN — Medication 5000 UNIT(S): at 05:15

## 2025-01-01 RX ADMIN — MEROPENEM 100 MILLIGRAM(S): 500 INJECTION INTRAVENOUS at 18:04

## 2025-01-01 RX ADMIN — HYDROMORPHONE HYDROCHLORIDE 0.5 MILLIGRAM(S): 4 INJECTION, SOLUTION INTRAMUSCULAR; INTRAVENOUS; SUBCUTANEOUS at 10:41

## 2025-01-01 RX ADMIN — Medication 2 MILLIGRAM(S): at 02:23

## 2025-01-01 RX ADMIN — IPRATROPIUM BROMIDE AND ALBUTEROL SULFATE 3 MILLILITER(S): .5; 2.5 SOLUTION RESPIRATORY (INHALATION) at 09:38

## 2025-01-01 RX ADMIN — DEXMEDETOMIDINE HYDROCHLORIDE 5.19 MICROGRAM(S)/KG/HR: 4 INJECTION, SOLUTION INTRAVENOUS at 05:08

## 2025-01-01 RX ADMIN — Medication 1.3 MG/KG/HR: at 23:52

## 2025-01-01 RX ADMIN — DEXMEDETOMIDINE HYDROCHLORIDE 5.19 MICROGRAM(S)/KG/HR: 4 INJECTION, SOLUTION INTRAVENOUS at 21:01

## 2025-01-01 RX ADMIN — POTASSIUM CHLORIDE 100 MILLIEQUIVALENT(S): 750 TABLET, EXTENDED RELEASE ORAL at 08:42

## 2025-01-01 RX ADMIN — MIDODRINE HYDROCHLORIDE 10 MILLIGRAM(S): 5 TABLET ORAL at 13:05

## 2025-01-01 RX ADMIN — BUDESONIDE 0.5 MILLIGRAM(S): 9 TABLET, EXTENDED RELEASE ORAL at 08:01

## 2025-01-01 RX ADMIN — DEXTROMETHORPHAN HBR AND GUAIFENESIN ORAL SOLUTION 10 MILLILITER(S): 10; 100 LIQUID ORAL at 23:21

## 2025-01-01 RX ADMIN — BUDESONIDE 0.5 MILLIGRAM(S): 9 TABLET, EXTENDED RELEASE ORAL at 07:50

## 2025-01-01 RX ADMIN — IPRATROPIUM BROMIDE AND ALBUTEROL SULFATE 3 MILLILITER(S): .5; 2.5 SOLUTION RESPIRATORY (INHALATION) at 19:09

## 2025-01-01 RX ADMIN — DEXTROMETHORPHAN HBR AND GUAIFENESIN ORAL SOLUTION 10 MILLILITER(S): 10; 100 LIQUID ORAL at 11:59

## 2025-01-01 RX ADMIN — BUDESONIDE 0.5 MILLIGRAM(S): 9 TABLET, EXTENDED RELEASE ORAL at 08:16

## 2025-01-01 RX ADMIN — DEXTROMETHORPHAN HBR AND GUAIFENESIN ORAL SOLUTION 10 MILLILITER(S): 10; 100 LIQUID ORAL at 12:29

## 2025-01-01 RX ADMIN — PANTOPRAZOLE 40 MILLIGRAM(S): 20 TABLET, DELAYED RELEASE ORAL at 17:42

## 2025-01-01 RX ADMIN — IPRATROPIUM BROMIDE AND ALBUTEROL SULFATE 3 MILLILITER(S): .5; 2.5 SOLUTION RESPIRATORY (INHALATION) at 17:25

## 2025-01-01 RX ADMIN — MEROPENEM 100 MILLIGRAM(S): 500 INJECTION INTRAVENOUS at 05:18

## 2025-01-01 RX ADMIN — AZITHROMYCIN MONOHYDRATE 255 MILLIGRAM(S): 200 POWDER, FOR SUSPENSION ORAL at 18:59

## 2025-01-01 RX ADMIN — Medication 600 UNIT(S)/HR: at 07:05

## 2025-01-01 RX ADMIN — BUDESONIDE 0.5 MILLIGRAM(S): 9 TABLET, EXTENDED RELEASE ORAL at 08:24

## 2025-01-01 RX ADMIN — CASPOFUNGIN ACETATE 260 MILLIGRAM(S): 5 INJECTION, POWDER, LYOPHILIZED, FOR SOLUTION INTRAVENOUS at 23:09

## 2025-01-01 RX ADMIN — MEROPENEM 100 MILLIGRAM(S): 500 INJECTION INTRAVENOUS at 18:09

## 2025-01-01 RX ADMIN — IPRATROPIUM BROMIDE AND ALBUTEROL SULFATE 3 MILLILITER(S): .5; 2.5 SOLUTION RESPIRATORY (INHALATION) at 13:39

## 2025-01-01 RX ADMIN — Medication 40 MILLIGRAM(S): at 11:51

## 2025-01-01 RX ADMIN — MEROPENEM 100 MILLIGRAM(S): 500 INJECTION INTRAVENOUS at 17:01

## 2025-01-01 RX ADMIN — METHYLPREDNISOLONE 125 MILLIGRAM(S): 4 TABLET ORAL at 16:12

## 2025-01-01 RX ADMIN — HYDROXYCHLOROQUINE SULFATE 200 MILLIGRAM(S): 200 TABLET ORAL at 06:03

## 2025-01-01 RX ADMIN — ANTISEPTIC SURGICAL SCRUB 15 MILLILITER(S): 0.04 SOLUTION TOPICAL at 17:42

## 2025-01-01 RX ADMIN — SODIUM CHLORIDE 500 MILLILITER(S): 9 INJECTION, SOLUTION INTRAVENOUS at 18:38

## 2025-01-01 NOTE — ED PROVIDER NOTE - PHYSICAL EXAMINATION
Vital signs as available reviewed.  General:  No acute distress.  Head:  Normocephalic, atraumatic.  Eyes:  Conjunctiva pink, no icterus.  Cardiovascular:  mild tachycardia  Respiratory:  Clear to auscultation, good air entry bilaterally. moderate increased work of breathing.  Abdomen:  Soft, non-tender.  Musculoskeletal:  No obvious deformity.  Neurologic: Alert and oriented, moving all extremities.  Skin:  Warm and dry.

## 2025-01-01 NOTE — ED ADULT NURSE NOTE - OBJECTIVE STATEMENT
79 yr old female arrives to ED c/o worsening sob for x6 weeks hx of lung cancer, pt maintaining saturation on 2lpm nasal  canula, pt also admits to chills and generalized weakness, Pt notes no chest pain or sob at this time, Pt placed on bedside cardiac monitor. Aristides GUEVARA

## 2025-01-01 NOTE — ED PROVIDER NOTE - CLINICAL SUMMARY MEDICAL DECISION MAKING FREE TEXT BOX
Here with several weeks of shortness of breath, cough, increased sputum production, abdominal pain, diarrhea. differential diagnosis inclusive of PNA, COPD, cancer recurrence, PE, ACS less likely. Check labs, XR, CT, treat symptoms as needed, admission considered.

## 2025-01-01 NOTE — ED PROVIDER NOTE - OBJECTIVE STATEMENT
79 F history lung CA in remission, RA, HTN here complaining of shortness of breath and cough with green sputum x 6 weeks. Feeling hot and cold but denies fevers, chills. + chest pain on right and now central tightness with cough x1 week. patient with intermittent diarrhea and abdominal pain. reports history of diverticulitis. patient previously using 2 lpm at night, now using oxygen all the time. patient given z pack then course of doxycycline over a month ago. patient with reduced PO intake as per family. PMD/Pulm [place].

## 2025-01-01 NOTE — ED ADULT NURSE NOTE - EXTENSIONS OF SELF_ADULT
----- Message from Corazon Bautista sent at 8/3/2022  3:57 PM CDT -----  Type:  Patient Returning Call    Who Called:pt     Who Left Message for Patient:Pinky Roa MA       Does the patient know what this is regarding?:yes to schedule f/u with Sujata Yuan NP    Would the patient rather a call back or a response via [facility]? Call back     Best Call Back Number:[phone] ([place])     Additional Information:        
Spoke with patient to schedule a follow up visit with Sujata Yuan NP Appointment made for August 19 at 10 am for seizure f/u  
None

## 2025-01-01 NOTE — ED PROVIDER NOTE - CADM POA PRESS ULCER
Chronic kidney disease is secondary to diabetes mellitus.  The patient has stage III chronic kidney disease.  I will continue to monitor BUN, creatinine, and GFR.  The patient was encouraged to avoid all nonsteroidal anti-inflammatory agents (i.e. Advil, Aleve, Motrin, Naprosyn, and Celebrex).   No

## 2025-01-01 NOTE — ED PROVIDER NOTE - CARE PLAN
Principal Discharge DX:	PNA (pneumonia)  Secondary Diagnosis:	Acute respiratory failure with hypoxia  Secondary Diagnosis:	Delirium   1

## 2025-01-01 NOTE — ED ADULT NURSE REASSESSMENT NOTE - NS ED NURSE REASSESS COMMENT FT1
pt. started to feel anxious and jittery movement,   informed Dr. Mcneil about the status pt.,     Dr. Mcneil came and examine the pt.   spoke to the ,   as per Dr. Mcneil he will order Ativan.

## 2025-01-01 NOTE — ED PROVIDER NOTE - PROGRESS NOTE DETAILS
After patient received steroids, benadryl patient with increasing agitation, reports increased anxiety- takes something for anxiety at home. patient offered ativan for anxiolysis and accepts. After ativan patient somnolent. Will get ABG to ensure no CO2 retention.    CTA negative for PE, + PNA. patient on 5LMP O2. Will admit.

## 2025-01-01 NOTE — ED ADULT NURSE REASSESSMENT NOTE - NS ED NURSE REASSESS COMMENT FT1
received pt. from previous RN - Avery JOHNSTON, pt. is on bed with  on bed side, pt. is on O2 support at 5LPM via NC w/ Humidifier, alert and oriented, attached on monitor, for CT Chest Angio and for Pre meds. w/ Benadryl, safety maintained and apply.

## 2025-01-02 NOTE — CONSULT NOTE ADULT - ASSESSMENT
79 F history lung CA in remission, RA, HTN here complaining of shortness of breath and cough with green sputum x 6 weeks. Feeling hot and cold but denies fevers, chills. + chest pain on right and now central tightness with cough x1 week. 79 F history lung CA in remission, RA, HTN here complaining of shortness of breath and cough with green sputum x 6 weeks. Feeling hot and cold but denies fevers, chills. + chest pain on right and now central tightness with cough x1 week.    pna  atelectasis  lung ca  RA  HTN  OP  OA  Ex Smoker  hx of Lung ca with Post op changes  eval for LRTI - PNA -   Anxiety    abx for PNA LRTI  prednisone - inhaler -nebs for COPD component  cough rx regimen  o2 support  goal sat > 88 pct  I sissy  oral hygiene  skin care  cvs rx regimen  BP control  nutritional status assessment  CT neg for PE  spoke with   pt follows with Dr Galan as outpatient, pulm med - has hx of Lung ca and operative intervention  GOC discussion

## 2025-01-02 NOTE — H&P ADULT - HISTORY OF PRESENT ILLNESS
This is a 79 F history lung CA in remission, RA, HTN, COPD on home O2 at night, here complaining of shortness of breath and cough with green sputum x 6 weeks. Feeling hot and cold but denies fevers, chills. + chest pain on right and now central tightness with cough x1 week. Patient with intermittent diarrhea and abdominal pain. Reports history of diverticulitis. Patient previously using 2L NC at night, now using oxygen all the time. Patient given z pack then course of doxycycline over a month ago. Patient with reduced PO intake as per family.

## 2025-01-02 NOTE — GOALS OF CARE CONVERSATION - ADVANCED CARE PLANNING - CONVERSATION DETAILS
PC team spoke to pt and her HCP maylin about events leading to hospitalization and diagnosis Hx lung Ca, RA ,COPD on O2 at night, Inquired about her wishes for escalation of medical care specifically cardiopulmonary resuscitation and intubation .Explained process in detail and probable outcome. Pt states she wants neither attempted agrees to DNR/DNI with trial NIV. MOLST completed and placed on medical record.

## 2025-01-02 NOTE — CONSULT NOTE ADULT - ASSESSMENT
diarrhea    cont diet as [place]  antibiotics per primary/ID  monitor GI fn  await stool studies  will follow

## 2025-01-02 NOTE — CONSULT NOTE ADULT - SUBJECTIVE AND OBJECTIVE BOX
[facility]    Herrera Kalpana Oneil NP    [address] [place] [address] [phone]      Chief Complaint:  Patient is a 79y old  Female who presents with a chief complaint of SOB (02 Jan 2025 13:06)      This is a 79 F history lung CA in remission, RA, HTN, COPD on home O2 at night, here complaining of shortness of breath and cough with green sputum x 6 weeks. Feeling hot and cold but denies fevers, chills. + chest pain on right and now central tightness with cough x1 week. Patient with intermittent diarrhea and abdominal pain. Reports history of diverticulitis. Patient previously using 2L NC at night, now using oxygen all the time. Patient given z pack then course of doxycycline over a month ago. Patient with reduced PO intake as per family.    asked to eval for diarrhea     Allergies:  IV Contrast (Hives; Rash)  minocycline (Other)  Zofran (Other)  Levaquin (Other)  Enbrel (Unknown)  penicillin (Other)      Medications:  acetaminophen     Tablet .. 650 milliGRAM(s) Oral every 6 hours PRN  albuterol/ipratropium for Nebulization 3 milliLiter(s) Nebulizer every 6 hours  aluminum hydroxide/magnesium hydroxide/simethicone Suspension 30 milliLiter(s) Oral every 4 hours PRN  aspirin enteric coated 81 milliGRAM(s) Oral daily  azithromycin  IVPB 500 milliGRAM(s) IV Intermittent every 24 hours  benzonatate 100 milliGRAM(s) Oral three times a day  cefTRIAXone   IVPB 1000 milliGRAM(s) IV Intermittent every 24 hours  enoxaparin Injectable 30 milliGRAM(s) SubCutaneous every 24 hours  famotidine    Tablet 20 milliGRAM(s) Oral daily  fluticasone propionate/ salmeterol 250-50 MICROgram(s) Diskus 1 Dose(s) Inhalation two times a day  gabapentin 100 milliGRAM(s) Oral three times a day  guaiFENesin  milliGRAM(s) Oral every 12 hours  hydroxychloroquine 200 milliGRAM(s) Oral two times a day  melatonin 3 milliGRAM(s) Oral at bedtime PRN  metoprolol tartrate 25 milliGRAM(s) Oral every 12 hours  montelukast 10 milliGRAM(s) Oral daily  multivitamin 1 Tablet(s) Oral daily  predniSONE   Tablet 20 milliGRAM(s) Oral daily  rOPINIRole 2 milliGRAM(s) Oral two times a day  rosuvastatin 5 milliGRAM(s) Oral at bedtime  sodium chloride 0.9%. 1000 milliLiter(s) IV Continuous <Continuous>      PMHX/PSHX:  Asthma    HLD (hyperlipidemia)    Rheumatoid arthritis    TIA (transient ischemic attack)    Esophageal reflux    Lung cancer    Chronic obstructive pulmonary disease, unspecified COPD type    Hiatal hernia with GERD    Essential hypertension    Hyperlipidemia, unspecified hyperlipidemia type    Calculus of gallbladder with chronic cholecystitis without obstruction    Childhood asthma    Stage 3 chronic kidney disease    Carotid artery plaque    IBS (irritable bowel syndrome)    Psoriatic arthritis    S/P lobectomy of lung    H/O colonoscopy    History of endoscopy    S/P cholecystectomy    CAD (coronary artery disease)        Family history:  No pertinent family history in first degree relatives    No pertinent family history in first degree relatives    FH: type 2 diabetes (Mother)    FH: CAD (coronary artery disease) (Father, Sibling)    FH: colon cancer (Sibling)    FH: myocardial infarction (Father)    FH: stroke (Sibling)    FH: lung cancer (Sibling)        Social History:     ROS:     General:  No wt loss, fevers, chills, night sweats, fatigue,   Eyes:  Good vision, no reported pain  ENT:  No sore throat, pain, runny nose, dysphagia  CV:  No pain, palpitations, hypo/hypertension  Resp:  No dyspnea, cough, tachypnea, wheezing  GI:  No pain, No nausea, No vomiting, ++ diarrhea, No constipation, No weight loss, No fever, No pruritis, No rectal bleeding, No tarry stools, No dysphagia,  :  No pain, bleeding, incontinence, nocturia  Muscle:  No pain, weakness  Neuro:  No weakness, tingling, memory problems  Psych:  No fatigue, insomnia, mood problems, depression  Endocrine:  No polyuria, polydipsia, cold/heat intolerance  Heme:  No petechiae, ecchymosis, easy bruisability  Skin:  No rash, tattoos, scars, edema      PHYSICAL EXAM:   Vital Signs:  Vital Signs Last 24 Hrs  T(C): 36.8 (02 Jan 2025 13:09), Max: 36.8 (01 Jan 2025 19:40)  T(F): 98.2 (02 Jan 2025 13:09), Max: 98.2 (01 Jan 2025 19:40)  HR: 91 (02 Jan 2025 13:40) (77 - 105)  BP: 126/75 (02 Jan 2025 13:09) (101/53 - 144/83)  BP(mean): 86 (02 Jan 2025 02:00) (68 - 87)  RR: 20 (02 Jan 2025 13:09) (20 - 23)  SpO2: 97% (02 Jan 2025 13:40) (94% - 100%)    Parameters below as of 02 Jan 2025 13:40  Patient On (Oxygen Delivery Method): nasal cannula      Daily     Daily     GENERAL:  Appears stated age, well-groomed, well-nourished, no distress  HEENT:  NC/AT,  conjunctivae clear and pink, no thyromegaly, nodules, adenopathy, no JVD, sclera -anicteric  CHEST:  Full & symmetric excursion, no increased effort, breath sounds clear  HEART:  Regular rhythm, S1, S2, no murmur/rub/S3/S4, no abdominal bruit, no edema  ABDOMEN:  Soft, non-tender, non-distended, normoactive bowel sounds,    EXTEREMITIES:  no cyanosis,clubbing or edema  SKIN:  No rash/erythema/ecchymoses/petechiae/wounds/abscess/warm/dry  NEURO:  Alert, oriented, no asterixis, no tremor, no encephalopathy    LABS:                        11.5   5.60  )-----------( 219      ( 02 Jan 2025 06:24 )             36.8     01-02    138  |  103  |  16  ----------------------------<  141[H]  4.2   |  28  |  0.99    Ca    9.6      02 Jan 2025 06:24  Mg     1.8     01-02    TPro  6.4  /  Alb  2.6[L]  /  TBili  0.5  /  DBili  x   /  AST  53[H]  /  ALT  20  /  AlkPhos  117  01-01    LIVER FUNCTIONS - ( 01 Jan 2025 15:50 )  Alb: 2.6 g/dL / Pro: 6.4 g/dL / ALK PHOS: 117 U/L / ALT: 20 U/L / AST: 53 U/L / GGT: x             Urinalysis Basic - ( 02 Jan 2025 06:24 )    Color: x / Appearance: x / SG: x / pH: x  Gluc: 141 mg/dL / Ketone: x  / Bili: x / Urobili: x   Blood: x / Protein: x / Nitrite: x   Leuk Esterase: x / RBC: x / WBC x   Sq Epi: x / Non Sq Epi: x / Bacteria: x          Imaging:

## 2025-01-02 NOTE — PHYSICAL THERAPY INITIAL EVALUATION ADULT - PERTINENT HX OF CURRENT PROBLEM, REHAB EVAL
79 F adm 1/1 w/ history lung CA in remission, RA, HTN, COPD on home O2 at night, here complaining of shortness of breath and cough with green sputum x 6 weeks. Feeling hot and cold but denies fevers, chills. + chest pain on right and now central tightness with cough x1 week. Pt admitted with PNA. CTA; no acute findings. CT abd/pelvis: no acute findings

## 2025-01-02 NOTE — H&P ADULT - PROBLEM SELECTOR PLAN 1
Admit  Pan-culture  Continue iv Rocephin/Zithromax  Check urine legionella & strep antigen, Mycoplasma IgM, procalcitonin  Supplemental oxygen prn  Cough suppressants  Trend WBC  Pulm consult  Further work-up/management pending clinical course.

## 2025-01-02 NOTE — CONSULT NOTE ADULT - SUBJECTIVE AND OBJECTIVE BOX
Date/Time Patient Seen:  		  Referring MD:   Data Reviewed	       Patient is a 79y old  Female who presents with a chief complaint of     Subjective/HPI   · Chief Complaint: The patient is a 79y Female complaining of shortness of breath.  · HPI Objective Statement: 79 F history lung CA in remission, RA, HTN here complaining of shortness of breath and cough with green sputum x 6 weeks. Feeling hot and cold but denies fevers, chills. + chest pain on right and now central tightness with cough x1 week. patient with intermittent diarrhea and abdominal pain. reports history of diverticulitis. patient previously using 2 lpm at night, now using oxygen all the time. patient given z pack then course of doxycycline over a month ago. patient with reduced PO intake as per family. PMD/Pulm Adama.      PAST MEDICAL & SURGICAL HISTORY:  Asthma    HLD (hyperlipidemia)    Rheumatoid arthritis    TIA (transient ischemic attack)  2012    Esophageal reflux    Lung cancer  Right lung.    Chronic obstructive pulmonary disease, unspecified COPD type  O2 at night    Hiatal hernia with GERD    Essential hypertension    Hyperlipidemia, unspecified hyperlipidemia type    Calculus of gallbladder with chronic cholecystitis without obstruction    Childhood asthma    Stage 3 chronic kidney disease    Carotid artery plaque    IBS (irritable bowel syndrome)    Psoriatic arthritis    S/P lobectomy of lung  Right lung in 1996.    H/O colonoscopy    History of endoscopy  Sept 2017    S/P cholecystectomy    CAD (coronary artery disease)  HIV:    HIV Test Questions:  · In accordance with [place] State law, we offer every patient who comes to our ED an HIV test. Would you like to be tested today?	Previously Declined (within the last year)    HEPATITIS C TEST QUESTIONS:    Hepatitis C Test Questions:  · In accordance with [place] State Law, we offer every patient a Hepatitis C test. Would you like to be tested today?	Opt out    PAST MEDICAL/SURGICAL/FAMILY/SOCIAL HISTORY:    Past Medical, Past Surgical, and Family History:  PAST MEDICAL HISTORY:  Carotid artery plaque     Childhood asthma     Chronic obstructive pulmonary disease, unspecified COPD type O2 at night    Essential hypertension     Hiatal hernia with GERD     HLD (hyperlipidemia)     IBS (irritable bowel syndrome)     Lung cancer Right lung.    Psoriatic arthritis     Rheumatoid arthritis     Stage 3 chronic kidney disease     TIA (transient ischemic attack) 2012.     PAST SURGICAL HISTORY:  H/O colonoscopy     History of endoscopy Sept 2017    S/P cholecystectomy     S/P lobectomy of lung Right lung in 1996.     FAMILY HISTORY:  Father  Still living? Unknown  FH: CAD (coronary artery disease), Age at diagnosis: Age Unknown  FH: myocardial infarction, Age at diagnosis: Age Unknown    Mother  Still living? Unknown  FH: type 2 diabetes, Age at diagnosis: Age Unknown    Sibling  Still living? Unknown  FH: CAD (coronary artery disease), Age at diagnosis: Age Unknown  FH: stroke, Age at diagnosis: Age Unknown  FH: colon cancer, Age at diagnosis: Age Unknown  FH: lung cancer, Age at diagnosis: Age Unknown.     Tobacco Usage:  · Tobacco Usage	Former smoker    ALLERGIES AND HOME MEDICATIONS:   Allergies:        Allergies:  	IV Contrast: Drug, Hives, Rash  	penicillin: Drug, Other, unknown  	Levaquin: Drug, Other, unknown  	Zofran: Drug, Other, pt cannot recall reaction  	minocycline: Drug, Other, unknown  	Enbrel: Drug, Unknown    Home Medications:   * Patient Currently Takes Medications as of 09-Aug-2024 13:02 documented in Structured Notes  · 	gabapentin 100 mg oral capsule: 1 cap(s) orally 3 times a day  · 	Zyvox 600 mg oral tablet: 1 tab(s) orally 2 times a day  · 	amLODIPine 10 mg oral tablet: 1 tab(s) orally once a day  · 	metoprolol tartrate 25 mg oral tablet: 1 tab(s) orally 2 times a day  · 	acetaminophen 325 mg oral tablet: 2 tab(s) orally every 6 hours As needed Temp greater or equal to 38C (100.4F), Mild Pain (1 - 3)  · 	azelastine 137 mcg/inh (0.1%) nasal spray: 2 spray(s) intranasally 2 times a day  · 	Singulair 10 mg oral tablet: 1 tab(s) orally once a day (in the evening)  · 	Arava 20 mg oral tablet: 1 tab(s) orally once a day (in the morning)  · 	Wixela Inhub 250 mcg-50 mcg inhalation powder: 1 puff(s) inhaled 2 times a day  · 	Multiple Vitamins oral tablet: 1 tab(s) orally once a day  · 	Crestor 5 mg oral tablet: 1 tab(s) orally once a day (at bedtime)  · 	Neurevia: 1 tablet daily  · 	Plaquenil 200 mg oral tablet: 1 tab(s) orally 2 times a day  · 	Aspir 81 oral delayed release tablet: 1 tab(s) orally once a day (in the morning)  · 	famotidine 40 mg oral tablet: 1 tab(s) orally once a day  · 	Requip 2 mg oral tablet: 1 tab(s) orally 2 times a day 1 tablet orally 2 times a day    VITAL SIGNS (Pullset):    ,,ED ADULT Flow Sheet:    01-Jan-2025 14:30  · Temp (F): 97  · Temp (C) Temp (C): 36.1  · Temp site Temp Site: forehead  · Heart Rate Heart Rate (beats/min): 106          Medication list         MEDICATIONS  (STANDING):  albuterol/ipratropium for Nebulization 3 milliLiter(s) Nebulizer every 6 hours  aspirin enteric coated 81 milliGRAM(s) Oral daily  azithromycin  IVPB 500 milliGRAM(s) IV Intermittent every 24 hours  benzonatate 100 milliGRAM(s) Oral three times a day  cefTRIAXone   IVPB 1000 milliGRAM(s) IV Intermittent every 24 hours  enoxaparin Injectable 30 milliGRAM(s) SubCutaneous every 24 hours  famotidine    Tablet 20 milliGRAM(s) Oral daily  fluticasone propionate/ salmeterol 250-50 MICROgram(s) Diskus 1 Dose(s) Inhalation two times a day  gabapentin 100 milliGRAM(s) Oral three times a day  guaiFENesin  milliGRAM(s) Oral every 12 hours  hydroxychloroquine 200 milliGRAM(s) Oral two times a day  metoprolol tartrate 25 milliGRAM(s) Oral every 12 hours  montelukast 10 milliGRAM(s) Oral daily  multivitamin 1 Tablet(s) Oral daily  rOPINIRole 2 milliGRAM(s) Oral two times a day  rosuvastatin 5 milliGRAM(s) Oral at bedtime  sodium chloride 0.9%. 1000 milliLiter(s) (50 mL/Hr) IV Continuous <Continuous>    MEDICATIONS  (PRN):  acetaminophen     Tablet .. 650 milliGRAM(s) Oral every 6 hours PRN Temp greater or equal to 38C (100.4F), Mild Pain (1 - 3)  aluminum hydroxide/magnesium hydroxide/simethicone Suspension 30 milliLiter(s) Oral every 4 hours PRN Dyspepsia  melatonin 3 milliGRAM(s) Oral at bedtime PRN Insomnia         Vitals log        ICU Vital Signs Last 24 Hrs  T(C): 36.1 (02 Jan 2025 02:00), Max: 36.8 (01 Jan 2025 19:40)  T(F): 97 (02 Jan 2025 02:00), Max: 98.2 (01 Jan 2025 19:40)  HR: 86 (02 Jan 2025 02:00) (86 - 106)  BP: 129/71 (02 Jan 2025 02:00) (84/53 - 136/57)  BP(mean): 86 (02 Jan 2025 02:00) (68 - 87)  ABP: --  ABP(mean): --  RR: 23 (02 Jan 2025 02:00) (20 - 23)  SpO2: 100% (02 Jan 2025 02:00) (82% - 100%)    O2 Parameters below as of 02 Jan 2025 02:00  Patient On (Oxygen Delivery Method): nasal cannula w/ humidification  O2 Flow (L/min): 4               Input and Output:  I&O's Detail      Lab Data                        11.2   11.03 )-----------( 218      ( 01 Jan 2025 15:50 )             35.8     01-01    137  |  106  |  18  ----------------------------<  64[L]  4.6   |  26  |  1.20    Ca    9.4      01 Jan 2025 15:50  Mg     2.1     01-01    TPro  6.4  /  Alb  2.6[L]  /  TBili  0.5  /  DBili  x   /  AST  53[H]  /  ALT  20  /  AlkPhos  117  01-01            Review of Systems	      Objective     Physical Examination        Pertinent Lab findings & Imaging      Howell:  NO   Adequate UO     I&O's Detail           Discussed with:     Cultures:	        Radiology      ACC: [id number] EXAM:  CT ABDOMEN AND PELVIS IC   ORDERED BY: JOHANN PATTON     ACC: [id number] EXAM:  CT ANGIO CHEST PULM [facility]   ORDERED BY: JOHANN PATTON     PROCEDURE DATE:  01/01/2025          INTERPRETATION:  CLINICAL INFORMATION: Shortness of breath, chest pain,   elevated d-dimer, evaluate for pulmonary embolism. Abdominal pain and   diarrhea    COMPARISON: None.    CONTRAST/COMPLICATIONS:  IV Contrast: Omnipaque 350  90 cc administered   10 cc discarded  Oral Contrast: NONE  The patient was premedicated due to history of allergy to IV contrast..    PROCEDURE:  CT Angiography of the Chest was performed followed by portal venous phase   imaging of the Abdomen and Pelvis.  Sagittal and coronal reformats were performed as well as 3D (MIP)   reconstructions.    FINDINGS:  CHEST:  LUNGS AND LARGE AIRWAYS: There is dependent debris or mucus in the distal   trachea and right interlobar bronchus. Right upper lobe postsurgical   changes. Interval development of patchy airspace opacities in the right   lower lobe suggesting pneumonia. There is chronic scarring and   architectural distortion as well as cystic bronchiectasis in the right   lower lobe. Emphysematous changes in the left lung.    PLEURA: Bilateral pleural effusions are mild on the right and trace on   the left.  VESSELS: Pulmonary arterial opacification is suboptimal. No main, lobar,   or segmental pulmonary embolism. Subsegmental branches cannot be   assessed. The main pulmonary artery is dilated, measures ingesting   pulmonary arterial hypertension. Atherosclerotic calcification of the   thoracic aorta and coronary arteries. HEART: Heart size is normal. No   pericardial effusion.  MEDIASTINUM AND DARION: No lymphadenopathy.  CHEST WALL AND LOWER NECK: Within normal limits.    ABDOMEN AND PELVIS:  Evaluation is limited by motion artifact and streak artifact from the   patient's arms, which were not removed from the field-of-view.  LIVER: Within normal limits.  BILE DUCTS: Normal caliber.  GALLBLADDER: Cholecystectomy.  SPLEEN: Within normal limits.  PANCREAS: Within normal limits.  ADRENALS: Within normal limits.  KIDNEYS/URETERS: Bilateral renal cortical scarring related to previous   inflammation.    BLADDER: Mildly distended.  REPRODUCTIVE ORGANS: No pelvic masses.    BOWEL: No bowel obstruction. Appendix is not visualized. No significant   fecal load. Diverticulosis.  PERITONEUM/RETROPERITONEUM: Within normal limits.  VESSELS: Aorta is not dilated. Moderate atherosclerotic vascular   calcification.  LYMPH NODES: No lymphadenopathy.  ABDOMINAL WALL: Within normal limits.  BONES: Vertebroplasty of L4. Chronic degenerative changes of the spine.    IMPRESSION:  Right lung patchy airspace opacities favoring pneumonia. Recommend   follow-up to resolution. Mild right pleural effusion. Right upper lobe   surgical resection. Dependent secretions or debris in the trachea and   right interlobar bronchus.    Dilated main pulmonary artery suggests pulmonary arterial hypertension.    Images of the abdomen and pelvis are motion degraded. No definite acute   findings in the abdomen or pelvis.            --- End of Report ---            CORIE TONY MD; Attending Radiologist  This document has been electronically signed. Jan 1 2025 11:15PM                         Date/Time Patient Seen:  		  Referring MD:   Data Reviewed	       Patient is a 79y old  Female who presents with a chief complaint of     Subjective/HPI   · Chief Complaint: The patient is a 79y Female complaining of shortness of breath.  · HPI Objective Statement: 79 F history lung CA in remission, RA, HTN here complaining of shortness of breath and cough with green sputum x 6 weeks. Feeling hot and cold but denies fevers, chills. + chest pain on right and now central tightness with cough x1 week. patient with intermittent diarrhea and abdominal pain. reports history of diverticulitis. patient previously using 2 lpm at night, now using oxygen all the time. patient given z pack then course of doxycycline over a month ago. patient with reduced PO intake as per family. PMD/Pulm Adama.      PAST MEDICAL & SURGICAL HISTORY:  Asthma    HLD (hyperlipidemia)    Rheumatoid arthritis    TIA (transient ischemic attack)  2012    Esophageal reflux    Lung cancer  Right lung.    Chronic obstructive pulmonary disease, unspecified COPD type  O2 at night    Hiatal hernia with GERD    Essential hypertension    Hyperlipidemia, unspecified hyperlipidemia type    Calculus of gallbladder with chronic cholecystitis without obstruction    Childhood asthma    Stage 3 chronic kidney disease    Carotid artery plaque    IBS (irritable bowel syndrome)    Psoriatic arthritis    S/P lobectomy of lung  Right lung in 1996.    H/O colonoscopy    History of endoscopy  Sept 2017    S/P cholecystectomy    CAD (coronary artery disease)  HIV:    HIV Test Questions:  · In accordance with [place] law, we offer every patient who comes to our ED an HIV test. Would you like to be tested today?	Previously Declined (within the last year)    HEPATITIS C TEST QUESTIONS:    Hepatitis C Test Questions:  · In accordance with [place] Law, we offer every patient a Hepatitis C test. Would you like to be tested today?	Opt out    PAST MEDICAL/SURGICAL/FAMILY/SOCIAL HISTORY:    Past Medical, Past Surgical, and Family History:  PAST MEDICAL HISTORY:  Carotid artery plaque     Childhood asthma     Chronic obstructive pulmonary disease, unspecified COPD type O2 at night    Essential hypertension     Hiatal hernia with GERD     HLD (hyperlipidemia)     IBS (irritable bowel syndrome)     Lung cancer Right lung.    Psoriatic arthritis     Rheumatoid arthritis     Stage 3 chronic kidney disease     TIA (transient ischemic attack) 2012.     PAST SURGICAL HISTORY:  H/O colonoscopy     History of endoscopy Sept 2017    S/P cholecystectomy     S/P lobectomy of lung Right lung in 1996.     FAMILY HISTORY:  Father  Still living? Unknown  FH: CAD (coronary artery disease), Age at diagnosis: Age Unknown  FH: myocardial infarction, Age at diagnosis: Age Unknown    Mother  Still living? Unknown  FH: type 2 diabetes, Age at diagnosis: Age Unknown    Sibling  Still living? Unknown  FH: CAD (coronary artery disease), Age at diagnosis: Age Unknown  FH: stroke, Age at diagnosis: Age Unknown  FH: colon cancer, Age at diagnosis: Age Unknown  FH: lung cancer, Age at diagnosis: Age Unknown.     Tobacco Usage:  · Tobacco Usage	Former smoker    ALLERGIES AND HOME MEDICATIONS:   Allergies:        Allergies:  	IV Contrast: Drug, Hives, Rash  	penicillin: Drug, Other, unknown  	Levaquin: Drug, Other, unknown  	Zofran: Drug, Other, pt cannot recall reaction  	minocycline: Drug, Other, unknown  	Enbrel: Drug, Unknown    Home Medications:   * Patient Currently Takes Medications as of 09-Aug-2024 13:02 documented in Structured Notes  · 	gabapentin 100 mg oral capsule: 1 cap(s) orally 3 times a day  · 	Zyvox 600 mg oral tablet: 1 tab(s) orally 2 times a day  · 	amLODIPine 10 mg oral tablet: 1 tab(s) orally once a day  · 	metoprolol tartrate 25 mg oral tablet: 1 tab(s) orally 2 times a day  · 	acetaminophen 325 mg oral tablet: 2 tab(s) orally every 6 hours As needed Temp greater or equal to 38C (100.4F), Mild Pain (1 - 3)  · 	azelastine 137 mcg/inh (0.1%) nasal spray: 2 spray(s) intranasally 2 times a day  · 	Singulair 10 mg oral tablet: 1 tab(s) orally once a day (in the evening)  · 	Arava 20 mg oral tablet: 1 tab(s) orally once a day (in the morning)  · 	Wixela Inhub 250 mcg-50 mcg inhalation powder: 1 puff(s) inhaled 2 times a day  · 	Multiple Vitamins oral tablet: 1 tab(s) orally once a day  · 	Crestor 5 mg oral tablet: 1 tab(s) orally once a day (at bedtime)  · 	Neurevia: 1 tablet daily  · 	Plaquenil 200 mg oral tablet: 1 tab(s) orally 2 times a day  · 	Aspir 81 oral delayed release tablet: 1 tab(s) orally once a day (in the morning)  · 	famotidine 40 mg oral tablet: 1 tab(s) orally once a day  · 	Requip 2 mg oral tablet: 1 tab(s) orally 2 times a day 1 tablet orally 2 times a day    VITAL SIGNS (Pullset):    ,,ED ADULT Flow Sheet:    01-Jan-2025 14:30  · Temp (F): 97  · Temp (C) Temp (C): 36.1  · Temp site Temp Site: forehead  · Heart Rate Heart Rate (beats/min): 106          Medication list         MEDICATIONS  (STANDING):  albuterol/ipratropium for Nebulization 3 milliLiter(s) Nebulizer every 6 hours  aspirin enteric coated 81 milliGRAM(s) Oral daily  azithromycin  IVPB 500 milliGRAM(s) IV Intermittent every 24 hours  benzonatate 100 milliGRAM(s) Oral three times a day  cefTRIAXone   IVPB 1000 milliGRAM(s) IV Intermittent every 24 hours  enoxaparin Injectable 30 milliGRAM(s) SubCutaneous every 24 hours  famotidine    Tablet 20 milliGRAM(s) Oral daily  fluticasone propionate/ salmeterol 250-50 MICROgram(s) Diskus 1 Dose(s) Inhalation two times a day  gabapentin 100 milliGRAM(s) Oral three times a day  guaiFENesin  milliGRAM(s) Oral every 12 hours  hydroxychloroquine 200 milliGRAM(s) Oral two times a day  metoprolol tartrate 25 milliGRAM(s) Oral every 12 hours  montelukast 10 milliGRAM(s) Oral daily  multivitamin 1 Tablet(s) Oral daily  rOPINIRole 2 milliGRAM(s) Oral two times a day  rosuvastatin 5 milliGRAM(s) Oral at bedtime  sodium chloride 0.9%. 1000 milliLiter(s) (50 mL/Hr) IV Continuous <Continuous>    MEDICATIONS  (PRN):  acetaminophen     Tablet .. 650 milliGRAM(s) Oral every 6 hours PRN Temp greater or equal to 38C (100.4F), Mild Pain (1 - 3)  aluminum hydroxide/magnesium hydroxide/simethicone Suspension 30 milliLiter(s) Oral every 4 hours PRN Dyspepsia  melatonin 3 milliGRAM(s) Oral at bedtime PRN Insomnia         Vitals log        ICU Vital Signs Last 24 Hrs  T(C): 36.1 (02 Jan 2025 02:00), Max: 36.8 (01 Jan 2025 19:40)  T(F): 97 (02 Jan 2025 02:00), Max: 98.2 (01 Jan 2025 19:40)  HR: 86 (02 Jan 2025 02:00) (86 - 106)  BP: 129/71 (02 Jan 2025 02:00) (84/53 - 136/57)  BP(mean): 86 (02 Jan 2025 02:00) (68 - 87)  ABP: --  ABP(mean): --  RR: 23 (02 Jan 2025 02:00) (20 - 23)  SpO2: 100% (02 Jan 2025 02:00) (82% - 100%)    O2 Parameters below as of 02 Jan 2025 02:00  Patient On (Oxygen Delivery Method): nasal cannula w/ humidification  O2 Flow (L/min): 4               Input and Output:  I&O's Detail      Lab Data                        11.2   11.03 )-----------( 218      ( 01 Jan 2025 15:50 )             35.8     01-01    137  |  106  |  18  ----------------------------<  64[L]  4.6   |  26  |  1.20    Ca    9.4      01 Jan 2025 15:50  Mg     2.1     01-01    TPro  6.4  /  Alb  2.6[L]  /  TBili  0.5  /  DBili  x   /  AST  53[H]  /  ALT  20  /  AlkPhos  117  01-01            Review of Systems	  sob  phillips  weakness  cough  o2 support  frail  weak  all systems reviewed      Objective     Physical Examination    spoke with   head nc  head at  o2 support  abd soft  verbal  alert  cn grossly int      Pertinent Lab findings & Imaging      Howell:  NO   Adequate UO     I&O's Detail           Discussed with:     Cultures:	        Radiology      ACC: [id number] EXAM:  CT ABDOMEN AND PELVIS IC   ORDERED BY: JOHANN PATTON     ACC: [id number] EXAM:  CT ANGIO CHEST PULM [facility]   ORDERED BY: JOHANN PATTON     PROCEDURE DATE:  01/01/2025          INTERPRETATION:  CLINICAL INFORMATION: Shortness of breath, chest pain,   elevated d-dimer, evaluate for pulmonary embolism. Abdominal pain and   diarrhea    COMPARISON: None.    CONTRAST/COMPLICATIONS:  IV Contrast: Omnipaque 350  90 cc administered   10 cc discarded  Oral Contrast: NONE  The patient was premedicated due to history of allergy to IV contrast..    PROCEDURE:  CT Angiography of the Chest was performed followed by portal venous phase   imaging of the Abdomen and Pelvis.  Sagittal and coronal reformats were performed as well as 3D (MIP)   reconstructions.    FINDINGS:  CHEST:  LUNGS AND LARGE AIRWAYS: There is dependent debris or mucus in the distal   trachea and right interlobar bronchus. Right upper lobe postsurgical   changes. Interval development of patchy airspace opacities in the right   lower lobe suggesting pneumonia. There is chronic scarring and   architectural distortion as well as cystic bronchiectasis in the right   lower lobe. Emphysematous changes in the left lung.    PLEURA: Bilateral pleural effusions are mild on the right and trace on   the left.  VESSELS: Pulmonary arterial opacification is suboptimal. No main, lobar,   or segmental pulmonary embolism. Subsegmental branches cannot be   assessed. The main pulmonary artery is dilated, measures ingesting   pulmonary arterial hypertension. Atherosclerotic calcification of the   thoracic aorta and coronary arteries. HEART: Heart size is normal. No   pericardial effusion.  MEDIASTINUM AND DARION: No lymphadenopathy.  CHEST WALL AND LOWER NECK: Within normal limits.    ABDOMEN AND PELVIS:  Evaluation is limited by motion artifact and streak artifact from the   patient's arms, which were not removed from the field-of-view.  LIVER: Within normal limits.  BILE DUCTS: Normal caliber.  GALLBLADDER: Cholecystectomy.  SPLEEN: Within normal limits.  PANCREAS: Within normal limits.  ADRENALS: Within normal limits.  KIDNEYS/URETERS: Bilateral renal cortical scarring related to previous   inflammation.    BLADDER: Mildly distended.  REPRODUCTIVE ORGANS: No pelvic masses.    BOWEL: No bowel obstruction. Appendix is not visualized. No significant   fecal load. Diverticulosis.  PERITONEUM/RETROPERITONEUM: Within normal limits.  VESSELS: Aorta is not dilated. Moderate atherosclerotic vascular   calcification.  LYMPH NODES: No lymphadenopathy.  ABDOMINAL WALL: Within normal limits.  BONES: Vertebroplasty of L4. Chronic degenerative changes of the spine.    IMPRESSION:  Right lung patchy airspace opacities favoring pneumonia. Recommend   follow-up to resolution. Mild right pleural effusion. Right upper lobe   surgical resection. Dependent secretions or debris in the trachea and   right interlobar bronchus.    Dilated main pulmonary artery suggests pulmonary arterial hypertension.    Images of the abdomen and pelvis are motion degraded. No definite acute   findings in the abdomen or pelvis.            --- End of Report ---            CORIE TONY MD; Attending Radiologist  This document has been electronically signed. Jan 1 2025 11:15PM

## 2025-01-02 NOTE — CONSULT NOTE ADULT - SUBJECTIVE AND OBJECTIVE BOX
[facility]  INFECTIOUS DISEASES - Florentino Odell, [address], [place] [address]  Tel: [phone]     Fax: [phone]  =======================================================    [facility]-[id number]  GINNY VASQUEZ     CC: Patient is a 79y old  Female who presents with a chief complaint of SOB (02 Jan 2025 06:39)    HPI:  79 F history lung CA in remission, RA, HTN, COPD on home O2 at night, who presented with shortness of breath and cough with green sputum. Denies fevers, chills. + chest pain on right and now central tightness with cough x1 week. Patient with intermittent diarrhea and abdominal pain. Patient previously using 2L NC at night, now using oxygen all the time. Patient given z pack then course of doxycycline over a month ago. Denies any sick contacts or recent travel.      PAST MEDICAL & SURGICAL HISTORY:  HLD (hyperlipidemia)      Rheumatoid arthritis      TIA (transient ischemic attack)  2012      Lung cancer  Right lung.      Chronic obstructive pulmonary disease, unspecified COPD type  O2 at night      Hiatal hernia with GERD      Essential hypertension      Childhood asthma      Stage 3 chronic kidney disease      Carotid artery plaque      IBS (irritable bowel syndrome)      Psoriatic arthritis      S/P lobectomy of lung  Right lung in 1996.      H/O colonoscopy      History of endoscopy  Sept 2017      S/P cholecystectomy          Social Hx:     FAMILY HISTORY:  FH: type 2 diabetes (Mother)    FH: CAD (coronary artery disease) (Father, Sibling)    FH: colon cancer (Sibling)    FH: myocardial infarction (Father)    FH: stroke (Sibling)    FH: lung cancer (Sibling)        Allergies    IV Contrast (Hives; Rash)  minocycline (Other)  Zofran (Other)  Levaquin (Other)  Enbrel (Unknown)  penicillin (Other)    Intolerances        Antibiotics:  MEDICATIONS  (STANDING):  albuterol/ipratropium for Nebulization 3 milliLiter(s) Nebulizer every 6 hours  aspirin enteric coated 81 milliGRAM(s) Oral daily  azithromycin  IVPB 500 milliGRAM(s) IV Intermittent every 24 hours  benzonatate 100 milliGRAM(s) Oral three times a day  cefTRIAXone   IVPB 1000 milliGRAM(s) IV Intermittent every 24 hours  enoxaparin Injectable 30 milliGRAM(s) SubCutaneous every 24 hours  famotidine    Tablet 20 milliGRAM(s) Oral daily  fluticasone propionate/ salmeterol 250-50 MICROgram(s) Diskus 1 Dose(s) Inhalation two times a day  gabapentin 100 milliGRAM(s) Oral three times a day  guaiFENesin  milliGRAM(s) Oral every 12 hours  hydroxychloroquine 200 milliGRAM(s) Oral two times a day  metoprolol tartrate 25 milliGRAM(s) Oral every 12 hours  montelukast 10 milliGRAM(s) Oral daily  multivitamin 1 Tablet(s) Oral daily  predniSONE   Tablet 20 milliGRAM(s) Oral daily  rOPINIRole 2 milliGRAM(s) Oral two times a day  rosuvastatin 5 milliGRAM(s) Oral at bedtime  sodium chloride 0.9%. 1000 milliLiter(s) (50 mL/Hr) IV Continuous <Continuous>    MEDICATIONS  (PRN):  acetaminophen     Tablet .. 650 milliGRAM(s) Oral every 6 hours PRN Temp greater or equal to 38C (100.4F), Mild Pain (1 - 3)  aluminum hydroxide/magnesium hydroxide/simethicone Suspension 30 milliLiter(s) Oral every 4 hours PRN Dyspepsia  melatonin 3 milliGRAM(s) Oral at bedtime PRN Insomnia       REVIEW OF SYSTEMS:  CONSTITUTIONAL:  No Fever or chills  HEENT:  No sore throat or runny nose.  CARDIOVASCULAR:  No chest pain   RESPIRATORY:  see history  GASTROINTESTINAL:  No nausea, vomiting or diarrhea.  GENITOURINARY:  No dysuria  MUSCULOSKELETAL:  no joint aches, no muscle pain  NEUROLOGIC:  No headache or dizziness  PSYCHIATRIC:  No disorder of thought or mood.    Physical Exam:  Vital Signs Last 24 Hrs  T(C): 36.4 (02 Jan 2025 07:56), Max: 36.8 (01 Jan 2025 19:40)  T(F): 97.6 (02 Jan 2025 07:56), Max: 98.2 (01 Jan 2025 19:40)  HR: 77 (02 Jan 2025 07:56) (77 - 106)  BP: 144/83 (02 Jan 2025 07:56) (84/53 - 144/83)  BP(mean): 86 (02 Jan 2025 02:00) (68 - 87)  RR: 20 (02 Jan 2025 07:56) (20 - 23)  SpO2: 97% (02 Jan 2025 11:55) (82% - 100%)    Parameters below as of 02 Jan 2025 11:55  Patient On (Oxygen Delivery Method): nasal cannula  O2 Flow (L/min): 4    Height (cm): 157.5 (01-01 @ 14:30)  Weight (kg): 47.6 (01-01 @ 14:30)  BMI (kg/m2): 19.2 (01-01 @ 14:30)  BSA (m2): 1.45 (01-01 @ 14:30)  GEN: NAD  HEENT: normocephalic and atraumatic.   NECK: Supple.   LUNGS: Normal respiratory effort  HEART: Regular rate and rhythm   ABDOMEN: Soft, nontender, and nondistended.    EXTREMITIES: No leg edema.  NEUROLOGIC: AO x 3  PSYCHIATRIC: Appropriate affect .  SKIN: No ulceration or induration present.    Labs:  01-02    138  |  103  |  16  ----------------------------<  141[H]  4.2   |  28  |  0.99    Ca    9.6      02 Jan 2025 06:24  Mg     1.8     01-02    TPro  6.4  /  Alb  2.6[L]  /  TBili  0.5  /  DBili  x   /  AST  53[H]  /  ALT  20  /  AlkPhos  117  01-01                          11.5   5.60  )-----------( 219      ( 02 Jan 2025 06:24 )             36.8       Urinalysis Basic - ( 02 Jan 2025 06:24 )    Color: x / Appearance: x / SG: x / pH: x  Gluc: 141 mg/dL / Ketone: x  / Bili: x / Urobili: x   Blood: x / Protein: x / Nitrite: x   Leuk Esterase: x / RBC: x / WBC x   Sq Epi: x / Non Sq Epi: x / Bacteria: x      LIVER FUNCTIONS - ( 01 Jan 2025 15:50 )  Alb: 2.6 g/dL / Pro: 6.4 g/dL / ALK PHOS: 117 U/L / ALT: 20 U/L / AST: 53 U/L / GGT: x                 Procalcitonin: 0.09 ng/mL (01-02-25 @ 06:24)    C-Reactive Protein: 30 mg/L (01-02-25 @ 06:24)      SARS-CoV-2 Result: NotDetec (01-01-25 @ 15:50)      RECENT CULTURES:        All imaging and other data have been reviewed.    < from: CT Angio Chest PE Protocol w/ IV Cont (01.01.25 @ 21:38) >  FINDINGS:  CHEST:  LUNGS AND LARGE AIRWAYS: There is dependent debris or mucus in the distal   trachea and right interlobar bronchus. Right upper lobe postsurgical   changes. Interval development of patchy airspace opacities in the right   lower lobe suggesting pneumonia. There is chronic scarring and   architectural distortion as well as cystic bronchiectasis in the right   lower lobe. Emphysematous changes in the left lung.    PLEURA: Bilateral pleural effusions are mild on the right and trace on   the left.  VESSELS: Pulmonary arterial opacification is suboptimal. No main, lobar,   or segmental pulmonary embolism. Subsegmental branches cannot be   assessed. The main pulmonary artery is dilated, measures ingesting   pulmonary arterial hypertension. Atherosclerotic calcification of the   thoracic aorta and coronary arteries. HEART: Heart size is normal. No   pericardial effusion.  MEDIASTINUM AND DARION: No lymphadenopathy.  CHEST WALL AND LOWER NECK: Within normal limits.    ABDOMEN AND PELVIS:  Evaluation is limited by motion artifact and streak artifact from the   patient's arms, which were not removed from the field-of-view.  LIVER: Within normal limits.  BILE DUCTS: Normal caliber.  GALLBLADDER: Cholecystectomy.  SPLEEN: Within normal limits.  PANCREAS: Within normal limits.  ADRENALS: Within normal limits.  KIDNEYS/URETERS: Bilateral renal cortical scarring related to previous   inflammation.    BLADDER: Mildly distended.  REPRODUCTIVE ORGANS: No pelvic masses.    BOWEL: No bowel obstruction. Appendix is not visualized. No significant   fecal load. Diverticulosis.  PERITONEUM/RETROPERITONEUM: Within normal limits.  VESSELS: Aorta is not dilated. Moderate atherosclerotic vascular   calcification.  LYMPH NODES: No lymphadenopathy.  ABDOMINAL WALL: Within normal limits.  BONES: Vertebroplasty of L4. Chronic degenerative changes of the spine.    IMPRESSION:  Right lung patchy airspace opacities favoring pneumonia. Recommend   follow-up to resolution. Mild right pleural effusion. Right upper lobe   surgical resection. Dependent secretions or debris in the trachea and   right interlobar bronchus.    Dilated main pulmonary artery suggests pulmonary arterial hypertension.    Images of the abdomen and pelvis are motion degraded. No definite acute   findings in the abdomen or pelvis.        < end of copied text >

## 2025-01-02 NOTE — CONSULT NOTE ADULT - ASSESSMENT
79 F history lung CA in remission, RA, HTN, COPD on home O2 at night, MRSA infection of L lower leg, who presented with shortness of breath and cough with green sputum. Denies fevers, chills. + chest pain on right and now central tightness with cough x1 week. Patient with intermittent diarrhea and abdominal pain. Patient previously using 2L NC at night, now using oxygen all the time. Patient given z pack then course of doxycycline over a month ago. Denies any sick contacts or recent travel.    Found to have R sided pneumonia with mild R pleural effusion. No fevers and mild leukocytosis resolved. COVID and flu/RSV negative.    #R sided pneumonia  #Acute on chronic respiratory failure  #Diarrhea    -continue ceftriaxone, if any fever/clinical change overnight would switch to vancomycin and cefepime  -continue azithromycin pending urine legionella antigen  -urine strep and legionella antigen negative  -send MRSA nasal PCR, sputum culture, RVP and pertussis PCR  -send stool for GI PCR and c diff  -blood cultures pending    Thank you for courtesy of this consult.     Will follow.  Discussed with the primary team.     Hilda Carrillo MD  Division of Infectious Diseases   Cell [phone] between 8am and 6pm   After 6pm and weekends please call ID service at [phone].     55 minutes spent on total encounter assessing patient, examination, chart review, counseling and coordinating care by the attending physician/nurse/care manager.

## 2025-01-02 NOTE — ED ADULT NURSE REASSESSMENT NOTE - NS ED NURSE REASSESS COMMENT FT1
Spoke with patient and patient's family member at bedside to address concerns with care. Pt experience also present. Pt provided with call bell and instructions for use.

## 2025-01-02 NOTE — PHYSICAL THERAPY INITIAL EVALUATION ADULT - ADDITIONAL COMMENTS
pt lives w/ her spouse in a house, no steps. Pt ambulates independently without device and was ind w/ ADls. + driving. Pt uses home O2 at night

## 2025-01-03 ENCOUNTER — TRANSCRIPTION ENCOUNTER (OUTPATIENT)
Age: 80
End: 2025-01-03

## 2025-01-03 NOTE — PATIENT CHOICE NOTE. - NSPTCHOICENOTES_GEN_ALL_CORE
Pt chose [facility] and pt had concentrator through "[employer]

## 2025-01-03 NOTE — CARE COORDINATION ASSESSMENT. - NSCAREPROVIDERS_GEN_ALL_CORE_FT
CARE PROVIDERS:  Accepting Physician: Brannon Velazquez  Administration: Casper Chirinos  Administration: Ene Santos  Admitting: Brannon Velazquez  Attending: Brannon Velazquez  Case Management: Vinay Lassiter  Consultant: Ronal Souza  Consultant: Cesar Johnson  Consultant: Kishor Cerrato  Consultant: Jacquelyn Boyer  Consultant: Hilda Carrillo  Consultant: Jenn Horton  Consultant: Patricia Vazquez  Covering Team: Khadar Nesbitt  ED Attending: Norm Mcneil  ED Nurse: Renee Shin  Nurse: Alfa Gauthier  Nurse: Ami Real  Nurse: Dwaine Serrano  Nurse: Steffi Butler  Ordered: Physician, Ordering  Ordered: Nehemias, [facility]  Outpatient Provider: Hon Will  Outpatient Provider: Hugo Oh  Outpatient Provider: Adriel Galan  Outpatient Provider: Lukas Fernandez  Override: Dwaine Serrano  Physical Therapy: Jan Garner  Respiratory Therapy: Pascual Trujillo  Team: [facility], Team  UR// Supp. Assoc.: Suni Mejia  UR// Supp. Assoc.: Shy Harley

## 2025-01-03 NOTE — PROGRESS NOTE ADULT - ASSESSMENT
79 F history lung CA in remission, RA, HTN, COPD on home O2 at night, MRSA infection of L lower leg, who presented with shortness of breath and cough with green sputum. Denies fevers, chills. + chest pain on right and now central tightness with cough x1 week. Patient with intermittent diarrhea and abdominal pain. Patient previously using 2L NC at night, now using oxygen all the time. Patient given z pack then course of doxycycline over a month ago. Denies any sick contacts or recent travel.    Found to have R sided pneumonia with mild R pleural effusion. Reports feeling better today, no diarrhea here in the hospital. WBC increased to 14, but could be steroids related. No fevers. RVP, pertussis PCR, urine strep and legionella antigens are negative.     #R sided pneumonia  #Acute on chronic respiratory failure  #Diarrhea    -continue ceftriaxone until today, then can switch cefpodoxime 200mg PO BID for 4 more days  -discontinue azithromycin   -follow blood cultures to completion  -monitor WBC  -discussed with Dr. Nesbitt  -I will be covered by Dr. Sadie Camacho this weekend, 1/4-1/5/25.    Hilda Carrillo MD  Division of Infectious Diseases   Cell [phone] between 8am and 6pm   After 6pm and weekends please call ID service at [phone].     35 minutes spent on total encounter assessing patient, examination, chart review, counseling and coordinating care by the attending physician/nurse/care manager.          79 F history lung CA in remission, RA, HTN, COPD on home O2 at night, MRSA infection of L lower leg, who presented with shortness of breath and cough with green sputum. Denies fevers, chills. + chest pain on right and now central tightness with cough x1 week. Patient with intermittent diarrhea and abdominal pain. Patient previously using 2L NC at night, now using oxygen all the time. Patient given z pack then course of doxycycline over a month ago. Denies any sick contacts or recent travel.    Found to have R sided pneumonia with mild R pleural effusion. Reports feeling better today, no diarrhea here in the hospital. WBC increased to 14, but could be steroids related. No fevers. RVP, pertussis PCR, urine strep and legionella antigens are negative. Blood cultures currently no growth.    #R sided pneumonia  #Acute on chronic respiratory failure  #Diarrhea    -continue ceftriaxone until today, then can switch cefpodoxime 200mg PO BID for 4 more days  -discontinue azithromycin   -follow blood cultures to completion  -monitor WBC  -discussed with Dr. Nesbitt  -I will be covered by Dr. Sadie Camacho this weekend, 1/4-1/5/25.    Hilda Carrillo MD  Division of Infectious Diseases   Cell [phone] between 8am and 6pm   After 6pm and weekends please call ID service at [phone].     35 minutes spent on total encounter assessing patient, examination, chart review, counseling and coordinating care by the attending physician/nurse/care manager.

## 2025-01-03 NOTE — DISCHARGE NOTE PROVIDER - CARE PROVIDERS DIRECT ADDRESSES
[email or web address]

## 2025-01-03 NOTE — DISCHARGE NOTE PROVIDER - NSDCCPCAREPLAN_GEN_ALL_CORE_FT
PRINCIPAL DISCHARGE DIAGNOSIS  Diagnosis: PNA (pneumonia)  Assessment and Plan of Treatment: Finish course of antibiotics.  Follow-up with your primary care doctor within 1 week.

## 2025-01-03 NOTE — PROGRESS NOTE ADULT - PROBLEM SELECTOR PLAN 1
Clinically improved  Change to po abx  Cultures NTD  Leukocytosis likely 2/2 to prednisone  Supplemental oxygen prn  Cough suppressants  Pulm/ID f/u

## 2025-01-03 NOTE — DISCHARGE NOTE PROVIDER - HOSPITAL COURSE
This is a 79 F history lung CA in remission, RA, HTN, COPD on home O2 at night, here complaining of shortness of breath and cough with green sputum x 6 weeks. Feeling hot and cold but denies fevers, chills. + chest pain on right and now central tightness with cough x1 week. Patient with intermittent diarrhea and abdominal pain. Reports history of diverticulitis. Patient previously using 2L NC at night, now using oxygen all the time. Patient given z pack then course of doxycycline over a month ago. Patient with reduced PO intake as per family.     Problem/Plan - 1:  ·  Problem: Pneumonia.   ·  Plan: Clinically improved  Change to po abx  Cultures NTD  Leukocytosis likely 2/2 to prednisone  Supplemental oxygen prn  Cough suppressants  Pulm/ID f/u.     Problem/Plan - 2:  ·  Problem: Acute on chronic respiratory failure with hypoxia.   ·  Plan: 2/2 to PNA, COPD  Supplemental oxygen prn  Continue Advair, duonbes  Pulm consult noted.     Problem/Plan - 3:  ·  Problem: COPD (chronic obstructive pulmonary disease).   ·  Plan: Continue Advair, duonebs  Supplemental oxygen prn  Pulm consult noted.     Problem/Plan - 4:  ·  Problem: Diarrhea.   ·  Plan: Resolved.     Problem/Plan - 5:  ·  Problem: Essential hypertension.   ·  Plan: Resume home meds.     Problem/Plan - 6:  ·  Problem: Rheumatoid arthritis.   ·  Plan: Continue Plaquenil and Arava.     Problem/Plan - 7:  ·  Problem: Dyslipidemia.   ·  Plan: Continue statin.      >35 minutes spent on discharge

## 2025-01-03 NOTE — PATIENT PROFILE ADULT - SURGICAL SITE INCISION
Medication: Test Strip passed protocol.   Last office visit date: 06/11/2024  Next appointment scheduled?: Yes   Number of refills given: 3    Last OV Plan of care: \"Controlled type 2 diabetes with neuropathy  (CMD); Type 2 diabetes mellitus with stage 2 chronic kidney disease, with long-term current use of insulin  (CMD):  Blood glucose have still been Running lower than normal.  Continue to check his blood sugars and increase the insulin by a few units as his blood sugars increase\"  Last Refill:  11/10/2023  Number of Refills Sent:  11  Quantity of Medication Given:   50 each        
no

## 2025-01-03 NOTE — DISCHARGE NOTE NURSING/CASE MANAGEMENT/SOCIAL WORK - FINANCIAL ASSISTANCE
[facility] provides services at a reduced cost to those who are determined to be eligible through [facility]’s financial assistance program. Information regarding [facility]’s financial assistance program can be found by going to [email or web address] or by calling [phone].

## 2025-01-03 NOTE — DISCHARGE NOTE NURSING/CASE MANAGEMENT/SOCIAL WORK - NSDCVIVACCINE_GEN_ALL_CORE_FT
Tdap; 06-Sep-2020 10:17; Staci Rose (RN); [employer]; [id number] (Exp. Date: 09-Jul-2022); IntraMuscular; Deltoid Left.; 0.5 milliLiter(s); VIS (VIS Published: 09-May-2013, VIS Presented: 06-Sep-2020);   Tdap; 03-Jun-2024 18:01; Rosalie Contreras (PAOLA); [employer]; [id number] (Exp. Date: 03-Dec-2025); IntraMuscular; Deltoid Right.; 0.5 milliLiter(s); VIS (VIS Published: 09-May-2013, VIS Presented: 03-Jun-2024);

## 2025-01-03 NOTE — CARE COORDINATION ASSESSMENT. - OTHER PERTINENT DISCHARGE PLANNING INFORMATION:
Met with patient and spouse at bedside to discuss the role of case management with verbalized understanding.  Patient seen in ED and anticipate inpatient admission for SOB.Pulm edema  Patient resides home with , has a RW if needed and stationary home o2 for nightly use.  Patient denies any current [facility] services.   provided transport as needed.  Will monitor for transition needs and remain available.  PCP Dr Adriel Galan

## 2025-01-03 NOTE — PROGRESS NOTE ADULT - ASSESSMENT
79 F history lung CA in remission, RA, HTN here complaining of shortness of breath and cough with green sputum x 6 weeks. Feeling hot and cold but denies fevers, chills. + chest pain on right and now central tightness with cough x1 week.    pna  atelectasis  lung ca  RA  HTN  OP  OA  Ex Smoker  hx of Lung ca with Post op changes  eval for LRTI - PNA -   Anxiety    pertussis NEG  covid and viral screen NEG  ID eval noted  cont emp ABX  COPD rx regimen - steroids - bronchodilators and fio2 support  spoke with     abx for PNA LRTI  prednisone - inhaler - nebs for COPD component  cough rx regimen  o2 support  goal sat > 88 pct  I sissy  oral hygiene  skin care  cvs rx regimen  BP control  nutritional status assessment  CT neg for PE  spoke with   pt follows with Dr Galan as outpatient, pulm med - has hx of Lung ca and operative intervention  GOC discussion - DNR DNI

## 2025-01-03 NOTE — PATIENT PROFILE ADULT - VISION (WITH CORRECTIVE LENSES IF THE PATIENT USUALLY WEARS THEM):
Partially impaired: cannot see medication labels or newsprint, but can see obstacles in path, and the surrounding layout; can count fingers at arm's length readers at home/Partially impaired: cannot see medication labels or newsprint, but can see obstacles in path, and the surrounding layout; can count fingers at arm's length

## 2025-01-03 NOTE — CARE COORDINATION ASSESSMENT. - NS SW HOME EQUIPMENT
Patient states she has a stationary concentrator at home for she uses 2L NC nightly.  She has no portable o2 at home.  Unsure of vendor presently/walker/oxygen

## 2025-01-03 NOTE — PATIENT CHOICE NOTE. - NSPTCHOICESTATE_GEN_ALL_CORE

## 2025-01-03 NOTE — CASE MANAGEMENT PROGRESS NOTE - NSCMPROGRESSNOTE_GEN_ALL_CORE
Per Md patient is stable for transition home today. Pt has home oxygen, concentrator only, through [facility].  Pt will now needs portable oxygen.CM Contacted [facility], spoke with Selena, stated that she will need the oxygen sats and will sent the script for MD to sign. Referral sent to [facility], awaiting RX form for MD to sign. [facility] will deliver portable oxygen to bedside prior to discharge. Patient is identified as a CMS STAR patient. Transition care management program explained and patient verbalized understanding. Pt has a follow up appointment with Dr Donis 1/9/25 3:30 PM. Patient is in agreement with receiving home care visiting nurse services. Referral sent to [facility] as requested, awaiting response. Discharge Notice reviewed and explained to patient, pt verbalized understanding. Copy given to patient. Pt is in agreement with transitioning home today and stated her spouse will be transporting her home. Primary care RN notified of above.

## 2025-01-03 NOTE — DISCHARGE NOTE PROVIDER - NSDCFUSCHEDAPPT_GEN_ALL_CORE_FT
Yun, Suk-Hyeon  [facility]  [address]  Scheduled Appointment: 01/10/2025

## 2025-01-03 NOTE — DISCHARGE NOTE NURSING/CASE MANAGEMENT/SOCIAL WORK - NSDCPEFALRISK_GEN_ALL_CORE
For information on Fall & Injury Prevention, visit: [email or web address] OR  [email or web address] OR  https://www.cdc.gov/steadi/patient.html

## 2025-01-03 NOTE — DISCHARGE NOTE PROVIDER - NSDCMRMEDTOKEN_GEN_ALL_CORE_FT
acetaminophen 325 mg oral tablet: 2 tab(s) orally every 6 hours As needed Temp greater or equal to 38C (100.4F), Mild Pain (1 - 3)  amLODIPine 10 mg oral tablet: 1 tab(s) orally once a day  Arava 20 mg oral tablet: 1 tab(s) orally once a day (in the morning)  Aspir 81 oral delayed release tablet: 1 tab(s) orally once a day (in the morning)  azelastine 137 mcg/inh (0.1%) nasal spray: 2 spray(s) intranasally 2 times a day  Crestor 5 mg oral tablet: 1 tab(s) orally once a day (at bedtime)  famotidine 40 mg oral tablet: 1 tab(s) orally once a day  gabapentin 100 mg oral capsule: 1 cap(s) orally 3 times a day  metoprolol tartrate 25 mg oral tablet: 1 tab(s) orally 2 times a day  Multiple Vitamins oral tablet: 1 tab(s) orally once a day  Neurevia: 1 tablet daily  Plaquenil 200 mg oral tablet: 1 tab(s) orally 2 times a day  Requip 2 mg oral tablet: 1 tab(s) orally 2 times a day 1 tablet orally 2 times a day  Singulair 10 mg oral tablet: 1 tab(s) orally once a day (in the evening)  Wixela Inhub 250 mcg-50 mcg inhalation powder: 1 puff(s) inhaled 2 times a day  Zyvox 600 mg oral tablet: 1 tab(s) orally 2 times a day   acetaminophen 325 mg oral tablet: 2 tab(s) orally every 6 hours As needed Temp greater or equal to 38C (100.4F), Mild Pain (1 - 3)  amLODIPine 10 mg oral tablet: 1 tab(s) orally once a day  Arava 20 mg oral tablet: 1 tab(s) orally once a day (in the morning)  Aspir 81 oral delayed release tablet: 1 tab(s) orally once a day (in the morning)  azelastine 137 mcg/inh (0.1%) nasal spray: 2 spray(s) intranasally 2 times a day  benzonatate 100 mg oral capsule: 1 cap(s) orally 3 times a day as needed for  cough  cefpodoxime 200 mg oral tablet: 1 tab(s) orally 2 times a day  Crestor 5 mg oral tablet: 1 tab(s) orally once a day (at bedtime)  famotidine 40 mg oral tablet: 1 tab(s) orally once a day  gabapentin 100 mg oral capsule: 1 cap(s) orally 3 times a day  guaiFENesin 600 mg oral tablet, extended release: 1 tab(s) orally every 12 hours as needed for  cough  metoprolol tartrate 25 mg oral tablet: 1 tab(s) orally 2 times a day  Multiple Vitamins oral tablet: 1 tab(s) orally once a day  Plaquenil 200 mg oral tablet: 1 tab(s) orally 2 times a day  predniSONE 20 mg oral tablet: 1 tab(s) orally once a day  Requip 2 mg oral tablet: 1 tab(s) orally 2 times a day 1 tablet orally 2 times a day  Singulair 10 mg oral tablet: 1 tab(s) orally once a day (in the evening)  Wixela Inhub 250 mcg-50 mcg inhalation powder: 1 puff(s) inhaled 2 times a day

## 2025-01-03 NOTE — DISCHARGE NOTE PROVIDER - CARE PROVIDER_API CALL
Adriel Galan  Pulmonary Disease  [address] [place] [id number]  Phone: [phone]  Fax: [phone]  Established Patient  Follow Up Time: 1 week

## 2025-01-03 NOTE — PROGRESS NOTE ADULT - SUBJECTIVE AND OBJECTIVE BOX
[facility]  INFECTIOUS DISEASES - [address], [place] [address]  Tel: [phone]     Fax: [phone]  =======================================================    GINNY VASQUEZ [id number]    Follow up: No fevers. Reports feeling better. Denies any pain or SOB. Had 1 BM here in the hospital, said stool is formed.    Allergies:  IV Contrast (Hives; Rash)  minocycline (Other)  Zofran (Other)  Levaquin (Other)  Enbrel (Unknown)  penicillin (Other)      Antibiotics:  acetaminophen     Tablet .. 650 milliGRAM(s) Oral every 6 hours PRN  albuterol/ipratropium for Nebulization 3 milliLiter(s) Nebulizer every 6 hours  aluminum hydroxide/magnesium hydroxide/simethicone Suspension 30 milliLiter(s) Oral every 4 hours PRN  aspirin enteric coated 81 milliGRAM(s) Oral daily  benzonatate 100 milliGRAM(s) Oral three times a day  cefTRIAXone   IVPB 1000 milliGRAM(s) IV Intermittent every 24 hours  enoxaparin Injectable 30 milliGRAM(s) SubCutaneous every 24 hours  famotidine    Tablet 20 milliGRAM(s) Oral daily  fluticasone propionate/ salmeterol 250-50 MICROgram(s) Diskus 1 Dose(s) Inhalation two times a day  gabapentin 100 milliGRAM(s) Oral three times a day  guaiFENesin  milliGRAM(s) Oral every 12 hours  hydroxychloroquine 200 milliGRAM(s) Oral two times a day  melatonin 3 milliGRAM(s) Oral at bedtime PRN  metoprolol tartrate 25 milliGRAM(s) Oral every 12 hours  montelukast 10 milliGRAM(s) Oral daily  multivitamin 1 Tablet(s) Oral daily  predniSONE   Tablet 20 milliGRAM(s) Oral daily  rOPINIRole 2 milliGRAM(s) Oral two times a day  rosuvastatin 5 milliGRAM(s) Oral at bedtime  sodium chloride 0.9%. 1000 milliLiter(s) IV Continuous <Continuous>       REVIEW OF SYSTEMS:  CONSTITUTIONAL:  No Fever or chills  HEENT:  No sore throat or runny nose.  CARDIOVASCULAR:  No chest pain   RESPIRATORY:  see history  GASTROINTESTINAL:  No nausea, vomiting or diarrhea.  GENITOURINARY:  No dysuria  MUSCULOSKELETAL:  no joint aches, no muscle pain  NEUROLOGIC:  No headache or dizziness  PSYCHIATRIC:  No disorder of thought or mood.     Physical Exam:  ICU Vital Signs Last 24 Hrs  T(C): 36.2 (03 Jan 2025 12:49), Max: 36.9 (03 Jan 2025 06:19)  T(F): 97.1 (03 Jan 2025 12:49), Max: 98.4 (03 Jan 2025 06:19)  HR: 83 (03 Jan 2025 12:49) (77 - 98)  BP: 145/69 (03 Jan 2025 12:49) (127/65 - 145/69)  BP(mean): --  ABP: --  ABP(mean): --  RR: 20 (03 Jan 2025 12:49) (20 - 20)  SpO2: 90% (03 Jan 2025 12:49) (90% - 93%)    O2 Parameters below as of 03 Jan 2025 12:49  Patient On (Oxygen Delivery Method): room air        GEN: NAD  HEENT: normocephalic and atraumatic.   NECK: Supple.   LUNGS: Normal respiratory effort  HEART: Regular rate and rhythm   ABDOMEN: Soft, nontender, and nondistended.    EXTREMITIES: No leg edema.  NEUROLOGIC: AO x 3  PSYCHIATRIC: Appropriate affect .      Labs:  01-03    139  |  108  |  21  ----------------------------<  98  4.8   |  27  |  1.00    Ca    8.8      03 Jan 2025 06:56  Mg     1.9     01-03    TPro  6.4  /  Alb  2.6[L]  /  TBili  0.5  /  DBili  x   /  AST  53[H]  /  ALT  20  /  AlkPhos  117  01-01                          9.7    14.66 )-----------( 223      ( 03 Jan 2025 06:56 )             31.0       Urinalysis Basic - ( 03 Jan 2025 06:56 )    Color: x / Appearance: x / SG: x / pH: x  Gluc: 98 mg/dL / Ketone: x  / Bili: x / Urobili: x   Blood: x / Protein: x / Nitrite: x   Leuk Esterase: x / RBC: x / WBC x   Sq Epi: x / Non Sq Epi: x / Bacteria: x      LIVER FUNCTIONS - ( 01 Jan 2025 15:50 )  Alb: 2.6 g/dL / Pro: 6.4 g/dL / ALK PHOS: 117 U/L / ALT: 20 U/L / AST: 53 U/L / GGT: x             RECENT CULTURES:  01-02 @ 13:45          NotDetec  01-01 @ 15:55 .Blood BLOOD     No growth at 24 hours        01-01 @ 15:50 .Blood BLOOD     No growth at 24 hours              All imaging and data are reviewed.     
Date/Time Patient Seen:  		  Referring MD:   Data Reviewed	       Patient is a 79y old  Female who presents with a chief complaint of SOB (02 Jan 2025 15:13)      Subjective/HPI     PAST MEDICAL & SURGICAL HISTORY:  Asthma    HLD (hyperlipidemia)    Rheumatoid arthritis    TIA (transient ischemic attack)  2012    Esophageal reflux    Lung cancer  Right lung.    Chronic obstructive pulmonary disease, unspecified COPD type  O2 at night    Hiatal hernia with GERD    Essential hypertension    Hyperlipidemia, unspecified hyperlipidemia type    Calculus of gallbladder with chronic cholecystitis without obstruction    Childhood asthma    Stage 3 chronic kidney disease    Carotid artery plaque    IBS (irritable bowel syndrome)    Psoriatic arthritis    S/P lobectomy of lung  Right lung in 1996.    H/O colonoscopy    History of endoscopy  Sept 2017    S/P cholecystectomy    CAD (coronary artery disease)          Medication list         MEDICATIONS  (STANDING):  albuterol/ipratropium for Nebulization 3 milliLiter(s) Nebulizer every 6 hours  aspirin enteric coated 81 milliGRAM(s) Oral daily  azithromycin  IVPB 500 milliGRAM(s) IV Intermittent every 24 hours  benzonatate 100 milliGRAM(s) Oral three times a day  cefTRIAXone   IVPB 1000 milliGRAM(s) IV Intermittent every 24 hours  enoxaparin Injectable 30 milliGRAM(s) SubCutaneous every 24 hours  famotidine    Tablet 20 milliGRAM(s) Oral daily  fluticasone propionate/ salmeterol 250-50 MICROgram(s) Diskus 1 Dose(s) Inhalation two times a day  gabapentin 100 milliGRAM(s) Oral three times a day  guaiFENesin  milliGRAM(s) Oral every 12 hours  hydroxychloroquine 200 milliGRAM(s) Oral two times a day  metoprolol tartrate 25 milliGRAM(s) Oral every 12 hours  montelukast 10 milliGRAM(s) Oral daily  multivitamin 1 Tablet(s) Oral daily  predniSONE   Tablet 20 milliGRAM(s) Oral daily  rOPINIRole 2 milliGRAM(s) Oral two times a day  rosuvastatin 5 milliGRAM(s) Oral at bedtime  sodium chloride 0.9%. 1000 milliLiter(s) (50 mL/Hr) IV Continuous <Continuous>    MEDICATIONS  (PRN):  acetaminophen     Tablet .. 650 milliGRAM(s) Oral every 6 hours PRN Temp greater or equal to 38C (100.4F), Mild Pain (1 - 3)  aluminum hydroxide/magnesium hydroxide/simethicone Suspension 30 milliLiter(s) Oral every 4 hours PRN Dyspepsia  melatonin 3 milliGRAM(s) Oral at bedtime PRN Insomnia         Vitals log        ICU Vital Signs Last 24 Hrs  T(C): 36.9 (03 Jan 2025 06:19), Max: 36.9 (03 Jan 2025 06:19)  T(F): 98.4 (03 Jan 2025 06:19), Max: 98.4 (03 Jan 2025 06:19)  HR: 77 (03 Jan 2025 06:19) (77 - 98)  BP: 127/65 (03 Jan 2025 06:19) (126/75 - 144/83)  BP(mean): --  ABP: --  ABP(mean): --  RR: 20 (03 Jan 2025 06:19) (20 - 20)  SpO2: 93% (03 Jan 2025 06:19) (93% - 98%)    O2 Parameters below as of 03 Jan 2025 06:19  Patient On (Oxygen Delivery Method): nasal cannula, 2L NC                 Input and Output:  I&O's Detail      Lab Data                        11.5   5.60  )-----------( 219      ( 02 Jan 2025 06:24 )             36.8     01-02    138  |  103  |  16  ----------------------------<  141[H]  4.2   |  28  |  0.99    Ca    9.6      02 Jan 2025 06:24  Mg     1.8     01-02    TPro  6.4  /  Alb  2.6[L]  /  TBili  0.5  /  DBili  x   /  AST  53[H]  /  ALT  20  /  AlkPhos  117  01-01            Review of Systems	      Objective     Physical Examination    heart s1s2  lung dc bS  head nc  head at  abd soft  cn grossly int  o2 support      Pertinent Lab findings & Imaging      Howell:  NO   Adequate UO     I&O's Detail           Discussed with:     Cultures:	        Radiology                            
Date of Service: 01-03-25 @ 11:25    Patient is a 79y old  Female who presents with a chief complaint of SOB (03 Jan 2025 06:54)      INTERVAL HPI/OVERNIGHT EVENTS: Patient seen and examined. NAD. No complaints.    Vital Signs Last 24 Hrs  T(C): 36.9 (03 Jan 2025 06:19), Max: 36.9 (03 Jan 2025 06:19)  T(F): 98.4 (03 Jan 2025 06:19), Max: 98.4 (03 Jan 2025 06:19)  HR: 78 (03 Jan 2025 08:20) (77 - 98)  BP: 127/65 (03 Jan 2025 06:19) (126/75 - 141/73)  BP(mean): --  RR: 20 (03 Jan 2025 06:19) (20 - 20)  SpO2: 93% (03 Jan 2025 06:19) (93% - 97%)    Parameters below as of 03 Jan 2025 06:19  Patient On (Oxygen Delivery Method): nasal cannula, 2L NC        01-03    139  |  108  |  21  ----------------------------<  98  4.8   |  27  |  1.00    Ca    8.8      03 Jan 2025 06:56  Mg     1.9     01-03    TPro  6.4  /  Alb  2.6[L]  /  TBili  0.5  /  DBili  x   /  AST  53[H]  /  ALT  20  /  AlkPhos  117  01-01                          9.7    14.66 )-----------( 223      ( 03 Jan 2025 06:56 )             31.0       CAPILLARY BLOOD GLUCOSE        Urinalysis Basic - ( 03 Jan 2025 06:56 )    Color: x / Appearance: x / SG: x / pH: x  Gluc: 98 mg/dL / Ketone: x  / Bili: x / Urobili: x   Blood: x / Protein: x / Nitrite: x   Leuk Esterase: x / RBC: x / WBC x   Sq Epi: x / Non Sq Epi: x / Bacteria: x              acetaminophen     Tablet .. 650 milliGRAM(s) Oral every 6 hours PRN  albuterol/ipratropium for Nebulization 3 milliLiter(s) Nebulizer every 6 hours  aluminum hydroxide/magnesium hydroxide/simethicone Suspension 30 milliLiter(s) Oral every 4 hours PRN  aspirin enteric coated 81 milliGRAM(s) Oral daily  azithromycin  IVPB 500 milliGRAM(s) IV Intermittent every 24 hours  benzonatate 100 milliGRAM(s) Oral three times a day  cefTRIAXone   IVPB 1000 milliGRAM(s) IV Intermittent every 24 hours  enoxaparin Injectable 30 milliGRAM(s) SubCutaneous every 24 hours  famotidine    Tablet 20 milliGRAM(s) Oral daily  fluticasone propionate/ salmeterol 250-50 MICROgram(s) Diskus 1 Dose(s) Inhalation two times a day  gabapentin 100 milliGRAM(s) Oral three times a day  guaiFENesin  milliGRAM(s) Oral every 12 hours  hydroxychloroquine 200 milliGRAM(s) Oral two times a day  melatonin 3 milliGRAM(s) Oral at bedtime PRN  metoprolol tartrate 25 milliGRAM(s) Oral every 12 hours  montelukast 10 milliGRAM(s) Oral daily  multivitamin 1 Tablet(s) Oral daily  predniSONE   Tablet 20 milliGRAM(s) Oral daily  rOPINIRole 2 milliGRAM(s) Oral two times a day  rosuvastatin 5 milliGRAM(s) Oral at bedtime  sodium chloride 0.9%. 1000 milliLiter(s) IV Continuous <Continuous>              REVIEW OF SYSTEMS:  CONSTITUTIONAL: No fever, no weight loss, or no fatigue  NECK: No pain, no stiffness  RESPIRATORY: No cough, no wheezing, no chills, no hemoptysis, No shortness of breath  CARDIOVASCULAR: No chest pain, no palpitations, no dizziness, no leg swelling  GASTROINTESTINAL: No abdominal pain. No nausea, no vomiting, no hematemesis; No diarrhea, no constipation. No melena, no hematochezia.  GENITOURINARY: No dysuria, no frequency, no hematuria, no incontinence  NEUROLOGICAL: No headaches, no loss of strength, no numbness, no tremors  SKIN: No itching, no burning  MUSCULOSKELETAL: No joint pain, no swelling; No muscle, no back, no extremity pain  PSYCHIATRIC: No depression, no mood swings,   HEME/LYMPH: No easy bruising, no bleeding gums  ALLERY AND IMMUNOLOGIC: No hives       Consultant(s) Notes Reviewed:  [X] YES  [ ] NO    PHYSICAL EXAM:  GENERAL: NAD  HEAD:  Atraumatic, Normocephalic  EYES: EOMI, PERRLA, conjunctiva and sclera clear  ENMT: No tonsillar erythema, exudates, or enlargement; Moist mucous membranes  NECK: Supple, No JVD  NERVOUS SYSTEM:  Awake & alert  CHEST/LUNG: Clear to auscultation bilaterally; No rales, rhonchi, wheezing,  HEART: Regular rate and rhythm  ABDOMEN: Soft, Nontender, Nondistended; Bowel sounds present  EXTREMITIES:  No clubbing, cyanosis, or edema  LYMPH: No lymphadenopathy noted  SKIN: No rashes      Advanced care planning discussed with patient/family [X] YES   [ ] NO    Advanced care planning discussed with patient/family. Patient's health status was discussed. All appropriate changes have been made regarding patient's end-of-life care. Advanced care planning forms reviewed/discussed/completed.  20 minutes spent.

## 2025-01-03 NOTE — DISCHARGE NOTE NURSING/CASE MANAGEMENT/SOCIAL WORK - PATIENT PORTAL LINK FT
3/14/2018 11:28 AM    Max heart rate: 85%    51 year old    Wt Readings from Last 1 Encounters:   01/27/16 (!) 140.6 kg      Ht Readings from Last 1 Encounters:   01/27/16 6' (1.829 m)       Patient Type: Outpatient    Cardiac Markers: Not Applicable    Reason for test: Pre op    Primary MD: Verónica Sargent       Ordering MD:  Verónica Sargent NP       Cardiologist Performing Test: JENNIFER Phipps    --------------------------------------------------------------------------------------------------------------------  HISTORY OF: Diabetic, HTN and High Cholesterol       PATIENT HAS HAD THE FOLLOWING IN THE LAST 24 HOURS: No Caffeinated, Decaf Beverages and Chocolate or Chocolate Drink.    Current Outpatient Prescriptions   Medication Sig Dispense Refill   • metformin (GLUCOPHAGE) 1000 MG tablet Take 1,000 mg by mouth 2 times daily (with meals).     • glyBURIDE (DIABETA) 5 MG tablet Take 5 mg by mouth 2 times daily (with meals).     • gabapentin (NEURONTIN) 300 MG capsule Take 300 mg by mouth 2 times daily.     • olmesartan (BENICAR) 40 MG tablet Take 40 mg by mouth daily.     • pravastatin (PRAVACHOL) 40 MG tablet Take 40 mg by mouth nightly.     • losartan (COZAAR) 50 MG tablet Take 50 mg by mouth daily.     • amLODIPine (NORVASC) 10 MG tablet Take 10 mg by mouth daily.     • hydrochlorothiazide (HYDRODIURIL) 25 MG tablet Take 25 mg by mouth daily.     • sildenafil (VIAGRA) 100 MG tablet Take 100 mg by mouth as needed for Erectile Dysfunction.     • prednisoLONE acetate (PRED FORTE) 1 % ophthalmic suspension 1 drop 4 times daily. On HOLD until after       No current facility-administered medications for this encounter.        ALLERGIES:  No Known Allergies    MEETS CRITERIA FOR STRESS TEST: Yes         NOTES: Patient tolerated stress test without chest pain.  Exercised 5.43, METS 7.0.  Patient discharged home in stable condition     TYPE OF TEST: Stress Echo    ABLE TO WALK: Yes     NEEDED: No   You can access the FollowMyHealth Patient Portal offered by [facility] by registering at the following website: [email or web address]. By joining [facility]’s FollowMyHealth portal, you will also be able to view your health information using other applications (apps) compatible with our system.

## 2025-01-03 NOTE — DISCHARGE NOTE NURSING/CASE MANAGEMENT/SOCIAL WORK - NSSCNAMETXT_GEN_ALL_CORE
[facility] [phone] or  [phone]  Nurse to visit the day after hospital discharge; Please contact the home care agency at the above phone number if you have not heard from them by 12 noon on the day after your hospital discharge.

## 2025-01-08 NOTE — ED PROVIDER NOTE - CONSTITUTIONAL, MLM
wd, wn, holding abdomen, appears in discomfort normal... wd, wn, holding abdomen, appears in discomfort, has emesis bag in hands

## 2025-01-08 NOTE — ED ADULT NURSE NOTE - OBJECTIVE STATEMENT
patient BIBA from home c/o worsening stomach pain, n/v/d since being d/c home after recent admission. patient agitated, refusing to answer certain questions during assessment, requesting pain medication first. unsure when last BM or vomiting episode was. no known fevers.

## 2025-01-08 NOTE — ED PROVIDER NOTE - PROGRESS NOTE DETAILS
spoke to pt and  regarding CT results, advised that a surgeon will be called in and will speak to them regarding surgical intervention, RN will be in shortly for pre-op testing and to start antibiotics and more pain medication

## 2025-01-08 NOTE — ED ADULT NURSE NOTE - CAS EDP DISCH DISPOSITION ADMI
OR/Surgery

## 2025-01-08 NOTE — ED PROVIDER NOTE - CLINICAL SUMMARY MEDICAL DECISION MAKING FREE TEXT BOX
79y F c/o severe abd pain, n/v/d since recent admission for PNA on abx, needs iv, labs, pain control and antiemetic, ct, will collect stool for gi panel and c.diff

## 2025-01-08 NOTE — ED PROVIDER NOTE - OBJECTIVE STATEMENT
79y F c/o abd pain, n/v/d, pt was admitted Jan 1 - [facility] for PNA, in chart see that pt had intermittent diarrhea/abd pain while here, pt just finished abx, reports the diarrhea worsened along with the abd pain after leaving the hospital, has nausea, unclear when last vomited, no known fever now 79y F c/o abd pain, n/v/d, pt was admitted Jan 1 - [facility] for PNA, in chart see that pt had intermittent diarrhea/abd pain while here, pt just finished abx, reports the diarrhea worsened along with the abd pain after leaving the hospital for the past few days, has nausea, unclear when last vomited, no known fever now, pain worse today

## 2025-01-08 NOTE — ED ADULT NURSE NOTE - NSFALLHARMRISKINTERV_ED_ALL_ED

## 2025-01-09 ENCOUNTER — TRANSCRIPTION ENCOUNTER (OUTPATIENT)
Age: 80
End: 2025-01-09

## 2025-01-09 ENCOUNTER — APPOINTMENT (OUTPATIENT)
Facility: CLINIC | Age: 80
End: 2025-01-09

## 2025-01-09 NOTE — H&P ADULT - NSHPLABSRESULTS_GEN_ALL_CORE
9.8    6.85  )-----------( 216      ( 09 Jan 2025 07:40 )             32.9     01-09    141  |  109[H]  |  28[H]  ----------------------------<  114[H]  5.4[H]   |  27  |  1.43[H]    Ca    7.5[L]      09 Jan 2025 07:40    TPro  4.6[L]  /  Alb  2.6[L]  /  TBili  0.3  /  DBili  x   /  AST  67[H]  /  ALT  44  /  AlkPhos  47  01-09    PT/INR - ( 09 Jan 2025 01:47 )   PT: 12.2 sec;   INR: 1.05 ratio         PTT - ( 09 Jan 2025 01:47 )  PTT:23.3 sec  Urinalysis Basic - ( 09 Jan 2025 07:40 )    Color: x / Appearance: x / SG: x / pH: x  Gluc: 114 mg/dL / Ketone: x  / Bili: x / Urobili: x   Blood: x / Protein: x / Nitrite: x   Leuk Esterase: x / RBC: x / WBC x   Sq Epi: x / Non Sq Epi: x / Bacteria: x      LIVER FUNCTIONS - ( 09 Jan 2025 07:40 )  Alb: 2.6 g/dL / Pro: 4.6 g/dL / ALK PHOS: 47 U/L / ALT: 44 U/L / AST: 67 U/L / GGT: x             Urinalysis with Rflx Culture (collected 09 Jan 2025 03:39)

## 2025-01-09 NOTE — CONSULT NOTE ADULT - SUBJECTIVE AND OBJECTIVE BOX
HPI:  80 y/o WF with PMHx with HTN, HLD, CKD, PUD (dx 2011), Lung CA s/p lobectomy (1996) presents to [facility] ED from home with  via ambulance c/o worsening generalized achy abdominal pain since last night.  Patient reports baseline abdominal discomfort x several years which worsened suddenly (described as 11/10) without associated fever/chills.  Patient recently discharged from [facility] - treated for PNA.  Patient reports that after discharge had 3 days of vomiting dark fluids and dark non-bloody which resolved with Imodium.  Patient reports tolerating last meal yesterday, small dark BM yesterday, + appetite.  Of note, patient requires constant O2 at home x several weeks, previously at nights only.    Endoscopy/Colonoscopy - 2023 - polypectomy, otherwise patient reports no significant findings.    Patient reports being non-compliant with medications - does not remember if she ran out of medications or was supposed to stop them. (09 Jan 2025 03:40)      PAST MEDICAL & SURGICAL HISTORY:  HLD (hyperlipidemia)      Rheumatoid arthritis      TIA (transient ischemic attack)  2012      Lung cancer  Right lung.      Chronic obstructive pulmonary disease, unspecified COPD type  O2 at night      Hiatal hernia with GERD      Essential hypertension      Childhood asthma      Stage 3 chronic kidney disease      Carotid artery plaque      IBS (irritable bowel syndrome)      Psoriatic arthritis      S/P lobectomy of lung  Right lung in 1996.      H/O colonoscopy      History of endoscopy  Sept 2017      S/P cholecystectomy          ANTIMICROBIAL:  metroNIDAZOLE  IVPB 500 milliGRAM(s) IV Intermittent every 12 hours    CARDIOVASCULAR:    PULMONARY:  albuterol/ipratropium for Nebulization. 3 milliLiter(s) Nebulizer once    NEUROLOGIC:  acetaminophen   IVPB .. 700 milliGRAM(s) IV Intermittent every 6 hours  propofol Infusion 20 MICROgram(s)/kG/Min IV Continuous <Continuous>    ONCOLOGIC:    HEMATOLOGIC:    GATROINTESTINAL:  pantoprazole  Injectable 40 milliGRAM(s) IV Push two times a day     MEDS:    ENDO/METABOLIC:    IV FLUID/NUTRITION:  albumin human 25% IVPB 50 milliLiter(s) IV Intermittent once  lactated ringers. 1000 milliLiter(s) IV Continuous <Continuous>  lactated ringers. 1000 milliLiter(s) IV Continuous <Continuous>  sodium bicarbonate  Infusion 0.319 mEq/kG/Hr IV Continuous <Continuous>    TOPICAL:    IMMUNOLOGIC & OTHER        Allergies    Zofran (Other)  minocycline (Other)  penicillin (Other)  IV Contrast (Hives; Rash)  Enbrel (Unknown)  Levaquin (Other)    Intolerances        SOCIAL HISTORY:    FAMILY HISTORY:  FH: type 2 diabetes (Mother)    FH: CAD (coronary artery disease) (Father, Sibling)    FH: colon cancer (Sibling)    FH: myocardial infarction (Father)    FH: stroke (Sibling)    FH: lung cancer (Sibling)        Vital Signs Last 24 Hrs  T(C): 36 (09 Jan 2025 07:00), Max: 36.6 (09 Jan 2025 02:23)  T(F): 96.8 (09 Jan 2025 07:00), Max: 97.9 (09 Jan 2025 02:23)  HR: 91 (09 Jan 2025 07:58) (80 - 119)  BP: 117/63 (09 Jan 2025 07:58) (89/58 - 183/80)  BP(mean): 83 (09 Jan 2025 07:58) (69 - 105)  RR: 20 (09 Jan 2025 07:30) (17 - 26)  SpO2: 100% (09 Jan 2025 07:58) (94% - 100%)    Parameters below as of 09 Jan 2025 07:58  Patient On (Oxygen Delivery Method): ventilator      ABG - ( 09 Jan 2025 07:01 )  pH, Arterial: 7.03  pH, Blood: x     /  pCO2: 85    /  pO2: 192   / HCO3: 22    / Base Excess: -8.3  /  SaO2: 97.5              Mode: AC/ CMV (Assist Control/ Continuous Mandatory Ventilation)  RR (machine): 25  TV (machine): 300  FiO2: 100  PEEP: 3  ITime: 1  MAP: 14  PIP: 31    I&O's Detail    08 Jan 2025 07:01  -  09 Jan 2025 07:00  --------------------------------------------------------  IN:    IV PiggyBack: 100 mL    Lactated Ringers: 1560 mL  Total IN: 1660 mL    OUT:    Blood Loss (mL): 5 mL    Bulb (mL): 70 mL    Indwelling Catheter - Urethral (mL): 5 mL  Total OUT: 80 mL    Total NET: 1580 mL      09 Jan 2025 07:01  -  09 Jan 2025 08:11  --------------------------------------------------------  IN:    Lactated Ringers: 50 mL    Propofol: 20 mL  Total IN: 70 mL    OUT:    Bulb (mL): 80 mL  Total OUT: 80 mL    Total NET: -10 mL        Daily Height in cm: 157.48 (09 Jan 2025 03:40)    Daily     REVIEW OF SYSTEMS:  as per hpi, other systems reviewed and are negative    PHYSICAL EXAM:    GENERAL: NAD, well-groomed, well-developed  HEAD:  Atraumatic, Normocephalic  EYES: EOMI, PERRLA, conjunctiva and sclera clear  ENMT: No tonsillar erythema, exudates, or enlargement; Moist mucous membranes, No lesions  NECK: Supple, No JVD, Normal thyroid  NERVOUS SYSTEM:  Alert & Oriented X3, Good concentration; Motor Strength 5/5 B/L upper and lower extremities  CHEST/LUNG: Clear to auscultation bilaterally; No rales, rhonchi, wheezing, or rubs  HEART: Regular rate and rhythm; No murmurs, rubs, or gallops  ABDOMEN: Soft, Nontender, Nondistended; Bowel sounds present  EXTREMITIES:  2+ Peripheral Pulses, No clubbing, cyanosis, or edema  LYMPH: No lymphadenopathy   SKIN: No rashes or lesions      LABS:                        9.8    6.85  )-----------( 216      ( 09 Jan 2025 07:40 )             32.9     01-09    141  |  109[H]  |  28[H]  ----------------------------<  114[H]  5.4[H]   |  27  |  1.43[H]    Ca    7.5[L]      09 Jan 2025 07:40    TPro  4.6[L]  /  Alb  2.6[L]  /  TBili  0.3  /  DBili  x   /  AST  67[H]  /  ALT  44  /  AlkPhos  47  01-09    PT/INR - ( 09 Jan 2025 01:47 )   PT: 12.2 sec;   INR: 1.05 ratio         PTT - ( 09 Jan 2025 01:47 )  PTT:23.3 sec  Urinalysis Basic - ( 09 Jan 2025 07:40 )    Color: x / Appearance: x / SG: x / pH: x  Gluc: 114 mg/dL / Ketone: x  / Bili: x / Urobili: x   Blood: x / Protein: x / Nitrite: x   Leuk Esterase: x / RBC: x / WBC x   Sq Epi: x / Non Sq Epi: x / Bacteria: x          PT/INR - ( 09 Jan 2025 01:47 )   PT: 12.2 sec;   INR: 1.05 ratio         PTT - ( 09 Jan 2025 01:47 )  PTT:23.3 sec    RADIOLOGY & ADDITIONAL STUDIES: HPI:  78 y/o WF with PMHx with HTN, HLD, CKD, PUD (dx 2011), Lung CA s/p lobectomy (1996) presents to [facility] ED from home with  via ambulance c/o worsening generalized achy abdominal pain since last night.  Patient reports baseline abdominal discomfort x several years which worsened suddenly (described as 11/10) without associated fever/chills.  Patient recently discharged from [facility] - treated for PNA.  Patient reports that after discharge had 3 days of vomiting dark fluids and dark non-bloody which resolved with Imodium.  Patient reports tolerating last meal yesterday, small dark BM yesterday, + appetite.  Of note, patient requires constant O2 at home x several weeks, previously at nights only.    Endoscopy/Colonoscopy - 2023 - polypectomy, otherwise patient reports no significant findings.    Patient reports being non-compliant with medications - does not remember if she ran out of medications or was supposed to stop them. (09 Jan 2025 03:40)    Patient was admitted to surgery, taken for emergent laproscopic repair of perforated ulcer, seen in pacu remaining intubated, initially hypotensive to 70s, now normotensive, tachypneic with low tidal volumes to 100, patient with history of right lobectomy    PAST MEDICAL & SURGICAL HISTORY:  HLD (hyperlipidemia)      Rheumatoid arthritis      TIA (transient ischemic attack)  2012      Lung cancer  Right lung.      Chronic obstructive pulmonary disease, unspecified COPD type  O2 at night      Hiatal hernia with GERD      Essential hypertension      Childhood asthma      Stage 3 chronic kidney disease      Carotid artery plaque      IBS (irritable bowel syndrome)      Psoriatic arthritis      S/P lobectomy of lung  Right lung in 1996.      H/O colonoscopy      History of endoscopy  Sept 2017      S/P cholecystectomy          ANTIMICROBIAL:  metroNIDAZOLE  IVPB 500 milliGRAM(s) IV Intermittent every 12 hours    CARDIOVASCULAR:    PULMONARY:  albuterol/ipratropium for Nebulization. 3 milliLiter(s) Nebulizer once    NEUROLOGIC:  acetaminophen   IVPB .. 700 milliGRAM(s) IV Intermittent every 6 hours  propofol Infusion 20 MICROgram(s)/kG/Min IV Continuous <Continuous>    ONCOLOGIC:    HEMATOLOGIC:    GATROINTESTINAL:  pantoprazole  Injectable 40 milliGRAM(s) IV Push two times a day     MEDS:    ENDO/METABOLIC:    IV FLUID/NUTRITION:  albumin human 25% IVPB 50 milliLiter(s) IV Intermittent once  lactated ringers. 1000 milliLiter(s) IV Continuous <Continuous>  lactated ringers. 1000 milliLiter(s) IV Continuous <Continuous>  sodium bicarbonate  Infusion 0.319 mEq/kG/Hr IV Continuous <Continuous>    TOPICAL:    IMMUNOLOGIC & OTHER        Allergies    Zofran (Other)  minocycline (Other)  penicillin (Other)  IV Contrast (Hives; Rash)  Enbrel (Unknown)  Levaquin (Other)    Intolerances        SOCIAL HISTORY:    FAMILY HISTORY:  FH: type 2 diabetes (Mother)    FH: CAD (coronary artery disease) (Father, Sibling)    FH: colon cancer (Sibling)    FH: myocardial infarction (Father)    FH: stroke (Sibling)    FH: lung cancer (Sibling)        Vital Signs Last 24 Hrs  T(C): 36 (09 Jan 2025 07:00), Max: 36.6 (09 Jan 2025 02:23)  T(F): 96.8 (09 Jan 2025 07:00), Max: 97.9 (09 Jan 2025 02:23)  HR: 91 (09 Jan 2025 07:58) (80 - 119)  BP: 117/63 (09 Jan 2025 07:58) (89/58 - 183/80)  BP(mean): 83 (09 Jan 2025 07:58) (69 - 105)  RR: 20 (09 Jan 2025 07:30) (17 - 26)  SpO2: 100% (09 Jan 2025 07:58) (94% - 100%)    Parameters below as of 09 Jan 2025 07:58  Patient On (Oxygen Delivery Method): ventilator      ABG - ( 09 Jan 2025 07:01 )  pH, Arterial: 7.03  pH, Blood: x     /  pCO2: 85    /  pO2: 192   / HCO3: 22    / Base Excess: -8.3  /  SaO2: 97.5              Mode: AC/ CMV (Assist Control/ Continuous Mandatory Ventilation)  RR (machine): 25  TV (machine): 300  FiO2: 100  PEEP: 3  ITime: 1  MAP: 14  PIP: 31    I&O's Detail    08 Jan 2025 07:01  -  09 Jan 2025 07:00  --------------------------------------------------------  IN:    IV PiggyBack: 100 mL    Lactated Ringers: 1560 mL  Total IN: 1660 mL    OUT:    Blood Loss (mL): 5 mL    Bulb (mL): 70 mL    Indwelling Catheter - Urethral (mL): 5 mL  Total OUT: 80 mL    Total NET: 1580 mL      09 Jan 2025 07:01  -  09 Jan 2025 08:11  --------------------------------------------------------  IN:    Lactated Ringers: 50 mL    Propofol: 20 mL  Total IN: 70 mL    OUT:    Bulb (mL): 80 mL  Total OUT: 80 mL    Total NET: -10 mL        Daily Height in cm: 157.48 (09 Jan 2025 03:40)    Daily     REVIEW OF SYSTEMS:  as per hpi, other systems reviewed and are negative    PHYSICAL EXAM:    GENERAL: elderly female, sedated, intubated  HEAD:  Atraumatic, Normocephalic  ENMT: + ETT with dark bloody/bilious contents in suction   NECK: no JVD noted  NERVOUS SYSTEM:  sedated on propofol, not following commands  CHEST/LUNG: no breath sounds on right, + bs on left with some rhonchi  HEART: Regular rate and rhythm; No murmurs   ABDOMEN: Soft, laproscopic sites c/d/i  EXTREMITIES:  2+ Peripheral Pulses, No clubbing,       LABS:                        9.8    6.85  )-----------( 216      ( 09 Jan 2025 07:40 )             32.9     01-09    141  |  109[H]  |  28[H]  ----------------------------<  114[H]  5.4[H]   |  27  |  1.43[H]    Ca    7.5[L]      09 Jan 2025 07:40    TPro  4.6[L]  /  Alb  2.6[L]  /  TBili  0.3  /  DBili  x   /  AST  67[H]  /  ALT  44  /  AlkPhos  47  01-09    PT/INR - ( 09 Jan 2025 01:47 )   PT: 12.2 sec;   INR: 1.05 ratio         PTT - ( 09 Jan 2025 01:47 )  PTT:23.3 sec  Urinalysis Basic - ( 09 Jan 2025 07:40 )    Color: x / Appearance: x / SG: x / pH: x  Gluc: 114 mg/dL / Ketone: x  / Bili: x / Urobili: x   Blood: x / Protein: x / Nitrite: x   Leuk Esterase: x / RBC: x / WBC x   Sq Epi: x / Non Sq Epi: x / Bacteria: x          PT/INR - ( 09 Jan 2025 01:47 )   PT: 12.2 sec;   INR: 1.05 ratio         PTT - ( 09 Jan 2025 01:47 )  PTT:23.3 sec  pH: 7.03  pCO2 85  pO2, Arterial: 192 mmHg  HCO3, Arterial: 22 mmol/L  Base Excess, Arterial: -8.3 mmol/L  Oxygen Saturation, Arterial: 97.5 %  FIO2, Arterial: 60pH, Arterial: 7.03RADIOLOGY & ADDITIONAL STUDIES:

## 2025-01-09 NOTE — ED ADULT NURSE REASSESSMENT NOTE - NSFALLHARMRISKINTERV_ED_ALL_ED

## 2025-01-09 NOTE — H&P ADULT - HISTORY OF PRESENT ILLNESS
80 y/o WF with PMHx with HTN, HLD, PUD (dx 2011), Lung CA s/p lobectomy 1996 presents to [facility] ED from home with  c/o worsening generalized achy abdominal pain since last night.  Patient reports baseline abdominal discomfort x several years which worsened suddenly (described as 11/10) without associated fever/chills.  Patient recently discharged from [facility] - treated for PNA.  Patient reports that after discharge had 3 days of vomiting dark fluids and dark non-bloody which resolved with Imodium.  Patient reports tolerating last meal yesterday, small dark BM yesterday, + appetite.  Of note, patient requires constant O2 at home x several weeks, previously at nights only.    Endoscopy/Colonoscopy - 2023 - polypectomy, otherwise patient reports no significant findings.    Patient reports being non-compliant with medications - does not remember if she ran out of medications or was supposed to stop them. 78 y/o WF with PMHx with HTN, HLD, CKD, PUD (dx 2011), Lung CA s/p lobectomy (1996) presents to [facility] ED from home with  via ambulance c/o worsening generalized achy abdominal pain since last night.  Patient reports baseline abdominal discomfort x several years which worsened suddenly (described as 11/10) without associated fever/chills.  Patient recently discharged from [facility] - treated for PNA.  Patient reports that after discharge had 3 days of vomiting dark fluids and dark non-bloody which resolved with Imodium.  Patient reports tolerating last meal yesterday, small dark BM yesterday, + appetite.  Of note, patient requires constant O2 at home x several weeks, previously at nights only.    Endoscopy/Colonoscopy - 2023 - polypectomy, otherwise patient reports no significant findings.    Patient reports being non-compliant with medications - does not remember if she ran out of medications or was supposed to stop them.

## 2025-01-09 NOTE — CONSULT NOTE ADULT - SUBJECTIVE AND OBJECTIVE BOX
HPI:  Patient is a 79y old  Female with a hx of CKD3 ( Cr ranging 1.1-1.5 per historical data ) who developed oligo-anuria in the immediate post-op period following lap repair of perforated gastric ulcer.   Admitted with hypothermia, abd pain, acute anemia. No record of hypotension. However, her BP has dropped relatively from SBP of 150-180 on presentation to 110s this morning.   Pt is currently in recovery, intubated.   Renal evaluation requested to address oligo-anuria.       PAST MEDICAL & SURGICAL HISTORY:  HLD (hyperlipidemia)  Rheumatoid arthritis  TIA (transient ischemic attack)  2012  Lung cancer  Right lung.  Chronic obstructive pulmonary disease, unspecified COPD type  O2 at night  Hiatal hernia with GERD  Essential hypertension  Childhood asthma  Stage 3 chronic kidney disease  Carotid artery plaque  IBS (irritable bowel syndrome)  Psoriatic arthritis  S/P lobectomy of lung  Right lung in 1996.  H/O colonoscopy  History of endoscopy  Sept 2017  S/P cholecystectomy        FAMILY HISTORY:  FH: type 2 diabetes (Mother)    FH: CAD (coronary artery disease) (Father, Sibling)    FH: colon cancer (Sibling)    FH: myocardial infarction (Father)    FH: stroke (Sibling)    FH: lung cancer (Sibling)        Allergies:  Zofran (Other)  minocycline (Other)  penicillin (Other)  IV Contrast (Hives; Rash)  Enbrel (Unknown)  Levaquin (Other)          MEDICATIONS  (STANDING):  acetaminophen   IVPB .. 700 milliGRAM(s) IV Intermittent every 6 hours  albumin human 25% IVPB 50 milliLiter(s) IV Intermittent once  albuterol/ipratropium for Nebulization. 3 milliLiter(s) Nebulizer once  chlorhexidine 0.12% Liquid 15 milliLiter(s) Oral Mucosa every 12 hours  lactated ringers. 1000 milliLiter(s) (150 mL/Hr) IV Continuous <Continuous>  lactated ringers. 1000 milliLiter(s) (75 mL/Hr) IV Continuous <Continuous>  metroNIDAZOLE  IVPB 500 milliGRAM(s) IV Intermittent every 12 hours  pantoprazole  Injectable 40 milliGRAM(s) IV Push two times a day  propofol Infusion 20 MICROgram(s)/kG/Min (5.64 mL/Hr) IV Continuous <Continuous>  sodium bicarbonate  Infusion 0.319 mEq/kG/Hr (100 mL/Hr) IV Continuous <Continuous>  sodium bicarbonate  Injectable 25 milliEquivalent(s) IV Push once    MEDICATIONS  (PRN):      Daily Height in cm: 157.48 (09 Jan 2025 03:40)    Daily     Drug Dosing Weight  Height (cm): 157.5 (09 Jan 2025 04:18)  Weight (kg): 47 (09 Jan 2025 04:18)  BMI (kg/m2): 18.9 (09 Jan 2025 04:18)  BSA (m2): 1.45 (09 Jan 2025 04:18)      REVIEW OF SYSTEMS:    Anuria  CKD  PUD        ABG - ( 09 Jan 2025 07:01 )  pH, Arterial: 7.03  pH, Blood: x     /  pCO2: 85    /  pO2: 192   / HCO3: 22    / Base Excess: -8.3  /  SaO2: 97.5                I&O's Detail    08 Jan 2025 07:01  -  09 Jan 2025 07:00  --------------------------------------------------------  IN:    IV PiggyBack: 100 mL    Lactated Ringers: 1560 mL  Total IN: 1660 mL    OUT:    Blood Loss (mL): 5 mL    Bulb (mL): 70 mL    Indwelling Catheter - Urethral (mL): 5 mL  Total OUT: 80 mL    Total NET: 1580 mL      09 Jan 2025 07:01  -  09 Jan 2025 08:01  --------------------------------------------------------  IN:    Lactated Ringers: 50 mL  Total IN: 50 mL    OUT:    Bulb (mL): 80 mL  Total OUT: 80 mL    Total NET: -30 mL          01-08 @ 07:01  -  01-09 @ 07:00  --------------------------------------------------------  IN: 1660 mL / OUT: 80 mL / NET: 1580 mL    01-09 @ 07:01  -  01-09 @ 08:01  --------------------------------------------------------  IN: 50 mL / OUT: 80 mL / NET: -30 mL        PHYSICAL EXAM:    GENERAL: NAD, intubated.   HEAD:  Atraumatic, normocephalic  CHEST/LUNG: Clear to auscultation bilaterally  HEART: Regular rate and rhythm. No murmurs, rubs, or gallops  ABDOMEN: post lap Sx, R abd JULIANN drain  EXTREMITIES:  no edema    LABS:  CBC Full  -  ( 09 Jan 2025 07:40 )  WBC Count : 6.85 K/uL  RBC Count : 3.31 M/uL  Hemoglobin : 9.8 g/dL  Hematocrit : 32.9 %  Platelet Count - Automated : 216 K/uL  Mean Cell Volume : 99.4 fL  Mean Cell Hemoglobin : 29.6 pg  Mean Cell Hemoglobin Concentration : 29.8 g/dL  Auto Neutrophil # : 5.75 K/uL  Auto Lymphocyte # : 0.41 K/uL  Auto Monocyte # : 0.29 K/uL  Auto Eosinophil # : 0.00 K/uL  Auto Basophil # : 0.02 K/uL  Auto Neutrophil % : 84.0 %  Auto Lymphocyte % : 6.0 %  Auto Monocyte % : 4.2 %  Auto Eosinophil % : 0.0 %  Auto Basophil % : 0.3 %    01-09    141  |  109[H]  |  28[H]  ----------------------------<  114[H]  5.4[H]   |  27  |  1.43[H]    Ca    7.5[L]      09 Jan 2025 07:40    TPro  4.6[L]  /  Alb  2.6[L]  /  TBili  0.3  /  DBili  x   /  AST  67[H]  /  ALT  44  /  AlkPhos  47  01-09        Impression:  * ESPERANZA, anuria -- suspect ischemic ATN due to either occult or relative hypotension  * HyperK -- mild. ESPERANZA, dec distal K delivery  * Perforated gastricl ulcer. S/p lap repair  * CKD 3 presumably due to RVD, hypertensive nephrosclerosis. Cr ranging widely 1.1-1.5 per historical data    Recommendations:   FeNa requested.   Change IVFs to NS in light of emerging hyperK  Keep -150 in the setting of suspected chronic hypertension.

## 2025-01-09 NOTE — CONSULT NOTE ADULT - CONSULT REASON
S/P gastric perf, natan patch repair last night a [place], transferred to [place] for ICU monitoring this AM

## 2025-01-09 NOTE — DISCHARGE NOTE PROVIDER - CARE PROVIDER_API CALL
Marcelino Valdez  Surgery  [address] [place] [id number]  Phone: [phone]  Fax: [phone]  Follow Up Time:

## 2025-01-09 NOTE — CONSULT NOTE ADULT - SUBJECTIVE AND OBJECTIVE BOX
Patient is a 79y old  Female who presents with a chief complaint of gastric perfration (09 Jan 2025 11:17)    BRIEF HOSPITAL COURSE: ***    Events last 24 hours: ***    PAST MEDICAL & SURGICAL HISTORY:  HLD (hyperlipidemia)  Rheumatoid arthritis  TIA (transient ischemic attack)  2012  Lung cancer  Right lung.  Chronic obstructive pulmonary disease, unspecified COPD type  O2 at night  Hiatal hernia with GERD  Essential hypertension  Childhood asthma  Stage 3 chronic kidney disease  Carotid artery plaque  IBS (irritable bowel syndrome)  Psoriatic arthritis  S/P lobectomy of lung  Right lung in 1996.  H/O colonoscopy  History of endoscopy  Sept 2017  S/P cholecystectomy    Allergies  penicillin (Other)  minocycline (Other)  Enbrel (Unknown)  IV Contrast (Hives; Rash)  Zofran (Other)  Levaquin (Other)    Intolerances    FAMILY HISTORY:  FH: type 2 diabetes (Mother)  FH: CAD (coronary artery disease) (Father, Sibling)  FH: colon cancer (Sibling)  FH: myocardial infarction (Father)  FH: stroke (Sibling)  FH: lung cancer (Sibling)    SOCIAL HISTORY:     Review of Systems:  Unable to obtain. Intubated/sedated.    Medications:  piperacillin/tazobactam IVPB. 3.375 Gram(s) IV Intermittent once  piperacillin/tazobactam IVPB.- 3.375 Gram(s) IV Intermittent once  piperacillin/tazobactam IVPB.. 3.375 Gram(s) IV Intermittent every 8 hours  propofol Infusion 10 MICROgram(s)/kG/Min IV Continuous <Continuous>  pantoprazole  Injectable 40 milliGRAM(s) IV Push daily  chlorhexidine 0.12% Liquid 15 milliLiter(s) Oral Mucosa every 12 hours  chlorhexidine 2% Cloths 1 Application(s) Topical <User Schedule>    Mode: AC/ CMV (Assist Control/ Continuous Mandatory Ventilation)  RR (machine): 24  TV (machine): 200  FiO2: 100  PEEP: 5  ITime: 1  MAP: 8  PIP: 31    ICU Vital Signs Last 24 Hrs  T(C): 35.9 (09 Jan 2025 08:55), Max: 36.6 (09 Jan 2025 02:23)  T(F): 96.7 (09 Jan 2025 08:55), Max: 97.9 (09 Jan 2025 02:23)  HR: 98 (09 Jan 2025 12:00) (80 - 119)  BP: 138/56 (09 Jan 2025 12:18) (89/58 - 183/80)  BP(mean): 78 (09 Jan 2025 12:18) (69 - 105)  ABP: 102/35 (09 Jan 2025 12:00) (97/45 - 141/60)  ABP(mean): 61 (09 Jan 2025 12:00) (61 - 89)  RR: 24 (09 Jan 2025 12:00) (17 - 26)  SpO2: 97% (09 Jan 2025 12:00) (94% - 100%)    O2 Parameters below as of 09 Jan 2025 12:00  Patient On (Oxygen Delivery Method): ventilator    Vital Signs Last 24 Hrs  T(C): 35.9 (09 Jan 2025 08:55), Max: 36.6 (09 Jan 2025 02:23)  T(F): 96.7 (09 Jan 2025 08:55), Max: 97.9 (09 Jan 2025 02:23)  HR: 98 (09 Jan 2025 12:00) (80 - 119)  BP: 138/56 (09 Jan 2025 12:18) (89/58 - 183/80)  BP(mean): 78 (09 Jan 2025 12:18) (69 - 105)  RR: 24 (09 Jan 2025 12:00) (17 - 26)  SpO2: 97% (09 Jan 2025 12:00) (94% - 100%)    Parameters below as of 09 Jan 2025 12:00  Patient On (Oxygen Delivery Method): ventilator    ABG - ( 09 Jan 2025 10:50 )  pH, Arterial: 6.94  pH, Blood: x     /  pCO2: 119   /  pO2: 278   / HCO3: 26    / Base Excess: -6.7  /  SaO2: 97.9      I&O's Detail    LABS:                        9.8    6.85  )-----------( 216      ( 09 Jan 2025 07:40 )             32.9     01-09    141  |  109[H]  |  28[H]  ----------------------------<  114[H]  5.4[H]   |  27  |  1.43[H]    Ca    7.5[L]      09 Jan 2025 07:40    TPro  4.6[L]  /  Alb  2.6[L]  /  TBili  0.3  /  DBili  x   /  AST  67[H]  /  ALT  44  /  AlkPhos  47  01-09    CAPILLARY BLOOD GLUCOSE    PT/INR - ( 09 Jan 2025 01:47 )   PT: 12.2 sec;   INR: 1.05 ratio    PTT - ( 09 Jan 2025 01:47 )  PTT:23.3 sec    Urinalysis Basic - ( 09 Jan 2025 07:40 )  Color: x / Appearance: x / SG: x / pH: x  Gluc: 114 mg/dL / Ketone: x  / Bili: x / Urobili: x   Blood: x / Protein: x / Nitrite: x   Leuk Esterase: x / RBC: x / WBC x   Sq Epi: x / Non Sq Epi: x / Bacteria: x    CULTURES:    Physical Examination:    General: Intubated.    HEENT: Pupils equal, reactive to light. Symmetric. No scleral icterus or injection.    PULM: Lung sounds absent on left.    NECK: Supple, no lymphadenopathy, trachea midline.    CVS: Regular rate and rhythm, no murmurs appreciated, +s1/s2.    ABD: Soft, nondistended, nontender, normoactive bowel sounds.    EXT: No edema, nontender.    SKIN: Warm and well perfused, no rashes noted.    NEURO: Sedated.    RADIOLOGY:  < from: CT Abdomen and Pelvis No Cont (01.09.25 @ 00:36) >    ACC: [id number] EXAM:  CT ABDOMEN AND PELVIS   ORDERED BY: JACKIE MILTON     PROCEDURE DATE:  01/09/2025      INTERPRETATION:  CLINICAL INFORMATION: Severe abdominal pain.    COMPARISON: January 1, 2025 CT    CONTRAST/COMPLICATIONS:  IV Contrast: None  Oral Contrast: None    PROCEDURE:  CT of the Abdomen and Pelvis was performed.  Sagittal and coronal reformats were performed.    FINDINGS:  Evaluation of solid organs and vascular structures is limited without   intravenous contrast.    LOWER CHEST: Partially imaged right pleural effusion and right basilar   atelectasis similar to prior exam. Aortic valve and mitral valve annular   calcifications. Small hiatal hernia.    LIVER: Within normal limits.  BILE DUCTS: Stable CBD dilatation, which could be related to   cholecystectomy.  GALLBLADDER: Cholecystectomy.  SPLEEN: Within normal limits.  PANCREAS: Within normal limits.  ADRENALS: Within normal limits.  KIDNEYS/URETERS: Nonobstructing left renal stone versus vascular   calcification. No hydronephrosis.    BLADDER: Minimally distended.  REPRODUCTIVE ORGANS: Atrophic uterus.  BOWEL: No bowel obstruction. No evidence of appendicitis. Colonic   diverticulosis. Diffuse wall thickening of the stomach and wall   thickening of the proximal duodenum. Possible area of ulceration in the   distal stomach (3, 51 and 5, 53) Wall thickening of several small bowel   loops in the mid to left abdomen (example 5, 57).  PERITONEUM/RETROPERITONEUM: Small to moderate ascites. Pneumoperitoneum   most prominently in the upper abdomen.  VESSELS: Atherosclerotic changes.  LYMPH NODES: No lymphadenopathy.  ABDOMINAL WALL: Small fat-containing umbilical hernia.  BONES: Degenerative changes. L4 compression deformity status post   kyphoplasty.    IMPRESSION: Exam limited by noncontrast technique.  Pneumoperitoneum and small to moderate ascites, compatible with   perforated viscus. Wall thickening of the stomach and duodenum with   possible area of ulceration in the distal stomach, concerning for   perforated peptic ulcer. Wall thickening of small bowel loops in the mid   to left abdomen also noted and underlying perforation from this region is   not excluded. Perforation from colonic diverticulosis/diverticulitis is   also possible but considered less likely. Surgical consult recommended.    Findings were discussed with Dr. JACKIE CACERES [facility] [phone] 1/9/2025   1:18 AM by Dr. Harvey with read back confirmation.    --- End of Report ---    DANIEL HARVEY MD; Attending Radiologist  This document has been electronically signed. Jan 9 2025  1:21AM    < end of copied text >    CRITICAL CARE TIME SPENT: 45 minutes    Date of entry of this note is equal to the date of services rendered.   Patient is a 79y old  Female who presents with a chief complaint of gastric perfration (09 Jan 2025 11:17)    BRIEF HOSPITAL COURSE: 80 y/o F with PMHx of HTN, HLD, CKD, PUD, and lung CA s/p pneumonectomy in 1996 who presented to ER at [facility] on 1/8 complaining of abdominal pain. Imaging revealed pneumoperitoneum and small to moderate ascites compatible with perforated viscus, and concern for perforated peptic ulcer.     Events last 24 hours: ***    PAST MEDICAL & SURGICAL HISTORY:  HLD (hyperlipidemia)  Rheumatoid arthritis  TIA (transient ischemic attack)  2012  Lung cancer  Right lung.  Chronic obstructive pulmonary disease, unspecified COPD type  O2 at night  Hiatal hernia with GERD  Essential hypertension  Childhood asthma  Stage 3 chronic kidney disease  Carotid artery plaque  IBS (irritable bowel syndrome)  Psoriatic arthritis  S/P lobectomy of lung  Right lung in 1996.  H/O colonoscopy  History of endoscopy  Sept 2017  S/P cholecystectomy    Allergies  penicillin (Other)  minocycline (Other)  Enbrel (Unknown)  IV Contrast (Hives; Rash)  Zofran (Other)  Levaquin (Other)    Intolerances    FAMILY HISTORY:  FH: type 2 diabetes (Mother)  FH: CAD (coronary artery disease) (Father, Sibling)  FH: colon cancer (Sibling)  FH: myocardial infarction (Father)  FH: stroke (Sibling)  FH: lung cancer (Sibling)    SOCIAL HISTORY:     Review of Systems:  Unable to obtain. Intubated/sedated.    Medications:  piperacillin/tazobactam IVPB. 3.375 Gram(s) IV Intermittent once  piperacillin/tazobactam IVPB.- 3.375 Gram(s) IV Intermittent once  piperacillin/tazobactam IVPB.. 3.375 Gram(s) IV Intermittent every 8 hours  propofol Infusion 10 MICROgram(s)/kG/Min IV Continuous <Continuous>  pantoprazole  Injectable 40 milliGRAM(s) IV Push daily  chlorhexidine 0.12% Liquid 15 milliLiter(s) Oral Mucosa every 12 hours  chlorhexidine 2% Cloths 1 Application(s) Topical <User Schedule>    Mode: AC/ CMV (Assist Control/ Continuous Mandatory Ventilation)  RR (machine): 24  TV (machine): 200  FiO2: 100  PEEP: 5  ITime: 1  MAP: 8  PIP: 31    ICU Vital Signs Last 24 Hrs  T(C): 35.9 (09 Jan 2025 08:55), Max: 36.6 (09 Jan 2025 02:23)  T(F): 96.7 (09 Jan 2025 08:55), Max: 97.9 (09 Jan 2025 02:23)  HR: 98 (09 Jan 2025 12:00) (80 - 119)  BP: 138/56 (09 Jan 2025 12:18) (89/58 - 183/80)  BP(mean): 78 (09 Jan 2025 12:18) (69 - 105)  ABP: 102/35 (09 Jan 2025 12:00) (97/45 - 141/60)  ABP(mean): 61 (09 Jan 2025 12:00) (61 - 89)  RR: 24 (09 Jan 2025 12:00) (17 - 26)  SpO2: 97% (09 Jan 2025 12:00) (94% - 100%)    O2 Parameters below as of 09 Jan 2025 12:00  Patient On (Oxygen Delivery Method): ventilator    Vital Signs Last 24 Hrs  T(C): 35.9 (09 Jan 2025 08:55), Max: 36.6 (09 Jan 2025 02:23)  T(F): 96.7 (09 Jan 2025 08:55), Max: 97.9 (09 Jan 2025 02:23)  HR: 98 (09 Jan 2025 12:00) (80 - 119)  BP: 138/56 (09 Jan 2025 12:18) (89/58 - 183/80)  BP(mean): 78 (09 Jan 2025 12:18) (69 - 105)  RR: 24 (09 Jan 2025 12:00) (17 - 26)  SpO2: 97% (09 Jan 2025 12:00) (94% - 100%)    Parameters below as of 09 Jan 2025 12:00  Patient On (Oxygen Delivery Method): ventilator    ABG - ( 09 Jan 2025 10:50 )  pH, Arterial: 6.94  pH, Blood: x     /  pCO2: 119   /  pO2: 278   / HCO3: 26    / Base Excess: -6.7  /  SaO2: 97.9      I&O's Detail    LABS:                        9.8    6.85  )-----------( 216      ( 09 Jan 2025 07:40 )             32.9     01-09    141  |  109[H]  |  28[H]  ----------------------------<  114[H]  5.4[H]   |  27  |  1.43[H]    Ca    7.5[L]      09 Jan 2025 07:40    TPro  4.6[L]  /  Alb  2.6[L]  /  TBili  0.3  /  DBili  x   /  AST  67[H]  /  ALT  44  /  AlkPhos  47  01-09    CAPILLARY BLOOD GLUCOSE    PT/INR - ( 09 Jan 2025 01:47 )   PT: 12.2 sec;   INR: 1.05 ratio    PTT - ( 09 Jan 2025 01:47 )  PTT:23.3 sec    Urinalysis Basic - ( 09 Jan 2025 07:40 )  Color: x / Appearance: x / SG: x / pH: x  Gluc: 114 mg/dL / Ketone: x  / Bili: x / Urobili: x   Blood: x / Protein: x / Nitrite: x   Leuk Esterase: x / RBC: x / WBC x   Sq Epi: x / Non Sq Epi: x / Bacteria: x    CULTURES:    Physical Examination:    General: Intubated.    HEENT: Pupils equal, reactive to light. Symmetric. No scleral icterus or injection.    PULM: Lung sounds absent on left.    NECK: Supple, no lymphadenopathy, trachea midline.    CVS: Regular rate and rhythm, no murmurs appreciated, +s1/s2.    ABD: Soft, nondistended, nontender, normoactive bowel sounds.    EXT: No edema, nontender.    SKIN: Warm and well perfused, no rashes noted.    NEURO: Sedated.    RADIOLOGY:  < from: CT Abdomen and Pelvis No Cont (01.09.25 @ 00:36) >    ACC: [id number] EXAM:  CT ABDOMEN AND PELVIS   ORDERED BY: JACKIE MILTON     PROCEDURE DATE:  01/09/2025      INTERPRETATION:  CLINICAL INFORMATION: Severe abdominal pain.    COMPARISON: January 1, 2025 CT    CONTRAST/COMPLICATIONS:  IV Contrast: None  Oral Contrast: None    PROCEDURE:  CT of the Abdomen and Pelvis was performed.  Sagittal and coronal reformats were performed.    FINDINGS:  Evaluation of solid organs and vascular structures is limited without   intravenous contrast.    LOWER CHEST: Partially imaged right pleural effusion and right basilar   atelectasis similar to prior exam. Aortic valve and mitral valve annular   calcifications. Small hiatal hernia.    LIVER: Within normal limits.  BILE DUCTS: Stable CBD dilatation, which could be related to   cholecystectomy.  GALLBLADDER: Cholecystectomy.  SPLEEN: Within normal limits.  PANCREAS: Within normal limits.  ADRENALS: Within normal limits.  KIDNEYS/URETERS: Nonobstructing left renal stone versus vascular   calcification. No hydronephrosis.    BLADDER: Minimally distended.  REPRODUCTIVE ORGANS: Atrophic uterus.  BOWEL: No bowel obstruction. No evidence of appendicitis. Colonic   diverticulosis. Diffuse wall thickening of the stomach and wall   thickening of the proximal duodenum. Possible area of ulceration in the   distal stomach (3, 51 and 5, 53) Wall thickening of several small bowel   loops in the mid to left abdomen (example 5, 57).  PERITONEUM/RETROPERITONEUM: Small to moderate ascites. Pneumoperitoneum   most prominently in the upper abdomen.  VESSELS: Atherosclerotic changes.  LYMPH NODES: No lymphadenopathy.  ABDOMINAL WALL: Small fat-containing umbilical hernia.  BONES: Degenerative changes. L4 compression deformity status post   kyphoplasty.    IMPRESSION: Exam limited by noncontrast technique.  Pneumoperitoneum and small to moderate ascites, compatible with   perforated viscus. Wall thickening of the stomach and duodenum with   possible area of ulceration in the distal stomach, concerning for   perforated peptic ulcer. Wall thickening of small bowel loops in the mid   to left abdomen also noted and underlying perforation from this region is   not excluded. Perforation from colonic diverticulosis/diverticulitis is   also possible but considered less likely. Surgical consult recommended.    Findings were discussed with Dr. JACIKE MILTON [phone] 1/9/2025   1:18 AM by Dr. Harvey with read back confirmation.    --- End of Report ---    DANIEL HARVEY MD; Attending Radiologist  This document has been electronically signed. Jan 9 2025  1:21AM    < end of copied text >    CRITICAL CARE TIME SPENT: 45 minutes    Date of entry of this note is equal to the date of services rendered.   Patient is a 79y old  Female who presents with a chief complaint of gastric perfration (09 Jan 2025 11:17)    BRIEF HOSPITAL COURSE: 80 y/o F with PMHx of HTN, HLD, CKD, PUD, and lung CA s/p pneumonectomy in 1996 who presented to ER at [facility] on 1/8 complaining of abdominal pain. Imaging revealed pneumoperitoneum, concern for perforated peptic ulcer. Pt underwent emergent repair of perforated viscus. Procedure complicated by aspiration event with subsequent hypercapnic respiratory failure. Transferred to [facility] ICU post procedure for higher level of care.    PAST MEDICAL & SURGICAL HISTORY:  HLD (hyperlipidemia)  Rheumatoid arthritis  TIA (transient ischemic attack)  2012  Lung cancer  Right lung.  Chronic obstructive pulmonary disease, unspecified COPD type  O2 at night  Hiatal hernia with GERD  Essential hypertension  Childhood asthma  Stage 3 chronic kidney disease  Carotid artery plaque  IBS (irritable bowel syndrome)  Psoriatic arthritis  S/P lobectomy of lung  Right lung in 1996.  H/O colonoscopy  History of endoscopy  Sept 2017  S/P cholecystectomy    Allergies  penicillin (Other)  minocycline (Other)  Enbrel (Unknown)  IV Contrast (Hives; Rash)  Zofran (Other)  Levaquin (Other)    Intolerances    FAMILY HISTORY:  FH: type 2 diabetes (Mother)  FH: CAD (coronary artery disease) (Father, Sibling)  FH: colon cancer (Sibling)  FH: myocardial infarction (Father)  FH: stroke (Sibling)  FH: lung cancer (Sibling)    SOCIAL HISTORY:   Hx of tobacco abuse, quit 30 years ago.    Review of Systems:  Unable to obtain. Intubated/sedated.    Medications:  piperacillin/tazobactam IVPB. 3.375 Gram(s) IV Intermittent once  piperacillin/tazobactam IVPB.- 3.375 Gram(s) IV Intermittent once  piperacillin/tazobactam IVPB.. 3.375 Gram(s) IV Intermittent every 8 hours  propofol Infusion 10 MICROgram(s)/kG/Min IV Continuous <Continuous>  pantoprazole  Injectable 40 milliGRAM(s) IV Push daily  chlorhexidine 0.12% Liquid 15 milliLiter(s) Oral Mucosa every 12 hours  chlorhexidine 2% Cloths 1 Application(s) Topical <User Schedule>    Mode: AC/ CMV (Assist Control/ Continuous Mandatory Ventilation)  RR (machine): 24  TV (machine): 200  FiO2: 100  PEEP: 5  ITime: 1  MAP: 8  PIP: 31    ICU Vital Signs Last 24 Hrs  T(C): 35.9 (09 Jan 2025 08:55), Max: 36.6 (09 Jan 2025 02:23)  T(F): 96.7 (09 Jan 2025 08:55), Max: 97.9 (09 Jan 2025 02:23)  HR: 98 (09 Jan 2025 12:00) (80 - 119)  BP: 138/56 (09 Jan 2025 12:18) (89/58 - 183/80)  BP(mean): 78 (09 Jan 2025 12:18) (69 - 105)  ABP: 102/35 (09 Jan 2025 12:00) (97/45 - 141/60)  ABP(mean): 61 (09 Jan 2025 12:00) (61 - 89)  RR: 24 (09 Jan 2025 12:00) (17 - 26)  SpO2: 97% (09 Jan 2025 12:00) (94% - 100%)    O2 Parameters below as of 09 Jan 2025 12:00  Patient On (Oxygen Delivery Method): ventilator    Vital Signs Last 24 Hrs  T(C): 35.9 (09 Jan 2025 08:55), Max: 36.6 (09 Jan 2025 02:23)  T(F): 96.7 (09 Jan 2025 08:55), Max: 97.9 (09 Jan 2025 02:23)  HR: 98 (09 Jan 2025 12:00) (80 - 119)  BP: 138/56 (09 Jan 2025 12:18) (89/58 - 183/80)  BP(mean): 78 (09 Jan 2025 12:18) (69 - 105)  RR: 24 (09 Jan 2025 12:00) (17 - 26)  SpO2: 97% (09 Jan 2025 12:00) (94% - 100%)    Parameters below as of 09 Jan 2025 12:00  Patient On (Oxygen Delivery Method): ventilator    ABG - ( 09 Jan 2025 10:50 )  pH, Arterial: 6.94  pH, Blood: x     /  pCO2: 119   /  pO2: 278   / HCO3: 26    / Base Excess: -6.7  /  SaO2: 97.9      I&O's Detail    LABS:                        9.8    6.85  )-----------( 216      ( 09 Jan 2025 07:40 )             32.9     01-09    141  |  109[H]  |  28[H]  ----------------------------<  114[H]  5.4[H]   |  27  |  1.43[H]    Ca    7.5[L]      09 Jan 2025 07:40    TPro  4.6[L]  /  Alb  2.6[L]  /  TBili  0.3  /  DBili  x   /  AST  67[H]  /  ALT  44  /  AlkPhos  47  01-09    CAPILLARY BLOOD GLUCOSE    PT/INR - ( 09 Jan 2025 01:47 )   PT: 12.2 sec;   INR: 1.05 ratio    PTT - ( 09 Jan 2025 01:47 )  PTT:23.3 sec    Urinalysis Basic - ( 09 Jan 2025 07:40 )  Color: x / Appearance: x / SG: x / pH: x  Gluc: 114 mg/dL / Ketone: x  / Bili: x / Urobili: x   Blood: x / Protein: x / Nitrite: x   Leuk Esterase: x / RBC: x / WBC x   Sq Epi: x / Non Sq Epi: x / Bacteria: x    CULTURES:    Physical Examination:    General: Intubated.    HEENT: Pupils equal, reactive to light. Symmetric. No scleral icterus or injection.    PULM: Lung sounds absent on left.    NECK: Supple, no lymphadenopathy, trachea midline.    CVS: Tachycardic.    ABD: Soft, nondistended, dressing c/d/i.    EXT: No edema, nontender.    SKIN: Warm and well perfused, no rashes noted.    NEURO: Sedated.    RADIOLOGY:  < from: CT Abdomen and Pelvis No Cont (01.09.25 @ 00:36) >    ACC: [id number] EXAM:  CT ABDOMEN AND PELVIS   ORDERED BY: JACKIE MILTON     PROCEDURE DATE:  01/09/2025      INTERPRETATION:  CLINICAL INFORMATION: Severe abdominal pain.    COMPARISON: January 1, 2025 CT    CONTRAST/COMPLICATIONS:  IV Contrast: None  Oral Contrast: None    PROCEDURE:  CT of the Abdomen and Pelvis was performed.  Sagittal and coronal reformats were performed.    FINDINGS:  Evaluation of solid organs and vascular structures is limited without   intravenous contrast.    LOWER CHEST: Partially imaged right pleural effusion and right basilar   atelectasis similar to prior exam. Aortic valve and mitral valve annular   calcifications. Small hiatal hernia.    LIVER: Within normal limits.  BILE DUCTS: Stable CBD dilatation, which could be related to   cholecystectomy.  GALLBLADDER: Cholecystectomy.  SPLEEN: Within normal limits.  PANCREAS: Within normal limits.  ADRENALS: Within normal limits.  KIDNEYS/URETERS: Nonobstructing left renal stone versus vascular   calcification. No hydronephrosis.    BLADDER: Minimally distended.  REPRODUCTIVE ORGANS: Atrophic uterus.  BOWEL: No bowel obstruction. No evidence of appendicitis. Colonic   diverticulosis. Diffuse wall thickening of the stomach and wall   thickening of the proximal duodenum. Possible area of ulceration in the   distal stomach (3, 51 and 5, 53) Wall thickening of several small bowel   loops in the mid to left abdomen (example 5, 57).  PERITONEUM/RETROPERITONEUM: Small to moderate ascites. Pneumoperitoneum   most prominently in the upper abdomen.  VESSELS: Atherosclerotic changes.  LYMPH NODES: No lymphadenopathy.  ABDOMINAL WALL: Small fat-containing umbilical hernia.  BONES: Degenerative changes. L4 compression deformity status post   kyphoplasty.    IMPRESSION: Exam limited by noncontrast technique.  Pneumoperitoneum and small to moderate ascites, compatible with   perforated viscus. Wall thickening of the stomach and duodenum with   possible area of ulceration in the distal stomach, concerning for   perforated peptic ulcer. Wall thickening of small bowel loops in the mid   to left abdomen also noted and underlying perforation from this region is   not excluded. Perforation from colonic diverticulosis/diverticulitis is   also possible but considered less likely. Surgical consult recommended.    Findings were discussed with Dr. JACKIE MILTON [phone] 1/9/2025   1:18 AM by Dr. Harvey with read back confirmation.    --- End of Report ---    DANIEL HARVEY MD; Attending Radiologist  This document has been electronically signed. Jan 9 2025  1:21AM    < end of copied text >    CRITICAL CARE TIME SPENT: 45 minutes    Date of entry of this note is equal to the date of services rendered.

## 2025-01-09 NOTE — INITIAL ORGAN DONATION REFERRAL - NSLIVEONNYCONTACTED_DATETIME
[FreeTextEntry1] : Presents today with c/c of left upper quadrant pain.  Pain is on the left lower boarder of ribs, ??muscular  pain ??.   Pain occurs before and sometimes after eating.  Pts Point to Left Lower Ribs.  Discussed Costocondritis, denies tylenol is helpful.  Also had a MRI Of Neck and spine for Pain Issues.  It was noted on MRI a Nodule lesion on inferior Left thyroid lobe.  Needs a sonogram. 09-Jan-2025 11:12

## 2025-01-09 NOTE — H&P ADULT - ASSESSMENT
A/P: 80 y/o WF with multiple comorbidities admitted to [facility] with abdominal pain, CT findings of pneumoperitoneum, likely perforated PUD. Now POD0 s/p Laparoscopic natan patch repair of gastric perforation at [facility]. Course complicated by respiratory distress 2/2 aspiration with significant acidosis to 7.0 requiring intubation. Patient deemed to need higher level of care, prompting transfer to [facility] for further management. ON arrival to ICU patient is intubated, sedated on propofol with 100% FiO2 and a PEEP of 3. She is saturating appropriately and hemodynamically stable. CXR at bedside showing significant consolidation of the right lung with some soft tissue gas in the L chest, likely 2/2 significant PEEP requirements at [facility].     Plan:  -NGT in place to [facility]  -NPO with IVF  -LISA  -Correction of profound acidosis and hypercarbia  -Strict Is+Os  -GI/DVT prophylaxis  -Antibiotics  -medical management per medicine team

## 2025-01-09 NOTE — DISCHARGE NOTE PROVIDER - NSDCCPTREATMENT_GEN_ALL_CORE_FT
PRINCIPAL PROCEDURE  Procedure: Laparoscopic repair of perforated gastric ulcer  Findings and Treatment:       SECONDARY PROCEDURE  Procedure: Laparoscopic omentopexy  Findings and Treatment:

## 2025-01-09 NOTE — H&P ADULT - NSHPPHYSICALEXAM_GEN_ALL_CORE
General: Intubated, sedated.  Head, Eyes, Ears, Nose, Throat: Normal cephalic/atraumatic,   Neck: trachea in the midline, without JVD or thyromegaly  Chest: Intubated, PEEP 3, FiO2 100%  Heart: Heart rhythm regular  Abdomen: Soft, No guarding, rebound, and no peritoneal signs.  Incision sites are c/d/i   Extremity: No swelling, or open sores, no gross deformities  Neuro: Sedated, unarousable  Skin: Good color, turgor, texture with no gross lesions, no eruptions, no rashes, no subcutaneous nodules and normal temperature.   Extremities: WWP, DP palpable BLE

## 2025-01-09 NOTE — CONSULT NOTE ADULT - ASSESSMENT
80 y/o WF with PMHx with HTN, HLD, CKD, PUD (dx 2011), Lung CA s/p lobectomy (1996) presents to [facility] ED from home with  via ambulance c/o worsening generalized achy abdominal pain since last night.  Patient reports baseline abdominal discomfort x several years which worsened suddenly    intestinal perf  atelectasis  pleural eff  HTN  HLD  CKD  PUD  PNA hx  Lung Ca - hx of Lobectomy    80 y/o WF with PMHx with HTN, HLD, CKD, PUD (dx 2011), Lung CA s/p lobectomy (1996) presents to [facility] ED from home with  via ambulance c/o worsening generalized achy abdominal pain since last night.  Patient reports baseline abdominal discomfort x several years which worsened suddenly    intestinal perf  atelectasis  pleural eff  HTN  HLD  CKD  PUD  PNA hx  Lung Ca - hx of Lobectomy     post op care  PUD perf - surgery and op note reviewed  lung protective vent support  fio2 titration  oral hygiene  skin care  suction PRN  HOB elev  PPI  Bronchodilators - Stress steroids - hx of COPD -   chr changes - deformity - lung parenchyma and chest - see CT chest -   ICU care  I and O  serial labs  replete lytes  dvt p  pain relief  rpt Blood gas

## 2025-01-09 NOTE — DISCHARGE NOTE PROVIDER - NSDCFUSCHEDAPPT_GEN_ALL_CORE_FT
Adriel Galan  [facility]  PULMMED [address] YOO  Scheduled Appointment: 01/09/2025    Yun, Suk-Hyeon  [facility]  NEPHRO [address]  Scheduled Appointment: 01/24/2025

## 2025-01-09 NOTE — CONSULT NOTE ADULT - ASSESSMENT
A/P: 80 y/o WF with multiple comorbidities admitted to [place] with abdominal pain, CT findings of pneumoperitoneum, likely perforated PUD. Now POD0 s/p Laparoscopic natan patch repair of gastric perforation at [facility]. Course complicated by respiratory distress 2/2 aspiration with significant acidosis to 7.0 requiring intubation. Patient deemed to need higher level of care, prompting transfer to [facility] for further management. ON arrival to ICU patient is intubated, sedated on propofol with 100% FiO2 and a PEEP of 3. She is saturating appropriately and hemodynamically stable. CXR at bedside showing significant consolidation of the right lung with some soft tissue gas in the L chest, likely 2/2 significant PEEP requirements at [place].     Plan:  -NGT in place to [facility]  -NPO with IVF  -LISA  -Correction of profound acidosis and hypercarbia  -Strict Is+Os  -GI/DVT prophylaxis  -Antibiotics  -medical management per medicine team  -AM labs   A/P: 78 y/o WF with multiple comorbidities admitted to [place] with abdominal pain, CT findings of pneumoperitoneum, likely perforated PUD. Now POD0 s/p Laparoscopic natan patch repair of gastric perforation at [facility]. Course complicated by respiratory distress 2/2 possible aspiration with significant acidosis to 7.0 requiring intubation. Patient deemed to need higher level of care, prompting transfer to [facility] for further management. ON arrival to ICU patient is intubated, sedated on propofol with 100% FiO2 and a PEEP of 3. She is saturating appropriately and hemodynamically stable. CXR at bedside showing significant consolidation of the right lung with some soft tissue gas in the L chest, likely 2/2 significant PEEP requirements at [place].     Plan:  -NGT in place to [facility]  -NPO with IVF  -LISA  -Correction of profound acidosis and hypercarbia  -Strict Is+Os  -GI/DVT prophylaxis  -Antibiotics  -medical management per medicine team  -AM labs

## 2025-01-09 NOTE — CONSULT NOTE ADULT - ASSESSMENT
78 yo female, pmh of HTN, HLD, CKD, PUD (dx 2011), Lung CA s/p lobectomy (1996) presents to [facility] ED from home with  via ambulance c/o worsening generalized achy abdominal pain, admitted to surgery for perforated viscus, taken for emergent surgery, now in acute hypoxic and hypercarbic respiratory failure 2/2 aspiration during procedure with severe acidosis, respiration improved with suctioning, nebulizers    Patient acutely ill due to above  - given amp of bicarbonate  - bicarbonate drip forgone with repeat lab work and stabilization in respiratory state  - transfer to [facility] ICU for further management, Dr. Christine accepting  - consideration for bronchoscopy as needed  IVF for worsening acute kidney injury - presumed ATN from hyptension during event  ordered stress dose steroids  - continue propofol for sedation  - cotninue protonix IV bid  - on ceftriaxone, metronidazole 78 yo female, pmh of HTN, HLD, CKD, PUD (dx 2011), Lung CA s/p lobectomy (1996) presents to [facility] ED from home with  via ambulance c/o worsening generalized achy abdominal pain, admitted to surgery for perforated viscus, taken for emergent surgery, now in acute hypoxic and hypercarbic respiratory failure 2/2 aspiration during procedure with severe acidosis, respiration improved with suctioning, nebulizers    Patient acutely ill due to above  - given amp of bicarbonate  - bicarbonate drip forgone with repeat lab work and stabilization in respiratory state  - transfer to [facility] ICU for further management, Dr. Christine accepting  - consideration for bronchoscopy as needed  IVF for worsening acute kidney injury - presumed ATN from hypotension during event  ordered stress dose steroids  - continue propofol for sedation  - cotninue protonix IV bid  - on ceftriaxone, metronidazole    patient critically ill due to above, time managing patient 60 minutes

## 2025-01-09 NOTE — CONSULT NOTE ADULT - SUBJECTIVE AND OBJECTIVE BOX
SURGERY CONSULT NOTE:    CHIEF COMPLAINT:  Patient is a 79y old  Female who presents with a chief complaint of     HPI FROM ED:  HPI:      PAST MEDICAL HISTORY:  PAST MEDICAL & SURGICAL HISTORY:  HLD (hyperlipidemia)      Rheumatoid arthritis      TIA (transient ischemic attack)  2012      Lung cancer  Right lung.      Chronic obstructive pulmonary disease, unspecified COPD type  O2 at night      Hiatal hernia with GERD      Essential hypertension      Childhood asthma      Stage 3 chronic kidney disease      Carotid artery plaque      IBS (irritable bowel syndrome)      Psoriatic arthritis      S/P lobectomy of lung  Right lung in 1996.      H/O colonoscopy      History of endoscopy  Sept 2017      S/P cholecystectomy          PAST SURGICAL HISTORY:    REVIEW OF SYSTEMS:  General/Constitutional: No acute distress, no headache, weakness, fevers, or chills   HEENT: Denies auditory or visual changes/disturbances, no vertigo, no throat pain, no dysphagia    Neck: Denies neck pain/stiffness, denies swelling/lumps/hoarseness   Lymphatic: Denies lumps or swelling in the axillae, groin, or neck bilaterally   Respiratory: Denies cough/hemoptysis, denies wheezing/SOB/dyspnea  Cardiac: Denies chest pain, palpitations  Abdomen: Denies abdominal bloating/fullness, nausea or vomiting, denies abdominal pain  Extremities: Denies sores, swelling, discoloration bilat UE/LE  Genitourinary: Denies urinary issues or complaints, denies dysuria/hematuria  Neuro: Denies weakness, paraesthesias, paralysis, syncope, loss of vision  Skin: Denies pruritus, pain, rashes  Psych: Denies hallucinations, visual disturbances, or depression    MEDICATIONS:  Home Medications:  Advair Diskus 100 mcg-50 mcg inhalation powder: 1 inhaled (09 Jan 2025 02:12)  amLODIPine 10 mg oral tablet: 1 tab(s) orally (09 Jan 2025 02:11)  Arava 20 mg oral tablet: 1 tab(s) orally (09 Jan 2025 02:11)  Crestor 5 mg oral tablet: 1 tab(s) orally (09 Jan 2025 02:11)  Fluticasone:  (09 Jan 2025 02:12)  hydroxychloroquine 200 mg oral tablet: 1 tab(s) orally (09 Jan 2025 02:11)  pantoprazole 40 mg intravenous injection: 40 milligram(s) intravenous 2 times a day (09 Jan 2025 08:56)  Requip 2 mg oral tablet: 1 tab(s) orally (09 Jan 2025 02:12)  Singulair 10 mg oral tablet: 1 tab(s) orally (09 Jan 2025 02:11)  Wixela Inhub 250 mcg-50 mcg inhalation powder: 1 puff(s) inhaled 2 times a day (09 Jan 2025 02:14)    MEDICATIONS  (STANDING):    MEDICATIONS  (PRN):      ALLERGIES:  Allergies    penicillin (Other)  minocycline (Other)  Enbrel (Unknown)  IV Contrast (Hives; Rash)  Zofran (Other)  Levaquin (Other)    Intolerances        SOCIAL HISTORY:  Social History:    Smoking: Yes [ ]  No [ ]   ______pk yrs  ETOH  Yes [ ]  No [ ]  Social [ ]  DRUGS:  Yes [ ]  No [ ]  if so what______________    FAMILY HISTORY:  FAMILY HISTORY:  FH: type 2 diabetes (Mother)    FH: CAD (coronary artery disease) (Father, Sibling)    FH: colon cancer (Sibling)    FH: myocardial infarction (Father)    FH: stroke (Sibling)    FH: lung cancer (Sibling)        VITAL SIGNS:  Vital Signs Last 24 Hrs  T(C): 35.9 (09 Jan 2025 08:55), Max: 36.6 (09 Jan 2025 02:23)  T(F): 96.7 (09 Jan 2025 08:55), Max: 97.9 (09 Jan 2025 02:23)  HR: 100 (09 Jan 2025 10:30) (80 - 119)  BP: 117/63 (09 Jan 2025 07:58) (89/58 - 183/80)  BP(mean): 83 (09 Jan 2025 07:58) (69 - 105)  RR: 25 (09 Jan 2025 08:55) (17 - 26)  SpO2: 100% (09 Jan 2025 10:30) (94% - 100%)        PHYSICAL EXAM:  General: No acute distress, appears comfortable, well-groomed, appears stated age  Head, Eyes, Ears, Nose, Throat: Normal cephalic/atraumatic, anicteric, conjunctiva-non injected and moist, vision grossly intact, hearing grossly intact, no nasal discharge, ears and nose symmetrical and atraumatic.  Nasal, oral, and oropharyngeal mucosa pink moist with no evidence of ulceration  Neck: Supple, carotids have good upstroke, trachea in the midline, without JVD or thyromegaly  Lymphatic: No evidence of masses or lymphadenopathy in the head, neck, trunk, axillary, inguinal, or supraclavicular regions  Chest: Lungs are clear to P&A, no wheezing, no rales, no ronchi, with good inspiratory effort  Heart: Heart rhythm regular, no murmurs  Abdomen: Soft, non tender, good bowel sounds present in all four quadrants.  No guarding, rebound, and no peritoneal signs.  No evidence of hepatosplenomegaly.  No evidence of abdominal wall hernias.  Inguinal regions are unremarkable with no evidence of hernias.   Extremity: No swelling, or open sores, no gross deformities,  good range of motion, 2+ peripheral pulses bilat UE/LE, no edema,  negative Marley's sign, no lymphadenopathy  Neuro: Alert and oriented x3, motor and sensory intact  Psychiatric: Awake , alert, oriented x3 with an appropriate affect.   Skin: Good color, turgor, texture with no gross lesions, no eruptions, no rashes, no subcutaneous nodules and normal temperature.     LABS:                        9.8    6.85  )-----------( 216      ( 09 Jan 2025 07:40 )             32.9     01-09    141  |  109[H]  |  28[H]  ----------------------------<  114[H]  5.4[H]   |  27  |  1.43[H]    Ca    7.5[L]      09 Jan 2025 07:40    TPro  4.6[L]  /  Alb  2.6[L]  /  TBili  0.3  /  DBili  x   /  AST  67[H]  /  ALT  44  /  AlkPhos  47  01-09    PT/INR - ( 09 Jan 2025 01:47 )   PT: 12.2 sec;   INR: 1.05 ratio         PTT - ( 09 Jan 2025 01:47 )  PTT:23.3 sec  Urinalysis Basic - ( 09 Jan 2025 07:40 )    Color: x / Appearance: x / SG: x / pH: x  Gluc: 114 mg/dL / Ketone: x  / Bili: x / Urobili: x   Blood: x / Protein: x / Nitrite: x   Leuk Esterase: x / RBC: x / WBC x   Sq Epi: x / Non Sq Epi: x / Bacteria: x      LIVER FUNCTIONS - ( 09 Jan 2025 07:40 )  Alb: 2.6 g/dL / Pro: 4.6 g/dL / ALK PHOS: 47 U/L / ALT: 44 U/L / AST: 67 U/L / GGT: x             Urinalysis with Rflx Culture (collected 09 Jan 2025 03:39)        RADIOLOGY & ADDITIONAL STUDIES:    ASSESSMENT:    PLAN: SURGERY CONSULT NOTE:    CHIEF COMPLAINT:  Patient is a 79y old  Female who presents with a chief complaint of     HPI:  80 y/o WF with PMHx with HTN, HLD, CKD, PUD (dx 2011), Lung CA s/p lobectomy (1996) presented to [facility] ED from home with  via ambulance c/o worsening generalized achy abdominal pain since 1/7 night.  Patient reports baseline abdominal discomfort x several years which worsened suddenly (described as 11/10) without associated fever/chills.  Patient recently discharged from [facility] - treated for PNA.  Patient reports that after discharge had 3 days of vomiting dark fluids and dark non-bloody which resolved with Imodium.  Patient reports tolerating last meal yesterday, small dark BM yesterday, + appetite. Of note, patient requires constant O2 at home x several weeks, previously at nights only. At [place], patient found to have gastric perforation and taken emergently to the OR for laparoscopic Srinath patch repair.         PAST MEDICAL HISTORY:  PAST MEDICAL & SURGICAL HISTORY:  HLD (hyperlipidemia)      Rheumatoid arthritis      TIA (transient ischemic attack)  2012      Lung cancer  Right lung.      Chronic obstructive pulmonary disease, unspecified COPD type  O2 at night      Hiatal hernia with GERD      Essential hypertension      Childhood asthma      Stage 3 chronic kidney disease      Carotid artery plaque      IBS (irritable bowel syndrome)      Psoriatic arthritis      S/P lobectomy of lung  Right lung in 1996.      H/O colonoscopy      History of endoscopy  Sept 2017      S/P cholecystectomy          PAST SURGICAL HISTORY:    REVIEW OF SYSTEMS:  General/Constitutional: No acute distress, no headache, weakness, fevers, or chills   HEENT: Denies auditory or visual changes/disturbances, no vertigo, no throat pain, no dysphagia    Neck: Denies neck pain/stiffness, denies swelling/lumps/hoarseness   Lymphatic: Denies lumps or swelling in the axillae, groin, or neck bilaterally   Respiratory: Denies cough/hemoptysis, denies wheezing/SOB/dyspnea  Cardiac: Denies chest pain, palpitations  Abdomen: Denies abdominal bloating/fullness, nausea or vomiting, denies abdominal pain  Extremities: Denies sores, swelling, discoloration bilat UE/LE  Genitourinary: Denies urinary issues or complaints, denies dysuria/hematuria  Neuro: Denies weakness, paraesthesias, paralysis, syncope, loss of vision  Skin: Denies pruritus, pain, rashes  Psych: Denies hallucinations, visual disturbances, or depression    MEDICATIONS:  Home Medications:  Advair Diskus 100 mcg-50 mcg inhalation powder: 1 inhaled (09 Jan 2025 02:12)  amLODIPine 10 mg oral tablet: 1 tab(s) orally (09 Jan 2025 02:11)  Arava 20 mg oral tablet: 1 tab(s) orally (09 Jan 2025 02:11)  Crestor 5 mg oral tablet: 1 tab(s) orally (09 Jan 2025 02:11)  Fluticasone:  (09 Jan 2025 02:12)  hydroxychloroquine 200 mg oral tablet: 1 tab(s) orally (09 Jan 2025 02:11)  pantoprazole 40 mg intravenous injection: 40 milligram(s) intravenous 2 times a day (09 Jan 2025 08:56)  Requip 2 mg oral tablet: 1 tab(s) orally (09 Jan 2025 02:12)  Singulair 10 mg oral tablet: 1 tab(s) orally (09 Jan 2025 02:11)  Wixela Inhub 250 mcg-50 mcg inhalation powder: 1 puff(s) inhaled 2 times a day (09 Jan 2025 02:14)    MEDICATIONS  (STANDING):    MEDICATIONS  (PRN):      ALLERGIES:  Allergies    penicillin (Other)  minocycline (Other)  Enbrel (Unknown)  IV Contrast (Hives; Rash)  Zofran (Other)  Levaquin (Other)    Intolerances        SOCIAL HISTORY:  Social History:    Smoking: Yes [ ]  No [ ]   ______pk yrs  ETOH  Yes [ ]  No [ ]  Social [ ]  DRUGS:  Yes [ ]  No [ ]  if so what______________    FAMILY HISTORY:  FAMILY HISTORY:  FH: type 2 diabetes (Mother)    FH: CAD (coronary artery disease) (Father, Sibling)    FH: colon cancer (Sibling)    FH: myocardial infarction (Father)    FH: stroke (Sibling)    FH: lung cancer (Sibling)        VITAL SIGNS:  Vital Signs Last 24 Hrs  T(C): 35.9 (09 Jan 2025 08:55), Max: 36.6 (09 Jan 2025 02:23)  T(F): 96.7 (09 Jan 2025 08:55), Max: 97.9 (09 Jan 2025 02:23)  HR: 100 (09 Jan 2025 10:30) (80 - 119)  BP: 117/63 (09 Jan 2025 07:58) (89/58 - 183/80)  BP(mean): 83 (09 Jan 2025 07:58) (69 - 105)  RR: 25 (09 Jan 2025 08:55) (17 - 26)  SpO2: 100% (09 Jan 2025 10:30) (94% - 100%)        PHYSICAL EXAM:  General: No acute distress, appears comfortable, well-groomed, appears stated age  Head, Eyes, Ears, Nose, Throat: Normal cephalic/atraumatic, anicteric, conjunctiva-non injected and moist, vision grossly intact, hearing grossly intact, no nasal discharge, ears and nose symmetrical and atraumatic.  Nasal, oral, and oropharyngeal mucosa pink moist with no evidence of ulceration  Neck: Supple, carotids have good upstroke, trachea in the midline, without JVD or thyromegaly  Lymphatic: No evidence of masses or lymphadenopathy in the head, neck, trunk, axillary, inguinal, or supraclavicular regions  Chest: Lungs are clear to P&A, no wheezing, no rales, no ronchi, with good inspiratory effort  Heart: Heart rhythm regular, no murmurs  Abdomen: Soft, non tender, good bowel sounds present in all four quadrants.  No guarding, rebound, and no peritoneal signs.  No evidence of hepatosplenomegaly.  No evidence of abdominal wall hernias.  Inguinal regions are unremarkable with no evidence of hernias.   Extremity: No swelling, or open sores, no gross deformities,  good range of motion, 2+ peripheral pulses bilat UE/LE, no edema,  negative Marley's sign, no lymphadenopathy  Neuro: Alert and oriented x3, motor and sensory intact  Psychiatric: Awake , alert, oriented x3 with an appropriate affect.   Skin: Good color, turgor, texture with no gross lesions, no eruptions, no rashes, no subcutaneous nodules and normal temperature.     LABS:                        9.8    6.85  )-----------( 216      ( 09 Jan 2025 07:40 )             32.9     01-09    141  |  109[H]  |  28[H]  ----------------------------<  114[H]  5.4[H]   |  27  |  1.43[H]    Ca    7.5[L]      09 Jan 2025 07:40    TPro  4.6[L]  /  Alb  2.6[L]  /  TBili  0.3  /  DBili  x   /  AST  67[H]  /  ALT  44  /  AlkPhos  47  01-09    PT/INR - ( 09 Jan 2025 01:47 )   PT: 12.2 sec;   INR: 1.05 ratio         PTT - ( 09 Jan 2025 01:47 )  PTT:23.3 sec  Urinalysis Basic - ( 09 Jan 2025 07:40 )    Color: x / Appearance: x / SG: x / pH: x  Gluc: 114 mg/dL / Ketone: x  / Bili: x / Urobili: x   Blood: x / Protein: x / Nitrite: x   Leuk Esterase: x / RBC: x / WBC x   Sq Epi: x / Non Sq Epi: x / Bacteria: x      LIVER FUNCTIONS - ( 09 Jan 2025 07:40 )  Alb: 2.6 g/dL / Pro: 4.6 g/dL / ALK PHOS: 47 U/L / ALT: 44 U/L / AST: 67 U/L / GGT: x             Urinalysis with Rflx Culture (collected 09 Jan 2025 03:39)        RADIOLOGY & ADDITIONAL STUDIES:    ASSESSMENT:    PLAN: SURGERY CONSULT NOTE:    CHIEF COMPLAINT:  Patient is a 79y old  Female who presents with a chief complaint of abdominal pain    HPI:  78 y/o WF with PMHx with HTN, HLD, CKD, PUD (dx 2011), Lung CA s/p lobectomy (1996) presented to [facility] ED from home with  via ambulance c/o worsening generalized achy abdominal pain since 1/7 night.  Patient reports baseline abdominal discomfort x several years which worsened suddenly (described as 11/10) without associated fever/chills.  Patient recently discharged from [facility] - treated for PNA.  Patient reports that after discharge had 3 days of vomiting dark fluids and dark non-bloody which resolved with Imodium.  Patient reports tolerating last meal yesterday, small dark BM yesterday, + appetite. Of note, patient requires constant O2 at home x several weeks, previously at nights only. At [place], CT findings of pnemoperitoneum, likely perforated PUD, made her NPO, started on IVF and IV antibiotics and underwent emergent repair of perforated viscus in OR. Pt underwent laparoscopic repair of perforated gastric ulcer with Srinath patch, Onur drain placement with intra-op findings of 1cm perforation of distal anterior stomach with diffuse gastric/bilious contamination. Procedure complicated by aspiration, and postoperatively, noted minimal amount of urine and blood gas with acidotic feature. Patient found to be in acute respiratory distress, acidotic and hypercarbic. ICU consult at that time determined that patient required higher level of care, resulting in transfer to [facility]. On arrival to the ICU she is intubated, sedated with propofol without any pressor requirements.       PAST MEDICAL HISTORY:  PAST MEDICAL & SURGICAL HISTORY:  HLD (hyperlipidemia)      Rheumatoid arthritis      TIA (transient ischemic attack)  2012      Lung cancer  Right lung.      Chronic obstructive pulmonary disease, unspecified COPD type  O2 at night      Hiatal hernia with GERD      Essential hypertension      Childhood asthma      Stage 3 chronic kidney disease      Carotid artery plaque      IBS (irritable bowel syndrome)      Psoriatic arthritis      S/P lobectomy of lung  Right lung in 1996.      H/O colonoscopy      History of endoscopy  Sept 2017      S/P cholecystectomy          PAST SURGICAL HISTORY:    REVIEW OF SYSTEMS:  Unable to perform 2/2 patient intubation, sedation    MEDICATIONS:  Home Medications:  Advair Diskus 100 mcg-50 mcg inhalation powder: 1 inhaled (09 Jan 2025 02:12)  amLODIPine 10 mg oral tablet: 1 tab(s) orally (09 Jan 2025 02:11)  Arava 20 mg oral tablet: 1 tab(s) orally (09 Jan 2025 02:11)  Crestor 5 mg oral tablet: 1 tab(s) orally (09 Jan 2025 02:11)  Fluticasone:  (09 Jan 2025 02:12)  hydroxychloroquine 200 mg oral tablet: 1 tab(s) orally (09 Jan 2025 02:11)  pantoprazole 40 mg intravenous injection: 40 milligram(s) intravenous 2 times a day (09 Jan 2025 08:56)  Requip 2 mg oral tablet: 1 tab(s) orally (09 Jan 2025 02:12)  Singulair 10 mg oral tablet: 1 tab(s) orally (09 Jan 2025 02:11)  Wixela Inhub 250 mcg-50 mcg inhalation powder: 1 puff(s) inhaled 2 times a day (09 Jan 2025 02:14)    MEDICATIONS  (STANDING):    MEDICATIONS  (PRN):      ALLERGIES:  Allergies    penicillin (Other)  minocycline (Other)  Enbrel (Unknown)  IV Contrast (Hives; Rash)  Zofran (Other)  Levaquin (Other)    Intolerances        SOCIAL HISTORY:  Social History:  deferred due to patient status    FAMILY HISTORY:  FAMILY HISTORY:  FH: type 2 diabetes (Mother)    FH: CAD (coronary artery disease) (Father, Sibling)    FH: colon cancer (Sibling)    FH: myocardial infarction (Father)    FH: stroke (Sibling)    FH: lung cancer (Sibling)        VITAL SIGNS:  Vital Signs Last 24 Hrs  T(C): 35.9 (09 Jan 2025 08:55), Max: 36.6 (09 Jan 2025 02:23)  T(F): 96.7 (09 Jan 2025 08:55), Max: 97.9 (09 Jan 2025 02:23)  HR: 100 (09 Jan 2025 10:30) (80 - 119)  BP: 117/63 (09 Jan 2025 07:58) (89/58 - 183/80)  BP(mean): 83 (09 Jan 2025 07:58) (69 - 105)  RR: 25 (09 Jan 2025 08:55) (17 - 26)  SpO2: 100% (09 Jan 2025 10:30) (94% - 100%)        PHYSICAL EXAM:  General: Intubated, sedated.  Head, Eyes, Ears, Nose, Throat: Normal cephalic/atraumatic,   Neck: trachea in the midline, without JVD or thyromegaly  Chest: Intubated, PEEP 3, FiO2 100%  Heart: Heart rhythm regular  Abdomen: Soft, No guarding, rebound, and no peritoneal signs.  Incision sites are c/d/i   Extremity: No swelling, or open sores, no gross deformities  Neuro: Sedated, unarousable  Skin: Good color, turgor, texture with no gross lesions, no eruptions, no rashes, no subcutaneous nodules and normal temperature.   Extremities: WWP, DP palpable BLE    LABS:                        9.8    6.85  )-----------( 216      ( 09 Jan 2025 07:40 )             32.9     01-09    141  |  109[H]  |  28[H]  ----------------------------<  114[H]  5.4[H]   |  27  |  1.43[H]    Ca    7.5[L]      09 Jan 2025 07:40    TPro  4.6[L]  /  Alb  2.6[L]  /  TBili  0.3  /  DBili  x   /  AST  67[H]  /  ALT  44  /  AlkPhos  47  01-09    PT/INR - ( 09 Jan 2025 01:47 )   PT: 12.2 sec;   INR: 1.05 ratio         PTT - ( 09 Jan 2025 01:47 )  PTT:23.3 sec  Urinalysis Basic - ( 09 Jan 2025 07:40 )    Color: x / Appearance: x / SG: x / pH: x  Gluc: 114 mg/dL / Ketone: x  / Bili: x / Urobili: x   Blood: x / Protein: x / Nitrite: x   Leuk Esterase: x / RBC: x / WBC x   Sq Epi: x / Non Sq Epi: x / Bacteria: x      LIVER FUNCTIONS - ( 09 Jan 2025 07:40 )  Alb: 2.6 g/dL / Pro: 4.6 g/dL / ALK PHOS: 47 U/L / ALT: 44 U/L / AST: 67 U/L / GGT: x             Urinalysis with Rflx Culture (collected 09 Jan 2025 03:39)        RADIOLOGY & ADDITIONAL STUDIES: SURGERY CONSULT NOTE:    CHIEF COMPLAINT:  Patient is a 79y old  Female who presents with a chief complaint of abdominal pain    HPI:  78 y/o WF with PMHx with HTN, HLD, CKD, PUD (dx 2011), Lung CA s/p lobectomy (1996) presented to [facility] ED from home with  via ambulance c/o worsening generalized achy abdominal pain since 1/7 night.  Patient reports baseline abdominal discomfort x several years which worsened suddenly (described as 11/10) without associated fever/chills.  Patient recently discharged from [facility] - treated for PNA.  Patient reports that after discharge had 3 days of vomiting dark fluids and dark non-bloody which resolved with Imodium.  Patient reports tolerating last meal yesterday, small dark BM yesterday, + appetite. Of note, patient requires constant O2 at home x several weeks, previously at nights only. At [place], CT findings of pnemoperitoneum, likely perforated PUD, made her NPO, started on IVF and IV antibiotics and underwent emergent repair of perforated viscus in OR. Pt underwent laparoscopic repair of perforated gastric ulcer with Srinath patch, Onur drain placement with intra-op findings of 1cm perforation of distal anterior stomach with diffuse gastric/bilious contamination. While no aspiration was noted intraoperatively, postoperatively, noted minimal amount of urine and blood gas with acidotic feature. Patient found to be in acute respiratory distress, acidotic and hypercarbic. CXR showing R lung consolidation consistent with likely aspiration event. ICU consult at that time determined that patient required higher level of care, resulting in transfer to [facility]. On arrival to the ICU she is intubated, sedated with propofol without any pressor requirements.       PAST MEDICAL HISTORY:  PAST MEDICAL & SURGICAL HISTORY:  HLD (hyperlipidemia)      Rheumatoid arthritis      TIA (transient ischemic attack)  2012      Lung cancer  Right lung.      Chronic obstructive pulmonary disease, unspecified COPD type  O2 at night      Hiatal hernia with GERD      Essential hypertension      Childhood asthma      Stage 3 chronic kidney disease      Carotid artery plaque      IBS (irritable bowel syndrome)      Psoriatic arthritis      S/P lobectomy of lung  Right lung in 1996.      H/O colonoscopy      History of endoscopy  Sept 2017      S/P cholecystectomy          PAST SURGICAL HISTORY:    REVIEW OF SYSTEMS:  Unable to perform 2/2 patient intubation, sedation    MEDICATIONS:  Home Medications:  Advair Diskus 100 mcg-50 mcg inhalation powder: 1 inhaled (09 Jan 2025 02:12)  amLODIPine 10 mg oral tablet: 1 tab(s) orally (09 Jan 2025 02:11)  Arava 20 mg oral tablet: 1 tab(s) orally (09 Jan 2025 02:11)  Crestor 5 mg oral tablet: 1 tab(s) orally (09 Jan 2025 02:11)  Fluticasone:  (09 Jan 2025 02:12)  hydroxychloroquine 200 mg oral tablet: 1 tab(s) orally (09 Jan 2025 02:11)  pantoprazole 40 mg intravenous injection: 40 milligram(s) intravenous 2 times a day (09 Jan 2025 08:56)  Requip 2 mg oral tablet: 1 tab(s) orally (09 Jan 2025 02:12)  Singulair 10 mg oral tablet: 1 tab(s) orally (09 Jan 2025 02:11)  Wixela Inhub 250 mcg-50 mcg inhalation powder: 1 puff(s) inhaled 2 times a day (09 Jan 2025 02:14)    MEDICATIONS  (STANDING):    MEDICATIONS  (PRN):      ALLERGIES:  Allergies    penicillin (Other)  minocycline (Other)  Enbrel (Unknown)  IV Contrast (Hives; Rash)  Zofran (Other)  Levaquin (Other)    Intolerances        SOCIAL HISTORY:  Social History:  deferred due to patient status    FAMILY HISTORY:  FAMILY HISTORY:  FH: type 2 diabetes (Mother)    FH: CAD (coronary artery disease) (Father, Sibling)    FH: colon cancer (Sibling)    FH: myocardial infarction (Father)    FH: stroke (Sibling)    FH: lung cancer (Sibling)        VITAL SIGNS:  Vital Signs Last 24 Hrs  T(C): 35.9 (09 Jan 2025 08:55), Max: 36.6 (09 Jan 2025 02:23)  T(F): 96.7 (09 Jan 2025 08:55), Max: 97.9 (09 Jan 2025 02:23)  HR: 100 (09 Jan 2025 10:30) (80 - 119)  BP: 117/63 (09 Jan 2025 07:58) (89/58 - 183/80)  BP(mean): 83 (09 Jan 2025 07:58) (69 - 105)  RR: 25 (09 Jan 2025 08:55) (17 - 26)  SpO2: 100% (09 Jan 2025 10:30) (94% - 100%)        PHYSICAL EXAM:  General: Intubated, sedated.  Head, Eyes, Ears, Nose, Throat: Normal cephalic/atraumatic,   Neck: trachea in the midline, without JVD or thyromegaly  Chest: Intubated, PEEP 3, FiO2 100%  Heart: Heart rhythm regular  Abdomen: Soft, No guarding, rebound, and no peritoneal signs.  Incision sites are c/d/i   Extremity: No swelling, or open sores, no gross deformities  Neuro: Sedated, unarousable  Skin: Good color, turgor, texture with no gross lesions, no eruptions, no rashes, no subcutaneous nodules and normal temperature.   Extremities: WWP, DP palpable BLE    LABS:                        9.8    6.85  )-----------( 216      ( 09 Jan 2025 07:40 )             32.9     01-09    141  |  109[H]  |  28[H]  ----------------------------<  114[H]  5.4[H]   |  27  |  1.43[H]    Ca    7.5[L]      09 Jan 2025 07:40    TPro  4.6[L]  /  Alb  2.6[L]  /  TBili  0.3  /  DBili  x   /  AST  67[H]  /  ALT  44  /  AlkPhos  47  01-09    PT/INR - ( 09 Jan 2025 01:47 )   PT: 12.2 sec;   INR: 1.05 ratio         PTT - ( 09 Jan 2025 01:47 )  PTT:23.3 sec  Urinalysis Basic - ( 09 Jan 2025 07:40 )    Color: x / Appearance: x / SG: x / pH: x  Gluc: 114 mg/dL / Ketone: x  / Bili: x / Urobili: x   Blood: x / Protein: x / Nitrite: x   Leuk Esterase: x / RBC: x / WBC x   Sq Epi: x / Non Sq Epi: x / Bacteria: x      LIVER FUNCTIONS - ( 09 Jan 2025 07:40 )  Alb: 2.6 g/dL / Pro: 4.6 g/dL / ALK PHOS: 47 U/L / ALT: 44 U/L / AST: 67 U/L / GGT: x             Urinalysis with Rflx Culture (collected 09 Jan 2025 03:39)        RADIOLOGY & ADDITIONAL STUDIES:

## 2025-01-09 NOTE — DISCHARGE NOTE PROVIDER - HOSPITAL COURSE
78 y/o WF with PMHx with HTN, HLD, CKD, PUD (dx 2011), Lung CA s/p lobectomy (1996) presents to [facility] ED from home with  via ambulance c/o worsening generalized achy abdominal pain since last night.  Patient reports baseline abdominal discomfort x several years which worsened suddenly (described as 11/10) without associated fever/chills.  Patient recently discharged from [facility] - treated for PNA.  Patient reports that after discharge had 3 days of vomiting dark fluids and dark non-bloody which resolved with Imodium.  Patient reports tolerating last meal yesterday, small dark BM yesterday, + appetite.  Of note, patient requires constant O2 at home x several weeks, previously at nights only.    Endoscopy/Colonoscopy - 2023 - polypectomy, otherwise patient reports no significant findings.    Patient reports being non-compliant with medications - does not remember if she ran out of medications or was supposed to stop them.    CT findings of pnemoperitoneum, likely perforated PUD, made her NPO, started on IVF and IV antibiotics and underwent emergent repair of perforated viscus in OR. Pt underwent laparoscopic repair of perforated gastric ulcer with Srinath patch, Onur drain placement with intra-op findings of 1cm perforation of distal anterior stomach with diffuse gastric/bilious contamination. . Patient tolerated procedure well.  Postoperatively, noted minimal amount of urine and blood gas with acidotic feature.  Pt was seen by Hospitalist, ICU intensivist, Pulmonology, Nephrology at PACU.  pt is deemed necessary for higher level of care at this time, thus transferring to [facility] ICU for further care.  Pt is accepted at [facility] ICU care.

## 2025-01-09 NOTE — H&P ADULT - ASSESSMENT
A/P: 80 y/o WF with multiple comorbidities admitted with abdominal pain, CT findings of pneumoperitoneum, likely perforated PUD, PRE-OP FOR EXPLORATORY LAPAROSCOPY, REPAIR OF PERFORATED VISCUS, POSSIBLE BOWEL RESECTION,    Admit to Surgery - Dr. TWIN Valdez  NPO with IVF  GI/DVT prophylaxis  Antibiotics  Medicine consult for medical management  AM labs  Consent obtained - risks/benefits/alternatives explained to patient and    A/P: 78 y/o WF with multiple comorbidities admitted with abdominal pain, CT findings of pneumoperitoneum, likely perforated PUD, PRE-OP FOR EXPLORATORY LAPAROSCOPY, REPAIR OF PERFORATED VISCUS, POSSIBLE BOWEL RESECTION,    Admit to Surgery - Dr. TWIN Valdez  NPO with IVF  Strict Is+Os  GI/DVT prophylaxis  Antibiotics  Medicine consult for medical management - SPCU monitoring  AM labs  Consent obtained - risks/benefits/alternatives explained to patient and

## 2025-01-09 NOTE — H&P ADULT - HISTORY OF PRESENT ILLNESS
78 y/o WF with PMHx with HTN, HLD, CKD, PUD (dx 2011), Lung CA s/p lobectomy (1996) presented to [facility] ED from home with  via ambulance c/o worsening generalized achy abdominal pain since 1/7 night.  Patient reports baseline abdominal discomfort x several years which worsened suddenly (described as 11/10) without associated fever/chills.  Patient recently discharged from [facility] - treated for PNA.  Patient reports that after discharge had 3 days of vomiting dark fluids and dark non-bloody which resolved with Imodium.  Patient reports tolerating last meal yesterday, small dark BM yesterday, + appetite. Of note, patient requires constant O2 at home x several weeks, previously at nights only. At [place], CT findings of pnemoperitoneum, likely perforated PUD, made her NPO, started on IVF and IV antibiotics and underwent emergent repair of perforated viscus in OR. Pt underwent laparoscopic repair of perforated gastric ulcer with Srinath patch, Onur drain placement with intra-op findings of 1cm perforation of distal anterior stomach with diffuse gastric/bilious contamination. Procedure complicated by aspiration, and postoperatively, noted minimal amount of urine and blood gas with acidotic feature. Patient found to be in acute respiratory distress, acidotic and hypercarbic. ICU consult at that time determined that patient required higher level of care, resulting in transfer to [facility]. On arrival to the ICU she is intubated, sedated with propofol without any pressor requirements.

## 2025-01-09 NOTE — BRIEF OPERATIVE NOTE - NSICDXBRIEFPROCEDURE_GEN_ALL_CORE_FT
PROCEDURES:  Laparoscopic repair of perforated gastric ulcer 09-Jan-2025 06:14:11  Bhavesh Marquez  Laparoscopic omentopexy 09-Jan-2025 06:14:32  Bhavesh Marquez

## 2025-01-09 NOTE — CONSULT NOTE ADULT - ASSESSMENT
80 y/o F with PMHx of HTN, HLD, CKD, PUD, and lung CA s/p pneumonectomy in 1996 admitted with:    Acute hypoxic/hypercapnic respiratory failure  Aspiration PNA  Perforated gastric ulcer  Pneumoperitoneum  ESPERANZA    Plan:  - Not requiring continuous sedation.  - Will start propofol infusion if needed.  - Maintain MAP > 65.  - Actively titrating ventilator settings to maintain SpO2 > 92% and adequate minute ventilation.  - Low tidal volume ventilation in the setting of right pneumonectomy hx.   - Extremely hypercapnic on ABG, tidal volume and respiratory rate were increased. Pending repeat ABG.  - NPO.  - NGT to LIWS.  - GI prophylaxis with protonix.  - Monitor renal function.  - 1 L bolus plus maintenance IVF on 75 cc/hr.  - Repeat BMP at 18:00.  - Howell in place for strict output monitoring.  - Abx coverage with zosyn plus antifungal coverage with diflucan.  - Follow up culture data.  - Accu-Cheks q6 hrs while NPO.  - Mechanical DVT prophylaxis with SCDs.    Case discussed with ICU attending, Dr. Kaba.

## 2025-01-09 NOTE — CONSULT NOTE ADULT - SUBJECTIVE AND OBJECTIVE BOX
Date/Time Patient Seen:  		  Referring MD:   Data Reviewed	       Patient is a 79y old  Female who presents with a chief complaint of I have severe abdominal pain x 1 day. (09 Jan 2025 03:40)      Subjective/HPI   78 y/o WF with PMHx with HTN, HLD, CKD, PUD (dx 2011), Lung CA s/p lobectomy (1996) presents to [facility] ED from home with  via ambulance c/o worsening generalized achy abdominal pain since last night.  Patient reports baseline abdominal discomfort x several years which worsened suddenly (described as 11/10) without associated fever/chills.  Patient recently discharged from [facility] - treated for PNA.  Patient reports that after discharge had 3 days of vomiting dark fluids and dark non-bloody which resolved with Imodium.  Patient reports tolerating last meal yesterday, small dark BM yesterday, + appetite.  Of note, patient requires constant O2 at home x several weeks, previously at nights only.    Endoscopy/Colonoscopy - 2023 - polypectomy, otherwise patient reports no significant findings.    Patient reports being non-compliant with medications - does not remember if she ran out of medications or was supposed to stop them.  PAST MEDICAL & SURGICAL HISTORY:  Asthma    HLD (hyperlipidemia)    Rheumatoid arthritis    TIA (transient ischemic attack)  2012    Esophageal reflux    Lung cancer  Right lung.    Chronic obstructive pulmonary disease, unspecified COPD type  O2 at night    Hiatal hernia with GERD    Essential hypertension    Hyperlipidemia, unspecified hyperlipidemia type    Calculus of gallbladder with chronic cholecystitis without obstruction    Childhood asthma    Stage 3 chronic kidney disease    Carotid artery plaque    IBS (irritable bowel syndrome)    Psoriatic arthritis    S/P lobectomy of lung  Right lung in 1996.    H/O colonoscopy    History of endoscopy  Sept 2017    S/P cholecystectomy    CAD (coronary artery disease)       Review of Systems:  · General	negative  · Skin/Breast	negative  · Ophthalmologic	negative  · ENMT	negative  · Respiratory and Thorax Symptoms	dyspnea  · Cardiovascular	negative  · Gastrointestinal Comments	SEE HPI  · Genitourinary	negative  · Musculoskeletal	negative  · Neurological	negative  · Psychiatric	negative      Allergies and Intolerances:        Allergies:  	IV Contrast: Drug, Hives, Rash  	penicillin: Drug, Other, unknown  	Levaquin: Drug, Other, unknown  	Zofran: Drug, Other, pt cannot recall reaction  	minocycline: Drug, Other, unknown  	Enbrel: Drug, Unknown    Home Medications:   * Patient Currently Takes Medications as of 09-Jan-2025 02:15 documented in Structured Notes  · 	guaiFENesin 600 mg oral tablet, extended release: Last Dose Taken:  , 1 tab(s) orally every 12 hours as needed for  cough  · 	benzonatate 100 mg oral capsule: Last Dose Taken:  , 1 cap(s) orally 3 times a day as needed for  cough  · 	predniSONE 20 mg oral tablet: Last Dose Taken:  , 1 tab(s) orally once a day  · 	cefpodoxime 200 mg oral tablet: Last Dose Taken:  , 1 tab(s) orally 2 times a day  · 	Crestor 5 mg oral tablet: Last Dose Taken:  , 1 tab(s) orally  · 	Advair Diskus 100 mcg-50 mcg inhalation powder: Last Dose Taken:  , 1 inhaled  · 	pantoprazole 40 mg oral delayed release tablet: Last Dose Taken:  , 1 tab(s) orally  · 	Arava 20 mg oral tablet: Last Dose Taken:  , 1 tab(s) orally  · 	Singulair 10 mg oral tablet: Last Dose Taken:  , 1 tab(s) orally  · 	azelastine nasal: Last Dose Taken:    · 	multivitamin: Last Dose Taken:    · 	Fluticasone: Last Dose Taken:    · 	amLODIPine 10 mg oral tablet: Last Dose Taken:  , 1 tab(s) orally  · 	hydroxychloroquine 200 mg oral tablet: Last Dose Taken:  , 1 tab(s) orally  · 	Aspir 81 oral delayed release tablet: Last Dose Taken:  , 1 tab(s) orally  · 	Neuriva Brain performance Plus Therapeutic Multiple Vitamins oral capsule: Last Dose Taken:  , 1 cap(s) orally  · 	Wixela Inhub 250 mcg-50 mcg inhalation powder: Last Dose Taken:  , 1 puff(s) inhaled 2 times a day  · 	Requip 2 mg oral tablet: Last Dose Taken:  , 1 tab(s) orally    Patient History:    Past Medical, Past Surgical, and Family History:  PAST MEDICAL HISTORY:  Carotid artery plaque     Childhood asthma     Chronic obstructive pulmonary disease, unspecified COPD type O2 at night    Essential hypertension     Hiatal hernia with GERD     HLD (hyperlipidemia)     IBS (irritable bowel syndrome)     Lung cancer Right lung.    Psoriatic arthritis     Rheumatoid arthritis     Stage 3 chronic kidney disease     TIA (transient ischemic attack) 2012.     PAST SURGICAL HISTORY:  H/O colonoscopy     History of endoscopy Sept 2017    S/P cholecystectomy     S/P lobectomy of lung Right lung in 1996.     FAMILY HISTORY:  Father  Still living? Unknown  FH: CAD (coronary artery disease), Age at diagnosis: Age Unknown  FH: myocardial infarction, Age at diagnosis: Age Unknown    Mother  Still living? Unknown  FH: type 2 diabetes, Age at diagnosis: Age Unknown    Sibling  Still living? Unknown  FH: CAD (coronary artery disease), Age at diagnosis: Age Unknown  FH: stroke, Age at diagnosis: Age Unknown  FH: colon cancer, Age at diagnosis: Age Unknown  FH: lung cancer, Age at diagnosis: Age Unknown.     Social History:  · Substance use	No  · Social History (marital status, living situation, occupation, and sexual history)	Former smoker - quit 30 years about  EtOH - drinks glass of wine/cocktail couple of times a month     Tobacco Screening:  · Core Measure Site	No    Risk Assessment:    Present on Admission:  Deep Venous Thrombosis	no  Pulmonary Embolus	no     HIV Screening:  · In accordance with [facility] law, we offer every patient who comes to our ED an HIV test. Would you like to be tested today?	Unable to answer due to medical condition/unresponsive/etc...     Hepatitis C Test Questions:  · In accordance with [facility] Law, we offer every patient a Hepatitis C test. Would you like to be tested today?	Opt out        Medication list         MEDICATIONS  (STANDING):  acetaminophen   IVPB .. 700 milliGRAM(s) IV Intermittent every 6 hours  albumin human 25% IVPB 50 milliLiter(s) IV Intermittent once  albuterol/ipratropium for Nebulization. 3 milliLiter(s) Nebulizer once  chlorhexidine 0.12% Liquid 15 milliLiter(s) Oral Mucosa every 12 hours  lactated ringers. 1000 milliLiter(s) (150 mL/Hr) IV Continuous <Continuous>  lactated ringers. 1000 milliLiter(s) (75 mL/Hr) IV Continuous <Continuous>  metroNIDAZOLE  IVPB 500 milliGRAM(s) IV Intermittent every 12 hours  pantoprazole  Injectable 40 milliGRAM(s) IV Push two times a day  propofol Infusion 20 MICROgram(s)/kG/Min (5.64 mL/Hr) IV Continuous <Continuous>    MEDICATIONS  (PRN):         Vitals log        ICU Vital Signs Last 24 Hrs  T(C): 36 (09 Jan 2025 06:37), Max: 36.6 (09 Jan 2025 02:23)  T(F): 96.8 (09 Jan 2025 06:37), Max: 97.9 (09 Jan 2025 02:23)  HR: 87 (09 Jan 2025 06:52) (87 - 119)  BP: 133/57 (09 Jan 2025 06:52) (121/53 - 183/80)  BP(mean): 105 (09 Jan 2025 03:40) (105 - 105)  ABP: --  ABP(mean): --  RR: 26 (09 Jan 2025 06:52) (18 - 26)  SpO2: 100% (09 Jan 2025 06:52) (94% - 100%)    O2 Parameters below as of 09 Jan 2025 06:52  Patient On (Oxygen Delivery Method): ventilator                 Input and Output:  I&O's Detail      Lab Data                        13.3   8.87  )-----------( 297      ( 08 Jan 2025 23:06 )             42.8     01-08    138  |  101  |  24[H]  ----------------------------<  159[H]  4.7   |  27  |  1.29    Ca    9.3      08 Jan 2025 23:06    TPro  6.5  /  Alb  2.8[L]  /  TBili  0.4  /  DBili  x   /  AST  56[H]  /  ALT  35  /  AlkPhos  102  01-08            Review of Systems	    abd pain  weakness  resp failure  post op  all systems reviewed    Objective     Physical Examination        Pertinent Lab findings & Imaging      Howell:  NO   Adequate UO     I&O's Detail           Discussed with:     Cultures:	        Radiology      ACC: [id number] EXAM:  CT ABDOMEN AND PELVIS   ORDERED BY: JACKIE MILTON     PROCEDURE DATE:  01/09/2025          INTERPRETATION:  CLINICAL INFORMATION: Severe abdominal pain.    COMPARISON: January 1, 2025 CT    CONTRAST/COMPLICATIONS:  IV Contrast: None  Oral Contrast: None      PROCEDURE:  CT of the Abdomen and Pelvis was performed.  Sagittal and coronal reformats were performed.    FINDINGS:  Evaluation of solid organs and vascular structures is limited without   intravenous contrast.    LOWER CHEST: Partially imaged right pleural effusion and right basilar   atelectasis similar to prior exam. Aortic valve and mitral valve annular   calcifications. Small hiatal hernia.    LIVER: Within normal limits.  BILE DUCTS: Stable CBD dilatation, which could be related to   cholecystectomy.  GALLBLADDER: Cholecystectomy.  SPLEEN: Within normal limits.  PANCREAS: Within normal limits.  ADRENALS: Within normal limits.  KIDNEYS/URETERS: Nonobstructing left renal stone versus vascular   calcification. No hydronephrosis.    BLADDER: Minimally distended.  REPRODUCTIVE ORGANS: Atrophic uterus.  BOWEL: No bowel obstruction. No evidence of appendicitis. Colonic   diverticulosis. Diffuse wall thickening of the stomach and wall   thickening of the proximal duodenum. Possible area of ulceration in the   distal stomach (3, 51 and 5, 53) Wall thickening of several small bowel   loops in the mid to left abdomen (example 5, 57).  PERITONEUM/RETROPERITONEUM: Small to moderate ascites. Pneumoperitoneum   most prominently in the upper abdomen.  VESSELS: Atherosclerotic changes.  LYMPH NODES: No lymphadenopathy.  ABDOMINAL WALL: Small fat-containing umbilical hernia.  BONES: Degenerative changes. L4 compression deformity status post   kyphoplasty.    IMPRESSION: Exam limited by noncontrast technique.  Pneumoperitoneum and small to moderate ascites, compatible with   perforated viscus. Wall thickening of the stomach and duodenum with   possible area of ulceration in the distal stomach, concerning for   perforated peptic ulcer. Wall thickening of small bowel loops in the mid   to left abdomen also noted and underlying perforation from this region is   not excluded. Perforation from colonic diverticulosis/diverticulitis is   also possible but considered less likely. Surgical consult recommended.    Findings were discussed with Dr. JACKIE MILTON [phone] 1/9/2025   1:18 AM by Dr. Harvey with read back confirmation.    --- End of Report ---            DANIEL HARVEY MD; Attending Radiologist  This document has been electronically signed. Jan 9 2025  1:21AM                         Date/Time Patient Seen:  		  Referring MD:   Data Reviewed	       Patient is a 79y old  Female who presents with a chief complaint of I have severe abdominal pain x 1 day. (09 Jan 2025 03:40)      Subjective/HPI   80 y/o WF with PMHx with HTN, HLD, CKD, PUD (dx 2011), Lung CA s/p lobectomy (1996) presents to [facility] ED from home with  via ambulance c/o worsening generalized achy abdominal pain since last night.  Patient reports baseline abdominal discomfort x several years which worsened suddenly (described as 11/10) without associated fever/chills.  Patient recently discharged from [facility] - treated for PNA.  Patient reports that after discharge had 3 days of vomiting dark fluids and dark non-bloody which resolved with Imodium.  Patient reports tolerating last meal yesterday, small dark BM yesterday, + appetite.  Of note, patient requires constant O2 at home x several weeks, previously at nights only.    Endoscopy/Colonoscopy - 2023 - polypectomy, otherwise patient reports no significant findings.    Patient reports being non-compliant with medications - does not remember if she ran out of medications or was supposed to stop them.  PAST MEDICAL & SURGICAL HISTORY:  Asthma    HLD (hyperlipidemia)    Rheumatoid arthritis    TIA (transient ischemic attack)  2012    Esophageal reflux    Lung cancer  Right lung.    Chronic obstructive pulmonary disease, unspecified COPD type  O2 at night    Hiatal hernia with GERD    Essential hypertension    Hyperlipidemia, unspecified hyperlipidemia type    Calculus of gallbladder with chronic cholecystitis without obstruction    Childhood asthma    Stage 3 chronic kidney disease    Carotid artery plaque    IBS (irritable bowel syndrome)    Psoriatic arthritis    S/P lobectomy of lung  Right lung in 1996.    H/O colonoscopy    History of endoscopy  Sept 2017    S/P cholecystectomy    CAD (coronary artery disease)       Review of Systems:  · General	negative  · Skin/Breast	negative  · Ophthalmologic	negative  · ENMT	negative  · Respiratory and Thorax Symptoms	dyspnea  · Cardiovascular	negative  · Gastrointestinal Comments	SEE HPI  · Genitourinary	negative  · Musculoskeletal	negative  · Neurological	negative  · Psychiatric	negative      Allergies and Intolerances:        Allergies:  	IV Contrast: Drug, Hives, Rash  	penicillin: Drug, Other, unknown  	Levaquin: Drug, Other, unknown  	Zofran: Drug, Other, pt cannot recall reaction  	minocycline: Drug, Other, unknown  	Enbrel: Drug, Unknown    Home Medications:   * Patient Currently Takes Medications as of 09-Jan-2025 02:15 documented in Structured Notes  · 	guaiFENesin 600 mg oral tablet, extended release: Last Dose Taken:  , 1 tab(s) orally every 12 hours as needed for  cough  · 	benzonatate 100 mg oral capsule: Last Dose Taken:  , 1 cap(s) orally 3 times a day as needed for  cough  · 	predniSONE 20 mg oral tablet: Last Dose Taken:  , 1 tab(s) orally once a day  · 	cefpodoxime 200 mg oral tablet: Last Dose Taken:  , 1 tab(s) orally 2 times a day  · 	Crestor 5 mg oral tablet: Last Dose Taken:  , 1 tab(s) orally  · 	Advair Diskus 100 mcg-50 mcg inhalation powder: Last Dose Taken:  , 1 inhaled  · 	pantoprazole 40 mg oral delayed release tablet: Last Dose Taken:  , 1 tab(s) orally  · 	Arava 20 mg oral tablet: Last Dose Taken:  , 1 tab(s) orally  · 	Singulair 10 mg oral tablet: Last Dose Taken:  , 1 tab(s) orally  · 	azelastine nasal: Last Dose Taken:    · 	multivitamin: Last Dose Taken:    · 	Fluticasone: Last Dose Taken:    · 	amLODIPine 10 mg oral tablet: Last Dose Taken:  , 1 tab(s) orally  · 	hydroxychloroquine 200 mg oral tablet: Last Dose Taken:  , 1 tab(s) orally  · 	Aspir 81 oral delayed release tablet: Last Dose Taken:  , 1 tab(s) orally  · 	Neuriva Brain performance Plus Therapeutic Multiple Vitamins oral capsule: Last Dose Taken:  , 1 cap(s) orally  · 	Wixela Inhub 250 mcg-50 mcg inhalation powder: Last Dose Taken:  , 1 puff(s) inhaled 2 times a day  · 	Requip 2 mg oral tablet: Last Dose Taken:  , 1 tab(s) orally    Patient History:    Past Medical, Past Surgical, and Family History:  PAST MEDICAL HISTORY:  Carotid artery plaque     Childhood asthma     Chronic obstructive pulmonary disease, unspecified COPD type O2 at night    Essential hypertension     Hiatal hernia with GERD     HLD (hyperlipidemia)     IBS (irritable bowel syndrome)     Lung cancer Right lung.    Psoriatic arthritis     Rheumatoid arthritis     Stage 3 chronic kidney disease     TIA (transient ischemic attack) 2012.     PAST SURGICAL HISTORY:  H/O colonoscopy     History of endoscopy Sept 2017    S/P cholecystectomy     S/P lobectomy of lung Right lung in 1996.     FAMILY HISTORY:  Father  Still living? Unknown  FH: CAD (coronary artery disease), Age at diagnosis: Age Unknown  FH: myocardial infarction, Age at diagnosis: Age Unknown    Mother  Still living? Unknown  FH: type 2 diabetes, Age at diagnosis: Age Unknown    Sibling  Still living? Unknown  FH: CAD (coronary artery disease), Age at diagnosis: Age Unknown  FH: stroke, Age at diagnosis: Age Unknown  FH: colon cancer, Age at diagnosis: Age Unknown  FH: lung cancer, Age at diagnosis: Age Unknown.     Social History:  · Substance use	No  · Social History (marital status, living situation, occupation, and sexual history)	Former smoker - quit 30 years about  EtOH - drinks glass of wine/cocktail couple of times a month     Tobacco Screening:  · Core Measure Site	No    Risk Assessment:    Present on Admission:  Deep Venous Thrombosis	no  Pulmonary Embolus	no     HIV Screening:  · In accordance with [facility] law, we offer every patient who comes to our ED an HIV test. Would you like to be tested today?	Unable to answer due to medical condition/unresponsive/etc...     Hepatitis C Test Questions:  · In accordance with [facility] Law, we offer every patient a Hepatitis C test. Would you like to be tested today?	Opt out        Medication list         MEDICATIONS  (STANDING):  acetaminophen   IVPB .. 700 milliGRAM(s) IV Intermittent every 6 hours  albumin human 25% IVPB 50 milliLiter(s) IV Intermittent once  albuterol/ipratropium for Nebulization. 3 milliLiter(s) Nebulizer once  chlorhexidine 0.12% Liquid 15 milliLiter(s) Oral Mucosa every 12 hours  lactated ringers. 1000 milliLiter(s) (150 mL/Hr) IV Continuous <Continuous>  lactated ringers. 1000 milliLiter(s) (75 mL/Hr) IV Continuous <Continuous>  metroNIDAZOLE  IVPB 500 milliGRAM(s) IV Intermittent every 12 hours  pantoprazole  Injectable 40 milliGRAM(s) IV Push two times a day  propofol Infusion 20 MICROgram(s)/kG/Min (5.64 mL/Hr) IV Continuous <Continuous>    MEDICATIONS  (PRN):         Vitals log        ICU Vital Signs Last 24 Hrs  T(C): 36 (09 Jan 2025 06:37), Max: 36.6 (09 Jan 2025 02:23)  T(F): 96.8 (09 Jan 2025 06:37), Max: 97.9 (09 Jan 2025 02:23)  HR: 87 (09 Jan 2025 06:52) (87 - 119)  BP: 133/57 (09 Jan 2025 06:52) (121/53 - 183/80)  BP(mean): 105 (09 Jan 2025 03:40) (105 - 105)  ABP: --  ABP(mean): --  RR: 26 (09 Jan 2025 06:52) (18 - 26)  SpO2: 100% (09 Jan 2025 06:52) (94% - 100%)    O2 Parameters below as of 09 Jan 2025 06:52  Patient On (Oxygen Delivery Method): ventilator                 Input and Output:  I&O's Detail      Lab Data                        13.3   8.87  )-----------( 297      ( 08 Jan 2025 23:06 )             42.8     01-08    138  |  101  |  24[H]  ----------------------------<  159[H]  4.7   |  27  |  1.29    Ca    9.3      08 Jan 2025 23:06    TPro  6.5  /  Alb  2.8[L]  /  TBili  0.4  /  DBili  x   /  AST  56[H]  /  ALT  35  /  AlkPhos  102  01-08            Review of Systems	    abd pain  weakness  resp failure  post op  all systems reviewed    Objective     Physical Examination    heart s1s2  lung dc bS  head nc  et tube      Pertinent Lab findings & Imaging      Howell:  NO   Adequate UO     I&O's Detail           Discussed with:     Cultures:	        Radiology      ACC: [id number] EXAM:  CT ABDOMEN AND PELVIS   ORDERED BY: JCAKIE MILTON     PROCEDURE DATE:  01/09/2025          INTERPRETATION:  CLINICAL INFORMATION: Severe abdominal pain.    COMPARISON: January 1, 2025 CT    CONTRAST/COMPLICATIONS:  IV Contrast: None  Oral Contrast: None      PROCEDURE:  CT of the Abdomen and Pelvis was performed.  Sagittal and coronal reformats were performed.    FINDINGS:  Evaluation of solid organs and vascular structures is limited without   intravenous contrast.    LOWER CHEST: Partially imaged right pleural effusion and right basilar   atelectasis similar to prior exam. Aortic valve and mitral valve annular   calcifications. Small hiatal hernia.    LIVER: Within normal limits.  BILE DUCTS: Stable CBD dilatation, which could be related to   cholecystectomy.  GALLBLADDER: Cholecystectomy.  SPLEEN: Within normal limits.  PANCREAS: Within normal limits.  ADRENALS: Within normal limits.  KIDNEYS/URETERS: Nonobstructing left renal stone versus vascular   calcification. No hydronephrosis.    BLADDER: Minimally distended.  REPRODUCTIVE ORGANS: Atrophic uterus.  BOWEL: No bowel obstruction. No evidence of appendicitis. Colonic   diverticulosis. Diffuse wall thickening of the stomach and wall   thickening of the proximal duodenum. Possible area of ulceration in the   distal stomach (3, 51 and 5, 53) Wall thickening of several small bowel   loops in the mid to left abdomen (example 5, 57).  PERITONEUM/RETROPERITONEUM: Small to moderate ascites. Pneumoperitoneum   most prominently in the upper abdomen.  VESSELS: Atherosclerotic changes.  LYMPH NODES: No lymphadenopathy.  ABDOMINAL WALL: Small fat-containing umbilical hernia.  BONES: Degenerative changes. L4 compression deformity status post   kyphoplasty.    IMPRESSION: Exam limited by noncontrast technique.  Pneumoperitoneum and small to moderate ascites, compatible with   perforated viscus. Wall thickening of the stomach and duodenum with   possible area of ulceration in the distal stomach, concerning for   perforated peptic ulcer. Wall thickening of small bowel loops in the mid   to left abdomen also noted and underlying perforation from this region is   not excluded. Perforation from colonic diverticulosis/diverticulitis is   also possible but considered less likely. Surgical consult recommended.    Findings were discussed with Dr. JACKIE MILTON [phone] 1/9/2025   1:18 AM by Dr. Harvey with read back confirmation.    --- End of Report ---            DANIEL HARVEY MD; Attending Radiologist  This document has been electronically signed. Jan 9 2025  1:21AM

## 2025-01-09 NOTE — CONSULT NOTE ADULT - REASON FOR ADMISSION
I have severe abdominal pain x 1 day.

## 2025-01-09 NOTE — CHART NOTE - NSCHARTNOTEFT_GEN_A_CORE
79F with HTN, HLD, CKD, PUD, Lung Cancer s/p RUL lobectomy who presents with a perforated gastric ulcer POD from laparoscopic repair with Srinath Patch and Onur Drain placement.  Pt transferred to hospitals ICU for severe hypercapnic respiratory failure 2/2 aspiration, and course is further complicated by ESPERANZA 2/2 ATN.    T(C): 37.3 (01-09-25 @ 17:02), Max: 37.3 (01-09-25 @ 17:02)  HR: 91 (01-09-25 @ 18:00) (80 - 119)  BP: 126/48 (01-09-25 @ 18:00) (83/43 - 183/80)  RR: 26 (01-09-25 @ 18:00) (17 - 27)  SpO2: 95% (01-09-25 @ 18:00) (94% - 100%)    Physical exam: Pt intubated, responsive to noxious stimuli, CTAB; abd softly distended, incisions C/D/I; drain with serosanguinous output    Neuro: pt sedated on propofol, prn fentayl for pain, wean off sedation as tolerated  Pulm: pt with hypercapnic respiratory failure 2/2 aspiration, now improving. At one point pt was on PEEP of 15 while outside hospital and CXR shows some subcutaneous emphysema of the left chest wall but no pneumothorax. Repeat CXR in AM to eval for pneumo  CV: Hypotension 2/2 sedation, continue levo to maintain MAP >65  GI: NPO with NGT to LWS  /Renal: ESPERANZA 2/2 ATN 2/2 dehydration; trend BMP and UOP. Trend hyperkalemia.   Heme/DVT PPX: CBC stable, continue to trend  Endo: No active issues  ID: Continue fluconazole and zosyn for perforation. Follow up blood cultures  Ethics: Full code, plan d/w  at bedside

## 2025-01-09 NOTE — DISCHARGE NOTE PROVIDER - NSDCCPCAREPLAN_GEN_ALL_CORE_FT
PRINCIPAL DISCHARGE DIAGNOSIS  Diagnosis: Gastrointestinal perforation  Assessment and Plan of Treatment:

## 2025-01-10 NOTE — PROGRESS NOTE ADULT - ASSESSMENT
80 y/o WF with PMHx with HTN, HLD, CKD, PUD (dx 2011), Lung CA s/p lobectomy (1996) presents to [facility] ED from home with  via ambulance c/o worsening generalized achy abdominal pain since last night.  Patient reports baseline abdominal discomfort x several years which worsened suddenly    intestinal perf  atelectasis  pleural eff  HTN  HLD  CKD  PUD  PNA hx  Lung Ca - hx of Lobectomy     rpt CXR this am   vs noted  ID f/u  cardio f/u    post op care  PUD perf - surgery and op note reviewed  lung protective vent support  fio2 titration  oral hygiene  skin care  suction PRN  HOB elev  PPI  Bronchodilators - Stress steroids - hx of COPD -   chr changes - deformity - lung parenchyma and chest - see CT chest -   ICU care  I and O  serial labs  replete lytes  dvt p  pain relief  rpt Blood gas

## 2025-01-10 NOTE — PROGRESS NOTE ADULT - SUBJECTIVE AND OBJECTIVE BOX
HPI:  78 y/o WF with PMHx with HTN, HLD, CKD, PUD (dx 2011), Lung CA s/p lobectomy (1996) presented to [facility] ED from home with  via ambulance c/o worsening generalized achy abdominal pain since 1/7 night.  Patient reports baseline abdominal discomfort x several years which worsened suddenly (described as 11/10) without associated fever/chills.  Patient recently discharged from [facility] - treated for PNA.  Patient reports that after discharge had 3 days of vomiting dark fluids and dark non-bloody which resolved with Imodium.  Patient reports tolerating last meal yesterday, small dark BM yesterday, + appetite. Of note, patient requires constant O2 at home x several weeks, previously at nights only. At [place], CT findings of pnemoperitoneum, likely perforated PUD, made her NPO, started on IVF and IV antibiotics and underwent emergent repair of perforated viscus in OR. Pt underwent laparoscopic repair of perforated gastric ulcer with Srinath patch, Onur drain placement with intra-op findings of 1cm perforation of distal anterior stomach with diffuse gastric/bilious contamination. Procedure complicated by aspiration, and postoperatively, noted minimal amount of urine and blood gas with acidotic feature. Patient found to be in acute respiratory distress, acidotic and hypercarbic. ICU consult at that time determined that patient required higher level of care, resulting in transfer to [facility]. On arrival to the ICU she is intubated, sedated with propofol without any pressor requirements.  (09 Jan 2025 17:11)      SUBJECTIVE:  Patient seen and examined at bedside.  No overnight events.  Patient remains intubated. She is arousable and alert, able to respond appropriately to questions.   VITALS  Vital Signs Last 24 Hrs  T(C): 37.4 (10 Wallace 2025 07:50), Max: 37.6 (09 Jan 2025 20:00)  T(F): 99.4 (10 Wallace 2025 07:50), Max: 99.7 (09 Jan 2025 20:00)  HR: 109 (10 Wallace 2025 05:48) (89 - 117)  BP: 126/61 (10 Wallace 2025 05:00) (83/43 - 148/53)  BP(mean): 81 (10 Wallace 2025 05:00) (56 - 83)  RR: 30 (10 Wallace 2025 05:30) (0 - 139)  SpO2: 94% (10 Wallace 2025 05:48) (93% - 100%)    Parameters below as of 09 Jan 2025 19:00  Patient On (Oxygen Delivery Method): ventilator    O2 Concentration (%): 50    PHYSICAL EXAM  GENERAL:  Intuabted but arousable female in NAD.  HEENT:  Normocephalic and atraumatic  CARDIO:  Regular rate and rhythm.    RESPIRATORY:  Nonlabored breathing, no accessory muscle use. Intubated on 3 PEEP and 35% FiO2  ABDOMEN:  Soft, nondistended, appropriately tender. No rebound or guarding. Incisions c/d/i  SKIN:  No jaundice, pallor, or cyanosis  NEURO:  Intubated but arousable, responds appropriately to prompting    INTAKE & OUTPUT  I&O's Summary    09 Jan 2025 07:01  -  10 Wallace 2025 07:00  --------------------------------------------------------  IN: 2542.6 mL / OUT: 955 mL / NET: 1587.6 mL      I&O's Detail    09 Jan 2025 07:01  -  10 Wallace 2025 07:00  --------------------------------------------------------  IN:    IV PiggyBack: 100 mL    IV PiggyBack: 100 mL    IV PiggyBack: 400 mL    IV PiggyBack: 100 mL    Lactated Ringers: 150 mL    Lactated Ringers Bolus: 500 mL    Norepinephrine: 27.3 mL    Propofol: 40.3 mL    sodium chloride 0.45%: 1125 mL  Total IN: 2542.6 mL    OUT:    Drain (mL): 450 mL    Indwelling Catheter - Urethral (mL): 405 mL    Nasogastric/Oral tube (mL): 100 mL  Total OUT: 955 mL    Total NET: 1587.6 mL          MEDICATIONS  MEDICATIONS  (STANDING):  albumin human 25% IVPB 50 milliLiter(s) IV Intermittent every 8 hours  chlorhexidine 0.12% Liquid 15 milliLiter(s) Oral Mucosa every 12 hours  chlorhexidine 2% Cloths 1 Application(s) Topical <User Schedule>  fluconAZOLE IVPB 200 milliGRAM(s) IV Intermittent every 24 hours  norepinephrine Infusion 0.05 MICROgram(s)/kG/Min (4.87 mL/Hr) IV Continuous <Continuous>  pantoprazole  Injectable 40 milliGRAM(s) IV Push daily  piperacillin/tazobactam IVPB.. 3.375 Gram(s) IV Intermittent every 8 hours  propofol Infusion 10 MICROgram(s)/kG/Min (3.11 mL/Hr) IV Continuous <Continuous>  sodium chloride 0.45%. 1000 milliLiter(s) (75 mL/Hr) IV Continuous <Continuous>    MEDICATIONS  (PRN):  fentaNYL    Injectable 50 MICROGram(s) IV Push every 2 hours PRN Severe Pain (7 - 10)      LABS:                        8.7    13.57 )-----------( 164      ( 10 Wallace 2025 06:05 )             27.5     01-10    138  |  107  |  45[H]  ----------------------------<  90  4.8   |  23  |  2.20[H]    Ca    7.5[L]      10 Wallace 2025 06:05  Phos  4.7     01-10  Mg     1.6     01-10    TPro  4.8[L]  /  Alb  2.7[L]  /  TBili  0.5  /  DBili  x   /  AST  87[H]  /  ALT  41  /  AlkPhos  45  01-10    PT/INR - ( 09 Jan 2025 01:47 )   PT: 12.2 sec;   INR: 1.05 ratio         PTT - ( 09 Jan 2025 01:47 )  PTT:23.3 sec    Urinalysis with Rflx Culture (collected 09 Jan 2025 03:39)        RADIOLOGY & ADDITIONAL STUDIES:   HPI:  80 y/o WF with PMHx with HTN, HLD, CKD, PUD (dx 2011), Lung CA s/p lobectomy (1996) presented to [facility] ED from home with  via ambulance c/o worsening generalized achy abdominal pain since 1/7 night.  Patient reports baseline abdominal discomfort x several years which worsened suddenly (described as 11/10) without associated fever/chills.  Patient recently discharged from [facility] - treated for PNA.  Patient reports that after discharge had 3 days of vomiting dark fluids and dark non-bloody which resolved with Imodium.  Patient reports tolerating last meal yesterday, small dark BM yesterday, + appetite. Of note, patient requires constant O2 at home x several weeks, previously at nights only. At [place], CT findings of pnemoperitoneum, likely perforated PUD, made her NPO, started on IVF and IV antibiotics and underwent emergent repair of perforated viscus in OR. Pt underwent laparoscopic repair of perforated gastric ulcer with Srinath patch, Onur drain placement with intra-op findings of 1cm perforation of distal anterior stomach with diffuse gastric/bilious contamination. Procedure complicated by aspiration,as we noted gastric contents in the ET tube . Postoperatively, noted minimal amount of urine and blood gas with acidotic feature. Patient found to be in acute respiratory distress, acidotic and hypercarbic. ICU consult at that time determined that patient required higher level of care, resulting in transfer to [facility]. On arrival to the ICU she is intubated, sedated with propofol without any pressor requirements.  (09 Jan 2025 17:11)      SUBJECTIVE:  Patient seen and examined at bedside.  No overnight events.  Patient remains intubated. She is arousable and alert, able to respond appropriately to questions.   VITALS  Vital Signs Last 24 Hrs  T(C): 37.4 (10 Wallace 2025 07:50), Max: 37.6 (09 Jan 2025 20:00)  T(F): 99.4 (10 Wallace 2025 07:50), Max: 99.7 (09 Jan 2025 20:00)  HR: 109 (10 Wallace 2025 05:48) (89 - 117)  BP: 126/61 (10 Wallace 2025 05:00) (83/43 - 148/53)  BP(mean): 81 (10 Wallace 2025 05:00) (56 - 83)  RR: 30 (10 Wallace 2025 05:30) (0 - 139)  SpO2: 94% (10 Wallace 2025 05:48) (93% - 100%)    Parameters below as of 09 Jan 2025 19:00  Patient On (Oxygen Delivery Method): ventilator    O2 Concentration (%): 50    PHYSICAL EXAM  GENERAL:  Intuabted but arousable female in NAD.  HEENT:  Normocephalic and atraumatic  CARDIO:  Regular rate and rhythm.    RESPIRATORY:  Nonlabored breathing, no accessory muscle use. Intubated on 3 PEEP and 35% FiO2  ABDOMEN:  Soft, nondistended, appropriately tender. No rebound or guarding. Incisions c/d/i  SKIN:  No jaundice, pallor, or cyanosis  NEURO:  Intubated but arousable, responds appropriately to prompting    INTAKE & OUTPUT  I&O's Summary    09 Jan 2025 07:01  -  10 Wallace 2025 07:00  --------------------------------------------------------  IN: 2542.6 mL / OUT: 955 mL / NET: 1587.6 mL      I&O's Detail    09 Jan 2025 07:01  -  10 Wallace 2025 07:00  --------------------------------------------------------  IN:    IV PiggyBack: 100 mL    IV PiggyBack: 100 mL    IV PiggyBack: 400 mL    IV PiggyBack: 100 mL    Lactated Ringers: 150 mL    Lactated Ringers Bolus: 500 mL    Norepinephrine: 27.3 mL    Propofol: 40.3 mL    sodium chloride 0.45%: 1125 mL  Total IN: 2542.6 mL    OUT:    Drain (mL): 450 mL    Indwelling Catheter - Urethral (mL): 405 mL    Nasogastric/Oral tube (mL): 100 mL  Total OUT: 955 mL    Total NET: 1587.6 mL          MEDICATIONS  MEDICATIONS  (STANDING):  albumin human 25% IVPB 50 milliLiter(s) IV Intermittent every 8 hours  chlorhexidine 0.12% Liquid 15 milliLiter(s) Oral Mucosa every 12 hours  chlorhexidine 2% Cloths 1 Application(s) Topical <User Schedule>  fluconAZOLE IVPB 200 milliGRAM(s) IV Intermittent every 24 hours  norepinephrine Infusion 0.05 MICROgram(s)/kG/Min (4.87 mL/Hr) IV Continuous <Continuous>  pantoprazole  Injectable 40 milliGRAM(s) IV Push daily  piperacillin/tazobactam IVPB.. 3.375 Gram(s) IV Intermittent every 8 hours  propofol Infusion 10 MICROgram(s)/kG/Min (3.11 mL/Hr) IV Continuous <Continuous>  sodium chloride 0.45%. 1000 milliLiter(s) (75 mL/Hr) IV Continuous <Continuous>    MEDICATIONS  (PRN):  fentaNYL    Injectable 50 MICROGram(s) IV Push every 2 hours PRN Severe Pain (7 - 10)      LABS:                        8.7    13.57 )-----------( 164      ( 10 Wallace 2025 06:05 )             27.5     01-10    138  |  107  |  45[H]  ----------------------------<  90  4.8   |  23  |  2.20[H]    Ca    7.5[L]      10 Wallace 2025 06:05  Phos  4.7     01-10  Mg     1.6     01-10    TPro  4.8[L]  /  Alb  2.7[L]  /  TBili  0.5  /  DBili  x   /  AST  87[H]  /  ALT  41  /  AlkPhos  45  01-10    PT/INR - ( 09 Jan 2025 01:47 )   PT: 12.2 sec;   INR: 1.05 ratio         PTT - ( 09 Jan 2025 01:47 )  PTT:23.3 sec    Urinalysis with Rflx Culture (collected 09 Jan 2025 03:39)        RADIOLOGY & ADDITIONAL STUDIES:

## 2025-01-10 NOTE — PROGRESS NOTE ADULT - SUBJECTIVE AND OBJECTIVE BOX
NEPHROLOGY PROGRESS NOTE    CHIEF COMPLAINT:  ESPERANZA/CKD    HPI:  Requires high flow O2.   Making some urine.  Hyperkalemia resolved.    EXAM:  Vital Signs Last 24 Hrs  T(C): 37.4 (10 Wallace 2025 07:50), Max: 37.6 (2025 20:00)  T(F): 99.4 (10 Wallace 2025 07:50), Max: 99.7 (2025 20:00)  HR: 107 (10 Wallace 2025 09:29) (89 - 125)  BP: 126/61 (10 Wallace 2025 05:00) (83/43 - 148/53)  BP(mean): 81 (10 Wallace 2025 05:00) (56 - 83)  RR: 31 (10 Wallace 2025 09:29) (0 - 139)  SpO2: 92% (10 Wallace 2025 09:29) (92% - 100%)    Parameters below as of 10 Wallace 2025 09:29  Patient On (Oxygen Delivery Method): nasal cannula, high flow  O2 Flow (L/min): 40  O2 Concentration (%): 35  I&O's Summary    2025 07:01  -  10 Wallace 2025 07:00  --------------------------------------------------------  IN: 2620.7 mL / OUT: 1040 mL / NET: 1580.7 mL    10 Wallace 2025 07:01  -  10 Wallace 2025 11:16  --------------------------------------------------------  IN: 151.5 mL / OUT: 170 mL / NET: -18.5 mL      Daily Height in cm: 157.48 (2025 12:18)    Daily Weight in k (10 Wallace 2025 04:30)    Conversant, in no apparent distress  Normal respiratory effort, lungs clear bilaterally  Heart RRR with no murmur, no peripheral edema    LABS                        8.7    13.57 )-----------( 164      ( 10 Wallace 2025 06:05 )             27.5     01-10    138  |  107  |  45[H]  ----------------------------<  90  4.8   |  23  |  2.20[H]    Ca    7.5[L]      10 Wallace 2025 06:05  Phos  4.7     01-10  Mg     1.6     01-10    TPro  4.8[L]  /  Alb  2.7[L]  /  TBili  0.5  /  DBili  x   /  AST  87[H]  /  ALT  41  /  AlkPhos  45  01-10    Radiology:  CXR personally reviewed - most of right lung is not aerated      Impression:  * ESPERANZA, - suspect ischemic ATN in maintenance phase, anuria -> oliguria  * Hyperkalemia resolved  * Perforated gastricl ulcer. S/p lap repair  * Post op acute respiratory failure better  * CKD 3 presumably due to RVD, hypertensive nephrosclerosis. Cr ranging widely 1.1-1.5 per historical data    Recommendations:   Continue IVF while NPO  Monitor I/O and daily BMP

## 2025-01-10 NOTE — PROGRESS NOTE ADULT - ATTENDING COMMENTS
79F with HTN, HLD, CKD, PUD, Lung Cancer s/p RUL lobectomy who presents with a perforated gastric ulcer POD from laparoscopic repair with Srinath Patch and Onur Drain placement.  Pt transferred to V ICU for severe hypercapnic respiratory failure 2/2 aspiration, and course is further complicated by ESPERANZA 2/2 ATN.    Neuro: continue prn pain medication and standing tylenol  Pulm: pt with acute hypercapnic respiratory failure 2/2 aspiration, now improving, s/p extubation, wean oxygen as tolerated  GI: NPO with NGT to LWS; advance diet per surgery recs  /Renal: ESPERANZA 2/2 ATN 2/2 dehydration now improving; trend BMP and UOP.   Heme/DVT PPX: Hb drop likely 2/2 IVF resuscitation, no evidence of bleeding trend CBC  Endo: No active issues  ID: Continue fluconazole and zosyn for perforation. Follow up blood cultures ([facility])  Ethics: Full code, plan d/w  at bedside.

## 2025-01-10 NOTE — PROGRESS NOTE ADULT - SUBJECTIVE AND OBJECTIVE BOX
INTERVAL HPI/OVERNIGHT EVENTS: No acute overnight events occurred.    SUBJECTIVE: Seen and examined pt at bedside. no overnight events. Plan to extubate and remove a line today. Patient started on ofirmev 1g q8 and budenoside BID. S/p ex lap with complications. Patient arousable and aware.    Review of Systems:  Unable to obtain 2/2 intubation    ICU Vital Signs Last 24 Hrs  T(C): 37.7 (10 Wallace 2025 11:58), Max: 37.7 (10 Wallace 2025 11:58)  T(F): 99.9 (10 Wallace 2025 11:58), Max: 99.9 (10 Wallace 2025 11:58)  HR: 106 (10 Wallace 2025 13:00) (89 - 125)  BP: 126/61 (10 Wallace 2025 05:00) (83/43 - 148/53)  BP(mean): 81 (10 Wallace 2025 05:00) (56 - 83)  ABP: 106/48 (10 Wallace 2025 13:00) (51/31 - 151/66)  ABP(mean): 68 (10 Wallace 2025 13:00) (39 - 100)  RR: 23 (10 Wallace 2025 13:00) (0 - 139)  SpO2: 93% (10 Wallace 2025 13:00) (92% - 100%)    O2 Parameters below as of 10 Wallace 2025 13:00  Patient On (Oxygen Delivery Method): nasal cannula, high flow  O2 Flow (L/min): 40  O2 Concentration (%): 35        Mode: AC/ CMV (Assist Control/ Continuous Mandatory Ventilation), RR (machine): 26, TV (machine): 350, FiO2: 35, PEEP: 3  01-09-25 @ 07:01  -  01-10-25 @ 07:00  --------------------------------------------------------  IN: 2620.7 mL / OUT: 1040 mL / NET: 1580.7 mL    01-10-25 @ 07:01  -  01-10-25 @ 13:34  --------------------------------------------------------  IN: 376.5 mL / OUT: 300 mL / NET: 76.5 mL        CAPILLARY BLOOD GLUCOSE      POCT Blood Glucose.: 176 mg/dL (10 Wallace 2025 12:43)      I&O's Summary    09 Jan 2025 07:01  -  10 Wallcae 2025 07:00  --------------------------------------------------------  IN: 2620.7 mL / OUT: 1040 mL / NET: 1580.7 mL    10 Wallace 2025 07:01  -  10 Wallace 2025 13:34  --------------------------------------------------------  IN: 376.5 mL / OUT: 300 mL / NET: 76.5 mL        PHYSICAL EXAM:  General: NAD  Neurology: awake and alert, intubated, arousable  HEENT: NC/AT  Respiratory: CTA b/l,  Cardiovascular: RRR, normal S1S2, no M/R/G  Abdomen: + drain, mildly distended  Extremities: No edema  Skin: warm/dry      Meds:  fluconAZOLE IVPB IV Intermittent  piperacillin/tazobactam IVPB.. IV Intermittent      dextrose 50% Injectable IV Push  dextrose 50% Injectable IV Push  dextrose 50% Injectable IV Push  dextrose Oral Gel Oral  glucagon  Injectable IntraMuscular    albuterol/ipratropium for Nebulization Nebulizer  buDESOnide    Inhalation Suspension Inhalation    acetaminophen   IVPB .. IV Intermittent  fentaNYL    Injectable IV Push PRN      heparin   Injectable SubCutaneous    pantoprazole  Injectable IV Push      albumin human 25% IVPB IV Intermittent  dextrose 5% + sodium chloride 0.45%. IV Continuous  dextrose 5%. IV Continuous  dextrose 5%. IV Continuous      chlorhexidine 0.12% Liquid Oral Mucosa  chlorhexidine 2% Cloths Topical                              8.7    13.57 )-----------( 164      ( 10 Wallace 2025 06:05 )             27.5       01-10    138  |  107  |  45[H]  ----------------------------<  90  4.8   |  23  |  2.20[H]    Ca    7.5[L]      10 Wallace 2025 06:05  Phos  4.7     01-10  Mg     1.6     01-10    TPro  4.8[L]  /  Alb  2.7[L]  /  TBili  0.5  /  DBili  x   /  AST  87[H]  /  ALT  41  /  AlkPhos  45  01-10          PT/INR - ( 09 Jan 2025 01:47 )   PT: 12.2 sec;   INR: 1.05 ratio         PTT - ( 09 Jan 2025 01:47 )  PTT:23.3 sec  Urinalysis Basic - ( 10 Wallace 2025 06:05 )    Color: x / Appearance: x / SG: x / pH: x  Gluc: 90 mg/dL / Ketone: x  / Bili: x / Urobili: x   Blood: x / Protein: x / Nitrite: x   Leuk Esterase: x / RBC: x / WBC x   Sq Epi: x / Non Sq Epi: x / Bacteria: x              DNR DNI     SCD's

## 2025-01-10 NOTE — PROGRESS NOTE ADULT - SUBJECTIVE AND OBJECTIVE BOX
Patient is a 79y old  Female who presents with a chief complaint of transferred from [place] s/p grahm patch repair (10 Wallace 2025 13:34)    PAST MEDICAL & SURGICAL HISTORY:  HLD (hyperlipidemia)      Rheumatoid arthritis      TIA (transient ischemic attack)  2012      Lung cancer  Right lung.      Chronic obstructive pulmonary disease, unspecified COPD type  O2 at night      Hiatal hernia with GERD      Essential hypertension      Childhood asthma      Stage 3 chronic kidney disease      Carotid artery plaque      IBS (irritable bowel syndrome)      Psoriatic arthritis      S/P lobectomy of lung  Right lung in 1996.      H/O colonoscopy      History of endoscopy  Sept 2017      S/P cholecystectomy        GINNY VASQUEZ   79y    Female    BRIEF HOSPITAL COURSE:    Review of Systems:                       All other ROS are negative.    Allergies    penicillin (Other)  minocycline (Other)  Enbrel (Unknown)  IV Contrast (Hives; Rash)  Zofran (Other)  Levaquin (Other)    Intolerances          ICU Vital Signs Last 24 Hrs  T(C): 36.7 (10 Wallace 2025 21:12), Max: 37.8 (10 Wallace 2025 17:13)  T(F): 98 (10 Wallace 2025 21:12), Max: 100.1 (10 Wallace 2025 17:13)  HR: 93 (11 Jan 2025 00:00) (89 - 125)  BP: 126/61 (10 Wallace 2025 05:00) (126/61 - 142/60)  BP(mean): 81 (10 Wallace 2025 05:00) (78 - 83)  ABP: 166/64 (11 Jan 2025 00:00) (106/48 - 166/64)  ABP(mean): 100 (11 Jan 2025 00:00) (66 - 100)  RR: 27 (11 Jan 2025 00:00) (15 - 32)  SpO2: 94% (11 Jan 2025 00:00) (89% - 99%)    O2 Parameters below as of 11 Jan 2025 00:00  Patient On (Oxygen Delivery Method): nasal cannula, high flow  O2 Flow (L/min): 40  O2 Concentration (%): 35      Physical Examination:    General:     HEENT:     PULM:     CVS:     ABD:     EXT:     SKIN:     Neuro:    ABG - ( 10 Wallace 2025 05:36 )  pH, Arterial: 7.28  pH, Blood: x     /  pCO2: 54    /  pO2: 72    / HCO3: 25    / Base Excess: -1.3  /  SaO2: 94.8              Mode: AC/ CMV (Assist Control/ Continuous Mandatory Ventilation)  RR (machine): 26  TV (machine): 350  FiO2: 35  PEEP: 3  ITime: 1  MAP: 19  PIP: 26    Mode: AC/ CMV (Assist Control/ Continuous Mandatory Ventilation), RR (machine): 26, TV (machine): 350, FiO2: 35, PEEP: 3, ITime: 1, MAP: 19, PIP: 26  LABS:                        8.7    13.57 )-----------( 164      ( 10 Wallace 2025 06:05 )             27.5     01-10    138  |  107  |  45[H]  ----------------------------<  90  4.8   |  23  |  2.20[H]    Ca    7.5[L]      10 Wallace 2025 06:05  Phos  4.7     01-10  Mg     1.6     01-10    TPro  4.8[L]  /  Alb  2.7[L]  /  TBili  0.5  /  DBili  x   /  AST  87[H]  /  ALT  41  /  AlkPhos  45  01-10          CAPILLARY BLOOD GLUCOSE      POCT Blood Glucose.: 98 mg/dL (10 Wallace 2025 23:23)  POCT Blood Glucose.: 91 mg/dL (10 Wallace 2025 18:50)  POCT Blood Glucose.: 176 mg/dL (10 Wallace 2025 12:43)  POCT Blood Glucose.: 46 mg/dL (10 Wallace 2025 12:11)  POCT Blood Glucose.: 51 mg/dL (10 Wallace 2025 12:07)  POCT Blood Glucose.: 86 mg/dL (10 Wallace 2025 05:30)    PT/INR - ( 09 Jan 2025 01:47 )   PT: 12.2 sec;   INR: 1.05 ratio         PTT - ( 09 Jan 2025 01:47 )  PTT:23.3 sec  Urinalysis Basic - ( 10 Wallace 2025 06:05 )    Color: x / Appearance: x / SG: x / pH: x  Gluc: 90 mg/dL / Ketone: x  / Bili: x / Urobili: x   Blood: x / Protein: x / Nitrite: x   Leuk Esterase: x / RBC: x / WBC x   Sq Epi: x / Non Sq Epi: x / Bacteria: x      CULTURES:      Medications:  MEDICATIONS  (STANDING):  acetaminophen   IVPB .. 1000 milliGRAM(s) IV Intermittent every 8 hours  albuterol/ipratropium for Nebulization 3 milliLiter(s) Nebulizer every 6 hours  buDESOnide    Inhalation Suspension 0.5 milliGRAM(s) Inhalation two times a day  chlorhexidine 2% Cloths 1 Application(s) Topical <User Schedule>  dextrose 5% + sodium chloride 0.45%. 1000 milliLiter(s) (75 mL/Hr) IV Continuous <Continuous>  dextrose 5%. 1000 milliLiter(s) (100 mL/Hr) IV Continuous <Continuous>  dextrose 5%. 1000 milliLiter(s) (50 mL/Hr) IV Continuous <Continuous>  dextrose 50% Injectable 25 Gram(s) IV Push once  dextrose 50% Injectable 12.5 Gram(s) IV Push once  dextrose 50% Injectable 25 Gram(s) IV Push once  dextrose Oral Gel 15 Gram(s) Oral once  fluconAZOLE IVPB 200 milliGRAM(s) IV Intermittent every 24 hours  glucagon  Injectable 1 milliGRAM(s) IntraMuscular once  heparin   Injectable 5000 Unit(s) SubCutaneous every 8 hours  pantoprazole  Injectable 40 milliGRAM(s) IV Push daily  piperacillin/tazobactam IVPB.. 3.375 Gram(s) IV Intermittent every 8 hours    MEDICATIONS  (PRN):  fentaNYL    Injectable 50 MICROGram(s) IV Push every 2 hours PRN Severe Pain (7 - 10)        01-09 @ 07:01  -  01-10 @ 07:00  --------------------------------------------------------  IN: 2620.7 mL / OUT: 1040 mL / NET: 1580.7 mL    01-10 @ 07:01  -  01-11 @ 01:01  --------------------------------------------------------  IN: 1726.5 mL / OUT: 1545 mL / NET: 181.5 mL        RADIOLOGY/IMAGING/ECHO    Critical care point of care ultrasound:    Assessment/Plan:          CRITICAL CARE TIME SPENT: 37 minutes assessing presenting problems of acute illness, which pose high probability of life threatening deterioration or end organ damage/dysfunction, as well as medical decision making including initiating plan of care, reviewing data, reviewing radiologic exams, discussing with multidisciplinary team,  discussing goals of care with patient/family, and writing this note.  Non-inclusive of procedures performed,         Patient is a 79y old  Female who presents with a chief complaint of transferred from [place] s/p grahm patch repair (10 Wallace 2025 13:34)    PAST MEDICAL & SURGICAL HISTORY:  HLD (hyperlipidemia)      Rheumatoid arthritis      TIA (transient ischemic attack)  2012      Lung cancer  Right lung.      Chronic obstructive pulmonary disease, unspecified COPD type  O2 at night      Hiatal hernia with GERD      Essential hypertension      Childhood asthma      Stage 3 chronic kidney disease      Carotid artery plaque      IBS (irritable bowel syndrome)      Psoriatic arthritis      S/P lobectomy of lung  Right lung in 1996.      H/O colonoscopy      History of endoscopy  Sept 2017      S/P cholecystectomy        GINNY VASQUEZ   79y    Female    BRIEF HOSPITAL COURSE:    Review of Systems:                       All other ROS are negative.    Allergies    penicillin (Other)  minocycline (Other)  Enbrel (Unknown)  IV Contrast (Hives; Rash)  Zofran (Other)  Levaquin (Other)    Intolerances          ICU Vital Signs Last 24 Hrs  T(C): 36.7 (10 Wallace 2025 21:12), Max: 37.8 (10 Wallace 2025 17:13)  T(F): 98 (10 Wallace 2025 21:12), Max: 100.1 (10 Wallace 2025 17:13)  HR: 93 (11 Jan 2025 00:00) (89 - 125)  BP: 126/61 (10 Wallace 2025 05:00) (126/61 - 142/60)  BP(mean): 81 (10 Wallace 2025 05:00) (78 - 83)  ABP: 166/64 (11 Jan 2025 00:00) (106/48 - 166/64)  ABP(mean): 100 (11 Jan 2025 00:00) (66 - 100)  RR: 27 (11 Jan 2025 00:00) (15 - 32)  SpO2: 94% (11 Jan 2025 00:00) (89% - 99%)    O2 Parameters below as of 11 Jan 2025 00:00  Patient On (Oxygen Delivery Method): nasal cannula, high flow  O2 Flow (L/min): 40  O2 Concentration (%): 35      Physical Examination:    General: NAD    HEENT: NCAT    PULM: CTA BL    CVS: S1S2 RRR    ABD: Soft NT ND    EXT: Compartments soft    SKIN: Warm and Dry    Neuro: Intact, no deficits    ABG - ( 10 Wallace 2025 05:36 )  pH, Arterial: 7.28  pH, Blood: x     /  pCO2: 54    /  pO2: 72    / HCO3: 25    / Base Excess: -1.3  /  SaO2: 94.8              Mode: AC/ CMV (Assist Control/ Continuous Mandatory Ventilation)  RR (machine): 26  TV (machine): 350  FiO2: 35  PEEP: 3  ITime: 1  MAP: 19  PIP: 26    Mode: AC/ CMV (Assist Control/ Continuous Mandatory Ventilation), RR (machine): 26, TV (machine): 350, FiO2: 35, PEEP: 3, ITime: 1, MAP: 19, PIP: 26  LABS:                        8.7    13.57 )-----------( 164      ( 10 Wallace 2025 06:05 )             27.5     01-10    138  |  107  |  45[H]  ----------------------------<  90  4.8   |  23  |  2.20[H]    Ca    7.5[L]      10 Wallace 2025 06:05  Phos  4.7     01-10  Mg     1.6     01-10    TPro  4.8[L]  /  Alb  2.7[L]  /  TBili  0.5  /  DBili  x   /  AST  87[H]  /  ALT  41  /  AlkPhos  45  01-10          CAPILLARY BLOOD GLUCOSE      POCT Blood Glucose.: 98 mg/dL (10 Wallace 2025 23:23)  POCT Blood Glucose.: 91 mg/dL (10 Wallace 2025 18:50)  POCT Blood Glucose.: 176 mg/dL (10 Wallace 2025 12:43)  POCT Blood Glucose.: 46 mg/dL (10 Wallace 2025 12:11)  POCT Blood Glucose.: 51 mg/dL (10 Wallace 2025 12:07)  POCT Blood Glucose.: 86 mg/dL (10 Wallace 2025 05:30)    PT/INR - ( 09 Jan 2025 01:47 )   PT: 12.2 sec;   INR: 1.05 ratio         PTT - ( 09 Jan 2025 01:47 )  PTT:23.3 sec  Urinalysis Basic - ( 10 Wallace 2025 06:05 )    Color: x / Appearance: x / SG: x / pH: x  Gluc: 90 mg/dL / Ketone: x  / Bili: x / Urobili: x   Blood: x / Protein: x / Nitrite: x   Leuk Esterase: x / RBC: x / WBC x   Sq Epi: x / Non Sq Epi: x / Bacteria: x      CULTURES:      Medications:  MEDICATIONS  (STANDING):  acetaminophen   IVPB .. 1000 milliGRAM(s) IV Intermittent every 8 hours  albuterol/ipratropium for Nebulization 3 milliLiter(s) Nebulizer every 6 hours  buDESOnide    Inhalation Suspension 0.5 milliGRAM(s) Inhalation two times a day  chlorhexidine 2% Cloths 1 Application(s) Topical <User Schedule>  dextrose 5% + sodium chloride 0.45%. 1000 milliLiter(s) (75 mL/Hr) IV Continuous <Continuous>  dextrose 5%. 1000 milliLiter(s) (100 mL/Hr) IV Continuous <Continuous>  dextrose 5%. 1000 milliLiter(s) (50 mL/Hr) IV Continuous <Continuous>  dextrose 50% Injectable 25 Gram(s) IV Push once  dextrose 50% Injectable 12.5 Gram(s) IV Push once  dextrose 50% Injectable 25 Gram(s) IV Push once  dextrose Oral Gel 15 Gram(s) Oral once  fluconAZOLE IVPB 200 milliGRAM(s) IV Intermittent every 24 hours  glucagon  Injectable 1 milliGRAM(s) IntraMuscular once  heparin   Injectable 5000 Unit(s) SubCutaneous every 8 hours  pantoprazole  Injectable 40 milliGRAM(s) IV Push daily  piperacillin/tazobactam IVPB.. 3.375 Gram(s) IV Intermittent every 8 hours    MEDICATIONS  (PRN):  fentaNYL    Injectable 50 MICROGram(s) IV Push every 2 hours PRN Severe Pain (7 - 10)        01-09 @ 07:01  -  01-10 @ 07:00  --------------------------------------------------------  IN: 2620.7 mL / OUT: 1040 mL / NET: 1580.7 mL    01-10 @ 07:01  -  01-11 @ 01:01  --------------------------------------------------------  IN: 1726.5 mL / OUT: 1545 mL / NET: 181.5 mL        RADIOLOGY/IMAGING/ECHO    Critical care point of care ultrasound:    Assessment/Plan:    SOB  Shock now resolved  Perf duodenal ulcer    Remains on HFNC  Cont nebs for SOB  Chest PT  Levo off  Keep NPO  PPI  Cont zosyn  Cont fluconazole  DVT ppx      CRITICAL CARE TIME SPENT: 37 minutes assessing presenting problems of acute illness, which pose high probability of life threatening deterioration or end organ damage/dysfunction, as well as medical decision making including initiating plan of care, reviewing data, reviewing radiologic exams, discussing with multidisciplinary team,  discussing goals of care with patient/family, and writing this note.  Non-inclusive of procedures performed,

## 2025-01-10 NOTE — PROGRESS NOTE ADULT - SUBJECTIVE AND OBJECTIVE BOX
Date/Time Patient Seen:  		  Referring MD:   Data Reviewed	       Patient is a 79y old  Female who presents with a chief complaint of transferred from [place] s/p grahm patch repair (09 Jan 2025 17:11)      Subjective/HPI     PAST MEDICAL & SURGICAL HISTORY:  Asthma    HLD (hyperlipidemia)    Rheumatoid arthritis    TIA (transient ischemic attack)  2012    Esophageal reflux    Lung cancer  Right lung.    Chronic obstructive pulmonary disease, unspecified COPD type  O2 at night    Hiatal hernia with GERD    Essential hypertension    Hyperlipidemia, unspecified hyperlipidemia type    Calculus of gallbladder with chronic cholecystitis without obstruction    Childhood asthma    Stage 3 chronic kidney disease    Carotid artery plaque    IBS (irritable bowel syndrome)    Psoriatic arthritis    S/P lobectomy of lung  Right lung in 1996.    H/O colonoscopy    History of endoscopy  Sept 2017    S/P cholecystectomy    CAD (coronary artery disease)          Medication list         MEDICATIONS  (STANDING):  albumin human 25% IVPB 50 milliLiter(s) IV Intermittent every 8 hours  chlorhexidine 0.12% Liquid 15 milliLiter(s) Oral Mucosa every 12 hours  chlorhexidine 2% Cloths 1 Application(s) Topical <User Schedule>  fluconAZOLE IVPB 200 milliGRAM(s) IV Intermittent every 24 hours  norepinephrine Infusion 0.05 MICROgram(s)/kG/Min (4.87 mL/Hr) IV Continuous <Continuous>  pantoprazole  Injectable 40 milliGRAM(s) IV Push daily  piperacillin/tazobactam IVPB.. 3.375 Gram(s) IV Intermittent every 8 hours  propofol Infusion 10 MICROgram(s)/kG/Min (3.11 mL/Hr) IV Continuous <Continuous>  sodium chloride 0.45%. 1000 milliLiter(s) (75 mL/Hr) IV Continuous <Continuous>    MEDICATIONS  (PRN):  fentaNYL    Injectable 50 MICROGram(s) IV Push every 2 hours PRN Severe Pain (7 - 10)         Vitals log        ICU Vital Signs Last 24 Hrs  T(C): 37.5 (10 Wallace 2025 04:00), Max: 37.6 (09 Jan 2025 20:00)  T(F): 99.5 (10 Wallace 2025 04:00), Max: 99.7 (09 Jan 2025 20:00)  HR: 93 (10 Wallace 2025 04:30) (80 - 109)  BP: 132/62 (10 Wallace 2025 04:00) (83/43 - 148/53)  BP(mean): 81 (10 Wallace 2025 04:00) (56 - 83)  ABP: 118/47 (10 Wallace 2025 04:30) (51/31 - 151/66)  ABP(mean): 70 (10 Wallace 2025 04:30) (39 - 96)  RR: 27 (10 Wallace 2025 04:30) (0 - 139)  SpO2: 94% (10 Wallace 2025 04:30) (93% - 100%)    O2 Parameters below as of 09 Jan 2025 19:00  Patient On (Oxygen Delivery Method): ventilator    O2 Concentration (%): 50         Mode: AC/ CMV (Assist Control/ Continuous Mandatory Ventilation)  RR (machine): 26  TV (machine): 380  FiO2: 40  PEEP: 3  ITime: 1  MAP: 10  PIP: 28      Input and Output:  I&O's Detail    09 Jan 2025 07:01  -  10 Wallace 2025 05:03  --------------------------------------------------------  IN:    IV PiggyBack: 100 mL    IV PiggyBack: 300 mL    IV PiggyBack: 50 mL    IV PiggyBack: 100 mL    Lactated Ringers: 150 mL    Lactated Ringers Bolus: 500 mL    Norepinephrine: 27.3 mL    Propofol: 37.2 mL    sodium chloride 0.45%: 975 mL  Total IN: 2239.5 mL    OUT:    Drain (mL): 410 mL    Indwelling Catheter - Urethral (mL): 295 mL  Total OUT: 705 mL    Total NET: 1534.5 mL          Lab Data                        11.0   9.97  )-----------( 230      ( 09 Jan 2025 12:40 )             36.3     01-09    139  |  109[H]  |  39[H]  ----------------------------<  124[H]  5.1   |  24  |  2.00[H]    Ca    7.5[L]      09 Jan 2025 19:20  Phos  5.1     01-09  Mg     1.8     01-09    TPro  5.1[L]  /  Alb  2.7[L]  /  TBili  0.9  /  DBili  x   /  AST  89[H]  /  ALT  46  /  AlkPhos  54  01-09    ABG - ( 09 Jan 2025 16:30 )  pH, Arterial: 7.27  pH, Blood: x     /  pCO2: 50    /  pO2: 125   / HCO3: 23    / Base Excess: -3.9  /  SaO2: 97.5                    Review of Systems	      Objective     Physical Examination    heart s1s2  lung dc BS  head nc  head at      Pertinent Lab findings & Imaging      Dante:  NO   Adequate UO     I&O's Detail    09 Jan 2025 07:01  -  10 Wallace 2025 05:03  --------------------------------------------------------  IN:    IV PiggyBack: 100 mL    IV PiggyBack: 300 mL    IV PiggyBack: 50 mL    IV PiggyBack: 100 mL    Lactated Ringers: 150 mL    Lactated Ringers Bolus: 500 mL    Norepinephrine: 27.3 mL    Propofol: 37.2 mL    sodium chloride 0.45%: 975 mL  Total IN: 2239.5 mL    OUT:    Drain (mL): 410 mL    Indwelling Catheter - Urethral (mL): 295 mL  Total OUT: 705 mL    Total NET: 1534.5 mL               Discussed with:     Cultures:	        Radiology

## 2025-01-10 NOTE — PROGRESS NOTE ADULT - ASSESSMENT
A/P: 78 y/o WF with multiple comorbidities admitted to [facility] with abdominal pain, CT findings of pneumoperitoneum, likely perforated PUD. Now POD0 s/p Laparoscopic natan patch repair of gastric perforation at [facility]. Course complicated by respiratory distress 2/2 aspiration with significant acidosis to 7.0 requiring intubation. Patient deemed to need higher level of care, prompting transfer to [facility] for further management. Today she is significantly more arousable, responds appropriately to prompting. Her FiO2 has decreased from 100% to 35%. Recovering well.    Plan:  -NGT in place to LIWS  -NPO with IVF  -LISA  -ICU management of acidosis and ventilator needs  -Strict Is+Os  -GI/DVT prophylaxis  -Antibiotics  -medical management per medicine team  -AM labs A/P: 80 y/o WF with multiple comorbidities admitted to [place] with abdominal pain, CT findings of pneumoperitoneum, likely perforated PUD. Now POD0 s/p Laparoscopic natan patch repair of gastric perforation at [facility]. Course complicated by respiratory distress 2/2 possible aspiration with significant acidosis to 7.0 requiring intubation. Patient deemed to need higher level of care, prompting transfer to [facility] for further management. Today she is significantly more arousable, responds appropriately to prompting. Her FiO2 has decreased from 100% to 35%. Recovering well.    Plan:  -NGT in place to LIWS  -NPO with IVF  -LISA  -ICU management of acidosis and ventilator needs  -Strict Is+Os  -GI/DVT prophylaxis  -Antibiotics  -medical management per medicine team  -AM labs A/P: 78 y/o WF with multiple comorbidities admitted to [place] with abdominal pain, CT findings of pneumoperitoneum, likely perforated PUD. Now POD0 s/p Laparoscopic natan patch repair of gastric perforation at [facility]. Course complicated by respiratory distress 2/2 possible aspiration with significant acidosis to 7.0 requiring intubation. Patient deemed to need higher level of care, prompting transfer to [facility] for further management. Today she is significantly more arousable, responds appropriately to prompting. Her FiO2 has decreased from 100% to 35%. Recovering well.    Plan:  -NGT in place to LIWS  -NPO with IVF  -LISA  -ICU management of acidosis and ventilator needs  -Strict Is+Os  -GI/DVT prophylaxis  -Antibiotics  -medical management per medicine team  -AM labs  Surg. ATT.  Pt. Seen and examined. Markedly improved  We will observe NGT output and remove once output is around 100. Once NGT remove will start on liquid diet.

## 2025-01-10 NOTE — PROGRESS NOTE ADULT - ASSESSMENT
78 y/o F with PMHx of HTN, HLD, CKD, PUD, and lung CA s/p pneumonectomy in 1996 admitted with:    Acute hypoxic/hypercapnic respiratory failure  Aspiration PNA  Perforated gastric ulcer  Pneumoperitoneum  EPSERANZA    Plan:  NEURO: not on prop, continue ofirmev 1g q8 for pain    CV: Maintain MAP > 65.    PULM: intubated, Actively titrating ventilator settings to maintain SpO2 > 92% and adequate minute ventilation.  - Low tidal volume ventilation in the setting of right pneumonectomy hx.   - Extremely hypercapnic on ABG, tidal volume and respiratory rate were increased. Repeat ABG pH significantly improved, retaining much less pco02  - Improved R lung aeration on CXR    GI: NPO.  - NGT to LIWS.  - GI prophylaxis with protonix.    RENAL:  - Monitor renal function.  - 1 L bolus plus maintenance IVF on 75 cc/hr.  - Repeat BMP at 18:00.  - Howell in place for strict output monitoring.    ID:  - Abx coverage with zosyn plus antifungal coverage with diflucan.  - Follow up culture data. NGTD    Heme:  - Accu-Cheks q6 hrs while NPO.    HEME:  - Mechanical DVT prophylaxis with SCDs.    Case discussed with ICU attending, Dr. Kaba. 78 y/o F with PMHx of HTN, HLD, CKD, PUD, and lung CA s/p pneumonectomy in 1996 admitted with:    Acute hypoxic/hypercapnic respiratory failure  Aspiration PNA  Perforated gastric ulcer  Pneumoperitoneum  ESPERANZA    Plan:  NEURO: not on prop, continue ofirmev 1g q8 for pain    CV: Maintain MAP > 65.    PULM: intubated, Actively titrating ventilator settings to maintain SpO2 > 92% and adequate minute ventilation.  - Low tidal volume ventilation in the setting of right pneumonectomy hx.   - Extremely hypercapnic on ABG, tidal volume and respiratory rate were increased. Repeat ABG pH significantly improved, retaining much less pco02  - Improved R lung aeration on CXR    GI: NPO.  - NGT to LIWS.  - GI prophylaxis with protonix.    RENAL:  - Monitor renal function.  - 1 L bolus plus maintenance IVF on 75 cc/hr.  - Repeat BMP at 18:00.  - Howell in place for strict output monitoring.    ID:  - Abx coverage with zosyn plus antifungal coverage with diflucan.  - Follow up culture data. NGTD    Endo:  - Accu-Cheks q6 hrs while NPO. patient had hypoglycemic event in PM and given d5    HEME:  - Mechanical DVT prophylaxis with SCDs.    Case discussed with ICU attending, Dr. Kaba.

## 2025-01-11 NOTE — PROGRESS NOTE ADULT - SUBJECTIVE AND OBJECTIVE BOX
Subjective: awake, alert. Somewhat SOB, on supplemental O2. Good UO. Cr stabilizing      MEDICATIONS  (STANDING):  acetaminophen   IVPB .. 1000 milliGRAM(s) IV Intermittent every 8 hours  albuterol/ipratropium for Nebulization 3 milliLiter(s) Nebulizer every 4 hours  buDESOnide    Inhalation Suspension 0.5 milliGRAM(s) Inhalation two times a day  chlorhexidine 2% Cloths 1 Application(s) Topical <User Schedule>  dextrose 5%. 1000 milliLiter(s) (50 mL/Hr) IV Continuous <Continuous>  dextrose 5%. 1000 milliLiter(s) (30 mL/Hr) IV Continuous <Continuous>  dextrose 5%. 1000 milliLiter(s) (100 mL/Hr) IV Continuous <Continuous>  dextrose 50% Injectable 25 Gram(s) IV Push once  dextrose 50% Injectable 12.5 Gram(s) IV Push once  dextrose 50% Injectable 25 Gram(s) IV Push once  dextrose Oral Gel 15 Gram(s) Oral once  fluconAZOLE IVPB 200 milliGRAM(s) IV Intermittent every 24 hours  glucagon  Injectable 1 milliGRAM(s) IntraMuscular once  heparin   Injectable 5000 Unit(s) SubCutaneous every 8 hours  pantoprazole  Injectable 40 milliGRAM(s) IV Push daily  piperacillin/tazobactam IVPB.. 3.375 Gram(s) IV Intermittent every 8 hours    MEDICATIONS  (PRN):  HYDROmorphone  Injectable 0.5 milliGRAM(s) IV Push every 4 hours PRN Severe Pain (7 - 10)          T(C): 36.7 (01-11-25 @ 08:00), Max: 37.8 (01-10-25 @ 17:13)  HR: 115 (01-11-25 @ 09:00) (89 - 115)  BP: --  RR: 29 (01-11-25 @ 09:00) (17 - 32)  SpO2: 93% (01-11-25 @ 09:00) (89% - 95%)  Wt(kg): --    ABG - ( 10 Wallace 2025 05:36 )  pH, Arterial: 7.28  pH, Blood: x     /  pCO2: 54    /  pO2: 72    / HCO3: 25    / Base Excess: -1.3  /  SaO2: 94.8                I&O's Detail    10 Wallace 2025 07:01  -  11 Jan 2025 07:00  --------------------------------------------------------  IN:    dextrose 5% + sodium chloride 0.45%: 1350 mL    IV PiggyBack: 150 mL    IV PiggyBack: 200 mL    IV PiggyBack: 275 mL    Propofol: 1.5 mL    sodium chloride 0.45%: 375 mL  Total IN: 2351.5 mL    OUT:    Drain (mL): 125 mL    Indwelling Catheter - Urethral (mL): 2995 mL    Nasogastric/Oral tube (mL): 800 mL    Norepinephrine: 0 mL  Total OUT: 3920 mL    Total NET: -1568.5 mL      11 Jan 2025 07:01  -  11 Jan 2025 09:57  --------------------------------------------------------  IN:    dextrose 5% + sodium chloride 0.45%: 150 mL    IV PiggyBack: 100 mL  Total IN: 250 mL    OUT:    Indwelling Catheter - Urethral (mL): 325 mL  Total OUT: 325 mL    Total NET: -75 mL               PHYSICAL EXAM:    GENERAL:   NECK: Supple, no inc in JVP  CHEST/LUNG: rhonchi  HEART: S1S2  ABDOMEN: post lap Sx, R abd drain  EXTREMITIES:  no edema.   NEURO: no asterixis      LABS:  CBC Full  -  ( 11 Jan 2025 06:25 )  WBC Count : 23.25 K/uL  RBC Count : 3.05 M/uL  Hemoglobin : 9.2 g/dL  Hematocrit : 28.3 %  Platelet Count - Automated : 170 K/uL  Mean Cell Volume : 92.8 fl  Mean Cell Hemoglobin : 30.2 pg  Mean Cell Hemoglobin Concentration : 32.5 g/dL  Auto Neutrophil # : 21.62 K/uL  Auto Lymphocyte # : 0.47 K/uL  Auto Monocyte # : 0.93 K/uL  Auto Eosinophil # : 0.23 K/uL  Auto Basophil # : 0.00 K/uL  Auto Neutrophil % : 78.0 %  Auto Lymphocyte % : 2.0 %  Auto Monocyte % : 4.0 %  Auto Eosinophil % : 1.0 %  Auto Basophil % : 0.0 %    01-11    141  |  106  |  38[H]  ----------------------------<  97  3.8   |  29  |  2.10[H]    Ca    8.3[L]      11 Jan 2025 06:25  Phos  3.3     01-11  Mg     1.7     01-11    TPro  5.3[L]  /  Alb  2.7[L]  /  TBili  0.7  /  DBili  x   /  AST  74[H]  /  ALT  39  /  AlkPhos  77  01-11          Impression:  * ESPERANZA, - suspect ischemic ATN in maintenance phase, anuria -> oliguria  * Hyperkalemia resolved  * Perforated gastricl ulcer. S/p lap repair  * Post op acute respiratory failure better  * CKD 3 presumably due to RVD, hypertensive nephrosclerosis. Cr ranging widely 1.1-1.5 per historical data    Recommendations:   Lower hypotonic IVFs to 30-50cc/h  Monitor I/O and daily BMP

## 2025-01-11 NOTE — PROGRESS NOTE ADULT - SUBJECTIVE AND OBJECTIVE BOX
Date/Time Patient Seen:  		  Referring MD:   Data Reviewed	       Patient is a 79y old  Female who presents with a chief complaint of transferred from [place] s/p grahm patch repair (11 Jan 2025 09:57)      Subjective/HPI     PAST MEDICAL & SURGICAL HISTORY:  Asthma    HLD (hyperlipidemia)    Rheumatoid arthritis    TIA (transient ischemic attack)  2012    Esophageal reflux    Lung cancer  Right lung.    Chronic obstructive pulmonary disease, unspecified COPD type  O2 at night    Hiatal hernia with GERD    Essential hypertension    Hyperlipidemia, unspecified hyperlipidemia type    Calculus of gallbladder with chronic cholecystitis without obstruction    Childhood asthma    Stage 3 chronic kidney disease    Carotid artery plaque    IBS (irritable bowel syndrome)    Psoriatic arthritis    S/P lobectomy of lung  Right lung in 1996.    H/O colonoscopy    History of endoscopy  Sept 2017    S/P cholecystectomy    CAD (coronary artery disease)          Medication list         MEDICATIONS  (STANDING):  acetaminophen   IVPB .. 1000 milliGRAM(s) IV Intermittent every 8 hours  albuterol/ipratropium for Nebulization 3 milliLiter(s) Nebulizer every 4 hours  buDESOnide    Inhalation Suspension 0.5 milliGRAM(s) Inhalation two times a day  chlorhexidine 2% Cloths 1 Application(s) Topical <User Schedule>  dextrose 5%. 1000 milliLiter(s) (50 mL/Hr) IV Continuous <Continuous>  dextrose 5%. 1000 milliLiter(s) (30 mL/Hr) IV Continuous <Continuous>  dextrose 5%. 1000 milliLiter(s) (100 mL/Hr) IV Continuous <Continuous>  dextrose 50% Injectable 25 Gram(s) IV Push once  dextrose 50% Injectable 12.5 Gram(s) IV Push once  dextrose 50% Injectable 25 Gram(s) IV Push once  dextrose Oral Gel 15 Gram(s) Oral once  fluconAZOLE IVPB 200 milliGRAM(s) IV Intermittent every 24 hours  glucagon  Injectable 1 milliGRAM(s) IntraMuscular once  heparin   Injectable 5000 Unit(s) SubCutaneous every 8 hours  pantoprazole  Injectable 40 milliGRAM(s) IV Push daily  piperacillin/tazobactam IVPB.. 3.375 Gram(s) IV Intermittent every 8 hours    MEDICATIONS  (PRN):  HYDROmorphone  Injectable 0.5 milliGRAM(s) IV Push every 4 hours PRN Severe Pain (7 - 10)         Vitals log        ICU Vital Signs Last 24 Hrs  T(C): 37.4 (11 Jan 2025 11:44), Max: 37.8 (10 Wallace 2025 17:13)  T(F): 99.4 (11 Jan 2025 11:44), Max: 100.1 (10 Wallace 2025 17:13)  HR: 97 (11 Jan 2025 11:37) (89 - 115)  BP: --  BP(mean): --  ABP: 132/55 (11 Jan 2025 11:00) (106/48 - 176/68)  ABP(mean): 81 (11 Jan 2025 11:00) (68 - 104)  RR: 26 (11 Jan 2025 11:00) (17 - 32)  SpO2: 90% (11 Jan 2025 11:37) (89% - 95%)    O2 Parameters below as of 11 Jan 2025 11:37  Patient On (Oxygen Delivery Method): nasal cannula, high flow                 Input and Output:  I&O's Detail    10 Wallace 2025 07:01  -  11 Jan 2025 07:00  --------------------------------------------------------  IN:    dextrose 5% + sodium chloride 0.45%: 1350 mL    IV PiggyBack: 150 mL    IV PiggyBack: 200 mL    IV PiggyBack: 275 mL    Propofol: 1.5 mL    sodium chloride 0.45%: 375 mL  Total IN: 2351.5 mL    OUT:    Drain (mL): 125 mL    Indwelling Catheter - Urethral (mL): 2995 mL    Nasogastric/Oral tube (mL): 800 mL    Norepinephrine: 0 mL  Total OUT: 3920 mL    Total NET: -1568.5 mL      11 Jan 2025 07:01  -  11 Jan 2025 11:52  --------------------------------------------------------  IN:    dextrose 5% + sodium chloride 0.45%: 150 mL    IV PiggyBack: 100 mL  Total IN: 250 mL    OUT:    Indwelling Catheter - Urethral (mL): 325 mL  Total OUT: 325 mL    Total NET: -75 mL          Lab Data                        9.2    23.25 )-----------( 170      ( 11 Jan 2025 06:25 )             28.3     01-11    141  |  106  |  38[H]  ----------------------------<  97  3.8   |  29  |  2.10[H]    Ca    8.3[L]      11 Jan 2025 06:25  Phos  3.3     01-11  Mg     1.7     01-11    TPro  5.3[L]  /  Alb  2.7[L]  /  TBili  0.7  /  DBili  x   /  AST  74[H]  /  ALT  39  /  AlkPhos  77  01-11    ABG - ( 10 Wallace 2025 05:36 )  pH, Arterial: 7.28  pH, Blood: x     /  pCO2: 54    /  pO2: 72    / HCO3: 25    / Base Excess: -1.3  /  SaO2: 94.8                    Review of Systems	      Objective     Physical Examination    heart s1s2  lung dc BS  head nc  head at      Pertinent Lab findings & Imaging      Dante:  NO   Adequate UO     I&O's Detail    10 Wallace 2025 07:01  -  11 Jan 2025 07:00  --------------------------------------------------------  IN:    dextrose 5% + sodium chloride 0.45%: 1350 mL    IV PiggyBack: 150 mL    IV PiggyBack: 200 mL    IV PiggyBack: 275 mL    Propofol: 1.5 mL    sodium chloride 0.45%: 375 mL  Total IN: 2351.5 mL    OUT:    Drain (mL): 125 mL    Indwelling Catheter - Urethral (mL): 2995 mL    Nasogastric/Oral tube (mL): 800 mL    Norepinephrine: 0 mL  Total OUT: 3920 mL    Total NET: -1568.5 mL      11 Jan 2025 07:01  -  11 Jan 2025 11:52  --------------------------------------------------------  IN:    dextrose 5% + sodium chloride 0.45%: 150 mL    IV PiggyBack: 100 mL  Total IN: 250 mL    OUT:    Indwelling Catheter - Urethral (mL): 325 mL  Total OUT: 325 mL    Total NET: -75 mL               Discussed with:     Cultures:	        Radiology

## 2025-01-11 NOTE — CHART NOTE - NSCHARTNOTEFT_GEN_A_CORE
Pt w/o need for arterial line, okay to d/c. Obtaining adequate BPs via cuff pressures. Arterial line removed from R radial artery, no sutures in place. Pressure held until adequate hemostasis achieved, site soft, no evidence of hematoma. Dressing placed over site of R radial artery. Entire catheter including tip intact.

## 2025-01-11 NOTE — PROGRESS NOTE ADULT - SUBJECTIVE AND OBJECTIVE BOX
Date of entry of this note is equal to date of services rendered    BRIEF HOSPITAL COURSE: 79F with HTN, HLD, CKD, PUD, Lung Cancer s/p RUL lobectomy who presents with a perforated gastric ulcer POD 2 from laparoscopic repair with Srinath Patch and Onur Drain placement. Pt transferred from  to [facility] ICU for severe hypercapnic respiratory failure 2ry aspiration requiring intubation, now extubated with hospital course  further complicated by ESPERANZA d/t ATN.    INTERVAL EVENTS:  -NGT to LWIS with bilious drainage, scant serosanguinous output in onur drain  -on D5 @50 for hypoglycemia while NPO  -HFNC 40L/50% overnight, some increased WOB will give 40mg IV Lasix now to offset IVFs  -ICU delirium pulling at NGT, HFNC therefore will start Precedex at low dose to facilitate treatment      REVIEW OF SYSTEMS:     [X] A ten-point review of systems was otherwise negative except as noted.  [ ] Due to altered mental status/intubation, subjective information were not able to be obtained from the patient. History was obtained, to the extent possible, from review of the chart and collateral sources of information.    PAST MEDICAL & SURGICAL HISTORY:  HLD (hyperlipidemia)      Rheumatoid arthritis      TIA (transient ischemic attack)  2012      Lung cancer  Right lung.      Chronic obstructive pulmonary disease, unspecified COPD type  O2 at night      Hiatal hernia with GERD      Essential hypertension      Childhood asthma      Stage 3 chronic kidney disease      Carotid artery plaque      IBS (irritable bowel syndrome)      Psoriatic arthritis      S/P lobectomy of lung  Right lung in 1996.      H/O colonoscopy      History of endoscopy  Sept 2017      S/P cholecystectomy        FAMILY HISTORY:  FH: type 2 diabetes (Mother)    FH: CAD (coronary artery disease) (Father, Sibling)    FH: colon cancer (Sibling)    FH: myocardial infarction (Father)    FH: stroke (Sibling)    FH: lung cancer (Sibling)        Vitals   ICU Vital Signs Last 24 Hrs  T(C): 37.2 (12 Jan 2025 00:01), Max: 37.5 (11 Jan 2025 16:51)  T(F): 99 (12 Jan 2025 00:01), Max: 99.5 (11 Jan 2025 16:51)  HR: 102 (12 Jan 2025 02:30) (83 - 141)  BP: 149/62 (12 Jan 2025 02:30) (110/52 - 158/64)  BP(mean): 76 (12 Jan 2025 02:30) (64 - 111)  ABP: 146/54 (11 Jan 2025 15:00) (124/51 - 176/68)  ABP(mean): 83 (11 Jan 2025 15:00) (76 - 104)  RR: 29 (12 Jan 2025 02:30) (16 - 34)  SpO2: 98% (12 Jan 2025 02:30) (90% - 100%)    O2 Parameters below as of 12 Jan 2025 00:02  Patient On (Oxygen Delivery Method): nasal cannula, high flow        PHYSICAL EXAM:  General: thin elderly female  HEENT: Pupils equal, reactive to light.  Symmetric.  PULM: coarse breath sounds b/l  CVS: Regular rate and rhythm  ABD: Soft, nondistended, nontender  EXT: No edema, nontender, warm  NEURO: Alert, oriented. Sensation intact. No focal deficits.        ABG - ( 10 Wallace 2025 05:36 )  pH, Arterial: 7.28  pH, Blood: x     /  pCO2: 54    /  pO2: 72    / HCO3: 25    / Base Excess: -1.3  /  SaO2: 94.8                I&O's Detail    10 Wallace 2025 07:01  -  11 Jan 2025 07:00  --------------------------------------------------------  IN:    dextrose 5% + sodium chloride 0.45%: 1350 mL    IV PiggyBack: 150 mL    IV PiggyBack: 200 mL    IV PiggyBack: 275 mL    Propofol: 1.5 mL    sodium chloride 0.45%: 375 mL  Total IN: 2351.5 mL    OUT:    Drain (mL): 125 mL    Indwelling Catheter - Urethral (mL): 2995 mL    Nasogastric/Oral tube (mL): 800 mL    Norepinephrine: 0 mL  Total OUT: 3920 mL    Total NET: -1568.5 mL      11 Jan 2025 07:01  -  12 Jan 2025 03:10  --------------------------------------------------------  IN:    Dexmedetomidine: 3.2 mL    dextrose 5%: 750 mL    dextrose 5% + sodium chloride 0.45%: 150 mL    IV PiggyBack: 100 mL    IV PiggyBack: 200 mL    IV PiggyBack: 300 mL  Total IN: 1503.2 mL    OUT:    Drain (mL): 80 mL    Incontinent per Collection Bag (mL): 300 mL    Indwelling Catheter - Urethral (mL): 575 mL    Nasogastric/Oral tube (mL): 250 mL  Total OUT: 1205 mL    Total NET: 298.2 mL          LABS                        9.2    23.25 )-----------( 170      ( 11 Jan 2025 06:25 )             28.3     01-11    141  |  106  |  38[H]  ----------------------------<  97  3.8   |  29  |  2.10[H]    Ca    8.3[L]      11 Jan 2025 06:25  Phos  3.3     01-11  Mg     1.7     01-11    TPro  5.3[L]  /  Alb  2.7[L]  /  TBili  0.7  /  DBili  x   /  AST  74[H]  /  ALT  39  /  AlkPhos  77  01-11   LIVER FUNCTIONS - ( 11 Jan 2025 06:25 )  Alb: 2.7 g/dL / Pro: 5.3 g/dL / ALK PHOS: 77 U/L / ALT: 39 U/L / AST: 74 U/L / GGT: x                 Urinalysis Basic - ( 11 Jan 2025 06:25 )    Color: x / Appearance: x / SG: x / pH: x  Gluc: 97 mg/dL / Ketone: x  / Bili: x / Urobili: x   Blood: x / Protein: x / Nitrite: x   Leuk Esterase: x / RBC: x / WBC x   Sq Epi: x / Non Sq Epi: x / Bacteria: x      POCT Blood Glucose.: 233 mg/dL *H* (01-11-25 @ 21:26)  POCT Blood Glucose.: 73 mg/dL (01-11-25 @ 20:53)  POCT Blood Glucose.: 81 mg/dL (01-11-25 @ 17:52)  POCT Blood Glucose.: 81 mg/dL (01-11-25 @ 12:02)  POCT Blood Glucose.: 85 mg/dL (01-11-25 @ 06:52)          -NGT to LWIS with bilious drainage, scant serosanguinous output in onur drain  -on D5 @50 for hypoglycemia while NPO

## 2025-01-11 NOTE — PROGRESS NOTE ADULT - NS ATTEND AMEND GEN_ALL_CORE FT
S/P Lap repair of perforated gastric ulcer with Srinath Patch.  POD 3.  Remains in ICU for worsening pulmonary function. (hx of Right lobectomy for lung CA), multiple pulmonary infiltrates and effusions.  NGT remains to LWIS, light bilious drainage.  Intraperitoneal drain to bulb suction with scant serosanguinous fluid, non-bilious.  WBC up form 17 to 23, suspect this is respiratory in nature.   Pt requiring minimal high flow oxygen treatment, managed by Pulm and ICU team.  Continue IV antibiotics.  Pulmonary toilets, incentive spirometry.  Will defer to ICU and Pulm regarding need for bronchoscopy.    Continue NGT to allow Srinath patch to seal.  May attempt clamp trial as early as Monday.  Continue PPI's  Discussed with patient, her  and ICU attending.

## 2025-01-11 NOTE — PROGRESS NOTE ADULT - ASSESSMENT
78 y/o F with PMHx of HTN, HLD, CKD, PUD, and lung CA s/p pneumonectomy in 1996 admitted with:    Acute hypoxic/hypercapnic respiratory failure  Aspiration PNA  Perforated gastric ulcer  Pneumoperitoneum  ESPERANZA    Plan:  NEURO: not on prop, continue ofirmev 1g q8 for pain    CV: Maintain MAP > 65.    PULM: intubated, Actively titrating ventilator settings to maintain SpO2 > 92% and adequate minute ventilation.  - Low tidal volume ventilation in the setting of right pneumonectomy hx.   - Extremely hypercapnic on ABG, tidal volume and respiratory rate were increased. Repeat ABG pH significantly improved, retaining much less pco02  - Improved R lung aeration on CXR  - On HFNC 40/35  - Started duonebs    GI: NPO.  - NGT to LIWS.  - GI prophylaxis with protonix.    RENAL:  - Monitor renal function.  - Decrease fluid rate  - Arcos in place for strict output monitoring. discontinue arcos    ID:  - Abx coverage with zosyn plus antifungal coverage with diflucan.  - Follow up culture data. NGTD  - Wbc increased from 13 to 23k    Endo:  - Accu-Cheks q6 hrs while NPO. patient had hypoglycemic event in PM and given dextrose    HEME:  - Mechanical DVT prophylaxis with SCDs.    Case discussed with ICU attending, Dr. Kaba. 80 y/o F with PMHx of HTN, HLD, CKD, PUD, and lung CA s/p pneumonectomy in 1996 admitted with:    Acute hypoxic/hypercapnic respiratory failure  Aspiration PNA  Perforated gastric ulcer  Pneumoperitoneum  ESPERANZA    Plan:  NEURO: not on prop, starting dilaudid for pain, dc tylenol.     CV: Maintain MAP > 65.    PULM: intubated, Actively titrating ventilator settings to maintain SpO2 > 92% and adequate minute ventilation.  - Low tidal volume ventilation in the setting of right pneumonectomy hx.   - Extremely hypercapnic on ABG, tidal volume and respiratory rate were increased. Repeat ABG pH significantly improved, retaining much less pco02  - Improved R lung aeration on CXR  - On HFNC 40/35  - Started duonebs    GI: NPO.  - NGT to LIWS.  - GI prophylaxis with protonix.    RENAL:  - Monitor renal function.  - Decrease fluid rate  - Arcos in place for strict output monitoring. discontinue arcos    ID:  - Abx coverage with zosyn plus antifungal coverage with diflucan.  - Follow up culture data. NGTD  - Wbc increased from 13 to 23k    Endo:  - Accu-Cheks q6 hrs while NPO. patient had hypoglycemic event in PM and given dextrose    HEME:  - Mechanical DVT prophylaxis with SCDs.    Case discussed with ICU attending, Dr. Kaba.

## 2025-01-11 NOTE — PROGRESS NOTE ADULT - ASSESSMENT
79F with HTN, HLD, CKD, PUD, Lung Cancer s/p RUL lobectomy who presents with a perforated gastric ulcer POD 2 from laparoscopic repair with Srinath Patch and Onur Drain placement. Pt transferred from  to [facility] ICU for severe hypercapnic respiratory failure 2ry aspiration requiring intubation, now extubated with hospital course further complicated by ESPERANZA d/t ATN. Admitted to the ICU with:     Perforated gastric ulcer POD#2 lap repair with Srinath patch and onur drain  Acute hypoxic/hypercapnic respiratory failure  Aspiration PNA  Pneumoperitoneum  ESPERANZA    NEURO: Delirious overnight, starting low dose precedex infusion to facilitate treatment compliance  CV: Maintain MAP>65, monitoring end points of perfusion  PULM: Increased WOB with rhonchi will give neb treatment, HFNC increased 40L/50%, and 40mg of Lasix IV for concern of fluid overload. Pt had severe hypercapnic resp failure on presentation now improved, though with inc WOB will add VBG to AM labs. Continue inhaled steroids and duonebs for secretions. Incentive spirometer.  GI: NPO with NGT to LIWS, diet to be advanced as per surgery. POD2 lap repair with srinath patch with onur drain placement.   RENAL: ESPERANZA likely in the setting of ATN/dehydration, getting IVFs with D5 @50 for hypoglycemia with increased secretions/WOB therefore diuresed 40mg lasix overnight. Strict I/Os.  ENDO: Maintain euglycemia 120-180  ID: On zosyn and fluconazole for gastric perforation  HEME:    DISPO: Pt critically ill requiring ICU level of care.     CRITICAL CARE TIME SPENT: 42 minutes of critical care time spent providing medical care for patient's acute illness/conditions that impairs at least one vital organ system and/or poses a high risk of imminent or life threatening deterioration in the patient's condition. It includes time spent evaluating and treating the patient's acute illness as well as time spent reviewing labs, radiology, discussing goals of care with patient and/or patient's family, and discussing the case with a multidisciplinary team, in an effort to prevent further life threatening deterioration or end organ damage. This time is independent of any procedures performed.

## 2025-01-11 NOTE — PROGRESS NOTE ADULT - SUBJECTIVE AND OBJECTIVE BOX
POD# s/p     S: Patient seen and examined at bedside.  No overnight events.  Patient reports no new complaints at this time.  Admits to flatus and BM.  Voiding, ambulating and tolerating diet. Patient denies any fever, chills, chest pain, shortness of breath, nausea, vomiting, or urinary complaints.    MEDICATIONS:  MEDICATIONS  (STANDING):  acetaminophen   IVPB .. 1000 milliGRAM(s) IV Intermittent every 8 hours  albuterol/ipratropium for Nebulization 3 milliLiter(s) Nebulizer every 4 hours  albuterol/ipratropium for Nebulization 3 milliLiter(s) Nebulizer every 6 hours  buDESOnide    Inhalation Suspension 0.5 milliGRAM(s) Inhalation two times a day  chlorhexidine 2% Cloths 1 Application(s) Topical <User Schedule>  dextrose 5%. 1000 milliLiter(s) (50 mL/Hr) IV Continuous <Continuous>  dextrose 5%. 1000 milliLiter(s) (100 mL/Hr) IV Continuous <Continuous>  dextrose 50% Injectable 25 Gram(s) IV Push once  dextrose 50% Injectable 12.5 Gram(s) IV Push once  dextrose 50% Injectable 25 Gram(s) IV Push once  dextrose Oral Gel 15 Gram(s) Oral once  fluconAZOLE IVPB 200 milliGRAM(s) IV Intermittent every 24 hours  glucagon  Injectable 1 milliGRAM(s) IntraMuscular once  heparin   Injectable 5000 Unit(s) SubCutaneous every 8 hours  pantoprazole  Injectable 40 milliGRAM(s) IV Push daily  piperacillin/tazobactam IVPB.. 3.375 Gram(s) IV Intermittent every 8 hours    MEDICATIONS  (PRN):  HYDROmorphone  Injectable 0.5 milliGRAM(s) IV Push every 4 hours PRN Severe Pain (7 - 10)      O:  VITAL SIGNS:  Vital Signs Last 24 Hrs  T(C): 36.7 (11 Jan 2025 08:00), Max: 37.8 (10 Wallace 2025 17:13)  T(F): 98.1 (11 Jan 2025 08:00), Max: 100.1 (10 Wallace 2025 17:13)  HR: 115 (11 Jan 2025 09:00) (89 - 115)  BP: --  BP(mean): --  RR: 29 (11 Jan 2025 09:00) (17 - 32)  SpO2: 93% (11 Jan 2025 09:00) (89% - 95%)    Parameters below as of 11 Jan 2025 08:10  Patient On (Oxygen Delivery Method): nasal cannula, high flow        PHYSICAL EXAM:  GENERAL: No acute distress, lying comfortably in bed  HEAD:  Atraumatic, Normocephalic  CHEST/LUNG: Non labored respirations, no accessory muscle use. CTAB; No wheezes, rales, or rhonchi  HEART: Regular rate and rhythm; No murmurs, rubs, or gallops  ABDOMEN: Soft, non-tender, non-distended; bowel sounds+  EXT: calves non-tender b/l, no edema  NEUROLOGY: A&O x 3, no focal deficits    INTAKE & OUTPUT:  I&O's Summary    10 Wallace 2025 07:01  -  11 Jan 2025 07:00  --------------------------------------------------------  IN: 2351.5 mL / OUT: 3920 mL / NET: -1568.5 mL    11 Jan 2025 07:01  -  11 Jan 2025 09:43  --------------------------------------------------------  IN: 150 mL / OUT: 325 mL / NET: -175 mL      I&O's Detail    10 Wallace 2025 07:01  -  11 Jan 2025 07:00  --------------------------------------------------------  IN:    dextrose 5% + sodium chloride 0.45%: 1350 mL    IV PiggyBack: 150 mL    IV PiggyBack: 200 mL    IV PiggyBack: 275 mL    Propofol: 1.5 mL    sodium chloride 0.45%: 375 mL  Total IN: 2351.5 mL    OUT:    Drain (mL): 125 mL    Indwelling Catheter - Urethral (mL): 2995 mL    Nasogastric/Oral tube (mL): 800 mL    Norepinephrine: 0 mL  Total OUT: 3920 mL    Total NET: -1568.5 mL      11 Jan 2025 07:01  -  11 Jan 2025 09:43  --------------------------------------------------------  IN:    dextrose 5% + sodium chloride 0.45%: 150 mL  Total IN: 150 mL    OUT:    Indwelling Catheter - Urethral (mL): 325 mL  Total OUT: 325 mL    Total NET: -175 mL          LABS:                        9.2    23.25 )-----------( 170      ( 11 Jan 2025 06:25 )             28.3     01-11    141  |  106  |  38[H]  ----------------------------<  97  3.8   |  29  |  2.10[H]    Ca    8.3[L]      11 Jan 2025 06:25  Phos  3.3     01-11  Mg     1.7     01-11    TPro  5.3[L]  /  Alb  2.7[L]  /  TBili  0.7  /  DBili  x   /  AST  74[H]  /  ALT  39  /  AlkPhos  77  01-11      Lactate, Blood: 1.5 mmol/L (01-09 @ 07:45)  Lactate, Blood: 1.9 mmol/L (01-09 @ 01:47)      Urinalysis with Rflx Culture (collected 09 Jan 2025 03:39)        RADIOLOGY & ADDITIONAL STUDIES:    A: 78y/o Female POD# s/p    P:  - VSSAF  - On exam:   - Labs   - On CT:  - Continue diet  - Continue antibiotics  - Continue to monitor Is & Os  - DVT prophylaxis with  - Encourage OOBA, IS  - Pain control, supportive care    Case to be discussed with   POD#2 s/p lap natan patch for perforated gastric ulcer, POC c/b acidosis, respiratory distress 2/2 aspiration requiring intubation.     S: Patient seen and examined at bedside, patient extubated 1/10. Patient complains of not being able to sleep much overnight 2/2 roommate. Patinet c/o cough, unable to produce mucous, and SOB. Patient denies any fever, chills, chest pain, nausea, vomiting.    MEDICATIONS:  MEDICATIONS  (STANDING):  acetaminophen   IVPB .. 1000 milliGRAM(s) IV Intermittent every 8 hours  albuterol/ipratropium for Nebulization 3 milliLiter(s) Nebulizer every 4 hours  albuterol/ipratropium for Nebulization 3 milliLiter(s) Nebulizer every 6 hours  buDESOnide    Inhalation Suspension 0.5 milliGRAM(s) Inhalation two times a day  chlorhexidine 2% Cloths 1 Application(s) Topical <User Schedule>  dextrose 5%. 1000 milliLiter(s) (50 mL/Hr) IV Continuous <Continuous>  dextrose 5%. 1000 milliLiter(s) (100 mL/Hr) IV Continuous <Continuous>  dextrose 50% Injectable 25 Gram(s) IV Push once  dextrose 50% Injectable 12.5 Gram(s) IV Push once  dextrose 50% Injectable 25 Gram(s) IV Push once  dextrose Oral Gel 15 Gram(s) Oral once  fluconAZOLE IVPB 200 milliGRAM(s) IV Intermittent every 24 hours  glucagon  Injectable 1 milliGRAM(s) IntraMuscular once  heparin   Injectable 5000 Unit(s) SubCutaneous every 8 hours  pantoprazole  Injectable 40 milliGRAM(s) IV Push daily  piperacillin/tazobactam IVPB.. 3.375 Gram(s) IV Intermittent every 8 hours    MEDICATIONS  (PRN):  HYDROmorphone  Injectable 0.5 milliGRAM(s) IV Push every 4 hours PRN Severe Pain (7 - 10)      O:  VITAL SIGNS:  Vital Signs Last 24 Hrs  T(C): 36.7 (11 Jan 2025 08:00), Max: 37.8 (10 Wallace 2025 17:13)  T(F): 98.1 (11 Jan 2025 08:00), Max: 100.1 (10 Wallace 2025 17:13)  HR: 115 (11 Jan 2025 09:00) (89 - 115)  RR: 29 (11 Jan 2025 09:00) (17 - 32)  SpO2: 93% (11 Jan 2025 09:00) (89% - 95%)    Parameters below as of 11 Jan 2025 08:10  Patient On (Oxygen Delivery Method): nasal cannula, high flow        PHYSICAL EXAM:  GENERAL: No acute distress, lying comfortably in bed  HEAD:  Atraumatic, Normocephalic  CHEST/LUNG: Wet cough appreciated. Non labored respirations, no accessory muscle use  HEART: Regular rate and rhythm  ABDOMEN: Soft, non-tender, non-distended, surgical incisions c/d/i, JULIANN drain RUQ SS output. NGT in place to LCWS.  EXT:  no edema  NEUROLOGY: no focal deficits    INTAKE & OUTPUT:  I&O's Summary    10 Wallace 2025 07:01  -  11 Jan 2025 07:00  --------------------------------------------------------  IN: 2351.5 mL / OUT: 3920 mL / NET: -1568.5 mL    11 Jan 2025 07:01  -  11 Jan 2025 09:43  --------------------------------------------------------  IN: 150 mL / OUT: 325 mL / NET: -175 mL      I&O's Detail    10 Wallace 2025 07:01  -  11 Jan 2025 07:00  --------------------------------------------------------  IN:    dextrose 5% + sodium chloride 0.45%: 1350 mL    IV PiggyBack: 150 mL    IV PiggyBack: 200 mL    IV PiggyBack: 275 mL    Propofol: 1.5 mL    sodium chloride 0.45%: 375 mL  Total IN: 2351.5 mL    OUT:    Drain (mL): 125 mL    Indwelling Catheter - Urethral (mL): 2995 mL    Nasogastric/Oral tube (mL): 800 mL    Norepinephrine: 0 mL  Total OUT: 3920 mL    Total NET: -1568.5 mL      11 Jan 2025 07:01  -  11 Jan 2025 09:43  --------------------------------------------------------  IN:    dextrose 5% + sodium chloride 0.45%: 150 mL  Total IN: 150 mL    OUT:    Indwelling Catheter - Urethral (mL): 325 mL  Total OUT: 325 mL    Total NET: -175 mL          LABS:                        9.2    23.25 )-----------( 170      ( 11 Jan 2025 06:25 )             28.3     01-11    141  |  106  |  38[H]  ----------------------------<  97  3.8   |  29  |  2.10[H]    Ca    8.3[L]      11 Jan 2025 06:25  Phos  3.3     01-11  Mg     1.7     01-11    TPro  5.3[L]  /  Alb  2.7[L]  /  TBili  0.7  /  DBili  x   /  AST  74[H]  /  ALT  39  /  AlkPhos  77  01-11      Lactate, Blood: 1.5 mmol/L (01-09 @ 07:45)  Lactate, Blood: 1.9 mmol/L (01-09 @ 01:47)      Urinalysis with Rflx Culture (collected 09 Jan 2025 03:39)      A: 80y/o Female PMHx HTN, HLD, CKD, PUD (dx 2011), Lung CA s/p lobectomy (1996) initially presented to [facility] ED for abdominal pain, found to have pneumoperitoneum, underwent laparoscopic repair of gastric ulcer with natan patch, POC c/b aspiration PNA, requiring intubation and pressor support. Pt no longer intubated (extubated 1/10), with NGT in place, abdomen soft, NTND, NGT in place. Labs with elevated WC to 23k from 17k.       NGT remains to LWIS, light bilious drainage.  Intraperitoneal drain to bulb suction with scant serosanguinous fluid, non-bilious.  Pt requiring minimal high flow oxygen treatment, managed by Pulm and ICU team.  Continue IV antibiotics.  Pulmonary toilets, incentive spirometry.  Will defer to ICU and Pulm regarding need for bronchoscopy.    Continue NGT to allow Natan patch to seal.  May attempt clamp trial as early as Monday.  Continue PPI's  Discussed with patient, her  and ICU attending.  P:  - VS notable for tachycardia and hypertension, decreased oxygen saturation requiring HFNC.   - Labs with leukocytosis 23K from 17K (likely pulmonary related 2/2 aspiration PNA)  - Aggressive chest PT  - Rec continuing abx  - Continue to monitor JULIANN drain and NGT output.   - NGT to continue during this post-op state  - Rest of care per ICU.     Case discussed with Dr. Smart (covering for Dr. Valdez).

## 2025-01-11 NOTE — PROGRESS NOTE ADULT - SUBJECTIVE AND OBJECTIVE BOX
INTERVAL HPI/OVERNIGHT EVENTS: No acute overnight events occurred. Vitals stable overnight, except for BP elevated. Holding home amlodipine at this time, patient has NG tube in. on hfnc 40/35 setting. Dc'ing isrrael. Wbc count elevated from 13 to 23.     Review of Systems:  Constitutional: No fever  Neuro: No headache  Resp: + cough, + sob  CVS: No chest pain, palpitations, leg swelling  GI: No abdominal pain, nausea, vomiting, diarrhea   Psych: + insomnia    ICU Vital Signs Last 24 Hrs  T(C): 37.4 (11 Jan 2025 11:44), Max: 37.8 (10 Wallace 2025 17:13)  T(F): 99.4 (11 Jan 2025 11:44), Max: 100.1 (10 Wallace 2025 17:13)  HR: 98 (11 Jan 2025 12:15) (89 - 115)  BP: --  BP(mean): --  ABP: 132/55 (11 Jan 2025 12:00) (106/48 - 176/68)  ABP(mean): 82 (11 Jan 2025 12:00) (68 - 104)  RR: 20 (11 Jan 2025 12:00) (17 - 32)  SpO2: 92% (11 Jan 2025 12:15) (89% - 95%)    O2 Parameters below as of 11 Jan 2025 12:15  Patient On (Oxygen Delivery Method): nasal cannula, high flow              01-10-25 @ 07:01  -  01-11-25 @ 07:00  --------------------------------------------------------  IN: 2351.5 mL / OUT: 3920 mL / NET: -1568.5 mL    01-11-25 @ 07:01  -  01-11-25 @ 12:28  --------------------------------------------------------  IN: 340 mL / OUT: 625 mL / NET: -285 mL        CAPILLARY BLOOD GLUCOSE      POCT Blood Glucose.: 81 mg/dL (11 Jan 2025 12:02)      I&O's Summary    10 Wallace 2025 07:01  -  11 Jan 2025 07:00  --------------------------------------------------------  IN: 2351.5 mL / OUT: 3920 mL / NET: -1568.5 mL    11 Jan 2025 07:01  -  11 Jan 2025 12:28  --------------------------------------------------------  IN: 340 mL / OUT: 625 mL / NET: -285 mL        PHYSICAL EXAM:  General: NAD, on HFNC  Neurology: awake and alert, anxious  HEENT: NC/AT  Respiratory: CTA b/l  Cardiovascular: RRR, normal S1S2, no M/R/G  Abdomen: mildly tender and distended  Extremities: no edema  Skin: warm/dry      Meds:  fluconAZOLE IVPB IV Intermittent  piperacillin/tazobactam IVPB.. IV Intermittent      dextrose 50% Injectable IV Push  dextrose 50% Injectable IV Push  dextrose 50% Injectable IV Push  dextrose Oral Gel Oral  glucagon  Injectable IntraMuscular    albuterol/ipratropium for Nebulization Nebulizer  buDESOnide    Inhalation Suspension Inhalation  guaiFENesin ER Oral    acetaminophen   IVPB .. IV Intermittent  HYDROmorphone  Injectable IV Push PRN      heparin   Injectable SubCutaneous    pantoprazole  Injectable IV Push      dextrose 5%. IV Continuous  dextrose 5%. IV Continuous  dextrose 5%. IV Continuous      chlorhexidine 2% Cloths Topical                              9.2    23.25 )-----------( 170      ( 11 Jan 2025 06:25 )             28.3     Bands 15.0    01-11    141  |  106  |  38[H]  ----------------------------<  97  3.8   |  29  |  2.10[H]    Ca    8.3[L]      11 Jan 2025 06:25  Phos  3.3     01-11  Mg     1.7     01-11    TPro  5.3[L]  /  Alb  2.7[L]  /  TBili  0.7  /  DBili  x   /  AST  74[H]  /  ALT  39  /  AlkPhos  77  01-11            Urinalysis Basic - ( 11 Jan 2025 06:25 )    Color: x / Appearance: x / SG: x / pH: x  Gluc: 97 mg/dL / Ketone: x  / Bili: x / Urobili: x   Blood: x / Protein: x / Nitrite: x   Leuk Esterase: x / RBC: x / WBC x   Sq Epi: x / Non Sq Epi: x / Bacteria: x             INTERVAL HPI/OVERNIGHT EVENTS: No acute overnight events occurred. Vitals stable overnight, except for BP elevated. Holding home amlodipine at this time, patient has NG tube in. on hfnc 40/35 setting. Dc'ing isrrael. Wbc count elevated from 13 to 23. Patient complaining of back pain that is chronic, hx of back operation per pt. She does not take pain meds at home due to renal dysfunction.    Review of Systems:  Constitutional: No fever  Neuro: No headache  Resp: + cough, + sob  CVS: No chest pain, palpitations, leg swelling  GI: No abdominal pain, nausea, vomiting, diarrhea   Psych: + insomnia    ICU Vital Signs Last 24 Hrs  T(C): 37.4 (11 Jan 2025 11:44), Max: 37.8 (10 Wallace 2025 17:13)  T(F): 99.4 (11 Jan 2025 11:44), Max: 100.1 (10 Wallace 2025 17:13)  HR: 98 (11 Jan 2025 12:15) (89 - 115)  BP: --  BP(mean): --  ABP: 132/55 (11 Jan 2025 12:00) (106/48 - 176/68)  ABP(mean): 82 (11 Jan 2025 12:00) (68 - 104)  RR: 20 (11 Jan 2025 12:00) (17 - 32)  SpO2: 92% (11 Jan 2025 12:15) (89% - 95%)    O2 Parameters below as of 11 Jan 2025 12:15  Patient On (Oxygen Delivery Method): nasal cannula, high flow              01-10-25 @ 07:01  -  01-11-25 @ 07:00  --------------------------------------------------------  IN: 2351.5 mL / OUT: 3920 mL / NET: -1568.5 mL    01-11-25 @ 07:01  -  01-11-25 @ 12:28  --------------------------------------------------------  IN: 340 mL / OUT: 625 mL / NET: -285 mL        CAPILLARY BLOOD GLUCOSE      POCT Blood Glucose.: 81 mg/dL (11 Jan 2025 12:02)      I&O's Summary    10 Wallace 2025 07:01  -  11 Jan 2025 07:00  --------------------------------------------------------  IN: 2351.5 mL / OUT: 3920 mL / NET: -1568.5 mL    11 Jan 2025 07:01  -  11 Jan 2025 12:28  --------------------------------------------------------  IN: 340 mL / OUT: 625 mL / NET: -285 mL        PHYSICAL EXAM:  General: NAD, on HFNC  Neurology: awake and alert, anxious  HEENT: NC/AT  Respiratory: CTA b/l  Cardiovascular: RRR, normal S1S2, no M/R/G  Abdomen: mildly tender and distended  Extremities: no edema  Skin: warm/dry      Meds:  fluconAZOLE IVPB IV Intermittent  piperacillin/tazobactam IVPB.. IV Intermittent      dextrose 50% Injectable IV Push  dextrose 50% Injectable IV Push  dextrose 50% Injectable IV Push  dextrose Oral Gel Oral  glucagon  Injectable IntraMuscular    albuterol/ipratropium for Nebulization Nebulizer  buDESOnide    Inhalation Suspension Inhalation  guaiFENesin ER Oral    acetaminophen   IVPB .. IV Intermittent  HYDROmorphone  Injectable IV Push PRN      heparin   Injectable SubCutaneous    pantoprazole  Injectable IV Push      dextrose 5%. IV Continuous  dextrose 5%. IV Continuous  dextrose 5%. IV Continuous      chlorhexidine 2% Cloths Topical                              9.2    23.25 )-----------( 170      ( 11 Jan 2025 06:25 )             28.3     Bands 15.0    01-11    141  |  106  |  38[H]  ----------------------------<  97  3.8   |  29  |  2.10[H]    Ca    8.3[L]      11 Jan 2025 06:25  Phos  3.3     01-11  Mg     1.7     01-11    TPro  5.3[L]  /  Alb  2.7[L]  /  TBili  0.7  /  DBili  x   /  AST  74[H]  /  ALT  39  /  AlkPhos  77  01-11            Urinalysis Basic - ( 11 Jan 2025 06:25 )    Color: x / Appearance: x / SG: x / pH: x  Gluc: 97 mg/dL / Ketone: x  / Bili: x / Urobili: x   Blood: x / Protein: x / Nitrite: x   Leuk Esterase: x / RBC: x / WBC x   Sq Epi: x / Non Sq Epi: x / Bacteria: x

## 2025-01-11 NOTE — PROGRESS NOTE ADULT - ASSESSMENT
78 y/o WF with PMHx with HTN, HLD, CKD, PUD (dx 2011), Lung CA s/p lobectomy (1996) presents to [facility] ED from home with  via ambulance c/o worsening generalized achy abdominal pain since last night.  Patient reports baseline abdominal discomfort x several years which worsened suddenly    intestinal perf  atelectasis  pleural eff  HTN  HLD  CKD  PUD  PNA hx  Lung Ca - hx of Lobectomy     vs noted - meds reviewed -   fio2 wean as tolerated  antimicrobial rx regimen  pain relief  NPO  nebs -     post op care  PUD perf - surgery and op note reviewed  lung protective vent support  fio2 titration  oral hygiene  skin care  suction PRN  HOB elev  PPI  Bronchodilators - Stress steroids - hx of COPD -   chr changes - deformity - lung parenchyma and chest - see CT chest -   ICU care  I and O  serial labs  replete lytes  dvt p  pain relief

## 2025-01-11 NOTE — PROGRESS NOTE ADULT - ATTENDING COMMENTS
79F with HTN, HLD, CKD, PUD, Lung Cancer s/p RUL lobectomy who presents with a perforated gastric ulcer POD 2 from laparoscopic repair with Srinath Patch and Onur Drain placement.  Pt transferred to [facility] ICU for severe hypercapnic respiratory failure 2/2 aspiration, and course is further complicated by ESPERANZA 2/2 ATN.    Neuro: continue prn pain medication and standing tylenol  Pulm: pt with acute hypercapnic respiratory failure 2/2 aspiration, s/p extubation. Remains on HFNC and has worsening secretions today. Continue inhaled steroids and duonebs. Incentive spirometry  GI: NPO with NGT to LWS; advance diet per surgery recs; POD 2 from laparoscopic repair with Srinath Patch and Onur Drain placement.  /Renal: ESPERANZA 2/2 ATN 2/2 dehydration now improving; trend BMP and UOP.   Heme/DVT PPX: Hb drop likely 2/2 IVF resuscitation, no evidence of bleeding trend CBC  Endo: No active issues  ID: Continue fluconazole and zosyn for perforation. Pt with rising white count, continue to monitor  Ethics: Full code, plan d/w  at bedside.

## 2025-01-12 NOTE — PROGRESS NOTE ADULT - SUBJECTIVE AND OBJECTIVE BOX
Date/Time Patient Seen:  		  Referring MD:   Data Reviewed	       Patient is a 79y old  Female who presents with a chief complaint of transferred from [place] s/p perforated gastric ulcer with natan patch repair (11 Jan 2025 23:45)      Subjective/HPI     PAST MEDICAL & SURGICAL HISTORY:  Asthma    HLD (hyperlipidemia)    Rheumatoid arthritis    TIA (transient ischemic attack)  2012    Esophageal reflux    Lung cancer  Right lung.    Chronic obstructive pulmonary disease, unspecified COPD type  O2 at night    Hiatal hernia with GERD    Essential hypertension    Hyperlipidemia, unspecified hyperlipidemia type    Calculus of gallbladder with chronic cholecystitis without obstruction    Childhood asthma    Stage 3 chronic kidney disease    Carotid artery plaque    IBS (irritable bowel syndrome)    Psoriatic arthritis    S/P lobectomy of lung  Right lung in 1996.    H/O colonoscopy    History of endoscopy  Sept 2017    S/P cholecystectomy    CAD (coronary artery disease)          Medication list         MEDICATIONS  (STANDING):  albuterol/ipratropium for Nebulization 3 milliLiter(s) Nebulizer every 4 hours  buDESOnide    Inhalation Suspension 0.5 milliGRAM(s) Inhalation two times a day  chlorhexidine 2% Cloths 1 Application(s) Topical <User Schedule>  dexMEDEtomidine Infusion 0.2 MICROgram(s)/kG/Hr (2.6 mL/Hr) IV Continuous <Continuous>  dextrose 5%. 1000 milliLiter(s) (50 mL/Hr) IV Continuous <Continuous>  dextrose 5%. 1000 milliLiter(s) (100 mL/Hr) IV Continuous <Continuous>  dextrose 5%. 1000 milliLiter(s) (50 mL/Hr) IV Continuous <Continuous>  dextrose 50% Injectable 25 Gram(s) IV Push once  dextrose 50% Injectable 12.5 Gram(s) IV Push once  dextrose 50% Injectable 25 Gram(s) IV Push once  dextrose Oral Gel 15 Gram(s) Oral once  fluconAZOLE IVPB 200 milliGRAM(s) IV Intermittent every 24 hours  furosemide   Injectable 40 milliGRAM(s) IV Push daily  glucagon  Injectable 1 milliGRAM(s) IntraMuscular once  heparin   Injectable 5000 Unit(s) SubCutaneous every 8 hours  pantoprazole  Injectable 40 milliGRAM(s) IV Push daily  piperacillin/tazobactam IVPB.. 3.375 Gram(s) IV Intermittent every 8 hours  QUEtiapine 12.5 milliGRAM(s) Oral at bedtime    MEDICATIONS  (PRN):  guaiFENesin ER 1200 milliGRAM(s) Oral every 12 hours PRN Cough  HYDROmorphone  Injectable 0.5 milliGRAM(s) IV Push every 4 hours PRN Severe Pain (7 - 10)         Vitals log        ICU Vital Signs Last 24 Hrs  T(C): 37 (12 Jan 2025 04:27), Max: 37.5 (11 Jan 2025 16:51)  T(F): 98.6 (12 Jan 2025 04:27), Max: 99.5 (11 Jan 2025 16:51)  HR: 87 (12 Jan 2025 06:45) (77 - 141)  BP: 129/56 (12 Jan 2025 06:45) (98/55 - 158/64)  BP(mean): 76 (12 Jan 2025 06:45) (64 - 111)  ABP: 146/54 (11 Jan 2025 15:00) (124/51 - 162/63)  ABP(mean): 83 (11 Jan 2025 15:00) (76 - 95)  RR: 29 (12 Jan 2025 06:45) (16 - 34)  SpO2: 98% (12 Jan 2025 06:45) (90% - 100%)    O2 Parameters below as of 12 Jan 2025 04:41  Patient On (Oxygen Delivery Method): nasal cannula, high flow                 Input and Output:  I&O's Detail    11 Jan 2025 07:01  -  12 Jan 2025 07:00  --------------------------------------------------------  IN:    Dexmedetomidine: 18.8 mL    dextrose 5%: 950 mL    dextrose 5% + sodium chloride 0.45%: 150 mL    IV PiggyBack: 100 mL    IV PiggyBack: 300 mL    IV PiggyBack: 250 mL  Total IN: 1768.8 mL    OUT:    Drain (mL): 130 mL    Incontinent per Collection Bag (mL): 1000 mL    Indwelling Catheter - Urethral (mL): 575 mL    Nasogastric/Oral tube (mL): 400 mL  Total OUT: 2105 mL    Total NET: -336.2 mL          Lab Data                        9.2    23.25 )-----------( 170      ( 11 Jan 2025 06:25 )             28.3     01-11    141  |  106  |  38[H]  ----------------------------<  97  3.8   |  29  |  2.10[H]    Ca    8.3[L]      11 Jan 2025 06:25  Phos  3.3     01-11  Mg     1.7     01-11    TPro  5.3[L]  /  Alb  2.7[L]  /  TBili  0.7  /  DBili  x   /  AST  74[H]  /  ALT  39  /  AlkPhos  77  01-11            Review of Systems	      Objective     Physical Examination    heart s1s2  lung dc BS  head nc  head at      Pertinent Lab findings & Imaging      Dante:  NO   Adequate UO     I&O's Detail    11 Jan 2025 07:01  -  12 Jan 2025 07:00  --------------------------------------------------------  IN:    Dexmedetomidine: 18.8 mL    dextrose 5%: 950 mL    dextrose 5% + sodium chloride 0.45%: 150 mL    IV PiggyBack: 100 mL    IV PiggyBack: 300 mL    IV PiggyBack: 250 mL  Total IN: 1768.8 mL    OUT:    Drain (mL): 130 mL    Incontinent per Collection Bag (mL): 1000 mL    Indwelling Catheter - Urethral (mL): 575 mL    Nasogastric/Oral tube (mL): 400 mL  Total OUT: 2105 mL    Total NET: -336.2 mL               Discussed with:     Cultures:	        Radiology

## 2025-01-12 NOTE — DIETITIAN INITIAL EVALUATION ADULT - OTHER INFO
Pt is a "80 yo F with HTN, HLD, CKD, PUD, Lung Cancer s/p RUL lobectomy who presents with a perforated gastric ulcer POD 2 from laparoscopic repair with Srinath Patch and Onur Drain placement.  Pt transferred to [facility] ICU for severe hypercapnic respiratory failure 2/2 aspiration, and course is further complicated by ESPERANZA 2/2 ATN."    Visited pt at bedside this am. Pt currently NPO. Receiving IVFs; D5@50ml/hr. Pt s/p laparoscopic repair of gastric ulcer with srinath patch POD#3, POC c/b aspiration PNA, requiring intubation and pressor support. Now extubated 1/10. NGT remains in place to LWIS. NGT output 400/24hr. Per surgery; undergo clamp trial today x 4 hours, can possibly d/c this afternoon if residuals low. CBW on admission 114#? Pt reports UBW of 101-107#. No edema noted. Skin: stage I to BL elbow, sacrum, stage II to BL buttocks, SDTI to R upper back. Pt to remain NPO at this time. Recommend initiating clear liquid diet with ensure clear TID as medically feasible. Diet education not appropriate at this time. RD remains available and will continue to follow-up.

## 2025-01-12 NOTE — PROGRESS NOTE ADULT - SUBJECTIVE AND OBJECTIVE BOX
POD#3 s/p lap natan patch for perforated gastric ulcer, POC c/b acidosis, respiratory distress 2/2 aspiration requiring intubation.     S: Patient seen and examined at bedside,  at bedside. Patient reports improvement in sleep overnight. C/o continued difficulty breathing, cough, SOB. Patient denies any fever, chills, chest pain, nausea, vomiting.    MEDICATIONS:  MEDICATIONS  (STANDING):  albuterol/ipratropium for Nebulization 3 milliLiter(s) Nebulizer every 4 hours  buDESOnide    Inhalation Suspension 0.5 milliGRAM(s) Inhalation two times a day  chlorhexidine 2% Cloths 1 Application(s) Topical <User Schedule>  dexMEDEtomidine Infusion 0.2 MICROgram(s)/kG/Hr (2.6 mL/Hr) IV Continuous <Continuous>  dextrose 5%. 1000 milliLiter(s) (100 mL/Hr) IV Continuous <Continuous>  dextrose 5%. 1000 milliLiter(s) (50 mL/Hr) IV Continuous <Continuous>  dextrose 5%. 1000 milliLiter(s) (50 mL/Hr) IV Continuous <Continuous>  dextrose 50% Injectable 25 Gram(s) IV Push once  dextrose 50% Injectable 12.5 Gram(s) IV Push once  dextrose 50% Injectable 25 Gram(s) IV Push once  dextrose Oral Gel 15 Gram(s) Oral once  fluconAZOLE IVPB 200 milliGRAM(s) IV Intermittent every 24 hours  furosemide   Injectable 40 milliGRAM(s) IV Push daily  glucagon  Injectable 1 milliGRAM(s) IntraMuscular once  heparin   Injectable 5000 Unit(s) SubCutaneous every 8 hours  pantoprazole  Injectable 40 milliGRAM(s) IV Push daily  piperacillin/tazobactam IVPB.. 3.375 Gram(s) IV Intermittent every 8 hours  QUEtiapine 12.5 milliGRAM(s) Oral at bedtime    MEDICATIONS  (PRN):  guaiFENesin ER 1200 milliGRAM(s) Oral every 12 hours PRN Cough  HYDROmorphone  Injectable 0.5 milliGRAM(s) IV Push every 4 hours PRN Severe Pain (7 - 10)      O:  VITAL SIGNS:  Vital Signs Last 24 Hrs  T(C): 36.8 (12 Jan 2025 07:54), Max: 37.5 (11 Jan 2025 16:51)  T(F): 98.3 (12 Jan 2025 07:54), Max: 99.5 (11 Jan 2025 16:51)  HR: 91 (12 Jan 2025 10:26) (77 - 141)  BP: 132/74 (12 Jan 2025 10:00) (98/55 - 158/64)  BP(mean): 90 (12 Jan 2025 10:00) (64 - 111)  RR: 22 (12 Jan 2025 10:26) (16 - 34)  SpO2: 97% (12 Jan 2025 10:26) (90% - 100%)    Parameters below as of 12 Jan 2025 08:17  Patient On (Oxygen Delivery Method): nasal cannula, high flow        PHYSICAL EXAM:  GENERAL: No acute distress, lying comfortably in bed  HEAD:  Atraumatic, Normocephalic  CHEST/LUNG: Wet cough appreciated. Non labored respirations, no accessory muscle use, with high flow nasal cannula.  HEART: Regular rate and rhythm  ABDOMEN: Soft, non-tender, non-distended, surgical incisions c/d/i, JULIANN drain RUQ SS output. NGT in place to LCWS, minimal output.  EXT:  no edema  NEUROLOGY: no focal deficits    INTAKE & OUTPUT:  I&O's Summary    11 Jan 2025 07:01  -  12 Jan 2025 07:00  --------------------------------------------------------  IN: 1843.8 mL / OUT: 2105 mL / NET: -261.2 mL    12 Jan 2025 07:01  -  12 Jan 2025 10:28  --------------------------------------------------------  IN: 175 mL / OUT: 0 mL / NET: 175 mL      I&O's Detail    11 Jan 2025 07:01  -  12 Jan 2025 07:00  --------------------------------------------------------  IN:    Dexmedetomidine: 18.8 mL    dextrose 5%: 1000 mL    dextrose 5% + sodium chloride 0.45%: 150 mL    IV PiggyBack: 100 mL    IV PiggyBack: 300 mL    IV PiggyBack: 275 mL  Total IN: 1843.8 mL    OUT:    Drain (mL): 130 mL    Incontinent per Collection Bag (mL): 1000 mL    Indwelling Catheter - Urethral (mL): 575 mL    Nasogastric/Oral tube (mL): 400 mL  Total OUT: 2105 mL    Total NET: -261.2 mL      12 Jan 2025 07:01  -  12 Jan 2025 10:28  --------------------------------------------------------  IN:    dextrose 5%: 150 mL    IV PiggyBack: 25 mL  Total IN: 175 mL    OUT:    Dexmedetomidine: 0 mL  Total OUT: 0 mL    Total NET: 175 mL          LABS:                        9.2    24.99 )-----------( 150      ( 12 Jan 2025 05:30 )             29.1     01-12    138  |  101  |  35[H]  ----------------------------<  86  3.5   |  31  |  1.90[H]    Ca    8.6      12 Jan 2025 05:30  Phos  4.0     01-12  Mg     1.7     01-12    TPro  5.0[L]  /  Alb  2.2[L]  /  TBili  0.7  /  DBili  x   /  AST  61[H]  /  ALT  37  /  AlkPhos  116  01-12      Lactate, Blood: 1.5 mmol/L (01-09 @ 07:45)  Lactate, Blood: 1.9 mmol/L (01-09 @ 01:47)      A: 78y/o Female PMHx HTN, HLD, CKD, PUD (dx 2011), Lung CA s/p lobectomy (1996) initially presented to [facility] ED for abdominal pain, found to have pneumoperitoneum, underwent laparoscopic repair of gastric ulcer with natan patch POD#3, POC c/b aspiration PNA, requiring intubation and pressor support. Pt no longer intubated (extubated 1/10), with NGT in place, abdomen soft, NTND, JULIANN drain wiht SS drainage. NGT output 400/24hr. Labs with continued luekocytosis 24 < 23 < 17, Cr downtrending to 1.9 today.      P:  - VSSAF, decreased oxygen saturation requiring HFNC.   - Labs with leukocytosis 24 < 23< 17 (likely pulmonary related 2/2 aspiration PNA)  - Aggressive chest PT  - Rec continuing abx  - Continue to monitor JULIANN drain  - Will undergo clamp trial today x 4 hours, can possibly d/c this afternoon if residuals low.   - Rest of care per ICU.     Case discussed with Dr. Smart (covering for Dr. Valdez).

## 2025-01-12 NOTE — DIETITIAN INITIAL EVALUATION ADULT - NSFNSGIIOFT_GEN_A_CORE
01-11-25 @ 07:01  -  01-12-25 @ 07:00  --------------------------------------------------------  OUT:    Nasogastric/Oral tube (mL): 400 mL  Total OUT: 400 mL    Total NET: -400 mL

## 2025-01-12 NOTE — DIETITIAN INITIAL EVALUATION ADULT - ADD RECOMMEND
1) Pt to remain NPO with IVFs at this time; recommend initiating clear liquid diet with ensure clear TID when medically feasible and progress diet to low fiber as tolerated  2) Recommend MVI/minerals daily and Vit C 500mg BID  3) Monitor po intake, diet tolerance, weight trends, labs, GI function, skin integrity

## 2025-01-12 NOTE — DIETITIAN INITIAL EVALUATION ADULT - PHYSCIAL ASSESSMENT
other (specify) BMI 20    NFPE limited at this time; pt with NGT in place, appears uncomfortable during visit

## 2025-01-12 NOTE — DIETITIAN INITIAL EVALUATION ADULT - NSFNSPHYEXAMSKINFT_GEN_A_CORE
Pressure Injury 1: left elbow, Stage I  Pressure Injury 2: right elbow, Stage I  Pressure Injury 3: sacrum, Stage I  Pressure Injury 4: left buttocks, Stage II  Pressure Injury 5: right buttocks, Stage II  Pressure Injury 6: right upper back, Stage I

## 2025-01-12 NOTE — PROGRESS NOTE ADULT - ATTENDING COMMENTS
79F with HTN, HLD, CKD, PUD, Lung Cancer s/p RUL lobectomy who presents with a perforated gastric ulcer POD 2 from laparoscopic repair with Srinath Patch and Onur Drain placement.  Pt transferred to [facility] ICU for severe hypercapnic respiratory failure 2/2 aspiration, and course is further complicated by ESPERANZA 2/2 ATN.    Neuro: continue prn pain medication and standing tylenol  Pulm: pt with acute hypercapnic respiratory failure 2/2 aspiration, s/p extubation. Remains on HFNC and has worsening secretions today. Continue inhaled steroids and duonebs. Incentive spirometry  GI: NGT d/c, advance to CLD; POD 3 from laparoscopic repair with Srinath Patch and Onur Drain placement.  /Renal: ESPERANZA 2/2 ATN 2/2 dehydration now improving Will dose lasix for mild fluid overload; trend BMP and UOP.   Heme/DVT PPX: Hb drop likely 2/2 IVF resuscitation, no evidence of bleeding trend CBC  Endo: No active issues  ID: Continue fluconazole and zosyn for perforation. Pt with rising white count, continue to monitor  Ethics: Full code, plan d/w  at bedside.

## 2025-01-12 NOTE — PROGRESS NOTE ADULT - ASSESSMENT
79F with HTN, HLD, CKD, PUD, Lung Cancer s/p RUL lobectomy who presents with a perforated gastric ulcer POD 2 from laparoscopic repair with Srinath Patch and Onur Drain placement. Pt transferred from  to [facility] ICU for severe hypercapnic respiratory failure 2ry aspiration requiring intubation, now extubated with hospital course further complicated by ESPERANZA d/t ATN. Admitted to the ICU with:     Perforated gastric ulcer POD#2 lap repair with Srinath patch and onur drain  Acute hypoxic/hypercapnic respiratory failure  Aspiration PNA  Pneumoperitoneum  ESPERANZA    NEURO: Delirious overnight, starting low dose precedex infusion and given ativan- now discontinued. Will start seroquel instead from tonight.   CV: Maintain MAP>65, monitoring end points of perfusion. Lasix 40 mg BID ordered for fluid overload. Albumin ordered as well.   PULM: Lasix for fluid overload.. Pt had severe hypercapnic resp failure on presentation now improved.. Continue inhaled steroids and duonebs for secretions. Incentive spirometer encouraged 10x an hour.   GI: NPO with NGT to LIWS, diet to be advanced as per surgery. POD3 lap repair with srinath patch with onur drain placement. Plan for clamp trial today with surgery  RENAL: ESPERANZA likely in the setting of ATN/dehydration, getting IVFs with D5 @50 for hypoglycemia with increased secretions/WOB therefore diuresed 40mg lasix overnight. Continue lasix 40 mg BID with close monitoring of kidney function. Strict I/Os.  ENDO: Maintain euglycemia 120-180  ID: On zosyn and fluconazole for gastric perforation  HEME: heparin for DVT prophylaxis

## 2025-01-12 NOTE — PROGRESS NOTE ADULT - ASSESSMENT
80 y/o WF with PMHx with HTN, HLD, CKD, PUD (dx 2011), Lung CA s/p lobectomy (1996) presents to [facility] ED from home with  via ambulance c/o worsening generalized achy abdominal pain since last night.  Patient reports baseline abdominal discomfort x several years which worsened suddenly    intestinal perf  atelectasis  pleural eff  HTN  HLD  CKD  PUD  PNA hx  Lung Ca - hx of Lobectomy     vs noted - meds reviewed -   fio2 wean as tolerated - HF NC - monitor Work of Breathing -   antimicrobial rx regimen  pain relief  NPO  nebs -     post op care  PUD perf - surgery and op note reviewed  lung protective vent support  fio2 titration  oral hygiene  skin care  suction PRN  HOB elev  PPI  Bronchodilators - Stress steroids - hx of COPD -   chr changes - deformity - lung parenchyma and chest - see CT chest -   ICU care  I and O  serial labs  replete lytes  dvt p  pain relief

## 2025-01-12 NOTE — PROGRESS NOTE ADULT - SUBJECTIVE AND OBJECTIVE BOX
Subjective: no change in status. Started on IV lasix      MEDICATIONS  (STANDING):  albuterol/ipratropium for Nebulization 3 milliLiter(s) Nebulizer every 4 hours  buDESOnide    Inhalation Suspension 0.5 milliGRAM(s) Inhalation two times a day  chlorhexidine 2% Cloths 1 Application(s) Topical <User Schedule>  dexMEDEtomidine Infusion 0.2 MICROgram(s)/kG/Hr (2.6 mL/Hr) IV Continuous <Continuous>  dextrose 5%. 1000 milliLiter(s) (100 mL/Hr) IV Continuous <Continuous>  dextrose 5%. 1000 milliLiter(s) (50 mL/Hr) IV Continuous <Continuous>  dextrose 5%. 1000 milliLiter(s) (50 mL/Hr) IV Continuous <Continuous>  dextrose 50% Injectable 25 Gram(s) IV Push once  dextrose 50% Injectable 12.5 Gram(s) IV Push once  dextrose 50% Injectable 25 Gram(s) IV Push once  dextrose Oral Gel 15 Gram(s) Oral once  fluconAZOLE IVPB 200 milliGRAM(s) IV Intermittent every 24 hours  furosemide   Injectable 40 milliGRAM(s) IV Push daily  glucagon  Injectable 1 milliGRAM(s) IntraMuscular once  heparin   Injectable 5000 Unit(s) SubCutaneous every 8 hours  pantoprazole  Injectable 40 milliGRAM(s) IV Push daily  piperacillin/tazobactam IVPB.. 3.375 Gram(s) IV Intermittent every 8 hours  QUEtiapine 12.5 milliGRAM(s) Oral at bedtime    MEDICATIONS  (PRN):  guaiFENesin ER 1200 milliGRAM(s) Oral every 12 hours PRN Cough  HYDROmorphone  Injectable 0.5 milliGRAM(s) IV Push every 4 hours PRN Severe Pain (7 - 10)          T(C): 36.8 (01-12-25 @ 07:54), Max: 37.5 (01-11-25 @ 16:51)  HR: 82 (01-12-25 @ 08:17) (77 - 141)  BP: 106/52 (01-12-25 @ 08:00) (98/55 - 158/64)  RR: 26 (01-12-25 @ 08:00) (16 - 34)  SpO2: 98% (01-12-25 @ 08:17) (90% - 100%)  Wt(kg): --        I&O's Detail    11 Jan 2025 07:01  -  12 Jan 2025 07:00  --------------------------------------------------------  IN:    Dexmedetomidine: 18.8 mL    dextrose 5%: 1000 mL    dextrose 5% + sodium chloride 0.45%: 150 mL    IV PiggyBack: 100 mL    IV PiggyBack: 300 mL    IV PiggyBack: 275 mL  Total IN: 1843.8 mL    OUT:    Drain (mL): 130 mL    Incontinent per Collection Bag (mL): 1000 mL    Indwelling Catheter - Urethral (mL): 575 mL    Nasogastric/Oral tube (mL): 400 mL  Total OUT: 2105 mL    Total NET: -261.2 mL      12 Jan 2025 07:01  -  12 Jan 2025 09:43  --------------------------------------------------------  IN:    dextrose 5%: 50 mL    IV PiggyBack: 25 mL  Total IN: 75 mL    OUT:    Dexmedetomidine: 0 mL  Total OUT: 0 mL    Total NET: 75 mL               PHYSICAL EXAM:    GENERAL: NAD  NECK: Supple, no inc in JVP  CHEST/LUNG: rhonchi  HEART: S1S2  ABDOMEN: post lap Sx, R abd drain  EXTREMITIES:  no edema.   NEURO: no asterixis        LABS:  CBC Full  -  ( 12 Jan 2025 05:30 )  WBC Count : 24.99 K/uL  RBC Count : 3.10 M/uL  Hemoglobin : 9.2 g/dL  Hematocrit : 29.1 %  Platelet Count - Automated : 150 K/uL  Mean Cell Volume : 93.9 fl  Mean Cell Hemoglobin : 29.7 pg  Mean Cell Hemoglobin Concentration : 31.6 g/dL  Auto Neutrophil # : 22.15 K/uL  Auto Lymphocyte # : 0.89 K/uL  Auto Monocyte # : 0.56 K/uL  Auto Eosinophil # : 0.27 K/uL  Auto Basophil # : 0.08 K/uL  Auto Neutrophil % : 88.6 %  Auto Lymphocyte % : 3.6 %  Auto Monocyte % : 2.2 %  Auto Eosinophil % : 1.1 %  Auto Basophil % : 0.3 %    01-12    138  |  101  |  35[H]  ----------------------------<  86  3.5   |  31  |  1.90[H]    Ca    8.6      12 Jan 2025 05:30  Phos  4.0     01-12  Mg     1.7     01-12    TPro  5.0[L]  /  Alb  2.2[L]  /  TBili  0.7  /  DBili  x   /  AST  61[H]  /  ALT  37  /  AlkPhos  116  01-12        Impression:  * ESPERANZA, - suspect ischemic ATN. Resolving. Peak Cr 2.20  * Hyperkalemia resolved  * Perforated gastricl ulcer. S/p lap repair  * Post op acute respiratory failure better  * CKD 3 presumably due to RVD, hypertensive nephrosclerosis. Cr ranging widely 1.1-1.5 per historical data    Recommendations:   Volume management per PCCM.   Monitor I/O and daily BMP/Cr

## 2025-01-12 NOTE — PROGRESS NOTE ADULT - ASSESSMENT
79F with HTN, HLD, CKD, PUD, Lung Cancer s/p RUL lobectomy who presents with a perforated gastric ulcer POD 2 from laparoscopic repair with Srinath Patch and Onur Drain placement. Pt transferred from  to [facility] ICU for severe hypercapnic respiratory failure 2ry aspiration requiring intubation, now extubated with hospital course further complicated by ESPERANZA d/t ATN. Admitted to the ICU with:     Perforated gastric ulcer POD#3 lap repair with Srinath patch and onur drain  Acute hypoxic/hypercapnic respiratory failure  Aspiration PNA  Pneumoperitoneum  ESPERANZA    NEURO: Will use Seroquel overnight, multimodal pain control  CV: Maintain MAP>65, monitoring end points of perfusion  PULM: Pt had severe hypercapnic resp failure on presentation now improved, component of congestion with gentle IVF started on Lasix now BID, monitor UOP. Still hypoxic on HFNC 30L/50% Continue inhaled steroids and duonebs for secretions. Incentive spirometer.  GI:  NGT removed today and diet advanced to CLD. POD3 lap repair with srinath patch with onur drain placement.   RENAL: ESPERANZA likely in the setting of ATN/dehydration, getting IVFs with D5 @50 for hypoglycemia though might not require it with advancement of diet. Lasix 40 BID,  Strict I/Os.  ENDO: Maintain euglycemia 120-180  ID: On zosyn and fluconazole for gastric perforation  HEME: on dvt ppx with SC Heparin    DISPO: Pt critically ill requiring ICU level of care.     CRITICAL CARE TIME SPENT: 38 minutes of critical care time spent providing medical care for patient's acute illness/conditions that impairs at least one vital organ system and/or poses a high risk of imminent or life threatening deterioration in the patient's condition. It includes time spent evaluating and treating the patient's acute illness as well as time spent reviewing labs, radiology, discussing goals of care with patient and/or patient's family, and discussing the case with a multidisciplinary team, in an effort to prevent further life threatening deterioration or end organ damage. This time is independent of any procedures performed. 79F with HTN, HLD, CKD, PUD, Lung Cancer s/p RUL lobectomy who presents with a perforated gastric ulcer POD 2 from laparoscopic repair with Srinath Patch and Onur Drain placement. Pt transferred from  to [facility] ICU for severe hypercapnic respiratory failure 2ry aspiration requiring intubation, now extubated with hospital course further complicated by ESPERANZA d/t ATN. Admitted to the ICU with:     Perforated gastric ulcer POD#3 lap repair with Srinath patch and onur drain  Acute hypoxic/hypercapnic respiratory failure  Aspiration PNA  Pneumoperitoneum  ESPERANZA    NEURO: Will use Seroquel overnight, multimodal pain control  CV: Maintain MAP>65, monitoring end points of perfusion  PULM: Pt had severe hypercapnic resp failure, aspiration requiring intubation, now extubated and improving though still has a lot of secretions. Component of volume overload with gentle IVF started on Lasix now BID, monitor UOP. Still hypoxic on HFNC 30L/50% Continue inhaled steroids and duonebs for secretions. Incentive spirometer. Aspiration precautions   GI:  NGT removed today and diet advanced to CLD. POD3 lap repair with srinath patch with onur drain placement.   RENAL: ESPERANZA likely in the setting of ATN/dehydration, getting IVFs with D5 @50 for hypoglycemia -> discontinued as CLD today and BG improved. Diuresis with Lasix 40 BID,  Strict I/Os.  ENDO: Maintain euglycemia 120-180  ID: On zosyn and fluconazole for gastric perforation  HEME: on dvt ppx with SC Heparin    DISPO: Pt critically ill requiring ICU level of care.     CRITICAL CARE TIME SPENT: 38 minutes of critical care time spent providing medical care for patient's acute illness/conditions that impairs at least one vital organ system and/or poses a high risk of imminent or life threatening deterioration in the patient's condition. It includes time spent evaluating and treating the patient's acute illness as well as time spent reviewing labs, radiology, discussing goals of care with patient and/or patient's family, and discussing the case with a multidisciplinary team, in an effort to prevent further life threatening deterioration or end organ damage. This time is independent of any procedures performed.

## 2025-01-12 NOTE — DIETITIAN INITIAL EVALUATION ADULT - HEIGHT FOR BMI (CENTIMETERS)
Internal Medicine Progress Note    Please refer to attending note for official recommendations.     Subjective  Patient reports improved abdominal pain, no acute concerns.  When visited bedside he still appears about the same level of jaundiced and comfortable resting in bed.  Says he is able to comfortably stand up and use the bathroom as needed.  Asked about getting paracentesis at this hospital going forward, although this would make him a challenge for transplant team to keep track of him.    Review of Systems   Constitutional:  Negative for activity change, appetite change and fatigue.   HENT:  Negative for sneezing and sore throat.    Respiratory:  Negative for apnea, cough, chest tightness, shortness of breath and wheezing.    Cardiovascular:  Positive for leg swelling. Negative for chest pain and palpitations.   Gastrointestinal:  Positive for abdominal pain. Negative for abdominal distention and blood in stool.   Genitourinary:  Negative for difficulty urinating, flank pain and hematuria.   Musculoskeletal:  Negative for arthralgias.   Neurological:  Negative for dizziness, light-headedness, numbness and headaches.   Psychiatric/Behavioral:  Negative for agitation, behavioral problems and confusion.         Objective  Vitals with min/max:      Vital Last Value 24 Hour Range   Temperature 97.5 °F (36.4 °C) (01/01/25 0747) Temp  Min: 97.5 °F (36.4 °C)  Max: 98.4 °F (36.9 °C)   Pulse 69 (01/01/25 0747) Pulse  Min: 68  Max: 84   Respiratory 16 (12/31/24 2031) Resp  Min: 16  Max: 18   Non-Invasive  Blood Pressure 105/61 (01/01/25 0747) BP  Min: 95/56  Max: 117/69   Pulse Oximetry 100 % (01/01/25 0747) SpO2  Min: 97 %  Max: 100 %   Arterial   Blood Pressure   No data recorded      I/O's    Intake/Output Summary (Last 24 hours) at 1/1/2025 0750  Last data filed at 12/31/2024 1220  Gross per 24 hour   Intake 368 ml   Output --   Net 368 ml       Physical Exam  Constitutional:       General: He is not in acute  distress.     Appearance: He is not ill-appearing or toxic-appearing.   HENT:      Neck: No rigidity.   Eyes:      General: Scleral icterus present.   Cardiovascular:      Rate and Rhythm: Normal rate.      Heart sounds: No murmur heard.     No friction rub.   Pulmonary:      Effort: No respiratory distress.      Breath sounds: No stridor. No wheezing.   Abdominal:      General: There is no distension.      Palpations: There is no mass.      Tenderness: There is no abdominal tenderness. There is no guarding.   Musculoskeletal:         General: No tenderness.      Right lower leg: Edema present.      Left lower leg: Edema present.      Comments: Bilateral lower extremity swelling improved   Skin:     Coloration: Skin is jaundiced.      Findings: No bruising.   Neurological:      General: No focal deficit present.      Mental Status: He is oriented to person, place, and time. Mental status is at baseline.   Psychiatric:         Mood and Affect: Mood normal.         Behavior: Behavior normal.         Thought Content: Thought content normal.       Labs     Recent Results (from the past 48 hour(s))   Comprehensive Metabolic Panel    Collection Time: 12/30/24  3:26 PM    Specimen: Blood, Venous   Result Value Ref Range    Fasting Status      Sodium 140 135 - 145 mmol/L    Potassium 3.9 3.4 - 5.1 mmol/L    Chloride 107 97 - 110 mmol/L    Carbon Dioxide 24 21 - 32 mmol/L    Anion Gap 13 7 - 19 mmol/L    Glucose 111 (H) 70 - 99 mg/dL    BUN 13 6 - 20 mg/dL    Creatinine 1.04 0.67 - 1.17 mg/dL    Glomerular Filtration Rate 88 >=60    BUN/Cr 13 7 - 25    Calcium 8.9 8.4 - 10.2 mg/dL    Bilirubin, Total 6.1 (H) 0.2 - 1.0 mg/dL    GOT/AST 60 (H) <=37 Units/L    GPT/ALT 29 <64 Units/L    Alkaline Phosphatase 193 (H) 45 - 117 Units/L    Albumin 2.6 (L) 3.4 - 5.0 g/dL    Protein, Total 6.0 (L) 6.4 - 8.2 g/dL    Globulin 3.4 2.0 - 4.0 g/dL    A/G Ratio 0.8 (L) 1.0 - 2.4   Lipase    Collection Time: 12/30/24  3:26 PM    Specimen:  Blood, Venous   Result Value Ref Range    Lipase 31 15 - 77 Units/L   CBC with Automated Differential (performable only)    Collection Time: 12/30/24  3:26 PM    Specimen: Blood, Venous   Result Value Ref Range    WBC 4.1 (L) 4.2 - 11.0 K/mcL    RBC 2.07 (L) 4.50 - 5.90 mil/mcL    HGB 7.0 (L) 13.0 - 17.0 g/dL    HCT 21.7 (L) 39.0 - 51.0 %    .8 (H) 78.0 - 100.0 fl    MCH 33.8 26.0 - 34.0 pg    MCHC 32.3 32.0 - 36.5 g/dL    RDW-CV 19.0 (H) 11.0 - 15.0 %    RDW-SD 73.3 (H) 39.0 - 50.0 fL     (L) 140 - 450 K/mcL    NRBC 0 <=0 /100 WBC    Neutrophil, Percent 51 %    Lymphocytes, Percent 34 %    Mono, Percent 11 %    Eosinophils, Percent 3 %    Basophils, Percent 1 %    Immature Granulocytes 0 %    Absolute Neutrophils 2.1 1.8 - 7.7 K/mcL    Absolute Lymphocytes 1.4 1.0 - 4.8 K/mcL    Absolute Monocytes 0.4 0.3 - 0.9 K/mcL    Absolute Eosinophils  0.1 0.0 - 0.5 K/mcL    Absolute Basophils 0.0 0.0 - 0.3 K/mcL    Absolute Immature Granulocytes 0.0 0.0 - 0.2 K/mcL   TYPE/SCREEN    Collection Time: 12/30/24  3:26 PM    Specimen: Blood, Venous   Result Value Ref Range    ABO/RH(D) O Rh Positive     ANTIBODY SCREEN Negative     TYPE AND SCREEN EXPIRATION DATE 01/02/2025 23:59    Electrocardiogram 12-Lead    Collection Time: 12/30/24  3:27 PM   Result Value Ref Range    Ventricular Rate EKG/Min (BPM) 70     Atrial Rate (BPM) 70     [place]-Interval (MSEC) 164     QRS-Interval (MSEC) 90     QT-Interval (MSEC) 418     QTc 451     P Axis (Degrees) 68     R Axis (Degrees) 50     T Axis (Degrees) 47     REPORT TEXT       Normal sinus rhythm  Normal ECG  When compared with ECG of  25-SEP-2024 22:45,  PREVIOUS ECG IS PRESENT  Confirmed by PUNEET WHITE MD ([id number]) on 12/30/2024 9:44:57 PM     Lactic Acid Venous With Reflex    Collection Time: 12/30/24  9:43 PM    Specimen: Blood, Venous   Result Value Ref Range    Lactate, Venous 2.2 (HH) 0.0 - 2.0 mmol/L   Blood Culture    Collection Time: 12/30/24  9:43 PM    Specimen:  Blood   Result Value Ref Range    Culture, Blood or Bone Marrow No Growth <24 hours    Blood Culture    Collection Time: 12/30/24  9:58 PM    Specimen: Blood, Venous   Result Value Ref Range    Culture, Blood or Bone Marrow No Growth <24 hours    Urinalysis & Reflex Microscopy With Culture If Indicated    Collection Time: 12/30/24 11:42 PM    Specimen: Urine clean catch   Result Value Ref Range    COLOR, URINALYSIS Yellow     APPEARANCE, URINALYSIS Clear     GLUCOSE, URINALYSIS Negative Negative mg/dL    BILIRUBIN, URINALYSIS Negative Negative    KETONES, URINALYSIS Negative Negative mg/dL    SPECIFIC GRAVITY, URINALYSIS 1.013 1.005 - 1.030    OCCULT BLOOD, URINALYSIS Negative Negative    PH, URINALYSIS 6.5 5.0 - 7.0    PROTEIN, URINALYSIS Negative Negative mg/dL    UROBILINOGEN, URINALYSIS 0.2 0.2, 1.0 mg/dL    NITRITE, URINALYSIS Positive (A) Negative    LEUKOCYTE ESTERASE, URINALYSIS Moderate (A) Negative    SQUAMOUS EPITHELIAL, URINALYSIS None Seen None Seen, 1 to 5 /hpf    ERYTHROCYTES, URINALYSIS 1 to 2 None Seen, 1 to 2 /hpf    LEUKOCYTES, URINALYSIS 11 to 25 (A) None Seen, 1 to 5 /hpf    BACTERIA, URINALYSIS Few (A) None Seen /hpf    HYALINE CASTS, URINALYSIS None Seen None Seen, 1 to 5 /lpf    MUCUS Present    Lactic Acid, Venous    Collection Time: 12/31/24 12:40 AM    Specimen: Blood, Venous   Result Value Ref Range    Lactate, Venous 3.1 (HH) 0.0 - 2.0 mmol/L   Prothrombin Time (INR/PT)    Collection Time: 12/31/24 12:45 AM    Specimen: Blood, Venous   Result Value Ref Range    Protime- PT 17.4 (H) 9.7 - 11.8 sec    INR 1.7     Comprehensive Metabolic Panel    Collection Time: 12/31/24  6:16 AM    Specimen: Blood, Venous   Result Value Ref Range    Fasting Status      Sodium 140 135 - 145 mmol/L    Potassium 4.1 3.4 - 5.1 mmol/L    Chloride 110 97 - 110 mmol/L    Carbon Dioxide 24 21 - 32 mmol/L    Anion Gap 10 7 - 19 mmol/L    Glucose 102 (H) 70 - 99 mg/dL    BUN 13 6 - 20 mg/dL    Creatinine 0.91 0.67  - 1.17 mg/dL    Glomerular Filtration Rate >90 >=60    BUN/Cr 14 7 - 25    Calcium 8.4 8.4 - 10.2 mg/dL    Bilirubin, Total 5.3 (H) 0.2 - 1.0 mg/dL    GOT/AST 57 (H) <=37 Units/L    GPT/ALT 27 <64 Units/L    Alkaline Phosphatase 138 (H) 45 - 117 Units/L    Albumin 2.1 (L) 3.4 - 5.0 g/dL    Protein, Total 5.1 (L) 6.4 - 8.2 g/dL    Globulin 3.0 2.0 - 4.0 g/dL    A/G Ratio 0.7 (L) 1.0 - 2.4   Magnesium    Collection Time: 12/31/24  6:16 AM    Specimen: Blood, Venous   Result Value Ref Range    Magnesium 1.5 (L) 1.7 - 2.4 mg/dL   Phosphorus    Collection Time: 12/31/24  6:16 AM    Specimen: Blood, Venous   Result Value Ref Range    Phosphorus 4.0 2.4 - 4.7 mg/dL   CBC with Automated Differential (performable only)    Collection Time: 12/31/24  6:16 AM    Specimen: Blood, Venous   Result Value Ref Range    WBC 3.2 (L) 4.2 - 11.0 K/mcL    RBC 1.88 (L) 4.50 - 5.90 mil/mcL    HGB 6.6 (LL) 13.0 - 17.0 g/dL    HCT 19.6 (L) 39.0 - 51.0 %    .3 (H) 78.0 - 100.0 fl    MCH 35.1 (H) 26.0 - 34.0 pg    MCHC 33.7 32.0 - 36.5 g/dL    RDW-CV 19.1 (H) 11.0 - 15.0 %    RDW-SD 72.6 (H) 39.0 - 50.0 fL    PLT 69 (L) 140 - 450 K/mcL    NRBC 0 <=0 /100 WBC    Neutrophil, Percent 52 %    Lymphocytes, Percent 33 %    Mono, Percent 11 %    Eosinophils, Percent 3 %    Basophils, Percent 1 %    Immature Granulocytes 0 %    Absolute Neutrophils 1.7 (L) 1.8 - 7.7 K/mcL    Absolute Lymphocytes 1.0 1.0 - 4.8 K/mcL    Absolute Monocytes 0.4 0.3 - 0.9 K/mcL    Absolute Eosinophils  0.1 0.0 - 0.5 K/mcL    Absolute Basophils 0.0 0.0 - 0.3 K/mcL    Absolute Immature Granulocytes 0.0 0.0 - 0.2 K/mcL   Lactic Acid, Venous    Collection Time: 12/31/24  6:16 AM    Specimen: Blood, Venous   Result Value Ref Range    Lactate, Venous 1.2 0.0 - 2.0 mmol/L   Prepare Red Blood Cells: 1 Units    Collection Time: 12/31/24  7:58 AM   Result Value Ref Range    UNIT BLOOD TYPE O Pos     ISBT BLOOD TYPE 5100     BLOOD EXPIRATION DATE [id number]     UNIT NUMBER  [id number]     DISPENSE STATUS Transfused     PRODUCT ID Red Blood Cells     PRODUCT CODE [id number]     PRODUCT DESCRIPTION RBC AS-1 LR     CROSSMATCH RESULT Compatible     ISSUE DATE/TIME [id number]    Albumin, Fluid    Collection Time: 12/31/24 11:15 AM    Specimen: Peritoneal (Paracentesis) Fluid   Result Value Ref Range    Albumin, Fluid <1.0 0.5 - 1.4 g/dL   Glucose, Fluid    Collection Time: 12/31/24 11:15 AM    Specimen: Peritoneal (Paracentesis) Fluid   Result Value Ref Range    Glucose, Fluid 122 (H) 80 - 120 mg/dL   LDH, Fluid    Collection Time: 12/31/24 11:15 AM    Specimen: Peritoneal (Paracentesis) Fluid   Result Value Ref Range    LDH, Fluid 30 (L) 40 - 120 Units/L   Total Protein, Fluid    Collection Time: 12/31/24 11:15 AM    Specimen: Peritoneal (Paracentesis) Fluid   Result Value Ref Range    Total Protein, Fluid <2.0 1.0 - 2.5 g/dL   Anaerobe/Aerobe, Bacterial Culture With Gram Stain    Collection Time: 12/31/24 11:15 AM    Specimen: Abdomen; Peritoneal (Paracentesis) Fluid   Result Value Ref Range    Gram Stain Present Polymorphonuclear cells.     Gram Stain No organisms seen.     Gram Stain Slide prepared by Cyt[employer].    Fluid Cell Count and Differential (NON SYNOVIAL/NON CSF) (performable only)    Collection Time: 12/31/24 11:15 AM    Specimen: Peritoneal (Paracentesis) Fluid; Peritoneal (Ascites) Fluid    Result Value Ref Range    Fluid Color Yellow     Fluid Clarity Clear     Total Nucleated Cells 93 0 - 1,000 /mcL   Differential, Fluid    Collection Time: 12/31/24 11:15 AM    Specimen: Peritoneal (Paracentesis) Fluid; Peritoneal (Ascites) Fluid    Result Value Ref Range    Neutrophils %, Fluid 1 %    Lymphocytes %, Fluid 30 %    Mono/Macrophages %, Fluid 66 %    Mesothelial Cells %, Fluid 3 %    Total Cells Counted, Fluid 100    Blood Culture    Collection Time: 12/31/24  3:34 PM    Specimen: Blood, Peripheral   Result Value Ref Range    Culture, Blood or Bone Marrow No Growth <24  hours    Blood Culture    Collection Time: 12/31/24  3:45 PM    Specimen: Blood, Peripheral   Result Value Ref Range    Culture, Blood or Bone Marrow No Growth <24 hours    CBC with Automated Differential (performable only)    Collection Time: 01/01/25  7:12 AM    Specimen: Blood, Venous   Result Value Ref Range    WBC 3.4 (L) 4.2 - 11.0 K/mcL    RBC 2.38 (L) 4.50 - 5.90 mil/mcL    HGB 8.1 (L) 13.0 - 17.0 g/dL    HCT 24.5 (L) 39.0 - 51.0 %    .9 (H) 78.0 - 100.0 fl    MCH 34.0 26.0 - 34.0 pg    MCHC 33.1 32.0 - 36.5 g/dL    RDW-CV 18.6 (H) 11.0 - 15.0 %    RDW-SD 69.7 (H) 39.0 - 50.0 fL    PLT 71 (L) 140 - 450 K/mcL    NRBC 0 <=0 /100 WBC    Neutrophil, Percent 50 %    Lymphocytes, Percent 35 %    Mono, Percent 11 %    Eosinophils, Percent 3 %    Basophils, Percent 1 %    Immature Granulocytes 0 %    Absolute Neutrophils 1.7 (L) 1.8 - 7.7 K/mcL    Absolute Lymphocytes 1.2 1.0 - 4.8 K/mcL    Absolute Monocytes 0.4 0.3 - 0.9 K/mcL    Absolute Eosinophils  0.1 0.0 - 0.5 K/mcL    Absolute Basophils 0.0 0.0 - 0.3 K/mcL    Absolute Immature Granulocytes 0.0 0.0 - 0.2 K/mcL       Imaging    XR CHEST AP OR PA   Final Result      No active cardiopulmonary disease and no interval change.               Electronically Signed by: CHARLEY BAINS M.D.    Signed on: 12/31/2024 5:00 PM    Workstation ID: [id number]      US PARACENTESIS   Final Result   Technically successful ultrasound-guided paracentesis.      PLANS: none   ___________________________________________________________________________   ______________________________________      PROCEDURES PERFORMED:   Ultrasound Guided Diagnostic and Therapeutic Paracentesis      ANESTHESIA/SEDATION: Conscious sedation was not used for the procedure.      PROPHYLACTIC ANTIBIOTIC: None.      PREPARATION: The site was prepared and draped and all elements of maximal   sterile barrier technique including sterile gloves, sterile gown, mask,   large sterile sheet, hand hygiene  and cutaneous antisepsis with 2%   chlorhexidine +70% isopropyl alcohol were used. Discussion of Risks,   Benefits and Alternatives completed with patient/authorized decision maker.   Additionally, potential problems related to recuperation, likelihood of   achieving care, treatment and service goals were discussed. Relevant risks,   benefits and side effects related to alternatives, including the possible   results of not receiving care, treatment and services discussed. When   indicated, any limitations on the confidentiality of information learned   from or about the patient were explained. All patient/authorized decision   maker questions answered and consent for procedure was provided. The   patient's identification, correct procedure and position were verified. The   appropriate site was verified and marked as appropriate. Equipment/Implants   were checked for functionality and availability.      PROCEDURE/FINDINGS:   ULTRASOUND GUIDED PARACENTESIS:   A catheter/needle was introduced into the largest pocket of fluid in the   abdominal cavity under real ultrasound guidance.  The image demonstrates   the tip of the needle within the target area.  An image was sent to the   PACS for documentation purposes.  Using this access, a paracentesis was   performed. The catheter was then removed, and manual pressure was applied   to the puncture site, followed by application of a sterile dressing.      Location: Right lower quadrant   Needle: Yueh Needle   Description of the fluid: Clear yellow   Albumin: 0 grams intravenously   Amount drained: 4000 ml       SPECIMENS: Samples were sent for laboratory analysis      COMPLICATIONS: None      IMPLANTS: None      ESTIMATED BLOOD LOSS: Minimal      RADIATION/CONTRAST DOSE: None.      Electronically Signed by: MARCO A HERNANDEZ M.D.    Signed on: 12/31/2024 12:38 PM    Workstation ID: [id number]           Assessment and Plan:   Kraig is a 49 year old male with PMH alcoholic  liver cirrhosis (last drink 4mo ago) ascites, jaundice, hepatic encephalopathy, hepatic renal syndrome, hx disseminated nocardia with brain abscess (2022), tobacco use d/o, and alcohol use d/o who presents for abdominal pain.     #Moderate suspicion for spontaneous bacterial peritonitis  #Abdominal pain and distention secondary to above  #Lactic acidosis  #Hyperbilirubinemia  #Jaundice  #Alcoholic cirrhosis  - Paracentesis 12/31/2024 removed 4 L, diagnostic labs negative for SBP  Plan:   - Discontinued ceftriaxone, per GI recs no need to treat for SBP  - Continue PTA rifaximin  - Discontinue spironolactone, start eplerenone 25 mg daily as outpatient  - Continue furosemide 40mg daily, lactulose 30g daily, midodrine 5mg TID  - GI consulted, appreciate recs  - PT/OT    #Asymptomatic bacteriuria  #Pyuria  - Urine culture pending  Plan:  - No antibiotics at this time  - Monitor for symptoms    #Chronic anemia  #Pancytopenia  - Received 1 unit of RBC pack today for Hgb < 7  - Improved on 1/1/2025    #Postprandial nausea likely secondary to abdominal ascites  - Zofran as needed    #Alcohol use disorder (Last drink 4mos ago)  #Nicotine use disorder  #Peripheral neuropathy  - PTA naltrexone, gabapentin, nicotine patches, thiamine    Fluids: Replace as needed  Electrolytes: Replace as needed   Nutrition: NPO for paracentesis   DVT Prophylaxis: Lovenox 40 mg   Code Status:   Code Status: Full Resuscitation,   Primary Care Provider: Estephania Leo DO  Consults: GI    Smoking Cessation  Ready to quit: Not Answered  Counseling given: Not Answered  Tobacco comments: Smokes 1 ppd since 15 years old     See attending physician final recommendations.    Shola Schuster DO  Internal Medicine PGY 1   157.48

## 2025-01-12 NOTE — DIETITIAN INITIAL EVALUATION ADULT - PERTINENT MEDS FT
MEDICATIONS  (STANDING):  albumin human 25% IVPB 50 milliLiter(s) IV Intermittent every 6 hours  albuterol/ipratropium for Nebulization 3 milliLiter(s) Nebulizer every 4 hours  buDESOnide    Inhalation Suspension 0.5 milliGRAM(s) Inhalation two times a day  chlorhexidine 2% Cloths 1 Application(s) Topical <User Schedule>  dextrose 5%. 1000 milliLiter(s) (100 mL/Hr) IV Continuous <Continuous>  dextrose 5%. 1000 milliLiter(s) (50 mL/Hr) IV Continuous <Continuous>  dextrose 5%. 1000 milliLiter(s) (50 mL/Hr) IV Continuous <Continuous>  dextrose 50% Injectable 25 Gram(s) IV Push once  dextrose 50% Injectable 12.5 Gram(s) IV Push once  dextrose 50% Injectable 25 Gram(s) IV Push once  dextrose Oral Gel 15 Gram(s) Oral once  fluconAZOLE IVPB 200 milliGRAM(s) IV Intermittent every 24 hours  furosemide   Injectable 40 milliGRAM(s) IV Push two times a day  glucagon  Injectable 1 milliGRAM(s) IntraMuscular once  heparin   Injectable 5000 Unit(s) SubCutaneous every 8 hours  pantoprazole  Injectable 40 milliGRAM(s) IV Push daily  piperacillin/tazobactam IVPB.. 3.375 Gram(s) IV Intermittent every 8 hours  QUEtiapine 12.5 milliGRAM(s) Oral at bedtime    MEDICATIONS  (PRN):  guaiFENesin ER 1200 milliGRAM(s) Oral every 12 hours PRN Cough  HYDROmorphone  Injectable 0.5 milliGRAM(s) IV Push every 4 hours PRN Severe Pain (7 - 10)

## 2025-01-12 NOTE — DIETITIAN INITIAL EVALUATION ADULT - PERTINENT LABORATORY DATA
01-12    138  |  101  |  35[H]  ----------------------------<  86  3.5   |  31  |  1.90[H]    Ca    8.6      12 Jan 2025 05:30  Phos  4.0     01-12  Mg     1.7     01-12    TPro  5.0[L]  /  Alb  2.2[L]  /  TBili  0.7  /  DBili  x   /  AST  61[H]  /  ALT  37  /  AlkPhos  116  01-12  POCT Blood Glucose.: 91 mg/dL (01-12-25 @ 11:52)

## 2025-01-12 NOTE — DIETITIAN INITIAL EVALUATION ADULT - SIGNS/SYMPTOMS
as evidenced by <75% of EER for >1 month, NFPE findings- mild muscle depletion.  as evidenced by multiple pressure injuries.

## 2025-01-12 NOTE — PROGRESS NOTE ADULT - SUBJECTIVE AND OBJECTIVE BOX
Interval Events:   Patient started on D5 for hypoglycemia while NPO. She had increased work of breathing overnight while on HFNC and given 40 mg IV lasix. She had some agitation and deliruim overnight so was given ativan and started on low dose precedex. Will transition to seroquel at bedtime from tonight. Educated on incentive spirometry today.     Review of Systems:  Constitutional: +weakness  Resp: No cough, wheezing, shortness of breath  CVS: No chest pain, palpitations, leg swelling  GI: No abdominal pain, nausea, vomiting, diarrhea   : No dysuria, frequency, incontinence  Skin: No itching, burning, rashes, or lesions   Msk: No joint pain or swelling  Psych: No depression, anxiety, mood swings    ICU Vital Signs Last 24 Hrs  T(C): 37.4 (12 Jan 2025 11:45), Max: 37.5 (11 Jan 2025 16:51)  T(F): 99.3 (12 Jan 2025 11:45), Max: 99.5 (11 Jan 2025 16:51)  HR: 94 (12 Jan 2025 13:00) (77 - 141)  BP: 117/49 (12 Jan 2025 13:00) (98/55 - 158/64)  BP(mean): 67 (12 Jan 2025 13:00) (64 - 111)  ABP: 146/54 (11 Jan 2025 15:00) (146/54 - 146/60)  ABP(mean): 83 (11 Jan 2025 15:00) (83 - 89)  RR: 25 (12 Jan 2025 13:00) (16 - 34)  SpO2: 93% (12 Jan 2025 13:00) (92% - 100%)    O2 Parameters below as of 12 Jan 2025 11:30  Patient On (Oxygen Delivery Method): nasal cannula, high flow              01-11-25 @ 07:01  -  01-12-25 @ 07:00  --------------------------------------------------------  IN: 1843.8 mL / OUT: 2105 mL / NET: -261.2 mL    01-12-25 @ 07:01  -  01-12-25 @ 13:58  --------------------------------------------------------  IN: 375 mL / OUT: 0 mL / NET: 375 mL        CAPILLARY BLOOD GLUCOSE      POCT Blood Glucose.: 91 mg/dL (12 Jan 2025 11:52)      I&O's Summary    11 Jan 2025 07:01  -  12 Jan 2025 07:00  --------------------------------------------------------  IN: 1843.8 mL / OUT: 2105 mL / NET: -261.2 mL    12 Jan 2025 07:01  -  12 Jan 2025 13:58  --------------------------------------------------------  IN: 375 mL / OUT: 0 mL / NET: 375 mL        Physical Exam:   General: thin elderly female  HEENT: Pupils equal, reactive to light.  Symmetric.  PULM: coarse breath sounds b/l  CVS: Regular rate and rhythm  ABD: Soft, nondistended, nontender  EXT: No edema, nontender, warm  NEURO: Alert, oriented. Sensation intact. No focal deficits.    Meds:  fluconAZOLE IVPB IV Intermittent  piperacillin/tazobactam IVPB.. IV Intermittent    furosemide   Injectable IV Push    dextrose 50% Injectable IV Push  dextrose 50% Injectable IV Push  dextrose 50% Injectable IV Push  dextrose Oral Gel Oral  glucagon  Injectable IntraMuscular    albuterol/ipratropium for Nebulization Nebulizer  buDESOnide    Inhalation Suspension Inhalation  guaiFENesin ER Oral PRN    HYDROmorphone  Injectable IV Push PRN  QUEtiapine Oral      heparin   Injectable SubCutaneous    pantoprazole  Injectable IV Push      albumin human 25% IVPB IV Intermittent  dextrose 5%. IV Continuous  dextrose 5%. IV Continuous  dextrose 5%. IV Continuous      chlorhexidine 2% Cloths Topical                              9.2    24.99 )-----------( 150      ( 12 Jan 2025 05:30 )             29.1       01-12    138  |  101  |  35[H]  ----------------------------<  86  3.5   |  31  |  1.90[H]    Ca    8.6      12 Jan 2025 05:30  Phos  4.0     01-12  Mg     1.7     01-12    TPro  5.0[L]  /  Alb  2.2[L]  /  TBili  0.7  /  DBili  x   /  AST  61[H]  /  ALT  37  /  AlkPhos  116  01-12            Urinalysis Basic - ( 12 Jan 2025 05:30 )    Color: x / Appearance: x / SG: x / pH: x  Gluc: 86 mg/dL / Ketone: x  / Bili: x / Urobili: x   Blood: x / Protein: x / Nitrite: x   Leuk Esterase: x / RBC: x / WBC x   Sq Epi: x / Non Sq Epi: x / Bacteria: x

## 2025-01-12 NOTE — DIETITIAN INITIAL EVALUATION ADULT - ORAL INTAKE PTA/DIET HISTORY
Pt reports decreased appetite/intake x 2-3 weeks PTA. At baseline, pt has never been a big eater per spouse report.

## 2025-01-12 NOTE — PROGRESS NOTE ADULT - SUBJECTIVE AND OBJECTIVE BOX
Date of entry of this note is equal to date of services rendered    BRIEF HOSPITAL COURSE: 79F with HTN, HLD, CKD, PUD, Lung Cancer s/p RUL lobectomy who presents with a perforated gastric ulcer POD 2 from laparoscopic repair with Srinath Patch and Onur Drain placement. Pt transferred from  to V ICU for severe hypercapnic respiratory failure 2ry aspiration requiring intubation, now extubated with hospital course  further complicated by ESPERANZA d/t ATN.    INTERVAL EVENTS:   - Remains on HFNC now on 30L/50%  - POD#3 lap repair with srinath patch, NGT removed, diet advanced to clears  - D5 @50 for hypoglycemia  - Lasix 40mg now BID, making urine    REVIEW OF SYSTEMS:     [X] A ten-point review of systems was otherwise negative except as noted.  [ ] Due to altered mental status/intubation, subjective information were not able to be obtained from the patient. History was obtained, to the extent possible, from review of the chart and collateral sources of information.      PAST MEDICAL & SURGICAL HISTORY:  HLD (hyperlipidemia)      Rheumatoid arthritis      TIA (transient ischemic attack)  2012      Lung cancer  Right lung.      Chronic obstructive pulmonary disease, unspecified COPD type  O2 at night      Hiatal hernia with GERD      Essential hypertension      Childhood asthma      Stage 3 chronic kidney disease      Carotid artery plaque      IBS (irritable bowel syndrome)      Psoriatic arthritis      S/P lobectomy of lung  Right lung in 1996.      H/O colonoscopy      History of endoscopy  Sept 2017      S/P cholecystectomy        FAMILY HISTORY:  FH: type 2 diabetes (Mother)    FH: CAD (coronary artery disease) (Father, Sibling)    FH: colon cancer (Sibling)    FH: myocardial infarction (Father)    FH: stroke (Sibling)    FH: lung cancer (Sibling)        Vitals   ICU Vital Signs Last 24 Hrs  T(C): 37.6 (12 Jan 2025 20:00), Max: 38 (12 Jan 2025 17:15)  T(F): 99.7 (12 Jan 2025 20:00), Max: 100.4 (12 Jan 2025 17:15)  HR: 107 (12 Jan 2025 21:00) (77 - 141)  BP: 137/51 (12 Jan 2025 21:00) (85/68 - 149/62)  BP(mean): 75 (12 Jan 2025 21:00) (60 - 111)  ABP: --  ABP(mean): --  RR: 28 (12 Jan 2025 21:00) (16 - 34)  SpO2: 97% (12 Jan 2025 21:00) (92% - 100%)    O2 Parameters below as of 12 Jan 2025 19:34  Patient On (Oxygen Delivery Method): nasal cannula, high flow                PHYSICAL EXAM:  General: ill appearing, frail  HEENT: Pupils equal, reactive to light.  Symmetric.  PULM: Clear to auscultation bilaterally  CVS: Regular rate and rhythm  ABD: Soft, nondistended, nontender  EXT: No edema, nontender, warm  NEURO: Alert, oriented. Sensation intact. No focal deficits.      VENT SETTINGS         I&O's Detail    11 Jan 2025 07:01  -  12 Jan 2025 07:00  --------------------------------------------------------  IN:    Dexmedetomidine: 18.8 mL    dextrose 5%: 1000 mL    dextrose 5% + sodium chloride 0.45%: 150 mL    IV PiggyBack: 100 mL    IV PiggyBack: 300 mL    IV PiggyBack: 275 mL  Total IN: 1843.8 mL    OUT:    Drain (mL): 130 mL    Incontinent per Collection Bag (mL): 1000 mL    Indwelling Catheter - Urethral (mL): 575 mL    Nasogastric/Oral tube (mL): 400 mL  Total OUT: 2105 mL    Total NET: -261.2 mL      12 Jan 2025 07:01  -  12 Jan 2025 21:57  --------------------------------------------------------  IN:    dextrose 5%: 650 mL    IV PiggyBack: 25 mL    IV PiggyBack: 100 mL    IV PiggyBack: 100 mL    IV PiggyBack: 100 mL  Total IN: 975 mL    OUT:    Dexmedetomidine: 0 mL    Drain (mL): 50 mL    Incontinent per Collection Bag (mL): 600 mL    Nasogastric/Oral tube (mL): 40 mL  Total OUT: 690 mL    Total NET: 285 mL          LABS                        9.2    24.99 )-----------( 150      ( 12 Jan 2025 05:30 )             29.1     01-12    138  |  101  |  35[H]  ----------------------------<  86  3.5   |  31  |  1.90[H]    Ca    8.6      12 Jan 2025 05:30  Phos  4.0     01-12  Mg     1.7     01-12    TPro  5.0[L]  /  Alb  2.2[L]  /  TBili  0.7  /  DBili  x   /  AST  61[H]  /  ALT  37  /  AlkPhos  116  01-12   LIVER FUNCTIONS - ( 12 Jan 2025 05:30 )  Alb: 2.2 g/dL / Pro: 5.0 g/dL / ALK PHOS: 116 U/L / ALT: 37 U/L / AST: 61 U/L / GGT: x                 Urinalysis Basic - ( 12 Jan 2025 05:30 )    Color: x / Appearance: x / SG: x / pH: x  Gluc: 86 mg/dL / Ketone: x  / Bili: x / Urobili: x   Blood: x / Protein: x / Nitrite: x   Leuk Esterase: x / RBC: x / WBC x   Sq Epi: x / Non Sq Epi: x / Bacteria: x      POCT Blood Glucose.: 120 mg/dL *H* (01-12-25 @ 17:42)  POCT Blood Glucose.: 91 mg/dL (01-12-25 @ 11:52)  POCT Blood Glucose.: 90 mg/dL (01-12-25 @ 05:34)          RADIOLOGY: **             Date of entry of this note is equal to date of services rendered    BRIEF HOSPITAL COURSE: 79F with HTN, HLD, CKD, PUD, Lung Cancer s/p RUL lobectomy who presents with a perforated gastric ulcer POD 2 from laparoscopic repair with Srinath Patch and Onur Drain placement. Pt transferred from  to V ICU for severe hypercapnic respiratory failure 2ry aspiration requiring intubation, now extubated with hospital course  further complicated by ESPERANZA d/t ATN.    INTERVAL EVENTS:   - Remains on HFNC now on 30L/50%  - POD#3 lap repair with srinath patch, NGT removed, diet advanced to clears  - D5 @50 for hypoglycemia  - Lasix 40mg now BID, making urine    REVIEW OF SYSTEMS:     [X] A ten-point review of systems was otherwise negative except as noted.  [ ] Due to altered mental status/intubation, subjective information were not able to be obtained from the patient. History was obtained, to the extent possible, from review of the chart and collateral sources of information.      PAST MEDICAL & SURGICAL HISTORY:  HLD (hyperlipidemia)      Rheumatoid arthritis      TIA (transient ischemic attack)  2012      Lung cancer  Right lung.      Chronic obstructive pulmonary disease, unspecified COPD type  O2 at night      Hiatal hernia with GERD      Essential hypertension      Childhood asthma      Stage 3 chronic kidney disease      Carotid artery plaque      IBS (irritable bowel syndrome)      Psoriatic arthritis      S/P lobectomy of lung  Right lung in 1996.      H/O colonoscopy      History of endoscopy  Sept 2017      S/P cholecystectomy        FAMILY HISTORY:  FH: type 2 diabetes (Mother)    FH: CAD (coronary artery disease) (Father, Sibling)    FH: colon cancer (Sibling)    FH: myocardial infarction (Father)    FH: stroke (Sibling)    FH: lung cancer (Sibling)        Vitals   ICU Vital Signs Last 24 Hrs  T(C): 37.6 (12 Jan 2025 20:00), Max: 38 (12 Jan 2025 17:15)  T(F): 99.7 (12 Jan 2025 20:00), Max: 100.4 (12 Jan 2025 17:15)  HR: 107 (12 Jan 2025 21:00) (77 - 141)  BP: 137/51 (12 Jan 2025 21:00) (85/68 - 149/62)  BP(mean): 75 (12 Jan 2025 21:00) (60 - 111)  ABP: --  ABP(mean): --  RR: 28 (12 Jan 2025 21:00) (16 - 34)  SpO2: 97% (12 Jan 2025 21:00) (92% - 100%)    O2 Parameters below as of 12 Jan 2025 19:34  Patient On (Oxygen Delivery Method): nasal cannula, high flow        PHYSICAL EXAM:  General: thin elderly female  HEENT: Pupils equal, reactive to light.  Symmetric.  PULM: coarse breath sounds b/l  CVS: Regular rate and rhythm  ABD: Soft, nondistended, nontender  EXT: No edema, nontender, warm  NEURO: Alert, oriented. Sensation intact. No focal deficits.          I&O's Detail    11 Jan 2025 07:01  -  12 Jan 2025 07:00  --------------------------------------------------------  IN:    Dexmedetomidine: 18.8 mL    dextrose 5%: 1000 mL    dextrose 5% + sodium chloride 0.45%: 150 mL    IV PiggyBack: 100 mL    IV PiggyBack: 300 mL    IV PiggyBack: 275 mL  Total IN: 1843.8 mL    OUT:    Drain (mL): 130 mL    Incontinent per Collection Bag (mL): 1000 mL    Indwelling Catheter - Urethral (mL): 575 mL    Nasogastric/Oral tube (mL): 400 mL  Total OUT: 2105 mL    Total NET: -261.2 mL      12 Jan 2025 07:01  -  12 Jan 2025 21:57  --------------------------------------------------------  IN:    dextrose 5%: 650 mL    IV PiggyBack: 25 mL    IV PiggyBack: 100 mL    IV PiggyBack: 100 mL    IV PiggyBack: 100 mL  Total IN: 975 mL    OUT:    Dexmedetomidine: 0 mL    Drain (mL): 50 mL    Incontinent per Collection Bag (mL): 600 mL    Nasogastric/Oral tube (mL): 40 mL  Total OUT: 690 mL    Total NET: 285 mL          LABS                        9.2    24.99 )-----------( 150      ( 12 Jan 2025 05:30 )             29.1     01-12    138  |  101  |  35[H]  ----------------------------<  86  3.5   |  31  |  1.90[H]    Ca    8.6      12 Jan 2025 05:30  Phos  4.0     01-12  Mg     1.7     01-12    TPro  5.0[L]  /  Alb  2.2[L]  /  TBili  0.7  /  DBili  x   /  AST  61[H]  /  ALT  37  /  AlkPhos  116  01-12   LIVER FUNCTIONS - ( 12 Jan 2025 05:30 )  Alb: 2.2 g/dL / Pro: 5.0 g/dL / ALK PHOS: 116 U/L / ALT: 37 U/L / AST: 61 U/L / GGT: x                 Urinalysis Basic - ( 12 Jan 2025 05:30 )    Color: x / Appearance: x / SG: x / pH: x  Gluc: 86 mg/dL / Ketone: x  / Bili: x / Urobili: x   Blood: x / Protein: x / Nitrite: x   Leuk Esterase: x / RBC: x / WBC x   Sq Epi: x / Non Sq Epi: x / Bacteria: x    POCT Blood Glucose.: 120 mg/dL *H* (01-12-25 @ 17:42)  POCT Blood Glucose.: 91 mg/dL (01-12-25 @ 11:52)  POCT Blood Glucose.: 90 mg/dL (01-12-25 @ 05:34)        RADIOLOGY: *

## 2025-01-13 NOTE — CARE COORDINATION ASSESSMENT. - NSDCPLANSERVICES_GEN_ALL_CORE
Anticipated Needs Unclear at Present Patient agreed to use of [facility] [phone] services as needed./Anticipated Needs Unclear at Present

## 2025-01-13 NOTE — PROGRESS NOTE ADULT - SUBJECTIVE AND OBJECTIVE BOX
Subjective: restless, tachypneic.       MEDICATIONS  (STANDING):  acetaminophen     Tablet .. 650 milliGRAM(s) Oral every 6 hours  albuterol/ipratropium for Nebulization 3 milliLiter(s) Nebulizer every 4 hours  buDESOnide    Inhalation Suspension 0.5 milliGRAM(s) Inhalation two times a day  chlorhexidine 2% Cloths 1 Application(s) Topical <User Schedule>  dextrose 5%. 1000 milliLiter(s) (100 mL/Hr) IV Continuous <Continuous>  dextrose 5%. 1000 milliLiter(s) (50 mL/Hr) IV Continuous <Continuous>  dextrose 50% Injectable 25 Gram(s) IV Push once  dextrose 50% Injectable 12.5 Gram(s) IV Push once  dextrose 50% Injectable 25 Gram(s) IV Push once  dextrose Oral Gel 15 Gram(s) Oral once  fluconAZOLE IVPB 200 milliGRAM(s) IV Intermittent every 24 hours  furosemide   Injectable 40 milliGRAM(s) IV Push two times a day  glucagon  Injectable 1 milliGRAM(s) IntraMuscular once  guaiFENesin  milliGRAM(s) Oral every 12 hours  heparin   Injectable 5000 Unit(s) SubCutaneous every 8 hours  pantoprazole    Tablet 40 milliGRAM(s) Oral two times a day  piperacillin/tazobactam IVPB.. 3.375 Gram(s) IV Intermittent every 8 hours  potassium chloride  10 mEq/100 mL IVPB 10 milliEquivalent(s) IV Intermittent every 1 hour  QUEtiapine 12.5 milliGRAM(s) Oral at bedtime    MEDICATIONS  (PRN):  guaifenesin/dextromethorphan Oral Liquid 20 milliLiter(s) Oral every 8 hours PRN Cough/chest congestion  oxyCODONE    IR 5 milliGRAM(s) Oral every 4 hours PRN Moderate Pain (4 - 6)  oxyCODONE    IR 10 milliGRAM(s) Oral every 4 hours PRN Severe Pain (7 - 10)          T(C): 37.2 (01-13-25 @ 12:35), Max: 38 (01-12-25 @ 17:15)  HR: 90 (01-13-25 @ 12:46) (86 - 132)  BP: 136/69 (01-13-25 @ 12:00) (85/68 - 137/51)  RR: 25 (01-13-25 @ 12:00) (19 - 39)  SpO2: 92% (01-13-25 @ 12:00) (92% - 100%)  Wt(kg): --        I&O's Detail    12 Jan 2025 07:01  -  13 Jan 2025 07:00  --------------------------------------------------------  IN:    dextrose 5%: 800 mL    IV PiggyBack: 100 mL    IV PiggyBack: 175 mL    IV PiggyBack: 200 mL    IV PiggyBack: 200 mL    IV PiggyBack: 100 mL  Total IN: 1575 mL    OUT:    Dexmedetomidine: 0 mL    Drain (mL): 110 mL    Incontinent per Collection Bag (mL): 900 mL    Nasogastric/Oral tube (mL): 40 mL  Total OUT: 1050 mL    Total NET: 525 mL      13 Jan 2025 07:01  -  13 Jan 2025 12:49  --------------------------------------------------------  IN:    IV PiggyBack: 500 mL    IV PiggyBack: 75 mL  Total IN: 575 mL    OUT:  Total OUT: 0 mL    Total NET: 575 mL               PHYSICAL EXAM:    GENERAL: restless, tachypneic  NECK: Supple, no inc in JVP  CHEST/LUNG: crackles  HEART: S1S2  ABDOMEN: pos lap sx, R abd drain  EXTREMITIES: min edema.    NEURO: no asterixis      LABS:  CBC Full  -  ( 13 Jan 2025 06:05 )  WBC Count : 16.95 K/uL  RBC Count : 2.88 M/uL  Hemoglobin : 8.4 g/dL  Hematocrit : 27.3 %  Platelet Count - Automated : 132 K/uL  Mean Cell Volume : 94.8 fl  Mean Cell Hemoglobin : 29.2 pg  Mean Cell Hemoglobin Concentration : 30.8 g/dL  Auto Neutrophil # : x  Auto Lymphocyte # : x  Auto Monocyte # : x  Auto Eosinophil # : x  Auto Basophil # : x  Auto Neutrophil % : x  Auto Lymphocyte % : x  Auto Monocyte % : x  Auto Eosinophil % : x  Auto Basophil % : x    01-13    138  |  96  |  34[H]  ----------------------------<  132[H]  2.7[LL]   |  33[H]  |  2.00[H]    Ca    8.5      13 Jan 2025 06:05  Phos  3.4     01-13  Mg     1.2     01-13    TPro  5.6[L]  /  Alb  3.2[L]  /  TBili  1.0  /  DBili  x   /  AST  49[H]  /  ALT  30  /  AlkPhos  176[H]  01-13            Impression:  * ESPERANZA, - suspect ischemic ATN. Resolving. Peak Cr 2.20  * Hyperkalemia resolved  * Perforated gastricl ulcer. S/p lap repair  * Post op acute respiratory failure better  * CKD 3 presumably due to RVD, hypertensive nephrosclerosis. Cr ranging widely 1.1-1.5 per historical data    Recommendations:   Volume management per PCCM.   Would hold Lasix till K is consistently > 3.6  Supplement K, Mg  Monitor I/O and daily BMP/Cr

## 2025-01-13 NOTE — CARE COORDINATION ASSESSMENT. - OTHER PERTINENT DISCHARGE PLANNING INFORMATION:
CM met with patient and spouse at bedside to explain role and transitions of care. Patient lives with spouse in a private house with 0 steps to get in and 14 to the second floor. Spouse David identified as caregiver, no home care services reported. Patient has oxygen in home.  Spouse will transport patient home when ready to be discharge.  No needs identified at this time.  CM explained  home care expectation, process, insurance provision and home safety with good understanding.  CM to make referral. Patient and spouse verbalized understanding of plans after discharge and are in agreement.  Resource folder left at bedside.  All questions answered to the best of my abilities.  CM remains available throughout the hospital stay.   CM met with patient and spouse at bedside to explain role and transitions of care. Patient lives with spouse in a private house with 0 steps to get in and 14 to the second floor. Spouse David identified as caregiver, no home care services reported. Patient agreed to use of [facility] [phone] services as needed. Patient has oxygen in home.  Spouse will transport patient home when ready to be discharge.  No needs identified at this time.  CM explained  home care expectation, process, insurance provision and home safety with good understanding.  CM to make referral. Patient and spouse verbalized understanding of plans after discharge and are in agreement.  Resource folder left at bedside.  All questions answered to the best of my abilities.  CM remains available throughout the hospital stay.

## 2025-01-13 NOTE — PROGRESS NOTE ADULT - ASSESSMENT
79F with HTN, HLD, CKD, PUD, Lung Cancer s/p RUL lobectomy who presents with a perforated gastric ulcer POD 2 from laparoscopic repair with Srinath Patch and Onur Drain placement. Pt transferred from  to [facility] ICU for severe hypercapnic respiratory failure 2ry aspiration requiring intubation, now extubated with hospital course further complicated by ESPERANZA d/t ATN. Admitted to the ICU with:     Perforated gastric ulcer s/p lap repair with Srinath patch and onur drain  Acute hypoxic/hypercapnic respiratory failure  Aspiration PNA  Pneumoperitoneum  ESPERANZA    NEURO:   - c/w Seroquel qhs  - pain regimen: tylenol prn mild, oxy 5 prn moderate, oxy 10 prn severe    CV:   - Maintain MAP>65    PULM:   - Pt had severe hypercapnic resp failure, aspiration requiring intubation, now extubated. Remains on HFNC  - Continue inhaled steroids and duonebs for secretions.   - Incentive spirometer  - Aspiration precautions   - Component of volume overload with gentle IVF started on Lasix now BID,  - mucinex and robitussin prn for cough     GI:    -  POD4 lap repair with srinath patch with onur drain placement.  - full liquid diet     RENAL:   - ESPERANZA likely in the setting of ATN/dehydration, s/p IVFs with D5 for hypoglycemia -> discontinued as tolerating PO now  - Diuresis with Lasix 40 BID  - Strict I/Os.    ENDO:   - no active issues  - Maintain euglycemia 120-180    ID:   - On zosyn and fluconazole for gastric perforation  - monitor WBC    HEME:   - on dvt ppx with SC Heparin     79F with HTN, HLD, CKD, PUD, Lung Cancer s/p RUL lobectomy who presents with a perforated gastric ulcer POD 2 from laparoscopic repair with Srinath Patch and Onur Drain placement. Pt transferred from  to [facility] ICU for severe hypercapnic respiratory failure 2ry aspiration requiring intubation, now extubated with hospital course further complicated by ESPERANZA d/t ATN. Admitted to the ICU with:     Perforated gastric ulcer s/p lap repair with Srinath patch and onur drain  Acute hypoxic/hypercapnic respiratory failure  Aspiration PNA  Pneumoperitoneum  ESPERANZA    NEURO:   - c/w Seroquel qhs  - pain regimen: tylenol prn mild, oxy 5 prn moderate, oxy 10 prn severe    CV:   - Maintain MAP>65    PULM:   - Pt had severe hypercapnic resp failure, aspiration requiring intubation, now extubated. Remains on HFNC  - Continue inhaled steroids and duonebs for secretions.   - Incentive spirometer  - Aspiration precautions   - Component of volume overload with gentle IVF started on Lasix now BID,  - mucinex and robitussin prn for cough     GI:    -  POD4 lap repair with srinath patch with onur drain placement.  - full liquid diet   - protonix bid    RENAL:   - ESPERANZA likely in the setting of ATN/dehydration, s/p IVFs with D5 for hypoglycemia -> discontinued as tolerating PO now  - Diuresis with Lasix 40 BID  - Strict I/Os.  - hypokalemia s/p repletion; repeat BMP @ 4 PM  - monitor and replete lytes prn    ENDO:   - no active issues  - Maintain euglycemia 120-180    ID:   - On zosyn and fluconazole for gastric perforation  - monitor WBC    HEME:   - on dvt ppx with SC Heparin

## 2025-01-13 NOTE — PROGRESS NOTE ADULT - SUBJECTIVE AND OBJECTIVE BOX
Patient is a 79y old  Female who presents with a chief complaint of transferred from [place] s/p grahm patch repair (13 Jan 2025 12:49)    Interval events: POD4 s/p lap repair with natan patch and anthony drain placement for perforated gastric ulcer. S/p IV ofirmev for mild fever. S/p D5 for hypoglycemia while NPO, now on clear liquid diet, advancing to full.     Review of Systems:  Constitutional: + weakness  Neuro: no headache, numbness, weakness  Resp: no cough, wheezing, shortness of breath  CVS: no chest pain, palpitations, leg swelling  GI: no abdominal pain, nausea, vomiting, diarrhea   : no dysuria, frequency, incontinence  Skin: no itching, burning, rashes, or lesions   Msk: no joint pain or swelling  Psych: no depression, anxiety    T(F): 99 (01-13-25 @ 12:35), Max: 100.4 (01-12-25 @ 17:15)  HR: 86 (01-13-25 @ 14:00) (86 - 132)  BP: 98/50 (01-13-25 @ 14:00) (85/68 - 137/51)  RR: 21 (01-13-25 @ 14:00) (19 - 39)  SpO2: 96% (01-13-25 @ 14:00) (92% - 100%)  Wt(kg): --        CAPILLARY BLOOD GLUCOSE      POCT Blood Glucose.: 126 mg/dL (13 Jan 2025 11:54)    I&O's Summary    12 Jan 2025 07:01  -  13 Jan 2025 07:00  --------------------------------------------------------  IN: 1575 mL / OUT: 1050 mL / NET: 525 mL    13 Jan 2025 07:01  -  13 Jan 2025 14:23  --------------------------------------------------------  IN: 650 mL / OUT: 0 mL / NET: 650 mL        Physical Exam:     General: thin elderly female  HEENT: NC/AT  PULM: coarse breath sounds b/l  CVS: Regular rate and rhythm  ABD: Soft, nondistended, nontender. JULIANN drain RUQ with SS output.  EXT: No edema, nontender, warm  NEURO: Alert, oriented. Sensation intact. No focal deficits.    Meds:  MEDICATIONS  (STANDING):  acetaminophen     Tablet .. 650 milliGRAM(s) Oral every 6 hours  albuterol/ipratropium for Nebulization 3 milliLiter(s) Nebulizer every 4 hours  buDESOnide    Inhalation Suspension 0.5 milliGRAM(s) Inhalation two times a day  chlorhexidine 2% Cloths 1 Application(s) Topical <User Schedule>  dextrose 5%. 1000 milliLiter(s) (100 mL/Hr) IV Continuous <Continuous>  dextrose 5%. 1000 milliLiter(s) (50 mL/Hr) IV Continuous <Continuous>  dextrose 50% Injectable 25 Gram(s) IV Push once  dextrose 50% Injectable 12.5 Gram(s) IV Push once  dextrose 50% Injectable 25 Gram(s) IV Push once  dextrose Oral Gel 15 Gram(s) Oral once  fluconAZOLE IVPB 200 milliGRAM(s) IV Intermittent every 24 hours  furosemide   Injectable 40 milliGRAM(s) IV Push two times a day  glucagon  Injectable 1 milliGRAM(s) IntraMuscular once  guaiFENesin  milliGRAM(s) Oral every 12 hours  heparin   Injectable 5000 Unit(s) SubCutaneous every 8 hours  pantoprazole    Tablet 40 milliGRAM(s) Oral two times a day  piperacillin/tazobactam IVPB.. 3.375 Gram(s) IV Intermittent every 8 hours  QUEtiapine 12.5 milliGRAM(s) Oral at bedtime    MEDICATIONS  (PRN):  guaifenesin/dextromethorphan Oral Liquid 20 milliLiter(s) Oral every 8 hours PRN Cough/chest congestion  oxyCODONE    IR 5 milliGRAM(s) Oral every 4 hours PRN Moderate Pain (4 - 6)  oxyCODONE    IR 10 milliGRAM(s) Oral every 4 hours PRN Severe Pain (7 - 10)                            8.4    16.95 )-----------( 132      ( 13 Jan 2025 06:05 )             27.3       01-13    138  |  96  |  34[H]  ----------------------------<  132[H]  2.7[LL]   |  33[H]  |  2.00[H]    Ca    8.5      13 Jan 2025 06:05  Phos  3.4     01-13  Mg     1.2     01-13    TPro  5.6[L]  /  Alb  3.2[L]  /  TBili  1.0  /  DBili  x   /  AST  49[H]  /  ALT  30  /  AlkPhos  176[H]  01-13            Urinalysis Basic - ( 13 Jan 2025 06:05 )    Color: x / Appearance: x / SG: x / pH: x  Gluc: 132 mg/dL / Ketone: x  / Bili: x / Urobili: x   Blood: x / Protein: x / Nitrite: x   Leuk Esterase: x / RBC: x / WBC x   Sq Epi: x / Non Sq Epi: x / Bacteria: x        JULIANN drain RUQ: Y  CENTRAL LINE: N     DE LA FUENTE: N        A-LINE: N       Patient is a 79y old  Female who presents with a chief complaint of transferred from [place] s/p grahm patch repair (13 Jan 2025 12:49)    Interval events: POD4 s/p lap repair with natan patch and anthony drain placement for perforated gastric ulcer. S/p IV ofirmev for mild fever 100.4 this am. S/p D5 for hypoglycemia while NPO, now on clear liquid diet, advancing to full.       T(F): 99 (01-13-25 @ 12:35), Max: 100.4 (01-12-25 @ 17:15)  HR: 86 (01-13-25 @ 14:00) (86 - 132)  BP: 98/50 (01-13-25 @ 14:00) (85/68 - 137/51)  RR: 21 (01-13-25 @ 14:00) (19 - 39)  SpO2: 96% (01-13-25 @ 14:00) (92% - 100%)  Wt(kg): --        CAPILLARY BLOOD GLUCOSE      POCT Blood Glucose.: 126 mg/dL (13 Jan 2025 11:54)    I&O's Summary    12 Jan 2025 07:01  -  13 Jan 2025 07:00  --------------------------------------------------------  IN: 1575 mL / OUT: 1050 mL / NET: 525 mL    13 Jan 2025 07:01  -  13 Jan 2025 14:23  --------------------------------------------------------  IN: 650 mL / OUT: 0 mL / NET: 650 mL        Physical Exam:     General: thin, frail, elderly female, chronically ill appearing  HEENT: NC/AT  PULM: coarse breath sounds b/l  CVS: Regular rate and rhythm  ABD: Soft, nondistended, nontender. JULIANN drain RUQ with SS output.  EXT: No edema, nontender, warm  NEURO: Alert, oriented. Sensation intact    Meds:  MEDICATIONS  (STANDING):  acetaminophen     Tablet .. 650 milliGRAM(s) Oral every 6 hours  albuterol/ipratropium for Nebulization 3 milliLiter(s) Nebulizer every 4 hours  buDESOnide    Inhalation Suspension 0.5 milliGRAM(s) Inhalation two times a day  chlorhexidine 2% Cloths 1 Application(s) Topical <User Schedule>  dextrose 5%. 1000 milliLiter(s) (100 mL/Hr) IV Continuous <Continuous>  dextrose 5%. 1000 milliLiter(s) (50 mL/Hr) IV Continuous <Continuous>  dextrose 50% Injectable 25 Gram(s) IV Push once  dextrose 50% Injectable 12.5 Gram(s) IV Push once  dextrose 50% Injectable 25 Gram(s) IV Push once  dextrose Oral Gel 15 Gram(s) Oral once  fluconAZOLE IVPB 200 milliGRAM(s) IV Intermittent every 24 hours  furosemide   Injectable 40 milliGRAM(s) IV Push two times a day  glucagon  Injectable 1 milliGRAM(s) IntraMuscular once  guaiFENesin  milliGRAM(s) Oral every 12 hours  heparin   Injectable 5000 Unit(s) SubCutaneous every 8 hours  pantoprazole    Tablet 40 milliGRAM(s) Oral two times a day  piperacillin/tazobactam IVPB.. 3.375 Gram(s) IV Intermittent every 8 hours  QUEtiapine 12.5 milliGRAM(s) Oral at bedtime    MEDICATIONS  (PRN):  guaifenesin/dextromethorphan Oral Liquid 20 milliLiter(s) Oral every 8 hours PRN Cough/chest congestion  oxyCODONE    IR 5 milliGRAM(s) Oral every 4 hours PRN Moderate Pain (4 - 6)  oxyCODONE    IR 10 milliGRAM(s) Oral every 4 hours PRN Severe Pain (7 - 10)                            8.4    16.95 )-----------( 132      ( 13 Jan 2025 06:05 )             27.3       01-13    138  |  96  |  34[H]  ----------------------------<  132[H]  2.7[LL]   |  33[H]  |  2.00[H]    Ca    8.5      13 Jan 2025 06:05  Phos  3.4     01-13  Mg     1.2     01-13    TPro  5.6[L]  /  Alb  3.2[L]  /  TBili  1.0  /  DBili  x   /  AST  49[H]  /  ALT  30  /  AlkPhos  176[H]  01-13            Urinalysis Basic - ( 13 Jan 2025 06:05 )    Color: x / Appearance: x / SG: x / pH: x  Gluc: 132 mg/dL / Ketone: x  / Bili: x / Urobili: x   Blood: x / Protein: x / Nitrite: x   Leuk Esterase: x / RBC: x / WBC x   Sq Epi: x / Non Sq Epi: x / Bacteria: x      < from: Xray Chest 1 View-PORTABLE IMMEDIATE (Xray Chest 1 View-PORTABLE IMMEDIATE .) (01.13.25 @ 19:38) >  FINDINGS:    Single frontal view of the chest demonstrates multiple chronic   right-sided rib fractures. Right lung by loss with complete   opacification. Improved left lung infiltrates. Osteopenia.. The   cardiomediastinal silhouette is unremarkable. No acute osseous   abnormalities. Overlying EKG leads and wires are noted    IMPRESSION: Improved left lung infiltrates.    --- End of Report ---    < end of copied text >  < from: Xray Chest 1 View-PORTABLE IMMEDIATE (Xray Chest 1 View-PORTABLE IMMEDIATE .) (01.13.25 @ 17:58) >  INTERPRETATION:  AP chest on January 13, 2025 at 5:38 PM. Patient is   hypoxic. There is history of perforated intestine.    Extensive trauma and/or surgery to the right chest removal of multiple   ribs and marked loss of volume of the right chest and right mediastinal   shift again noted. Right chest is almost totally which is increased from   January 9.    NG tube present on January 9 is been removed. NG tube has also been   removed.    Present film shows slightly increasing left upper lobe and left base   infiltrate.    IMPRESSION: NG tube and endotracheal tube removed. Increasing left upper   lobe and left base infiltrate. There is increasing opacification of the   right chest.    --- End of Report ---      < end of copied text >      JULIANN drain RUQ: Y  CENTRAL LINE: N     DE LA FUENTE: N        A-LINE: N

## 2025-01-13 NOTE — PROGRESS NOTE ADULT - ASSESSMENT
78 y/o WF with PMHx with HTN, HLD, CKD, PUD (dx 2011), Lung CA s/p lobectomy (1996) presents to [facility] [place] ED from home with  via ambulance c/o worsening generalized achy abdominal pain since last night.  Patient reports baseline abdominal discomfort x several years which worsened suddenly    intestinal perf  atelectasis  pleural eff  HTN  HLD  CKD  PUD  PNA hx  Lung Ca - hx of Lobectomy     vs noted - meds reviewed -   fio2 wean as tolerated - HF NC - monitor Work of Breathing -   antimicrobial rx regimen  pain relief  PO clears -   nebs -     post op care  PUD perf - surgery and op note reviewed  lung protective vent support  fio2 titration  oral hygiene  skin care  suction PRN  HOB elev  PPI  Bronchodilators - Stress steroids - hx of COPD -   chr changes - deformity - lung parenchyma and chest - see CT chest -   ICU care  I and O  serial labs  replete lytes  dvt p  pain relief

## 2025-01-13 NOTE — PATIENT CHOICE NOTE. - NSPTCHOICESTATE_GEN_ALL_CORE

## 2025-01-13 NOTE — CARE COORDINATION ASSESSMENT. - CURRENT MENTAL STATUS/COGNITIVE FUNCTIONING
Patient responded to verbal commands but assisted by spouse in completing assessment./alert/oriented to person/oriented to place/oriented to time/oriented to situation

## 2025-01-13 NOTE — CAREGIVER ENGAGEMENT NOTE - CAREGIVER OUTREACH NOTES - FREE TEXT
CM met with caregiver at patient bedside explain role and transitions of care.  All questions answered to the best of my abilities.  CM remains available throughout the hospital stay.

## 2025-01-13 NOTE — CARE COORDINATION ASSESSMENT. - NSCAREPROVIDERS_GEN_ALL_CORE_FT
CARE PROVIDERS:  Administration: Casper Chirinos  Administration: Jamir Good  Administration: Chad Dunbar  Admitting: Marcelino Valdez  Attending: Amanda Kaba  Case Management: Mercedes Bliss  Clinical Doc. Improvement: Elizabeth Silverman  Consultant: Ethel Umaña  Consultant: Khan, Fahrina  Consultant: Lalo Spivey  Consultant: Zara Amanda  Consultant: Aaron Mejia  Consultant: Bhavin Fuller  Consultant: Marcelino Valdez  Consultant: Casper Cristobal  Consultant: Cesar Johnson  Consultant: Weil, Patricia  Consultant: Bhupinder Glass  Nurse: Angelica Hernandez  Nurse: Kleiner, Patricia  Nurse: Estela Middleton  Nurse: Bebe Villatoro  Nurse: Kamila Hernandez  Ordered: Doctor, Unknown  Ordered: ADM, User  Outpatient Provider: Adriel Galan  Outpatient Provider: Lukas Fernandez  Outpatient Provider: Hugo Oh  Outpatient Provider: Bhupinder Glass  Override: Radha Maria  PCA/Nursing Assistant: Graciela Sarmiento  PCA/Nursing Assistant: Erika Valenzuela  Primary Team: Ferny Barnard  Primary Team: Valeri Eduardo  Primary Team: Sofya Bahena  Primary Team: Sina Hunter  Primary Team: Ethel Merida  Primary Team: Yogi Taylor  Primary Team: Jean Mckeon  Primary Team: Michelle Frazier  Primary Team: Gwen Rosado  Primary Team: Casper Serrano  Primary Team: Jorge Luis Garcia  Primary Team: Brannon Velazquez  Primary Team: Alma Gauthier  Registered Dietitian: Leslie Monet  Respiratory Therapy: Nell Pan  Respiratory Therapy: Pascual Trujillo// Supp. Assoc.: Benedict Larios

## 2025-01-13 NOTE — PROGRESS NOTE ADULT - ATTENDING COMMENTS
79F with HTN, HLD, CKD, PUD, Lung Cancer s/p RUL lobectomy who presents with a perforated gastric ulcer POD 4 from laparoscopic repair with Srinath Patch and Onur Drain placement.  Postop course complicated by prolonged intubation for hypercapnic respiratory failure 2/2 aspiration, ESPERANZA 2/2 ATN.  Remains on HFNC    --pain control with IV tylenol  --volume overload, continue lasix  --acute hypoxemic respiratory failure on HFNC weaned flow to 20L  diuresis as above  secretion clearance with bronchdilators, inhaled steroids, mucinex, robitussin  --POD 4 Srinath patch for perforated   continue clear liquid diet  PPI bid for PUD  --ESPERANZA persists  diurese for volume overload  hypokalemia and hypomag, replace, repeat in pm  --zosyn and fluconazole for peritonitis from perforated     **This evening with progressive worsening of hypoxemia, HFNC escalated to max 40L and 100%, then added NRB, transitioned to AVAPS on max support.  CXR with L base opacity.  POCUS shows small L effusion and B lines at bases, A lines superiorly.  Ultimately had worsening mental status and impending respiratory arrest and was emergently intubated.    Belia intubation with development of shock, likely from respiratory acidosis and sedation, wean as tolerates.  Check sputum Cx, continue zosyn and fluc for now    Events discussed with  throughout the day

## 2025-01-13 NOTE — PROGRESS NOTE ADULT - SUBJECTIVE AND OBJECTIVE BOX
Date/Time Patient Seen:  		  Referring MD:   Data Reviewed	       Patient is a 79y old  Female who presents with a chief complaint of transferred from [place] s/p grahm patch repair (12 Jan 2025 21:56)      Subjective/HPI     PAST MEDICAL & SURGICAL HISTORY:  Asthma    HLD (hyperlipidemia)    Rheumatoid arthritis    TIA (transient ischemic attack)  2012    Esophageal reflux    Lung cancer  Right lung.    Chronic obstructive pulmonary disease, unspecified COPD type  O2 at night    Hiatal hernia with GERD    Essential hypertension    Hyperlipidemia, unspecified hyperlipidemia type    Calculus of gallbladder with chronic cholecystitis without obstruction    Childhood asthma    Stage 3 chronic kidney disease    Carotid artery plaque    IBS (irritable bowel syndrome)    Psoriatic arthritis    S/P lobectomy of lung  Right lung in 1996.    H/O colonoscopy    History of endoscopy  Sept 2017    S/P cholecystectomy    CAD (coronary artery disease)          Medication list         MEDICATIONS  (STANDING):  albuterol/ipratropium for Nebulization 3 milliLiter(s) Nebulizer every 4 hours  buDESOnide    Inhalation Suspension 0.5 milliGRAM(s) Inhalation two times a day  chlorhexidine 2% Cloths 1 Application(s) Topical <User Schedule>  dextrose 5%. 1000 milliLiter(s) (50 mL/Hr) IV Continuous <Continuous>  dextrose 5%. 1000 milliLiter(s) (100 mL/Hr) IV Continuous <Continuous>  dextrose 50% Injectable 25 Gram(s) IV Push once  dextrose 50% Injectable 12.5 Gram(s) IV Push once  dextrose 50% Injectable 25 Gram(s) IV Push once  dextrose Oral Gel 15 Gram(s) Oral once  fluconAZOLE IVPB 200 milliGRAM(s) IV Intermittent every 24 hours  furosemide   Injectable 40 milliGRAM(s) IV Push two times a day  glucagon  Injectable 1 milliGRAM(s) IntraMuscular once  heparin   Injectable 5000 Unit(s) SubCutaneous every 8 hours  pantoprazole  Injectable 40 milliGRAM(s) IV Push daily  piperacillin/tazobactam IVPB.. 3.375 Gram(s) IV Intermittent every 8 hours  QUEtiapine 12.5 milliGRAM(s) Oral at bedtime    MEDICATIONS  (PRN):  guaifenesin/dextromethorphan Oral Liquid 20 milliLiter(s) Oral every 4 hours PRN Cough / chest congestion  HYDROmorphone  Injectable 0.5 milliGRAM(s) IV Push every 4 hours PRN Severe Pain (7 - 10)         Vitals log        ICU Vital Signs Last 24 Hrs  T(C): 36.9 (13 Jan 2025 04:40), Max: 38 (12 Jan 2025 17:15)  T(F): 98.5 (13 Jan 2025 04:40), Max: 100.4 (12 Jan 2025 17:15)  HR: 95 (13 Jan 2025 05:00) (80 - 132)  BP: 135/49 (13 Jan 2025 05:00) (85/68 - 146/69)  BP(mean): 75 (13 Jan 2025 05:00) (53 - 125)  ABP: --  ABP(mean): --  RR: 36 (13 Jan 2025 05:00) (19 - 37)  SpO2: 95% (13 Jan 2025 05:00) (92% - 100%)    O2 Parameters below as of 13 Jan 2025 04:47  Patient On (Oxygen Delivery Method): nasal cannula, high flow                 Input and Output:  I&O's Detail    11 Jan 2025 07:01  -  12 Jan 2025 07:00  --------------------------------------------------------  IN:    Dexmedetomidine: 18.8 mL    dextrose 5%: 1000 mL    dextrose 5% + sodium chloride 0.45%: 150 mL    IV PiggyBack: 100 mL    IV PiggyBack: 300 mL    IV PiggyBack: 275 mL  Total IN: 1843.8 mL    OUT:    Drain (mL): 130 mL    Incontinent per Collection Bag (mL): 1000 mL    Indwelling Catheter - Urethral (mL): 575 mL    Nasogastric/Oral tube (mL): 400 mL  Total OUT: 2105 mL    Total NET: -261.2 mL      12 Jan 2025 07:01  -  13 Jan 2025 05:23  --------------------------------------------------------  IN:    dextrose 5%: 800 mL    IV PiggyBack: 100 mL    IV PiggyBack: 100 mL    IV PiggyBack: 150 mL    IV PiggyBack: 125 mL  Total IN: 1275 mL    OUT:    Dexmedetomidine: 0 mL    Drain (mL): 50 mL    Incontinent per Collection Bag (mL): 600 mL    Nasogastric/Oral tube (mL): 40 mL  Total OUT: 690 mL    Total NET: 585 mL          Lab Data                        9.2    24.99 )-----------( 150      ( 12 Jan 2025 05:30 )             29.1     01-12    138  |  101  |  35[H]  ----------------------------<  86  3.5   |  31  |  1.90[H]    Ca    8.6      12 Jan 2025 05:30  Phos  4.0     01-12  Mg     1.7     01-12    TPro  5.0[L]  /  Alb  2.2[L]  /  TBili  0.7  /  DBili  x   /  AST  61[H]  /  ALT  37  /  AlkPhos  116  01-12            Review of Systems	      Objective     Physical Examination    heart s1s2  lung dc BS  head nc  head at      Pertinent Lab findings & Imaging      Dante:  NO   Adequate UO     I&O's Detail    11 Jan 2025 07:01  -  12 Jan 2025 07:00  --------------------------------------------------------  IN:    Dexmedetomidine: 18.8 mL    dextrose 5%: 1000 mL    dextrose 5% + sodium chloride 0.45%: 150 mL    IV PiggyBack: 100 mL    IV PiggyBack: 300 mL    IV PiggyBack: 275 mL  Total IN: 1843.8 mL    OUT:    Drain (mL): 130 mL    Incontinent per Collection Bag (mL): 1000 mL    Indwelling Catheter - Urethral (mL): 575 mL    Nasogastric/Oral tube (mL): 400 mL  Total OUT: 2105 mL    Total NET: -261.2 mL      12 Jan 2025 07:01  -  13 Jan 2025 05:23  --------------------------------------------------------  IN:    dextrose 5%: 800 mL    IV PiggyBack: 100 mL    IV PiggyBack: 100 mL    IV PiggyBack: 150 mL    IV PiggyBack: 125 mL  Total IN: 1275 mL    OUT:    Dexmedetomidine: 0 mL    Drain (mL): 50 mL    Incontinent per Collection Bag (mL): 600 mL    Nasogastric/Oral tube (mL): 40 mL  Total OUT: 690 mL    Total NET: 585 mL               Discussed with:     Cultures:	        Radiology

## 2025-01-13 NOTE — CONSULT NOTE ADULT - SUBJECTIVE AND OBJECTIVE BOX
78 y/o WF with PMHx with HTN, HLD, CKD, PUD (dx 2011), Lung CA s/p lobectomy (1996) presents to [facility] ED from home with  via ambulance c/o worsening generalized achy abdominal pain since last night.  Patient reports baseline abdominal discomfort x several years which worsened suddenly (described as 11/10) without associated fever/chills.  Patient recently discharged from [facility] - treated for PNA.  Patient reports that after discharge had 3 days of vomiting dark fluids and dark non-bloody which resolved with Imodium.  Patient reports tolerating last meal yesterday, small dark BM yesterday, + appetite.  Of note, patient requires constant O2 at home x several weeks, previously at nights only.    Endoscopy/Colonoscopy - 2023 - polypectomy, otherwise patient reports no significant findings.    Patient reports being non-compliant with medications - does not remember if she ran out of medications or was supposed to stop them. (09 Jan 2025 03:40)    Patient was admitted to surgery, taken for emergent laproscopic repair of perforated ulcer, seen in pacu remaining intubated, initially hypotensive to 70s, now normotensive, tachypneic with low tidal volumes to 100, patient with history of right lobectomy    pt was continued to be NPO and compromised respiratory function    PAST MEDICAL & SURGICAL HISTORY:    HLD (hyperlipidemia)      Rheumatoid arthritis      TIA (transient ischemic attack)  2012      Lung cancer  Right lung.      Chronic obstructive pulmonary disease, unspecified COPD type  O2 at night      Hiatal hernia with GERD      Essential hypertension      Childhood asthma      Stage 3 chronic kidney disease      Carotid artery plaque      IBS (irritable bowel syndrome)      Psoriatic arthritis      S/P lobectomy of lung  Right lung in 1996.      H/O colonoscopy      History of endoscopy  Sept 2017      S/P cholecystectomy          ANTIMICROBIAL:  metroNIDAZOLE  IVPB 500 milliGRAM(s) IV Intermittent every 12 hours    CARDIOVASCULAR:    PULMONARY:  albuterol/ipratropium for Nebulization. 3 milliLiter(s) Nebulizer once    NEUROLOGIC:  acetaminophen   IVPB .. 700 milliGRAM(s) IV Intermittent every 6 hours  propofol Infusion 20 MICROgram(s)/kG/Min IV Continuous <Continuous>    ONCOLOGIC:    HEMATOLOGIC:    GATROINTESTINAL:  pantoprazole  Injectable 40 milliGRAM(s) IV Push two times a day     MEDS:    ENDO/METABOLIC:    IV FLUID/NUTRITION:  albumin human 25% IVPB 50 milliLiter(s) IV Intermittent once  lactated ringers. 1000 milliLiter(s) IV Continuous <Continuous>  lactated ringers. 1000 milliLiter(s) IV Continuous <Continuous>  sodium bicarbonate  Infusion 0.319 mEq/kG/Hr IV Continuous <Continuous>    TOPICAL:    IMMUNOLOGIC & OTHER        Allergies    Zofran (Other)  minocycline (Other)  penicillin (Other)  IV Contrast (Hives; Rash)  Enbrel (Unknown)  Levaquin (Other)    Intolerances        SOCIAL HISTORY:    FAMILY HISTORY:  FH: type 2 diabetes (Mother)    FH: CAD (coronary artery disease) (Father, Sibling)    FH: colon cancer (Sibling)    FH: myocardial infarction (Father)    FH: stroke (Sibling)    FH: lung cancer (Sibling)        Vital Signs Last 24 Hrs  T(C): 36 (09 Jan 2025 07:00), Max: 36.6 (09 Jan 2025 02:23)  T(F): 96.8 (09 Jan 2025 07:00), Max: 97.9 (09 Jan 2025 02:23)  HR: 91 (09 Jan 2025 07:58) (80 - 119)  BP: 117/63 (09 Jan 2025 07:58) (89/58 - 183/80)  BP(mean): 83 (09 Jan 2025 07:58) (69 - 105)  RR: 20 (09 Jan 2025 07:30) (17 - 26)  SpO2: 100% (09 Jan 2025 07:58) (94% - 100%)    Parameters below as of 09 Jan 2025 07:58  Patient On (Oxygen Delivery Method): ventilator      ABG - ( 09 Jan 2025 07:01 )  pH, Arterial: 7.03  pH, Blood: x     /  pCO2: 85    /  pO2: 192   / HCO3: 22    / Base Excess: -8.3  /  SaO2: 97.5              Mode: AC/ CMV (Assist Control/ Continuous Mandatory Ventilation)  RR (machine): 25  TV (machine): 300  FiO2: 100  PEEP: 3  ITime: 1  MAP: 14  PIP: 31    I&O's Detail    08 Jan 2025 07:01  -  09 Jan 2025 07:00  --------------------------------------------------------  IN:    IV PiggyBack: 100 mL    Lactated Ringers: 1560 mL  Total IN: 1660 mL    OUT:    Blood Loss (mL): 5 mL    Bulb (mL): 70 mL    Indwelling Catheter - Urethral (mL): 5 mL  Total OUT: 80 mL    Total NET: 1580 mL      09 Jan 2025 07:01  -  09 Jan 2025 08:11  --------------------------------------------------------  IN:    Lactated Ringers: 50 mL    Propofol: 20 mL  Total IN: 70 mL    OUT:    Bulb (mL): 80 mL  Total OUT: 80 mL    Total NET: -10 mL        Daily Height in cm: 157.48 (09 Jan 2025 03:40)    Daily     REVIEW OF SYSTEMS:  as per hpi, other systems reviewed and are negative    PHYSICAL EXAM:    GENERAL: elderly female, sedated, intubated  HEAD:  Atraumatic, Normocephalic  ENMT: + ETT with dark bloody/bilious contents in suction   NECK: no JVD noted  NERVOUS SYSTEM:  sedated on propofol, not following commands  CHEST/LUNG: no breath sounds on right, + bs on left with some rhonchi  HEART: Regular rate and rhythm; No murmurs   ABDOMEN: Soft, laproscopic sites c/d/i  EXTREMITIES:  2+ Peripheral Pulses, No clubbing,       LABS:                        9.8    6.85  )-----------( 216      ( 09 Jan 2025 07:40 )             32.9     01-09    141  |  109[H]  |  28[H]  ----------------------------<  114[H]  5.4[H]   |  27  |  1.43[H]    Ca    7.5[L]      09 Jan 2025 07:40    TPro  4.6[L]  /  Alb  2.6[L]  /  TBili  0.3  /  DBili  x   /  AST  67[H]  /  ALT  44  /  AlkPhos  47  01-09    PT/INR - ( 09 Jan 2025 01:47 )   PT: 12.2 sec;   INR: 1.05 ratio         PTT - ( 09 Jan 2025 01:47 )  PTT:23.3 sec  Urinalysis Basic - ( 09 Jan 2025 07:40 )    Color: x / Appearance: x / SG: x / pH: x  Gluc: 114 mg/dL / Ketone: x  / Bili: x / Urobili: x   Blood: x / Protein: x / Nitrite: x   Leuk Esterase: x / RBC: x / WBC x   Sq Epi: x / Non Sq Epi: x / Bacteria: x          PT/INR - ( 09 Jan 2025 01:47 )   PT: 12.2 sec;   INR: 1.05 ratio         PTT - ( 09 Jan 2025 01:47 )  PTT:23.3 sec  pH: 7.03  pCO2 85  pO2, Arterial: 192 mmHg  HCO3, Arterial: 22 mmol/L  Base Excess, Arterial: -8.3 mmol/L  Oxygen Saturation, Arterial: 97.5 %  FIO2, Arterial: 60pH, Arterial: 7.03RADIOLOGY & ADDITIONAL STUDIES:      Assessment and Recommendation:   · Assessment	  78 yo female, pmh of HTN, HLD, CKD, PUD (dx 2011), Lung CA s/p lobectomy (1996) presents to [facility] ED initially from home with  via ambulance c/o worsening generalized achy abdominal pain, admitted to surgery for perforated viscus, taken for emergent surgery, now in acute hypoxic and hypercarbic respiratory failure 2/2 aspiration during procedure with severe acidosis, respiration improved with suctioning, nebulizers    Patient acutely ill due to above  - given amp of bicarbonate  - bicarbonate drip forgone with repeat lab work and stabilization in respiratory state  - transfer to [facility] ICU for further management, Dr. Christine accepting  - consideration for bronchoscopy as needed  IVF for worsening acute kidney injury - presumed ATN from hypotension during event  ordered stress dose steroids  - continue propofol for sedation  - cotninue protonix IV bid  - on ceftriaxone, metronidazole initially for potential infection and later switched to zosyn.

## 2025-01-13 NOTE — PROGRESS NOTE ADULT - SUBJECTIVE AND OBJECTIVE BOX
HPI:  78 y/o WF with PMHx with HTN, HLD, CKD, PUD (dx 2011), Lung CA s/p lobectomy (1996) presented to [facility] ED from home with  via ambulance c/o worsening generalized achy abdominal pain since 1/7 night.  Patient reports baseline abdominal discomfort x several years which worsened suddenly (described as 11/10) without associated fever/chills.  Patient recently discharged from [facility] - treated for PNA.  Patient reports that after discharge had 3 days of vomiting dark fluids and dark non-bloody which resolved with Imodium.  Patient reports tolerating last meal yesterday, small dark BM yesterday, + appetite. Of note, patient requires constant O2 at home x several weeks, previously at nights only. At [place], CT findings of pnemoperitoneum, likely perforated PUD, made her NPO, started on IVF and IV antibiotics and underwent emergent repair of perforated viscus in OR. Pt underwent laparoscopic repair of perforated gastric ulcer with Srinath patch, Onur drain placement with intra-op findings of 1cm perforation of distal anterior stomach with diffuse gastric/bilious contamination. Procedure complicated by aspiration, and postoperatively, noted minimal amount of urine and blood gas with acidotic feature. Patient found to be in acute respiratory distress, acidotic and hypercarbic. ICU consult at that time determined that patient required higher level of care, resulting in transfer to [facility]. On arrival to the ICU she is intubated, sedated with propofol without any pressor requirements.  (09 Jan 2025 17:11)      POD#4 s/p diagnostic laparoscopy, srinath patch repair of gastric ulcer    SUBJECTIVE:  Patient seen and examined at bedside.  No overnight events.  Patient reports no new complaints at this time.  Continues to complain of cough and congestion. She has been up to the chair, and she was encouraged to spend time today in the chair as well. She denies fevers, chills, nausea, vomiting.    VITALS  Vital Signs Last 24 Hrs  T(C): 37.9 (13 Jan 2025 08:12), Max: 38 (12 Jan 2025 17:15)  T(F): 100.3 (13 Jan 2025 08:12), Max: 100.4 (12 Jan 2025 17:15)  HR: 86 (13 Jan 2025 11:00) (86 - 132)  BP: 122/101 (13 Jan 2025 11:00) (85/68 - 137/51)  BP(mean): 109 (13 Jan 2025 11:00) (53 - 125)  RR: 20 (13 Jan 2025 11:00) (19 - 39)  SpO2: 93% (13 Jan 2025 11:00) (92% - 100%)    Parameters below as of 13 Jan 2025 07:55  Patient On (Oxygen Delivery Method): nasal cannula, high flow        PHYSICAL EXAM  GENERAL: No acute distress, lying comfortably in bed  HEAD:  Atraumatic, Normocephalic  CHEST/LUNG: Wet cough appreciated. Non labored respirations, no accessory muscle use, with high flow nasal cannula.  HEART: Regular rate and rhythm  ABDOMEN: Soft, non-tender, non-distended, surgical incisions c/d/i, JULIANN drain RUQ SS output.  EXT:  no edema  NEUROLOGY: no focal deficits    INTAKE & OUTPUT  I&O's Summary    12 Jan 2025 07:01  -  13 Jan 2025 07:00  --------------------------------------------------------  IN: 1575 mL / OUT: 1050 mL / NET: 525 mL    13 Jan 2025 07:01  -  13 Jan 2025 12:07  --------------------------------------------------------  IN: 450 mL / OUT: 0 mL / NET: 450 mL      I&O's Detail    12 Jan 2025 07:01  -  13 Jan 2025 07:00  --------------------------------------------------------  IN:    dextrose 5%: 800 mL    IV PiggyBack: 100 mL    IV PiggyBack: 175 mL    IV PiggyBack: 200 mL    IV PiggyBack: 200 mL    IV PiggyBack: 100 mL  Total IN: 1575 mL    OUT:    Dexmedetomidine: 0 mL    Drain (mL): 110 mL    Incontinent per Collection Bag (mL): 900 mL    Nasogastric/Oral tube (mL): 40 mL  Total OUT: 1050 mL    Total NET: 525 mL      13 Jan 2025 07:01  -  13 Jan 2025 12:07  --------------------------------------------------------  IN:    IV PiggyBack: 50 mL    IV PiggyBack: 400 mL  Total IN: 450 mL    OUT:  Total OUT: 0 mL    Total NET: 450 mL          MEDICATIONS  MEDICATIONS  (STANDING):  acetaminophen     Tablet .. 650 milliGRAM(s) Oral every 6 hours  albuterol/ipratropium for Nebulization 3 milliLiter(s) Nebulizer every 4 hours  buDESOnide    Inhalation Suspension 0.5 milliGRAM(s) Inhalation two times a day  chlorhexidine 2% Cloths 1 Application(s) Topical <User Schedule>  dextrose 5%. 1000 milliLiter(s) (100 mL/Hr) IV Continuous <Continuous>  dextrose 5%. 1000 milliLiter(s) (50 mL/Hr) IV Continuous <Continuous>  dextrose 50% Injectable 25 Gram(s) IV Push once  dextrose 50% Injectable 12.5 Gram(s) IV Push once  dextrose 50% Injectable 25 Gram(s) IV Push once  dextrose Oral Gel 15 Gram(s) Oral once  fluconAZOLE IVPB 200 milliGRAM(s) IV Intermittent every 24 hours  furosemide   Injectable 40 milliGRAM(s) IV Push two times a day  glucagon  Injectable 1 milliGRAM(s) IntraMuscular once  guaiFENesin  milliGRAM(s) Oral every 12 hours  heparin   Injectable 5000 Unit(s) SubCutaneous every 8 hours  pantoprazole    Tablet 40 milliGRAM(s) Oral two times a day  piperacillin/tazobactam IVPB.. 3.375 Gram(s) IV Intermittent every 8 hours  potassium chloride  10 mEq/100 mL IVPB 10 milliEquivalent(s) IV Intermittent every 1 hour  QUEtiapine 12.5 milliGRAM(s) Oral at bedtime    MEDICATIONS  (PRN):  guaifenesin/dextromethorphan Oral Liquid 20 milliLiter(s) Oral every 8 hours PRN Cough/chest congestion  oxyCODONE    IR 5 milliGRAM(s) Oral every 4 hours PRN Moderate Pain (4 - 6)  oxyCODONE    IR 10 milliGRAM(s) Oral every 4 hours PRN Severe Pain (7 - 10)      LABS:                        8.4    16.95 )-----------( 132      ( 13 Jan 2025 06:05 )             27.3     01-13    138  |  96  |  34[H]  ----------------------------<  132[H]  2.7[LL]   |  33[H]  |  2.00[H]    Ca    8.5      13 Jan 2025 06:05  Phos  3.4     01-13  Mg     1.2     01-13    TPro  5.6[L]  /  Alb  3.2[L]  /  TBili  1.0  /  DBili  x   /  AST  49[H]  /  ALT  30  /  AlkPhos  176[H]  01-13          RADIOLOGY & ADDITIONAL STUDIES:

## 2025-01-13 NOTE — PROGRESS NOTE ADULT - ASSESSMENT
80y/o Female PMHx HTN, HLD, CKD, PUD (dx 2011), Lung CA s/p lobectomy (1996) initially presented to S ED for abdominal pain, found to have pneumoperitoneum, underwent laparoscopic repair of gastric ulcer with natan patch POD#4, POC c/b aspiration PNA, requiring intubation and pressor support. Pt no longer intubated (extubated 1/10), NGT removed 1/12, abdomen soft, NTND, JULIANN drain wiht SS drainage. Labs with improved leukocytosis (16<24 < 23 < 17.) Continued ICU management, patient continues to improve    P:  - Advance to FLD  - Leukocytosis improved to 16 from 24 yesterday  -Aggressive chest PT, OOB, pulm toilet  - Rec continuing abx  - Continue to monitor JULIANN drain  - Rest of care per ICU.    80y/o Female PMHx HTN, HLD, CKD, PUD (dx 2011), Lung CA s/p lobectomy (1996) initially presented to [facility] ED for abdominal pain, found to have pneumoperitoneum, underwent laparoscopic repair of gastric ulcer with natan patch POD#4, POC c/b aspiration PNA, requiring intubation and pressor support. Pt no longer intubated (extubated 1/10), NGT removed 1/12, abdomen soft, NTND, JULIANN drain wiht SS drainage. Labs with improved leukocytosis (16<24 < 23 < 17.) Continued ICU management, patient continues to improve    P:  - Advance to FLD  - Leukocytosis improved to 16 from 24 yesterday  -Aggressive chest PT, OOB, pulm toilet  - Rec continuing abx  - Continue to monitor JULIANN drain  - Rest of care per ICU.   Surg. Att.  Pt. seen and examined. She required intubation.  Discussed with Dr. Gauthier. Possiibility of bronchoscopy was discussed.  Will follow.

## 2025-01-14 NOTE — PROCEDURE NOTE - NSINDICATIONS_GEN_A_CORE
hypertonic/irritant infusion
critical patient/monitoring purposes
airway protection/respiratory distress/respiratory failure

## 2025-01-14 NOTE — PROGRESS NOTE ADULT - SUBJECTIVE AND OBJECTIVE BOX
HPI:  78 y/o WF with PMHx with HTN, HLD, CKD, PUD (dx 2011), Lung CA s/p lobectomy (1996) presented to [facility] ED from home with  via ambulance c/o worsening generalized achy abdominal pain since 1/7 night.  Patient reports baseline abdominal discomfort x several years which worsened suddenly (described as 11/10) without associated fever/chills.  Patient recently discharged from [facility] - treated for PNA.  Patient reports that after discharge had 3 days of vomiting dark fluids and dark non-bloody which resolved with Imodium.  Patient reports tolerating last meal yesterday, small dark BM yesterday, + appetite. Of note, patient requires constant O2 at home x several weeks, previously at nights only. At [place], CT findings of pnemoperitoneum, likely perforated PUD, made her NPO, started on IVF and IV antibiotics and underwent emergent repair of perforated viscus in OR. Pt underwent laparoscopic repair of perforated gastric ulcer with Srinath patch, Onur drain placement with intra-op findings of 1cm perforation of distal anterior stomach with diffuse gastric/bilious contamination. Procedure complicated by aspiration, and postoperatively, noted minimal amount of urine and blood gas with acidotic feature. Patient found to be in acute respiratory distress, acidotic and hypercarbic. ICU consult at that time determined that patient required higher level of care, resulting in transfer to [facility]. On arrival to the ICU she is intubated, sedated with propofol without any pressor requirements.  (09 Jan 2025 17:11)      POD#5 s/p srinath patch repair for gastric ulcer .      SUBJECTIVE:  Patient seen and examined at bedside.  she was reintubated yesterday possible aspiration. Patient is sedated and not responsive.     VITALS  Vital Signs Last 24 Hrs  T(C): 38.4 (14 Jan 2025 07:50), Max: 38.6 (14 Jan 2025 06:00)  T(F): 101.1 (14 Jan 2025 07:50), Max: 101.4 (14 Jan 2025 06:00)  HR: 124 (14 Jan 2025 08:04) (71 - 141)  BP: 94/28 (14 Jan 2025 01:45) (64/50 - 168/90)  BP(mean): 43 (14 Jan 2025 01:45) (43 - 121)  RR: 34 (14 Jan 2025 08:04) (20 - 36)  SpO2: 94% (14 Jan 2025 08:04) (85% - 100%)    Parameters below as of 14 Jan 2025 07:15  Patient On (Oxygen Delivery Method): ventilator      PHYSICAL EXAM  GENERAL:  Female lying comfortably in bed in NAD.  HEENT:  NC/AT. Sclera white. Mucous membranes moist.  CARDIO:  Regular rate and rhythm.  No murmur, gallop or rub appreciated.  RESPIRATORY:  Synchronous with the vent. Nonlabored breathing, no accessory muscle use.     ABDOMEN:  Soft, nondistended, nontender. BS appreciated on auscultation.  No rebound tenderness or guarding. Gopi drain serosanguinous   EXTREMITIES: No calf tenderness bilaterally.  SKIN:  No jaundice, pallor, or cyanosis  NEURO:  A&O x 3    INTAKE & OUTPUT  I&O's Summary    13 Jan 2025 07:01  -  14 Jan 2025 07:00  --------------------------------------------------------  IN: 1674 mL / OUT: 770 mL / NET: 904 mL    14 Jan 2025 07:01  -  14 Jan 2025 09:01  --------------------------------------------------------  IN: 102.6 mL / OUT: 0 mL / NET: 102.6 mL      I&O's Detail    13 Jan 2025 07:01  -  14 Jan 2025 07:00  --------------------------------------------------------  IN:    IV PiggyBack: 100 mL    IV PiggyBack: 100 mL    IV PiggyBack: 600 mL    IV PiggyBack: 270 mL    Norepinephrine: 488 mL    Propofol: 116 mL  Total IN: 1674 mL    OUT:    Dexmedetomidine: 0 mL    Drain (mL): 100 mL    Incontinent per Collection Bag (mL): 670 mL  Total OUT: 770 mL    Total NET: 904 mL      14 Jan 2025 07:01  -  14 Jan 2025 09:01  --------------------------------------------------------  IN:    IV PiggyBack: 25 mL    Norepinephrine: 66 mL    Propofol: 11.6 mL  Total IN: 102.6 mL    OUT:  Total OUT: 0 mL    Total NET: 102.6 mL          MEDICATIONS  MEDICATIONS  (STANDING):  albuterol/ipratropium for Nebulization 3 milliLiter(s) Nebulizer every 4 hours  buDESOnide    Inhalation Suspension 0.5 milliGRAM(s) Inhalation two times a day  chlorhexidine 0.12% Liquid 15 milliLiter(s) Oral Mucosa every 12 hours  chlorhexidine 2% Cloths 1 Application(s) Topical <User Schedule>  dexMEDEtomidine Infusion 0.4 MICROgram(s)/kG/Hr (5.19 mL/Hr) IV Continuous <Continuous>  dextrose 5%. 1000 milliLiter(s) (50 mL/Hr) IV Continuous <Continuous>  dextrose 5%. 1000 milliLiter(s) (100 mL/Hr) IV Continuous <Continuous>  dextrose 50% Injectable 12.5 Gram(s) IV Push once  dextrose 50% Injectable 25 Gram(s) IV Push once  dextrose 50% Injectable 25 Gram(s) IV Push once  dextrose Oral Gel 15 Gram(s) Oral once  fluconAZOLE IVPB 200 milliGRAM(s) IV Intermittent every 24 hours  furosemide   Injectable 40 milliGRAM(s) IV Push two times a day  glucagon  Injectable 1 milliGRAM(s) IntraMuscular once  guaiFENesin  milliGRAM(s) Oral every 12 hours  heparin   Injectable 5000 Unit(s) SubCutaneous every 8 hours  ketamine Infusion. 0.25 mG/kG/Hr (1.3 mL/Hr) IV Continuous <Continuous>  norepinephrine Infusion 0.05 MICROgram(s)/kG/Min (4.87 mL/Hr) IV Continuous <Continuous>  pantoprazole    Tablet 40 milliGRAM(s) Oral two times a day  piperacillin/tazobactam IVPB.. 3.375 Gram(s) IV Intermittent every 8 hours  propofol Infusion 20 MICROgram(s)/kG/Min (6.23 mL/Hr) IV Continuous <Continuous>  QUEtiapine 12.5 milliGRAM(s) Oral at bedtime  vasopressin Infusion 0.04 Unit(s)/Min (6 mL/Hr) IV Continuous <Continuous>    MEDICATIONS  (PRN):  fentaNYL    Injectable 50 MICROGram(s) IV Push every 2 hours PRN Mild Pain (1 - 3) or vent synchrony  guaifenesin/dextromethorphan Oral Liquid 20 milliLiter(s) Oral every 8 hours PRN Cough/chest congestion  oxyCODONE    IR 5 milliGRAM(s) Oral every 4 hours PRN Moderate Pain (4 - 6)  oxyCODONE    IR 10 milliGRAM(s) Oral every 4 hours PRN Severe Pain (7 - 10)      LABS:                        9.0    x     )-----------( 176      ( 14 Jan 2025 06:00 )             28.8     01-14    138  |  98  |  38[H]  ----------------------------<  103[H]  4.3   |  25  |  2.50[H]    Ca    8.9      14 Jan 2025 06:00  Phos  5.2     01-14  Mg     2.6     01-14    TPro  5.4[L]  /  Alb  2.5[L]  /  TBili  0.7  /  DBili  x   /  AST  59[H]  /  ALT  30  /  AlkPhos  319[H]  01-14      RADIOLOGY & ADDITIONAL STUDIES:    ACC: [id number] EXAM: XR CHEST PORTABLE URGENT 1V ORDERED BY: KIAN MARIE  PROCEDURE DATE: 01/13/2025  INTERPRETATION: TECHNIQUE: Single portable view of the chest.  COMPARISON: 1/13/2025  CLINICAL HISTORY: Line Placement  FINDINGS:  Single frontal view of the chest demonstrates multiple chronic right-sided rib fractures. Right lung by loss with complete opacification. stable left lung infiltrates. Osteopenia.. The cardiomediastinal silhouette is unremarkable. No acute osseous abnormalities. Overlying EKG leads and wires are noted  IMPRESSION: Stable left lung infiltrates.      ASSESSMENT & PLAN   79y Female POD#5 s/p srinath patch repair for gastric ulcer, c/p aspiration. Required reintubation yesterday. On pressors. No NG tube. Tachycardic 110s, Abdomen soft, no tenderness or distension, drain serosanguinous 100 cc.     - No further surgical intervention   - Continue antibiotics  - Serial abdominal exams  - Is & Os  - DVT prophylaxis heparin subcutaneous   - OOB, pain control  - Incentive spirometry  - Follow up AM labs  - Continue management per ICU   - Case to be discussed with Dr. Valdez

## 2025-01-14 NOTE — PROGRESS NOTE ADULT - PROBLEM SELECTOR PLAN 1
vent support per pulm / intensivist Post-Care Instructions: I reviewed with the patient in detail post-care instructions. Patient is to avoid sunlight for the next 2 days, and wear sun protection. Patients may expect sunburn like redness, discomfort and scabbing.

## 2025-01-14 NOTE — PROGRESS NOTE ADULT - ASSESSMENT
79F with HTN, HLD, CKD, PUD, Lung Cancer s/p RUL lobectomy who presents with a perforated gastric ulcer POD 2 from laparoscopic repair with Srinath Patch and Onur Drain placement. Pt transferred from  to [facility] ICU for severe hypercapnic respiratory failure 2ry aspiration requiring intubation, now extubated with hospital course further complicated by ESPERANZA d/t ATN. Admitted to the ICU with:     Perforated gastric ulcer s/p lap repair with Srinath patch and onur drain  Acute hypoxic/hypercapnic respiratory failure  Aspiration PNA  Pneumoperitoneum  ESPERANZA    NEURO:   - d/c Seroquel qhs  - d/c tylenol and oxy, will re-eval pain    CV:   - Maintain MAP>65    PULM:   - Pt had severe hypercapnic resp failure, aspiration requiring intubation, was previously extubated and on HFNC  - Pt w/ acute worsening hypoxia and required emergent intubation  - continue ventilation  - Continue inhaled steroids and duonebs for secretions.   - Aspiration precautions   - S/p Lasix for volume overload, now off   - mucinex and robitussin prn for cough     GI:    - s/p lap repair with srinath patch with onur drain placement.  - tube feeds via OG tube  - protonix bid for PUD    RENAL:   - ESPERANZA likely in the setting of ATN/dehydration, 500 cc IV bolus x 1   - D/c lasix   - Strict I/Os.  - hypokalemia s/p repletion, resolved  - monitor and replete lytes prn    ENDO:   - no active issues  - Maintain euglycemia 120-180    ID:   - On zosyn and fluconazole for gastric perforation/peritonitis  - febrile to 101.4F  - sputum culture pending  - BC x 2 pending  - check lactate  - U/A pending   - monitor WBC    HEME:   - on dvt ppx with SC Heparin     79F with HTN, HLD, CKD, PUD, Lung Cancer s/p RUL lobectomy who presents with a perforated gastric ulcer POD 2 from laparoscopic repair with Srinath Patch and Onur Drain placement. Pt transferred from  to [facility] ICU for severe hypercapnic respiratory failure 2ry aspiration requiring intubation, now extubated with hospital course further complicated by ESPERANZA d/t ATN. Admitted to the ICU with:     Perforated gastric ulcer s/p lap repair with Srinath patch and onur drain  Acute hypoxic/hypercapnic respiratory failure  Aspiration PNA  Pneumoperitoneum  ESPERANZA    NEURO:   - d/c Seroquel qhs  - d/c tylenol and oxy, will re-eval pain    CV:   - Maintain MAP>65  - now with severe shock requiring pressors; possible secondary to hypovolemia, s/p 500 cc IVF bolus  - d/c Lasix  - TTE ordered  - on levophed, ketamine, vasopressin, and propofol    PULM:   - Pt had severe hypercapnic resp failure, aspiration requiring intubation, was previously extubated and on HFNC  - Pt w/ acute worsening hypoxia and required emergent intubation  - continue ventilation  - Continue inhaled steroids and duonebs for secretions.   - Aspiration precautions   - S/p Lasix for volume overload, now off   - mucinex and robitussin prn for cough     GI:    - s/p lap repair with srinath patch with onur drain placement.  - tube feeds via OG tube  - protonix bid for PUD    RENAL:   - ESPERANZA likely in the setting of ATN/dehydration, 500 cc IV bolus x 1   - D/c lasix   - Strict I/Os.  - hypokalemia s/p repletion, resolved  - monitor and replete lytes prn    ENDO:   - no active issues  - Maintain euglycemia 120-180    ID:   - On zosyn and fluconazole for gastric perforation/peritonitis  - febrile to 101.4F  - sputum culture pending  - BC x 2 pending  - check lactate  - U/A pending   - monitor WBC    HEME:   - on dvt ppx with SC Heparin     79F with HTN, HLD, CKD, PUD, Lung Cancer s/p RUL lobectomy who presents with a perforated gastric ulcer POD 2 from laparoscopic repair with Srinath Patch and Onur Drain placement. Pt transferred from  to \[facility]\"" ICU for severe hypercapnic respiratory failure 2ry aspiration requiring intubation, now extubated with hospital course further complicated by ESPERANZA d/t ATN. Admitted to the ICU with:     Perforated gastric ulcer s/p lap repair with Srinath patch and onur drain  Acute hypoxic/hypercapnic respiratory failure  Aspiration PNA  Pneumoperitoneum  ESPERANZA    NEURO:   - d/c Seroquel qhs  - d/c tylenol and oxy, will re-eval pain    CV:   - Maintain MAP>65  - now with severe shock requiring pressors; possible secondary to hypovolemia, s/p 500 cc IVF bolus  - d/c Lasix  - TTE ordered  - on levophed  - wean propofol, added ketamine and precedex    PULM:   - Pt had severe hypercapnic resp failure, aspiration requiring intubation, was previously extubated and on HFNC  - Pt w/ acute worsening hypoxia and required emergent intubation, developed respiratory acidosis  - continue ventilation  - Continue inhaled steroids and duonebs for secretions.   - Aspiration precautions   - S/p Lasix for volume overload, now off   - mucinex and robitussin prn for cough  - c/w mucomyst     GI:    - s/p lap repair with srinath patch with onur drain placement.  - tube feeds via OG when pressor requirements down  - protonix bid for PUD    RENAL:   - ESPERANZA likely in the setting of ATN/dehydration, 500 cc IV bolus x 1   - D/c lasix   - Strict I/Os.  - hypokalemia s/p repletion, resolved  - monitor and replete lytes prn    ENDO:   - no active issues  - Maintain euglycemia 120-180    ID:   - On zosyn and fluconazole for gastric perforation/peritonitis  - febrile to 101.4F, concern for possible aspiration PNA  - sputum culture pending  - BC x 2 pending  - check lactate  - U/A pending   - monitor WBC    HEME:   - on dvt ppx with SC Heparin     79F with HTN, HLD, CKD, PUD, Lung Cancer s/p RUL lobectomy who presents with a perforated gastric ulcer POD 2 from laparoscopic repair with Srinath Patch and Onur Drain placement. Pt transferred from  to [facility] ICU for severe hypercapnic respiratory failure 2ry aspiration requiring intubation, now extubated with hospital course further complicated by ESPERANZA d/t ATN. Admitted to the ICU with:     Perforated gastric ulcer s/p lap repair with Srinath patch and onur drain  Acute hypoxic/hypercapnic respiratory failure  Aspiration PNA  Pneumoperitoneum  ESPERANZA    NEURO:   - d/c Seroquel qhs  - d/c tylenol and oxy, will re-eval pain    CV:   - Maintain MAP>65  - now with severe shock requiring pressors; possible secondary to hypovolemia, s/p 500 cc IVF bolus  - d/c Lasix  - TTE done, f/u results   - on levophed  - wean propofol, added ketamine and precedex    PULM:   - Pt had severe hypercapnic resp failure, aspiration requiring intubation, was previously extubated and on HFNC  - Pt w/ acute worsening hypoxia and required emergent intubation, developed respiratory acidosis  - continue ventilation  - Continue inhaled steroids and duonebs for secretions.   - Aspiration precautions   - S/p Lasix for volume overload, now off   - mucinex and robitussin prn for cough  - c/w mucomyst     GI:    - s/p lap repair with srinath patch with onur drain placement.  - tube feeds via OG when pressor requirements down  - protonix bid for PUD    RENAL:   - ESPERANZA likely in the setting of ATN/dehydration, 500 cc IV bolus x 1   - D/c lasix   - Strict I/Os.  - hypokalemia s/p repletion, resolved  - monitor and replete lytes prn    ENDO:   - no active issues  - Maintain euglycemia 120-180    ID:   - On zosyn and fluconazole for gastric perforation/peritonitis  - febrile to 101.4F, concern for possible aspiration PNA  - sputum culture pending  - BC x 2 pending  - check lactate  - U/A pending   - monitor WBC    HEME:   - on dvt ppx with SC Heparin

## 2025-01-14 NOTE — PROGRESS NOTE ADULT - ATTENDING COMMENTS
79F with HTN, HLD, CKD, PUD, Lung Cancer s/p RUL lobectomy who presents with a perforated gastric ulcer POD 5 from laparoscopic repair with Srinath Patch.  Postop course complicated by prolonged intubation for hypercapnic respiratory failure 2/2 aspiration, ESPERANZA 2/2 ATN, and now likely aspiration pneumonia with recurrent hypoxemic respiratory failure, septic shock, lactic acidosis, and worsening ESPERANZA.    --due to high vasopressor requirements, wean off propofol  use precedex, ketamine gtt for sedation, prn fentanyl for pain  --likely septic +/- hypovolemic shock  fluid challenge  check echo  add vasopressin, weaning down levophed with changing sedation  --acute hypoxemic respiratory failure on HFNC weaned flow to 20L  diuresis as above  secretion clearance with bronchdilators, inhaled steroids, mucinex, robitussin  --POD 4 Srinath patch for perforated   continue clear liquid diet  PPI bid for PUD  --ESPERANZA persists  diurese for volume overload  hypokalemia and hypomag, replace, repeat in pm  --zosyn and fluconazole for peritonitis from perforated     **This evening with progressive worsening of hypoxemia, HFNC escalated to max 40L and 100%, then added NRB, transitioned to AVAPS on max support.  CXR with L base opacity.  POCUS shows small L effusion and B lines at bases, A lines superiorly.  Ultimately had worsening mental status and impending respiratory arrest and was emergently intubated.    Belia intubation with development of shock, likely from respiratory acidosis and sedation, wean as tolerates.  Check sputum Cx, continue zosyn and fluc for now    Events discussed with  throughout the day 79F with HTN, HLD, CKD, PUD, Lung Cancer s/p RUL lobectomy who presents with a perforated gastric ulcer POD 5 from laparoscopic repair with Srinath Patch.  Postop course complicated by prolonged intubation for hypercapnic respiratory failure 2/2 aspiration, ESPERANZA 2/2 ATN, and now likely aspiration pneumonia with recurrent hypoxemic respiratory failure, septic shock, lactic acidosis, and worsening ESPERANZA.    --due to high vasopressor requirements, wean off propofol  use precedex, ketamine gtt for sedation, prn fentanyl for pain  --likely septic +/- hypovolemic shock  fluid challenge  check echo  add vasopressin, weaning down levophed with changing sedation  --acute hypoxemic respiratory failure, wean FiO2 as tolerates  COPD, continue bronchdilators, robitussin for secretion clearance  secretions better today  --POD 4 Srinath patch for perforated   PPI bid for PUD  start TF, at risk for refeeding syndrome, will advance slowly  --ESPERANZA worse in setting of shock  volume depleted, challenge as above  --septic shock suspect from aspiration pneumonia  f/u panCx  continue zosyn and fluconazole for pneumonia and peritonitis  -- updated

## 2025-01-14 NOTE — PROVIDER CONTACT NOTE (EICU) - ACTION/TREATMENT ORDERED:
Bedside team requesting order for arterial blood gas. Order entered as requested bedside team to follow up results.
-have placed order for chest x-ray post central line placement as requested by bedsdie team. Bedside team to follow up images once x-ray complete.

## 2025-01-14 NOTE — CHART NOTE - NSCHARTNOTEFT_GEN_A_CORE
: Disha      INDICATION: shock, acute respiratory failure      PROCEDURE:    [x ] LIMITED ECHO    [x ] LIMITED CHEST      FINDINGS:  Left lung: A lines anteriorly, B lines at bases, with small pleural effusion    LV function appears acceptable  IVC 1.2cm and collapsed  no pericardial effusion    INTERPRETATION:  shock may be volume responsive    Images stored on [facility]

## 2025-01-14 NOTE — PROGRESS NOTE ADULT - SUBJECTIVE AND OBJECTIVE BOX
Date of Service 25 @ 15:52    Patient is a 79y old  Female who presents with a chief complaint of transferred from [place] s/p [facility] patch repair (2025 14:39)      INTERVAL /OVERNIGHT EVENTS: now intubated    MEDICATIONS  (STANDING):  albuterol/ipratropium for Nebulization 3 milliLiter(s) Nebulizer every 6 hours  buDESOnide    Inhalation Suspension 0.5 milliGRAM(s) Inhalation two times a day  chlorhexidine 0.12% Liquid 15 milliLiter(s) Oral Mucosa every 12 hours  chlorhexidine 2% Cloths 1 Application(s) Topical <User Schedule>  dexMEDEtomidine Infusion 0.4 MICROgram(s)/kG/Hr (5.19 mL/Hr) IV Continuous <Continuous>  dextrose 5%. 1000 milliLiter(s) (100 mL/Hr) IV Continuous <Continuous>  dextrose 5%. 1000 milliLiter(s) (50 mL/Hr) IV Continuous <Continuous>  dextrose 50% Injectable 25 Gram(s) IV Push once  dextrose 50% Injectable 12.5 Gram(s) IV Push once  dextrose 50% Injectable 25 Gram(s) IV Push once  dextrose Oral Gel 15 Gram(s) Oral once  fluconAZOLE IVPB 200 milliGRAM(s) IV Intermittent every 24 hours  glucagon  Injectable 1 milliGRAM(s) IntraMuscular once  guaifenesin/dextromethorphan Oral Liquid 10 milliLiter(s) Oral every 6 hours  heparin   Injectable 5000 Unit(s) SubCutaneous every 8 hours  ketamine Infusion. 0.25 mG/kG/Hr (1.3 mL/Hr) IV Continuous <Continuous>  norepinephrine Infusion 0.05 MICROgram(s)/kG/Min (4.87 mL/Hr) IV Continuous <Continuous>  pantoprazole    Tablet 40 milliGRAM(s) Oral two times a day  piperacillin/tazobactam IVPB.. 3.375 Gram(s) IV Intermittent every 12 hours  propofol Infusion 20 MICROgram(s)/kG/Min (6.23 mL/Hr) IV Continuous <Continuous>  vasopressin Infusion 0.04 Unit(s)/Min (6 mL/Hr) IV Continuous <Continuous>    MEDICATIONS  (PRN):  fentaNYL    Injectable 50 MICROGram(s) IV Push every 2 hours PRN Mild Pain (1 - 3) or vent synchrony      Allergies    penicillin (Other)  minocycline (Other)  Enbrel (Unknown)  IV Contrast (Hives; Rash)  Zofran (Other)  Levaquin (Other)    Intolerances        REVIEW OF SYSTEMS:  unable to obtain    Vital Signs Last 24 Hrs  T(C): 36.2 (2025 12:28), Max: 38.6 (2025 06:00)  T(F): 97.2 (2025 12:28), Max: 101.4 (2025 06:00)  HR: 80 (2025 13:57) (71 - 141)  BP: 94/28 (2025 01:45) (64/50 - 168/90)  BP(mean): 43 (2025 01:45) (43 - 107)  RR: 28 (2025 13:30) (20 - 36)  SpO2: 99% (2025 13:57) (85% - 100%)    Parameters below as of 2025 13:57  Patient On (Oxygen Delivery Method): ventilator, 60%        PHYSICAL EXAM:  GENERAL: NAD, well-groomed, well-developed  HEAD:  Atraumatic, Normocephalic  EYES: EOMI, PERRLA, conjunctiva and sclera clear  ENMT: No tonsillar erythema, exudates, or enlargement; Moist mucous membranes, Good dentition, No lesions  NECK: Supple, No JVD, Normal thyroid  NERVOUS SYSTEM:  sedated  CHEST/LUNG: Clear to auscultation bilaterally; No rales, rhonchi, wheezing, or rubs  HEART: Regular rate and rhythm; No murmurs, rubs, or gallops  ABDOMEN: Soft, Nontender, Nondistended; Bowel sounds present  EXTREMITIES:  2+ Peripheral Pulses, No clubbing, cyanosis, or edema  LYMPH: No lymphadenopathy noted  SKIN: No rashes or lesions    LABS:                        9.0    19.65 )-----------( 176      ( 2025 06:00 )             28.8     2025 06:00    138    |  98     |  38     ----------------------------<  103    4.3     |  25     |  2.50     Ca    8.9        2025 06:00  Phos  5.2       2025 06:00  Mg     2.6       2025 06:00    TPro  5.4    /  Alb  2.5    /  TBili  0.7    /  DBili  x      /  AST  59     /  ALT  30     /  AlkPhos  319    2025 06:00      Urinalysis Basic - ( 2025 11:00 )    Color: Yellow / Appearance: Clear / S.022 / pH: x  Gluc: x / Ketone: Negative mg/dL  / Bili: Negative / Urobili: 0.2 mg/dL   Blood: x / Protein: 30 mg/dL / Nitrite: Negative   Leuk Esterase: Negative / RBC: 2 /HPF / WBC 4 /HPF   Sq Epi: x / Non Sq Epi: x / Bacteria: Few /HPF      CAPILLARY BLOOD GLUCOSE      POCT Blood Glucose.: 133 mg/dL (2025 12:48)  POCT Blood Glucose.: 67 mg/dL (2025 12:22)  POCT Blood Glucose.: 70 mg/dL (2025 12:20)  POCT Blood Glucose.: 85 mg/dL (2025 05:02)  POCT Blood Glucose.: 123 mg/dL (2025 23:51)      RADIOLOGY & ADDITIONAL TESTS:    Notes Reviewed:  [x ] YES  [ ] NO    Care Discussed with Consultants/Other Providers [x ] YES  [ ] NO

## 2025-01-14 NOTE — PROGRESS NOTE ADULT - SUBJECTIVE AND OBJECTIVE BOX
Patient is a 79y old  Female who presents with a chief complaint of transferred from [place] s/p [facility] patch repair (2025 15:51)    BRIEF HOSPITAL COURSE:   79F PMHx HTN, HLD, CKD, PUD, Lung Cancer (s/p RUL lobectomy) who presents with perforated gastric ulcer s/p laparoscopic repair with Srinath Patch POD#5. Post-operative course complicated by septic shock, prolonged intubation for hypercapnic respiratory failure iso aspiration, ESPERANZA secondary to ATN, lactic acidosis.     Events last 24 hours:   Remains in vasopressor-dependent shock state with Levophed and fixed-dose Vasopressin Infusions.  Sedated on Precedex and Ketamine Infusions. Dyssynchronous with ventilator this evening s/p switching from Propofol Infusion. Vt increased to 350. Given Versed 2mg IVPx1.   Lactate downtrending from this AM.  FS wnl. Febrile to 101.4'F.    PAST MEDICAL & SURGICAL HISTORY:  HLD (hyperlipidemia)  Rheumatoid arthritis  TIA (transient ischemic attack)    Lung cancer  Right lung.  Chronic obstructive pulmonary disease, unspecified COPD type  O2 at night  Hiatal hernia with GERD  Essential hypertension  Childhood asthma  Stage 3 chronic kidney disease  Carotid artery plaque  IBS (irritable bowel syndrome)  Psoriatic arthritis  S/P lobectomy of lung  Right lung in .  H/O colonoscopy  History of endoscopy  2017  S/P cholecystectomy    Review of Systems:  Due to patient intubated/sedated, subjective information was not able to be obtained from the patient. History was obtained, to the extent possible, from review of the chart and collateral sources of information.     Medications:  fluconAZOLE IVPB 200 milliGRAM(s) IV Intermittent every 24 hours  piperacillin/tazobactam IVPB.. 3.375 Gram(s) IV Intermittent every 12 hours  norepinephrine Infusion 0.05 MICROgram(s)/kG/Min IV Continuous <Continuous>  albuterol/ipratropium for Nebulization 3 milliLiter(s) Nebulizer every 6 hours  buDESOnide    Inhalation Suspension 0.5 milliGRAM(s) Inhalation two times a day  guaifenesin/dextromethorphan Oral Liquid 10 milliLiter(s) Oral every 6 hours  dexMEDEtomidine Infusion 0.4 MICROgram(s)/kG/Hr IV Continuous <Continuous>  fentaNYL    Injectable 50 MICROGram(s) IV Push every 2 hours PRN  ketamine Infusion. 0.25 mG/kG/Hr IV Continuous <Continuous  heparin   Injectable 5000 Unit(s) SubCutaneous every 8 hours  pantoprazole    Tablet 40 milliGRAM(s) Oral two times a day  dextrose 50% Injectable 25 Gram(s) IV Push once  dextrose 50% Injectable 12.5 Gram(s) IV Push once  dextrose 50% Injectable 25 Gram(s) IV Push once  dextrose Oral Gel 15 Gram(s) Oral once  glucagon  Injectable 1 milliGRAM(s) IntraMuscular once  vasopressin Infusion 0.04 Unit(s)/Min IV Continuous <Continuous>  dextrose 5%. 1000 milliLiter(s) IV Continuous <Continuous>  dextrose 5%. 1000 milliLiter(s) IV Continuous <Continuous>  chlorhexidine 0.12% Liquid 15 milliLiter(s) Oral Mucosa every 12 hours  chlorhexidine 2% Cloths 1 Application(s) Topical <User Schedule>  Mode: AC/ CMV (Assist Control/ Continuous Mandatory Ventilation)  RR (machine): 28  TV (machine): 330  FiO2: 50  PEEP: 5  ITime: 0.8  MAP: 16  PIP: 33    ICU Vital Signs Last 24 Hrs  T(C): 37.6 (2025 20:18), Max: 38.6 (2025 06:00)  T(F): 99.6 (2025 20:18), Max: 101.4 (2025 06:00)  HR: 94 (2025 20:30) (77 - 141)  BP: 137/102 (2025 20:00) (93/82 - 137/102)  BP(mean): 111 (2025 20:00) (43 - 111)  ABP: 100/50 (2025 20:30) (66/37 - 197/68)  ABP(mean): 68 (2025 20:30) (49 - 110)  RR: 38 (2025 20:30) (20 - 43)  SpO2: 97% (2025 20:30) (92% - 100%)  O2 Parameters below as of 2025 19:34  Patient On (Oxygen Delivery Method): ventilator  ABG - ( 2025 05:00 )  pH, Arterial: 7.33  pH, Blood: x     /  pCO2: 56    /  pO2: 142   / HCO3: 30    / Base Excess: 3.6   /  SaO2: 97.8      I&O's Detail  2025 07:01  -  2025 07:00  --------------------------------------------------------  IN:    IV PiggyBack: 100 mL    IV PiggyBack: 100 mL    IV PiggyBack: 600 mL    IV PiggyBack: 270 mL    Norepinephrine: 488 mL    Propofol: 116 mL  Total IN: 1674 mL  OUT:    Dexmedetomidine: 0 mL    Drain (mL): 100 mL    Incontinent per Collection Bag (mL): 670 mL  Total OUT: 770 mL  Total NET: 904 mL    2025 07:01  -  2025 21:04  --------------------------------------------------------  IN:    Dexmedetomidine: 45 mL    IV PiggyBack: 100 mL    IV PiggyBack: 75 mL    Ketamine: 30.4 mL    Lactated Ringers Bolus: 1000 mL    Nepro with Carb Steady: 30 mL    Norepinephrine: 352 mL    Propofol: 48.2 mL    Vasopressin: 54 mL  Total IN: 1734.6 mL  OUT:    Drain (mL): 70 mL    Indwelling Catheter - Urethral (mL): 620 mL  Total OUT: 690 mL  Total NET: 1044.6 mL    LABS:                      9.0    19.65 )-----------( 176      ( 2025 06:00 )             28.8     -14  138  |  98  |  38[H]  ----------------------------<  103[H]  4.3   |  25  |  2.50[H]  Ca    8.9      2025 06:00  Phos  5.2     -  Mg     2.6       TPro  5.4[L]  /  Alb  2.5[L]  /  TBili  0.7  /  DBili  x   /  AST  59[H]  /  ALT  30  /  AlkPhos  319[H]      CAPILLARY BLOOD GLUCOSE  POCT Blood Glucose.: 107 mg/dL (2025 17:31)    Urinalysis Basic - ( 2025 11:00 )  Color: Yellow / Appearance: Clear / S.022 / pH: x  Gluc: x / Ketone: Negative mg/dL  / Bili: Negative / Urobili: 0.2 mg/dL   Blood: x / Protein: 30 mg/dL / Nitrite: Negative   Leuk Esterase: Negative / RBC: 2 /HPF / WBC 4 /HPF   Sq Epi: x / Non Sq Epi: x / Bacteria: Few /HPF    CULTURES:    Physical Examination:  General: +Intubated.   HEENT: PERRL.  NECK: Supple.   PULM: Decreased BS at bases bilaterally.  CVS: s1/s2.  ABD: Soft, nondistended, nontender, normoactive bowel sounds.  EXT: No edema, nontender.  SKIN: Warm.  NEURO: +Sedated.     RADIOLOGY:   < from: Xray Chest 1 View AP/PA (25 @ 11:14) >  ACC: [id number] EXAM:  XR CHEST AP OR PA 1V   ORDERED BY: DOROTHY BETANCUR   PROCEDURE DATE:  2025    IMPRESSION:  Enteric tube tip is in the stomach.  ET tube and left IJ line as above.  Continued right-sided volume loss and complete opacification of the right   hemithorax.  Reticular and patchy, predominantly left upper lung, opacities, again   seen.  Small left pleural effusion.    CRITICAL CARE TIME SPENT:  40 minutes of critical care time spent providing medical care for patient's acute illness/conditions that impairs at least one vital organ system and/or poses a high risk of imminent or life threatening deterioration in the patient's condition. It includes time spent evaluating and treating the patient's acute illness as well as time spent reviewing labs, radiology, discussing goals of care with patient and/or patient's family, and discussing the case with a multidisciplinary team, in an effort to prevent further life threatening deterioration or end organ damage. This time is independent of any procedures performed.    Date of entry of this note is equal to the date of services rendered.

## 2025-01-14 NOTE — PROCEDURE NOTE - NSSITEPREP_SKIN_A_CORE
BP monitoring,continue current antihypertensive meds, low salt diet,followup with PMD in 1-2 weeks
chlorhexidine
chlorhexidine

## 2025-01-14 NOTE — PROGRESS NOTE ADULT - SUBJECTIVE AND OBJECTIVE BOX
Patient is a 79y old  Female who presents with a chief complaint of transferred from [place] s/p [facility] patch repair (2025 14:19)    Interval events: Yesterday evening pt had progressive worsening of hypoxemia, HFNC escalated to max 40L and 100%, then added NRB, transitioned to AVAPS on max support.  CXR with L base opacity.  POCUS showed small L effusion and B lines at bases, A lines superiorly.  Ultimately had worsening mental status and impending respiratory arrest and was emergently intubated. Belia intubation with development of shock, likely from respiratory acidosis and sedation. Pt febrile overnight with Tmax 101.4 @ 6 AM. Now POD#5. Unable to obtain ROS secondary to intubation and sedation.     Review of Systems: Unable to obtain secondary to intubation and sedation.     T(F): 97.2 (25 @ 12:28), Max: 101.4 (25 @ 06:00)  HR: 80 (25 @ 13:57) (71 - 141)  BP: 94/28 (25 @ 01:45) (64/50 - 168/90)  RR: 28 (25 @ 13:30) (20 - 36)  SpO2: 99% (25 @ 13:57) (85% - 100%)  Wt(kg): --    Mode: AC/ CMV (Assist Control/ Continuous Mandatory Ventilation), RR (machine): 28, TV (machine): 330, FiO2: 60, PEEP: 5    CAPILLARY BLOOD GLUCOSE      POCT Blood Glucose.: 133 mg/dL (2025 12:48)    I&O's Summary    2025 07:01  -  2025 07:00  --------------------------------------------------------  IN: 1674 mL / OUT: 770 mL / NET: 904 mL    2025 07:01  -  2025 14:39  --------------------------------------------------------  IN: 877.6 mL / OUT: 400 mL / NET: 477.6 mL        Physical Exam:     General: thin, frail, elderly female, intubated and sedated  HEENT: NC/AT  PULM: intubated and sedated  CVS: tachy, Regular rhythm  ABD: Soft, nondistended. JULIANN drain RUQ with SS output.  EXT: No edema, nontender, warm  NEURO: sedated    Meds:  MEDICATIONS  (STANDING):  albuterol/ipratropium for Nebulization 3 milliLiter(s) Nebulizer every 6 hours  buDESOnide    Inhalation Suspension 0.5 milliGRAM(s) Inhalation two times a day  chlorhexidine 0.12% Liquid 15 milliLiter(s) Oral Mucosa every 12 hours  chlorhexidine 2% Cloths 1 Application(s) Topical <User Schedule>  dexMEDEtomidine Infusion 0.4 MICROgram(s)/kG/Hr (5.19 mL/Hr) IV Continuous <Continuous>  dextrose 5%. 1000 milliLiter(s) (100 mL/Hr) IV Continuous <Continuous>  dextrose 5%. 1000 milliLiter(s) (50 mL/Hr) IV Continuous <Continuous>  dextrose 50% Injectable 25 Gram(s) IV Push once  dextrose 50% Injectable 12.5 Gram(s) IV Push once  dextrose 50% Injectable 25 Gram(s) IV Push once  dextrose Oral Gel 15 Gram(s) Oral once  fluconAZOLE IVPB 200 milliGRAM(s) IV Intermittent every 24 hours  glucagon  Injectable 1 milliGRAM(s) IntraMuscular once  guaifenesin/dextromethorphan Oral Liquid 10 milliLiter(s) Oral every 6 hours  heparin   Injectable 5000 Unit(s) SubCutaneous every 8 hours  ketamine Infusion. 0.25 mG/kG/Hr (1.3 mL/Hr) IV Continuous <Continuous>  norepinephrine Infusion 0.05 MICROgram(s)/kG/Min (4.87 mL/Hr) IV Continuous <Continuous>  pantoprazole    Tablet 40 milliGRAM(s) Oral two times a day  piperacillin/tazobactam IVPB.. 3.375 Gram(s) IV Intermittent every 12 hours  propofol Infusion 20 MICROgram(s)/kG/Min (6.23 mL/Hr) IV Continuous <Continuous>  vasopressin Infusion 0.04 Unit(s)/Min (6 mL/Hr) IV Continuous <Continuous>    MEDICATIONS  (PRN):  fentaNYL    Injectable 50 MICROGram(s) IV Push every 2 hours PRN Mild Pain (1 - 3) or vent synchrony                            9.0    19.65 )-----------( 176      ( 2025 06:00 )             28.8     Bands 2.0        138  |  98  |  38[H]  ----------------------------<  103[H]  4.3   |  25  |  2.50[H]    Ca    8.9      2025 06:00  Phos  5.2       Mg     2.6         TPro  5.4[L]  /  Alb  2.5[L]  /  TBili  0.7  /  DBili  x   /  AST  59[H]  /  ALT  30  /  AlkPhos  319[H]      Lactate 3.9            @ 12:35    Lactate 6.8            @ 09:40            Urinalysis Basic - ( 2025 11:00 )    Color: Yellow / Appearance: Clear / S.022 / pH: x  Gluc: x / Ketone: Negative mg/dL  / Bili: Negative / Urobili: 0.2 mg/dL   Blood: x / Protein: 30 mg/dL / Nitrite: Negative   Leuk Esterase: Negative / RBC: 2 /HPF / WBC 4 /HPF   Sq Epi: x / Non Sq Epi: x / Bacteria: Few /HPF      ET Tube ET Tube --   Moderate polymorphonuclear leukocytes per low power field  No Squamous epithelial cells per low power field  No organisms seen per oil power field  @ 19:00        ABG - ( 2025 05:00 )  pH, Arterial: 7.33  pH, Blood: x     /  pCO2: 56    /  pO2: 142   / HCO3: 30    / Base Excess: 3.6   /  SaO2: 97.8                CENTRAL LINE: Y (left IJ)  DE LA FUENTE: Y  A-LINE: Y (left axilla)  OG tube: Y         Patient is a 79y old  Female who presents with a chief complaint of transferred from [place] s/p [facility] patch repair (2025 14:19)    Interval events: Yesterday evening pt had progressive worsening of hypoxemia, HFNC escalated to max 40L and 100%, then added NRB, transitioned to AVAPS on max support.  CXR with L base opacity.  POCUS showed small L effusion and B lines at bases, A lines superiorly.  Ultimately had worsening mental status and impending respiratory arrest and was emergently intubated. Belia intubation with development of shock, likely from respiratory acidosis and sedation. Pt febrile overnight with Tmax 101.4 @ 6 AM. Now POD#5. Unable to obtain ROS secondary to intubation and sedation.     Review of Systems: Unable to obtain secondary to intubation and sedation.     T(F): 97.2 (25 @ 12:28), Max: 101.4 (25 @ 06:00)  HR: 80 (25 @ 13:57) (71 - 141)  BP: 94/28 (25 @ 01:45) (64/50 - 168/90)  RR: 28 (25 @ 13:30) (20 - 36)  SpO2: 99% (25 @ 13:57) (85% - 100%)  Wt(kg): --    Mode: AC/ CMV (Assist Control/ Continuous Mandatory Ventilation), RR (machine): 28, TV (machine): 330, FiO2: 60, PEEP: 5    CAPILLARY BLOOD GLUCOSE      POCT Blood Glucose.: 133 mg/dL (2025 12:48)    I&O's Summary    2025 07:01  -  2025 07:00  --------------------------------------------------------  IN: 1674 mL / OUT: 770 mL / NET: 904 mL    2025 07:01  -  2025 14:39  --------------------------------------------------------  IN: 877.6 mL / OUT: 400 mL / NET: 477.6 mL        Physical Exam:     General: thin, frail, elderly female, intubated and sedated  HEENT: NC/AT  PULM: intubated and sedated  CVS: tachy, Regular rhythm  ABD: Soft, nondistended. JULIANN drain RUQ with SS output.  EXT: No edema, nontender, warm  NEURO: sedated, off sedation is able to follow commands    Meds:  MEDICATIONS  (STANDING):  albuterol/ipratropium for Nebulization 3 milliLiter(s) Nebulizer every 6 hours  buDESOnide    Inhalation Suspension 0.5 milliGRAM(s) Inhalation two times a day  chlorhexidine 0.12% Liquid 15 milliLiter(s) Oral Mucosa every 12 hours  chlorhexidine 2% Cloths 1 Application(s) Topical <User Schedule>  dexMEDEtomidine Infusion 0.4 MICROgram(s)/kG/Hr (5.19 mL/Hr) IV Continuous <Continuous>  dextrose 5%. 1000 milliLiter(s) (100 mL/Hr) IV Continuous <Continuous>  dextrose 5%. 1000 milliLiter(s) (50 mL/Hr) IV Continuous <Continuous>  dextrose 50% Injectable 25 Gram(s) IV Push once  dextrose 50% Injectable 12.5 Gram(s) IV Push once  dextrose 50% Injectable 25 Gram(s) IV Push once  dextrose Oral Gel 15 Gram(s) Oral once  fluconAZOLE IVPB 200 milliGRAM(s) IV Intermittent every 24 hours  glucagon  Injectable 1 milliGRAM(s) IntraMuscular once  guaifenesin/dextromethorphan Oral Liquid 10 milliLiter(s) Oral every 6 hours  heparin   Injectable 5000 Unit(s) SubCutaneous every 8 hours  ketamine Infusion. 0.25 mG/kG/Hr (1.3 mL/Hr) IV Continuous <Continuous>  norepinephrine Infusion 0.05 MICROgram(s)/kG/Min (4.87 mL/Hr) IV Continuous <Continuous>  pantoprazole    Tablet 40 milliGRAM(s) Oral two times a day  piperacillin/tazobactam IVPB.. 3.375 Gram(s) IV Intermittent every 12 hours  propofol Infusion 20 MICROgram(s)/kG/Min (6.23 mL/Hr) IV Continuous <Continuous>  vasopressin Infusion 0.04 Unit(s)/Min (6 mL/Hr) IV Continuous <Continuous>    MEDICATIONS  (PRN):  fentaNYL    Injectable 50 MICROGram(s) IV Push every 2 hours PRN Mild Pain (1 - 3) or vent synchrony                            9.0    19.65 )-----------( 176      ( 2025 06:00 )             28.8     Bands 2.0        138  |  98  |  38[H]  ----------------------------<  103[H]  4.3   |  25  |  2.50[H]    Ca    8.9      2025 06:00  Phos  5.2       Mg     2.6         TPro  5.4[L]  /  Alb  2.5[L]  /  TBili  0.7  /  DBili  x   /  AST  59[H]  /  ALT  30  /  AlkPhos  319[H]      Lactate 3.9            @ 12:35    Lactate 6.8            @ 09:40            Urinalysis Basic - ( 2025 11:00 )    Color: Yellow / Appearance: Clear / S.022 / pH: x  Gluc: x / Ketone: Negative mg/dL  / Bili: Negative / Urobili: 0.2 mg/dL   Blood: x / Protein: 30 mg/dL / Nitrite: Negative   Leuk Esterase: Negative / RBC: 2 /HPF / WBC 4 /HPF   Sq Epi: x / Non Sq Epi: x / Bacteria: Few /HPF      ET Tube ET Tube --   Moderate polymorphonuclear leukocytes per low power field  No Squamous epithelial cells per low power field  No organisms seen per oil power field  @ 19:00        ABG - ( 2025 05:00 )  pH, Arterial: 7.33  pH, Blood: x     /  pCO2: 56    /  pO2: 142   / HCO3: 30    / Base Excess: 3.6   /  SaO2: 97.8          < from: Xray Chest 1 View AP/PA (25 @ 11:14) >  IMPRESSION:  Enteric tube tip is in the stomach.    ET tube and left IJ line as above.    Continued right-sided volume loss and complete opacification of the right   hemithorax.    Reticular and patchy, predominantly left upper lung, opacities, again   seen.    Small left pleural effusion.    --- End of Report ---    < end of copied text >  < from: Xray Chest 1 View- PORTABLE-Urgent (Xray Chest 1 View- PORTABLE-Urgent .) (25 @ 22:12) >  FINDINGS:    Single frontal view of the chest demonstrates multiple chronic   right-sided rib fractures. Right lung by loss with complete   opacification. stable left lung infiltrates. Osteopenia.. The   cardiomediastinal silhouette is unremarkable.No acute osseous   abnormalities. Overlying EKG leads and wires are noted    IMPRESSION: Stable left lung infiltrates.      --- End of Report ---    < end of copied text >        CENTRAL LINE: Y (left IJ)  DE LA FUENTE: Y  A-LINE: Y (left axilla)  OG tube: Y

## 2025-01-14 NOTE — PHARMACOTHERAPY INTERVENTION NOTE - COMMENTS
Patient is a 79 year old female currently ordered for Zosyn 3.375 g Q8H for bowel perforation. Discussed with MD and recommended decreasing frequency to Q12H as CrCl 13.8 and eGFR <20. MD agreed and order was updated.

## 2025-01-14 NOTE — PROGRESS NOTE ADULT - ASSESSMENT
78 y/o WF with PMHx with HTN, HLD, CKD, PUD (dx 2011), Lung CA s/p lobectomy (1996) presents to [facility] ED from home with  via ambulance c/o worsening generalized achy abdominal pain since last night.  Patient reports baseline abdominal discomfort x several years which worsened suddenly    intestinal perf  atelectasis  pleural eff  HTN  HLD  CKD  PUD  PNA hx  Lung Ca - hx of Lobectomy     intubated  vs noted  ABG noted  meds reviewed  labs reviewed    post op care  PUD perf - surgery and op note reviewed  lung protective vent support  fio2 titration  oral hygiene  skin care  suction PRN  HOB elev  PPI  Bronchodilators - Stress steroids - hx of COPD -   chr changes - deformity - lung parenchyma and chest - see CT chest -   ICU care  I and O  serial labs  replete lytes  dvt p  pain relief

## 2025-01-14 NOTE — PROGRESS NOTE ADULT - ASSESSMENT
79F PMHx HTN, HLD, CKD, PUD, Lung Cancer (s/p RUL lobectomy) admitted with:  Assessment:  1. Acute Hypoxic/Hypercapnic Respiratory Failure  2. Septic Shock  3. Aspiration PNA  4. Perforated Gastric Ulcer s/p laparoscopic repair with Srinath Patch POD#5  5. ESPERANZA  6. Lactic Acidosis    Plan:  NEURO:   -Sedated on Precedex and Ketamine Infusions. Dyssynchronous with ventilator this evening s/p switching from Propofol Infusion. Given Versed 2mg IVPx1. Goal RASS 0 to -1.   -Fentanyl IVP PRN for pain/vent dyssynchrony.    -Serial neurologic assessments.     CV:   -Remains in vasopressor-dependent shock state with Levophed and fixed-dose Vasopressin Infusions. Actively titrating to maintain goal MAP >65 monitoring end points of organ perfusion; lactate downtrending, trend until cleared.   -Keep K~4 and Mg>2 for optimal arrhythmia suppression.  -Official TTE performed, results pending.     PULM:   -Patient currently on Full vent support  -titrate settings to maintain SaO2 >90%, or pH >7.25  -consider low tidal volume ventilation strategy w/ goal Tv 4-6 cc/kg of ideal body weight  -plateu pressure goal <30  -Peridex oral care  -aggressive pulmonary toilet, Albuterol q6h / Pulmicort BID.  -daily sedation vacation with spontaneous breathing trial if clinical condition warrants, discuss with respiratory therapy     GI:   -TF.   -Protonix BID.     RENAL:   -Renal function currently with ESPERANZA, likely ATN iso shock state.   -trend lytes/Scr daily with BMP  -I's and O's, goal UOP 0.5 cc/kg/hr  -renal dose meds and avoid nephrotoxins     ENDO:   -Aggressive glycemic control to limit FS glucose to <180mg/dl.     ID:   -Febrile. Cx NGTD thus far. Repeat Cx sent - pending. Empiric Zosyn/Diflucan courses. ID following.   -ABX use and/or discontinuation based on discussion with ID in conjunction with clinical features, culture data, and judicious procalcitonin monitoring.    HEME:  -VTE ppx with SQH.     GOC: Full Code.

## 2025-01-14 NOTE — PROGRESS NOTE ADULT - SUBJECTIVE AND OBJECTIVE BOX
Date/Time Patient Seen:  		  Referring MD:   Data Reviewed	       Patient is a 79y old  Female who presents with a chief complaint of transferred from [place] s/p [facility] patch repair (13 Jan 2025 14:22)      Subjective/HPI     PAST MEDICAL & SURGICAL HISTORY:  Asthma    HLD (hyperlipidemia)    Rheumatoid arthritis    TIA (transient ischemic attack)  2012    Esophageal reflux    Lung cancer  Right lung.    Chronic obstructive pulmonary disease, unspecified COPD type  O2 at night    Hiatal hernia with GERD    Essential hypertension    Hyperlipidemia, unspecified hyperlipidemia type    Calculus of gallbladder with chronic cholecystitis without obstruction    Childhood asthma    Stage 3 chronic kidney disease    Carotid artery plaque    IBS (irritable bowel syndrome)    Psoriatic arthritis    S/P lobectomy of lung  Right lung in 1996.    H/O colonoscopy    History of endoscopy  Sept 2017    S/P cholecystectomy    CAD (coronary artery disease)          Medication list         MEDICATIONS  (STANDING):  acetaminophen     Tablet .. 650 milliGRAM(s) Oral every 6 hours  albuterol/ipratropium for Nebulization 3 milliLiter(s) Nebulizer every 4 hours  buDESOnide    Inhalation Suspension 0.5 milliGRAM(s) Inhalation two times a day  chlorhexidine 0.12% Liquid 15 milliLiter(s) Oral Mucosa every 12 hours  chlorhexidine 2% Cloths 1 Application(s) Topical <User Schedule>  dexMEDEtomidine Infusion 0.4 MICROgram(s)/kG/Hr (5.19 mL/Hr) IV Continuous <Continuous>  dextrose 5%. 1000 milliLiter(s) (50 mL/Hr) IV Continuous <Continuous>  dextrose 5%. 1000 milliLiter(s) (100 mL/Hr) IV Continuous <Continuous>  dextrose 50% Injectable 12.5 Gram(s) IV Push once  dextrose 50% Injectable 25 Gram(s) IV Push once  dextrose 50% Injectable 25 Gram(s) IV Push once  dextrose Oral Gel 15 Gram(s) Oral once  fluconAZOLE IVPB 200 milliGRAM(s) IV Intermittent every 24 hours  furosemide   Injectable 40 milliGRAM(s) IV Push two times a day  glucagon  Injectable 1 milliGRAM(s) IntraMuscular once  guaiFENesin  milliGRAM(s) Oral every 12 hours  heparin   Injectable 5000 Unit(s) SubCutaneous every 8 hours  norepinephrine Infusion 0.05 MICROgram(s)/kG/Min (4.87 mL/Hr) IV Continuous <Continuous>  pantoprazole    Tablet 40 milliGRAM(s) Oral two times a day  piperacillin/tazobactam IVPB.. 3.375 Gram(s) IV Intermittent every 8 hours  propofol Infusion 20 MICROgram(s)/kG/Min (6.23 mL/Hr) IV Continuous <Continuous>  QUEtiapine 12.5 milliGRAM(s) Oral at bedtime    MEDICATIONS  (PRN):  guaifenesin/dextromethorphan Oral Liquid 20 milliLiter(s) Oral every 8 hours PRN Cough/chest congestion  oxyCODONE    IR 5 milliGRAM(s) Oral every 4 hours PRN Moderate Pain (4 - 6)  oxyCODONE    IR 10 milliGRAM(s) Oral every 4 hours PRN Severe Pain (7 - 10)         Vitals log        ICU Vital Signs Last 24 Hrs  T(C): 38.2 (14 Jan 2025 04:36), Max: 38.2 (14 Jan 2025 04:36)  T(F): 100.8 (14 Jan 2025 04:36), Max: 100.8 (14 Jan 2025 04:36)  HR: 106 (14 Jan 2025 05:05) (71 - 119)  BP: 94/28 (14 Jan 2025 01:45) (64/50 - 168/90)  BP(mean): 43 (14 Jan 2025 01:45) (43 - 121)  ABP: 107/54 (14 Jan 2025 02:00) (84/45 - 154/65)  ABP(mean): 74 (14 Jan 2025 02:00) (59 - 96)  RR: 31 (14 Jan 2025 02:00) (20 - 39)  SpO2: 98% (14 Jan 2025 05:05) (85% - 100%)    O2 Parameters below as of 14 Jan 2025 05:05  Patient On (Oxygen Delivery Method): ventilator             Mode: AC/ CMV (Assist Control/ Continuous Mandatory Ventilation)  RR (machine): 28  TV (machine): 330  FiO2: 60  PEEP: 5  ITime: 0.8  MAP: 15  PIP: 31      Input and Output:  I&O's Detail    12 Jan 2025 07:01  -  13 Jan 2025 07:00  --------------------------------------------------------  IN:    dextrose 5%: 800 mL    IV PiggyBack: 100 mL    IV PiggyBack: 175 mL    IV PiggyBack: 200 mL    IV PiggyBack: 200 mL    IV PiggyBack: 100 mL  Total IN: 1575 mL    OUT:    Dexmedetomidine: 0 mL    Drain (mL): 110 mL    Incontinent per Collection Bag (mL): 900 mL    Nasogastric/Oral tube (mL): 40 mL  Total OUT: 1050 mL    Total NET: 525 mL      13 Jan 2025 07:01  -  14 Jan 2025 05:39  --------------------------------------------------------  IN:    IV PiggyBack: 500 mL    IV PiggyBack: 100 mL    IV PiggyBack: 100 mL    IV PiggyBack: 100 mL  Total IN: 800 mL    OUT:    Dexmedetomidine: 0 mL    Drain (mL): 50 mL    Incontinent per Collection Bag (mL): 350 mL  Total OUT: 400 mL    Total NET: 400 mL          Lab Data                        8.4    16.95 )-----------( 132      ( 13 Jan 2025 06:05 )             27.3     01-13    136  |  97  |  34[H]  ----------------------------<  173[H]  3.8   |  29  |  2.00[H]    Ca    8.9      13 Jan 2025 18:15  Phos  3.4     01-13  Mg     2.6     01-13    TPro  5.6[L]  /  Alb  3.2[L]  /  TBili  1.0  /  DBili  x   /  AST  49[H]  /  ALT  30  /  AlkPhos  176[H]  01-13    ABG - ( 14 Jan 2025 05:00 )  pH, Arterial: 7.33  pH, Blood: x     /  pCO2: 56    /  pO2: 142   / HCO3: 30    / Base Excess: 3.6   /  SaO2: 97.8                    Review of Systems	      Objective     Physical Examination    heart s1s2  lung dec BS  head nc  head at      Pertinent Lab findings & Imaging      Dante:  NO   Adequate UO     I&O's Detail    12 Jan 2025 07:01  -  13 Jan 2025 07:00  --------------------------------------------------------  IN:    dextrose 5%: 800 mL    IV PiggyBack: 100 mL    IV PiggyBack: 175 mL    IV PiggyBack: 200 mL    IV PiggyBack: 200 mL    IV PiggyBack: 100 mL  Total IN: 1575 mL    OUT:    Dexmedetomidine: 0 mL    Drain (mL): 110 mL    Incontinent per Collection Bag (mL): 900 mL    Nasogastric/Oral tube (mL): 40 mL  Total OUT: 1050 mL    Total NET: 525 mL      13 Jan 2025 07:01  -  14 Jan 2025 05:39  --------------------------------------------------------  IN:    IV PiggyBack: 500 mL    IV PiggyBack: 100 mL    IV PiggyBack: 100 mL    IV PiggyBack: 100 mL  Total IN: 800 mL    OUT:    Dexmedetomidine: 0 mL    Drain (mL): 50 mL    Incontinent per Collection Bag (mL): 350 mL  Total OUT: 400 mL    Total NET: 400 mL               Discussed with:     Cultures:	        Radiology

## 2025-01-14 NOTE — PROGRESS NOTE ADULT - SUBJECTIVE AND OBJECTIVE BOX
NEPHROLOGY PROGRESS NOTE    CHIEF COMPLAINT:  ESPERANZA    HPI:  Shocky.  Intubated.  Renal function a little worse with UO 50 ml/hr.    EXAM:  Vital Signs Last 24 Hrs  T(C): 36.2 (2025 12:28), Max: 38.6 (2025 06:00)  T(F): 97.2 (2025 12:28), Max: 101.4 (2025 06:00)  HR: 80 (2025 13:57) (71 - 141)  BP: 94/28 (2025 01:45) (64/50 - 168/90)  BP(mean): 43 (2025 01:45) (43 - 107)  RR: 28 (2025 13:30) (20 - 36)  SpO2: 99% (2025 13:57) (85% - 100%)    Parameters below as of 2025 13:57  Patient On (Oxygen Delivery Method): ventilator, 60%      I&O's Summary    2025 07:01  -  2025 07:00  --------------------------------------------------------  IN: 1674 mL / OUT: 770 mL / NET: 904 mL    2025 07:01  -  2025 14:19  --------------------------------------------------------  IN: 877.6 mL / OUT: 400 mL / NET: 477.6 mL      Daily     Daily Weight in k.4 (2025 06:00)    Conversant, in no apparent distress  Normal respiratory effort, lungs clear bilaterally  Heart RRR with no murmur, no peripheral edema    LABS                        9.0    19.65 )-----------( 176      ( 2025 06:00 )             28.8     -14    138  |  98  |  38[H]  ----------------------------<  103[H]  4.3   |  25  |  2.50[H]    Ca    8.9      2025 06:00  Phos  5.2       Mg     2.6         TPro  5.4[L]  /  Alb  2.5[L]  /  TBili  0.7  /  DBili  x   /  AST  59[H]  /  ALT  30  /  AlkPhos  319[H]      Lactate 6.8 -> 3.9      Impression:  * ESPERANZA - worsening ATN in setting of shock, non oliguric  * Perforated gastric ulcer. S/p lap repair  * Post op acute respiratory failure  * CKD 3 presumably due to RVD, hypertensive nephrosclerosis. Cr ranging widely 1.1-1.5 per historical data    Recommendations:   Volume management per PCCM  Monitor I/O and BMP 1-2 times daily

## 2025-01-14 NOTE — CHART NOTE - NSCHARTNOTEFT_GEN_A_CORE
ID consulted requested by primary team; however, patient's  refused ID evaluation. Patient is currently on Zosyn and fluconazole, blood and respiratory cultures in process. Discussed with Dr. Gauthier. Please call ID if any questions or acute issues arise. Thank you.      Hilad Carrillo MD  Division of infectious Diseases  Cell [phone] between 8am and 6pm  After 6pm and over the weekends please call ID service line at [phone]. ID consulted requested by primary team; however, patient's  refused ID evaluation. Patient is currently on Zosyn and fluconazole, blood and respiratory cultures in process. Discussed with Dr. Gauthier. Please call ID if any questions. Thank you.      Hilda Carrillo MD  Division of infectious Diseases  Cell [phone] between 8am and 6pm  After 6pm and over the weekends please call ID service line at [phone].

## 2025-01-15 NOTE — PROGRESS NOTE ADULT - ASSESSMENT
79F PMHx HTN, HLD, CKD, PUD, Lung Cancer (s/p RUL lobectomy) who presents with perforated gastric ulcer s/p laparoscopic repair with Srinath Patch POD# 6     1. Acute Hypoxic/Hypercapnic Respiratory Failure  2. Septic Shock  3. Aspiration PNA  4. Perforated Gastric Ulcer s/p laparoscopic repair with Srinath Patch POD#5  5. ESPERANZA  6. Lactic Acidosis    Plan  Neuro: on ketamine and Precedex to facilitate safe ventilation. Target RASS of 0 to -2. PRN fentanyl for pain   Cardio: remains dependent on levo and vaso. Actively titrate to MAP of 65-70. Trend lactic. If still elevated trial IVF bolus   Pulm: Pt intubated on full vent support. Actively titrate to sp02 above 92%. on PRVC 28/330/40/5. daily ABG  GI: tube feeds as tolerated, PPI BID  Renal: strict i/o, renally dose meds prn   ID: empiric meropenum and diflucan . blood cx negative. ID following   Heme: heparin sq + SCDs for dvt ppx   Endo: no issues     Dispo: Remains in ICU, pt is full code

## 2025-01-15 NOTE — PROGRESS NOTE ADULT - SUBJECTIVE AND OBJECTIVE BOX
[facility] NEPHROLOGY SERVICES, [facility]  NEPHROLOGY AND HYPERTENSION  [address]  [address]  [place] [address]  [phone]    MD ALBERTINA ALEXIS MD YELENA ROSENBERG, MD BINNY KOSHY, MD CHRISTOPHER CAPUTO, MD EDWARD BOVER, MD          Patient events noted  Vent dependent     MEDICATIONS  (STANDING):  albuterol/ipratropium for Nebulization 3 milliLiter(s) Nebulizer every 6 hours  buDESOnide    Inhalation Suspension 0.5 milliGRAM(s) Inhalation two times a day  chlorhexidine 0.12% Liquid 15 milliLiter(s) Oral Mucosa every 12 hours  chlorhexidine 2% Cloths 1 Application(s) Topical <User Schedule>  dexMEDEtomidine Infusion 0.4 MICROgram(s)/kG/Hr (5.19 mL/Hr) IV Continuous <Continuous>  dextrose 5%. 1000 milliLiter(s) (100 mL/Hr) IV Continuous <Continuous>  dextrose 5%. 1000 milliLiter(s) (50 mL/Hr) IV Continuous <Continuous>  dextrose 50% Injectable 25 Gram(s) IV Push once  dextrose 50% Injectable 12.5 Gram(s) IV Push once  dextrose 50% Injectable 25 Gram(s) IV Push once  dextrose Oral Gel 15 Gram(s) Oral once  fluconAZOLE IVPB 200 milliGRAM(s) IV Intermittent every 24 hours  glucagon  Injectable 1 milliGRAM(s) IntraMuscular once  guaifenesin/dextromethorphan Oral Liquid 10 milliLiter(s) Oral every 6 hours  heparin   Injectable 5000 Unit(s) SubCutaneous every 8 hours  ketamine Infusion. 0.25 mG/kG/Hr (1.3 mL/Hr) IV Continuous <Continuous>  meropenem  IVPB 500 milliGRAM(s) IV Intermittent every 12 hours  norepinephrine Infusion 0.05 MICROgram(s)/kG/Min (4.87 mL/Hr) IV Continuous <Continuous>  pantoprazole   Suspension 40 milliGRAM(s) Oral two times a day  vasopressin Infusion 0.04 Unit(s)/Min (6 mL/Hr) IV Continuous <Continuous>    MEDICATIONS  (PRN):  fentaNYL    Injectable 50 MICROGram(s) IV Push every 2 hours PRN Mild Pain (1 - 3) or vent synchrony      01-14-25 @ 07:01  -  01-15-25 @ 07:00  --------------------------------------------------------  IN: 2388.6 mL / OUT: 1150 mL / NET: 1238.6 mL    01-15-25 @ 07:01  -  01-15-25 @ 19:23  --------------------------------------------------------  IN: 1353.4 mL / OUT: 0 mL / NET: 1353.4 mL      PHYSICAL EXAM:      T(C): 37.3 (01-15-25 @ 16:29), Max: 38.3 (01-15-25 @ 08:03)  HR: 76 (01-15-25 @ 19:14) (64 - 103)  BP: 102/90 (01-15-25 @ 04:00) (102/90 - 147/79)  RR: 29 (01-15-25 @ 19:14) (16 - 43)  SpO2: 96% (01-15-25 @ 19:14) (96% - 100%)  Wt(kg): --  Lungs clear  Heart S1S2  Abd soft NT ND  Extremities:   tr edema                                    7.4    16.26 )-----------( 134      ( 15 Wallace 2025 06:20 )             23.0     01-15    139  |  102  |  40[H]  ----------------------------<  168[H]  4.0   |  25  |  2.60[H]    Ca    8.3[L]      15 Wallace 2025 06:20  Phos  5.0     01-15  Mg     1.8     01-15    TPro  4.7[L]  /  Alb  1.9[L]  /  TBili  0.8  /  DBili  x   /  AST  114[H]  /  ALT  45  /  AlkPhos  387[H]  01-15    ABG - ( 15 Wallace 2025 14:00 )  pH, Arterial: 7.43  pH, Blood: x     /  pCO2: 44    /  pO2: 103   / HCO3: 29    / Base Excess: 4.9   /  SaO2: 97.5              LIVER FUNCTIONS - ( 15 Wallace 2025 06:20 )  Alb: 1.9 g/dL / Pro: 4.7 g/dL / ALK PHOS: 387 U/L / ALT: 45 U/L / AST: 114 U/L / GGT: x           Creatinine Trend: 2.60<--, 2.50<--, 2.00<--, 2.00<--, 1.90<--, 2.10<--  Mode: AC/ CMV (Assist Control/ Continuous Mandatory Ventilation)  RR (machine): 28  TV (machine): 350  FiO2: 40  PEEP: 5  ITime: 0.8  MAP: 15  PIP: 29      Impression:  * ESPERANZA - worsening ATN in setting of shock, non oliguric  * Perforated gastric ulcer. S/p lap repair  * Post op acute respiratory failure  * CKD 3 presumably due to RVD, hypertensive nephrosclerosis. Cr ranging widely 1.1-1.5 per historical data    Recommendations:   Volume management per PCCM  Monitor I/O and BMP 1-2 times daily  Prognosis guarded         Nicolas Loyd MD

## 2025-01-15 NOTE — PROGRESS NOTE ADULT - ASSESSMENT
79F with HTN, HLD, CKD, PUD, Lung Cancer s/p RUL lobectomy who presents with a perforated gastric ulcer POD 2 from laparoscopic repair with Srinath Patch and Onur Drain placement. Pt transferred from  to [facility] ICU for severe hypercapnic respiratory failure 2ry aspiration requiring intubation, now extubated with hospital course further complicated by ESPERANZA d/t ATN. Admitted to the ICU with:     Perforated gastric ulcer s/p lap repair with Srinath patch and onur drain  Acute hypoxic/hypercapnic respiratory failure  Aspiration PNA  Pneumoperitoneum  ESPERANZA    NEURO:   -on ketamine and precedex for sedation  - fentanyl prn for pain/vent synch    CV:   - Maintain MAP>65  - now with septic shock +/- hypovolemia, s/p 500 cc IVF bolus x 3  - d/c Lasix  - TTE ordered  - on levophed and vasopressin    PULM:   - Pt had severe hypercapnic resp failure, aspiration requiring intubation, was previously extubated and on HFNC  - Pt w/ acute worsening hypoxia and required emergent intubation, developed respiratory acidosis, suspect aspiration PNA  - continue ventilation  - Continue inhaled steroids and duonebs for secretions.   - Aspiration precautions   - off Lasix   - mucinex and robitussin prn for cough  - c/w mucomyst  - ABG ordered     GI:    - s/p lap repair with srinath patch with onur drain placement.  - tube feeds via OG when pressor requirements down  - protonix bid for PUD  - nutrition consult    RENAL:   - ESPERANZA likely in the setting of ATN/dehydration, 500 cc IV bolus x 3  - D/c lasix   - Strict I/Os.  - monitor and replete lytes prn    ENDO:   - no active issues  - Maintain euglycemia 120-180    ID:   - given persistent fever will d/c zosyn and escalate to meropenem  - c/w fluconazole for gastric perforation/peritonitis  - sputum culture commensal merline  - BC x 2 pending  - lactate downtrending  - U/A neg  - monitor WBC    HEME:   - on dvt ppx with SC Heparin     79F with HTN, HLD, CKD, PUD, Lung Cancer s/p RUL lobectomy who presents with a perforated gastric ulcer POD 2 from laparoscopic repair with Srinath Patch and Onur Drain placement. Pt transferred from  to [facility] ICU for severe hypercapnic respiratory failure 2ry aspiration requiring intubation, now extubated with hospital course further complicated by ESPERANZA d/t ATN. Admitted to the ICU with:     Perforated gastric ulcer s/p lap repair with Srinath patch and onur drain  Acute hypoxic/hypercapnic respiratory failure  Aspiration PNA  Pneumoperitoneum  ESPERANZA    NEURO:   -on ketamine and precedex for sedation  - fentanyl prn for pain/vent synch    CV:   - Maintain MAP>65  - now with septic shock +/- hypovolemia, s/p 500 cc IVF bolus x 3  - d/c Lasix  - TTE ordered  - on levophed and vasopressin    PULM:   - Pt had severe hypercapnic resp failure, aspiration requiring intubation, was previously extubated and on HFNC  - Pt w/ acute worsening hypoxia and required emergent intubation, developed respiratory acidosis, suspect aspiration PNA  - continue ventilation  - Continue inhaled steroids and duonebs for secretions.   - Aspiration precautions   - off Lasix   - robitussin q6hrs prn for cough  - ABG ordered     GI:    - s/p lap repair with srinath patch with onur drain placement.  - tube feeds via OG when pressor requirements down  - protonix bid for PUD  - nutrition consult    RENAL:   - ESPERANZA likely in the setting of ATN/dehydration, 500 cc IV bolus x 3  - D/c lasix   - Strict I/Os.  - monitor and replete lytes prn    ENDO:   - no active issues  - Maintain euglycemia 120-180    ID:   - given persistent fever will d/c zosyn and escalate to meropenem  - c/w fluconazole for gastric perforation/peritonitis  - sputum culture commensal merline  - BC x 2 pending  - lactate downtrending  - U/A neg  - monitor WBC    HEME:   - on dvt ppx with SC Heparin     79F with HTN, HLD, CKD, PUD, Lung Cancer s/p RUL lobectomy who presents with a perforated gastric ulcer POD 2 from laparoscopic repair with Srinath Patch and Onur Drain placement. Pt transferred from  to [facility] ICU for severe hypercapnic respiratory failure 2ry aspiration requiring intubation, now extubated with hospital course further complicated by ESPERANZA d/t ATN. Admitted to the ICU with:     Perforated gastric ulcer s/p lap repair with Srinath patch and onur drain  Acute hypoxic/hypercapnic respiratory failure  Aspiration PNA  Pneumoperitoneum  ESPERANZA    NEURO:   -on ketamine and precedex for sedation  - fentanyl prn for pain/vent synch    CV:   - Maintain MAP>65  - now with septic shock +/- hypovolemia, s/p 500 cc IVF bolus x 3  - d/c Lasix  - TTE ordered  - on levophed and vasopressin    PULM:   - Pt had severe hypercapnic resp failure, aspiration requiring intubation, was previously extubated and on HFNC  - Pt w/ acute worsening hypoxia and required emergent intubation, developed respiratory acidosis, suspect aspiration PNA  - continue ventilation  - Continue inhaled steroids and duonebs for secretions.   - Aspiration precautions   - off Lasix   - robitussin q6hrs prn for cough  - ABG ordered     GI:    - s/p lap repair with srinath patch with onur drain placement.  - tube feeds via OG   - protonix bid for PUD  - nutrition consult    RENAL:   - ESPERANZA likely in the setting of ATN/dehydration, 500 cc IV bolus x 3  - D/c lasix   - Strict I/Os.  - monitor and replete lytes prn    ENDO:   - no active issues  - Maintain euglycemia 120-180    ID:   - given persistent fever will d/c zosyn and escalate to meropenem  - c/w fluconazole for gastric perforation/peritonitis  - sputum culture commensal merline  - BC x 2 pending  - lactate downtrending  - U/A neg  - monitor WBC    HEME:   - on dvt ppx with SC Heparin

## 2025-01-15 NOTE — PROGRESS NOTE ADULT - SUBJECTIVE AND OBJECTIVE BOX
Date of entry of this note is equal to the date of services rendered       Patient is a 79y old  Female who presents with a chief complaint of transferred from [place] s/p grahm patch repair (15 Wallace 2025 19:23)      BRIEF HOSPITAL COURSE:   79F PMHx HTN, HLD, CKD, PUD, Lung Cancer (s/p RUL lobectomy) who presents with perforated gastric ulcer s/p laparoscopic repair with Srinath Patch POD# 6. Post-operative course complicated by septic shock, prolonged intubation for hypercapnic respiratory failure iso aspiration, ESPERANZA secondary to ATN, lactic acidosis.       Events last 24 hours:   Remains dependent on levo and vaso, intubated on full vent support       Social history:    PAST MEDICAL & SURGICAL HISTORY:  HLD (hyperlipidemia)      Rheumatoid arthritis      TIA (transient ischemic attack)  2012      Lung cancer  Right lung.      Chronic obstructive pulmonary disease, unspecified COPD type  O2 at night      Hiatal hernia with GERD      Essential hypertension      Childhood asthma      Stage 3 chronic kidney disease      Carotid artery plaque      IBS (irritable bowel syndrome)      Psoriatic arthritis      S/P lobectomy of lung  Right lung in 1996.      H/O colonoscopy      History of endoscopy  Sept 2017      S/P cholecystectomy        Allergies    penicillin (Other)  minocycline (Other)  Enbrel (Unknown)  IV Contrast (Hives; Rash)  Zofran (Other)  Levaquin (Other)    Intolerances      FAMILY HISTORY:  FH: type 2 diabetes (Mother)    FH: CAD (coronary artery disease) (Father, Sibling)    FH: colon cancer (Sibling)    FH: myocardial infarction (Father)    FH: stroke (Sibling)    FH: lung cancer (Sibling)            Medications:  fluconAZOLE IVPB 200 milliGRAM(s) IV Intermittent every 24 hours  meropenem  IVPB 500 milliGRAM(s) IV Intermittent every 12 hours    norepinephrine Infusion 0.05 MICROgram(s)/kG/Min IV Continuous <Continuous>    albuterol/ipratropium for Nebulization 3 milliLiter(s) Nebulizer every 6 hours  buDESOnide    Inhalation Suspension 0.5 milliGRAM(s) Inhalation two times a day  guaifenesin/dextromethorphan Oral Liquid 10 milliLiter(s) Oral every 6 hours    dexMEDEtomidine Infusion 0.4 MICROgram(s)/kG/Hr IV Continuous <Continuous>  fentaNYL    Injectable 50 MICROGram(s) IV Push every 2 hours PRN  ketamine Infusion. 0.25 mG/kG/Hr IV Continuous <Continuous>      heparin   Injectable 5000 Unit(s) SubCutaneous every 8 hours    pantoprazole   Suspension 40 milliGRAM(s) Oral two times a day      dextrose 50% Injectable 25 Gram(s) IV Push once  dextrose 50% Injectable 12.5 Gram(s) IV Push once  dextrose 50% Injectable 25 Gram(s) IV Push once  dextrose Oral Gel 15 Gram(s) Oral once  glucagon  Injectable 1 milliGRAM(s) IntraMuscular once  vasopressin Infusion 0.04 Unit(s)/Min IV Continuous <Continuous>    dextrose 5%. 1000 milliLiter(s) IV Continuous <Continuous>  dextrose 5%. 1000 milliLiter(s) IV Continuous <Continuous>      chlorhexidine 0.12% Liquid 15 milliLiter(s) Oral Mucosa every 12 hours  chlorhexidine 2% Cloths 1 Application(s) Topical <User Schedule>        Mode: AC/ CMV (Assist Control/ Continuous Mandatory Ventilation)  RR (machine): 28  TV (machine): 350  FiO2: 40  PEEP: 5  ITime: 0.8  MAP: 17  PIP: 30      ICU Vital Signs Last 24 Hrs  T(C): 37.3 (15 Wallace 2025 16:29), Max: 38.3 (15 Wallace 2025 08:03)  T(F): 99.2 (15 Wallace 2025 16:29), Max: 100.9 (15 Wallace 2025 08:03)  HR: 96 (15 Wallace 2025 20:00) (64 - 103)  BP: 102/90 (15 Wallace 2025 04:00) (102/90 - 147/79)  BP(mean): 96 (15 Wallace 2025 04:00) (96 - 99)  ABP: 140/65 (15 Wallace 2025 20:00) (86/44 - 274/268)  ABP(mean): 89 (15 Wallace 2025 20:00) (59 - 258)  RR: 34 (15 Wallace 2025 20:00) (16 - 39)  SpO2: 98% (15 Wallace 2025 20:00) (96% - 100%)    O2 Parameters below as of 15 Wallace 2025 20:00  Patient On (Oxygen Delivery Method): ventilator          Vital Signs Last 24 Hrs  T(C): 37.3 (15 Wallace 2025 16:29), Max: 38.3 (15 Wallace 2025 08:03)  T(F): 99.2 (15 Wallace 2025 16:29), Max: 100.9 (15 Wallace 2025 08:03)  HR: 96 (15 Wallace 2025 20:00) (64 - 103)  BP: 102/90 (15 Wallace 2025 04:00) (102/90 - 147/79)  BP(mean): 96 (15 Wallace 2025 04:00) (96 - 99)  RR: 34 (15 Wallace 2025 20:00) (16 - 39)  SpO2: 98% (15 Wallace 2025 20:00) (96% - 100%)    Parameters below as of 15 Wallace 2025 20:00  Patient On (Oxygen Delivery Method): ventilator        ABG - ( 15 Wallace 2025 14:00 )  pH, Arterial: 7.43  pH, Blood: x     /  pCO2: 44    /  pO2: 103   / HCO3: 29    / Base Excess: 4.9   /  SaO2: 97.5                I&O's Detail    14 Jan 2025 07:01  -  15 Wallace 2025 07:00  --------------------------------------------------------  IN:    Dexmedetomidine: 143.8 mL    IV PiggyBack: 100 mL    IV PiggyBack: 150 mL    Ketamine: 90.4 mL    Lactated Ringers Bolus: 1000 mL    Nepro with Carb Steady: 240 mL    Norepinephrine: 490.2 mL    Propofol: 48.2 mL    Vasopressin: 126 mL  Total IN: 2388.6 mL    OUT:    Drain (mL): 150 mL    Indwelling Catheter - Urethral (mL): 1000 mL  Total OUT: 1150 mL    Total NET: 1238.6 mL      15 Wallace 2025 07:01  -  15 Wallace 2025 20:09  --------------------------------------------------------  IN:    Dexmedetomidine: 137.4 mL    IV PiggyBack: 100 mL    IV PiggyBack: 50 mL    Ketamine: 65 mL    Lactated Ringers Bolus: 500 mL    Nepro with Carb Steady: 360 mL    Norepinephrine: 94 mL    Vasopressin: 72 mL  Total IN: 1378.4 mL    OUT:  Total OUT: 0 mL    Total NET: 1378.4 mL            LABS:                        7.4    16.26 )-----------( 134      ( 15 Wallace 2025 06:20 )             23.0     01-15    139  |  102  |  40[H]  ----------------------------<  168[H]  4.0   |  25  |  2.60[H]    Ca    8.3[L]      15 Wallace 2025 06:20  Phos  5.0     01-15  Mg     1.8     01-15    TPro  4.7[L]  /  Alb  1.9[L]  /  TBili  0.8  /  DBili  x   /  AST  114[H]  /  ALT  45  /  AlkPhos  387[H]  01-15          CAPILLARY BLOOD GLUCOSE      POCT Blood Glucose.: 108 mg/dL (15 Wallace 2025 11:26)      Urinalysis Basic - ( 15 Wallace 2025 06:20 )    Color: x / Appearance: x / SG: x / pH: x  Gluc: 168 mg/dL / Ketone: x  / Bili: x / Urobili: x   Blood: x / Protein: x / Nitrite: x   Leuk Esterase: x / RBC: x / WBC x   Sq Epi: x / Non Sq Epi: x / Bacteria: x      CULTURES:  Culture Results:   No growth at 24 hours (01-14 @ 10:10)  Culture Results:   No growth at 24 hours (01-14 @ 09:40)  Culture Results:   Commensal merline consistent with body site (01-13 @ 19:00)      Physical Examination:    General: elderly female in bed, ill appearing, thin, frail     HEENT: Pupils equal, reactive to light.  Symmetric.    PULM: b/l air entry     CVS: +s1/s2    ABD: Soft, , nondistended. JULIANN drain RUQ with SS output    EXT: No edema    SKIN: Warm    Neuro: sedated     RADIOLOGY:   < from: CT Abdomen and Pelvis No Cont (01.09.25 @ 00:36) >  INTERPRETATION:  CLINICAL INFORMATION: Severe abdominal pain.    COMPARISON: January 1, 2025 CT    CONTRAST/COMPLICATIONS:  IV Contrast: None  Oral Contrast: None      PROCEDURE:  CT of the Abdomen and Pelvis was performed.  Sagittal and coronal reformats were performed.    FINDINGS:  Evaluation of solid organs and vascular structures is limited without   intravenous contrast.    LOWER CHEST: Partially imaged right pleural effusion and right basilar   atelectasis similar to prior exam. Aortic valve and mitral valve annular   calcifications. Small hiatal hernia.    LIVER: Within normal limits.  BILE DUCTS: Stable CBD dilatation, which could be related to   cholecystectomy.  GALLBLADDER: Cholecystectomy.  SPLEEN: Within normal limits.  PANCREAS: Within normal limits.  ADRENALS: Within normal limits.  KIDNEYS/URETERS: Nonobstructing left renal stone versus vascular   calcification. No hydronephrosis.    BLADDER: Minimally distended.  REPRODUCTIVE ORGANS: Atrophic uterus.  BOWEL: No bowel obstruction. No evidence of appendicitis. Colonic   diverticulosis. Diffuse wall thickening of the stomach and wall   thickening of the proximal duodenum. Possible area of ulceration in the   distal stomach (3, 51 and 5, 53) Wall thickening of several small bowel   loops in the mid to left abdomen (example 5, 57).  PERITONEUM/RETROPERITONEUM: Small to moderate ascites. Pneumoperitoneum   most prominently in the upper abdomen.  VESSELS: Atherosclerotic changes.  LYMPH NODES: No lymphadenopathy.  ABDOMINAL WALL: Small fat-containing umbilical hernia.  BONES: Degenerative changes. L4 compression deformity status post   kyphoplasty.    IMPRESSION: Exam limited by noncontrast technique.  Pneumoperitoneum and small to moderate ascites, compatible with   perforated viscus. Wall thickening of the stomach and duodenum with   possible area of ulceration in the distal stomach, concerning for   perforated peptic ulcer. Wall thickening of small bowel loops in the mid   to left abdomen also noted and underlying perforation from this region is   not excluded. Perforation from colonic diverticulosis/diverticulitis is   also possible but considered less likely. Surgical consult recommended.    < end of copied text >      Critical Care time: 35 mins assessing presenting problems of acute illness that poses high probability of life threatening deterioration or end organ damage/dysfunction.  Medical decision making inculding Initiating plan of care, reviewing data, reviewing radiology,direct patient bedside evaluation and interpretation of vital signs, any necessary ventilator management , discusion with multidisciplinary team, discussing goals of care with patient/family, all non inclusive of procedures      To improve communication and patient safety, I have coordinated care with the multidisciplinary team including the bedside nurse, appropriate attending of record and consultants as needed.

## 2025-01-15 NOTE — PROGRESS NOTE ADULT - ATTENDING COMMENTS
98.1 80 yo woman with HTN, HLD, CKD, PUD, Lung Cancer s/p RUL lobectomy who presents with a perforated gastric ulcer POD 5 from laparoscopic repair with Srinath Patch.  Postop course complicated by prolonged intubation for hypercapnic respiratory failure 2/2 aspiration, ESPERANZA 2/2 ATN, and now likely aspiration pneumonia with recurrent hypoxemic respiratory failure, septic shock, lactic acidosis, and worsening ESPERANZA.    --continue sedation with ketamine and precedex, prn fentanyl  --likely septic +/- hypovolemic shock, improving levophed requirement, also on vaso  fluid challenge again  no evidence of cardiac component of echo  --acute hypoxemic respiratory failure, wean FiO2 as tolerates  trial of PS 15/5 but with immediate tachypnea to 40s  COPD, continue bronchdilators, robitussin for secretion clearance  --POD 5 Srinath patch for perforated   PPI bid for PUD  continue TF  --ESPERANZA persists, likely ATN from septic shock  IVF as above  --septic shock suspect from aspiration pneumonia, recurrent fever  escalate zosyn to meropenem, continue fluconazole  possible panCT though without IV contrast will be limited  -- updated

## 2025-01-15 NOTE — PROGRESS NOTE ADULT - SUBJECTIVE AND OBJECTIVE BOX
Date/Time Patient Seen:  		  Referring MD:   Data Reviewed	       Patient is a 79y old  Female who presents with a chief complaint of transferred from [place] s/p grahm patch repair (14 Jan 2025 21:03)      Subjective/HPI     PAST MEDICAL & SURGICAL HISTORY:  Asthma    HLD (hyperlipidemia)    Rheumatoid arthritis    TIA (transient ischemic attack)  2012    Esophageal reflux    Lung cancer  Right lung.    Chronic obstructive pulmonary disease, unspecified COPD type  O2 at night    Hiatal hernia with GERD    Essential hypertension    Hyperlipidemia, unspecified hyperlipidemia type    Calculus of gallbladder with chronic cholecystitis without obstruction    Childhood asthma    Stage 3 chronic kidney disease    Carotid artery plaque    IBS (irritable bowel syndrome)    Psoriatic arthritis    S/P lobectomy of lung  Right lung in 1996.    H/O colonoscopy    History of endoscopy  Sept 2017    S/P cholecystectomy    CAD (coronary artery disease)          Medication list         MEDICATIONS  (STANDING):  albuterol/ipratropium for Nebulization 3 milliLiter(s) Nebulizer every 6 hours  buDESOnide    Inhalation Suspension 0.5 milliGRAM(s) Inhalation two times a day  chlorhexidine 0.12% Liquid 15 milliLiter(s) Oral Mucosa every 12 hours  chlorhexidine 2% Cloths 1 Application(s) Topical <User Schedule>  dexMEDEtomidine Infusion 0.4 MICROgram(s)/kG/Hr (5.19 mL/Hr) IV Continuous <Continuous>  dextrose 5%. 1000 milliLiter(s) (100 mL/Hr) IV Continuous <Continuous>  dextrose 5%. 1000 milliLiter(s) (50 mL/Hr) IV Continuous <Continuous>  dextrose 50% Injectable 25 Gram(s) IV Push once  dextrose 50% Injectable 12.5 Gram(s) IV Push once  dextrose 50% Injectable 25 Gram(s) IV Push once  dextrose Oral Gel 15 Gram(s) Oral once  fluconAZOLE IVPB 200 milliGRAM(s) IV Intermittent every 24 hours  glucagon  Injectable 1 milliGRAM(s) IntraMuscular once  guaifenesin/dextromethorphan Oral Liquid 10 milliLiter(s) Oral every 6 hours  heparin   Injectable 5000 Unit(s) SubCutaneous every 8 hours  ketamine Infusion. 0.25 mG/kG/Hr (1.3 mL/Hr) IV Continuous <Continuous>  norepinephrine Infusion 0.05 MICROgram(s)/kG/Min (4.87 mL/Hr) IV Continuous <Continuous>  pantoprazole    Tablet 40 milliGRAM(s) Oral two times a day  piperacillin/tazobactam IVPB.. 3.375 Gram(s) IV Intermittent every 12 hours  vasopressin Infusion 0.04 Unit(s)/Min (6 mL/Hr) IV Continuous <Continuous>    MEDICATIONS  (PRN):  fentaNYL    Injectable 50 MICROGram(s) IV Push every 2 hours PRN Mild Pain (1 - 3) or vent synchrony         Vitals log        ICU Vital Signs Last 24 Hrs  T(C): 38 (15 Wallace 2025 00:58), Max: 38.6 (14 Jan 2025 06:00)  T(F): 100.4 (15 Wallace 2025 00:58), Max: 101.4 (14 Jan 2025 06:00)  HR: 70 (15 Wallace 2025 04:51) (67 - 141)  BP: 102/90 (15 Wallace 2025 04:00) (102/90 - 147/79)  BP(mean): 96 (15 Wallace 2025 04:00) (96 - 111)  ABP: 99/47 (15 Wallace 2025 04:30) (66/37 - 274/268)  ABP(mean): 64 (15 Wallace 2025 04:30) (49 - 258)  RR: 28 (15 Wallace 2025 04:30) (21 - 43)  SpO2: 100% (15 Wallace 2025 04:51) (92% - 100%)    O2 Parameters below as of 15 Wallace 2025 00:53  Patient On (Oxygen Delivery Method): ventilator             Mode: AC/ CMV (Assist Control/ Continuous Mandatory Ventilation)  RR (machine): 28  TV (machine): 350  FiO2: 40  PEEP: 5  ITime: 0.8  MAP: 14  PIP: 28      Input and Output:  I&O's Detail    13 Jan 2025 07:01  -  14 Jan 2025 07:00  --------------------------------------------------------  IN:    IV PiggyBack: 100 mL    IV PiggyBack: 100 mL    IV PiggyBack: 600 mL    IV PiggyBack: 270 mL    Norepinephrine: 488 mL    Propofol: 116 mL  Total IN: 1674 mL    OUT:    Dexmedetomidine: 0 mL    Drain (mL): 100 mL    Incontinent per Collection Bag (mL): 670 mL  Total OUT: 770 mL    Total NET: 904 mL      14 Jan 2025 07:01  -  15 Jan 2025 05:45  --------------------------------------------------------  IN:    Dexmedetomidine: 134.2 mL    IV PiggyBack: 100 mL    IV PiggyBack: 75 mL    Ketamine: 85.4 mL    Lactated Ringers Bolus: 1000 mL    Nepro with Carb Steady: 200 mL    Norepinephrine: 483.4 mL    Propofol: 48.2 mL    Vasopressin: 114 mL  Total IN: 2240.2 mL    OUT:    Drain (mL): 150 mL    Indwelling Catheter - Urethral (mL): 1000 mL  Total OUT: 1150 mL    Total NET: 1090.2 mL          Lab Data                        9.0    19.65 )-----------( 176      ( 14 Jan 2025 06:00 )             28.8     01-14    138  |  98  |  38[H]  ----------------------------<  103[H]  4.3   |  25  |  2.50[H]    Ca    8.9      14 Jan 2025 06:00  Phos  5.2     01-14  Mg     2.6     01-14    TPro  5.4[L]  /  Alb  2.5[L]  /  TBili  0.7  /  DBili  x   /  AST  59[H]  /  ALT  30  /  AlkPhos  319[H]  01-14    ABG - ( 14 Jan 2025 05:00 )  pH, Arterial: 7.33  pH, Blood: x     /  pCO2: 56    /  pO2: 142   / HCO3: 30    / Base Excess: 3.6   /  SaO2: 97.8                    Review of Systems	      Objective     Physical Examination  heart s1s2  lung dec BS  head nc  head at        Pertinent Lab findings & Imaging      Dante:  NO   Adequate UO     I&O's Detail    13 Jan 2025 07:01  -  14 Jan 2025 07:00  --------------------------------------------------------  IN:    IV PiggyBack: 100 mL    IV PiggyBack: 100 mL    IV PiggyBack: 600 mL    IV PiggyBack: 270 mL    Norepinephrine: 488 mL    Propofol: 116 mL  Total IN: 1674 mL    OUT:    Dexmedetomidine: 0 mL    Drain (mL): 100 mL    Incontinent per Collection Bag (mL): 670 mL  Total OUT: 770 mL    Total NET: 904 mL      14 Jan 2025 07:01  -  15 Wallace 2025 05:45  --------------------------------------------------------  IN:    Dexmedetomidine: 134.2 mL    IV PiggyBack: 100 mL    IV PiggyBack: 75 mL    Ketamine: 85.4 mL    Lactated Ringers Bolus: 1000 mL    Nepro with Carb Steady: 200 mL    Norepinephrine: 483.4 mL    Propofol: 48.2 mL    Vasopressin: 114 mL  Total IN: 2240.2 mL    OUT:    Drain (mL): 150 mL    Indwelling Catheter - Urethral (mL): 1000 mL  Total OUT: 1150 mL    Total NET: 1090.2 mL               Discussed with:     Cultures:	        Radiology

## 2025-01-15 NOTE — CONSULT NOTE ADULT - ASSESSMENT
80 y/o WF with PMHx with HTN, HLD, CKD, PUD (dx 2011), Lung CA s/p lobectomy (1996)  who was admitted on 1/9 for worsening generalized achy abdominal pain since 1/7 night.  Patient reports baseline abdominal discomfort x several years which worsened suddenly (described as 11/10) without associated fever/chills. She was found to have pneumooperitoneum, s/p Laparoscopic repair of perforated gastric ulcer on 1/9. Patient has been on antibiotics and antifungals. On 1/13 she was intubated.     This AM Zosyn switched to meropenem given fevers. Respiratory culture from 1/13 growing commensal merlien consistent with body site. WBC improved today.    #Pneumonia  #Perforated gastric ulcer  #Fevers  #Leukocytosis  #Hypotension    -continue meropenem (renally dosed)  -continue fluconazole (renally dosed), if has more high fevers overnight consider broadening to caspofungin  -blood cultures pending  -monitor WBC  -consider CT chest and A/P  -discussed with  at bedside  Thank you for courtesy of this consult.     Will follow.  Discussed with the primary team.     Hilda Carrillo MD  Division of Infectious Diseases   Cell [phone] between 8am and 6pm   After 6pm and weekends please call ID service at [phone].     55 minutes spent on total encounter assessing patient, examination, chart review, counseling and coordinating care by the attending physician/nurse/care manager.

## 2025-01-15 NOTE — PROGRESS NOTE ADULT - SUBJECTIVE AND OBJECTIVE BOX
Patient is a 79y old  Female who presents with a chief complaint of transferred from [place] s/p [facility] patch repair (15 Wallace 2025 09:17)    Interval events:     Review of Systems:  Constitutional: no fever, chills, fatigue  Neuro: no headache, numbness, weakness  Resp: no cough, wheezing, shortness of breath  CVS: no chest pain, palpitations, leg swelling  GI: no abdominal pain, nausea, vomiting, diarrhea   : no dysuria, frequency, incontinence  Skin: no itching, burning, rashes, or lesions   Msk: no joint pain or swelling  Psych: no depression, anxiety    T(F): 100.9 (01-15-25 @ 08:03), Max: 100.9 (01-15-25 @ 08:03)  HR: 80 (01-15-25 @ 08:31) (64 - 118)  BP: 102/90 (01-15-25 @ 04:00) (102/90 - 147/79)  RR: 21 (01-15-25 @ 06:00) (16 - 43)  SpO2: 96% (01-15-25 @ 08:19) (95% - 100%)  Wt(kg): --    Mode: AC/ CMV (Assist Control/ Continuous Mandatory Ventilation), RR (machine): 28, TV (machine): 350, FiO2: 40, PEEP: 5    CAPILLARY BLOOD GLUCOSE      POCT Blood Glucose.: 137 mg/dL (15 Wallace 2025 05:31)    I&O's Summary    14 Jan 2025 07:01  -  15 Wallace 2025 07:00  --------------------------------------------------------  IN: 2388.6 mL / OUT: 1150 mL / NET: 1238.6 mL        Physical Exam:     Gen:  Neuro:  HEENT:  CV:  Pulm:  GI:  Ext:  Skin:    Meds:  MEDICATIONS  (STANDING):  albuterol/ipratropium for Nebulization 3 milliLiter(s) Nebulizer every 6 hours  buDESOnide    Inhalation Suspension 0.5 milliGRAM(s) Inhalation two times a day  chlorhexidine 0.12% Liquid 15 milliLiter(s) Oral Mucosa every 12 hours  chlorhexidine 2% Cloths 1 Application(s) Topical <User Schedule>  dexMEDEtomidine Infusion 0.4 MICROgram(s)/kG/Hr (5.19 mL/Hr) IV Continuous <Continuous>  dextrose 5%. 1000 milliLiter(s) (100 mL/Hr) IV Continuous <Continuous>  dextrose 5%. 1000 milliLiter(s) (50 mL/Hr) IV Continuous <Continuous>  dextrose 50% Injectable 25 Gram(s) IV Push once  dextrose 50% Injectable 12.5 Gram(s) IV Push once  dextrose 50% Injectable 25 Gram(s) IV Push once  dextrose Oral Gel 15 Gram(s) Oral once  fluconAZOLE IVPB 200 milliGRAM(s) IV Intermittent every 24 hours  glucagon  Injectable 1 milliGRAM(s) IntraMuscular once  guaifenesin/dextromethorphan Oral Liquid 10 milliLiter(s) Oral every 6 hours  heparin   Injectable 5000 Unit(s) SubCutaneous every 8 hours  ketamine Infusion. 0.25 mG/kG/Hr (1.3 mL/Hr) IV Continuous <Continuous>  lactated ringers Bolus 500 milliLiter(s) IV Bolus once  meropenem  IVPB 500 milliGRAM(s) IV Intermittent every 12 hours  norepinephrine Infusion 0.05 MICROgram(s)/kG/Min (4.87 mL/Hr) IV Continuous <Continuous>  pantoprazole    Tablet 40 milliGRAM(s) Oral two times a day  vasopressin Infusion 0.04 Unit(s)/Min (6 mL/Hr) IV Continuous <Continuous>    MEDICATIONS  (PRN):  fentaNYL    Injectable 50 MICROGram(s) IV Push every 2 hours PRN Mild Pain (1 - 3) or vent synchrony                            7.4    16.26 )-----------( 134      ( 15 Wallace 2025 06:20 )             23.0       01-15    139  |  102  |  40[H]  ----------------------------<  168[H]  4.0   |  25  |  2.60[H]    Ca    8.3[L]      15 Wallace 2025 06:20  Phos  5.0     01-15  Mg     1.8     01-15    TPro  4.7[L]  /  Alb  1.9[L]  /  TBili  0.8  /  DBili  x   /  AST  114[H]  /  ALT  45  /  AlkPhos  387[H]  01-15    Lactate 3.8           01-15 @ 06:20    Lactate 3.5           01-14 @ 20:50    Lactate 4.0           01-14 @ 17:15    Lactate 3.9           01-14 @ 12:35            Urinalysis Basic - ( 15 Wallace 2025 06:20 )    Color: x / Appearance: x / SG: x / pH: x  Gluc: 168 mg/dL / Ketone: x  / Bili: x / Urobili: x   Blood: x / Protein: x / Nitrite: x   Leuk Esterase: x / RBC: x / WBC x   Sq Epi: x / Non Sq Epi: x / Bacteria: x      ET Tube ET Tube   Commensal merline consistent with body site   Moderate polymorphonuclear leukocytes per low power field  No Squamous epithelial cells per low power field  No organisms seen per oil power field 01-13 @ 19:00        ABG - ( 14 Jan 2025 05:00 )  pH, Arterial: 7.33  pH, Blood: x     /  pCO2: 56    /  pO2: 142   / HCO3: 30    / Base Excess: 3.6   /  SaO2: 97.8                Radiology: ***  Bedside Lung U/S: ***  Bedside Cardiac U/S: ***    CENTRAL LINE: Y/N   DATE INSERTED:   REMOVE: Y/N  DE LA FUENTE: Y/N      DATE INSERTED:        REMOVE: Y/N  A-LINE: Y/N     DATE INSERTED:              REMOVE: Y/N    GLOBAL ISSUE/BEST PRACTICE:  Analgesia:  Sedation:  HOB elevation: yes  Stress ulcer prophylaxis:  VTE prophylaxis:  Glycemic control:  Nutrition:    CODE STATUS: ***  [facility] discussion: Y       Patient is a 79y old  Female who presents with a chief complaint of transferred from [place] s/p [facility] patch repair (15 Wallace 2025 09:17)    Interval events: Yesterday pt with lactate elevated to 6.8, now downtrending. Received 500 cc bolus IVF x 2. Overnight tidal volume on vent increased to 350. S/p versed x 1 for vent synchronization. Pt with fever overnight, Tmax 102.2 this AM. Now POD #6.     Review of Systems:  Constitutional: no fever, chills, fatigue  Neuro: no headache, numbness, weakness  Resp: no cough, wheezing, shortness of breath  CVS: no chest pain, palpitations, leg swelling  GI: no abdominal pain, nausea, vomiting, diarrhea   : no dysuria, frequency, incontinence  Skin: no itching, burning, rashes, or lesions   Msk: no joint pain or swelling  Psych: no depression, anxiety    T(F): 100.9 (01-15-25 @ 08:03), Max: 100.9 (01-15-25 @ 08:03)  HR: 80 (01-15-25 @ 08:31) (64 - 118)  BP: 102/90 (01-15-25 @ 04:00) (102/90 - 147/79)  RR: 21 (01-15-25 @ 06:00) (16 - 43)  SpO2: 96% (01-15-25 @ 08:19) (95% - 100%)  Wt(kg): --    Mode: AC/ CMV (Assist Control/ Continuous Mandatory Ventilation), RR (machine): 28, TV (machine): 350, FiO2: 40, PEEP: 5    CAPILLARY BLOOD GLUCOSE      POCT Blood Glucose.: 137 mg/dL (15 Wallace 2025 05:31)    I&O's Summary    14 Jan 2025 07:01  -  15 Wallace 2025 07:00  --------------------------------------------------------  IN: 2388.6 mL / OUT: 1150 mL / NET: 1238.6 mL        Physical Exam:     Gen:  Neuro:  HEENT:  CV:  Pulm:  GI:  Ext:  Skin:    Meds:  MEDICATIONS  (STANDING):  albuterol/ipratropium for Nebulization 3 milliLiter(s) Nebulizer every 6 hours  buDESOnide    Inhalation Suspension 0.5 milliGRAM(s) Inhalation two times a day  chlorhexidine 0.12% Liquid 15 milliLiter(s) Oral Mucosa every 12 hours  chlorhexidine 2% Cloths 1 Application(s) Topical <User Schedule>  dexMEDEtomidine Infusion 0.4 MICROgram(s)/kG/Hr (5.19 mL/Hr) IV Continuous <Continuous>  dextrose 5%. 1000 milliLiter(s) (100 mL/Hr) IV Continuous <Continuous>  dextrose 5%. 1000 milliLiter(s) (50 mL/Hr) IV Continuous <Continuous>  dextrose 50% Injectable 25 Gram(s) IV Push once  dextrose 50% Injectable 12.5 Gram(s) IV Push once  dextrose 50% Injectable 25 Gram(s) IV Push once  dextrose Oral Gel 15 Gram(s) Oral once  fluconAZOLE IVPB 200 milliGRAM(s) IV Intermittent every 24 hours  glucagon  Injectable 1 milliGRAM(s) IntraMuscular once  guaifenesin/dextromethorphan Oral Liquid 10 milliLiter(s) Oral every 6 hours  heparin   Injectable 5000 Unit(s) SubCutaneous every 8 hours  ketamine Infusion. 0.25 mG/kG/Hr (1.3 mL/Hr) IV Continuous <Continuous>  lactated ringers Bolus 500 milliLiter(s) IV Bolus once  meropenem  IVPB 500 milliGRAM(s) IV Intermittent every 12 hours  norepinephrine Infusion 0.05 MICROgram(s)/kG/Min (4.87 mL/Hr) IV Continuous <Continuous>  pantoprazole    Tablet 40 milliGRAM(s) Oral two times a day  vasopressin Infusion 0.04 Unit(s)/Min (6 mL/Hr) IV Continuous <Continuous>    MEDICATIONS  (PRN):  fentaNYL    Injectable 50 MICROGram(s) IV Push every 2 hours PRN Mild Pain (1 - 3) or vent synchrony                            7.4    16.26 )-----------( 134      ( 15 Wallace 2025 06:20 )             23.0       01-15    139  |  102  |  40[H]  ----------------------------<  168[H]  4.0   |  25  |  2.60[H]    Ca    8.3[L]      15 Wallace 2025 06:20  Phos  5.0     01-15  Mg     1.8     01-15    TPro  4.7[L]  /  Alb  1.9[L]  /  TBili  0.8  /  DBili  x   /  AST  114[H]  /  ALT  45  /  AlkPhos  387[H]  01-15    Lactate 3.8           01-15 @ 06:20    Lactate 3.5           01-14 @ 20:50    Lactate 4.0           01-14 @ 17:15    Lactate 3.9           01-14 @ 12:35            Urinalysis Basic - ( 15 Wallace 2025 06:20 )    Color: x / Appearance: x / SG: x / pH: x  Gluc: 168 mg/dL / Ketone: x  / Bili: x / Urobili: x   Blood: x / Protein: x / Nitrite: x   Leuk Esterase: x / RBC: x / WBC x   Sq Epi: x / Non Sq Epi: x / Bacteria: x      ET Tube ET Tube   Commensal merline consistent with body site   Moderate polymorphonuclear leukocytes per low power field  No Squamous epithelial cells per low power field  No organisms seen per oil power field 01-13 @ 19:00        ABG - ( 14 Jan 2025 05:00 )  pH, Arterial: 7.33  pH, Blood: x     /  pCO2: 56    /  pO2: 142   / HCO3: 30    / Base Excess: 3.6   /  SaO2: 97.8                Radiology: ***  Bedside Lung U/S: ***  Bedside Cardiac U/S: ***    CENTRAL LINE: Y/N   DATE INSERTED:   REMOVE: Y/N  DE LA FUENTE: Y/N      DATE INSERTED:        REMOVE: Y/N  A-LINE: Y/N     DATE INSERTED:              REMOVE: Y/N    GLOBAL ISSUE/BEST PRACTICE:  Analgesia:  Sedation:  HOB elevation: yes  Stress ulcer prophylaxis:  VTE prophylaxis:  Glycemic control:  Nutrition:    CODE STATUS: ***  [facility] discussion: Y       Patient is a 79y old  Female who presents with a chief complaint of transferred from [place] s/p [facility] patch repair (15 Wallace 2025 09:17)    Interval events: Yesterday pt with lactate elevated to 6.8, now downtrending. Received 500 cc bolus IVF x 2. Overnight tidal volume on vent increased to 350. S/p versed x 1 for vent synchronization. Pt with fever overnight, Tmax 102.2 this AM. Now POD #6. Remains intubated and sedated.     Review of Systems: Unable to obtain secondary to intubation and sedation.     T(F): 100.9 (01-15-25 @ 08:03), Max: 100.9 (01-15-25 @ 08:03)  HR: 80 (01-15-25 @ 08:31) (64 - 118)  BP: 102/90 (01-15-25 @ 04:00) (102/90 - 147/79)  RR: 21 (01-15-25 @ 06:00) (16 - 43)  SpO2: 96% (01-15-25 @ 08:19) (95% - 100%)  Wt(kg): --    Mode: AC/ CMV (Assist Control/ Continuous Mandatory Ventilation), RR (machine): 28, TV (machine): 350, FiO2: 40, PEEP: 5    CAPILLARY BLOOD GLUCOSE      POCT Blood Glucose.: 137 mg/dL (15 Wallace 2025 05:31)    I&O's Summary    14 Jan 2025 07:01  -  15 Wallace 2025 07:00  --------------------------------------------------------  IN: 2388.6 mL / OUT: 1150 mL / NET: 1238.6 mL        Physical Exam:   General: thin, frail, elderly female, intubated and sedated  HEENT: NC/AT, PERRL  PULM: intubated and sedated, on vent, rhonchus breath sounds b/l   CVS:  Regular rate and rhythm  ABD: Soft, nondistended. JULIANN drain RUQ with SS output.  EXT: No edema, nontender, warm  NEURO: sedated    Meds:  MEDICATIONS  (STANDING):  albuterol/ipratropium for Nebulization 3 milliLiter(s) Nebulizer every 6 hours  buDESOnide    Inhalation Suspension 0.5 milliGRAM(s) Inhalation two times a day  chlorhexidine 0.12% Liquid 15 milliLiter(s) Oral Mucosa every 12 hours  chlorhexidine 2% Cloths 1 Application(s) Topical <User Schedule>  dexMEDEtomidine Infusion 0.4 MICROgram(s)/kG/Hr (5.19 mL/Hr) IV Continuous <Continuous>  dextrose 5%. 1000 milliLiter(s) (100 mL/Hr) IV Continuous <Continuous>  dextrose 5%. 1000 milliLiter(s) (50 mL/Hr) IV Continuous <Continuous>  dextrose 50% Injectable 25 Gram(s) IV Push once  dextrose 50% Injectable 12.5 Gram(s) IV Push once  dextrose 50% Injectable 25 Gram(s) IV Push once  dextrose Oral Gel 15 Gram(s) Oral once  fluconAZOLE IVPB 200 milliGRAM(s) IV Intermittent every 24 hours  glucagon  Injectable 1 milliGRAM(s) IntraMuscular once  guaifenesin/dextromethorphan Oral Liquid 10 milliLiter(s) Oral every 6 hours  heparin   Injectable 5000 Unit(s) SubCutaneous every 8 hours  ketamine Infusion. 0.25 mG/kG/Hr (1.3 mL/Hr) IV Continuous <Continuous>  lactated ringers Bolus 500 milliLiter(s) IV Bolus once  meropenem  IVPB 500 milliGRAM(s) IV Intermittent every 12 hours  norepinephrine Infusion 0.05 MICROgram(s)/kG/Min (4.87 mL/Hr) IV Continuous <Continuous>  pantoprazole    Tablet 40 milliGRAM(s) Oral two times a day  vasopressin Infusion 0.04 Unit(s)/Min (6 mL/Hr) IV Continuous <Continuous>    MEDICATIONS  (PRN):  fentaNYL    Injectable 50 MICROGram(s) IV Push every 2 hours PRN Mild Pain (1 - 3) or vent synchrony                            7.4    16.26 )-----------( 134      ( 15 Wallace 2025 06:20 )             23.0       01-15    139  |  102  |  40[H]  ----------------------------<  168[H]  4.0   |  25  |  2.60[H]    Ca    8.3[L]      15 Wallace 2025 06:20  Phos  5.0     01-15  Mg     1.8     01-15    TPro  4.7[L]  /  Alb  1.9[L]  /  TBili  0.8  /  DBili  x   /  AST  114[H]  /  ALT  45  /  AlkPhos  387[H]  01-15    Lactate 3.8           01-15 @ 06:20    Lactate 3.5           01-14 @ 20:50    Lactate 4.0           01-14 @ 17:15    Lactate 3.9           01-14 @ 12:35            Urinalysis Basic - ( 15 Wallace 2025 06:20 )    Color: x / Appearance: x / SG: x / pH: x  Gluc: 168 mg/dL / Ketone: x  / Bili: x / Urobili: x   Blood: x / Protein: x / Nitrite: x   Leuk Esterase: x / RBC: x / WBC x   Sq Epi: x / Non Sq Epi: x / Bacteria: x      ET Tube ET Tube   Commensal merline consistent with body site   Moderate polymorphonuclear leukocytes per low power field  No Squamous epithelial cells per low power field  No organisms seen per oil power field 01-13 @ 19:00        ABG - ( 14 Jan 2025 05:00 )  pH, Arterial: 7.33  pH, Blood: x     /  pCO2: 56    /  pO2: 142   / HCO3: 30    / Base Excess: 3.6   /  SaO2: 97.8            < from: Xray Chest 1 View AP/PA (01.14.25 @ 11:14) >  IMPRESSION:  Enteric tube tip is in the stomach.    ET tube and left IJ line as above.    Continued right-sided volume loss and complete opacification of the right   hemithorax.    Reticular and patchy, predominantly left upper lung, opacities, again   seen.    Small left pleural effusion.    --- End of Report ---    < end of copied text >  < from: Xray Chest 1 View- PORTABLE-Urgent (Xray Chest 1 View- PORTABLE-Urgent .) (01.13.25 @ 22:12) >  FINDINGS:    Single frontal view of the chest demonstrates multiple chronic   right-sided rib fractures. Right lung by loss with complete   opacification. stable left lung infiltrates. Osteopenia.. The   cardiomediastinal silhouette is unremarkable.No acute osseous   abnormalities. Overlying EKG leads and wires are noted    IMPRESSION: Stable left lung infiltrates.      --- End of Report ---    < end of copied text >        CENTRAL LINE: Y (left IJ)  DE LA FUENTE: Y  A-LINE: Y (left axilla)  OG tube: Y   Patient is a 79y old  Female who presents with a chief complaint of transferred from [place] s/p [facility] patch repair (15 Wallace 2025 09:17)    Interval events: Yesterday pt with lactate elevated to 6.8, now downtrending. Received 500 cc bolus IVF x 2. Overnight tidal volume on vent increased to 350. S/p versed x 1 for vent synchronization. Pt with fever overnight, Tmax 102.2 this AM. Now POD #6. Remains intubated and sedated.     Review of Systems: Unable to obtain secondary to intubation and sedation.     T(F): 100.9 (01-15-25 @ 08:03), Max: 100.9 (01-15-25 @ 08:03)  HR: 80 (01-15-25 @ 08:31) (64 - 118)  BP: 102/90 (01-15-25 @ 04:00) (102/90 - 147/79)  RR: 21 (01-15-25 @ 06:00) (16 - 43)  SpO2: 96% (01-15-25 @ 08:19) (95% - 100%)  Wt(kg): --    Mode: AC/ CMV (Assist Control/ Continuous Mandatory Ventilation), RR (machine): 28, TV (machine): 350, FiO2: 40, PEEP: 5    CAPILLARY BLOOD GLUCOSE      POCT Blood Glucose.: 137 mg/dL (15 Wallace 2025 05:31)    I&O's Summary    14 Jan 2025 07:01  -  15 Wallace 2025 07:00  --------------------------------------------------------  IN: 2388.6 mL / OUT: 1150 mL / NET: 1238.6 mL        Physical Exam:   General: thin, frail, elderly female, intubated and sedated  HEENT: NC/AT, PERRL  PULM: intubated and sedated, on vent, rhonchus breath sounds b/l   CVS:  Regular rate and rhythm  ABD: Soft, nondistended. JULIANN drain RUQ with SS output.  EXT: No edema, nontender, warm  NEURO: sedated    Meds:  MEDICATIONS  (STANDING):  albuterol/ipratropium for Nebulization 3 milliLiter(s) Nebulizer every 6 hours  buDESOnide    Inhalation Suspension 0.5 milliGRAM(s) Inhalation two times a day  chlorhexidine 0.12% Liquid 15 milliLiter(s) Oral Mucosa every 12 hours  chlorhexidine 2% Cloths 1 Application(s) Topical <User Schedule>  dexMEDEtomidine Infusion 0.4 MICROgram(s)/kG/Hr (5.19 mL/Hr) IV Continuous <Continuous>  dextrose 5%. 1000 milliLiter(s) (100 mL/Hr) IV Continuous <Continuous>  dextrose 5%. 1000 milliLiter(s) (50 mL/Hr) IV Continuous <Continuous>  dextrose 50% Injectable 25 Gram(s) IV Push once  dextrose 50% Injectable 12.5 Gram(s) IV Push once  dextrose 50% Injectable 25 Gram(s) IV Push once  dextrose Oral Gel 15 Gram(s) Oral once  fluconAZOLE IVPB 200 milliGRAM(s) IV Intermittent every 24 hours  glucagon  Injectable 1 milliGRAM(s) IntraMuscular once  guaifenesin/dextromethorphan Oral Liquid 10 milliLiter(s) Oral every 6 hours  heparin   Injectable 5000 Unit(s) SubCutaneous every 8 hours  ketamine Infusion. 0.25 mG/kG/Hr (1.3 mL/Hr) IV Continuous <Continuous>  lactated ringers Bolus 500 milliLiter(s) IV Bolus once  meropenem  IVPB 500 milliGRAM(s) IV Intermittent every 12 hours  norepinephrine Infusion 0.05 MICROgram(s)/kG/Min (4.87 mL/Hr) IV Continuous <Continuous>  pantoprazole    Tablet 40 milliGRAM(s) Oral two times a day  vasopressin Infusion 0.04 Unit(s)/Min (6 mL/Hr) IV Continuous <Continuous>    MEDICATIONS  (PRN):  fentaNYL    Injectable 50 MICROGram(s) IV Push every 2 hours PRN Mild Pain (1 - 3) or vent synchrony                            7.4    16.26 )-----------( 134      ( 15 Wallace 2025 06:20 )             23.0       01-15    139  |  102  |  40[H]  ----------------------------<  168[H]  4.0   |  25  |  2.60[H]    Ca    8.3[L]      15 Wallace 2025 06:20  Phos  5.0     01-15  Mg     1.8     01-15    TPro  4.7[L]  /  Alb  1.9[L]  /  TBili  0.8  /  DBili  x   /  AST  114[H]  /  ALT  45  /  AlkPhos  387[H]  01-15    Lactate 3.8           01-15 @ 06:20    Lactate 3.5           01-14 @ 20:50    Lactate 4.0           01-14 @ 17:15    Lactate 3.9           01-14 @ 12:35            Urinalysis Basic - ( 15 Wallace 2025 06:20 )    Color: x / Appearance: x / SG: x / pH: x  Gluc: 168 mg/dL / Ketone: x  / Bili: x / Urobili: x   Blood: x / Protein: x / Nitrite: x   Leuk Esterase: x / RBC: x / WBC x   Sq Epi: x / Non Sq Epi: x / Bacteria: x      ET Tube ET Tube   Commensal merline consistent with body site   Moderate polymorphonuclear leukocytes per low power field  No Squamous epithelial cells per low power field  No organisms seen per oil power field 01-13 @ 19:00        ABG - ( 14 Jan 2025 05:00 )  pH, Arterial: 7.33  pH, Blood: x     /  pCO2: 56    /  pO2: 142   / HCO3: 30    / Base Excess: 3.6   /  SaO2: 97.8            < from: Xray Chest 1 View AP/PA (01.14.25 @ 11:14) >  IMPRESSION:  Enteric tube tip is in the stomach.    ET tube and left IJ line as above.    Continued right-sided volume loss and complete opacification of the right   hemithorax.    Reticular and patchy, predominantly left upper lung, opacities, again   seen.    Small left pleural effusion.    --- End of Report ---    < end of copied text >  < from: Xray Chest 1 View- PORTABLE-Urgent (Xray Chest 1 View- PORTABLE-Urgent .) (01.13.25 @ 22:12) >  FINDINGS:    Single frontal view of the chest demonstrates multiple chronic   right-sided rib fractures. Right lung by loss with complete   opacification. stable left lung infiltrates. Osteopenia.. The   cardiomediastinal silhouette is unremarkable.No acute osseous   abnormalities. Overlying EKG leads and wires are noted    IMPRESSION: Stable left lung infiltrates.      --- End of Report ---    < end of copied text >    < from: TTE W or WO Ultrasound Enhancing Agent (01.14.25 @ 20:54) >  CONCLUSIONS:      1. Technically difficult image quality.   2. The endocardium is not well-visualized. Overall there appears to be grossly normal left ventricular systolic function on limited visualization. Grossly the ejection fraction is approximately 60 to 65%. Wall motion abnormalities cannot be ruled out. Consider repeating the study with echo contrast if clinically warranted.   3. The right ventricle is not well visualized. probably normal right ventricular systolic function.   4. Left atrium was not well visualized and LA volume could not be calculated.   5. There is calcification of the mitral valve annulus.   6. Mitral valve leaflets are diffusely calcified.   7. Aortic valve was not well visualized.   8. Tricuspid valve was not well visualized.   9. Pulmonary artery systolic pressure could not be estimated.  10. Trace mitral regurgitation.    < end of copied text >          CENTRAL LINE: Y (left IJ)  DE LA FUENTE: Y  A-LINE: Y (left axilla)  OG tube: Y

## 2025-01-15 NOTE — CONSULT NOTE ADULT - SUBJECTIVE AND OBJECTIVE BOX
[facility]  INFECTIOUS DISEASES - Florentino Odell, [address], [place] [address]  Tel: [phone]     Fax: [phone]  =======================================================    N-[id number]  GINNY VASQUEZ     CC: Patient is a 79y old  Female who presents with a chief complaint of transferred from [place] s/p grahm patch repair (15 Wallace 2025 10:30)    HPI:  80 y/o WF with PMHx with HTN, HLD, CKD, PUD (dx 2011), Lung CA s/p lobectomy (1996)  who was admitted on 1/9 for worsening generalized achy abdominal pain since 1/7 night.  Patient reports baseline abdominal discomfort x several years which worsened suddenly (described as 11/10) without associated fever/chills. She was found to have pneumooperitoneum, s/p Laparoscopic repair of perforated gastric ulcer on 1/9. Patient has been on antibiotics and antifungals. On 1/13 she was intubated. This AM Zosyn switched to meropenem.    PAST MEDICAL & SURGICAL HISTORY:  HLD (hyperlipidemia)      Rheumatoid arthritis      TIA (transient ischemic attack)  2012      Lung cancer  Right lung.      Chronic obstructive pulmonary disease, unspecified COPD type  O2 at night      Hiatal hernia with GERD      Essential hypertension      Childhood asthma      Stage 3 chronic kidney disease      Carotid artery plaque      IBS (irritable bowel syndrome)      Psoriatic arthritis      S/P lobectomy of lung  Right lung in 1996.      H/O colonoscopy      History of endoscopy  Sept 2017      S/P cholecystectomy          Social Hx:     FAMILY HISTORY:  FH: type 2 diabetes (Mother)    FH: CAD (coronary artery disease) (Father, Sibling)    FH: colon cancer (Sibling)    FH: myocardial infarction (Father)    FH: stroke (Sibling)    FH: lung cancer (Sibling)        Allergies    penicillin (Other)  minocycline (Other)  Enbrel (Unknown)  IV Contrast (Hives; Rash)  Zofran (Other)  Levaquin (Other)    Intolerances        Antibiotics:  MEDICATIONS  (STANDING):  albuterol/ipratropium for Nebulization 3 milliLiter(s) Nebulizer every 6 hours  buDESOnide    Inhalation Suspension 0.5 milliGRAM(s) Inhalation two times a day  chlorhexidine 0.12% Liquid 15 milliLiter(s) Oral Mucosa every 12 hours  chlorhexidine 2% Cloths 1 Application(s) Topical <User Schedule>  dexMEDEtomidine Infusion 0.4 MICROgram(s)/kG/Hr (5.19 mL/Hr) IV Continuous <Continuous>  dextrose 5%. 1000 milliLiter(s) (100 mL/Hr) IV Continuous <Continuous>  dextrose 5%. 1000 milliLiter(s) (50 mL/Hr) IV Continuous <Continuous>  dextrose 50% Injectable 25 Gram(s) IV Push once  dextrose 50% Injectable 12.5 Gram(s) IV Push once  dextrose 50% Injectable 25 Gram(s) IV Push once  dextrose Oral Gel 15 Gram(s) Oral once  fluconAZOLE IVPB 200 milliGRAM(s) IV Intermittent every 24 hours  glucagon  Injectable 1 milliGRAM(s) IntraMuscular once  guaifenesin/dextromethorphan Oral Liquid 10 milliLiter(s) Oral every 6 hours  heparin   Injectable 5000 Unit(s) SubCutaneous every 8 hours  ketamine Infusion. 0.25 mG/kG/Hr (1.3 mL/Hr) IV Continuous <Continuous>  meropenem  IVPB 500 milliGRAM(s) IV Intermittent every 12 hours  norepinephrine Infusion 0.05 MICROgram(s)/kG/Min (4.87 mL/Hr) IV Continuous <Continuous>  pantoprazole    Tablet 40 milliGRAM(s) Oral two times a day  vasopressin Infusion 0.04 Unit(s)/Min (6 mL/Hr) IV Continuous <Continuous>    MEDICATIONS  (PRN):  fentaNYL    Injectable 50 MICROGram(s) IV Push every 2 hours PRN Mild Pain (1 - 3) or vent synchrony       REVIEW OF SYSTEMS:  unable to obtain, intubated    Physical Exam:  Vital Signs Last 24 Hrs  T(C): 36.6 (15 Wallace 2025 12:03), Max: 38.3 (15 Wallace 2025 08:03)  T(F): 97.8 (15 Wallace 2025 12:03), Max: 100.9 (15 Wallace 2025 08:03)  HR: 73 (15 Wallace 2025 13:24) (64 - 118)  BP: 102/90 (15 Wallace 2025 04:00) (102/90 - 147/79)  BP(mean): 96 (15 Wallace 2025 04:00) (96 - 111)  RR: 28 (15 Wallace 2025 11:00) (16 - 43)  SpO2: 100% (15 Wallace 2025 12:53) (96% - 100%)    Parameters below as of 15 Wallace 2025 05:00  Patient On (Oxygen Delivery Method): ventilator    O2 Concentration (%): 40    GEN: intubated  HEENT: normocephalic and atraumatic.   LUNGS: intubated  HEART: Regular rate and rhythm   ABDOMEN: Soft, nondistended.    EXTREMITIES: No leg edema.  NEUROLOGIC: intubated, sedated    Labs:  01-15    139  |  102  |  40[H]  ----------------------------<  168[H]  4.0   |  25  |  2.60[H]    Ca    8.3[L]      15 Wallace 2025 06:20  Phos  5.0     01-15  Mg     1.8     01-15    TPro  4.7[L]  /  Alb  1.9[L]  /  TBili  0.8  /  DBili  x   /  AST  114[H]  /  ALT  45  /  AlkPhos  387[H]  01-15                          7.4    16.26 )-----------( 134      ( 15 Wallace 2025 06:20 )             23.0       Urinalysis Basic - ( 15 Wallace 2025 06:20 )    Color: x / Appearance: x / SG: x / pH: x  Gluc: 168 mg/dL / Ketone: x  / Bili: x / Urobili: x   Blood: x / Protein: x / Nitrite: x   Leuk Esterase: x / RBC: x / WBC x   Sq Epi: x / Non Sq Epi: x / Bacteria: x      LIVER FUNCTIONS - ( 15 Wallace 2025 06:20 )  Alb: 1.9 g/dL / Pro: 4.7 g/dL / ALK PHOS: 387 U/L / ALT: 45 U/L / AST: 114 U/L / GGT: x               ABG - ( 15 Wallace 2025 14:00 )  pH, Arterial: 7.43  pH, Blood: x     /  pCO2: 44    /  pO2: 103   / HCO3: 29    / Base Excess: 4.9   /  SaO2: 97.5              Procalcitonin: 0.09 ng/mL (01-02-25 @ 06:24)    C-Reactive Protein: 26 mg/L (01-02-25 @ 09:45)  C-Reactive Protein: 30 mg/L (01-02-25 @ 06:24)      SARS-CoV-2: NotDetec (01-02-25 @ 13:45)  SARS-CoV-2 Result: NotDetec (01-01-25 @ 15:50)      RECENT CULTURES:  01-13 @ 19:00 ET Tube ET Tube     Commensal merline consistent with body site    Moderate polymorphonuclear leukocytes per low power field  No Squamous epithelial cells per low power field  No organisms seen per oil power field      01-02 @ 13:45          NotDetec  01-01 @ 15:55 .Blood BLOOD     No growth at 5 days        01-01 @ 15:50 .Blood BLOOD     No growth at 5 days              All imaging and other data have been reviewed.  < from: Xray Chest 1 View AP/PA (01.14.25 @ 11:14) >  IMPRESSION:  Enteric tube tip is in the stomach.    ET tube and left IJ line as above.    Continued right-sided volume loss and complete opacification of the right   hemithorax.    Reticular and patchy, predominantly left upper lung, opacities, again   seen.    Small left pleural effusion.    --- End of Report ---        < end of copied text >

## 2025-01-15 NOTE — PROGRESS NOTE ADULT - ASSESSMENT
78 y/o WF with PMHx with HTN, HLD, CKD, PUD (dx 2011), Lung CA s/p lobectomy (1996) presents to [facility] ED from home with  via ambulance c/o worsening generalized achy abdominal pain since last night.  Patient reports baseline abdominal discomfort x several years which worsened suddenly    intestinal perf  atelectasis  pleural eff  HTN  HLD  CKD  PUD  PNA hx  Lung Ca - hx of Lobectomy     intubated - ventilated  vs noted  lactic acid trend  abx  vent support  shock management    post op care  PUD perf - surgery and op note reviewed  lung protective vent support  fio2 titration  oral hygiene  skin care  suction PRN  HOB elev  PPI  Bronchodilators - Stress steroids - hx of COPD -   chr changes - deformity - lung parenchyma and chest - see CT chest -   ICU care  I and O  serial labs  replete lytes  dvt p  pain relief

## 2025-01-15 NOTE — PROGRESS NOTE ADULT - PROBLEM SELECTOR PLAN 1
vent support per pulm / intensivist vent support per pulm / intensivist  re intubated yesterday   remains on pressors

## 2025-01-15 NOTE — PROGRESS NOTE ADULT - SUBJECTIVE AND OBJECTIVE BOX
SUBJECTIVE:  Patient seen and examined at bedside. Intubated/sedated on dual pressors    Vital Signs Last 24 Hrs  T(C): 38.3 (15 Wallace 2025 08:03), Max: 38.3 (15 Wallace 2025 08:03)  T(F): 100.9 (15 Wallace 2025 08:03), Max: 100.9 (15 Wallace 2025 08:03)  HR: 80 (15 Wallace 2025 08:31) (64 - 132)  BP: 102/90 (15 Wallace 2025 04:00) (102/90 - 147/79)  BP(mean): 96 (15 Wallace 2025 04:00) (96 - 111)  RR: 21 (15 Wallace 2025 06:00) (16 - 43)  SpO2: 96% (15 Wallace 2025 08:19) (92% - 100%)    Parameters below as of 15 Wallace 2025 05:00  Patient On (Oxygen Delivery Method): ventilator    O2 Concentration (%): 40    PHYSICAL EXAM:  GENERAL: No acute distress, well-developed  HEAD:  Atraumatic, Normocephalic  CHEST/LUNG: equal rise and fall of chest  ABDOMEN: Soft, non-tender, non-distended  NEUROLOGY: responding appropriately, no focal deficits    I&O's Summary    14 Jan 2025 07:01  -  15 Wallace 2025 07:00  --------------------------------------------------------  IN: 2388.6 mL / OUT: 1150 mL / NET: 1238.6 mL      I&O's Detail    14 Jan 2025 07:01  -  15 Wallace 2025 07:00  --------------------------------------------------------  IN:    Dexmedetomidine: 143.8 mL    IV PiggyBack: 100 mL    IV PiggyBack: 150 mL    Ketamine: 90.4 mL    Lactated Ringers Bolus: 1000 mL    Nepro with Carb Steady: 240 mL    Norepinephrine: 490.2 mL    Propofol: 48.2 mL    Vasopressin: 126 mL  Total IN: 2388.6 mL    OUT:    Drain (mL): 150 mL    Indwelling Catheter - Urethral (mL): 1000 mL  Total OUT: 1150 mL    Total NET: 1238.6 mL        MEDICATIONS  (STANDING):  albuterol/ipratropium for Nebulization 3 milliLiter(s) Nebulizer every 6 hours  buDESOnide    Inhalation Suspension 0.5 milliGRAM(s) Inhalation two times a day  chlorhexidine 0.12% Liquid 15 milliLiter(s) Oral Mucosa every 12 hours  chlorhexidine 2% Cloths 1 Application(s) Topical <User Schedule>  dexMEDEtomidine Infusion 0.4 MICROgram(s)/kG/Hr (5.19 mL/Hr) IV Continuous <Continuous>  dextrose 5%. 1000 milliLiter(s) (100 mL/Hr) IV Continuous <Continuous>  dextrose 5%. 1000 milliLiter(s) (50 mL/Hr) IV Continuous <Continuous>  dextrose 50% Injectable 25 Gram(s) IV Push once  dextrose 50% Injectable 12.5 Gram(s) IV Push once  dextrose 50% Injectable 25 Gram(s) IV Push once  dextrose Oral Gel 15 Gram(s) Oral once  fluconAZOLE IVPB 200 milliGRAM(s) IV Intermittent every 24 hours  glucagon  Injectable 1 milliGRAM(s) IntraMuscular once  guaifenesin/dextromethorphan Oral Liquid 10 milliLiter(s) Oral every 6 hours  heparin   Injectable 5000 Unit(s) SubCutaneous every 8 hours  ketamine Infusion. 0.25 mG/kG/Hr (1.3 mL/Hr) IV Continuous <Continuous>  norepinephrine Infusion 0.05 MICROgram(s)/kG/Min (4.87 mL/Hr) IV Continuous <Continuous>  pantoprazole    Tablet 40 milliGRAM(s) Oral two times a day  piperacillin/tazobactam IVPB.. 3.375 Gram(s) IV Intermittent every 12 hours  vasopressin Infusion 0.04 Unit(s)/Min (6 mL/Hr) IV Continuous <Continuous>    MEDICATIONS  (PRN):  fentaNYL    Injectable 50 MICROGram(s) IV Push every 2 hours PRN Mild Pain (1 - 3) or vent synchrony    LABS:                        7.4    16.26 )-----------( 134      ( 15 Wallace 2025 06:20 )             23.0     01-15    139  |  102  |  40[H]  ----------------------------<  168[H]  4.0   |  25  |  2.60[H]    Ca    8.3[L]      15 Wallace 2025 06:20  Phos  5.0     01-15  Mg     1.8     01-15    TPro  4.7[L]  /  Alb  1.9[L]  /  TBili  0.8  /  DBili  x   /  AST  114[H]  /  ALT  45  /  AlkPhos  387[H]  01-15      Urinalysis Basic - ( 15 Wallace 2025 06:20 )    Color: x / Appearance: x / SG: x / pH: x  Gluc: 168 mg/dL / Ketone: x  / Bili: x / Urobili: x   Blood: x / Protein: x / Nitrite: x   Leuk Esterase: x / RBC: x / WBC x   Sq Epi: x / Non Sq Epi: x / Bacteria: x      RADIOLOGY & ADDITIONAL STUDIES:    ASSESSMENT    79y Female POD#6 s/p natan patch repair for gastric ulcer, c/p aspiration. Required reintubation yesterday. On pressors. No NG tube. Tachycardic 110s, Abdomen soft, no tenderness or distension, drain serosanguinous 100 cc.     - No further surgical intervention   - Continue antibiotics  - Serial abdominal exams  - Is & Os  - DVT prophylaxis heparin subcutaneous   - OOB, pain control  - Incentive spirometry  - Follow up AM labs  - Continue management per ICU     Surgical Team Contact Information  Spectralink: Ext: [phone] or [phone]

## 2025-01-15 NOTE — PROGRESS NOTE ADULT - SUBJECTIVE AND OBJECTIVE BOX
Patient is a 79y old  Female who presents with a chief complaint of transferred from [place] s/p [facility] patch repair (15 Wallace 2025 14:35)      INTERVAL HPI/OVERNIGHT EVENTS:  T(C): 37.3 (01-15-25 @ 16:29), Max: 38.3 (01-15-25 @ 08:03)  HR: 77 (01-15-25 @ 19:06) (64 - 103)  BP: 102/90 (01-15-25 @ 04:00) (102/90 - 147/79)  RR: 33 (01-15-25 @ 19:06) (16 - 43)  SpO2: 97% (01-15-25 @ 19:06) (96% - 100%)  Wt(kg): --  I&O's Summary    14 Jan 2025 07:01  -  15 Wallace 2025 07:00  --------------------------------------------------------  IN: 2388.6 mL / OUT: 1150 mL / NET: 1238.6 mL    15 Wallace 2025 07:01  -  15 Wallace 2025 19:14  --------------------------------------------------------  IN: 1353.4 mL / OUT: 0 mL / NET: 1353.4 mL        LABS:                        7.4    16.26 )-----------( 134      ( 15 Wallace 2025 06:20 )             23.0     01-15    139  |  102  |  40[H]  ----------------------------<  168[H]  4.0   |  25  |  2.60[H]    Ca    8.3[L]      15 Wallace 2025 06:20  Phos  5.0     01-15  Mg     1.8     01-15    TPro  4.7[L]  /  Alb  1.9[L]  /  TBili  0.8  /  DBili  x   /  AST  114[H]  /  ALT  45  /  AlkPhos  387[H]  01-15      Urinalysis Basic - ( 15 Wallace 2025 06:20 )    Color: x / Appearance: x / SG: x / pH: x  Gluc: 168 mg/dL / Ketone: x  / Bili: x / Urobili: x   Blood: x / Protein: x / Nitrite: x   Leuk Esterase: x / RBC: x / WBC x   Sq Epi: x / Non Sq Epi: x / Bacteria: x      CAPILLARY BLOOD GLUCOSE      POCT Blood Glucose.: 108 mg/dL (15 Wallace 2025 11:26)  POCT Blood Glucose.: 137 mg/dL (15 Wallace 2025 05:31)  POCT Blood Glucose.: 92 mg/dL (15 Wallace 2025 00:19)    ABG - ( 15 Wallace 2025 14:00 )  pH, Arterial: 7.43  pH, Blood: x     /  pCO2: 44    /  pO2: 103   / HCO3: 29    / Base Excess: 4.9   /  SaO2: 97.5                Urinalysis Basic - ( 15 Wallace 2025 06:20 )    Color: x / Appearance: x / SG: x / pH: x  Gluc: 168 mg/dL / Ketone: x  / Bili: x / Urobili: x   Blood: x / Protein: x / Nitrite: x   Leuk Esterase: x / RBC: x / WBC x   Sq Epi: x / Non Sq Epi: x / Bacteria: x        MEDICATIONS  (STANDING):  albuterol/ipratropium for Nebulization 3 milliLiter(s) Nebulizer every 6 hours  buDESOnide    Inhalation Suspension 0.5 milliGRAM(s) Inhalation two times a day  chlorhexidine 0.12% Liquid 15 milliLiter(s) Oral Mucosa every 12 hours  chlorhexidine 2% Cloths 1 Application(s) Topical <User Schedule>  dexMEDEtomidine Infusion 0.4 MICROgram(s)/kG/Hr (5.19 mL/Hr) IV Continuous <Continuous>  dextrose 5%. 1000 milliLiter(s) (100 mL/Hr) IV Continuous <Continuous>  dextrose 5%. 1000 milliLiter(s) (50 mL/Hr) IV Continuous <Continuous>  dextrose 50% Injectable 25 Gram(s) IV Push once  dextrose 50% Injectable 12.5 Gram(s) IV Push once  dextrose 50% Injectable 25 Gram(s) IV Push once  dextrose Oral Gel 15 Gram(s) Oral once  fluconAZOLE IVPB 200 milliGRAM(s) IV Intermittent every 24 hours  glucagon  Injectable 1 milliGRAM(s) IntraMuscular once  guaifenesin/dextromethorphan Oral Liquid 10 milliLiter(s) Oral every 6 hours  heparin   Injectable 5000 Unit(s) SubCutaneous every 8 hours  ketamine Infusion. 0.25 mG/kG/Hr (1.3 mL/Hr) IV Continuous <Continuous>  meropenem  IVPB 500 milliGRAM(s) IV Intermittent every 12 hours  norepinephrine Infusion 0.05 MICROgram(s)/kG/Min (4.87 mL/Hr) IV Continuous <Continuous>  pantoprazole   Suspension 40 milliGRAM(s) Oral two times a day  vasopressin Infusion 0.04 Unit(s)/Min (6 mL/Hr) IV Continuous <Continuous>    MEDICATIONS  (PRN):  fentaNYL    Injectable 50 MICROGram(s) IV Push every 2 hours PRN Mild Pain (1 - 3) or vent synchrony      REVIEW OF SYSTEMS:  not obtainable  RADIOLOGY & ADDITIONAL TESTS:    Imaging Personally Reviewed:  [x ] YES  [ ] NO    Consultant(s) Notes Reviewed:  [x ] YES  [ ] NO    PHYSICAL EXAM:  GENERAL: NAD, well-groomed, well-developed  HEAD:  Atraumatic, Normocephalic  EYES: EOMI, PERRLA, conjunctiva and sclera clear  ENMT: No tonsillar erythema, exudates, or enlargement; Moist mucous membranes, Good dentition, No lesions  NECK: Supple, No JVD, Normal thyroid  NERVOUS SYSTEM:  intubated and sedated  CHEST/LUNG: Clear to percussion bilaterally; No rales, rhonchi, wheezing, or rubs  HEART: Regular rate and rhythm; No murmurs, rubs, or gallops  ABDOMEN: Soft, Nontender, Nondistended; Bowel sounds present  EXTREMITIES:  2+ Peripheral Pulses, No clubbing, cyanosis, or edema  LYMPH: No lymphadenopathy noted  SKIN: No rashes or lesions    Care Discussed with Consultants/Other Providers [x ] YES  [ ] NO

## 2025-01-15 NOTE — CHART NOTE - NSCHARTNOTEFT_GEN_A_CORE
Assessment:   Pt seen for nutrition follow up.  Chart reviewed, hospital course noted.     Brief History:   "79F PMHx HTN, HLD, CKD, PUD, Lung Cancer (s/p RUL lobectomy) who presents with perforated gastric ulcer s/p laparoscopic repair with Srinath Patch POD#6.  Post-operative course complicated by septic shock, prolonged intubation for hypercapnic respiratory failure iso aspiration, ESPERANZA secondary to ATN, lactic acidosis. "    Remains on dual pressors, off Propofol now.  Feeds running at 30cc/hr, tolerated.  Past 24 hrs pt received 395mL formula (41% of prescribed amount).  PHos high- rec phos binder per MD discretion.   +BM 1/15    Factors impacting intake: [ ] none [ ] nausea  [ ] vomiting [ ] diarrhea [ ] constipation  [ ]chewing problems [ ] swallowing issues  [x] other: intubated, sedated    Diet Prescription: Diet, NPO:   Tube Feeding Modality: Nasogastric  Nepro with Carb Steady  Total Volume for 24 Hours (mL): 960  Continuous  Starting Tube Feed Rate {mL per Hour}: 10  Increase Tube Feed Rate by (mL): 10     Every 8 hours  Until Goal Tube Feed Rate (mL per Hour): 40  Tube Feed Duration (in Hours): 24  Tube Feed Start Time: 12:00 (01-14-25 @ 14:40)    Current Weight: 1/9 114.4#, 1/13 106.7#, 1/15 118.7#      Pertinent Medications: MEDICATIONS  (STANDING):  albuterol/ipratropium for Nebulization 3 milliLiter(s) Nebulizer every 6 hours  buDESOnide    Inhalation Suspension 0.5 milliGRAM(s) Inhalation two times a day  chlorhexidine 0.12% Liquid 15 milliLiter(s) Oral Mucosa every 12 hours  chlorhexidine 2% Cloths 1 Application(s) Topical <User Schedule>  dexMEDEtomidine Infusion 0.4 MICROgram(s)/kG/Hr (5.19 mL/Hr) IV Continuous <Continuous>  dextrose 5%. 1000 milliLiter(s) (100 mL/Hr) IV Continuous <Continuous>  dextrose 5%. 1000 milliLiter(s) (50 mL/Hr) IV Continuous <Continuous>  dextrose 50% Injectable 25 Gram(s) IV Push once  dextrose 50% Injectable 12.5 Gram(s) IV Push once  dextrose 50% Injectable 25 Gram(s) IV Push once  dextrose Oral Gel 15 Gram(s) Oral once  fluconAZOLE IVPB 200 milliGRAM(s) IV Intermittent every 24 hours  glucagon  Injectable 1 milliGRAM(s) IntraMuscular once  guaifenesin/dextromethorphan Oral Liquid 10 milliLiter(s) Oral every 6 hours  heparin   Injectable 5000 Unit(s) SubCutaneous every 8 hours  ketamine Infusion. 0.25 mG/kG/Hr (1.3 mL/Hr) IV Continuous <Continuous>  meropenem  IVPB 500 milliGRAM(s) IV Intermittent every 12 hours  norepinephrine Infusion 0.05 MICROgram(s)/kG/Min (4.87 mL/Hr) IV Continuous <Continuous>  pantoprazole    Tablet 40 milliGRAM(s) Oral two times a day  vasopressin Infusion 0.04 Unit(s)/Min (6 mL/Hr) IV Continuous <Continuous>    MEDICATIONS  (PRN):  fentaNYL    Injectable 50 MICROGram(s) IV Push every 2 hours PRN Mild Pain (1 - 3) or vent synchrony    Pertinent Labs: 01-15 Na139 mmol/L Glu 168 mg/dL[H] K+ 4.0 mmol/L Cr  2.60 mg/dL[H] BUN 40 mg/dL[H] 01-15 Phos 5.0 mg/dL[H] 01-15 Alb 1.9 g/dL[L]     CAPILLARY BLOOD GLUCOSE      POCT Blood Glucose.: 108 mg/dL (15 Wallace 2025 11:26)  POCT Blood Glucose.: 137 mg/dL (15 Wallace 2025 05:31)  POCT Blood Glucose.: 92 mg/dL (15 Wallace 2025 00:19)  POCT Blood Glucose.: 107 mg/dL (14 Jan 2025 17:31)      Skin: L forearm tear, R/L elbow I, sacral I, L/R buttock II, R upper back DTI/I  Edema: none noted    Estimated Needs:   [x] no change since previous assessment on: 1/12 based on #  [ ] recalculated: based on   kcal/day: 6185-4105 gemma  gms protein/day: 59-69 gms    Previous Nutrition Diagnosis:     [x] Increased Nutrient Needs (kcal/protein, micronutrients)  [x]  Malnutrition     Nutrition Diagnosis is [x] ongoing  [ ] resolved [ ] not applicable     New Nutrition Diagnosis: [x] not applicable       Interventions:   Recommend  [ ] Continue current diet  [ ] Change Diet To:  [ ] Nutrition Supplement  [x] Nutrition Support- Continue Nepro 30cc/hr, change to goal rate 50cc/hr x 18 hrs (1620 gemma, 73 gm prot)  [x] Other: daily Nephrovite    Monitoring and Evaluation:   [ ] PO intake [ x ] Tolerance to diet prescription [ x ] weights [ x ] labs [ x ] follow up per protocol  [x] other: s/s GI distress, bowel function, skin integrity/ edema

## 2025-01-16 NOTE — PROGRESS NOTE ADULT - ASSESSMENT
79y Female POD#7 s/p natan patch repair for gastric ulcer, c/p aspiration. Required reintubation 1/14, currently on 40 FiO2, 5 PEEP. On 0.1 Norepi. No NG tube. Abdomen soft, no tenderness or distension, drain serosanguinous.     - No further surgical intervention   - Continue antibiotics  - Serial abdominal exams  - Is & Os  - DVT prophylaxis heparin subcutaneous   - OOB, pain control  - Incentive spirometry  - Follow up AM labs  - Continue management per ICU

## 2025-01-16 NOTE — PROGRESS NOTE ADULT - SUBJECTIVE AND OBJECTIVE BOX
[facility]  INFECTIOUS DISEASES - [address], [place] [address]  Tel: [phone]     Fax: [phone]  =======================================================    GINNY VASQUEZ [id number]    Follow up: Tmax of 101.1 this AM.    Allergies:  penicillin (Other)  minocycline (Other)  Enbrel (Unknown)  IV Contrast (Hives; Rash)  Zofran (Other)  Levaquin (Other)      Antibiotics:  albuterol/ipratropium for Nebulization 3 milliLiter(s) Nebulizer every 6 hours  buDESOnide    Inhalation Suspension 0.5 milliGRAM(s) Inhalation two times a day  chlorhexidine 0.12% Liquid 15 milliLiter(s) Oral Mucosa every 12 hours  chlorhexidine 2% Cloths 1 Application(s) Topical <User Schedule>  dexMEDEtomidine Infusion 1.2 MICROgram(s)/kG/Hr IV Continuous <Continuous>  dextrose 5%. 1000 milliLiter(s) IV Continuous <Continuous>  dextrose 5%. 1000 milliLiter(s) IV Continuous <Continuous>  dextrose 50% Injectable 25 Gram(s) IV Push once  dextrose 50% Injectable 12.5 Gram(s) IV Push once  dextrose 50% Injectable 25 Gram(s) IV Push once  dextrose Oral Gel 15 Gram(s) Oral once  diphenhydrAMINE Injectable 50 milliGRAM(s) IV Push once  fentaNYL    Injectable 50 MICROGram(s) IV Push every 2 hours PRN  fluconAZOLE IVPB 200 milliGRAM(s) IV Intermittent every 24 hours  glucagon  Injectable 1 milliGRAM(s) IntraMuscular once  guaifenesin/dextromethorphan Oral Liquid 10 milliLiter(s) Oral every 6 hours  heparin   Injectable 5000 Unit(s) SubCutaneous every 8 hours  iohexol 300 mG (iodine)/mL Oral Solution 30 milliLiter(s) Oral once  ketamine Infusion. 0.25 mG/kG/Hr IV Continuous <Continuous>  meropenem  IVPB 500 milliGRAM(s) IV Intermittent every 12 hours  midodrine 10 milliGRAM(s) Oral every 8 hours  norepinephrine Infusion 0.05 MICROgram(s)/kG/Min IV Continuous <Continuous>  pantoprazole   Suspension 40 milliGRAM(s) Oral two times a day       REVIEW OF SYSTEMS:  unable to obtain, intubated     Physical Exam:  ICU Vital Signs Last 24 Hrs  T(C): 36.6 (16 Jan 2025 12:05), Max: 38.4 (16 Jan 2025 06:00)  T(F): 97.8 (16 Jan 2025 12:05), Max: 101.1 (16 Jan 2025 06:00)  HR: 80 (16 Jan 2025 13:34) (54 - 111)  BP: --  BP(mean): --  ABP: 149/61 (16 Jan 2025 13:00) (86/44 - 156/67)  ABP(mean): 91 (16 Jan 2025 13:00) (57 - 116)  RR: 30 (16 Jan 2025 13:00) (13 - 41)  SpO2: 99% (16 Jan 2025 13:34) (96% - 100%)    O2 Parameters below as of 16 Jan 2025 08:28  Patient On (Oxygen Delivery Method): ventilator        GEN: intubated  HEENT: normocephalic and atraumatic.   LUNGS: intubated  HEART: Regular rate and rhythm   ABDOMEN: Soft, nondistended.    EXTREMITIES: No leg edema. + L forearm swelling  NEUROLOGIC: intubated, sedated      Labs:  01-16    139  |  103  |  45[H]  ----------------------------<  120[H]  2.7[LL]   |  28  |  2.20[H]    Ca    8.2[L]      16 Jan 2025 06:05  Phos  3.6     01-16  Mg     1.7     01-16    TPro  4.8[L]  /  Alb  1.7[L]  /  TBili  0.5  /  DBili  x   /  AST  104[H]  /  ALT  41  /  AlkPhos  490[H]  01-16                          6.7    16.66 )-----------( 120      ( 16 Jan 2025 06:05 )             20.4       Urinalysis Basic - ( 16 Jan 2025 06:05 )    Color: x / Appearance: x / SG: x / pH: x  Gluc: 120 mg/dL / Ketone: x  / Bili: x / Urobili: x   Blood: x / Protein: x / Nitrite: x   Leuk Esterase: x / RBC: x / WBC x   Sq Epi: x / Non Sq Epi: x / Bacteria: x      LIVER FUNCTIONS - ( 16 Jan 2025 06:05 )  Alb: 1.7 g/dL / Pro: 4.8 g/dL / ALK PHOS: 490 U/L / ALT: 41 U/L / AST: 104 U/L / GGT: x             RECENT CULTURES:  01-14 @ 10:10 .Blood BLOOD     No growth at 24 hours        01-14 @ 09:40 .Blood BLOOD     No growth at 24 hours        01-13 @ 19:00 ET Tube ET Tube     Commensal merline consistent with body site    Moderate polymorphonuclear leukocytes per low power field  No Squamous epithelial cells per low power field  No organisms seen per oil power field            All imaging and data are reviewed.

## 2025-01-16 NOTE — PROGRESS NOTE ADULT - ATTENDING COMMENTS
78 yo woman with HTN, HLD, CKD, PUD, Lung Cancer s/p RUL lobectomy who presents with a perforated gastric ulcer POD 6 from laparoscopic repair with Srinath Patch.  Postop course complicated by prolonged intubation for hypercapnic respiratory failure 2/2 aspiration, ESPERANZA 2/2 ATN, and now likely aspiration pneumonia with recurrent hypoxemic respiratory failure, septic shock, lactic acidosis, and ESPERANZA.    --continue sedation with ketamine and precedex, prn fentanyl  --likely septic +/- hypovolemic shock, remains on low dose levophed, off vaso  add midodrine  --acute hypoxemic respiratory failure, wean FiO2 as tolerates  COPD, continue bronchdilators, robitussin for secretion clearance  --POD 6 Srinath patch for perforated   PPI bid for PUD  continue TF  --ESPERANZA persists, likely ATN from septic shock  continue supportive care  hypokalemia, replace  --septic shock suspect from aspiration pneumonia, recurrent fever  continue meropenem, fluconazole  CT c/a/p with IV and PO contrast  premedication for IV contrast for allergy  --anemia, transfuse 1 PRBC  doubt bleeding  -- updated

## 2025-01-16 NOTE — PROGRESS NOTE ADULT - SUBJECTIVE AND OBJECTIVE BOX
NEPHROLOGY PROGRESS NOTE    CHIEF COMPLAINT:  ESPERANZA    HPI:  Renal function stable, borderline oliguric.  Remains vented on pressor.    EXAM:  Vital Signs Last 24 Hrs  T(C): 36.1 (2025 07:51), Max: 38.4 (2025 06:00)  T(F): 97 (2025 07:51), Max: 101.1 (2025 06:00)  HR: 74 (2025 11:00) (54 - 111)  BP: --  BP(mean): --  RR: 31 (2025 11:00) (13 - 41)  SpO2: 99% (2025 11:00) (96% - 100%)    Parameters below as of 2025 08:28  Patient On (Oxygen Delivery Method): ventilator      I&O's Summary    15 Wallace 2025 07:01  -  2025 07:00  --------------------------------------------------------  IN: 2248.5 mL / OUT: 765 mL / NET: 1483.5 mL    2025 07:01  -  2025 11:43  --------------------------------------------------------  IN: 687.2 mL / OUT: 30 mL / NET: 657.2 mL      Daily     Daily Weight in k.3 (2025 05:56)    Sedated, vented  Normal respiratory effort, lungs clear bilaterally  Heart RRR with no murmur, no peripheral edema    LABS                        6.7    16.66 )-----------( 120      ( 2025 06:05 )             20.4     01-16    139  |  103  |  45[H]  ----------------------------<  120[H]  2.7[LL]   |  28  |  2.20[H]    Ca    8.2[L]      2025 06:05  Phos  3.6       Mg     1.7         TPro  4.8[L]  /  Alb  1.7[L]  /  TBili  0.5  /  DBili  x   /  AST  104[H]  /  ALT  41  /  AlkPhos  490[H]        Impression:  ESPERANZA - septic ATN with borderline oliguria  Perforated gastric ulcer s/p lap repair  Post op acute respiratory failure and shock  CKD 3 presumably due to RVD, hypertensive nephrosclerosis. Cr ranging widely 1.1-1.5 per historical data    Recommendations:   At moderate to high risk of contrast nephrotoxicity;  given her ongoing shock, pressor and vent requirement, the information gleaned from this scan could , therefore benefits appear to outweigh renal risks;  discussed with Dr. Gauthier and her  and we all agree to proceed with knowledge that dialytic requirement is a possible outcome.  Her  states she is allergic to contrast dye (hives) therefore would require pre medications

## 2025-01-16 NOTE — PROGRESS NOTE ADULT - SUBJECTIVE AND OBJECTIVE BOX
HPI:  78 y/o WF with PMHx with HTN, HLD, CKD, PUD (dx 2011), Lung CA s/p lobectomy (1996) presented to [facility] ED from home with  via ambulance c/o worsening generalized achy abdominal pain since 1/7 night.  Patient reports baseline abdominal discomfort x several years which worsened suddenly (described as 11/10) without associated fever/chills.  Patient recently discharged from [facility] - treated for PNA.  Patient reports that after discharge had 3 days of vomiting dark fluids and dark non-bloody which resolved with Imodium.  Patient reports tolerating last meal yesterday, small dark BM yesterday, + appetite. Of note, patient requires constant O2 at home x several weeks, previously at nights only. At [place], CT findings of pnemoperitoneum, likely perforated PUD, made her NPO, started on IVF and IV antibiotics and underwent emergent repair of perforated viscus in OR. Pt underwent laparoscopic repair of perforated gastric ulcer with Srinath patch, Onur drain placement with intra-op findings of 1cm perforation of distal anterior stomach with diffuse gastric/bilious contamination. Procedure complicated by aspiration, and postoperatively, noted minimal amount of urine and blood gas with acidotic feature. Patient found to be in acute respiratory distress, acidotic and hypercarbic. ICU consult at that time determined that patient required higher level of care, resulting in transfer to [facility]. On arrival to the ICU she is intubated, sedated with propofol without any pressor requirements.  (09 Jan 2025 17:11)        SUBJECTIVE:  Patient seen and examined at bedside.  No overnight events.  Remains intubated, sedated on PRVC with 40 FiO2 and 5 PEEP. Saturating appropriately.   VITALS  Vital Signs Last 24 Hrs  T(C): 36.1 (16 Jan 2025 07:51), Max: 38.4 (16 Jan 2025 06:00)  T(F): 97 (16 Jan 2025 07:51), Max: 101.1 (16 Jan 2025 06:00)  HR: 99 (16 Jan 2025 09:00) (54 - 111)  BP: --  BP(mean): --  RR: 19 (16 Jan 2025 09:00) (13 - 41)  SpO2: 99% (16 Jan 2025 09:00) (96% - 100%)    Parameters below as of 16 Jan 2025 08:28  Patient On (Oxygen Delivery Method): ventilator        PHYSICAL EXAM  GENERAL:  Intubated, sedated Female lying in bed.  HEENT:  Normocephalic and atraumatic  CARDIO:  Regular rate and rhythm.   RESPIRATORY:  Intubated, equal chest rise bilaterally  ABDOMEN:  Soft, nondistended. Incisions are c/d/i. No guarding.   SKIN:  No jaundice, pallor, or cyanosis  NEURO:  Intubated, sedated, minimally arousable    INTAKE & OUTPUT  I&O's Summary    15 Wallace 2025 07:01  -  16 Jan 2025 07:00  --------------------------------------------------------  IN: 2248.5 mL / OUT: 765 mL / NET: 1483.5 mL    16 Jan 2025 07:01  -  16 Jan 2025 09:27  --------------------------------------------------------  IN: 343.6 mL / OUT: 30 mL / NET: 313.6 mL      I&O's Detail    15 Wallace 2025 07:01  -  16 Jan 2025 07:00  --------------------------------------------------------  IN:    Dexmedetomidine: 267.9 mL    IV PiggyBack: 100 mL    IV PiggyBack: 100 mL    IV PiggyBack: 100 mL    Ketamine: 121.3 mL    Lactated Ringers Bolus: 500 mL    Nepro with Carb Steady: 840 mL    Norepinephrine: 141.3 mL    Vasopressin: 78 mL  Total IN: 2248.5 mL    OUT:    Drain (mL): 60 mL    Indwelling Catheter - Urethral (mL): 620 mL    Nasogastric/Oral tube (mL): 85 mL  Total OUT: 765 mL    Total NET: 1483.5 mL      16 Jan 2025 07:01  -  16 Jan 2025 09:27  --------------------------------------------------------  IN:    Dexmedetomidine: 31.2 mL    IV PiggyBack: 200 mL    Ketamine: 13 mL    Nepro with Carb Steady: 80 mL    Norepinephrine: 19.4 mL  Total IN: 343.6 mL    OUT:    Drain (mL): 30 mL  Total OUT: 30 mL    Total NET: 313.6 mL          MEDICATIONS  MEDICATIONS  (STANDING):  albuterol/ipratropium for Nebulization 3 milliLiter(s) Nebulizer every 6 hours  buDESOnide    Inhalation Suspension 0.5 milliGRAM(s) Inhalation two times a day  chlorhexidine 0.12% Liquid 15 milliLiter(s) Oral Mucosa every 12 hours  chlorhexidine 2% Cloths 1 Application(s) Topical <User Schedule>  dexMEDEtomidine Infusion 1.2 MICROgram(s)/kG/Hr (15.6 mL/Hr) IV Continuous <Continuous>  dextrose 5%. 1000 milliLiter(s) (100 mL/Hr) IV Continuous <Continuous>  dextrose 5%. 1000 milliLiter(s) (50 mL/Hr) IV Continuous <Continuous>  dextrose 50% Injectable 25 Gram(s) IV Push once  dextrose 50% Injectable 12.5 Gram(s) IV Push once  dextrose 50% Injectable 25 Gram(s) IV Push once  dextrose Oral Gel 15 Gram(s) Oral once  fluconAZOLE IVPB 200 milliGRAM(s) IV Intermittent every 24 hours  glucagon  Injectable 1 milliGRAM(s) IntraMuscular once  guaifenesin/dextromethorphan Oral Liquid 10 milliLiter(s) Oral every 6 hours  heparin   Injectable 5000 Unit(s) SubCutaneous every 8 hours  ketamine Infusion. 0.25 mG/kG/Hr (1.3 mL/Hr) IV Continuous <Continuous>  meropenem  IVPB 500 milliGRAM(s) IV Intermittent every 12 hours  norepinephrine Infusion 0.05 MICROgram(s)/kG/Min (4.87 mL/Hr) IV Continuous <Continuous>  pantoprazole   Suspension 40 milliGRAM(s) Oral two times a day  potassium chloride  10 mEq/100 mL IVPB 10 milliEquivalent(s) IV Intermittent every 1 hour  vasopressin Infusion 0.04 Unit(s)/Min (6 mL/Hr) IV Continuous <Continuous>    MEDICATIONS  (PRN):  fentaNYL    Injectable 50 MICROGram(s) IV Push every 2 hours PRN Mild Pain (1 - 3) or vent synchrony      LABS:                        6.7    16.66 )-----------( 120      ( 16 Jan 2025 06:05 )             20.4     01-16    139  |  103  |  45[H]  ----------------------------<  120[H]  2.7[LL]   |  28  |  2.20[H]    Ca    8.2[L]      16 Jan 2025 06:05  Phos  3.6     01-16  Mg     1.7     01-16    TPro  4.8[L]  /  Alb  1.7[L]  /  TBili  0.5  /  DBili  x   /  AST  104[H]  /  ALT  41  /  AlkPhos  490[H]  01-16        Urinalysis with Rflx Culture (collected 14 Jan 2025 11:00)    Culture - Blood (collected 14 Jan 2025 10:10)  Source: .Blood BLOOD  Preliminary Report (15 Wallace 2025 15:01):    No growth at 24 hours    Culture - Blood (collected 14 Jan 2025 09:40)  Source: .Blood BLOOD  Preliminary Report (15 Wallace 2025 15:01):    No growth at 24 hours    Culture - Sputum (collected 13 Jan 2025 19:00)  Source: ET Tube ET Tube  Gram Stain (14 Jan 2025 13:12):    Moderate polymorphonuclear leukocytes per low power field    No Squamous epithelial cells per low power field    No organisms seen per oil power field  Final Report (15 Wallace 2025 21:17):    Commensal merline consistent with body site        RADIOLOGY & ADDITIONAL STUDIES:

## 2025-01-16 NOTE — PROGRESS NOTE ADULT - SUBJECTIVE AND OBJECTIVE BOX
Problem: Nutrition Deficit:  Goal: Optimize nutritional status  Outcome: Not Progressing  Flowsheets (Taken 8/27/2024 1812)  Nutrient intake appropriate for improving, restoring, or maintaining nutritional needs:   Assess nutritional status and recommend course of action   Monitor oral intake, labs, and treatment plans  Note: Encourage increased protein/ meal intake.      Problem: Discharge Planning  Goal: Discharge to home or other facility with appropriate resources  Outcome: Progressing     Problem: Cardiovascular - Adult  Goal: Maintains optimal cardiac output and hemodynamic stability  Outcome: Progressing  Flowsheets (Taken 8/27/2024 1812)  Maintains optimal cardiac output and hemodynamic stability:   Monitor blood pressure and heart rate   Assess for signs of decreased cardiac output  Goal: Absence of cardiac dysrhythmias or at baseline  Outcome: Progressing  Flowsheets (Taken 8/27/2024 1812)  Absence of cardiac dysrhythmias or at baseline: Monitor cardiac rate and rhythm     Problem: Chronic Conditions and Co-morbidities  Goal: Patient's chronic conditions and co-morbidity symptoms are monitored and maintained or improved  Outcome: Progressing  Flowsheets (Taken 8/27/2024 1812)  Care Plan - Patient's Chronic Conditions and Co-Morbidity Symptoms are Monitored and Maintained or Improved:   Monitor and assess patient's chronic conditions and comorbid symptoms for stability, deterioration, or improvement   Collaborate with multidisciplinary team to address chronic and comorbid conditions and prevent exacerbation or deterioration     Problem: Respiratory - Adult  Goal: Achieves optimal ventilation and oxygenation  Outcome: Progressing  Flowsheets (Taken 8/27/2024 1812)  Achieves optimal ventilation and oxygenation:   Assess for changes in respiratory status   Assess for changes in mentation and behavior   Oxygen supplementation based on oxygen saturation or arterial blood gases     Problem: Metabolic/Fluid and  Date of Service 01-16-25 @ 13:24    Patient is a 79y old  Female who presents with a chief complaint of transferred from [place] s/p [facility] patch repair (16 Jan 2025 11:43)      INTERVAL /OVERNIGHT EVENTS: still vented and sedated    MEDICATIONS  (STANDING):  albuterol/ipratropium for Nebulization 3 milliLiter(s) Nebulizer every 6 hours  buDESOnide    Inhalation Suspension 0.5 milliGRAM(s) Inhalation two times a day  chlorhexidine 0.12% Liquid 15 milliLiter(s) Oral Mucosa every 12 hours  chlorhexidine 2% Cloths 1 Application(s) Topical <User Schedule>  dexMEDEtomidine Infusion 1.2 MICROgram(s)/kG/Hr (15.6 mL/Hr) IV Continuous <Continuous>  dextrose 5%. 1000 milliLiter(s) (100 mL/Hr) IV Continuous <Continuous>  dextrose 5%. 1000 milliLiter(s) (50 mL/Hr) IV Continuous <Continuous>  dextrose 50% Injectable 25 Gram(s) IV Push once  dextrose 50% Injectable 12.5 Gram(s) IV Push once  dextrose 50% Injectable 25 Gram(s) IV Push once  dextrose Oral Gel 15 Gram(s) Oral once  diphenhydrAMINE Injectable 50 milliGRAM(s) IV Push once  fluconAZOLE IVPB 200 milliGRAM(s) IV Intermittent every 24 hours  glucagon  Injectable 1 milliGRAM(s) IntraMuscular once  guaifenesin/dextromethorphan Oral Liquid 10 milliLiter(s) Oral every 6 hours  heparin   Injectable 5000 Unit(s) SubCutaneous every 8 hours  iohexol 300 mG (iodine)/mL Oral Solution 30 milliLiter(s) Oral once  ketamine Infusion. 0.25 mG/kG/Hr (1.3 mL/Hr) IV Continuous <Continuous>  meropenem  IVPB 500 milliGRAM(s) IV Intermittent every 12 hours  midodrine 10 milliGRAM(s) Oral every 8 hours  norepinephrine Infusion 0.05 MICROgram(s)/kG/Min (4.87 mL/Hr) IV Continuous <Continuous>  pantoprazole   Suspension 40 milliGRAM(s) Oral two times a day    MEDICATIONS  (PRN):  fentaNYL    Injectable 50 MICROGram(s) IV Push every 2 hours PRN Mild Pain (1 - 3) or vent synchrony      Allergies    penicillin (Other)  minocycline (Other)  Enbrel (Unknown)  IV Contrast (Hives; Rash)  Zofran (Other)  Levaquin (Other)    Intolerances        REVIEW OF SYSTEMS:  unable to obtain    Vital Signs Last 24 Hrs  T(C): 36.6 (16 Jan 2025 12:05), Max: 38.4 (16 Jan 2025 06:00)  T(F): 97.8 (16 Jan 2025 12:05), Max: 101.1 (16 Jan 2025 06:00)  HR: 76 (16 Jan 2025 13:00) (54 - 111)  BP: --  BP(mean): --  RR: 30 (16 Jan 2025 13:00) (13 - 41)  SpO2: 98% (16 Jan 2025 13:00) (96% - 100%)    Parameters below as of 16 Jan 2025 08:28  Patient On (Oxygen Delivery Method): ventilator        PHYSICAL EXAM:  GENERAL: NAD, well-groomed, well-developed  HEAD:  Atraumatic, Normocephalic  EYES: EOMI, PERRLA, conjunctiva and sclera clear  ENMT: No tonsillar erythema, exudates, or enlargement; Moist mucous membranes, Good dentition, No lesions  NECK: Supple, No JVD, Normal thyroid  NERVOUS SYSTEM:  sedated  CHEST/LUNG: Clear to auscultation bilaterally; No rales, rhonchi, wheezing, or rubs  HEART: Regular rate and rhythm; No murmurs, rubs, or gallops  ABDOMEN: Soft, Nontender, Nondistended; Bowel sounds present  EXTREMITIES:  2+ Peripheral Pulses, No clubbing, cyanosis, or edema  LYMPH: No lymphadenopathy noted  SKIN: No rashes or lesions    LABS:                        6.7    16.66 )-----------( 120      ( 16 Jan 2025 06:05 )             20.4     16 Jan 2025 06:05    139    |  103    |  45     ----------------------------<  120    2.7     |  28     |  2.20     Ca    8.2        16 Jan 2025 06:05  Phos  3.6       16 Jan 2025 06:05  Mg     1.7       16 Jan 2025 06:05    TPro  4.8    /  Alb  1.7    /  TBili  0.5    /  DBili  x      /  AST  104    /  ALT  41     /  AlkPhos  490    16 Jan 2025 06:05      Urinalysis Basic - ( 16 Jan 2025 06:05 )    Color: x / Appearance: x / SG: x / pH: x  Gluc: 120 mg/dL / Ketone: x  / Bili: x / Urobili: x   Blood: x / Protein: x / Nitrite: x   Leuk Esterase: x / RBC: x / WBC x   Sq Epi: x / Non Sq Epi: x / Bacteria: x      CAPILLARY BLOOD GLUCOSE      POCT Blood Glucose.: 123 mg/dL (16 Jan 2025 11:04)  POCT Blood Glucose.: 109 mg/dL (16 Jan 2025 06:51)  POCT Blood Glucose.: 145 mg/dL (15 Wallace 2025 22:29)      RADIOLOGY & ADDITIONAL TESTS:    Notes Reviewed:  [x ] YES  [ ] NO    Care Discussed with Consultants/Other Providers [x ] YES  [ ] NO

## 2025-01-16 NOTE — PROGRESS NOTE ADULT - ASSESSMENT
78 y/o WF with PMHx with HTN, HLD, CKD, PUD (dx 2011), Lung CA s/p lobectomy (1996)  who was admitted on 1/9 for worsening generalized achy abdominal pain since 1/7 night.  Patient reports baseline abdominal discomfort x several years which worsened suddenly (described as 11/10) without associated fever/chills. She was found to have pneumooperitoneum, s/p Laparoscopic repair of perforated gastric ulcer on 1/9. Patient has been on antibiotics and antifungals. On 1/13 she was intubated.     On 1/15 AM Zosyn switched to meropenem given fevers. Respiratory culture from 1/13 growing commensal merline consistent with body site and blood cultures from 1/14 currently no growth.    #Pneumonia  #Perforated gastric ulcer  #Fevers  #Leukocytosis  #Hypotension    -continue meropenem (renally dosed)  -continue fluconazole (renally dosed), if has more high fevers overnight consider broadening to caspofungin  -consider LUE US and removal of LUE IV line  -consider CT chest and A/P  -follow cultures to completion  -monitor WBC  -discussed with  at bedside  -discussed with Dr. Disha Carrillo MD  Division of Infectious Diseases   Cell [phone] between 8am and 6pm   After 6pm and weekends please call ID service at [phone].     35 minutes spent on total encounter assessing patient, examination, chart review, counseling and coordinating care by the attending physician/nurse/care manager.

## 2025-01-16 NOTE — PROGRESS NOTE ADULT - SUBJECTIVE AND OBJECTIVE BOX
Date of entry of this note is equal to date of services rendered    BRIEF HOSPITAL COURSE: 79F with HTN, HLD, CKD, PUD, Lung Cancer s/p RUL lobectomy who presents with a perforated gastric ulcer POD#7 from laparoscopic repair with Srinath Patch and Onur Drain placement. Pt transferred from  to [facility] [place] ICU for severe hypercapnic respiratory failure 2ry aspiration requiring intubation, extubated with hospital course  further complicated by ESPERANZA d/t ATN, respiratory failure on HFNC with worsening hypoxemia/hypercapnic ultimately reintubated 1/13/25 with worsening dual pressor shock state.      INTERVAL EVENTS:  - titrated off levophed and vasopressin  - Remains intubated and sedated on precedex/ketamine      REVIEW OF SYSTEMS:     [ ] A ten-point review of systems was otherwise negative except as noted.  [X] Due to altered mental status/intubation, subjective information were not able to be obtained from the patient. History was obtained, to the extent possible, from review of the chart and collateral sources of information.      PAST MEDICAL & SURGICAL HISTORY:  HLD (hyperlipidemia)      Rheumatoid arthritis      TIA (transient ischemic attack)  2012      Lung cancer  Right lung.      Chronic obstructive pulmonary disease, unspecified COPD type  O2 at night      Hiatal hernia with GERD      Essential hypertension      Childhood asthma      Stage 3 chronic kidney disease      Carotid artery plaque      IBS (irritable bowel syndrome)      Psoriatic arthritis      S/P lobectomy of lung  Right lung in 1996.      H/O colonoscopy      History of endoscopy  Sept 2017      S/P cholecystectomy        FAMILY HISTORY:  FH: type 2 diabetes (Mother)    FH: CAD (coronary artery disease) (Father, Sibling)    FH: colon cancer (Sibling)    FH: myocardial infarction (Father)    FH: stroke (Sibling)    FH: lung cancer (Sibling)        Vitals   ICU Vital Signs Last 24 Hrs  T(C): 37.6 (16 Jan 2025 19:37), Max: 38.4 (16 Jan 2025 06:00)  T(F): 99.6 (16 Jan 2025 19:37), Max: 101.1 (16 Jan 2025 06:00)  HR: 84 (16 Jan 2025 20:30) (54 - 111)  BP: --  BP(mean): --  ABP: 143/57 (16 Jan 2025 20:30) (87/38 - 171/70)  ABP(mean): 87 (16 Jan 2025 20:30) (57 - 116)  RR: 27 (16 Jan 2025 20:30) (13 - 41)  SpO2: 100% (16 Jan 2025 20:30) (96% - 100%)    O2 Parameters below as of 16 Jan 2025 20:00  Patient On (Oxygen Delivery Method): ventilator  O2 Flow (L/min): 40              PHYSICAL EXAM:  General: ill appearing, frail  HEENT: Pupils equal, reactive to light.  Symmetric.  PULM: Clear to auscultation bilaterally  CVS: Regular rate and rhythm  ABD: Soft, nondistended, nontender  EXT: No edema, nontender, warm  NEURO: Alert, oriented. Sensation intact. No focal deficits.      VENT SETTINGS   Mode: AC/ CMV (Assist Control/ Continuous Mandatory Ventilation)  RR (machine): 28  TV (machine): 350  FiO2: 40  PEEP: 5  ITime: 0.8  MAP: 15  PIP: 29    ABG - ( 16 Jan 2025 06:28 )  pH, Arterial: 7.39  pH, Blood: x     /  pCO2: 47    /  pO2: 94    / HCO3: 28    / Base Excess: 3.5   /  SaO2: 96.5                I&O's Detail    15 Wallace 2025 07:01  -  16 Jan 2025 07:00  --------------------------------------------------------  IN:    Dexmedetomidine: 267.9 mL    IV PiggyBack: 100 mL    IV PiggyBack: 100 mL    IV PiggyBack: 100 mL    Ketamine: 121.3 mL    Lactated Ringers Bolus: 500 mL    Nepro with Carb Steady: 840 mL    Norepinephrine: 141.3 mL    Vasopressin: 78 mL  Total IN: 2248.5 mL    OUT:    Drain (mL): 60 mL    Indwelling Catheter - Urethral (mL): 620 mL    Nasogastric/Oral tube (mL): 85 mL  Total OUT: 765 mL    Total NET: 1483.5 mL      16 Jan 2025 07:01  -  16 Jan 2025 20:48  --------------------------------------------------------  IN:    Dexmedetomidine: 31.2 mL    Dexmedetomidine: 176.8 mL    IV PiggyBack: 500 mL    IV PiggyBack: 50 mL    IV PiggyBack: 100 mL    Ketamine: 89.7 mL    Nepro with Carb Steady: 520 mL    Norepinephrine: 53.4 mL    PRBCs (Packed Red Blood Cells): 317 mL  Total IN: 1838.1 mL    OUT:    Drain (mL): 70 mL    Indwelling Catheter - Urethral (mL): 1150 mL  Total OUT: 1220 mL    Total NET: 618.1 mL          LABS                        6.7    16.66 )-----------( 120      ( 16 Jan 2025 06:05 )             20.4     01-16    x   |  x   |  x   ----------------------------<  x   4.2   |  x   |  x     Ca    8.2[L]      16 Jan 2025 06:05  Phos  3.6     01-16  Mg     1.7     01-16    TPro  4.8[L]  /  Alb  1.7[L]  /  TBili  0.5  /  DBili  x   /  AST  104[H]  /  ALT  41  /  AlkPhos  490[H]  01-16   LIVER FUNCTIONS - ( 16 Jan 2025 06:05 )  Alb: 1.7 g/dL / Pro: 4.8 g/dL / ALK PHOS: 490 U/L / ALT: 41 U/L / AST: 104 U/L / GGT: x                 Urinalysis Basic - ( 16 Jan 2025 06:05 )    Color: x / Appearance: x / SG: x / pH: x  Gluc: 120 mg/dL / Ketone: x  / Bili: x / Urobili: x   Blood: x / Protein: x / Nitrite: x   Leuk Esterase: x / RBC: x / WBC x   Sq Epi: x / Non Sq Epi: x / Bacteria: x      POCT Blood Glucose.: 116 mg/dL *H* (01-16-25 @ 19:23)  POCT Blood Glucose.: 123 mg/dL *H* (01-16-25 @ 11:04)  POCT Blood Glucose.: 109 mg/dL *H* (01-16-25 @ 06:51)  POCT Blood Glucose.: 145 mg/dL *H* (01-15-25 @ 22:29)          RADIOLOGY:              Date of entry of this note is equal to date of services rendered    BRIEF HOSPITAL COURSE: 79F with HTN, HLD, CKD, PUD, Lung Cancer s/p RUL lobectomy who presents with a perforated gastric ulcer POD#7 from laparoscopic repair with Srinath Patch and Onur Drain placement. Pt transferred from  to [facility] ICU for severe hypercapnic respiratory failure 2ry aspiration requiring intubation, extubated with hospital course  further complicated by ESPERANZA d/t ATN, respiratory failure on HFNC with worsening hypoxemia/hypercapnic ultimately reintubated 1/13/25 with worsening dual pressor shock state.      INTERVAL EVENTS: ***  - titrated off levophed and vasopressin  - Remains intubated and sedated on precedex/ketamine      REVIEW OF SYSTEMS:     [ ] A ten-point review of systems was otherwise negative except as noted.  [X] Due to altered mental status/intubation, subjective information were not able to be obtained from the patient. History was obtained, to the extent possible, from review of the chart and collateral sources of information.      PAST MEDICAL & SURGICAL HISTORY:  HLD (hyperlipidemia)      Rheumatoid arthritis      TIA (transient ischemic attack)  2012      Lung cancer  Right lung.      Chronic obstructive pulmonary disease, unspecified COPD type  O2 at night      Hiatal hernia with GERD      Essential hypertension      Childhood asthma      Stage 3 chronic kidney disease      Carotid artery plaque      IBS (irritable bowel syndrome)      Psoriatic arthritis      S/P lobectomy of lung  Right lung in 1996.      H/O colonoscopy      History of endoscopy  Sept 2017      S/P cholecystectomy        FAMILY HISTORY:  FH: type 2 diabetes (Mother)    FH: CAD (coronary artery disease) (Father, Sibling)    FH: colon cancer (Sibling)    FH: myocardial infarction (Father)    FH: stroke (Sibling)    FH: lung cancer (Sibling)        Vitals   ICU Vital Signs Last 24 Hrs  T(C): 37.6 (16 Jan 2025 19:37), Max: 38.4 (16 Jan 2025 06:00)  T(F): 99.6 (16 Jan 2025 19:37), Max: 101.1 (16 Jan 2025 06:00)  HR: 84 (16 Jan 2025 20:30) (54 - 111)  BP: --  BP(mean): --  ABP: 143/57 (16 Jan 2025 20:30) (87/38 - 171/70)  ABP(mean): 87 (16 Jan 2025 20:30) (57 - 116)  RR: 27 (16 Jan 2025 20:30) (13 - 41)  SpO2: 100% (16 Jan 2025 20:30) (96% - 100%)    O2 Parameters below as of 16 Jan 2025 20:00  Patient On (Oxygen Delivery Method): ventilator  O2 Flow (L/min): 40              PHYSICAL EXAM:  General: ill appearing, frail  HEENT: Pupils equal, reactive to light.  Symmetric.  PULM: Clear to auscultation bilaterally  CVS: Regular rate and rhythm  ABD: Soft, nondistended, nontender  EXT: No edema, nontender, warm  NEURO: Alert, oriented. Sensation intact. No focal deficits.      VENT SETTINGS   Mode: AC/ CMV (Assist Control/ Continuous Mandatory Ventilation)  RR (machine): 28  TV (machine): 350  FiO2: 40  PEEP: 5  ITime: 0.8  MAP: 15  PIP: 29    ABG - ( 16 Jan 2025 06:28 )  pH, Arterial: 7.39  pH, Blood: x     /  pCO2: 47    /  pO2: 94    / HCO3: 28    / Base Excess: 3.5   /  SaO2: 96.5                I&O's Detail    15 Wallace 2025 07:01  -  16 Jan 2025 07:00  --------------------------------------------------------  IN:    Dexmedetomidine: 267.9 mL    IV PiggyBack: 100 mL    IV PiggyBack: 100 mL    IV PiggyBack: 100 mL    Ketamine: 121.3 mL    Lactated Ringers Bolus: 500 mL    Nepro with Carb Steady: 840 mL    Norepinephrine: 141.3 mL    Vasopressin: 78 mL  Total IN: 2248.5 mL    OUT:    Drain (mL): 60 mL    Indwelling Catheter - Urethral (mL): 620 mL    Nasogastric/Oral tube (mL): 85 mL  Total OUT: 765 mL    Total NET: 1483.5 mL      16 Jan 2025 07:01  -  16 Jan 2025 20:48  --------------------------------------------------------  IN:    Dexmedetomidine: 31.2 mL    Dexmedetomidine: 176.8 mL    IV PiggyBack: 500 mL    IV PiggyBack: 50 mL    IV PiggyBack: 100 mL    Ketamine: 89.7 mL    Nepro with Carb Steady: 520 mL    Norepinephrine: 53.4 mL    PRBCs (Packed Red Blood Cells): 317 mL  Total IN: 1838.1 mL    OUT:    Drain (mL): 70 mL    Indwelling Catheter - Urethral (mL): 1150 mL  Total OUT: 1220 mL    Total NET: 618.1 mL          LABS                        6.7    16.66 )-----------( 120      ( 16 Jan 2025 06:05 )             20.4     01-16    x   |  x   |  x   ----------------------------<  x   4.2   |  x   |  x     Ca    8.2[L]      16 Jan 2025 06:05  Phos  3.6     01-16  Mg     1.7     01-16    TPro  4.8[L]  /  Alb  1.7[L]  /  TBili  0.5  /  DBili  x   /  AST  104[H]  /  ALT  41  /  AlkPhos  490[H]  01-16   LIVER FUNCTIONS - ( 16 Jan 2025 06:05 )  Alb: 1.7 g/dL / Pro: 4.8 g/dL / ALK PHOS: 490 U/L / ALT: 41 U/L / AST: 104 U/L / GGT: x                 Urinalysis Basic - ( 16 Jan 2025 06:05 )    Color: x / Appearance: x / SG: x / pH: x  Gluc: 120 mg/dL / Ketone: x  / Bili: x / Urobili: x   Blood: x / Protein: x / Nitrite: x   Leuk Esterase: x / RBC: x / WBC x   Sq Epi: x / Non Sq Epi: x / Bacteria: x      POCT Blood Glucose.: 116 mg/dL *H* (01-16-25 @ 19:23)  POCT Blood Glucose.: 123 mg/dL *H* (01-16-25 @ 11:04)  POCT Blood Glucose.: 109 mg/dL *H* (01-16-25 @ 06:51)  POCT Blood Glucose.: 145 mg/dL *H* (01-15-25 @ 22:29)          RADIOLOGY:              Date of entry of this note is equal to date of services rendered    BRIEF HOSPITAL COURSE: 79F with HTN, HLD, CKD, PUD, Lung Cancer s/p RUL lobectomy who presents with a perforated gastric ulcer POD#7 from laparoscopic repair with Srinath Patch and Onur Drain placement. Pt transferred from  to [facility] ICU for severe hypercapnic respiratory failure 2ry aspiration requiring intubation, extubated with hospital course further complicated by ESPERANZA d/t ATN, respiratory failure on HFNC with worsening hypoxemia/hypercapnic ultimately reintubated 1/13/25 for concern of airway protection with worsening dual pressor shock state post intubation.       INTERVAL EVENTS:   - titrated off levophed and vasopressin  - Remains intubated on full vent support  - sedated on precedex/ketamine, dyssynchronous with ventilator this evening despite increasing ketamine infusion, will give additional 2mg Versed IVP now  - Febrile 101.3 Fluconazole escalated to Caspofungin in conjunction with ID      REVIEW OF SYSTEMS:     [ ] A ten-point review of systems was otherwise negative except as noted.  [X] Due to altered mental status/intubation, subjective information were not able to be obtained from the patient. History was obtained, to the extent possible, from review of the chart and collateral sources of information.      PAST MEDICAL & SURGICAL HISTORY:  HLD (hyperlipidemia)      Rheumatoid arthritis      TIA (transient ischemic attack)  2012      Lung cancer  Right lung.      Chronic obstructive pulmonary disease, unspecified COPD type  O2 at night      Hiatal hernia with GERD      Essential hypertension      Childhood asthma      Stage 3 chronic kidney disease      Carotid artery plaque      IBS (irritable bowel syndrome)      Psoriatic arthritis      S/P lobectomy of lung  Right lung in 1996.      H/O colonoscopy      History of endoscopy  Sept 2017      S/P cholecystectomy        FAMILY HISTORY:  FH: type 2 diabetes (Mother)    FH: CAD (coronary artery disease) (Father, Sibling)    FH: colon cancer (Sibling)    FH: myocardial infarction (Father)    FH: stroke (Sibling)    FH: lung cancer (Sibling)        Vitals   ICU Vital Signs Last 24 Hrs  T(C): 37.6 (16 Jan 2025 19:37), Max: 38.4 (16 Jan 2025 06:00)  T(F): 99.6 (16 Jan 2025 19:37), Max: 101.1 (16 Jan 2025 06:00)  HR: 84 (16 Jan 2025 20:30) (54 - 111)  BP: --  BP(mean): --  ABP: 143/57 (16 Jan 2025 20:30) (87/38 - 171/70)  ABP(mean): 87 (16 Jan 2025 20:30) (57 - 116)  RR: 27 (16 Jan 2025 20:30) (13 - 41)  SpO2: 100% (16 Jan 2025 20:30) (96% - 100%)    O2 Parameters below as of 16 Jan 2025 20:00  Patient On (Oxygen Delivery Method): ventilator  O2 Flow (L/min): 40      PHYSICAL EXAM:  General: ill appearing, frail, thin  HEENT: Pupils equal, reactive to light.  Symmetric.  PULM: rhonchi b/l, minimal breath sounds on R  CVS: Regular rate and rhythm  ABD: Soft, nondistended, Onur drain RUQ with serosanguinous output  EXT: No edema, nontender, warm  NEURO: sedated with RASS -2      VENT SETTINGS   Mode: AC/ CMV (Assist Control/ Continuous Mandatory Ventilation)  RR (machine): 28  TV (machine): 350  FiO2: 40  PEEP: 5  ITime: 0.8  MAP: 15  PIP: 29    ABG - ( 16 Jan 2025 06:28 )  pH, Arterial: 7.39  pH, Blood: x     /  pCO2: 47    /  pO2: 94    / HCO3: 28    / Base Excess: 3.5   /  SaO2: 96.5                I&O's Detail    15 Awllace 2025 07:01  -  16 Jan 2025 07:00  --------------------------------------------------------  IN:    Dexmedetomidine: 267.9 mL    IV PiggyBack: 100 mL    IV PiggyBack: 100 mL    IV PiggyBack: 100 mL    Ketamine: 121.3 mL    Lactated Ringers Bolus: 500 mL    Nepro with Carb Steady: 840 mL    Norepinephrine: 141.3 mL    Vasopressin: 78 mL  Total IN: 2248.5 mL    OUT:    Drain (mL): 60 mL    Indwelling Catheter - Urethral (mL): 620 mL    Nasogastric/Oral tube (mL): 85 mL  Total OUT: 765 mL    Total NET: 1483.5 mL      16 Jan 2025 07:01  -  16 Jan 2025 20:48  --------------------------------------------------------  IN:    Dexmedetomidine: 31.2 mL    Dexmedetomidine: 176.8 mL    IV PiggyBack: 500 mL    IV PiggyBack: 50 mL    IV PiggyBack: 100 mL    Ketamine: 89.7 mL    Nepro with Carb Steady: 520 mL    Norepinephrine: 53.4 mL    PRBCs (Packed Red Blood Cells): 317 mL  Total IN: 1838.1 mL    OUT:    Drain (mL): 70 mL    Indwelling Catheter - Urethral (mL): 1150 mL  Total OUT: 1220 mL    Total NET: 618.1 mL          LABS                        6.7    16.66 )-----------( 120      ( 16 Jan 2025 06:05 )             20.4     01-16    x   |  x   |  x   ----------------------------<  x   4.2   |  x   |  x     Ca    8.2[L]      16 Jan 2025 06:05  Phos  3.6     01-16  Mg     1.7     01-16    TPro  4.8[L]  /  Alb  1.7[L]  /  TBili  0.5  /  DBili  x   /  AST  104[H]  /  ALT  41  /  AlkPhos  490[H]  01-16   LIVER FUNCTIONS - ( 16 Jan 2025 06:05 )  Alb: 1.7 g/dL / Pro: 4.8 g/dL / ALK PHOS: 490 U/L / ALT: 41 U/L / AST: 104 U/L / GGT: x                 Urinalysis Basic - ( 16 Jan 2025 06:05 )    Color: x / Appearance: x / SG: x / pH: x  Gluc: 120 mg/dL / Ketone: x  / Bili: x / Urobili: x   Blood: x / Protein: x / Nitrite: x   Leuk Esterase: x / RBC: x / WBC x   Sq Epi: x / Non Sq Epi: x / Bacteria: x      POCT Blood Glucose.: 116 mg/dL *H* (01-16-25 @ 19:23)  POCT Blood Glucose.: 123 mg/dL *H* (01-16-25 @ 11:04)  POCT Blood Glucose.: 109 mg/dL *H* (01-16-25 @ 06:51)  POCT Blood Glucose.: 145 mg/dL *H* (01-15-25 @ 22:29)      RADIOLOGY:   < from: CT Chest w/ Oral Cont and w/ IV Cont (01.16.25 @ 18:35) >  IMPRESSION:  Complete opacification of the distal right mainstem bronchus with   resultant near complete collapse of right middle and right lower lobes   with trace aeration remaining. Findings discussed with Dr. Gauthier at 7:15   PM on 1/16/2025 with read back.    Moderate right and small left pleural effusions.  Minimal tree-in-bud and groundglass opacities in the left lower lobe are   likely infectious.  Liver is overall heterogeneous in appearance with suggestion of multiple   hypodense lesions. Further evaluation with contrast-enhanced MRI is   advised.  Distended endometrial cavity in this postmenopausal patient for which   further evaluation can be considered with a pelvic ultrasound on   outpatient basis, when patient's current clinical issues resolve.    --- End of Report ---      < from: Xray Chest 1 View AP/PA (01.14.25 @ 11:14) >  IMPRESSION:  Enteric tube tip is in the stomach.    ET tube and left IJ line as above.    Continued right-sided volume loss and complete opacification of the right   hemithorax.    Reticular and patchy, predominantly left upper lung, opacities, again   seen.    Small left pleural effusion.    --- End of Report ---                     Spoke with Dr. Varela, he recommends admission for further work up of AMS. Labs as noted.  Pt with elev WBC with hx of lymphoma.  Case d/w Hospitalist/Dr. Allen and will admit to any monitored bed and hold Abx at this time until definitive source is identified

## 2025-01-16 NOTE — PROGRESS NOTE ADULT - ASSESSMENT
80 y/o WF with PMHx with HTN, HLD, CKD, PUD (dx 2011), Lung CA s/p lobectomy (1996) presents to [facility] ED from home with  via ambulance c/o worsening generalized achy abdominal pain since last night.  Patient reports baseline abdominal discomfort x several years which worsened suddenly    intestinal perf  atelectasis  pleural eff  HTN  HLD  CKD  PUD  PNA hx  Lung Ca - hx of Lobectomy     intubated - ventilated  vs noted  lactic acid trend  abx  vent support  shock management    post op care  PUD perf - surgery and op note reviewed  lung protective vent support  fio2 titration  oral hygiene  skin care  suction PRN  HOB elev  PPI  Bronchodilators - Stress steroids - hx of COPD -   chr changes - deformity - lung parenchyma and chest - see CT chest -   ICU care  I and O  serial labs  replete lytes  dvt p  pain relief

## 2025-01-16 NOTE — PROGRESS NOTE ADULT - ASSESSMENT
***    NEURO:    CV:  PULM:  GI:  RENAL:  ENDO:  ID:  HEME:      DISPO: Pt critically ill requiring ICU level of care. GOC: FULL Code, pts  bedside, all questions answered.     CRITICAL CARE TIME SPENT: *** minutes of critical care time spent providing medical care for patient's acute illness/conditions that impairs at least one vital organ system and/or poses a high risk of imminent or life threatening deterioration in the patient's condition. It includes time spent evaluating and treating the patient's acute illness as well as time spent reviewing labs, radiology, discussing goals of care with patient and/or patient's family, and discussing the case with a multidisciplinary team, in an effort to prevent further life threatening deterioration or end organ damage. This time is independent of any procedures performed.   79F with HTN, HLD, CKD, PUD, Lung Cancer s/p RUL lobectomy who presents with a perforated gastric ulcer POD#7 from laparoscopic repair with Srinath Patch and Onur Drain placement. Pt transferred from  to [facility] ICU for severe hypercapnic respiratory failure 2ry aspiration requiring intubation, extubated with hospital course further complicated by ESPERANZA d/t ATN, respiratory failure on HFNC with worsening hypoxemia/hypercapnic ultimately reintubated 1/13/25 for concern of airway protection with worsening dual pressor shock state post intubation.      Acute Hypoxic/Hypercapnic respiratory failure  Distributive shock  Aspiration pneumonia  Perforated gastric ulcer s/p laparoscopic repair with srinath patch POD#6  ESPERANZA  Lactic Acidosis  Anemia  Hypokalemia      NEURO: On ketamine and precedex infusion to facilitate safe ventilation, with RASS goal 0 to -2. Gave additional 2mg Versed IVP overnight for vent synchrony. Febrile to 101.3, ofirmev given.  CV: Titrated off levophed infusion and vasopressin infusion, maintaining MAP>65, monitoring end points of perfusion. IVF challenge with good response, if BP starts to drop will trial bolus.   PULM: Remains intubated on full vent support on 28/350/+5/40% oxygenation/ventilation improving. CPAP trailed though immediately tachypneic therefore aborted.   GI: Tolerated TF   RENAL:  ENDO:  ID: Continues to be febrile, On Meropenem and Fluconazole -> escalated to Caspofungin in conjunction with ID. Cx NGTD  HEME: Anemic during the day with hgb of 6.7 s/p 1u PRBC repeat labs overnight pending      DISPO: Pt critically ill requiring ICU level of care. GOC: FULL Code, pts  bedside, all questions answered.     CRITICAL CARE TIME SPENT: *** minutes of critical care time spent providing medical care for patient's acute illness/conditions that impairs at least one vital organ system and/or poses a high risk of imminent or life threatening deterioration in the patient's condition. It includes time spent evaluating and treating the patient's acute illness as well as time spent reviewing labs, radiology, discussing goals of care with patient and/or patient's family, and discussing the case with a multidisciplinary team, in an effort to prevent further life threatening deterioration or end organ damage. This time is independent of any procedures performed.   79F with HTN, HLD, CKD, PUD, Lung Cancer s/p RUL lobectomy who presents with a perforated gastric ulcer POD#7 from laparoscopic repair with Srinath Patch and Onur Drain placement. Pt transferred from  to [facility] ICU for severe hypercapnic respiratory failure 2ry aspiration requiring intubation, extubated with hospital course further complicated by ESPERANZA d/t ATN, respiratory failure on HFNC with worsening hypoxemia/hypercapnic ultimately reintubated 1/13/25 for concern of airway protection with worsening dual pressor shock state post intubation.    Acute Hypoxic/Hypercapnic respiratory failure  Distributive shock  Aspiration pneumonia  Perforated gastric ulcer s/p laparoscopic repair with srinath patch POD#6  ESPERANZA  Lactic Acidosis  Anemia  Hypokalemia      NEURO: On ketamine and precedex infusion to facilitate safe ventilation, with RASS goal 0 to -2. Gave additional 2mg Versed IVP overnight for vent synchrony. Febrile to 101.3, ofirmev given.  CV: Titrated off levophed infusion tonight and is off fixed dose vasopressin infusion, gave albumin x1 dose, maintaining MAP>65, monitoring end points of perfusion. IVF challenge with good response, if BP starts to drop will trial bolus.   PULM: Remains intubated on full vent support on 28/350/+5/40%, monitoring peak pressures, oxygenation/ventilation improving. CPAP trailed though immediately tachypneic therefore aborted. CT Chest today shows similar findings in R lung with near complete collapse of R lung, with opacification of distal right mainstem bronchus - possible bronch today?   GI: Tolerated TF, on PPI BID for PUD. Alk phos uptrending 400s now 800 with normal Tbili, slight elevations in AST. CT abdomen today shows multiple hypodense lesions of Liver? no obstruction.  RENAL: ESPERANZA likely d/t ATN improving shock state with some improvement in Cr 2.20->1.90. Electrolytes low, K repleted 2.7 -> 4.2 overnight with additional 2gMG ordered for level of 1.8.   ENDO: maintain -180 while critically ill  ID: Continues to be febrile, On Meropenem and Fluconazole -> escalated to Caspofungin in conjunction with ID. Cx NGTD.   HEME: Anemic during the day with hgb of 6.7 (slow downtrend, dilutional vs phlebotomy induced? s/p 1u PRBC repeat labs overnight with improvement in hgb to 8.3      DISPO: Pt critically ill requiring ICU level of care. GOC: FULL Code, pts  bedside, all questions answered.     CRITICAL CARE TIME SPENT: 39 minutes of critical care time spent providing medical care for patient's acute illness/conditions that impairs at least one vital organ system and/or poses a high risk of imminent or life threatening deterioration in the patient's condition. It includes time spent evaluating and treating the patient's acute illness as well as time spent reviewing labs, radiology, discussing goals of care with patient and/or patient's family, and discussing the case with a multidisciplinary team, in an effort to prevent further life threatening deterioration or end organ damage. This time is independent of any procedures performed.

## 2025-01-16 NOTE — PROGRESS NOTE ADULT - SUBJECTIVE AND OBJECTIVE BOX
Date/Time Patient Seen:  		  Referring MD:   Data Reviewed	       Patient is a 79y old  Female who presents with a chief complaint of transferred from [place] s/p grahm patch repair (15 Wallace 2025 20:08)      Subjective/HPI     PAST MEDICAL & SURGICAL HISTORY:  Asthma    HLD (hyperlipidemia)    Rheumatoid arthritis    TIA (transient ischemic attack)  2012    Esophageal reflux    Lung cancer  Right lung.    Chronic obstructive pulmonary disease, unspecified COPD type  O2 at night    Hiatal hernia with GERD    Essential hypertension    Hyperlipidemia, unspecified hyperlipidemia type    Calculus of gallbladder with chronic cholecystitis without obstruction    Childhood asthma    Stage 3 chronic kidney disease    Carotid artery plaque    IBS (irritable bowel syndrome)    Psoriatic arthritis    S/P lobectomy of lung  Right lung in 1996.    H/O colonoscopy    History of endoscopy  Sept 2017    S/P cholecystectomy    CAD (coronary artery disease)          Medication list         MEDICATIONS  (STANDING):  albuterol/ipratropium for Nebulization 3 milliLiter(s) Nebulizer every 6 hours  buDESOnide    Inhalation Suspension 0.5 milliGRAM(s) Inhalation two times a day  chlorhexidine 0.12% Liquid 15 milliLiter(s) Oral Mucosa every 12 hours  chlorhexidine 2% Cloths 1 Application(s) Topical <User Schedule>  dexMEDEtomidine Infusion 0.4 MICROgram(s)/kG/Hr (5.19 mL/Hr) IV Continuous <Continuous>  dextrose 5%. 1000 milliLiter(s) (100 mL/Hr) IV Continuous <Continuous>  dextrose 5%. 1000 milliLiter(s) (50 mL/Hr) IV Continuous <Continuous>  dextrose 50% Injectable 25 Gram(s) IV Push once  dextrose 50% Injectable 12.5 Gram(s) IV Push once  dextrose 50% Injectable 25 Gram(s) IV Push once  dextrose Oral Gel 15 Gram(s) Oral once  fluconAZOLE IVPB 200 milliGRAM(s) IV Intermittent every 24 hours  glucagon  Injectable 1 milliGRAM(s) IntraMuscular once  guaifenesin/dextromethorphan Oral Liquid 10 milliLiter(s) Oral every 6 hours  heparin   Injectable 5000 Unit(s) SubCutaneous every 8 hours  ketamine Infusion. 0.25 mG/kG/Hr (1.3 mL/Hr) IV Continuous <Continuous>  meropenem  IVPB 500 milliGRAM(s) IV Intermittent every 12 hours  norepinephrine Infusion 0.05 MICROgram(s)/kG/Min (4.87 mL/Hr) IV Continuous <Continuous>  pantoprazole   Suspension 40 milliGRAM(s) Oral two times a day  potassium chloride  10 mEq/100 mL IVPB 10 milliEquivalent(s) IV Intermittent every 1 hour  vasopressin Infusion 0.04 Unit(s)/Min (6 mL/Hr) IV Continuous <Continuous>    MEDICATIONS  (PRN):  fentaNYL    Injectable 50 MICROGram(s) IV Push every 2 hours PRN Mild Pain (1 - 3) or vent synchrony         Vitals log        ICU Vital Signs Last 24 Hrs  T(C): 36.1 (16 Jan 2025 07:51), Max: 38.4 (16 Jan 2025 06:00)  T(F): 97 (16 Jan 2025 07:51), Max: 101.1 (16 Jan 2025 06:00)  HR: 83 (16 Jan 2025 08:28) (54 - 111)  BP: --  BP(mean): --  ABP: 109/47 (16 Jan 2025 08:00) (86/44 - 171/68)  ABP(mean): 69 (16 Jan 2025 08:00) (57 - 116)  RR: 26 (16 Jan 2025 08:00) (20 - 41)  SpO2: 100% (16 Jan 2025 08:28) (96% - 100%)    O2 Parameters below as of 16 Jan 2025 08:28  Patient On (Oxygen Delivery Method): ventilator             Mode: AC/ CMV (Assist Control/ Continuous Mandatory Ventilation)  RR (machine): 28  TV (machine): 350  FiO2: 40  PEEP: 5  ITime: 0.8  MAP: 15  PIP: 30      Input and Output:  I&O's Detail    15 Wallace 2025 07:01  -  16 Jan 2025 07:00  --------------------------------------------------------  IN:    Dexmedetomidine: 267.9 mL    IV PiggyBack: 100 mL    IV PiggyBack: 100 mL    IV PiggyBack: 100 mL    Ketamine: 121.3 mL    Lactated Ringers Bolus: 500 mL    Nepro with Carb Steady: 840 mL    Norepinephrine: 141.3 mL    Vasopressin: 78 mL  Total IN: 2248.5 mL    OUT:    Drain (mL): 60 mL    Indwelling Catheter - Urethral (mL): 620 mL    Nasogastric/Oral tube (mL): 85 mL  Total OUT: 765 mL    Total NET: 1483.5 mL          Lab Data                        6.7    16.66 )-----------( 120      ( 16 Jan 2025 06:05 )             20.4     01-16    139  |  103  |  45[H]  ----------------------------<  120[H]  2.7[LL]   |  28  |  2.20[H]    Ca    8.2[L]      16 Jan 2025 06:05  Phos  3.6     01-16  Mg     1.7     01-16    TPro  4.8[L]  /  Alb  1.7[L]  /  TBili  0.5  /  DBili  x   /  AST  104[H]  /  ALT  41  /  AlkPhos  490[H]  01-16    ABG - ( 16 Jan 2025 06:28 )  pH, Arterial: 7.39  pH, Blood: x     /  pCO2: 47    /  pO2: 94    / HCO3: 28    / Base Excess: 3.5   /  SaO2: 96.5                    Review of Systems	      Objective     Physical Examination    heart s1s2  lung dc BS  head nc  head at      Pertinent Lab findings & Imaging      Danet:  NO   Adequate UO     I&O's Detail    15 Wallace 2025 07:01  -  16 Jan 2025 07:00  --------------------------------------------------------  IN:    Dexmedetomidine: 267.9 mL    IV PiggyBack: 100 mL    IV PiggyBack: 100 mL    IV PiggyBack: 100 mL    Ketamine: 121.3 mL    Lactated Ringers Bolus: 500 mL    Nepro with Carb Steady: 840 mL    Norepinephrine: 141.3 mL    Vasopressin: 78 mL  Total IN: 2248.5 mL    OUT:    Drain (mL): 60 mL    Indwelling Catheter - Urethral (mL): 620 mL    Nasogastric/Oral tube (mL): 85 mL  Total OUT: 765 mL    Total NET: 1483.5 mL               Discussed with:     Cultures:	        Radiology

## 2025-01-16 NOTE — PROGRESS NOTE ADULT - ASSESSMENT
79F with HTN, HLD, CKD, PUD, Lung Cancer s/p RUL lobectomy who presents with a perforated gastric ulcer POD 2 from laparoscopic repair with Srinath Patch and Onur Drain placement. Pt transferred from  to [facility] ICU for severe hypercapnic respiratory failure 2ry aspiration requiring intubation, now extubated with hospital course further complicated by ESPERANZA d/t ATN. Admitted to the ICU with:     Perforated gastric ulcer s/p lap repair with Srinath patch and onur drain  Acute hypoxic/hypercapnic respiratory failure  Aspiration PNA  Pneumoperitoneum  ESPERANZA    NEURO:   -on ketamine and precedex for sedation  - fentanyl prn for pain/vent synch    CV:   - Maintain MAP>65  - now with septic shock +/- hypovolemia, s/p 500 cc IVF bolus x 3  - off Lasix  - TTE technically difficult study, LVEF 60-65%  - on levophed   - off vasopressin  - start midodrine 10 mg q8hrs      PULM:   - Pt had severe hypercapnic resp failure, aspiration requiring intubation, was previously extubated and on HFNC  - Pt w/ acute worsening hypoxia and required emergent intubation, developed respiratory acidosis, suspect aspiration PNA  - continue ventilation  - Continue inhaled steroids and duonebs for secretions.   - Aspiration precautions   - off Lasix   - robitussin q6hrs for cough  - ABG pH 7.39 pCO2 47 HCO3 28     GI:    - s/p lap repair with srinath patch with onur drain placement.  - tube feeds via OG   - protonix bid for PUD  - nutrition consult    RENAL:   - ESPERANZA likely in the setting of ATN/dehydration  - Strict I/Os.  - monitor and replete lytes prn  - hypokalemia, replete with 60Meq, repeat BMP at 2 PM    ENDO:   - no active issues  - Maintain euglycemia 120-180    ID:   - febrile to 101.1F this AM, will order CT chest/Abd/pel w/ con  - s/p zosyn, escalated to meropenem on 1/15  - c/w fluconazole for gastric perforation/peritonitis  - sputum culture commensal merline  - BC x 2 NG at 24hrs  - lactate downtrended  - U/A neg  - monitor WBC    HEME:   - Hgb 6.7, 1uPRBC ordered STAT  - maintain active type+screen  - on dvt ppx with SC Heparin   79F with HTN, HLD, CKD, PUD, Lung Cancer s/p RUL lobectomy who presents with a perforated gastric ulcer POD 2 from laparoscopic repair with Srinath Patch and Onur Drain placement. Pt transferred from  to [facility] ICU for severe hypercapnic respiratory failure 2ry aspiration requiring intubation, now extubated with hospital course further complicated by ESPERANZA d/t ATN. Admitted to the ICU with:     Perforated gastric ulcer s/p lap repair with Srinath patch and onur drain  Acute hypoxic/hypercapnic respiratory failure  Aspiration PNA  Pneumoperitoneum  ESPERANZA    NEURO:   -on ketamine and precedex for sedation  - fentanyl prn for pain/vent synch    CV:   - Maintain MAP>65  - now with septic shock +/- hypovolemia, s/p 500 cc IVF bolus x 3  - off Lasix  - TTE technically difficult study, LVEF 60-65%  - on levophed   - off vasopressin  - start midodrine 10 mg q8hrs      PULM:   - Pt had severe hypercapnic resp failure, aspiration requiring intubation, was previously extubated and on HFNC  - Pt w/ acute worsening hypoxia and required emergent intubation, developed respiratory acidosis, suspect aspiration PNA  - continue ventilation  - Continue inhaled steroids and duonebs for secretions.   - Aspiration precautions   - off Lasix   - robitussin q6hrs for cough  - ABG pH 7.39 pCO2 47 HCO3 28, repeat ABG tmrw     GI:    - s/p lap repair with srinath patch with onur drain placement.  - tube feeds via OG   - protonix bid for PUD  - nutrition consult    RENAL:   - ESPERANZA likely in the setting of ATN/dehydration  - Strict I/Os.  - monitor and replete lytes prn  - hypokalemia, replete with 60Meq, repeat BMP at 2 PM    ENDO:   - no active issues  - Maintain euglycemia 120-180    ID:   - febrile to 101.1F this AM, ordered CT chest/Abd/pel w/ PO and IV contrast  - s/p zosyn, escalated to meropenem on 1/15  - c/w fluconazole for gastric perforation/peritonitis  - sputum culture commensal merline  - BC x 2 NG at 24hrs  - lactate downtrended  - U/A neg  - monitor WBC    HEME:   - Hgb 6.7, 1uPRBC ordered STAT  - monitor H/H  - maintain active type+screen  - on dvt ppx with SC Heparin

## 2025-01-16 NOTE — PROGRESS NOTE ADULT - SUBJECTIVE AND OBJECTIVE BOX
Patient is a 79y old  Female who presents with a chief complaint of transferred from [place] s/p [facility] patch repair (16 Jan 2025 09:22)    Interval events:     Review of Systems:  Constitutional: no fever, chills, fatigue  Neuro: no headache, numbness, weakness  Resp: no cough, wheezing, shortness of breath  CVS: no chest pain, palpitations, leg swelling  GI: no abdominal pain, nausea, vomiting, diarrhea   : no dysuria, frequency, incontinence  Skin: no itching, burning, rashes, or lesions   Msk: no joint pain or swelling  Psych: no depression, anxiety    T(F): 97 (01-16-25 @ 07:51), Max: 101.1 (01-16-25 @ 06:00)  HR: 74 (01-16-25 @ 11:00) (54 - 111)  BP: --  RR: 31 (01-16-25 @ 11:00) (13 - 41)  SpO2: 99% (01-16-25 @ 11:00) (96% - 100%)  Wt(kg): --    Mode: AC/ CMV (Assist Control/ Continuous Mandatory Ventilation), RR (machine): 28, TV (machine): 350, FiO2: 40, PEEP: 5    CAPILLARY BLOOD GLUCOSE      POCT Blood Glucose.: 123 mg/dL (16 Jan 2025 11:04)    I&O's Summary    15 Wallace 2025 07:01  -  16 Jan 2025 07:00  --------------------------------------------------------  IN: 2248.5 mL / OUT: 765 mL / NET: 1483.5 mL    16 Jan 2025 07:01  -  16 Jan 2025 11:26  --------------------------------------------------------  IN: 687.2 mL / OUT: 30 mL / NET: 657.2 mL        Physical Exam:     Gen:  Neuro:  HEENT:  CV:  Pulm:  GI:  Ext:  Skin:    Meds:  MEDICATIONS  (STANDING):  albuterol/ipratropium for Nebulization 3 milliLiter(s) Nebulizer every 6 hours  buDESOnide    Inhalation Suspension 0.5 milliGRAM(s) Inhalation two times a day  chlorhexidine 0.12% Liquid 15 milliLiter(s) Oral Mucosa every 12 hours  chlorhexidine 2% Cloths 1 Application(s) Topical <User Schedule>  dexMEDEtomidine Infusion 1.2 MICROgram(s)/kG/Hr (15.6 mL/Hr) IV Continuous <Continuous>  dextrose 5%. 1000 milliLiter(s) (100 mL/Hr) IV Continuous <Continuous>  dextrose 5%. 1000 milliLiter(s) (50 mL/Hr) IV Continuous <Continuous>  dextrose 50% Injectable 25 Gram(s) IV Push once  dextrose 50% Injectable 12.5 Gram(s) IV Push once  dextrose 50% Injectable 25 Gram(s) IV Push once  dextrose Oral Gel 15 Gram(s) Oral once  fluconAZOLE IVPB 200 milliGRAM(s) IV Intermittent every 24 hours  glucagon  Injectable 1 milliGRAM(s) IntraMuscular once  guaifenesin/dextromethorphan Oral Liquid 10 milliLiter(s) Oral every 6 hours  heparin   Injectable 5000 Unit(s) SubCutaneous every 8 hours  ketamine Infusion. 0.25 mG/kG/Hr (1.3 mL/Hr) IV Continuous <Continuous>  meropenem  IVPB 500 milliGRAM(s) IV Intermittent every 12 hours  midodrine 10 milliGRAM(s) Oral every 8 hours  norepinephrine Infusion 0.05 MICROgram(s)/kG/Min (4.87 mL/Hr) IV Continuous <Continuous>  pantoprazole   Suspension 40 milliGRAM(s) Oral two times a day  potassium chloride  20 mEq/100 mL IVPB 20 milliEquivalent(s) IV Intermittent once    MEDICATIONS  (PRN):  fentaNYL    Injectable 50 MICROGram(s) IV Push every 2 hours PRN Mild Pain (1 - 3) or vent synchrony                            6.7    16.66 )-----------( 120      ( 16 Jan 2025 06:05 )             20.4       01-16    139  |  103  |  45[H]  ----------------------------<  120[H]  2.7[LL]   |  28  |  2.20[H]    Ca    8.2[L]      16 Jan 2025 06:05  Phos  3.6     01-16  Mg     1.7     01-16    TPro  4.8[L]  /  Alb  1.7[L]  /  TBili  0.5  /  DBili  x   /  AST  104[H]  /  ALT  41  /  AlkPhos  490[H]  01-16            Urinalysis Basic - ( 16 Jan 2025 06:05 )    Color: x / Appearance: x / SG: x / pH: x  Gluc: 120 mg/dL / Ketone: x  / Bili: x / Urobili: x   Blood: x / Protein: x / Nitrite: x   Leuk Esterase: x / RBC: x / WBC x   Sq Epi: x / Non Sq Epi: x / Bacteria: x      .Blood BLOOD   No growth at 24 hours -- 01-14 @ 10:10  .Blood BLOOD   No growth at 24 hours -- 01-14 @ 09:40  ET Tube ET Tube   Commensal merline consistent with body site   Moderate polymorphonuclear leukocytes per low power field  No Squamous epithelial cells per low power field  No organisms seen per oil power field 01-13 @ 19:00        ABG - ( 16 Jan 2025 06:28 )  pH, Arterial: 7.39  pH, Blood: x     /  pCO2: 47    /  pO2: 94    / HCO3: 28    / Base Excess: 3.5   /  SaO2: 96.5                Radiology: ***  Bedside Lung U/S: ***  Bedside Cardiac U/S: ***    CENTRAL LINE: Y/N   DATE INSERTED:   REMOVE: Y/N  DE LA FUENTE: Y/N      DATE INSERTED:        REMOVE: Y/N  A-LINE: Y/N     DATE INSERTED:              REMOVE: Y/N    GLOBAL ISSUE/BEST PRACTICE:  Analgesia:  Sedation:  HOB elevation: yes  Stress ulcer prophylaxis:  VTE prophylaxis:  Glycemic control:  Nutrition:    CODE STATUS: ***  [facility] discussion: Y       Patient is a 79y old  Female who presents with a chief complaint of transferred from [place] s/p grahm patch repair (16 Jan 2025 09:22)    Interval events: Now POD#7. On precedex, levophed, and ketamine. Off vasopressin. Remains intubated and sedated. Febrile this AM to 101.1F @ 6 AM. S/p Ofirmev x 1. Potassium low, repletion ordered, Hgb low, 1U pRBC transfusing     Review of Systems: Unable to obtain secondary to intubation and sedation.    T(F): 97 (01-16-25 @ 07:51), Max: 101.1 (01-16-25 @ 06:00)  HR: 74 (01-16-25 @ 11:00) (54 - 111)  BP: --  RR: 31 (01-16-25 @ 11:00) (13 - 41)  SpO2: 99% (01-16-25 @ 11:00) (96% - 100%)  Wt(kg): --    Mode: AC/ CMV (Assist Control/ Continuous Mandatory Ventilation), RR (machine): 28, TV (machine): 350, FiO2: 40, PEEP: 5    CAPILLARY BLOOD GLUCOSE      POCT Blood Glucose.: 123 mg/dL (16 Jan 2025 11:04)    I&O's Summary    15 Wallace 2025 07:01  -  16 Jan 2025 07:00  --------------------------------------------------------  IN: 2248.5 mL / OUT: 765 mL / NET: 1483.5 mL    16 Jan 2025 07:01  -  16 Jan 2025 11:26  --------------------------------------------------------  IN: 687.2 mL / OUT: 30 mL / NET: 657.2 mL        Physical Exam:     General: thin, frail, elderly female, intubated and sedated  HEENT: NC/AT  PULM: intubated and sedated, on vent, rhonchus breath sounds b/l   CVS:  Regular rate and rhythm  ABD: Soft, nondistended. JULIANN drain RUQ with SS output.  : arcos in place draining yellow urine  EXT: No edema, nontender, warm. LUE swollen, no erythema or warmth.   NEURO: sedated    Meds:  MEDICATIONS  (STANDING):  albuterol/ipratropium for Nebulization 3 milliLiter(s) Nebulizer every 6 hours  buDESOnide    Inhalation Suspension 0.5 milliGRAM(s) Inhalation two times a day  chlorhexidine 0.12% Liquid 15 milliLiter(s) Oral Mucosa every 12 hours  chlorhexidine 2% Cloths 1 Application(s) Topical <User Schedule>  dexMEDEtomidine Infusion 1.2 MICROgram(s)/kG/Hr (15.6 mL/Hr) IV Continuous <Continuous>  dextrose 5%. 1000 milliLiter(s) (100 mL/Hr) IV Continuous <Continuous>  dextrose 5%. 1000 milliLiter(s) (50 mL/Hr) IV Continuous <Continuous>  dextrose 50% Injectable 25 Gram(s) IV Push once  dextrose 50% Injectable 12.5 Gram(s) IV Push once  dextrose 50% Injectable 25 Gram(s) IV Push once  dextrose Oral Gel 15 Gram(s) Oral once  fluconAZOLE IVPB 200 milliGRAM(s) IV Intermittent every 24 hours  glucagon  Injectable 1 milliGRAM(s) IntraMuscular once  guaifenesin/dextromethorphan Oral Liquid 10 milliLiter(s) Oral every 6 hours  heparin   Injectable 5000 Unit(s) SubCutaneous every 8 hours  ketamine Infusion. 0.25 mG/kG/Hr (1.3 mL/Hr) IV Continuous <Continuous>  meropenem  IVPB 500 milliGRAM(s) IV Intermittent every 12 hours  midodrine 10 milliGRAM(s) Oral every 8 hours  norepinephrine Infusion 0.05 MICROgram(s)/kG/Min (4.87 mL/Hr) IV Continuous <Continuous>  pantoprazole   Suspension 40 milliGRAM(s) Oral two times a day  potassium chloride  20 mEq/100 mL IVPB 20 milliEquivalent(s) IV Intermittent once    MEDICATIONS  (PRN):  fentaNYL    Injectable 50 MICROGram(s) IV Push every 2 hours PRN Mild Pain (1 - 3) or vent synchrony                            6.7    16.66 )-----------( 120      ( 16 Jan 2025 06:05 )             20.4       01-16    139  |  103  |  45[H]  ----------------------------<  120[H]  2.7[LL]   |  28  |  2.20[H]    Ca    8.2[L]      16 Jan 2025 06:05  Phos  3.6     01-16  Mg     1.7     01-16    TPro  4.8[L]  /  Alb  1.7[L]  /  TBili  0.5  /  DBili  x   /  AST  104[H]  /  ALT  41  /  AlkPhos  490[H]  01-16            Urinalysis Basic - ( 16 Jan 2025 06:05 )    Color: x / Appearance: x / SG: x / pH: x  Gluc: 120 mg/dL / Ketone: x  / Bili: x / Urobili: x   Blood: x / Protein: x / Nitrite: x   Leuk Esterase: x / RBC: x / WBC x   Sq Epi: x / Non Sq Epi: x / Bacteria: x      .Blood BLOOD   No growth at 24 hours -- 01-14 @ 10:10  .Blood BLOOD   No growth at 24 hours -- 01-14 @ 09:40  ET Tube ET Tube   Commensal merline consistent with body site   Moderate polymorphonuclear leukocytes per low power field  No Squamous epithelial cells per low power field  No organisms seen per oil power field 01-13 @ 19:00        ABG - ( 16 Jan 2025 06:28 )  pH, Arterial: 7.39  pH, Blood: x     /  pCO2: 47    /  pO2: 94    / HCO3: 28    / Base Excess: 3.5   /  SaO2: 96.5        < from: Xray Chest 1 View AP/PA (01.14.25 @ 11:14) >  IMPRESSION:  Enteric tube tip is in the stomach.    ET tube and left IJ line as above.    Continued right-sided volume loss and complete opacification of the right   hemithorax.    Reticular and patchy, predominantly left upper lung, opacities, again   seen.    Small left pleural effusion.    --- End of Report ---    < end of copied text >  < from: Xray Chest 1 View- PORTABLE-Urgent (Xray Chest 1 View- PORTABLE-Urgent .) (01.13.25 @ 22:12) >  FINDINGS:    Single frontal view of the chest demonstrates multiple chronic   right-sided rib fractures. Right lung by loss with complete   opacification. stable left lung infiltrates. Osteopenia.. The   cardiomediastinal silhouette is unremarkable.No acute osseous   abnormalities. Overlying EKG leads and wires are noted    IMPRESSION: Stable left lung infiltrates.      --- End of Report ---    < end of copied text >    < from: TTE W or WO Ultrasound Enhancing Agent (01.14.25 @ 20:54) >  CONCLUSIONS:      1. Technically difficult image quality.   2. The endocardium is not well-visualized. Overall there appears to be grossly normal left ventricular systolic function on limited visualization. Grossly the ejection fraction is approximately 60 to 65%. Wall motion abnormalities cannot be ruled out. Consider repeating the study with echo contrast if clinically warranted.   3. The right ventricle is not well visualized. probably normal right ventricular systolic function.   4. Left atrium was not well visualized and LA volume could not be calculated.   5. There is calcification of the mitral valve annulus.   6. Mitral valve leaflets are diffusely calcified.   7. Aortic valve was not well visualized.   8. Tricuspid valve was not well visualized.   9. Pulmonary artery systolic pressure could not be estimated.  10. Trace mitral regurgitation.    < end of copied text >      CENTRAL LINE: Y (left IJ)  ARCOS: Y  A-LINE: Y (left axilla)  OG tube: Y

## 2025-01-17 NOTE — PROGRESS NOTE ADULT - ATTENDING COMMENTS
78 yo woman with HTN, HLD, CKD, PUD, Lung Cancer s/p RUL lobectomy who presents with a perforated gastric ulcer POD 7 from laparoscopic repair with Srinath Patch.  Postop course complicated by prolonged intubation for hypercapnic respiratory failure 2/2 aspiration, ESPERANZA 2/2 ATN, and now likely aspiration pneumonia with recurrent hypoxemic respiratory failure, septic shock, lactic acidosis, and ESPERANZA.    --continue sedation with ketamine and precedex, prn fentanyl  --hypotension resolved, d/c midodrine  --acute hypoxemic respiratory failure, wean FiO2 as tolerates  COPD, continue bronchdilators, robitussin for secretion clearance  bronch today for secretion clearance from R lung  --POD 7 Srinath patch for perforated   PPI bid for PUD  continue TF  elevated alk phos and liver lesion, unclear etiology, GI eval appreciated  check GGT  --ESPERANZA persists, likely ATN from septic shock  continue supportive care  --septic shock suspect from aspiration pneumonia, recurrent fever  continue meropenem, fluconazole  --anemia, improved after 1 PRBC yesterday  -- updated

## 2025-01-17 NOTE — PROCEDURAL SAFETY CHECKLIST WITH OR WITHOUT SEDATION - NSPRESURGSED_GEN_ALL_CORE
Unable to consent due to medical condition
Present, accurate, and signed
Present, accurate, and signed

## 2025-01-17 NOTE — PROGRESS NOTE ADULT - SUBJECTIVE AND OBJECTIVE BOX
Date/Time Patient Seen:  		  Referring MD:   Data Reviewed	       Patient is a 79y old  Female who presents with a chief complaint of transferred from [place] s/p [facility] patch repair (16 Jan 2025 20:47)      Subjective/HPI     PAST MEDICAL & SURGICAL HISTORY:  Asthma    HLD (hyperlipidemia)    Rheumatoid arthritis    TIA (transient ischemic attack)  2012    Esophageal reflux    Lung cancer  Right lung.    Chronic obstructive pulmonary disease, unspecified COPD type  O2 at night    Hiatal hernia with GERD    Essential hypertension    Hyperlipidemia, unspecified hyperlipidemia type    Calculus of gallbladder with chronic cholecystitis without obstruction    Childhood asthma    Stage 3 chronic kidney disease    Carotid artery plaque    IBS (irritable bowel syndrome)    Psoriatic arthritis    S/P lobectomy of lung  Right lung in 1996.    H/O colonoscopy    History of endoscopy  Sept 2017    S/P cholecystectomy    CAD (coronary artery disease)          Medication list         MEDICATIONS  (STANDING):  albuterol/ipratropium for Nebulization 3 milliLiter(s) Nebulizer every 6 hours  buDESOnide    Inhalation Suspension 0.5 milliGRAM(s) Inhalation two times a day  caspofungin IVPB 50 milliGRAM(s) IV Intermittent every 24 hours  caspofungin IVPB      chlorhexidine 0.12% Liquid 15 milliLiter(s) Oral Mucosa every 12 hours  chlorhexidine 2% Cloths 1 Application(s) Topical <User Schedule>  dexMEDEtomidine Infusion 1.2 MICROgram(s)/kG/Hr (15.6 mL/Hr) IV Continuous <Continuous>  dextrose 5%. 1000 milliLiter(s) (50 mL/Hr) IV Continuous <Continuous>  dextrose 5%. 1000 milliLiter(s) (100 mL/Hr) IV Continuous <Continuous>  dextrose 50% Injectable 25 Gram(s) IV Push once  dextrose 50% Injectable 12.5 Gram(s) IV Push once  dextrose 50% Injectable 25 Gram(s) IV Push once  dextrose Oral Gel 15 Gram(s) Oral once  glucagon  Injectable 1 milliGRAM(s) IntraMuscular once  guaifenesin/dextromethorphan Oral Liquid 10 milliLiter(s) Oral every 6 hours  heparin   Injectable 5000 Unit(s) SubCutaneous every 8 hours  ketamine Infusion. 0.25 mG/kG/Hr (1.3 mL/Hr) IV Continuous <Continuous>  meropenem  IVPB 500 milliGRAM(s) IV Intermittent every 12 hours  midodrine 10 milliGRAM(s) Oral every 8 hours  norepinephrine Infusion 0.05 MICROgram(s)/kG/Min (4.87 mL/Hr) IV Continuous <Continuous>  pantoprazole   Suspension 40 milliGRAM(s) Oral two times a day    MEDICATIONS  (PRN):  fentaNYL    Injectable 50 MICROGram(s) IV Push every 2 hours PRN Mild Pain (1 - 3) or vent synchrony         Vitals log        ICU Vital Signs Last 24 Hrs  T(C): 38 (17 Jan 2025 04:03), Max: 38.5 (16 Jan 2025 22:30)  T(F): 100.4 (17 Jan 2025 04:03), Max: 101.3 (16 Jan 2025 22:30)  HR: 87 (17 Jan 2025 05:00) (73 - 111)  BP: --  BP(mean): --  ABP: 133/55 (17 Jan 2025 05:00) (97/47 - 171/70)  ABP(mean): 83 (17 Jan 2025 05:00) (66 - 107)  RR: 27 (17 Jan 2025 05:00) (13 - 39)  SpO2: 97% (17 Jan 2025 05:00) (93% - 100%)    O2 Parameters below as of 17 Jan 2025 04:00  Patient On (Oxygen Delivery Method): ventilator    O2 Concentration (%): 40         Mode: AC/ CMV (Assist Control/ Continuous Mandatory Ventilation)  RR (machine): 28  TV (machine): 350  FiO2: 40  PEEP: 5  ITime: 0.8  MAP: 15  PIP: 33      Input and Output:  I&O's Detail    15 Wallace 2025 07:01  -  16 Jan 2025 07:00  --------------------------------------------------------  IN:    Dexmedetomidine: 267.9 mL    IV PiggyBack: 100 mL    IV PiggyBack: 100 mL    IV PiggyBack: 100 mL    Ketamine: 121.3 mL    Lactated Ringers Bolus: 500 mL    Nepro with Carb Steady: 840 mL    Norepinephrine: 141.3 mL    Vasopressin: 78 mL  Total IN: 2248.5 mL    OUT:    Drain (mL): 60 mL    Indwelling Catheter - Urethral (mL): 620 mL    Nasogastric/Oral tube (mL): 85 mL  Total OUT: 765 mL    Total NET: 1483.5 mL      16 Jan 2025 07:01  -  17 Jan 2025 05:47  --------------------------------------------------------  IN:    Dexmedetomidine: 31.2 mL    Dexmedetomidine: 312 mL    IV PiggyBack: 500 mL    IV PiggyBack: 50 mL    IV PiggyBack: 100 mL    IV PiggyBack: 100 mL    IV PiggyBack: 50 mL    IV PiggyBack: 250 mL    IV PiggyBack: 50 mL    Ketamine: 185.9 mL    Nepro with Carb Steady: 800 mL    Norepinephrine: 53.4 mL    PRBCs (Packed Red Blood Cells): 317 mL  Total IN: 2799.5 mL    OUT:    Drain (mL): 70 mL    Indwelling Catheter - Urethral (mL): 1950 mL  Total OUT: 2020 mL    Total NET: 779.5 mL          Lab Data                        8.3    16.43 )-----------( 109      ( 16 Jan 2025 23:48 )             24.3     01-16    139  |  104  |  43[H]  ----------------------------<  116[H]  4.2   |  27  |  1.90[H]    Ca    8.5      16 Jan 2025 23:48  Phos  4.1     01-16  Mg     1.8     01-16    TPro  5.4[L]  /  Alb  1.7[L]  /  TBili  0.5  /  DBili  x   /  AST  107[H]  /  ALT  44  /  AlkPhos  815[H]  01-16    ABG - ( 17 Jan 2025 05:00 )  pH, Arterial: 7.32  pH, Blood: x     /  pCO2: 50    /  pO2: 85    / HCO3: 26    / Base Excess: -0.3  /  SaO2: 97.8                    Review of Systems	      Objective     Physical Examination      heart s1s2  lung dc bS  head nc  head at    Pertinent Lab findings & Imaging      Dante:  NO   Adequate UO     I&O's Detail    15 Wallace 2025 07:01  -  16 Jan 2025 07:00  --------------------------------------------------------  IN:    Dexmedetomidine: 267.9 mL    IV PiggyBack: 100 mL    IV PiggyBack: 100 mL    IV PiggyBack: 100 mL    Ketamine: 121.3 mL    Lactated Ringers Bolus: 500 mL    Nepro with Carb Steady: 840 mL    Norepinephrine: 141.3 mL    Vasopressin: 78 mL  Total IN: 2248.5 mL    OUT:    Drain (mL): 60 mL    Indwelling Catheter - Urethral (mL): 620 mL    Nasogastric/Oral tube (mL): 85 mL  Total OUT: 765 mL    Total NET: 1483.5 mL      16 Jan 2025 07:01  -  17 Jan 2025 05:47  --------------------------------------------------------  IN:    Dexmedetomidine: 31.2 mL    Dexmedetomidine: 312 mL    IV PiggyBack: 500 mL    IV PiggyBack: 50 mL    IV PiggyBack: 100 mL    IV PiggyBack: 100 mL    IV PiggyBack: 50 mL    IV PiggyBack: 250 mL    IV PiggyBack: 50 mL    Ketamine: 185.9 mL    Nepro with Carb Steady: 800 mL    Norepinephrine: 53.4 mL    PRBCs (Packed Red Blood Cells): 317 mL  Total IN: 2799.5 mL    OUT:    Drain (mL): 70 mL    Indwelling Catheter - Urethral (mL): 1950 mL  Total OUT: 2020 mL    Total NET: 779.5 mL               Discussed with:     Cultures:	        Radiology

## 2025-01-17 NOTE — PROCEDURE NOTE - NSINFORMCONSENT_GEN_A_CORE
Benefits, risks, and possible complications of procedure explained to patient/caregiver who verbalized understanding and gave written consent.
Benefits, risks, and possible complications of procedure explained to patient/caregiver who verbalized understanding and gave verbal consent.
This was an emergent procedure.
Benefits, risks, and possible complications of procedure explained to patient/caregiver who verbalized understanding and gave verbal consent.

## 2025-01-17 NOTE — CONSULT NOTE ADULT - SUBJECTIVE AND OBJECTIVE BOX
[facility]  Fam Alarcon PA-C  [facility] [place] [address]  [phone]      Chief Complaint:  Patient is a 79y old  Female who presents with a chief complaint of transferred from [facility] s/p srinath patch repair (2025 13:12)      HPI:  80 y/o WF with PMHx with HTN, HLD, CKD, PUD (dx ), Lung CA s/p lobectomy () presented to [facility] ED from home with  via ambulance c/o worsening generalized achy abdominal pain since  night.  Patient reports baseline abdominal discomfort x several years which worsened suddenly (described as 11/10) without associated fever/chills.  Patient recently discharged from [facility] - treated for PNA.  Patient reports that after discharge had 3 days of vomiting dark fluids and dark non-bloody which resolved with Imodium.  Patient reports tolerating last meal yesterday, small dark BM yesterday, + appetite. Of note, patient requires constant O2 at home x several weeks, previously at nights only. At [place], CT findings of pnemoperitoneum, likely perforated PUD, made her NPO, started on IVF and IV antibiotics and underwent emergent repair of perforated viscus in OR. Pt underwent laparoscopic repair of perforated gastric ulcer with Srinath patch, Nour drain placement with intra-op findings of 1cm perforation of distal anterior stomach with diffuse gastric/bilious contamination. Procedure complicated by aspiration, and postoperatively, noted minimal amount of urine and blood gas with acidotic feature. Patient found to be in acute respiratory distress, acidotic and hypercarbic. ICU consult at that time determined that patient required higher level of care, resulting in transfer to [facility]. On arrival to the ICU she is intubated, sedated with propofol without any pressor requirements.     INTERVAL HPI  Pt s/e in ICU with  at bedside  Pt intubated/sedated thus unable to obtain history. History reviewed with  at bedside as above  Pt's hospital course c/b respiratory failure, which required re-intubation   denies any known history of liver disease, hepatitis, EtOH dependence, or further GI issues.    Allergies:  penicillin (Other)  minocycline (Other)  Enbrel (Unknown)  IV Contrast (Hives; Rash)  Zofran (Other)  Levaquin (Other)      Medications:  albuterol/ipratropium for Nebulization 3 milliLiter(s) Nebulizer every 6 hours  buDESOnide    Inhalation Suspension 0.5 milliGRAM(s) Inhalation two times a day  caspofungin IVPB 50 milliGRAM(s) IV Intermittent every 24 hours  caspofungin IVPB      chlorhexidine 0.12% Liquid 15 milliLiter(s) Oral Mucosa every 12 hours  chlorhexidine 2% Cloths 1 Application(s) Topical <User Schedule>  dexMEDEtomidine Infusion 1.2 MICROgram(s)/kG/Hr IV Continuous <Continuous>  dextrose 5%. 1000 milliLiter(s) IV Continuous <Continuous>  dextrose 5%. 1000 milliLiter(s) IV Continuous <Continuous>  dextrose 50% Injectable 25 Gram(s) IV Push once  dextrose 50% Injectable 12.5 Gram(s) IV Push once  dextrose 50% Injectable 25 Gram(s) IV Push once  dextrose Oral Gel 15 Gram(s) Oral once  fentaNYL    Injectable 50 MICROGram(s) IV Push every 2 hours PRN  glucagon  Injectable 1 milliGRAM(s) IntraMuscular once  guaifenesin/dextromethorphan Oral Liquid 10 milliLiter(s) Oral every 6 hours  heparin   Injectable 5000 Unit(s) SubCutaneous every 8 hours  ketamine Infusion. 0.25 mG/kG/Hr IV Continuous <Continuous>  meropenem  IVPB 500 milliGRAM(s) IV Intermittent every 12 hours  pantoprazole   Suspension 40 milliGRAM(s) Oral two times a day      PMHX/PSHX:  Asthma    HLD (hyperlipidemia)    Rheumatoid arthritis    TIA (transient ischemic attack)    Esophageal reflux    Lung cancer    Chronic obstructive pulmonary disease, unspecified COPD type    Hiatal hernia with GERD    Essential hypertension    Hyperlipidemia, unspecified hyperlipidemia type    Calculus of gallbladder with chronic cholecystitis without obstruction    Childhood asthma    Stage 3 chronic kidney disease    Carotid artery plaque    IBS (irritable bowel syndrome)    Psoriatic arthritis    S/P lobectomy of lung    H/O colonoscopy    History of endoscopy    S/P cholecystectomy    CAD (coronary artery disease)        Family history:  No pertinent family history in first degree relatives    No pertinent family history in first degree relatives    FH: type 2 diabetes (Mother)    FH: CAD (coronary artery disease) (Father, Sibling)    FH: colon cancer (Sibling)    FH: myocardial infarction (Father)    FH: stroke (Sibling)    FH: lung cancer (Sibling)      ROS:   Unable to obtain due to intubation/sedation      PHYSICAL EXAM:   Vital Signs:  Vital Signs Last 24 Hrs  T(C): 37.7 (2025 12:00), Max: 38.5 (2025 22:30)  T(F): 99.9 (2025 12:00), Max: 101.3 (2025 22:30)  HR: 78 (2025 13:50) (77 - 92)  BP: --  BP(mean): --  RR: 32 (2025 13:00) (22 - 44)  SpO2: 96% (2025 13:50) (93% - 100%)    Parameters below as of 2025 13:39  Patient On (Oxygen Delivery Method): ventilator      Daily     Daily Weight in k (2025 04:00)    GENERAL:  Appears stated age,   HEENT:  NC/AT,    CHEST:  Full & symmetric excursion,   HEART:  Regular rhythm  ABDOMEN:  Soft, non-tender, non-distended,   EXTEREMITIES:  no cyanosis,clubbing or edema  SKIN:  No rash  NEURO:  Alert,    LABS:                        8.2    13.94 )-----------( 101      ( 2025 06:18 )             24.4         140  |  104  |  45[H]  ----------------------------<  141[H]  3.9   |  27  |  2.00[H]    Ca    8.7      2025 06:18  Phos  4.8       Mg     2.8         TPro  5.7[L]  /  Alb  2.2[L]  /  TBili  0.6  /  DBili  x   /  AST  101[H]  /  ALT  43  /  AlkPhos  876[H]      LIVER FUNCTIONS - ( 2025 06:18 )  Alb: 2.2 g/dL / Pro: 5.7 g/dL / ALK PHOS: 876 U/L / ALT: 43 U/L / AST: 101 U/L / GGT: x             Urinalysis Basic - ( 2025 06:18 )    Color: x / Appearance: x / SG: x / pH: x  Gluc: 141 mg/dL / Ketone: x  / Bili: x / Urobili: x   Blood: x / Protein: x / Nitrite: x   Leuk Esterase: x / RBC: x / WBC x   Sq Epi: x / Non Sq Epi: x / Bacteria: x          Imaging:  < from: CT Abdomen and Pelvis w/ Oral Cont and w/ IV Cont (25 @ 18:35) >    ACC: [id number] EXAM:  CT ABDOMEN AND PELVIS OC IC   ORDERED BY: OFE CABELLO     ACC: [id number] EXAM:  CT CHEST OC IC   ORDERED BY: OFE CABELLO     PROCEDURE DATE:  2025          INTERPRETATION:  CLINICAL INFORMATION: Persistent fever. Postop day 7   from repair of perforated gastric ulcer with gram patch and Onur drain.    COMPARISON: 2025 and 2025.    CONTRAST/COMPLICATIONS:  IV Contrast: Omnipaque 350 90 cc administered   10 cc discarded  Oral Contrast: Other  .    PROCEDURE:  CT of the Chest, Abdomen and Pelvis was performed.  Sagittal and coronal reformats were performed.    FINDINGS:  CHEST:  LUNGS AND LARGE AIRWAYS: ETT in place. Right upper lobectomy with   complete opacification of right mainstem bronchus, bronchus intermedius   with near complete collapse of right middle and lower lobes with trace   amount of aeration remaining. New tree-in-bud and minimal groundglass   opacities in the left lower lobe. Mild emphysema.  PLEURA: Small left pleural effusion. Moderate right pleural effusion.  VESSELS: Aortic calcifications. Coronary artery calcifications.  HEART: Heart size is normal. No pericardial effusion.  MEDIASTINUM AND DARION: No lymphadenopathy. Left jugular vein catheter with   tip in distal SVC.  CHEST WALL AND LOWER NECK: A catheter coursing through the left axillary   artery with its tip not well visualized due to calcifications in the wall   of the artery.    ABDOMEN AND PELVIS:  LIVER: Overall heterogeneous in appearance with question of few scattered   lesions, largest measuring 1.4 cm in segment 8 (301, 76).  BILE DUCTS: Normal caliber.  GALLBLADDER: Cholecystectomy.  SPLEEN: Within normal limits.  PANCREAS: Within normal limits.  ADRENALS: Within normal limits.  KIDNEYS/URETERS: Bilateral cortical scarring.    BLADDER: Decompressed around an indwelling catheter.  REPRODUCTIVE ORGANS: Atrophic uterus with likely distended endometrial   cavity measuring up to 12 mm.    BOWEL: No bowel obstruction. Enteric catheter with tip in mid gastric   lumen. Rectal temperature probe in place. Appendix is not visualized. No   evidence of inflammation in the pericecal region.  PERITONEUM/RETROPERITONEUM: A right surgical drainage catheter with tip   located in the right mid abdomen. Minimal standing and fluid in the right   upper quadrant is likely postsurgical. No fluid collection.  VESSELS: Atherosclerotic changes.  LYMPH NODES: No lymphadenopathy.  ABDOMINAL WALL: Within normal limits.  BONES: Degenerative changes. Multiple right rib deformities from prior   surgical resection. Kyphoplasty changes at L4 vertebral body.    IMPRESSION:  Complete opacification of the distal right mainstem bronchus with   resultant near complete collapse of right middle and right lower lobes   with trace aeration remaining. Findings discussed with Dr. Gauthier at 7:15   PM on 2025 with read back.    Moderate right and small left pleural effusions.  Minimal tree-in-bud and groundglass opacities in the left lower lobe are   likely infectious.  Liver is overall heterogeneous in appearance with suggestion of multiple   hypodense lesions. Further evaluation with contrast-enhanced MRI is   advised.  Distended endometrial cavity in this postmenopausal patient for which   further evaluation can be considered with a pelvic ultrasound on   outpatient basis, when patient's current clinical issues resolve.    --- End of Report ---    ELAINE CASTRO MD; Attending Radiologist  This document has been electronically signed. 2025  7:18PM    < end of copied text >

## 2025-01-17 NOTE — PROGRESS NOTE ADULT - CRITICAL CARE SERVICES
40 fingers/toes warm to touch/no swelling/capillary refill time < 2 seconds/no paresthesia/no cyanosis of extremity

## 2025-01-17 NOTE — PROGRESS NOTE ADULT - ASSESSMENT
79F with HTN, HLD, CKD, PUD, Lung Cancer s/p RUL lobectomy who presents with a perforated gastric ulcer POD 2 from laparoscopic repair with Srinath Patch and Onur Drain placement. Pt transferred from  to [facility] ICU for severe hypercapnic respiratory failure 2ry aspiration requiring intubation, now extubated with hospital course further complicated by ESPERANZA d/t ATN. Admitted to the ICU with:     Perforated gastric ulcer s/p lap repair with Srinath patch and onur drain  Acute hypoxic/hypercapnic respiratory failure  Aspiration PNA  Pneumoperitoneum  ESPERANZA    NEURO:   -on ketamine and precedex for sedation  - fentanyl prn for pain/vent synch    CV:   - Maintain MAP>65, patient slightly hypertensive, taking patient off midodrine   - off Lasix  - TTE technically difficult study, LVEF 60-65%  - off levophed   - off vasopressin      PULM:   - Pt had severe hypercapnic resp failure, aspiration requiring intubation, was previously extubated and on HFNC  - Pt w/ acute worsening hypoxia and required emergent intubation, developed respiratory acidosis, suspect aspiration PNA  - continue ventilation  - Continue inhaled steroids and duonebs for secretions.   - Aspiration precautions   CT chest; complete occlusion of the Right mainstem bronchus   - bronchoscopy 1/17; thick mucus suctioned out fo bronchus   - off Lasix   - robitussin q6hrs for cough  - ABG pH 7.39 pCO2 47 HCO3 28, repeat ABG tmrw     GI:    - s/p lap repair with srinath patch with onur drain placement.  - tube feeds via OG   - protonix bid for PUD  - CT abdomen revealed multiple hypodense hepatic nodules, in setting of elevated Alk phos (800s), f/u GI consult  - nutrition consult    RENAL:   - ESPERANZA likely in the setting of ATN/dehydration  - Strict I/Os.  - monitor and replete lytes prn  - hypokalemia, replete with 60Meq, repeat BMP at 2 PM    ENDO:   - no active issues  - Maintain euglycemia 120-180    ID:   - febrile to 101.1F this AM, ordered CT chest/Abd/pel w/ PO and IV contrast  - s/p zosyn, escalated to meropenem on 1/15  - escalated to capsofungin for gastric perforation/peritonitis  - sputum culture commensal merline  - BC x 2 NG at 24hrs  - lactate downtrended  - U/A neg  - monitor WBC    HEME:   - Hgb 6.7, 1uPRBC ordered STAT, hgb increased appropriately   - monitor H/H  - maintain active type+screen  - on dvt ppx with SC Heparin

## 2025-01-17 NOTE — PROGRESS NOTE ADULT - SUBJECTIVE AND OBJECTIVE BOX
NEPHROLOGY PROGRESS NOTE    CHIEF COMPLAINT:  ESPERANZA    HPI:  Had CT scan with dye last night.  Making urine.  Renal function stable.    EXAM:  Vital Signs Last 24 Hrs  T(C): 37.7 (2025 12:00), Max: 38.5 (2025 22:30)  T(F): 99.9 (2025 12:00), Max: 101.3 (2025 22:30)  HR: 85 (2025 12:00) (76 - 92)  BP: --  BP(mean): --  RR: 30 (2025 12:00) (22 - 44)  SpO2: 95% (2025 12:00) (93% - 100%)    Parameters below as of 2025 08:58  Patient On (Oxygen Delivery Method): ventilator      I&O's Summary    2025 07:01  -  2025 07:00  --------------------------------------------------------  IN: 3067 mL / OUT: 2365 mL / NET: 702 mL    2025 07:01  -  2025 12:31  --------------------------------------------------------  IN: 130 mL / OUT: 300 mL / NET: -170 mL      Daily     Daily Weight in k (2025 04:00)      Normal respiratory effort, lungs clear bilaterally  Heart RRR with no murmur, no peripheral edema    LABS                        8.2    13.94 )-----------( 101      ( 2025 06:18 )             24.4     -17    140  |  104  |  45[H]  ----------------------------<  141[H]  3.9   |  27  |  2.00[H]    Ca    8.7      2025 06:18  Phos  4.8       Mg     2.8         TPro  5.7[L]  /  Alb  2.2[L]  /  TBili  0.6  /  DBili  x   /  AST  101[H]  /  ALT  43  /  AlkPhos  876[H]        Impression:  ESPERANZA - septic ATN;  risk for contrast nephropathy next 24-48 hrs  Perforated gastric ulcer s/p lap repair  Post op acute respiratory failure and shock  CKD 3 presumably due to RVD, hypertensive nephrosclerosis. Cr ranging widely 1.1-1.5 per historical data    Recommendations:   Monitor daily BMP and I/O

## 2025-01-17 NOTE — PROCEDURE NOTE - NSBRONCHHISTORY_GEN_A_CORE_FT
Hx RUL lobectomy 20 years ago for lung cancer, unclear cause, now with aspiration pneumonia and acute respiratory failure, unable to wean from vent, mucus plugging of R on CT

## 2025-01-17 NOTE — PROGRESS NOTE ADULT - SUBJECTIVE AND OBJECTIVE BOX
Interval Events:   - Patient receieved versed last night   spiked fever Tmax 101.3 10:30 PM  - antifungal escalated to capsofungin    Review of Systems: Unable to obtain 2/2 patient intubated and sedated     ICU Vital Signs Last 24 Hrs  T(C): 36.2 (17 Jan 2025 15:47), Max: 38.5 (16 Jan 2025 22:30)  T(F): 97.2 (17 Jan 2025 15:47), Max: 101.3 (16 Jan 2025 22:30)  HR: 82 (17 Jan 2025 16:00) (78 - 92)  BP: --  BP(mean): --  ABP: 157/64 (17 Jan 2025 16:00) (132/55 - 171/70)  ABP(mean): 98 (17 Jan 2025 16:00) (81 - 107)  RR: 31 (17 Jan 2025 16:00) (22 - 44)  SpO2: 94% (17 Jan 2025 16:00) (93% - 100%)    O2 Parameters below as of 17 Jan 2025 13:39  Patient On (Oxygen Delivery Method): ventilator            Mode: AC/ CMV (Assist Control/ Continuous Mandatory Ventilation), RR (machine): 28, TV (machine): 350, FiO2: 40, PEEP: 5  01-16-25 @ 07:01  -  01-17-25 @ 07:00  --------------------------------------------------------  IN: 3067 mL / OUT: 2365 mL / NET: 702 mL    01-17-25 @ 07:01 - 01-17-25 @ 16:56  --------------------------------------------------------  IN: 325 mL / OUT: 925 mL / NET: -600 mL        CAPILLARY BLOOD GLUCOSE      POCT Blood Glucose.: 113 mg/dL (17 Jan 2025 05:25)      I&O's Summary    16 Jan 2025 07:01  -  17 Jan 2025 07:00  --------------------------------------------------------  IN: 3067 mL / OUT: 2365 mL / NET: 702 mL    17 Jan 2025 07:01  -  17 Jan 2025 16:56  --------------------------------------------------------  IN: 325 mL / OUT: 925 mL / NET: -600 mL        Physical Exam:     GENERAL: NAD, lying in bed comfortably  HEAD:  Atraumatic, Normocephalic, intubated  EYES: EOMI, PERRLA, assymetric pupils, right > left, both pupils reactive   CHEST/LUNG: mechanical breathing appreciated, no rhonchi, no wheezing, no crackles. no stridor   HEART: Regular rate and rhythm; No murmurs, rubs, or gallops  ABDOMEN:  Soft, nontender, nondistended  EXTREMITIES:  Peripheral Pulses, brisk capillary refill. No clubbing, cyanosis, or edema  NERVOUS SYSTEM:  Patient sedated    Meds:  caspofungin IVPB IV Intermittent  caspofungin IVPB   meropenem  IVPB IV Intermittent      dextrose 50% Injectable IV Push  dextrose 50% Injectable IV Push  dextrose 50% Injectable IV Push  dextrose Oral Gel Oral  glucagon  Injectable IntraMuscular    albuterol/ipratropium for Nebulization Nebulizer  buDESOnide    Inhalation Suspension Inhalation  guaifenesin/dextromethorphan Oral Liquid Oral    dexMEDEtomidine Infusion IV Continuous  fentaNYL    Injectable IV Push PRN  ketamine Infusion. IV Continuous      heparin   Injectable SubCutaneous    pantoprazole   Suspension Oral      dextrose 5%. IV Continuous  dextrose 5%. IV Continuous      chlorhexidine 0.12% Liquid Oral Mucosa  chlorhexidine 2% Cloths Topical                              8.2    13.94 )-----------( 101      ( 17 Jan 2025 06:18 )             24.4       01-17    140  |  104  |  45[H]  ----------------------------<  141[H]  3.9   |  27  |  2.00[H]    Ca    8.7      17 Jan 2025 06:18  Phos  4.8     01-17  Mg     2.8     01-17    TPro  5.7[L]  /  Alb  2.2[L]  /  TBili  0.6  /  DBili  x   /  AST  101[H]  /  ALT  43  /  AlkPhos  876[H]  01-17    Lactate 1.6           01-17 @ 00:25            Urinalysis Basic - ( 17 Jan 2025 06:18 )    Color: x / Appearance: x / SG: x / pH: x  Gluc: 141 mg/dL / Ketone: x  / Bili: x / Urobili: x   Blood: x / Protein: x / Nitrite: x   Leuk Esterase: x / RBC: x / WBC x   Sq Epi: x / Non Sq Epi: x / Bacteria: x      .Blood BLOOD   No growth at 72 Hours -- 01-14 @ 10:10  .Blood BLOOD   No growth at 72 Hours -- 01-14 @ 09:40  ET Tube ET Tube   Commensal merline consistent with body site   Moderate polymorphonuclear leukocytes per low power field  No Squamous epithelial cells per low power field  No organisms seen per oil power field 01-13 @ 19:00              Radiology:  < from: CT Abdomen and Pelvis w/ Oral Cont and w/ IV Cont (01.16.25 @ 18:35) >    CONTRAST/COMPLICATIONS:  IV Contrast: Omnipaque 350 90 cc administered   10 cc discarded  Oral Contrast: Other  .    PROCEDURE:  CT of the Chest, Abdomen and Pelvis was performed.  Sagittal and coronal reformats were performed.    FINDINGS:  CHEST:  LUNGS AND LARGE AIRWAYS: ETT in place. Right upper lobectomy with   complete opacification of right mainstem bronchus, bronchus intermedius   with near complete collapse of right middle and lower lobes with trace   amount of aeration remaining. New tree-in-bud and minimal groundglass   opacities in the left lower lobe. Mild emphysema.  PLEURA: Small left pleural effusion. Moderate right pleural effusion.  VESSELS: Aortic calcifications. Coronary artery calcifications.  HEART: Heart size is normal. No pericardial effusion.  MEDIASTINUM AND DARION: No lymphadenopathy. Left jugular vein catheter with   tip in distal SVC.  CHEST WALL AND LOWER NECK: A catheter coursing through the left axillary   artery with its tip not well visualized due to calcifications in the wall   of the artery.    ABDOMEN AND PELVIS:  LIVER: Overall heterogeneous in appearance with question of few scattered   lesions, largest measuring 1.4 cm in segment 8 (301, 76).  BILE DUCTS: Normal caliber.  GALLBLADDER: Cholecystectomy.  SPLEEN: Within normal limits.  PANCREAS: Within normal limits.  ADRENALS: Within normal limits.  KIDNEYS/URETERS: Bilateral cortical scarring.    BLADDER: Decompressed around an indwelling catheter.  REPRODUCTIVE ORGANS: Atrophic uterus with likely distended endometrial   cavity measuring up to 12 mm.    BOWEL: No bowel obstruction. Enteric catheter with tip in mid gastric   lumen. Rectal temperature probe in place. Appendix is not visualized. No   evidence of inflammation in the pericecal region.  PERITONEUM/RETROPERITONEUM: A right surgical drainage catheter with tip   located in the right mid abdomen. Minimal standing and fluid in the right   upper quadrant is likely postsurgical. No fluid collection.  VESSELS: Atherosclerotic changes.  LYMPH NODES: No lymphadenopathy.  ABDOMINAL WALL: Within normal limits.  BONES: Degenerative changes. Multiple right rib deformities from prior   surgical resection. Kyphoplasty changes at L4 vertebral body.    IMPRESSION:  Complete opacification of the distal right mainstem bronchus with   resultant near complete collapse of right middle and right lower lobes   with trace aeration remaining. Findings discussed with Dr. Gauthier at 7:15   PM on 1/16/2025 with read back.    Moderate right and small left pleural effusions.  Minimal tree-in-bud and groundglass opacities in the left lower lobe are   likely infectious.  Liver is overall heterogeneous in appearance with suggestion of multiple   hypodense lesions. Further evaluation with contrast-enhanced MRI is   advised.  Distended endometrial cavity in this postmenopausal patient for which   further evaluation can be considered with a pelvic ultrasound on   outpatient basis, when patient's current clinical issues resolve.    --- End of Report ---    < end of copied text >        Tubes/Lines: Intubated Left axillary A-line, arcos, OG tube, LIJ central line       GLOBAL ISSUE/BEST PRACTICE:  Analgesia: fentanyl   Sedation: precedex, ketamine   HOB elevation: Y  VTE prophylaxis: heparin   Nutrition: Tube feeds     CODE STATUS: full code        Interval Events:   - Patient receieved versed last night   spiked fever Tmax 101.3 10:30 PM  - antifungal escalated to capsofungin    Review of Systems: Unable to obtain 2/2 patient intubated and sedated     ICU Vital Signs Last 24 Hrs  T(C): 36.2 (17 Jan 2025 15:47), Max: 38.5 (16 Jan 2025 22:30)  T(F): 97.2 (17 Jan 2025 15:47), Max: 101.3 (16 Jan 2025 22:30)  HR: 82 (17 Jan 2025 16:00) (78 - 92)  BP: --  BP(mean): --  ABP: 157/64 (17 Jan 2025 16:00) (132/55 - 171/70)  ABP(mean): 98 (17 Jan 2025 16:00) (81 - 107)  RR: 31 (17 Jan 2025 16:00) (22 - 44)  SpO2: 94% (17 Jan 2025 16:00) (93% - 100%)    O2 Parameters below as of 17 Jan 2025 13:39  Patient On (Oxygen Delivery Method): ventilator            Mode: AC/ CMV (Assist Control/ Continuous Mandatory Ventilation), RR (machine): 28, TV (machine): 350, FiO2: 40, PEEP: 5  01-16-25 @ 07:01  -  01-17-25 @ 07:00  --------------------------------------------------------  IN: 3067 mL / OUT: 2365 mL / NET: 702 mL    01-17-25 @ 07:01 - 01-17-25 @ 16:56  --------------------------------------------------------  IN: 325 mL / OUT: 925 mL / NET: -600 mL        CAPILLARY BLOOD GLUCOSE      POCT Blood Glucose.: 113 mg/dL (17 Jan 2025 05:25)      I&O's Summary    16 Jan 2025 07:01  - 17 Jan 2025 07:00  --------------------------------------------------------  IN: 3067 mL / OUT: 2365 mL / NET: 702 mL    17 Jan 2025 07:01  -  17 Jan 2025 16:56  --------------------------------------------------------  IN: 325 mL / OUT: 925 mL / NET: -600 mL        Physical Exam:     GENERAL: critically ill, intubated and sedated  HEAD:  Atraumatic, Normocephalic, intubated  EYES: PERRLA, assymetric pupils, right > left, both pupils reactive   CHEST/LUNG: mechanical breathing appreciated, no rhonchi, no wheezing, no crackles. no stridor   HEART: Regular rate and rhythm; No murmurs, rubs, or gallops  ABDOMEN:  Soft, nontender, nondistended  EXTREMITIES:  Peripheral Pulses, brisk capillary refill. No clubbing, cyanosis, or edema  NERVOUS SYSTEM:  Patient sedated    Meds:  caspofungin IVPB IV Intermittent  caspofungin IVPB   meropenem  IVPB IV Intermittent      dextrose 50% Injectable IV Push  dextrose 50% Injectable IV Push  dextrose 50% Injectable IV Push  dextrose Oral Gel Oral  glucagon  Injectable IntraMuscular    albuterol/ipratropium for Nebulization Nebulizer  buDESOnide    Inhalation Suspension Inhalation  guaifenesin/dextromethorphan Oral Liquid Oral    dexMEDEtomidine Infusion IV Continuous  fentaNYL    Injectable IV Push PRN  ketamine Infusion. IV Continuous      heparin   Injectable SubCutaneous    pantoprazole   Suspension Oral      dextrose 5%. IV Continuous  dextrose 5%. IV Continuous      chlorhexidine 0.12% Liquid Oral Mucosa  chlorhexidine 2% Cloths Topical                              8.2    13.94 )-----------( 101      ( 17 Jan 2025 06:18 )             24.4       01-17    140  |  104  |  45[H]  ----------------------------<  141[H]  3.9   |  27  |  2.00[H]    Ca    8.7      17 Jan 2025 06:18  Phos  4.8     01-17  Mg     2.8     01-17    TPro  5.7[L]  /  Alb  2.2[L]  /  TBili  0.6  /  DBili  x   /  AST  101[H]  /  ALT  43  /  AlkPhos  876[H]  01-17    Lactate 1.6           01-17 @ 00:25            Urinalysis Basic - ( 17 Jan 2025 06:18 )    Color: x / Appearance: x / SG: x / pH: x  Gluc: 141 mg/dL / Ketone: x  / Bili: x / Urobili: x   Blood: x / Protein: x / Nitrite: x   Leuk Esterase: x / RBC: x / WBC x   Sq Epi: x / Non Sq Epi: x / Bacteria: x      .Blood BLOOD   No growth at 72 Hours -- 01-14 @ 10:10  .Blood BLOOD   No growth at 72 Hours -- 01-14 @ 09:40  ET Tube ET Tube   Commensal merline consistent with body site   Moderate polymorphonuclear leukocytes per low power field  No Squamous epithelial cells per low power field  No organisms seen per oil power field 01-13 @ 19:00              Radiology:  < from: CT Abdomen and Pelvis w/ Oral Cont and w/ IV Cont (01.16.25 @ 18:35) >    CONTRAST/COMPLICATIONS:  IV Contrast: Omnipaque 350 90 cc administered   10 cc discarded  Oral Contrast: Other  .    PROCEDURE:  CT of the Chest, Abdomen and Pelvis was performed.  Sagittal and coronal reformats were performed.    FINDINGS:  CHEST:  LUNGS AND LARGE AIRWAYS: ETT in place. Right upper lobectomy with   complete opacification of right mainstem bronchus, bronchus intermedius   with near complete collapse of right middle and lower lobes with trace   amount of aeration remaining. New tree-in-bud and minimal groundglass   opacities in the left lower lobe. Mild emphysema.  PLEURA: Small left pleural effusion. Moderate right pleural effusion.  VESSELS: Aortic calcifications. Coronary artery calcifications.  HEART: Heart size is normal. No pericardial effusion.  MEDIASTINUM AND DARION: No lymphadenopathy. Left jugular vein catheter with   tip in distal SVC.  CHEST WALL AND LOWER NECK: A catheter coursing through the left axillary   artery with its tip not well visualized due to calcifications in the wall   of the artery.    ABDOMEN AND PELVIS:  LIVER: Overall heterogeneous in appearance with question of few scattered   lesions, largest measuring 1.4 cm in segment 8 (301, 76).  BILE DUCTS: Normal caliber.  GALLBLADDER: Cholecystectomy.  SPLEEN: Within normal limits.  PANCREAS: Within normal limits.  ADRENALS: Within normal limits.  KIDNEYS/URETERS: Bilateral cortical scarring.    BLADDER: Decompressed around an indwelling catheter.  REPRODUCTIVE ORGANS: Atrophic uterus with likely distended endometrial   cavity measuring up to 12 mm.    BOWEL: No bowel obstruction. Enteric catheter with tip in mid gastric   lumen. Rectal temperature probe in place. Appendix is not visualized. No   evidence of inflammation in the pericecal region.  PERITONEUM/RETROPERITONEUM: A right surgical drainage catheter with tip   located in the right mid abdomen. Minimal standing and fluid in the right   upper quadrant is likely postsurgical. No fluid collection.  VESSELS: Atherosclerotic changes.  LYMPH NODES: No lymphadenopathy.  ABDOMINAL WALL: Within normal limits.  BONES: Degenerative changes. Multiple right rib deformities from prior   surgical resection. Kyphoplasty changes at L4 vertebral body.    IMPRESSION:  Complete opacification of the distal right mainstem bronchus with   resultant near complete collapse of right middle and right lower lobes   with trace aeration remaining. Findings discussed with Dr. Gauthier at 7:15   PM on 1/16/2025 with read back.    Moderate right and small left pleural effusions.  Minimal tree-in-bud and groundglass opacities in the left lower lobe are   likely infectious.  Liver is overall heterogeneous in appearance with suggestion of multiple   hypodense lesions. Further evaluation with contrast-enhanced MRI is   advised.  Distended endometrial cavity in this postmenopausal patient for which   further evaluation can be considered with a pelvic ultrasound on   outpatient basis, when patient's current clinical issues resolve.    --- End of Report ---    < end of copied text >        Tubes/Lines: Intubated Left axillary A-line, arcos, OG tube, LIJ central line       GLOBAL ISSUE/BEST PRACTICE:  Analgesia: fentanyl   Sedation: precedex, ketamine   HOB elevation: Y  VTE prophylaxis: heparin   Nutrition: Tube feeds     CODE STATUS: full code

## 2025-01-17 NOTE — CONSULT NOTE ADULT - REASON FOR ADMISSION
transferred from [facility]/hira gibbs patch repair
Gastric perfration
Gastric perforation

## 2025-01-17 NOTE — PROGRESS NOTE ADULT - ASSESSMENT
78 y/o WF with PMHx with HTN, HLD, CKD, PUD (dx 2011), Lung CA s/p lobectomy (1996) presents to [facility] ED from home with  via ambulance c/o worsening generalized achy abdominal pain since last night.  Patient reports baseline abdominal discomfort x several years which worsened suddenly    intestinal perf  atelectasis  pleural eff  HTN  HLD  CKD  PUD  PNA hx  Lung Ca - hx of Lobectomy     intubated - ventilated  abg noted  antimicrobial rx regimen  icu care  post op care    post op care  PUD perf - surgery and op note reviewed  lung protective vent support  fio2 titration  oral hygiene  skin care  suction PRN  HOB elev  PPI  Bronchodilators - Stress steroids - hx of COPD -   chr changes - deformity - lung parenchyma and chest - see CT chest -   ICU care  I and O  serial labs  replete lytes  dvt p  pain relief

## 2025-01-17 NOTE — PROGRESS NOTE ADULT - SUBJECTIVE AND OBJECTIVE BOX
[facility]  INFECTIOUS DISEASES - [address], [place] [address]  Tel: [phone]     Fax: [phone]  =======================================================    GINNY VASQUEZ [id number]    Follow up: Tmax of 101.3 overnight.    Allergies:  penicillin (Other)  minocycline (Other)  Enbrel (Unknown)  IV Contrast (Hives; Rash)  Zofran (Other)  Levaquin (Other)      Antibiotics:  albuterol/ipratropium for Nebulization 3 milliLiter(s) Nebulizer every 6 hours  buDESOnide    Inhalation Suspension 0.5 milliGRAM(s) Inhalation two times a day  caspofungin IVPB      caspofungin IVPB 50 milliGRAM(s) IV Intermittent every 24 hours  chlorhexidine 0.12% Liquid 15 milliLiter(s) Oral Mucosa every 12 hours  chlorhexidine 2% Cloths 1 Application(s) Topical <User Schedule>  dexMEDEtomidine Infusion 1.2 MICROgram(s)/kG/Hr IV Continuous <Continuous>  dextrose 5%. 1000 milliLiter(s) IV Continuous <Continuous>  dextrose 5%. 1000 milliLiter(s) IV Continuous <Continuous>  dextrose 50% Injectable 25 Gram(s) IV Push once  dextrose 50% Injectable 12.5 Gram(s) IV Push once  dextrose 50% Injectable 25 Gram(s) IV Push once  dextrose Oral Gel 15 Gram(s) Oral once  fentaNYL    Injectable 50 MICROGram(s) IV Push every 2 hours PRN  glucagon  Injectable 1 milliGRAM(s) IntraMuscular once  guaifenesin/dextromethorphan Oral Liquid 10 milliLiter(s) Oral every 6 hours  heparin   Injectable 5000 Unit(s) SubCutaneous every 8 hours  ketamine Infusion. 0.25 mG/kG/Hr IV Continuous <Continuous>  meropenem  IVPB 500 milliGRAM(s) IV Intermittent every 12 hours  midodrine 10 milliGRAM(s) Oral every 8 hours  norepinephrine Infusion 0.05 MICROgram(s)/kG/Min IV Continuous <Continuous>  pantoprazole   Suspension 40 milliGRAM(s) Oral two times a day       REVIEW OF SYSTEMS:  unable to obtain, intubated     Physical Exam:  ICU Vital Signs Last 24 Hrs  T(C): 37.7 (17 Jan 2025 12:00), Max: 38.5 (16 Jan 2025 22:30)  T(F): 99.9 (17 Jan 2025 12:00), Max: 101.3 (16 Jan 2025 22:30)  HR: 85 (17 Jan 2025 12:00) (76 - 92)  BP: --  BP(mean): --  ABP: 141/57 (17 Jan 2025 12:00) (97/47 - 171/70)  ABP(mean): 88 (17 Jan 2025 12:00) (66 - 107)  RR: 30 (17 Jan 2025 12:00) (22 - 44)  SpO2: 95% (17 Jan 2025 12:00) (93% - 100%)    O2 Parameters below as of 17 Jan 2025 08:58  Patient On (Oxygen Delivery Method): ventilator      GEN: intubated  HEENT: normocephalic and atraumatic.   LUNGS: intubated  HEART: Regular rate and rhythm   ABDOMEN: Soft, nondistended.    EXTREMITIES: No leg edema. + L forearm swelling appears improved  NEUROLOGIC: intubated, sedated    Labs:  01-17    140  |  104  |  45[H]  ----------------------------<  141[H]  3.9   |  27  |  2.00[H]    Ca    8.7      17 Jan 2025 06:18  Phos  4.8     01-17  Mg     2.8     01-17    TPro  5.7[L]  /  Alb  2.2[L]  /  TBili  0.6  /  DBili  x   /  AST  101[H]  /  ALT  43  /  AlkPhos  876[H]  01-17                          8.2    13.94 )-----------( 101      ( 17 Jan 2025 06:18 )             24.4       Urinalysis Basic - ( 17 Jan 2025 06:18 )    Color: x / Appearance: x / SG: x / pH: x  Gluc: 141 mg/dL / Ketone: x  / Bili: x / Urobili: x   Blood: x / Protein: x / Nitrite: x   Leuk Esterase: x / RBC: x / WBC x   Sq Epi: x / Non Sq Epi: x / Bacteria: x      LIVER FUNCTIONS - ( 17 Jan 2025 06:18 )  Alb: 2.2 g/dL / Pro: 5.7 g/dL / ALK PHOS: 876 U/L / ALT: 43 U/L / AST: 101 U/L / GGT: x             RECENT CULTURES:  01-14 @ 10:10 .Blood BLOOD     No growth at 48 Hours        01-14 @ 09:40 .Blood BLOOD     No growth at 48 Hours        01-13 @ 19:00 ET Tube ET Tube     Commensal merline consistent with body site    Moderate polymorphonuclear leukocytes per low power field  No Squamous epithelial cells per low power field  No organisms seen per oil power field            All imaging and data are reviewed.       < from: CT Chest w/ Oral Cont and w/ IV Cont (01.16.25 @ 18:35) >  FINDINGS:  CHEST:  LUNGS AND LARGE AIRWAYS: ETT in place. Right upper lobectomy with   complete opacification of right mainstem bronchus, bronchus intermedius   with near complete collapse of right middle and lower lobes with trace   amount of aeration remaining. New tree-in-bud and minimal groundglass   opacities in the left lower lobe. Mild emphysema.  PLEURA: Small left pleural effusion. Moderate right pleural effusion.  VESSELS: Aortic calcifications. Coronary artery calcifications.  HEART: Heart size is normal. No pericardial effusion.  MEDIASTINUM AND DARION: No lymphadenopathy. Left jugular vein catheter with   tip in distal SVC.  CHEST WALL AND LOWER NECK: A catheter coursing through the left axillary   artery with its tip not well visualized due to calcifications in the wall   of the artery.    ABDOMEN AND PELVIS:  LIVER: Overall heterogeneous in appearance with question of few scattered   lesions, largest measuring 1.4 cm in segment 8 (301, 76).  BILE DUCTS: Normal caliber.  GALLBLADDER: Cholecystectomy.  SPLEEN: Within normal limits.  PANCREAS: Within normal limits.  ADRENALS: Within normal limits.  KIDNEYS/URETERS: Bilateral cortical scarring.    BLADDER: Decompressed around an indwelling catheter.  REPRODUCTIVE ORGANS: Atrophic uterus with likely distended endometrial   cavity measuring up to 12 mm.    BOWEL: No bowel obstruction. Enteric catheter with tip in mid gastric   lumen. Rectal temperature probe in place. Appendix is not visualized. No   evidence of inflammation in the pericecal region.  PERITONEUM/RETROPERITONEUM: A right surgical drainage catheter with tip   located in the right mid abdomen. Minimal standing and fluid in the right   upper quadrant is likely postsurgical. No fluid collection.  VESSELS: Atherosclerotic changes.  LYMPH NODES: No lymphadenopathy.  ABDOMINAL WALL: Within normal limits.  BONES: Degenerative changes. Multiple right rib deformities from prior   surgical resection. Kyphoplasty changes at L4 vertebral body.    IMPRESSION:  Complete opacification of the distal right mainstem bronchus with   resultant near complete collapse of right middle and right lower lobes   with trace aeration remaining. Findings discussed with Dr. Gauthier at 7:15   PM on 1/16/2025 with read back.    Moderate right and small left pleural effusions.  Minimal tree-in-bud and groundglass opacities in the left lower lobe are   likely infectious.  Liver is overall heterogeneous in appearance with suggestion of multiple   hypodense lesions. Further evaluation with contrast-enhanced MRI is   advised.  Distended endometrial cavity in this postmenopausal patient for which   further evaluation can be considered with a pelvic ultrasound on   outpatient basis, when patient's current clinical issues resolve.    --- End of Report ---          < end of copied text >

## 2025-01-17 NOTE — CONSULT NOTE ADULT - ASSESSMENT
Perforated gastric ulcer s/p natan patch 1/9/2025  Hx lung CA s/p lobectomy  Acute respiratory failure  Abnormal imaging  Elevated alkaline phosphatase    CT a/p 1/16 noted, heterogenous liver with scattered lesions largest 1.4cm    LFTs and alk phos noted, continue to trend  Check isoenzyme to differentiate cause of elevated alk phos  PPI  OGT feeds  Continue ICU care for ventilation/bronchoscopy  Can consider MR abdomen w/ IV contrast if pt medically able  D/w  at bedside  D/w ICU team    I reviewed the overnight course of events on the unit, re-confirming the patient history. I discussed the care with the patient  Differential diagnosis and plan of care discussed with patient after the evaluation  105 minutes spent on total encounter of which more than fifty percent of the encounter was spent counseling and/or coordinating care by the attending physician.

## 2025-01-17 NOTE — PROGRESS NOTE ADULT - SUBJECTIVE AND OBJECTIVE BOX
Date of Service 01-17-25 @ 13:14    Patient is a 79y old  Female who presents with a chief complaint of transferred from [place] s/p [facility] patch repair (17 Jan 2025 12:31)      INTERVAL /OVERNIGHT EVENTS: still intubated    MEDICATIONS  (STANDING):  albuterol/ipratropium for Nebulization 3 milliLiter(s) Nebulizer every 6 hours  buDESOnide    Inhalation Suspension 0.5 milliGRAM(s) Inhalation two times a day  caspofungin IVPB      caspofungin IVPB 50 milliGRAM(s) IV Intermittent every 24 hours  chlorhexidine 0.12% Liquid 15 milliLiter(s) Oral Mucosa every 12 hours  chlorhexidine 2% Cloths 1 Application(s) Topical <User Schedule>  dexMEDEtomidine Infusion 1.2 MICROgram(s)/kG/Hr (15.6 mL/Hr) IV Continuous <Continuous>  dextrose 5%. 1000 milliLiter(s) (100 mL/Hr) IV Continuous <Continuous>  dextrose 5%. 1000 milliLiter(s) (50 mL/Hr) IV Continuous <Continuous>  dextrose 50% Injectable 25 Gram(s) IV Push once  dextrose 50% Injectable 12.5 Gram(s) IV Push once  dextrose 50% Injectable 25 Gram(s) IV Push once  dextrose Oral Gel 15 Gram(s) Oral once  glucagon  Injectable 1 milliGRAM(s) IntraMuscular once  guaifenesin/dextromethorphan Oral Liquid 10 milliLiter(s) Oral every 6 hours  heparin   Injectable 5000 Unit(s) SubCutaneous every 8 hours  ketamine Infusion. 0.25 mG/kG/Hr (1.3 mL/Hr) IV Continuous <Continuous>  meropenem  IVPB 500 milliGRAM(s) IV Intermittent every 12 hours  midodrine 10 milliGRAM(s) Oral every 8 hours  norepinephrine Infusion 0.05 MICROgram(s)/kG/Min (4.87 mL/Hr) IV Continuous <Continuous>  pantoprazole   Suspension 40 milliGRAM(s) Oral two times a day    MEDICATIONS  (PRN):  fentaNYL    Injectable 50 MICROGram(s) IV Push every 2 hours PRN Mild Pain (1 - 3) or vent synchrony      Allergies    penicillin (Other)  minocycline (Other)  Enbrel (Unknown)  IV Contrast (Hives; Rash)  Zofran (Other)  Levaquin (Other)    Intolerances        REVIEW OF SYSTEMS:  unable to obtain    Vital Signs Last 24 Hrs  T(C): 37.7 (17 Jan 2025 12:00), Max: 38.5 (16 Jan 2025 22:30)  T(F): 99.9 (17 Jan 2025 12:00), Max: 101.3 (16 Jan 2025 22:30)  HR: 85 (17 Jan 2025 12:00) (77 - 92)  BP: --  BP(mean): --  RR: 30 (17 Jan 2025 12:00) (22 - 44)  SpO2: 95% (17 Jan 2025 12:00) (93% - 100%)    Parameters below as of 17 Jan 2025 08:58  Patient On (Oxygen Delivery Method): ventilator        PHYSICAL EXAM:  GENERAL: NAD, well-groomed, well-developed  HEAD:  Atraumatic, Normocephalic  EYES: EOMI, PERRLA, conjunctiva and sclera clear  ENMT: No tonsillar erythema, exudates, or enlargement; Moist mucous membranes, Good dentition, No lesions  NECK: Supple, No JVD, Normal thyroid  NERVOUS SYSTEM:  Alert & Oriented X3, Good concentration; Motor Strength 5/5 B/L upper and lower extremities; DTRs 2+ intact and symmetric  CHEST/LUNG: Clear to auscultation bilaterally; No rales, rhonchi, wheezing, or rubs  HEART: Regular rate and rhythm; No murmurs, rubs, or gallops  ABDOMEN: Soft, Nontender, Nondistended; Bowel sounds present  EXTREMITIES:  2+ Peripheral Pulses, No clubbing, cyanosis, or edema  LYMPH: No lymphadenopathy noted  SKIN: No rashes or lesions    LABS:                        8.2    13.94 )-----------( 101      ( 17 Jan 2025 06:18 )             24.4     17 Jan 2025 06:18    140    |  104    |  45     ----------------------------<  141    3.9     |  27     |  2.00     Ca    8.7        17 Jan 2025 06:18  Phos  4.8       17 Jan 2025 06:18  Mg     2.8       17 Jan 2025 06:18    TPro  5.7    /  Alb  2.2    /  TBili  0.6    /  DBili  x      /  AST  101    /  ALT  43     /  AlkPhos  876    17 Jan 2025 06:18      Urinalysis Basic - ( 17 Jan 2025 06:18 )    Color: x / Appearance: x / SG: x / pH: x  Gluc: 141 mg/dL / Ketone: x  / Bili: x / Urobili: x   Blood: x / Protein: x / Nitrite: x   Leuk Esterase: x / RBC: x / WBC x   Sq Epi: x / Non Sq Epi: x / Bacteria: x      CAPILLARY BLOOD GLUCOSE      POCT Blood Glucose.: 113 mg/dL (17 Jan 2025 05:25)  POCT Blood Glucose.: 124 mg/dL (17 Jan 2025 00:48)  POCT Blood Glucose.: 116 mg/dL (16 Jan 2025 19:23)      RADIOLOGY & ADDITIONAL TESTS:    Notes Reviewed:  [x ] YES  [ ] NO    Care Discussed with Consultants/Other Providers [x ] YES  [ ] NO

## 2025-01-17 NOTE — PROGRESS NOTE ADULT - ASSESSMENT
80 y/o WF with PMHx with HTN, HLD, CKD, PUD (dx 2011), Lung CA s/p lobectomy (1996)  who was admitted on 1/9 for worsening generalized achy abdominal pain since 1/7 night.  Patient reports baseline abdominal discomfort x several years which worsened suddenly (described as 11/10) without associated fever/chills. She was found to have pneumooperitoneum, s/p Laparoscopic repair of perforated gastric ulcer on 1/9. Patient has been on antibiotics and antifungals. On 1/13 she was intubated.     On 1/15 AM Zosyn switched to meropenem given fevers. Respiratory culture from 1/13 growing commensal merline consistent with body site and blood cultures from 1/14 remain no growth. CT chest and A/P done 1/16 showed complete opacification of the distal right mainstem bronchus with resultant near complete collapse of right middle and right lower lobes with trace aeration remaining. No evidence of intraabdominal infection.    #Pneumonia  #Perforated gastric ulcer  #Fevers  #Leukocytosis  #Hypotension    -continue meropenem (renally dosed)  -fluconazole discontinued  -plan for bronchoscopy  -follow cultures to completion  -monitor WBC  -discussed with  at bedside  -discussed with Dr. Gauthier  -I will be covered by Dr. Odell this weekend, 1/18-1/19/25    Hilda Carrillo MD  Division of Infectious Diseases   Cell [phone] between 8am and 6pm   After 6pm and weekends please call ID service at [phone].     35 minutes spent on total encounter assessing patient, examination, chart review, counseling and coordinating care by the attending physician/nurse/care manager.        80 y/o WF with PMHx with HTN, HLD, CKD, PUD (dx 2011), Lung CA s/p lobectomy (1996)  who was admitted on 1/9 for worsening generalized achy abdominal pain since 1/7 night.  Patient reports baseline abdominal discomfort x several years which worsened suddenly (described as 11/10) without associated fever/chills. She was found to have pneumooperitoneum, s/p Laparoscopic repair of perforated gastric ulcer on 1/9. Patient has been on antibiotics and antifungals. On 1/13 she was intubated.     On 1/15 AM Zosyn switched to meropenem given fevers. Respiratory culture from 1/13 growing commensal merline consistent with body site and blood cultures from 1/14 remain no growth. CT chest and A/P done 1/16 showed complete opacification of the distal right mainstem bronchus with resultant near complete collapse of right middle and right lower lobes with trace aeration remaining. No evidence of intraabdominal infection.    #Pneumonia  #Perforated gastric ulcer  #Fevers  #Leukocytosis  #Hypotension    -continue meropenem (renally dosed)  -fluconazole discontinued  -plan for bronchoscopy--send for BAL bacterial/fungal/AFB cultures  -follow cultures to completion  -monitor WBC  -discussed with  at bedside  -discussed with Dr. Gauthier  -I will be covered by Dr. Odell this weekend, 1/18-1/19/25    Hilda Carrillo MD  Division of Infectious Diseases   Cell [phone] between 8am and 6pm   After 6pm and weekends please call ID service at [phone].     35 minutes spent on total encounter assessing patient, examination, chart review, counseling and coordinating care by the attending physician/nurse/care manager.

## 2025-01-17 NOTE — SOCIAL WORK PROGRESS NOTE - NSSWPROGRESSNOTE_GEN_ALL_CORE
Pt remains acute in the ICU, orally intubated. Met with pt's spouse David at bedside in order to provide psychosocial support. Will remain available and continue to follow up.

## 2025-01-17 NOTE — PROCEDURE NOTE - NSBRONCHPROCDETAILS_GEN_A_CORE_FT
Bronchoscope inserted through preexisting ETT. ETT noted to be in good position. Copious thick yellow secretions suctioned from R tracheobronchial tree.  R sided tracheobronchial tree examined to at least the first subsegmental level.  RUL surgical stump noted.  No masses or lesions visualized.  L sided tracheobronchial tree examined to the segmental level.  Scant clear mucoid secretions were suctioned.  Bronchoscope then withdrawn from ETT.  Pt tolerated procedure well    Specimens: bacterial and fungal Cx, cytology, fungitell, galactomannin

## 2025-01-18 NOTE — PROGRESS NOTE ADULT - SUBJECTIVE AND OBJECTIVE BOX
SUBJECTIVE:  Patient seen and examined at bedside. Patient still intubated and sedated     VITALS  Vital Signs Last 24 Hrs  T(C): 38.6 (18 Jan 2025 12:00), Max: 39 (18 Jan 2025 10:30)  T(F): 101.5 (18 Jan 2025 12:00), Max: 102.2 (18 Jan 2025 10:30)  HR: 70 (18 Jan 2025 13:02) (70 - 92)  BP: 111/75 (18 Jan 2025 12:38) (92/43 - 182/65)  BP(mean): 83 (18 Jan 2025 12:38) (56 - 123)  RR: 33 (18 Jan 2025 12:38) (15 - 35)  SpO2: 92% (18 Jan 2025 13:02) (92% - 98%)    Parameters below as of 18 Jan 2025 13:02  Patient On (Oxygen Delivery Method): ventilator    PHYSICAL EXAM  GENERAL:  Intubated and sedated  ABDOMEN:  Soft, non-tender, non-distended.  EXTREMITIES: No calf tenderness bilaterally. Left arm swelling more than right    INTAKE & OUTPUT  I&O's Summary    17 Jan 2025 07:01  -  18 Jan 2025 07:00  --------------------------------------------------------  IN: 1450 mL / OUT: 2000 mL / NET: -550 mL    18 Jan 2025 07:01  -  18 Jan 2025 13:03  --------------------------------------------------------  IN: 332.2 mL / OUT: 185 mL / NET: 147.2 mL      I&O's Detail    17 Jan 2025 07:01  -  18 Jan 2025 07:00  --------------------------------------------------------  IN:    Dexmedetomidine: 468 mL    Heparin Infusion: 30 mL    Ketamine: 312 mL    Nepro with Carb Steady: 640 mL  Total IN: 1450 mL    OUT:    Indwelling Catheter - Urethral (mL): 2000 mL  Total OUT: 2000 mL    Total NET: -550 mL      18 Jan 2025 07:01  -  18 Jan 2025 13:03  --------------------------------------------------------  IN:    Dexmedetomidine: 97.2 mL    Heparin Infusion: 50 mL    Ketamine: 65 mL    Nepro with Carb Steady: 120 mL  Total IN: 332.2 mL    OUT:    Indwelling Catheter - Urethral (mL): 185 mL  Total OUT: 185 mL    Total NET: 147.2 mL          MEDICATIONS  MEDICATIONS  (STANDING):  albuterol/ipratropium for Nebulization 3 milliLiter(s) Nebulizer every 6 hours  buDESOnide    Inhalation Suspension 0.5 milliGRAM(s) Inhalation two times a day  caspofungin IVPB      caspofungin IVPB 50 milliGRAM(s) IV Intermittent every 24 hours  chlorhexidine 0.12% Liquid 15 milliLiter(s) Oral Mucosa every 12 hours  chlorhexidine 2% Cloths 1 Application(s) Topical <User Schedule>  dexMEDEtomidine Infusion 1.2 MICROgram(s)/kG/Hr (15.6 mL/Hr) IV Continuous <Continuous>  dextrose 5%. 1000 milliLiter(s) (100 mL/Hr) IV Continuous <Continuous>  dextrose 5%. 1000 milliLiter(s) (50 mL/Hr) IV Continuous <Continuous>  dextrose 50% Injectable 25 Gram(s) IV Push once  dextrose 50% Injectable 12.5 Gram(s) IV Push once  dextrose 50% Injectable 25 Gram(s) IV Push once  dextrose Oral Gel 15 Gram(s) Oral once  glucagon  Injectable 1 milliGRAM(s) IntraMuscular once  guaifenesin/dextromethorphan Oral Liquid 10 milliLiter(s) Oral every 6 hours  heparin  Infusion.  Unit(s)/Hr (10 mL/Hr) IV Continuous <Continuous>  ketamine Infusion. 0.25 mG/kG/Hr (1.3 mL/Hr) IV Continuous <Continuous>  meropenem  IVPB 500 milliGRAM(s) IV Intermittent every 12 hours  pantoprazole   Suspension 40 milliGRAM(s) Oral two times a day    MEDICATIONS  (PRN):  fentaNYL    Injectable 50 MICROGram(s) IV Push every 2 hours PRN Mild Pain (1 - 3) or vent synchrony  heparin   Injectable 4000 Unit(s) IV Push every 6 hours PRN For aPTT less than 40  heparin   Injectable 2000 Unit(s) IV Push every 6 hours PRN For aPTT between 40 - 57      LABS:                        7.0    12.33 )-----------( 83       ( 18 Jan 2025 11:10 )             21.6     01-18    143  |  109[H]  |  52[H]  ----------------------------<  112[H]  3.8   |  24  |  2.00[H]    Ca    8.6      18 Jan 2025 11:10  Phos  4.0     01-18  Mg     2.2     01-18    TPro  5.0[L]  /  Alb  1.6[L]  /  TBili  0.4  /  DBili  x   /  AST  185[H]  /  ALT  66  /  AlkPhos  1799[H]  01-18    PT/INR - ( 18 Jan 2025 03:14 )   PT: 13.2 sec;   INR: 1.13 ratio         PTT - ( 18 Jan 2025 03:14 )  PTT:40.5 sec    Culture - Bronchial (collected 17 Jan 2025 16:40)  Source: Bronchial  Gram Stain (18 Jan 2025 06:59):    Few polymorphonuclear leukocytes seen per low power field    Rare Squamous epithelial cells seen per low power field    Rare Gram positive cocci in pairs seen per oil power field    ASSESSMENT & PLAN  79F with HTN, HLD, CKD, PUD, Lung Cancer s/p RUL lobectomy who presents with a perforated gastric ulcer POD#7 from laparoscopic repair with Srinath Patch and Onur Drain placement. Pt transferred from  to [facility] ICU for severe hypercapnic respiratory failure 2ry aspiration requiring intubation, extubated with hospital course further complicated by ESPERANZA d/t ATN, respiratory failure on HFNC with worsening hypoxemia/hypercapnic ultimately reintubated 1/13/25 for concern of airway protection with worsening dual pressor shock state post intubation.     - Patient intubated and sedated  - Tube feeds  - Drain removed yesterday  - With fevers  - ID recs  - Repeat CT? per ICU  - Case discussed with Dr. Valdez

## 2025-01-18 NOTE — CHART NOTE - NSCHARTNOTEFT_GEN_A_CORE
Assessment: patient seen for malnutrition follow up. chart reviewed . hospital course noted patient intubated     79F with HTN, HLD, CKD, PUD, Lung Cancer s/p RUL lobectomy who presents with a perforated gastric ulcer POD#7 from laparoscopic repair with Srinath Patch and Onur Drain placement. Pt transferred from  to \[facility]\"" ICU for severe hypercapnic respiratory failure 2ry aspiration requiring intubation, extubated with hospital course further complicated by ESPERANZA d/t ATN, respiratory failure on HFNC with worsening hypoxemia/hypercapnic ultimately reintubated 1/13/25 for concern of airway protection with worsening dual pressor shock state post intubation.   patient tolerating feeding per RN but with some loose stools noted.          Factors impacting intake: [ ] none [ ] nausea  [ ] vomiting [ ] diarrhea [ ] constipation  [ ]chewing problems [x ] swallowing issues  [x ] other: intubation /NG feeding    Diet Prescription: Diet, NPO:   Tube Feeding Modality: Nasogastric  Nepro with Carb Steady  Total Volume for 24 Hours (mL): 960  Continuous  Starting Tube Feed Rate {mL per Hour}: 10  Increase Tube Feed Rate by (mL): 10     Every 8 hours  Until Goal Tube Feed Rate (mL per Hour): 40  Tube Feed Duration (in Hours): 24  Tube Feed Start Time: 12:00 (01-14-25 @ 14:40)    Intake: pump study completed over 24 hours 633ml 65% of feeding received  over 48 hours 1432ml , 74% of feeding received     Current Weight: weight 1/18 122.5# 1/15 wt 118.7# 1/9 114.4#  generalized + 2 edema left and right arm +3 edema      Pertinent Medications: MEDICATIONS  (STANDING):  albuterol/ipratropium for Nebulization 3 milliLiter(s) Nebulizer every 6 hours  buDESOnide    Inhalation Suspension 0.5 milliGRAM(s) Inhalation two times a day  caspofungin IVPB      caspofungin IVPB 50 milliGRAM(s) IV Intermittent every 24 hours  chlorhexidine 0.12% Liquid 15 milliLiter(s) Oral Mucosa every 12 hours  chlorhexidine 2% Cloths 1 Application(s) Topical <User Schedule>  dexMEDEtomidine Infusion 1.2 MICROgram(s)/kG/Hr (15.6 mL/Hr) IV Continuous <Continuous>  dextrose 5%. 1000 milliLiter(s) (100 mL/Hr) IV Continuous <Continuous>  dextrose 5%. 1000 milliLiter(s) (50 mL/Hr) IV Continuous <Continuous>  dextrose 50% Injectable 25 Gram(s) IV Push once  dextrose 50% Injectable 12.5 Gram(s) IV Push once  dextrose 50% Injectable 25 Gram(s) IV Push once  dextrose Oral Gel 15 Gram(s) Oral once  glucagon  Injectable 1 milliGRAM(s) IntraMuscular once  guaifenesin/dextromethorphan Oral Liquid 10 milliLiter(s) Oral every 6 hours  heparin  Infusion.  Unit(s)/Hr (10 mL/Hr) IV Continuous <Continuous>  ketamine Infusion. 0.25 mG/kG/Hr (1.3 mL/Hr) IV Continuous <Continuous>  meropenem  IVPB 500 milliGRAM(s) IV Intermittent every 12 hours  pantoprazole   Suspension 40 milliGRAM(s) Oral two times a day    MEDICATIONS  (PRN):  fentaNYL    Injectable 50 MICROGram(s) IV Push every 2 hours PRN Mild Pain (1 - 3) or vent synchrony  heparin   Injectable 4000 Unit(s) IV Push every 6 hours PRN For aPTT less than 40  heparin   Injectable 2000 Unit(s) IV Push every 6 hours PRN For aPTT between 40 - 57    Pertinent Labs: BUN 52 Creat 2.0 LFT's elevated   Skin: buttock stage 2, left and right elbow stage 1 right upper back stage 1, sacrum stage 1    Estimated Needs:   [[x] no change since previous assessment on: 1/12 based on #  kcal/day: 5682-2126 gemma  gms protein/day: 59-69 gms      Previous Nutrition Diagnosis:   [x] Increased Nutrient Needs (kcal/protein, micronutrients)  [x]  Malnutrition       Nutrition Diagnosis is [x ] ongoing  [ ] resolved [ ] not applicable     New Nutrition Diagnosis: [x ] not applicable       Interventions:   Recommend  [ ] Change Diet To:  [ ] Nutrition Supplement  [ x] Nutrition Support recommend nepro 50ml hrX 18 hr 1620kcals and 73 gms protein, fluids per MD discretion ( left message with MD to activate TF order on TEAMS)  [ x] Other: recommend renal MVI,  recommend banatrol once daily if loose BM's continue     Monitoring and Evaluation:    [ x ] Tolerance to diet prescription [ x ] weights [ x ] labs[ x ] follow up per protocol  [x ] other: follow for GI distress, follow edema

## 2025-01-18 NOTE — PROGRESS NOTE ADULT - SUBJECTIVE AND OBJECTIVE BOX
Date of Service 01-18-25 @ 17:18    Patient is a 79y old  Female who presents with a chief complaint of transferred from [place] s/p [facility] patch repair (18 Jan 2025 13:03)      INTERVAL /OVERNIGHT EVENTS: still intubated    MEDICATIONS  (STANDING):  albuterol/ipratropium for Nebulization 3 milliLiter(s) Nebulizer every 6 hours  buDESOnide    Inhalation Suspension 0.5 milliGRAM(s) Inhalation two times a day  caspofungin IVPB 50 milliGRAM(s) IV Intermittent every 24 hours  caspofungin IVPB      chlorhexidine 0.12% Liquid 15 milliLiter(s) Oral Mucosa every 12 hours  chlorhexidine 2% Cloths 1 Application(s) Topical <User Schedule>  dexMEDEtomidine Infusion 1.2 MICROgram(s)/kG/Hr (15.6 mL/Hr) IV Continuous <Continuous>  dextrose 5%. 1000 milliLiter(s) (100 mL/Hr) IV Continuous <Continuous>  dextrose 5%. 1000 milliLiter(s) (50 mL/Hr) IV Continuous <Continuous>  dextrose 50% Injectable 25 Gram(s) IV Push once  dextrose 50% Injectable 12.5 Gram(s) IV Push once  dextrose 50% Injectable 25 Gram(s) IV Push once  dextrose Oral Gel 15 Gram(s) Oral once  glucagon  Injectable 1 milliGRAM(s) IntraMuscular once  guaifenesin/dextromethorphan Oral Liquid 10 milliLiter(s) Oral every 6 hours  heparin  Infusion.  Unit(s)/Hr (10 mL/Hr) IV Continuous <Continuous>  ketamine Infusion. 0.25 mG/kG/Hr (1.3 mL/Hr) IV Continuous <Continuous>  meropenem  IVPB 500 milliGRAM(s) IV Intermittent every 12 hours  pantoprazole   Suspension 40 milliGRAM(s) Oral two times a day    MEDICATIONS  (PRN):  fentaNYL    Injectable 50 MICROGram(s) IV Push every 2 hours PRN Mild Pain (1 - 3) or vent synchrony  heparin   Injectable 4000 Unit(s) IV Push every 6 hours PRN For aPTT less than 40  heparin   Injectable 2000 Unit(s) IV Push every 6 hours PRN For aPTT between 40 - 57      Allergies    penicillin (Other)  minocycline (Other)  Enbrel (Unknown)  IV Contrast (Hives; Rash)  Zofran (Other)  Levaquin (Other)    Intolerances        REVIEW OF SYSTEMS:  unable to obtain    Vital Signs Last 24 Hrs  T(C): 37.4 (18 Jan 2025 16:58), Max: 39 (18 Jan 2025 10:30)  T(F): 99.3 (18 Jan 2025 16:58), Max: 102.2 (18 Jan 2025 10:30)  HR: 76 (18 Jan 2025 16:30) (70 - 92)  BP: 146/60 (18 Jan 2025 16:30) (92/43 - 182/65)  BP(mean): 79 (18 Jan 2025 16:30) (56 - 123)  RR: 28 (18 Jan 2025 16:30) (15 - 35)  SpO2: 94% (18 Jan 2025 16:30) (88% - 98%)    Parameters below as of 18 Jan 2025 16:30    O2 Flow (L/min): 40      PHYSICAL EXAM:  GENERAL: NAD, well-groomed, well-developed  HEAD:  Atraumatic, Normocephalic  EYES: EOMI, PERRLA, conjunctiva and sclera clear  ENMT: No tonsillar erythema, exudates, or enlargement; Moist mucous membranes, Good dentition, No lesions  NECK: Supple, No JVD, Normal thyroid  NERVOUS SYSTEM:  sedated  CHEST/LUNG: Clear to auscultation bilaterally; No rales, rhonchi, wheezing, or rubs  HEART: Regular rate and rhythm; No murmurs, rubs, or gallops  ABDOMEN: Soft, Nontender, Nondistended; Bowel sounds present  EXTREMITIES:  2+ Peripheral Pulses, No clubbing, cyanosis, or edema  LYMPH: No lymphadenopathy noted  SKIN: No rashes or lesions    LABS:                        7.0    12.33 )-----------( 83       ( 18 Jan 2025 11:10 )             21.6     18 Jan 2025 11:10    143    |  109    |  52     ----------------------------<  112    3.8     |  24     |  2.00     Ca    8.6        18 Jan 2025 11:10  Phos  4.0       18 Jan 2025 11:10  Mg     2.2       18 Jan 2025 11:10    TPro  5.0    /  Alb  1.6    /  TBili  0.4    /  DBili  x      /  AST  185    /  ALT  66     /  AlkPhos  1799   18 Jan 2025 11:10    PT/INR - ( 18 Jan 2025 03:14 )   PT: 13.2 sec;   INR: 1.13 ratio         PTT - ( 18 Jan 2025 14:13 )  PTT:162.0 sec  Urinalysis Basic - ( 18 Jan 2025 11:10 )    Color: x / Appearance: x / SG: x / pH: x  Gluc: 112 mg/dL / Ketone: x  / Bili: x / Urobili: x   Blood: x / Protein: x / Nitrite: x   Leuk Esterase: x / RBC: x / WBC x   Sq Epi: x / Non Sq Epi: x / Bacteria: x      CAPILLARY BLOOD GLUCOSE      POCT Blood Glucose.: 106 mg/dL (18 Jan 2025 06:06)  POCT Blood Glucose.: 76 mg/dL (17 Jan 2025 23:29)      RADIOLOGY & ADDITIONAL TESTS:    Notes Reviewed:  [x ] YES  [ ] NO    Care Discussed with Consultants/Other Providers [x ] YES  [ ] NO

## 2025-01-18 NOTE — PROGRESS NOTE ADULT - ASSESSMENT
79F with HTN, HLD, CKD, PUD, Lung Cancer s/p RUL lobectomy who presents with a perforated gastric ulcer POD#7 from laparoscopic repair with Srinath Patch and Onur Drain placement. Pt transferred from  to hospitals ICU for severe hypercapnic respiratory failure 2ry aspiration requiring intubation, extubated with hospital course further complicated by ESPERANZA d/t ATN, respiratory failure on HFNC with worsening hypoxemia/hypercapnic ultimately reintubated 1/13/25 for concern of airway protection with worsening dual pressor shock state post intubation.     Problem List:  Perforated gastric ulcer s/p laparoscopic repair with srinath patch POD#8  Acute Hypoxic/Hypercapnic respiratory failure  Aspiration pneumonia  Distributive shock  ESPERANZA      NEURO: remains sedated on ketamine/precedex infusion with prn fentanyl for vent synchrony. SBT/SAT as able  CV: shock improving remains off vasopressors, MAP >65  PULM: Full vent support, prolonged intubation with Recurrent aspiration/mucus plugging with bronch today for secretion clearance from R lung, Acute on chronic hypercapnic respiratory failure improving with repeat abg overnight lateral, weaning settings as tolerated. Cx sent and pending from bronch, c/w to have thick secretions.   GI: POD8 s/p perf gastric ulcer s/p srinath patch, on PPI BID for peptic ulcer disease, TF started tolerated thus far  RENAL: ESPERANZA likely residual from ATN d/t severe shock state, renal indices remain lateral, electrolyte repleted, monitor strict I/Os  ENDO: maintain -180 while critically ill  ID: Recurrent aspiration on Meropenem and Caspofungin, no fevers tonight, Cx sent from bronch today.   HEME: Will get Duplex STAT, plan to remove A line, Concern for dvt will start full AC with Heparin infusion    Dispo: Critically ill requiring ICU level of care, at high risk for decompensation.  Case discussed and plan formulated with eICU attending.    CRITICAL CARE TIME SPENT: 40 minutes of critical care time spent providing medical care for patient's acute illness/conditions that impairs at least one vital organ system and/or poses a high risk of imminent or life threatening deterioration in the patient's condition. It includes time spent evaluating and treating the patient's acute illness as well as time spent reviewing labs, radiology, discussing goals of care with patient and/or patient's family, and discussing the case with a multidisciplinary team, in an effort to prevent further life threatening deterioration or end organ damage. This time is independent of any procedures performed.  Date of entry of this note is equal to the date of services rendered.

## 2025-01-18 NOTE — PROGRESS NOTE ADULT - ASSESSMENT
Surg. Att.  Pt. seen and examined.  Abd remains soft, wounds are clean. She is on tube feeds.  Still intubated. Had bronchoscopy which yielded large amt of secretions.   was updated by myself and ICU staff on current condition.  He blames hospital care for wife's  pneumonia. I belief that Patient Representative get involved.

## 2025-01-18 NOTE — PROGRESS NOTE ADULT - SUBJECTIVE AND OBJECTIVE BOX
INTERVAL HPI/OVERNIGHT EVENTS:  No new overnight event.  No N/V/D.  Tolerating diet.    Allergies    penicillin (Other)  minocycline (Other)  Enbrel (Unknown)  IV Contrast (Hives; Rash)  Zofran (Other)  Levaquin (Other)    Intolerances    General:  No wt loss, fevers, chills, night sweats, fatigue,   Eyes:  Good vision, no reported pain  ENT:  No sore throat, pain, runny nose, dysphagia  CV:  No pain, palpitations, hypo/hypertension  Resp:  No dyspnea, cough, tachypnea, wheezing  GI:  No pain, No nausea, No vomiting, No diarrhea, No constipation, No weight loss, No fever, No pruritis, No rectal bleeding, No tarry stools, No dysphagia,  :  No pain, bleeding, incontinence, nocturia  Muscle:  No pain, weakness  Neuro:  No weakness, tingling, memory problems  Psych:  No fatigue, insomnia, mood problems, depression  Endocrine:  No polyuria, polydipsia, cold/heat intolerance  Heme:  No petechiae, ecchymosis, easy bruisability  Skin:  No rash, tattoos, scars, edema      PHYSICAL EXAM:   Vital Signs:  Vital Signs Last 24 Hrs  T(C): 35.3 (2025 19:44), Max: 39 (2025 10:30)  T(F): 95.5 (2025 19:44), Max: 102.2 (2025 10:30)  HR: 64 (2025 20:30) (64 - 92)  BP: 145/56 (2025 20:30) (92/43 - 182/65)  BP(mean): 79 (2025 20:30) (56 - 123)  RR: 29 (2025 20:30) (15 - 35)  SpO2: 99% (2025 20:30) (88% - 99%)    Parameters below as of 2025 20:00  Patient On (Oxygen Delivery Method): ventilator      Daily     Daily Weight in k.6 (2025 05:38)I&O's Summary    2025 07:01  -  2025 07:00  --------------------------------------------------------  IN: 1450 mL / OUT: 2000 mL / NET: -550 mL    2025 07:01  -  2025 20:42  --------------------------------------------------------  IN: 1567.9 mL / OUT: 555 mL / NET: 1012.9 mL        GENERAL:  Appears stated age, well-groomed, well-nourished, no distress  HEENT:  NC/AT,  conjunctivae clear and pink, no thyromegaly, nodules, adenopathy, no JVD, sclera -anicteric  CHEST:  Full & symmetric excursion, no increased effort, breath sounds clear  HEART:  Regular rhythm, S1, S2, no murmur/rub/S3/S4, no abdominal bruit, no edema  ABDOMEN:  Soft, non-tender, non-distended, normoactive bowel sounds,  no masses ,no hepato-splenomegaly, no signs of chronic liver disease  EXTEREMITIES:  no cyanosis,clubbing or edema  SKIN:  No rash/erythema/ecchymoses/petechiae/wounds/abscess/warm/dry  NEURO:  Alert, oriented, no asterixis, no tremor, no encephalopathy      LABS:                        7.0    12.33 )-----------( 83       ( 2025 11:10 )             21.6         143  |  109[H]  |  52[H]  ----------------------------<  112[H]  3.8   |  24  |  2.00[H]    Ca    8.6      2025 11:10  Phos  4.0       Mg     2.2         TPro  5.0[L]  /  Alb  1.6[L]  /  TBili  0.4  /  DBili  x   /  AST  185[H]  /  ALT  66  /  AlkPhos  1799[H]      PT/INR - ( 2025 03:14 )   PT: 13.2 sec;   INR: 1.13 ratio         PTT - ( 2025 14:13 )  PTT:162.0 sec  Urinalysis Basic - ( 2025 11:10 )    Color: x / Appearance: x / SG: x / pH: x  Gluc: 112 mg/dL / Ketone: x  / Bili: x / Urobili: x   Blood: x / Protein: x / Nitrite: x   Leuk Esterase: x / RBC: x / WBC x   Sq Epi: x / Non Sq Epi: x / Bacteria: x      amylase   lipase  RADIOLOGY & ADDITIONAL TESTS:

## 2025-01-18 NOTE — PROGRESS NOTE ADULT - ASSESSMENT
79F with HTN, HLD, CKD, PUD, Lung Cancer s/p RUL lobectomy who presents with a perforated gastric ulcer POD 2 from laparoscopic repair with Srinath Patch and Onur Drain placement. Pt transferred from  to [facility] ICU for severe hypercapnic respiratory failure 2ry aspiration requiring intubation, now extubated with hospital course further complicated by ESPERANZA d/t ATN. Admitted to the ICU with:     Perforated gastric ulcer s/p lap repair with Srinath patch and onur drain  Acute hypoxic/hypercapnic respiratory failure  Aspiration PNA  Pneumoperitoneum  ESPERANZA    NEURO:   -on ketamine and precedex for sedation  - fentanyl prn for pain/vent synch    CV:   - Maintain MAP>65, patient slightly hypertensive, taking patient off midodrine   - off Lasix  - TTE technically difficult study, LVEF 60-65%  - off levophed   - off vasopressin      PULM:   - Pt had severe hypercapnic resp failure, aspiration requiring intubation, was previously extubated and on HFNC  - Pt w/ acute worsening hypoxia and required emergent intubation, developed respiratory acidosis, suspect aspiration PNA  - continue ventilation  - Continue inhaled steroids and duonebs for secretions.   - Aspiration precautions   CT chest; complete occlusion of the Right mainstem bronchus   - bronchoscopy 1/17; thick mucus suctioned out fo bronchus   - off Lasix   - robitussin q6hrs for cough  - ABG pH 7.39 pCO2 47 HCO3 28, repeat ABG tmrw     GI:    - s/p lap repair with srinath patch with onur drain placement.  - tube feeds via OG   - protonix bid for PUD  - CT abdomen revealed multiple hypodense hepatic nodules, in setting of elevated Alk phos (800s), f/u GI consult  - nutrition consult    RENAL:   - ESPERANZA likely in the setting of ATN/dehydration  - Strict I/Os.  - monitor and replete lytes prn  - hypokalemia, replete with 60Meq, repeat BMP at 2 PM    ENDO:   - no active issues  - Maintain euglycemia 120-180    ID:   - febrile to 101.1F this AM, ordered CT chest/Abd/pel w/ PO and IV contrast  - s/p zosyn, escalated to meropenem on 1/15  - escalated to capsofungin for gastric perforation/peritonitis  - sputum culture commensal merline  - BC x 2 NG at 24hrs  - lactate downtrended  - U/A neg  - monitor WBC    HEME:   - Hgb 6.7, 1uPRBC ordered STAT, hgb increased appropriately   - monitor H/H  - maintain active type+screen  - on dvt ppx with SC Heparin   79F with HTN, HLD, CKD, PUD, Lung Cancer s/p RUL lobectomy who presents with a perforated gastric ulcer POD 2 from laparoscopic repair with Srinath Patch and Onur Drain placement. Pt transferred from  to [facility] ICU for severe hypercapnic respiratory failure 2ry aspiration requiring intubation, now extubated with hospital course further complicated by ESPERANZA d/t ATN. Admitted to the ICU with:     Perforated gastric ulcer s/p lap repair with Srinath patch and onur drain  Acute hypoxic/hypercapnic respiratory failure  Aspiration PNA  Pneumoperitoneum  ESPERANZA    NEURO:   -on ketamine and precedex for sedation, wean trial today  - fentanyl prn for pain/vent synch    CV:   - Maintain MAP>65, patient slightly hypertensive, taking patient off midodrine   - off Lasix  - TTE technically difficult study, LVEF 60-65%  - off levophed   - off vasopressin      PULM:   - Pt had severe hypercapnic resp failure, aspiration requiring intubation, was previously extubated and on HFNC  - Pt w/ acute worsening hypoxia and required emergent intubation, developed respiratory acidosis, suspect aspiration PNA  - continue ventilation  - Continue inhaled steroids and duonebs for secretions.   - Aspiration precautions   CT chest; complete occlusion of the Right mainstem bronchus   - bronchoscopy 1/17; thick mucus suctioned out fo bronchus   - off Lasix   - robitussin q6hrs for cough  - ABG pH 7.32 pCO2 51 HCO3 26, repeat ABG tmrw     GI:    - s/p lap repair with srinath patch with onur drain placement.  - tube feeds via OG   - protonix bid for PUD  - CT abdomen revealed multiple hypodense hepatic nodules, in setting of elevated Alk phos (possibly related to chronic DMARD use and meropenem)  - GI consulted, rec MRI abd w/ IV contrast when pt stable   - GGT and ALP isoenzyme pending   - nutrition consult    RENAL:   - ESPERANZA likely in the setting of ATN/dehydration  - Strict I/Os.  - monitor and replete lytes prn  - hypokalemia, replete with 60Meq, repeat BMP at 2 PM    ENDO:   - no active issues  - Maintain euglycemia 120-180    ID:   - febrile to 102.2F this AM; ordered CT chest/Abd/pel w/ PO and IV contrast  - s/p zosyn, escalated to meropenem on 1/15  - escalated to capsofungin for gastric perforation/peritonitis  - sputum culture commensal merline  - BC x 2 NG at 24hrs  - lactate downtrended  - U/A neg  - bronch culture pending  - monitor WBC    HEME:   - s/p 1uPRBC   - monitor H/H  - maintain active type+screen  - Doppler u/s LUE: DVT LUE  - c/w full dose heparin gtt

## 2025-01-18 NOTE — PROGRESS NOTE ADULT - ASSESSMENT
78 y/o WF with PMHx with HTN, HLD, CKD, PUD (dx 2011), Lung CA s/p lobectomy (1996) presents to [facility] ED from home with  via ambulance c/o worsening generalized achy abdominal pain since last night.  Patient reports baseline abdominal discomfort x several years which worsened suddenly    intestinal perf  atelectasis  pleural eff  HTN  HLD  CKD  PUD  PNA hx  Lung Ca - hx of Lobectomy     intubated - ventilated  abg noted  antimicrobial rx regimen  icu care  post op care  s/p Bronch on 1 17 25 -   VS noted - fever noted -     post op care  PUD perf - surgery and op note reviewed  lung protective vent support  fio2 titration  oral hygiene  skin care  suction PRN  HOB elev  PPI  Bronchodilators - Stress steroids - hx of COPD -   chr changes - deformity - lung parenchyma and chest - see CT chest -   ICU care  I and O  serial labs  replete lytes  dvt p  pain relief

## 2025-01-18 NOTE — PROVIDER CONTACT NOTE (EICU) - SITUATION
Elerted by bedside team to address hypertension, requesting one time order for hydralazine 10mg IVP.

## 2025-01-18 NOTE — PROGRESS NOTE ADULT - SUBJECTIVE AND OBJECTIVE BOX
Patient is a 79y old  Female who presents with a chief complaint of transferred from [place] s/p [facility] patch repair (18 Jan 2025 09:20)    Interval events:     Review of Systems:  Constitutional: no fever, chills, fatigue  Neuro: no headache, numbness, weakness  Resp: no cough, wheezing, shortness of breath  CVS: no chest pain, palpitations, leg swelling  GI: no abdominal pain, nausea, vomiting, diarrhea   : no dysuria, frequency, incontinence  Skin: no itching, burning, rashes, or lesions   Msk: no joint pain or swelling  Psych: no depression, anxiety    T(F): 102.2 (01-18-25 @ 10:30), Max: 102.2 (01-18-25 @ 10:30)  HR: 81 (01-18-25 @ 10:00) (73 - 92)  BP: 152/48 (01-18-25 @ 10:00) (133/73 - 182/65)  RR: 27 (01-18-25 @ 10:00) (15 - 35)  SpO2: 94% (01-18-25 @ 10:00) (94% - 98%)  Wt(kg): --    Mode: AC/ CMV (Assist Control/ Continuous Mandatory Ventilation), RR (machine): 28, TV (machine): 350, FiO2: 40, PEEP: 5    CAPILLARY BLOOD GLUCOSE      POCT Blood Glucose.: 106 mg/dL (18 Jan 2025 06:06)    I&O's Summary    17 Jan 2025 07:01  -  18 Jan 2025 07:00  --------------------------------------------------------  IN: 1450 mL / OUT: 2000 mL / NET: -550 mL    18 Jan 2025 07:01  -  18 Jan 2025 10:50  --------------------------------------------------------  IN: 2163 mL / OUT: 100 mL / NET: 2063 mL        Physical Exam:     Gen:  Neuro:  HEENT:  CV:  Pulm:  GI:  Ext:  Skin:    Meds:  MEDICATIONS  (STANDING):  acetaminophen   IVPB .. 1000 milliGRAM(s) IV Intermittent once  albuterol/ipratropium for Nebulization 3 milliLiter(s) Nebulizer every 6 hours  buDESOnide    Inhalation Suspension 0.5 milliGRAM(s) Inhalation two times a day  caspofungin IVPB 50 milliGRAM(s) IV Intermittent every 24 hours  caspofungin IVPB      chlorhexidine 0.12% Liquid 15 milliLiter(s) Oral Mucosa every 12 hours  chlorhexidine 2% Cloths 1 Application(s) Topical <User Schedule>  dexMEDEtomidine Infusion 1.2 MICROgram(s)/kG/Hr (15.6 mL/Hr) IV Continuous <Continuous>  dextrose 5%. 1000 milliLiter(s) (100 mL/Hr) IV Continuous <Continuous>  dextrose 5%. 1000 milliLiter(s) (50 mL/Hr) IV Continuous <Continuous>  dextrose 50% Injectable 25 Gram(s) IV Push once  dextrose 50% Injectable 12.5 Gram(s) IV Push once  dextrose 50% Injectable 25 Gram(s) IV Push once  dextrose Oral Gel 15 Gram(s) Oral once  glucagon  Injectable 1 milliGRAM(s) IntraMuscular once  guaifenesin/dextromethorphan Oral Liquid 10 milliLiter(s) Oral every 6 hours  heparin  Infusion.  Unit(s)/Hr (10 mL/Hr) IV Continuous <Continuous>  ketamine Infusion. 0.25 mG/kG/Hr (1.3 mL/Hr) IV Continuous <Continuous>  meropenem  IVPB 500 milliGRAM(s) IV Intermittent every 12 hours  pantoprazole   Suspension 40 milliGRAM(s) Oral two times a day    MEDICATIONS  (PRN):  fentaNYL    Injectable 50 MICROGram(s) IV Push every 2 hours PRN Mild Pain (1 - 3) or vent synchrony  heparin   Injectable 4000 Unit(s) IV Push every 6 hours PRN For aPTT less than 40  heparin   Injectable 2000 Unit(s) IV Push every 6 hours PRN For aPTT between 40 - 57                            7.5    14.07 )-----------( 89       ( 18 Jan 2025 03:14 )             22.7       01-18    143  |  109[H]  |  49[H]  ----------------------------<  110[H]  3.6   |  27  |  1.90[H]    Ca    8.6      18 Jan 2025 03:14  Phos  4.3     01-18  Mg     2.2     01-18    TPro  5.3[L]  /  Alb  1.8[L]  /  TBili  0.7  /  DBili  x   /  AST  134[H]  /  ALT  52  /  AlkPhos  1409[H]  01-18    Lactate 0.9           01-18 @ 03:14          PT/INR - ( 18 Jan 2025 03:14 )   PT: 13.2 sec;   INR: 1.13 ratio         PTT - ( 18 Jan 2025 03:14 )  PTT:40.5 sec  Urinalysis Basic - ( 18 Jan 2025 03:14 )    Color: x / Appearance: x / SG: x / pH: x  Gluc: 110 mg/dL / Ketone: x  / Bili: x / Urobili: x   Blood: x / Protein: x / Nitrite: x   Leuk Esterase: x / RBC: x / WBC x   Sq Epi: x / Non Sq Epi: x / Bacteria: x      Bronchial --   Few polymorphonuclear leukocytes seen per low power field  Rare Squamous epithelial cells seen per low power field  Rare Gram positive cocci in pairs seen per oil power field 01-17 @ 16:40  .Blood BLOOD   No growth at 72 Hours -- 01-14 @ 10:10  .Blood BLOOD   No growth at 72 Hours -- 01-14 @ 09:40  ET Tube ET Tube   Commensal merline consistent with body site   Moderate polymorphonuclear leukocytes per low power field  No Squamous epithelial cells per low power field  No organisms seen per oil power field 01-13 @ 19:00        ABG - ( 18 Jan 2025 03:04 )  pH, Arterial: 7.32  pH, Blood: x     /  pCO2: 51    /  pO2: 80    / HCO3: 26    / Base Excess: 0.2   /  SaO2: 96.3                Radiology: ***  Bedside Lung U/S: ***  Bedside Cardiac U/S: ***    CENTRAL LINE: Y/N   DATE INSERTED:   REMOVE: Y/N  DE LA FUENTE: Y/N      DATE INSERTED:        REMOVE: Y/N  A-LINE: Y/N     DATE INSERTED:              REMOVE: Y/N    GLOBAL ISSUE/BEST PRACTICE:  Analgesia:  Sedation:  HOB elevation: yes  Stress ulcer prophylaxis:  VTE prophylaxis:  Glycemic control:  Nutrition:    CODE STATUS: ***  [facility] discussion: Y       Patient is a 79y old  Female who presents with a chief complaint of transferred from [place] s/p [facility] patch repair (18 Jan 2025 09:20)    Interval events: Febrile overnight, Tmax 102.2F this AM. IV ofirmev x 1. Antifungal previously escalated to capsofungin. Also on meropenem. Remains on vent with precedex/ketamine for sedation. Off midodrine, levophed, and vasopressin. B/l upper extremity swelling, L>R, started on full ac for likely DVT. Official report of doppler u/s pending.     Review of Systems: Unable to obtain 2/2 patient intubated and sedated     T(F): 102.2 (01-18-25 @ 10:30), Max: 102.2 (01-18-25 @ 10:30)  HR: 81 (01-18-25 @ 10:00) (73 - 92)  BP: 152/48 (01-18-25 @ 10:00) (133/73 - 182/65)  RR: 27 (01-18-25 @ 10:00) (15 - 35)  SpO2: 94% (01-18-25 @ 10:00) (94% - 98%)  Wt(kg): --    Mode: AC/ CMV (Assist Control/ Continuous Mandatory Ventilation), RR (machine): 28, TV (machine): 350, FiO2: 40, PEEP: 5    CAPILLARY BLOOD GLUCOSE      POCT Blood Glucose.: 106 mg/dL (18 Jan 2025 06:06)    I&O's Summary    17 Jan 2025 07:01  -  18 Jan 2025 07:00  --------------------------------------------------------  IN: 1450 mL / OUT: 2000 mL / NET: -550 mL    18 Jan 2025 07:01  -  18 Jan 2025 10:50  --------------------------------------------------------  IN: 2163 mL / OUT: 100 mL / NET: 2063 mL        Physical Exam:   GENERAL: critically ill, intubated and sedated  HEAD:  Atraumatic, Normocephalic, intubated  EYES: PERRLA, assymetric pupils, right > left, both pupils reactive   CHEST/LUNG: mechanical breathing appreciated, no rhonchi, no wheezing, no crackles. no stridor   HEART: Regular rate and rhythm; No murmurs, rubs, or gallops  ABDOMEN:  Soft, nontender, nondistended  EXTREMITIES:  Peripheral Pulses, brisk capillary refill. No clubbing, cyanosis, or edema  NERVOUS SYSTEM:  Patient sedated    Meds:  MEDICATIONS  (STANDING):  acetaminophen   IVPB .. 1000 milliGRAM(s) IV Intermittent once  albuterol/ipratropium for Nebulization 3 milliLiter(s) Nebulizer every 6 hours  buDESOnide    Inhalation Suspension 0.5 milliGRAM(s) Inhalation two times a day  caspofungin IVPB 50 milliGRAM(s) IV Intermittent every 24 hours  caspofungin IVPB      chlorhexidine 0.12% Liquid 15 milliLiter(s) Oral Mucosa every 12 hours  chlorhexidine 2% Cloths 1 Application(s) Topical <User Schedule>  dexMEDEtomidine Infusion 1.2 MICROgram(s)/kG/Hr (15.6 mL/Hr) IV Continuous <Continuous>  dextrose 5%. 1000 milliLiter(s) (100 mL/Hr) IV Continuous <Continuous>  dextrose 5%. 1000 milliLiter(s) (50 mL/Hr) IV Continuous <Continuous>  dextrose 50% Injectable 25 Gram(s) IV Push once  dextrose 50% Injectable 12.5 Gram(s) IV Push once  dextrose 50% Injectable 25 Gram(s) IV Push once  dextrose Oral Gel 15 Gram(s) Oral once  glucagon  Injectable 1 milliGRAM(s) IntraMuscular once  guaifenesin/dextromethorphan Oral Liquid 10 milliLiter(s) Oral every 6 hours  heparin  Infusion.  Unit(s)/Hr (10 mL/Hr) IV Continuous <Continuous>  ketamine Infusion. 0.25 mG/kG/Hr (1.3 mL/Hr) IV Continuous <Continuous>  meropenem  IVPB 500 milliGRAM(s) IV Intermittent every 12 hours  pantoprazole   Suspension 40 milliGRAM(s) Oral two times a day    MEDICATIONS  (PRN):  fentaNYL    Injectable 50 MICROGram(s) IV Push every 2 hours PRN Mild Pain (1 - 3) or vent synchrony  heparin   Injectable 4000 Unit(s) IV Push every 6 hours PRN For aPTT less than 40  heparin   Injectable 2000 Unit(s) IV Push every 6 hours PRN For aPTT between 40 - 57                            7.5    14.07 )-----------( 89       ( 18 Jan 2025 03:14 )             22.7       01-18    143  |  109[H]  |  49[H]  ----------------------------<  110[H]  3.6   |  27  |  1.90[H]    Ca    8.6      18 Jan 2025 03:14  Phos  4.3     01-18  Mg     2.2     01-18    TPro  5.3[L]  /  Alb  1.8[L]  /  TBili  0.7  /  DBili  x   /  AST  134[H]  /  ALT  52  /  AlkPhos  1409[H]  01-18    Lactate 0.9           01-18 @ 03:14          PT/INR - ( 18 Jan 2025 03:14 )   PT: 13.2 sec;   INR: 1.13 ratio         PTT - ( 18 Jan 2025 03:14 )  PTT:40.5 sec  Urinalysis Basic - ( 18 Jan 2025 03:14 )    Color: x / Appearance: x / SG: x / pH: x  Gluc: 110 mg/dL / Ketone: x  / Bili: x / Urobili: x   Blood: x / Protein: x / Nitrite: x   Leuk Esterase: x / RBC: x / WBC x   Sq Epi: x / Non Sq Epi: x / Bacteria: x      Bronchial --   Few polymorphonuclear leukocytes seen per low power field  Rare Squamous epithelial cells seen per low power field  Rare Gram positive cocci in pairs seen per oil power field 01-17 @ 16:40  .Blood BLOOD   No growth at 72 Hours -- 01-14 @ 10:10  .Blood BLOOD   No growth at 72 Hours -- 01-14 @ 09:40  ET Tube ET Tube   Commensal merline consistent with body site   Moderate polymorphonuclear leukocytes per low power field  No Squamous epithelial cells per low power field  No organisms seen per oil power field 01-13 @ 19:00        ABG - ( 18 Jan 2025 03:04 )  pH, Arterial: 7.32  pH, Blood: x     /  pCO2: 51    /  pO2: 80    / HCO3: 26    / Base Excess: 0.2   /  SaO2: 96.3                Radiology: ***  Bedside Lung U/S: ***  Bedside Cardiac U/S: ***    CENTRAL LINE: Y/N   DATE INSERTED:   REMOVE: Y/N  DE LA FUENTE: Y/N      DATE INSERTED:        REMOVE: Y/N  A-LINE: Y/N     DATE INSERTED:              REMOVE: Y/N    GLOBAL ISSUE/BEST PRACTICE:  Analgesia:  Sedation:  HOB elevation: yes  Stress ulcer prophylaxis:  VTE prophylaxis:  Glycemic control:  Nutrition:    CODE STATUS: ***  [facility] discussion: Y       Patient is a 79y old  Female who presents with a chief complaint of transferred from [place] s/p [facility] patch repair (18 Jan 2025 09:20)    Interval events: Febrile overnight, Tmax 102.2F this AM. IV ofirmev x 1. Antifungal previously escalated to capsofungin. Also on meropenem. Remains on vent with precedex/ketamine for sedation. Off midodrine, levophed, and vasopressin. B/l upper extremity swelling, L>R, started on full ac for likely DVT. Official report of doppler u/s pending.     Review of Systems: Unable to obtain 2/2 patient intubated and sedated     T(F): 102.2 (01-18-25 @ 10:30), Max: 102.2 (01-18-25 @ 10:30)  HR: 81 (01-18-25 @ 10:00) (73 - 92)  BP: 152/48 (01-18-25 @ 10:00) (133/73 - 182/65)  RR: 27 (01-18-25 @ 10:00) (15 - 35)  SpO2: 94% (01-18-25 @ 10:00) (94% - 98%)  Wt(kg): --    Mode: AC/ CMV (Assist Control/ Continuous Mandatory Ventilation), RR (machine): 28, TV (machine): 350, FiO2: 40, PEEP: 5    CAPILLARY BLOOD GLUCOSE      POCT Blood Glucose.: 106 mg/dL (18 Jan 2025 06:06)    I&O's Summary    17 Jan 2025 07:01  -  18 Jan 2025 07:00  --------------------------------------------------------  IN: 1450 mL / OUT: 2000 mL / NET: -550 mL    18 Jan 2025 07:01  -  18 Jan 2025 10:50  --------------------------------------------------------  IN: 2163 mL / OUT: 100 mL / NET: 2063 mL        Physical Exam:   GENERAL: critically ill, intubated and sedated  HEAD:  Atraumatic, Normocephalic, intubated  EYES: PERRLA, asymmetric pupils, right > left, both pupils reactive   CHEST/LUNG: mechanical breathing appreciated, no rhonchi, no wheezing, no crackles  HEART: Regular rate and rhythm, S1S2  ABDOMEN:  Soft, nontender, nondistended  EXTREMITIES:  B/l UE swelling, L>R upper extremity. +1 radial pulse LUE, +2 radial pulse RUE.   NERVOUS SYSTEM:  Patient sedated    Meds:  MEDICATIONS  (STANDING):  acetaminophen   IVPB .. 1000 milliGRAM(s) IV Intermittent once  albuterol/ipratropium for Nebulization 3 milliLiter(s) Nebulizer every 6 hours  buDESOnide    Inhalation Suspension 0.5 milliGRAM(s) Inhalation two times a day  caspofungin IVPB 50 milliGRAM(s) IV Intermittent every 24 hours  caspofungin IVPB      chlorhexidine 0.12% Liquid 15 milliLiter(s) Oral Mucosa every 12 hours  chlorhexidine 2% Cloths 1 Application(s) Topical <User Schedule>  dexMEDEtomidine Infusion 1.2 MICROgram(s)/kG/Hr (15.6 mL/Hr) IV Continuous <Continuous>  dextrose 5%. 1000 milliLiter(s) (100 mL/Hr) IV Continuous <Continuous>  dextrose 5%. 1000 milliLiter(s) (50 mL/Hr) IV Continuous <Continuous>  dextrose 50% Injectable 25 Gram(s) IV Push once  dextrose 50% Injectable 12.5 Gram(s) IV Push once  dextrose 50% Injectable 25 Gram(s) IV Push once  dextrose Oral Gel 15 Gram(s) Oral once  glucagon  Injectable 1 milliGRAM(s) IntraMuscular once  guaifenesin/dextromethorphan Oral Liquid 10 milliLiter(s) Oral every 6 hours  heparin  Infusion.  Unit(s)/Hr (10 mL/Hr) IV Continuous <Continuous>  ketamine Infusion. 0.25 mG/kG/Hr (1.3 mL/Hr) IV Continuous <Continuous>  meropenem  IVPB 500 milliGRAM(s) IV Intermittent every 12 hours  pantoprazole   Suspension 40 milliGRAM(s) Oral two times a day    MEDICATIONS  (PRN):  fentaNYL    Injectable 50 MICROGram(s) IV Push every 2 hours PRN Mild Pain (1 - 3) or vent synchrony  heparin   Injectable 4000 Unit(s) IV Push every 6 hours PRN For aPTT less than 40  heparin   Injectable 2000 Unit(s) IV Push every 6 hours PRN For aPTT between 40 - 57                            7.5    14.07 )-----------( 89       ( 18 Jan 2025 03:14 )             22.7       01-18    143  |  109[H]  |  49[H]  ----------------------------<  110[H]  3.6   |  27  |  1.90[H]    Ca    8.6      18 Jan 2025 03:14  Phos  4.3     01-18  Mg     2.2     01-18    TPro  5.3[L]  /  Alb  1.8[L]  /  TBili  0.7  /  DBili  x   /  AST  134[H]  /  ALT  52  /  AlkPhos  1409[H]  01-18    Lactate 0.9           01-18 @ 03:14          PT/INR - ( 18 Jan 2025 03:14 )   PT: 13.2 sec;   INR: 1.13 ratio         PTT - ( 18 Jan 2025 03:14 )  PTT:40.5 sec  Urinalysis Basic - ( 18 Jan 2025 03:14 )    Color: x / Appearance: x / SG: x / pH: x  Gluc: 110 mg/dL / Ketone: x  / Bili: x / Urobili: x   Blood: x / Protein: x / Nitrite: x   Leuk Esterase: x / RBC: x / WBC x   Sq Epi: x / Non Sq Epi: x / Bacteria: x      Bronchial --   Few polymorphonuclear leukocytes seen per low power field  Rare Squamous epithelial cells seen per low power field  Rare Gram positive cocci in pairs seen per oil power field 01-17 @ 16:40  .Blood BLOOD   No growth at 72 Hours -- 01-14 @ 10:10  .Blood BLOOD   No growth at 72 Hours -- 01-14 @ 09:40  ET Tube ET Tube   Commensal merline consistent with body site   Moderate polymorphonuclear leukocytes per low power field  No Squamous epithelial cells per low power field  No organisms seen per oil power field 01-13 @ 19:00        ABG - ( 18 Jan 2025 03:04 )  pH, Arterial: 7.32  pH, Blood: x     /  pCO2: 51    /  pO2: 80    / HCO3: 26    / Base Excess: 0.2   /  SaO2: 96.3              CENTRAL LINE: Y (left IJ)  DE LA FUENTE: Y        A-LINE: N      OG Tube: Y       CODE STATUS: full code       Patient is a 79y old  Female who presents with a chief complaint of transferred from [place] s/p [facility] patch repair (18 Jan 2025 09:20)    Interval events: Febrile overnight, Tmax 102.2F this AM. IV ofirmev x 1. Antifungal previously escalated to capsofungin. Also on meropenem. Remains on vent with precedex/ketamine for sedation. Off midodrine, levophed, and vasopressin. B/l upper extremity swelling, L>R, started on full ac for DVT.     Review of Systems: Unable to obtain 2/2 patient intubated and sedated     T(F): 102.2 (01-18-25 @ 10:30), Max: 102.2 (01-18-25 @ 10:30)  HR: 81 (01-18-25 @ 10:00) (73 - 92)  BP: 152/48 (01-18-25 @ 10:00) (133/73 - 182/65)  RR: 27 (01-18-25 @ 10:00) (15 - 35)  SpO2: 94% (01-18-25 @ 10:00) (94% - 98%)  Wt(kg): --    Mode: AC/ CMV (Assist Control/ Continuous Mandatory Ventilation), RR (machine): 28, TV (machine): 350, FiO2: 40, PEEP: 5    CAPILLARY BLOOD GLUCOSE      POCT Blood Glucose.: 106 mg/dL (18 Jan 2025 06:06)    I&O's Summary    17 Jan 2025 07:01  -  18 Jan 2025 07:00  --------------------------------------------------------  IN: 1450 mL / OUT: 2000 mL / NET: -550 mL    18 Jan 2025 07:01  -  18 Jan 2025 10:50  --------------------------------------------------------  IN: 2163 mL / OUT: 100 mL / NET: 2063 mL        Physical Exam:   GENERAL: critically ill, intubated and sedated  HEAD:  Atraumatic, Normocephalic, intubated  EYES: PERRLA, asymmetric pupils, right > left, both pupils reactive   CHEST/LUNG: mechanical breathing appreciated, no rhonchi, no wheezing, no crackles  HEART: Regular rate and rhythm, S1S2  ABDOMEN:  Soft, nontender, nondistended  EXTREMITIES:  B/l UE swelling, L>R upper extremity. +1 radial pulse LUE, +2 radial pulse RUE.   NERVOUS SYSTEM:  Patient sedated    Meds:  MEDICATIONS  (STANDING):  acetaminophen   IVPB .. 1000 milliGRAM(s) IV Intermittent once  albuterol/ipratropium for Nebulization 3 milliLiter(s) Nebulizer every 6 hours  buDESOnide    Inhalation Suspension 0.5 milliGRAM(s) Inhalation two times a day  caspofungin IVPB 50 milliGRAM(s) IV Intermittent every 24 hours  caspofungin IVPB      chlorhexidine 0.12% Liquid 15 milliLiter(s) Oral Mucosa every 12 hours  chlorhexidine 2% Cloths 1 Application(s) Topical <User Schedule>  dexMEDEtomidine Infusion 1.2 MICROgram(s)/kG/Hr (15.6 mL/Hr) IV Continuous <Continuous>  dextrose 5%. 1000 milliLiter(s) (100 mL/Hr) IV Continuous <Continuous>  dextrose 5%. 1000 milliLiter(s) (50 mL/Hr) IV Continuous <Continuous>  dextrose 50% Injectable 25 Gram(s) IV Push once  dextrose 50% Injectable 12.5 Gram(s) IV Push once  dextrose 50% Injectable 25 Gram(s) IV Push once  dextrose Oral Gel 15 Gram(s) Oral once  glucagon  Injectable 1 milliGRAM(s) IntraMuscular once  guaifenesin/dextromethorphan Oral Liquid 10 milliLiter(s) Oral every 6 hours  heparin  Infusion.  Unit(s)/Hr (10 mL/Hr) IV Continuous <Continuous>  ketamine Infusion. 0.25 mG/kG/Hr (1.3 mL/Hr) IV Continuous <Continuous>  meropenem  IVPB 500 milliGRAM(s) IV Intermittent every 12 hours  pantoprazole   Suspension 40 milliGRAM(s) Oral two times a day    MEDICATIONS  (PRN):  fentaNYL    Injectable 50 MICROGram(s) IV Push every 2 hours PRN Mild Pain (1 - 3) or vent synchrony  heparin   Injectable 4000 Unit(s) IV Push every 6 hours PRN For aPTT less than 40  heparin   Injectable 2000 Unit(s) IV Push every 6 hours PRN For aPTT between 40 - 57                            7.5    14.07 )-----------( 89       ( 18 Jan 2025 03:14 )             22.7       01-18    143  |  109[H]  |  49[H]  ----------------------------<  110[H]  3.6   |  27  |  1.90[H]    Ca    8.6      18 Jan 2025 03:14  Phos  4.3     01-18  Mg     2.2     01-18    TPro  5.3[L]  /  Alb  1.8[L]  /  TBili  0.7  /  DBili  x   /  AST  134[H]  /  ALT  52  /  AlkPhos  1409[H]  01-18    Lactate 0.9           01-18 @ 03:14          PT/INR - ( 18 Jan 2025 03:14 )   PT: 13.2 sec;   INR: 1.13 ratio         PTT - ( 18 Jan 2025 03:14 )  PTT:40.5 sec  Urinalysis Basic - ( 18 Jan 2025 03:14 )    Color: x / Appearance: x / SG: x / pH: x  Gluc: 110 mg/dL / Ketone: x  / Bili: x / Urobili: x   Blood: x / Protein: x / Nitrite: x   Leuk Esterase: x / RBC: x / WBC x   Sq Epi: x / Non Sq Epi: x / Bacteria: x      Bronchial --   Few polymorphonuclear leukocytes seen per low power field  Rare Squamous epithelial cells seen per low power field  Rare Gram positive cocci in pairs seen per oil power field 01-17 @ 16:40  .Blood BLOOD   No growth at 72 Hours -- 01-14 @ 10:10  .Blood BLOOD   No growth at 72 Hours -- 01-14 @ 09:40  ET Tube ET Tube   Commensal merline consistent with body site   Moderate polymorphonuclear leukocytes per low power field  No Squamous epithelial cells per low power field  No organisms seen per oil power field 01-13 @ 19:00        ABG - ( 18 Jan 2025 03:04 )  pH, Arterial: 7.32  pH, Blood: x     /  pCO2: 51    /  pO2: 80    / HCO3: 26    / Base Excess: 0.2   /  SaO2: 96.3              CENTRAL LINE: Y (left IJ)  DE LA FUENTE: Y        A-LINE: N      OG Tube: Y       CODE STATUS: full code       Patient is a 79y old  Female who presents with a chief complaint of transferred from [place] s/[facility] patch repair (18 Jan 2025 09:20)    Interval events: Febrile overnight, Tmax 102.2F this AM. IV ofirmev x 1. Antifungal escalated to capsofungin. Also on meropenem. Remains on vent with precedex/ketamine for sedation. Off midodrine, levophed, and vasopressin. B/l upper extremity swelling, L>R, started on full ac for DVT.     Review of Systems: Unable to obtain 2/2 patient intubated and sedated     T(F): 102.2 (01-18-25 @ 10:30), Max: 102.2 (01-18-25 @ 10:30)  HR: 81 (01-18-25 @ 10:00) (73 - 92)  BP: 152/48 (01-18-25 @ 10:00) (133/73 - 182/65)  RR: 27 (01-18-25 @ 10:00) (15 - 35)  SpO2: 94% (01-18-25 @ 10:00) (94% - 98%)  Wt(kg): --    Mode: AC/ CMV (Assist Control/ Continuous Mandatory Ventilation), RR (machine): 28, TV (machine): 350, FiO2: 40, PEEP: 5    CAPILLARY BLOOD GLUCOSE      POCT Blood Glucose.: 106 mg/dL (18 Jan 2025 06:06)    I&O's Summary    17 Jan 2025 07:01  -  18 Jan 2025 07:00  --------------------------------------------------------  IN: 1450 mL / OUT: 2000 mL / NET: -550 mL    18 Jan 2025 07:01  -  18 Jan 2025 10:50  --------------------------------------------------------  IN: 2163 mL / OUT: 100 mL / NET: 2063 mL        Physical Exam:   GENERAL: critically ill, intubated and sedated  HEAD:  Atraumatic, Normocephalic, intubated  EYES: PERRLA, asymmetric pupils, right > left, both pupils reactive   CHEST/LUNG: no rhonchi, no wheezing, no crackles  HEART: Regular rate and rhythm, S1S2  ABDOMEN:  Soft, nontender, nondistended  EXTREMITIES:  B/l UE swelling, L>R upper extremity. +1 radial pulse LUE, +2 radial pulse RUE.   NERVOUS SYSTEM:  Patient sedated    Meds:  MEDICATIONS  (STANDING):  acetaminophen   IVPB .. 1000 milliGRAM(s) IV Intermittent once  albuterol/ipratropium for Nebulization 3 milliLiter(s) Nebulizer every 6 hours  buDESOnide    Inhalation Suspension 0.5 milliGRAM(s) Inhalation two times a day  caspofungin IVPB 50 milliGRAM(s) IV Intermittent every 24 hours  caspofungin IVPB      chlorhexidine 0.12% Liquid 15 milliLiter(s) Oral Mucosa every 12 hours  chlorhexidine 2% Cloths 1 Application(s) Topical <User Schedule>  dexMEDEtomidine Infusion 1.2 MICROgram(s)/kG/Hr (15.6 mL/Hr) IV Continuous <Continuous>  dextrose 5%. 1000 milliLiter(s) (100 mL/Hr) IV Continuous <Continuous>  dextrose 5%. 1000 milliLiter(s) (50 mL/Hr) IV Continuous <Continuous>  dextrose 50% Injectable 25 Gram(s) IV Push once  dextrose 50% Injectable 12.5 Gram(s) IV Push once  dextrose 50% Injectable 25 Gram(s) IV Push once  dextrose Oral Gel 15 Gram(s) Oral once  glucagon  Injectable 1 milliGRAM(s) IntraMuscular once  guaifenesin/dextromethorphan Oral Liquid 10 milliLiter(s) Oral every 6 hours  heparin  Infusion.  Unit(s)/Hr (10 mL/Hr) IV Continuous <Continuous>  ketamine Infusion. 0.25 mG/kG/Hr (1.3 mL/Hr) IV Continuous <Continuous>  meropenem  IVPB 500 milliGRAM(s) IV Intermittent every 12 hours  pantoprazole   Suspension 40 milliGRAM(s) Oral two times a day    MEDICATIONS  (PRN):  fentaNYL    Injectable 50 MICROGram(s) IV Push every 2 hours PRN Mild Pain (1 - 3) or vent synchrony  heparin   Injectable 4000 Unit(s) IV Push every 6 hours PRN For aPTT less than 40  heparin   Injectable 2000 Unit(s) IV Push every 6 hours PRN For aPTT between 40 - 57                            7.5    14.07 )-----------( 89       ( 18 Jan 2025 03:14 )             22.7       01-18    143  |  109[H]  |  49[H]  ----------------------------<  110[H]  3.6   |  27  |  1.90[H]    Ca    8.6      18 Jan 2025 03:14  Phos  4.3     01-18  Mg     2.2     01-18    TPro  5.3[L]  /  Alb  1.8[L]  /  TBili  0.7  /  DBili  x   /  AST  134[H]  /  ALT  52  /  AlkPhos  1409[H]  01-18    Lactate 0.9           01-18 @ 03:14          PT/INR - ( 18 Jan 2025 03:14 )   PT: 13.2 sec;   INR: 1.13 ratio         PTT - ( 18 Jan 2025 03:14 )  PTT:40.5 sec  Urinalysis Basic - ( 18 Jan 2025 03:14 )    Color: x / Appearance: x / SG: x / pH: x  Gluc: 110 mg/dL / Ketone: x  / Bili: x / Urobili: x   Blood: x / Protein: x / Nitrite: x   Leuk Esterase: x / RBC: x / WBC x   Sq Epi: x / Non Sq Epi: x / Bacteria: x      Bronchial --   Few polymorphonuclear leukocytes seen per low power field  Rare Squamous epithelial cells seen per low power field  Rare Gram positive cocci in pairs seen per oil power field 01-17 @ 16:40  .Blood BLOOD   No growth at 72 Hours -- 01-14 @ 10:10  .Blood BLOOD   No growth at 72 Hours -- 01-14 @ 09:40  ET Tube ET Tube   Commensal merline consistent with body site   Moderate polymorphonuclear leukocytes per low power field  No Squamous epithelial cells per low power field  No organisms seen per oil power field 01-13 @ 19:00        ABG - ( 18 Jan 2025 03:04 )  pH, Arterial: 7.32  pH, Blood: x     /  pCO2: 51    /  pO2: 80    / HCO3: 26    / Base Excess: 0.2   /  SaO2: 96.3              CENTRAL LINE: Y (left IJ)  DE LA FUENTE: Y        A-LINE: N      OG Tube: Y       CODE STATUS: full code

## 2025-01-18 NOTE — PROGRESS NOTE ADULT - SUBJECTIVE AND OBJECTIVE BOX
Date of entry of this note is equal to date of services rendered    BRIEF HOSPITAL COURSE: 79F with HTN, HLD, CKD, PUD, Lung Cancer s/p RUL lobectomy who presents with a perforated gastric ulcer POD#7 from laparoscopic repair with Srinath Patch and Onur Drain placement. Pt transferred from  to [facility] ICU for severe hypercapnic respiratory failure 2ry aspiration requiring intubation, extubated with hospital course further complicated by ESPERANZA d/t ATN, respiratory failure on HFNC with worsening hypoxemia/hypercapnic ultimately reintubated 1/13/25 for concern of airway protection with worsening dual pressor shock state post intubation.      INTERVAL EVENTS:  - Remains on full vent support, sedated on precedex/ketamine infusion   - L arm appears more swollen, palpable pulses 1+ radial on L, A line in left axilla not correlating with BP cuffs, No hematoma, Off vasopressors.   - Will get Duplex STAT, plan to remove A line  - Concern for dvt will start full AC      REVIEW OF SYSTEMS:     [ ] A ten-point review of systems was otherwise negative except as noted.  [X] Due to altered mental status/intubation, subjective information were not able to be obtained from the patient. History was obtained, to the extent possible, from review of the chart and collateral sources of information.    PAST MEDICAL & SURGICAL HISTORY:  HLD (hyperlipidemia)      Rheumatoid arthritis      TIA (transient ischemic attack)  2012      Lung cancer  Right lung.      Chronic obstructive pulmonary disease, unspecified COPD type  O2 at night      Hiatal hernia with GERD      Essential hypertension      Childhood asthma      Stage 3 chronic kidney disease      Carotid artery plaque      IBS (irritable bowel syndrome)      Psoriatic arthritis      S/P lobectomy of lung  Right lung in 1996.      H/O colonoscopy      History of endoscopy  Sept 2017      S/P cholecystectomy        FAMILY HISTORY:  FH: type 2 diabetes (Mother)    FH: CAD (coronary artery disease) (Father, Sibling)    FH: colon cancer (Sibling)    FH: myocardial infarction (Father)    FH: stroke (Sibling)    FH: lung cancer (Sibling)        Vitals   ICU Vital Signs Last 24 Hrs  T(C): 36.3 (18 Jan 2025 00:29), Max: 38.4 (17 Jan 2025 20:36)  T(F): 97.3 (18 Jan 2025 00:29), Max: 101.1 (17 Jan 2025 20:36)  HR: 76 (18 Jan 2025 02:00) (76 - 92)  BP: 138/70 (18 Jan 2025 02:00) (133/73 - 158/56)  BP(mean): 84 (18 Jan 2025 02:00) (78 - 88)  ABP: 112/44 (18 Jan 2025 02:00) (99/41 - 167/64)  ABP(mean): 68 (18 Jan 2025 02:00) (62 - 103)  RR: 28 (18 Jan 2025 02:00) (19 - 44)  SpO2: 97% (18 Jan 2025 02:00) (93% - 100%)    O2 Parameters below as of 17 Jan 2025 19:15  Patient On (Oxygen Delivery Method): ventilator      PHYSICAL EXAM:  General: ill appearing, frail, thin  HEENT: Pupils equal, reactive to light.  Symmetric.  PULM: rhonchi b/l, minimal breath sounds on R  CVS: Regular rate and rhythm  ABD: Soft, nondistended, Onur drain RUQ with serosanguinous output  EXT: No edema, nontender, warm  NEURO: sedated with RASS -2    VENT SETTINGS   Mode: AC/ CMV (Assist Control/ Continuous Mandatory Ventilation)  RR (machine): 28  TV (machine): 350  FiO2: 40  PEEP: 5  ITime: 0.8  MAP: 15  PIP: 30    ABG - ( 17 Jan 2025 05:00 )  pH, Arterial: 7.32  pH, Blood: x     /  pCO2: 50    /  pO2: 85    / HCO3: 26    / Base Excess: -0.3  /  SaO2: 97.8          I&O's Detail    16 Jan 2025 07:01  -  17 Jan 2025 07:00  --------------------------------------------------------  IN:    Dexmedetomidine: 31.2 mL    Dexmedetomidine: 370.5 mL    IV PiggyBack: 500 mL    IV PiggyBack: 100 mL    IV PiggyBack: 100 mL    IV PiggyBack: 50 mL    IV PiggyBack: 250 mL    IV PiggyBack: 50 mL    IV PiggyBack: 100 mL    Ketamine: 224.9 mL    Nepro with Carb Steady: 920 mL    Norepinephrine: 53.4 mL    PRBCs (Packed Red Blood Cells): 317 mL  Total IN: 3067 mL    OUT:    Drain (mL): 130 mL    Indwelling Catheter - Urethral (mL): 2235 mL  Total OUT: 2365 mL    Total NET: 702 mL      17 Jan 2025 07:01  -  18 Jan 2025 03:01  --------------------------------------------------------  IN:    Dexmedetomidine: 370.5 mL    Ketamine: 247 mL    Nepro with Carb Steady: 440 mL  Total IN: 1057.5 mL    OUT:    Indwelling Catheter - Urethral (mL): 1610 mL  Total OUT: 1610 mL    Total NET: -552.5 mL      LABS                        8.2    13.94 )-----------( 101      ( 17 Jan 2025 06:18 )             24.4     01-17    140  |  104  |  45[H]  ----------------------------<  141[H]  3.9   |  27  |  2.00[H]    Ca    8.7      17 Jan 2025 06:18  Phos  4.8     01-17  Mg     2.8     01-17    TPro  5.7[L]  /  Alb  2.2[L]  /  TBili  0.6  /  DBili  x   /  AST  101[H]  /  ALT  43  /  AlkPhos  876[H]  01-17   LIVER FUNCTIONS - ( 17 Jan 2025 06:18 )  Alb: 2.2 g/dL / Pro: 5.7 g/dL / ALK PHOS: 876 U/L / ALT: 43 U/L / AST: 101 U/L / GGT: x                 Urinalysis Basic - ( 17 Jan 2025 06:18 )    Color: x / Appearance: x / SG: x / pH: x  Gluc: 141 mg/dL / Ketone: x  / Bili: x / Urobili: x   Blood: x / Protein: x / Nitrite: x   Leuk Esterase: x / RBC: x / WBC x   Sq Epi: x / Non Sq Epi: x / Bacteria: x      POCT Blood Glucose.: 76 mg/dL (01-17-25 @ 23:29)  POCT Blood Glucose.: 113 mg/dL *H* (01-17-25 @ 05:25)      RADIOLOGY:     RADIOLOGY:   < from: CT Chest w/ Oral Cont and w/ IV Cont (01.16.25 @ 18:35) >  IMPRESSION:  Complete opacification of the distal right mainstem bronchus with   resultant near complete collapse of right middle and right lower lobes   with trace aeration remaining. Findings discussed with Dr. Gauthier at 7:15   PM on 1/16/2025 with read back.    Moderate right and small left pleural effusions.  Minimal tree-in-bud and groundglass opacities in the left lower lobe are   likely infectious.  Liver is overall heterogeneous in appearance with suggestion of multiple   hypodense lesions. Further evaluation with contrast-enhanced MRI is   advised.  Distended endometrial cavity in this postmenopausal patient for which   further evaluation can be considered with a pelvic ultrasound on   outpatient basis, when patient's current clinical issues resolve.    --- End of Report ---      < from: Xray Chest 1 View AP/PA (01.14.25 @ 11:14) >  IMPRESSION:  Enteric tube tip is in the stomach.    ET tube and left IJ line as above.    Continued right-sided volume loss and complete opacification of the right   hemithorax.    Reticular and patchy, predominantly left upper lung, opacities, again   seen.    Small left pleural effusion.    --- End of Report ---      Pending Arterial and Venous Duplex ***

## 2025-01-18 NOTE — PROGRESS NOTE ADULT - SUBJECTIVE AND OBJECTIVE BOX
[facility]   INFECTIOUS DISEASES   [address] [place] [address]  Tel: [phone]     Fax: [phone]  =========================================================  MD Gerardo Christensen Michelle, MD Shah, Kaushal, MD Sunjit, Jaspal, MD Sehrish Shahid, MD   =========================================================    GINNY VASQUEZ [id number]    Follow up: Intubated in ICU, no fever, no overnight event.     Allergies:  penicillin (Other)  minocycline (Other)  Enbrel (Unknown)  IV Contrast (Hives; Rash)  Zofran (Other)  Levaquin (Other)    albuterol/ipratropium for Nebulization 3 milliLiter(s) Nebulizer every 6 hours  buDESOnide    Inhalation Suspension 0.5 milliGRAM(s) Inhalation two times a day  caspofungin IVPB      caspofungin IVPB 50 milliGRAM(s) IV Intermittent every 24 hours  chlorhexidine 0.12% Liquid 15 milliLiter(s) Oral Mucosa every 12 hours  chlorhexidine 2% Cloths 1 Application(s) Topical <User Schedule>  dexMEDEtomidine Infusion 1.2 MICROgram(s)/kG/Hr IV Continuous <Continuous>  dextrose 5%. 1000 milliLiter(s) IV Continuous <Continuous>  dextrose 5%. 1000 milliLiter(s) IV Continuous <Continuous>  dextrose 50% Injectable 25 Gram(s) IV Push once  dextrose 50% Injectable 12.5 Gram(s) IV Push once  dextrose 50% Injectable 25 Gram(s) IV Push once  dextrose Oral Gel 15 Gram(s) Oral once  fentaNYL    Injectable 50 MICROGram(s) IV Push every 2 hours PRN  glucagon  Injectable 1 milliGRAM(s) IntraMuscular once  guaifenesin/dextromethorphan Oral Liquid 10 milliLiter(s) Oral every 6 hours  heparin   Injectable 4000 Unit(s) IV Push every 6 hours PRN  heparin   Injectable 2000 Unit(s) IV Push every 6 hours PRN  heparin  Infusion.  Unit(s)/Hr IV Continuous <Continuous>  ketamine Infusion. 0.25 mG/kG/Hr IV Continuous <Continuous>  meropenem  IVPB 500 milliGRAM(s) IV Intermittent every 12 hours  pantoprazole   Suspension 40 milliGRAM(s) Oral two times a day     REVIEW OF SYSTEMS:  unable      Physical Exam:  ICU Vital Signs Last 24 Hrs  T(C): 38.6 (18 Jan 2025 12:00), Max: 39 (18 Jan 2025 10:30)  T(F): 101.5 (18 Jan 2025 12:00), Max: 102.2 (18 Jan 2025 10:30)  HR: 77 (18 Jan 2025 11:57) (73 - 92)  BP: 150/56 (18 Jan 2025 11:30) (132/116 - 182/65)  BP(mean): 78 (18 Jan 2025 11:30) (76 - 123)  ABP: 137/50 (18 Jan 2025 03:30) (99/41 - 157/64)  ABP(mean): 81 (18 Jan 2025 03:30) (62 - 98)  RR: 32 (18 Jan 2025 11:30) (15 - 35)  SpO2: 96% (18 Jan 2025 11:57) (94% - 98%)  O2 Parameters below as of 18 Jan 2025 08:00  GEN: intubated  HEENT: normocephalic and atraumatic.   LUNGS: clear but some rhonchi bilaterally   HEART: Regular rate and rhythm   ABDOMEN: Soft, nondistended.    EXTREMITIES: without edema.   NEUROLOGIC: intubated, sedated  Skin: No rash    Labs:  01-18    143  |  109[H]  |  49[H]  ----------------------------<  110[H]  3.6   |  27  |  1.90[H]    Ca    8.6      18 Jan 2025 03:14  Phos  4.3     01-18  Mg     2.2     01-18    TPro  5.3[L]  /  Alb  1.8[L]  /  TBili  0.7  /  DBili  x   /  AST  134[H]  /  ALT  52  /  AlkPhos  1409[H]  01-18                        7.5    14.07 )-----------( 89       ( 18 Jan 2025 03:14 )             22.7     PT/INR - ( 18 Jan 2025 03:14 )   PT: 13.2 sec;   INR: 1.13 ratio    PTT - ( 18 Jan 2025 03:14 )  PTT:40.5 sec  Urinalysis Basic - ( 18 Jan 2025 03:14 )    Color: x / Appearance: x / SG: x / pH: x  Gluc: 110 mg/dL / Ketone: x  / Bili: x / Urobili: x   Blood: x / Protein: x / Nitrite: x   Leuk Esterase: x / RBC: x / WBC x   Sq Epi: x / Non Sq Epi: x / Bacteria: x    LIVER FUNCTIONS - ( 18 Jan 2025 03:14 )  Alb: 1.8 g/dL / Pro: 5.3 g/dL / ALK PHOS: 1409 U/L / ALT: 52 U/L / AST: 134 U/L / GGT: x           RECENT CULTURES:  01-17 @ 16:40 Bronchial       Few polymorphonuclear leukocytes seen per low power field  Rare Squamous epithelial cells seen per low power field  Rare Gram positive cocci in pairs seen per oil power field    01-14 @ 10:10 .Blood BLOOD     No growth at 72 Hours    01-14 @ 09:40 .Blood BLOOD     No growth at 72 Hours    01-13 @ 19:00 ET Tube ET Tube     Commensal merline consistent with body site    Moderate polymorphonuclear leukocytes per low power field  No Squamous epithelial cells per low power field  No organisms seen per oil power field    All imaging and data are reviewed.   < from: CT Chest w/ Oral Cont and w/ IV Cont (01.16.25 @ 18:35) >  IMPRESSION:  Complete opacification of the distal right mainstem bronchus with   resultant near complete collapse of right middle and right lower lobes   with trace aeration remaining. Findings discussed with Dr. Gauthier at 7:15   PM on 1/16/2025 with read back.  Moderate right and small left pleural effusions.  Minimal tree-in-bud and groundglass opacities in the left lower lobe are   likely infectious.  Liver is overall heterogeneous in appearance with suggestion of multiple   hypodense lesions. Further evaluation with contrast-enhanced MRI is   advised.  Distended endometrial cavity in this postmenopausal patient for which   further evaluation can be considered with a pelvic ultrasound on   outpatient basis, when patient's current clinical issues resolve.    Assessment and Plan:   78 y/o WF with PMHx with HTN, HLD, CKD, PUD (dx 2011), Lung CA s/p lobectomy (1996)  who was admitted on 1/9 for worsening generalized achy abdominal pain since 1/7 night.  Patient reports baseline abdominal discomfort x several years which worsened suddenly (described as 11/10) without associated fever/chills. She was found to have pneumooperitoneum, s/p Laparoscopic repair of perforated gastric ulcer on 1/9. Patient has been on antibiotics and antifungals. On 1/13 she was intubated.     On 1/15 AM Zosyn switched to meropenem given fevers. Respiratory culture from 1/13 growing commensal merline consistent with body site and blood cultures from 1/14 remain no growth. CT chest and A/P done 1/16 showed complete opacification of the distal right mainstem bronchus with resultant near complete collapse of right middle and right lower lobes with trace aeration remaining. No evidence of intraabdominal infection.    # Pneumonia  # Perforated gastric ulcer  # Fevers  # Leukocytosis  # Hypotension    - Continue meropenem (renally dosed)  -fluconazole discontinued  - S/p bronchoscopy on 1/17 so far GPC in pairs, will follow cultures  - Monitor WBC, Creat and Tmax     Will follow.     Kevon Odell MD  Division of Infectious Diseases   Please call ID service at [phone] with any question.      50 minutes spent on total encounter assessing patient, examination, chart review, counseling and coordinating care by the attending physician/nurse/care manager.

## 2025-01-18 NOTE — PROGRESS NOTE ADULT - ATTENDING COMMENTS
78 yo woman with HTN, HLD, CKD, PUD, Lung Cancer s/p RUL lobectomy who presents with a perforated gastric ulcer POD 8 from laparoscopic repair with Srinath Patch.  Postop course complicated by prolonged intubation for hypercapnic respiratory failure 2/2 aspiration, ESPERANZA 2/2 ATN, and now likely aspiration pneumonia with recurrent hypoxemic respiratory failure, septic shock, lactic acidosis, and ESPERANZA.    --wean sedation to facilitate vent weaning, prn fentanyl  --mild htn, watch for now  --acute hypoxemic respiratory failure, wean FiO2 as tolerates to 30%  failed PS 10/5 within minutes with rr 40 and TV <200s  COPD, continue bronchdilators, robitussin for secretion clearance  --POD 8 Srinath patch for perforated   PPI bid for PUD  continue TF  elevated alk phos, ?related to antifungals, monitor, check GGT   liver lesion, MRI when stable  --ESPERANZA persists, likely ATN from septic shock  continue supportive care  --septic shock suspect from aspiration pneumonia, recurrent fever  continue meropenem, fluconazole now escalated to caspo  f/u bronch Cx  --anemia, stable  LUE DVT, continue AC with heparin  -- updated

## 2025-01-18 NOTE — PROGRESS NOTE ADULT - SUBJECTIVE AND OBJECTIVE BOX
Date/Time Patient Seen:  		  Referring MD:   Data Reviewed	       Patient is a 79y old  Female who presents with a chief complaint of transferred from [place] s/p grahm patch repair (18 Jan 2025 03:00)      Subjective/HPI     PAST MEDICAL & SURGICAL HISTORY:  Asthma    HLD (hyperlipidemia)    Rheumatoid arthritis    TIA (transient ischemic attack)  2012    Esophageal reflux    Lung cancer  Right lung.    Chronic obstructive pulmonary disease, unspecified COPD type  O2 at night    Hiatal hernia with GERD    Essential hypertension    Hyperlipidemia, unspecified hyperlipidemia type    Calculus of gallbladder with chronic cholecystitis without obstruction    Childhood asthma    Stage 3 chronic kidney disease    Carotid artery plaque    IBS (irritable bowel syndrome)    Psoriatic arthritis    S/P lobectomy of lung  Right lung in 1996.    H/O colonoscopy    History of endoscopy  Sept 2017    S/P cholecystectomy    CAD (coronary artery disease)          Medication list         MEDICATIONS  (STANDING):  albuterol/ipratropium for Nebulization 3 milliLiter(s) Nebulizer every 6 hours  buDESOnide    Inhalation Suspension 0.5 milliGRAM(s) Inhalation two times a day  caspofungin IVPB 50 milliGRAM(s) IV Intermittent every 24 hours  caspofungin IVPB      chlorhexidine 0.12% Liquid 15 milliLiter(s) Oral Mucosa every 12 hours  chlorhexidine 2% Cloths 1 Application(s) Topical <User Schedule>  dexMEDEtomidine Infusion 1.2 MICROgram(s)/kG/Hr (15.6 mL/Hr) IV Continuous <Continuous>  dextrose 5%. 1000 milliLiter(s) (100 mL/Hr) IV Continuous <Continuous>  dextrose 5%. 1000 milliLiter(s) (50 mL/Hr) IV Continuous <Continuous>  dextrose 50% Injectable 25 Gram(s) IV Push once  dextrose 50% Injectable 12.5 Gram(s) IV Push once  dextrose 50% Injectable 25 Gram(s) IV Push once  dextrose Oral Gel 15 Gram(s) Oral once  glucagon  Injectable 1 milliGRAM(s) IntraMuscular once  guaifenesin/dextromethorphan Oral Liquid 10 milliLiter(s) Oral every 6 hours  heparin  Infusion.  Unit(s)/Hr (10 mL/Hr) IV Continuous <Continuous>  ketamine Infusion. 0.25 mG/kG/Hr (1.3 mL/Hr) IV Continuous <Continuous>  meropenem  IVPB 500 milliGRAM(s) IV Intermittent every 12 hours  pantoprazole   Suspension 40 milliGRAM(s) Oral two times a day    MEDICATIONS  (PRN):  fentaNYL    Injectable 50 MICROGram(s) IV Push every 2 hours PRN Mild Pain (1 - 3) or vent synchrony  heparin   Injectable 4000 Unit(s) IV Push every 6 hours PRN For aPTT less than 40  heparin   Injectable 2000 Unit(s) IV Push every 6 hours PRN For aPTT between 40 - 57         Vitals log        ICU Vital Signs Last 24 Hrs  T(C): 38.1 (18 Jan 2025 08:18), Max: 38.4 (17 Jan 2025 20:36)  T(F): 100.6 (18 Jan 2025 08:18), Max: 101.1 (17 Jan 2025 20:36)  HR: 73 (18 Jan 2025 08:06) (73 - 92)  BP: 147/53 (18 Jan 2025 07:30) (133/73 - 182/65)  BP(mean): 79 (18 Jan 2025 07:30) (78 - 96)  ABP: 137/50 (18 Jan 2025 03:30) (99/41 - 157/64)  ABP(mean): 81 (18 Jan 2025 03:30) (62 - 98)  RR: 15 (18 Jan 2025 08:00) (15 - 35)  SpO2: 95% (18 Jan 2025 08:06) (94% - 98%)    O2 Parameters below as of 17 Jan 2025 19:15  Patient On (Oxygen Delivery Method): ventilator             Mode: AC/ CMV (Assist Control/ Continuous Mandatory Ventilation)  RR (machine): 28  TV (machine): 350  FiO2: 40  PEEP: 5  ITime: 1  MAP: 14  PIP: 30      Input and Output:  I&O's Detail    17 Jan 2025 07:01  -  18 Jan 2025 07:00  --------------------------------------------------------  IN:    Dexmedetomidine: 468 mL    Heparin Infusion: 30 mL    Ketamine: 312 mL    Nepro with Carb Steady: 640 mL  Total IN: 1450 mL    OUT:    Indwelling Catheter - Urethral (mL): 2000 mL  Total OUT: 2000 mL    Total NET: -550 mL      18 Jan 2025 07:01  -  18 Jan 2025 09:21  --------------------------------------------------------  IN:    Dexmedetomidine: 39 mL    Heparin Infusion: 20 mL    Ketamine: 26 mL    Nepro with Carb Steady: 80 mL  Total IN: 165 mL    OUT:    Indwelling Catheter - Urethral (mL): 65 mL  Total OUT: 65 mL    Total NET: 100 mL          Lab Data                        7.5    14.07 )-----------( 89       ( 18 Jan 2025 03:14 )             22.7     01-18    143  |  109[H]  |  49[H]  ----------------------------<  110[H]  3.6   |  27  |  1.90[H]    Ca    8.6      18 Jan 2025 03:14  Phos  4.3     01-18  Mg     2.2     01-18    TPro  5.3[L]  /  Alb  1.8[L]  /  TBili  0.7  /  DBili  x   /  AST  134[H]  /  ALT  52  /  AlkPhos  1409[H]  01-18    ABG - ( 18 Jan 2025 03:04 )  pH, Arterial: 7.32  pH, Blood: x     /  pCO2: 51    /  pO2: 80    / HCO3: 26    / Base Excess: 0.2   /  SaO2: 96.3                    Review of Systems	      Objective     Physical Examination    heart s1s2  lung dec BS  head nc  head at  abd soft      Pertinent Lab findings & Imaging      Dante:  NO   Adequate UO     I&O's Detail    17 Jan 2025 07:01  -  18 Jan 2025 07:00  --------------------------------------------------------  IN:    Dexmedetomidine: 468 mL    Heparin Infusion: 30 mL    Ketamine: 312 mL    Nepro with Carb Steady: 640 mL  Total IN: 1450 mL    OUT:    Indwelling Catheter - Urethral (mL): 2000 mL  Total OUT: 2000 mL    Total NET: -550 mL      18 Jan 2025 07:01  -  18 Jan 2025 09:21  --------------------------------------------------------  IN:    Dexmedetomidine: 39 mL    Heparin Infusion: 20 mL    Ketamine: 26 mL    Nepro with Carb Steady: 80 mL  Total IN: 165 mL    OUT:    Indwelling Catheter - Urethral (mL): 65 mL  Total OUT: 65 mL    Total NET: 100 mL               Discussed with:     Cultures:	        Radiology

## 2025-01-19 NOTE — PROGRESS NOTE ADULT - SUBJECTIVE AND OBJECTIVE BOX
INTERVAL HPI/OVERNIGHT EVENTS:  No new overnight event.  No N/V/D.  Tolerating diet via ngt  intubated   at bedside  Allergies    penicillin (Other)  minocycline (Other)  Enbrel (Unknown)  IV Contrast (Hives; Rash)  Zofran (Other)  Levaquin (Other)    Intolerances    General:  No wt loss, fevers, chills, night sweats, fatigue,   Eyes:  Good vision, no reported pain  ENT:  No sore throat, pain, runny nose, dysphagia  CV:  No pain, palpitations, hypo/hypertension  Resp:  No dyspnea, cough, tachypnea, wheezing  GI:  No pain, No nausea, No vomiting, No diarrhea, No constipation, No weight loss, No fever, No pruritis, No rectal bleeding, No tarry stools, No dysphagia,  :  No pain, bleeding, incontinence, nocturia  Muscle:  No pain, weakness  Neuro:  No weakness, tingling, memory problems  Psych:  No fatigue, insomnia, mood problems, depression  Endocrine:  No polyuria, polydipsia, cold/heat intolerance  Heme:  No petechiae, ecchymosis, easy bruisability  Skin:  No rash, tattoos, scars, edema      PHYSICAL EXAM:   Vital Signs:  Vital Signs Last 24 Hrs  T(C): 37.4 (2025 04:18), Max: 37.4 (2025 16:58)  T(F): 99.3 (2025 04:18), Max: 99.3 (2025 16:58)  HR: 96 (2025 10:56) (64 - 112)  BP: 133/50 (2025 08:30) (93/55 - 196/153)  BP(mean): 75 (2025 08:30) (65 - 168)  RR: 31 (2025 08:30) (12 - 42)  SpO2: 91% (2025 10:56) (88% - 100%)    Parameters below as of 2025 08:01  Patient On (Oxygen Delivery Method): ventilator      Daily     Daily Weight in k (2025 01:30)I&O's Summary    2025 07:01  -  2025 07:00  --------------------------------------------------------  IN: 2644.8 mL / OUT: 895 mL / NET: 1749.8 mL    2025 07:01  -  2025 12:43  --------------------------------------------------------  IN: 75.9 mL / OUT: 0 mL / NET: 75.9 mL        GENERAL:  Appears stated age, well-groomed, well-nourished, no distress  HEENT:  NC/AT,  conjunctivae clear and pink, no thyromegaly, nodules, adenopathy, no JVD, sclera -anicteric  CHEST:  Full & symmetric excursion, no increased effort, breath sounds clear  HEART:  Regular rhythm, S1, S2, no murmur/rub/S3/S4, no abdominal bruit, no edema  ABDOMEN:  Soft, non-tender, non-distended, normoactive bowel sounds,  no masses ,no hepato-splenomegaly, no signs of chronic liver disease  EXTEREMITIES:  no cyanosis,clubbing or edema  SKIN:  No rash/erythema/ecchymoses/petechiae/wounds/abscess/warm/dry  NEURO:  Alert, oriented, no asterixis, no tremor, no encephalopathy      LABS:                        7.3    14.41 )-----------( 80       ( 2025 05:15 )             22.5         144  |  111[H]  |  63[H]  ----------------------------<  127[H]  3.6   |  27  |  2.00[H]    Ca    8.7      2025 05:15  Phos  4.8       Mg     2.3         TPro  5.1[L]  /  Alb  1.6[L]  /  TBili  0.6  /  DBili  x   /  AST  134[H]  /  ALT  59  /  AlkPhos  1537[H]      PT/INR - ( 2025 05:15 )   PT: 15.7 sec;   INR: 1.35 ratio         PTT - ( 2025 05:15 )  PTT:97.6 sec  Urinalysis Basic - ( 2025 05:15 )    Color: x / Appearance: x / SG: x / pH: x  Gluc: 127 mg/dL / Ketone: x  / Bili: x / Urobili: x   Blood: x / Protein: x / Nitrite: x   Leuk Esterase: x / RBC: x / WBC x   Sq Epi: x / Non Sq Epi: x / Bacteria: x      amylase   lipase  RADIOLOGY & ADDITIONAL TESTS:

## 2025-01-19 NOTE — PROGRESS NOTE ADULT - SUBJECTIVE AND OBJECTIVE BOX
Date of Service: 01-19-25 @ 13:38    Patient is a 79y old  Female who presents with a chief complaint of transferred from [place] s/p [facility] patch repair (19 Jan 2025 12:43)      INTERVAL HPI/OVERNIGHT EVENTS: Patient seen and examined. NAD. .    Vital Signs Last 24 Hrs  T(C): 37.5 (19 Jan 2025 12:00), Max: 37.5 (19 Jan 2025 12:00)  T(F): 99.5 (19 Jan 2025 12:00), Max: 99.5 (19 Jan 2025 12:00)  HR: 91 (19 Jan 2025 13:00) (64 - 113)  BP: 135/49 (19 Jan 2025 13:00) (93/55 - 196/153)  BP(mean): 73 (19 Jan 2025 13:00) (65 - 168)  RR: 56 (19 Jan 2025 13:00) (12 - 56)  SpO2: 90% (19 Jan 2025 13:00) (88% - 100%)    Parameters below as of 19 Jan 2025 08:01  Patient On (Oxygen Delivery Method): ventilator        01-19    144  |  111[H]  |  63[H]  ----------------------------<  127[H]  3.6   |  27  |  2.00[H]    Ca    8.7      19 Jan 2025 05:15  Phos  4.8     01-19  Mg     2.3     01-19    TPro  5.1[L]  /  Alb  1.6[L]  /  TBili  0.6  /  DBili  x   /  AST  134[H]  /  ALT  59  /  AlkPhos  1537[H]  01-19                          7.3    14.41 )-----------( 80       ( 19 Jan 2025 05:15 )             22.5     PT/INR - ( 19 Jan 2025 05:15 )   PT: 15.7 sec;   INR: 1.35 ratio         PTT - ( 19 Jan 2025 12:20 )  PTT:96.0 sec  CAPILLARY BLOOD GLUCOSE      POCT Blood Glucose.: 125 mg/dL (19 Jan 2025 11:13)  POCT Blood Glucose.: 128 mg/dL (19 Jan 2025 05:29)  POCT Blood Glucose.: 162 mg/dL (18 Jan 2025 23:17)  POCT Blood Glucose.: 163 mg/dL (18 Jan 2025 17:14)    Urinalysis Basic - ( 19 Jan 2025 05:15 )    Color: x / Appearance: x / SG: x / pH: x  Gluc: 127 mg/dL / Ketone: x  / Bili: x / Urobili: x   Blood: x / Protein: x / Nitrite: x   Leuk Esterase: x / RBC: x / WBC x   Sq Epi: x / Non Sq Epi: x / Bacteria: x              albuterol/ipratropium for Nebulization 3 milliLiter(s) Nebulizer every 6 hours  buDESOnide    Inhalation Suspension 0.5 milliGRAM(s) Inhalation two times a day  caspofungin IVPB 50 milliGRAM(s) IV Intermittent every 24 hours  caspofungin IVPB      chlorhexidine 0.12% Liquid 15 milliLiter(s) Oral Mucosa every 12 hours  chlorhexidine 2% Cloths 1 Application(s) Topical <User Schedule>  dexMEDEtomidine Infusion 1.2 MICROgram(s)/kG/Hr IV Continuous <Continuous>  dextrose 5%. 1000 milliLiter(s) IV Continuous <Continuous>  dextrose 5%. 1000 milliLiter(s) IV Continuous <Continuous>  dextrose 50% Injectable 25 Gram(s) IV Push once  dextrose 50% Injectable 12.5 Gram(s) IV Push once  dextrose 50% Injectable 25 Gram(s) IV Push once  dextrose Oral Gel 15 Gram(s) Oral once  fentaNYL   Infusion. 0.501 MICROgram(s)/kG/Hr IV Continuous <Continuous>  glucagon  Injectable 1 milliGRAM(s) IntraMuscular once  guaifenesin/dextromethorphan Oral Liquid 10 milliLiter(s) Oral every 6 hours  heparin   Injectable 4000 Unit(s) IV Push every 6 hours PRN  heparin   Injectable 2000 Unit(s) IV Push every 6 hours PRN  heparin  Infusion.  Unit(s)/Hr IV Continuous <Continuous>  ketamine Infusion. 0.25 mG/kG/Hr IV Continuous <Continuous>  labetalol Injectable 10 milliGRAM(s) IV Push daily PRN  meropenem  IVPB 500 milliGRAM(s) IV Intermittent every 12 hours  midazolam Injectable 2 milliGRAM(s) IV Push daily PRN  pantoprazole   Suspension 40 milliGRAM(s) Oral two times a day              REVIEW OF SYSTEMS: unobtainable   CONSTITUTIONAL: No fever, no weight loss, or no fatigue  NECK: No pain, no stiffness  RESPIRATORY: No cough, no wheezing, no chills, no hemoptysis, No shortness of breath  CARDIOVASCULAR: No chest pain, no palpitations, no dizziness, no leg swelling  GASTROINTESTINAL: No abdominal pain. No nausea, no vomiting, no hematemesis; No diarrhea, no constipation. No melena, no hematochezia.  GENITOURINARY: No dysuria, no frequency, no hematuria, no incontinence  NEUROLOGICAL: No headaches, no loss of strength, no numbness, no tremors  SKIN: No itching, no burning  MUSCULOSKELETAL: No joint pain, no swelling; No muscle, no back, no extremity pain  PSYCHIATRIC: No depression, no mood swings,   HEME/LYMPH: No easy bruising, no bleeding gums  ALLERY AND IMMUNOLOGIC: No hives       Consultant(s) Notes Reviewed:  [X] YES  [ ] NO    PHYSICAL EXAM:  GENERAL: NAD  HEAD:  Atraumatic, Normocephalic  ENMT: intubated  NECK: Supple, No JVD  NERVOUS SYSTEM:  sedated  CHEST/LUNG: Clear to auscultation bilaterally; No rales, rhonchi, wheezing,  HEART: Regular rate and rhythm  ABDOMEN: Soft,   EXTREMITIES:  No clubbing, cyanosis, or edema  LYMPH: No lymphadenopathy noted  SKIN: No rashes      Advanced care planning discussed with patient/family [X] YES   [ ] NO    Advanced care planning discussed with patient/family. Patient's health status was discussed. All appropriate changes have been made regarding patient's end-of-life care. Advanced care planning forms reviewed/discussed/completed.  20 minutes spent.

## 2025-01-19 NOTE — PROGRESS NOTE ADULT - SUBJECTIVE AND OBJECTIVE BOX
Patient is a 79y old  Female who presents with a chief complaint of transferred from [place] s/p [facility] patch repair (19 Jan 2025 08:35)    Interval events:     Review of Systems:  Constitutional: no fever, chills, fatigue  Neuro: no headache, numbness, weakness  Resp: no cough, wheezing, shortness of breath  CVS: no chest pain, palpitations, leg swelling  GI: no abdominal pain, nausea, vomiting, diarrhea   : no dysuria, frequency, incontinence  Skin: no itching, burning, rashes, or lesions   Msk: no joint pain or swelling  Psych: no depression, anxiety    T(F): 99.3 (01-19-25 @ 04:18), Max: 102.2 (01-18-25 @ 10:30)  HR: 76 (01-19-25 @ 08:30) (64 - 112)  BP: 133/50 (01-19-25 @ 08:30) (92/43 - 196/153)  RR: 31 (01-19-25 @ 08:30) (12 - 42)  SpO2: 96% (01-19-25 @ 08:30) (88% - 100%)  Wt(kg): --    Mode: AC/ CMV (Assist Control/ Continuous Mandatory Ventilation), RR (machine): 30, TV (machine): 350, FiO2: 40, PEEP: 5    CAPILLARY BLOOD GLUCOSE      POCT Blood Glucose.: 128 mg/dL (19 Jan 2025 05:29)    I&O's Summary    18 Jan 2025 07:01  -  19 Jan 2025 07:00  --------------------------------------------------------  IN: 2644.8 mL / OUT: 895 mL / NET: 1749.8 mL    19 Jan 2025 07:01  -  19 Jan 2025 09:29  --------------------------------------------------------  IN: 75.9 mL / OUT: 0 mL / NET: 75.9 mL        Physical Exam:     Gen:  Neuro:  HEENT:  CV:  Pulm:  GI:  Ext:  Skin:    Meds:  MEDICATIONS  (STANDING):  albuterol/ipratropium for Nebulization 3 milliLiter(s) Nebulizer every 6 hours  buDESOnide    Inhalation Suspension 0.5 milliGRAM(s) Inhalation two times a day  caspofungin IVPB 50 milliGRAM(s) IV Intermittent every 24 hours  caspofungin IVPB      chlorhexidine 0.12% Liquid 15 milliLiter(s) Oral Mucosa every 12 hours  chlorhexidine 2% Cloths 1 Application(s) Topical <User Schedule>  dexMEDEtomidine Infusion 1.2 MICROgram(s)/kG/Hr (15.6 mL/Hr) IV Continuous <Continuous>  dextrose 5%. 1000 milliLiter(s) (100 mL/Hr) IV Continuous <Continuous>  dextrose 5%. 1000 milliLiter(s) (50 mL/Hr) IV Continuous <Continuous>  dextrose 50% Injectable 25 Gram(s) IV Push once  dextrose 50% Injectable 12.5 Gram(s) IV Push once  dextrose 50% Injectable 25 Gram(s) IV Push once  dextrose Oral Gel 15 Gram(s) Oral once  glucagon  Injectable 1 milliGRAM(s) IntraMuscular once  guaifenesin/dextromethorphan Oral Liquid 10 milliLiter(s) Oral every 6 hours  heparin  Infusion.  Unit(s)/Hr (10 mL/Hr) IV Continuous <Continuous>  ketamine Infusion. 0.25 mG/kG/Hr (1.3 mL/Hr) IV Continuous <Continuous>  meropenem  IVPB 500 milliGRAM(s) IV Intermittent every 12 hours  pantoprazole   Suspension 40 milliGRAM(s) Oral two times a day    MEDICATIONS  (PRN):  heparin   Injectable 4000 Unit(s) IV Push every 6 hours PRN For aPTT less than 40  heparin   Injectable 2000 Unit(s) IV Push every 6 hours PRN For aPTT between 40 - 57                            7.3    14.41 )-----------( 80       ( 19 Jan 2025 05:15 )             22.5     Bands 5.0    01-19    144  |  111[H]  |  63[H]  ----------------------------<  127[H]  3.6   |  27  |  2.00[H]    Ca    8.7      19 Jan 2025 05:15  Phos  4.8     01-19  Mg     2.3     01-19    TPro  5.1[L]  /  Alb  1.6[L]  /  TBili  0.6  /  DBili  x   /  AST  134[H]  /  ALT  59  /  AlkPhos  1537[H]  01-19          PT/INR - ( 19 Jan 2025 05:15 )   PT: 15.7 sec;   INR: 1.35 ratio         PTT - ( 19 Jan 2025 05:15 )  PTT:97.6 sec  Urinalysis Basic - ( 19 Jan 2025 05:15 )    Color: x / Appearance: x / SG: x / pH: x  Gluc: 127 mg/dL / Ketone: x  / Bili: x / Urobili: x   Blood: x / Protein: x / Nitrite: x   Leuk Esterase: x / RBC: x / WBC x   Sq Epi: x / Non Sq Epi: x / Bacteria: x      Bronchial   Commensal merline consistent with body site   Few polymorphonuclear leukocytes seen per low power field  Rare Squamous epithelial cells seen per low power field  Rare Gram positive cocci in pairs seen per oil power field 01-17 @ 16:40  .Blood BLOOD   No growth at 4 days -- 01-14 @ 10:10  .Blood BLOOD   No growth at 4 days -- 01-14 @ 09:40        ABG - ( 19 Jan 2025 04:52 )  pH, Arterial: 7.29  pH, Blood: x     /  pCO2: 54    /  pO2: 72    / HCO3: 26    / Base Excess: -0.8  /  SaO2: 93.2                  CENTRAL LINE: Y (left IJ)  DE LA FUENTE: Y        OG Tube: Y   A-LINE: N        CODE STATUS: full code Patient is a 79y old  Female who presents with a chief complaint of transferred from [place] s/p [facility] patch repair (19 Jan 2025 08:35)    Interval events: Pt was afebrile overnight. Overnight attempted to wean off ketamine and pt became tachypneic. Was given Versed 2 mg IV x 1. PRN Fentanyl pushes were increased to the 75 mcg dose. BP was 196/133 and hydralazine 10 mg IV x 1 was given. Pt remains on precedex and ketamine. Will attempt to wean off ketamine and switch to fentanyl for alternative sedation.     Review of Systems: Unable to obtain 2/2 patient intubated and sedated    T(F): 99.3 (01-19-25 @ 04:18), Max: 102.2 (01-18-25 @ 10:30)  HR: 76 (01-19-25 @ 08:30) (64 - 112)  BP: 133/50 (01-19-25 @ 08:30) (92/43 - 196/153)  RR: 31 (01-19-25 @ 08:30) (12 - 42)  SpO2: 96% (01-19-25 @ 08:30) (88% - 100%)  Wt(kg): --    Mode: AC/ CMV (Assist Control/ Continuous Mandatory Ventilation), RR (machine): 30, TV (machine): 350, FiO2: 40, PEEP: 5    CAPILLARY BLOOD GLUCOSE      POCT Blood Glucose.: 128 mg/dL (19 Jan 2025 05:29)    I&O's Summary    18 Jan 2025 07:01  -  19 Jan 2025 07:00  --------------------------------------------------------  IN: 2644.8 mL / OUT: 895 mL / NET: 1749.8 mL    19 Jan 2025 07:01  -  19 Jan 2025 09:29  --------------------------------------------------------  IN: 75.9 mL / OUT: 0 mL / NET: 75.9 mL        Physical Exam:   GENERAL: critically ill, intubated and sedated  HEAD:  Atraumatic, Normocephalic, intubated  EYES: PERRLA, asymmetric pupils, right > left, both pupils reactive   CHEST/LUNG: diffuse rhonchi bilaterally   HEART: Regular rate and rhythm, S1S2  ABDOMEN:  Soft, nontender, nondistended, arcos draining yellow urine  EXTREMITIES:  B/l UE swelling, L>R upper extremity. ecchymosis of left hand.   NERVOUS SYSTEM:  Patient sedated    Meds:  MEDICATIONS  (STANDING):  albuterol/ipratropium for Nebulization 3 milliLiter(s) Nebulizer every 6 hours  buDESOnide    Inhalation Suspension 0.5 milliGRAM(s) Inhalation two times a day  caspofungin IVPB 50 milliGRAM(s) IV Intermittent every 24 hours  caspofungin IVPB      chlorhexidine 0.12% Liquid 15 milliLiter(s) Oral Mucosa every 12 hours  chlorhexidine 2% Cloths 1 Application(s) Topical <User Schedule>  dexMEDEtomidine Infusion 1.2 MICROgram(s)/kG/Hr (15.6 mL/Hr) IV Continuous <Continuous>  dextrose 5%. 1000 milliLiter(s) (100 mL/Hr) IV Continuous <Continuous>  dextrose 5%. 1000 milliLiter(s) (50 mL/Hr) IV Continuous <Continuous>  dextrose 50% Injectable 25 Gram(s) IV Push once  dextrose 50% Injectable 12.5 Gram(s) IV Push once  dextrose 50% Injectable 25 Gram(s) IV Push once  dextrose Oral Gel 15 Gram(s) Oral once  glucagon  Injectable 1 milliGRAM(s) IntraMuscular once  guaifenesin/dextromethorphan Oral Liquid 10 milliLiter(s) Oral every 6 hours  heparin  Infusion.  Unit(s)/Hr (10 mL/Hr) IV Continuous <Continuous>  ketamine Infusion. 0.25 mG/kG/Hr (1.3 mL/Hr) IV Continuous <Continuous>  meropenem  IVPB 500 milliGRAM(s) IV Intermittent every 12 hours  pantoprazole   Suspension 40 milliGRAM(s) Oral two times a day    MEDICATIONS  (PRN):  heparin   Injectable 4000 Unit(s) IV Push every 6 hours PRN For aPTT less than 40  heparin   Injectable 2000 Unit(s) IV Push every 6 hours PRN For aPTT between 40 - 57                            7.3    14.41 )-----------( 80       ( 19 Jan 2025 05:15 )             22.5     Bands 5.0    01-19    144  |  111[H]  |  63[H]  ----------------------------<  127[H]  3.6   |  27  |  2.00[H]    Ca    8.7      19 Jan 2025 05:15  Phos  4.8     01-19  Mg     2.3     01-19    TPro  5.1[L]  /  Alb  1.6[L]  /  TBili  0.6  /  DBili  x   /  AST  134[H]  /  ALT  59  /  AlkPhos  1537[H]  01-19          PT/INR - ( 19 Jan 2025 05:15 )   PT: 15.7 sec;   INR: 1.35 ratio         PTT - ( 19 Jan 2025 05:15 )  PTT:97.6 sec  Urinalysis Basic - ( 19 Jan 2025 05:15 )    Color: x / Appearance: x / SG: x / pH: x  Gluc: 127 mg/dL / Ketone: x  / Bili: x / Urobili: x   Blood: x / Protein: x / Nitrite: x   Leuk Esterase: x / RBC: x / WBC x   Sq Epi: x / Non Sq Epi: x / Bacteria: x      Bronchial   Commensal merline consistent with body site   Few polymorphonuclear leukocytes seen per low power field  Rare Squamous epithelial cells seen per low power field  Rare Gram positive cocci in pairs seen per oil power field 01-17 @ 16:40  .Blood BLOOD   No growth at 4 days -- 01-14 @ 10:10  .Blood BLOOD   No growth at 4 days -- 01-14 @ 09:40        ABG - ( 19 Jan 2025 04:52 )  pH, Arterial: 7.29  pH, Blood: x     /  pCO2: 54    /  pO2: 72    / HCO3: 26    / Base Excess: -0.8  /  SaO2: 93.2                  CENTRAL LINE: Y (left IJ)  ARCOS: Y        OG Tube: Y   A-LINE: N        CODE STATUS: full code Patient is a 79y old  Female who presents with a chief complaint of transferred from [place] s/p [facility] patch repair (19 Jan 2025 08:35)    Interval events: Pt was afebrile overnight. Overnight attempted to wean off ketamine and pt became tachypneic. Was given Versed 2 mg IV x 1. PRN Fentanyl pushes were increased to the 75 mcg dose. BP was 196/133 and hydralazine 10 mg IV x 1 was given. Pt remains on precedex and ketamine. Will attempt to wean off ketamine and switch to fentanyl for alternative sedation.     Review of Systems: Unable to obtain 2/2 patient intubated and sedated    T(F): 99.3 (01-19-25 @ 04:18), Max: 102.2 (01-18-25 @ 10:30)  HR: 76 (01-19-25 @ 08:30) (64 - 112)  BP: 133/50 (01-19-25 @ 08:30) (92/43 - 196/153)  RR: 31 (01-19-25 @ 08:30) (12 - 42)  SpO2: 96% (01-19-25 @ 08:30) (88% - 100%)  Wt(kg): --    Mode: AC/ CMV (Assist Control/ Continuous Mandatory Ventilation), RR (machine): 30, TV (machine): 350, FiO2: 40, PEEP: 5    CAPILLARY BLOOD GLUCOSE      POCT Blood Glucose.: 128 mg/dL (19 Jan 2025 05:29)    I&O's Summary    18 Jan 2025 07:01  -  19 Jan 2025 07:00  --------------------------------------------------------  IN: 2644.8 mL / OUT: 895 mL / NET: 1749.8 mL    19 Jan 2025 07:01  -  19 Jan 2025 09:29  --------------------------------------------------------  IN: 75.9 mL / OUT: 0 mL / NET: 75.9 mL        Physical Exam:   GENERAL: critically ill, intubated and sedated  HEAD:  Atraumatic, Normocephalic, intubated  EYES: PERRLA, asymmetric pupils, right > left, both pupils reactive   CHEST/LUNG: diffuse rhonchi bilaterally   HEART: Regular rate and rhythm, S1S2  ABDOMEN:  Soft, nontender, nondistended, arcos draining yellow urine  EXTREMITIES:  B/l UE swelling, L>R upper extremity. ecchymosis of left hand.   NERVOUS SYSTEM:  Patient sedated    Meds:  MEDICATIONS  (STANDING):  albuterol/ipratropium for Nebulization 3 milliLiter(s) Nebulizer every 6 hours  buDESOnide    Inhalation Suspension 0.5 milliGRAM(s) Inhalation two times a day  caspofungin IVPB 50 milliGRAM(s) IV Intermittent every 24 hours  caspofungin IVPB      chlorhexidine 0.12% Liquid 15 milliLiter(s) Oral Mucosa every 12 hours  chlorhexidine 2% Cloths 1 Application(s) Topical <User Schedule>  dexMEDEtomidine Infusion 1.2 MICROgram(s)/kG/Hr (15.6 mL/Hr) IV Continuous <Continuous>  dextrose 5%. 1000 milliLiter(s) (100 mL/Hr) IV Continuous <Continuous>  dextrose 5%. 1000 milliLiter(s) (50 mL/Hr) IV Continuous <Continuous>  dextrose 50% Injectable 25 Gram(s) IV Push once  dextrose 50% Injectable 12.5 Gram(s) IV Push once  dextrose 50% Injectable 25 Gram(s) IV Push once  dextrose Oral Gel 15 Gram(s) Oral once  glucagon  Injectable 1 milliGRAM(s) IntraMuscular once  guaifenesin/dextromethorphan Oral Liquid 10 milliLiter(s) Oral every 6 hours  heparin  Infusion.  Unit(s)/Hr (10 mL/Hr) IV Continuous <Continuous>  ketamine Infusion. 0.25 mG/kG/Hr (1.3 mL/Hr) IV Continuous <Continuous>  meropenem  IVPB 500 milliGRAM(s) IV Intermittent every 12 hours  pantoprazole   Suspension 40 milliGRAM(s) Oral two times a day    MEDICATIONS  (PRN):  heparin   Injectable 4000 Unit(s) IV Push every 6 hours PRN For aPTT less than 40  heparin   Injectable 2000 Unit(s) IV Push every 6 hours PRN For aPTT between 40 - 57                            7.3    14.41 )-----------( 80       ( 19 Jan 2025 05:15 )             22.5     Bands 5.0    01-19    144  |  111[H]  |  63[H]  ----------------------------<  127[H]  3.6   |  27  |  2.00[H]    Ca    8.7      19 Jan 2025 05:15  Phos  4.8     01-19  Mg     2.3     01-19    TPro  5.1[L]  /  Alb  1.6[L]  /  TBili  0.6  /  DBili  x   /  AST  134[H]  /  ALT  59  /  AlkPhos  1537[H]  01-19          PT/INR - ( 19 Jan 2025 05:15 )   PT: 15.7 sec;   INR: 1.35 ratio         PTT - ( 19 Jan 2025 05:15 )  PTT:97.6 sec  Urinalysis Basic - ( 19 Jan 2025 05:15 )    Color: x / Appearance: x / SG: x / pH: x  Gluc: 127 mg/dL / Ketone: x  / Bili: x / Urobili: x   Blood: x / Protein: x / Nitrite: x   Leuk Esterase: x / RBC: x / WBC x   Sq Epi: x / Non Sq Epi: x / Bacteria: x      Bronchial   Commensal merline consistent with body site   Few polymorphonuclear leukocytes seen per low power field  Rare Squamous epithelial cells seen per low power field  Rare Gram positive cocci in pairs seen per oil power field 01-17 @ 16:40  .Blood BLOOD   No growth at 4 days -- 01-14 @ 10:10  .Blood BLOOD   No growth at 4 days -- 01-14 @ 09:40        ABG - ( 19 Jan 2025 04:52 )  pH, Arterial: 7.29  pH, Blood: x     /  pCO2: 54    /  pO2: 72    / HCO3: 26    / Base Excess: -0.8  /  SaO2: 93.2          CENTRAL LINE: Y (left IJ)  ARCOS: Y        OG Tube: Y   A-LINE: N        CODE STATUS: full code

## 2025-01-19 NOTE — PROGRESS NOTE ADULT - ATTENDING COMMENTS
80 yo woman with HTN, HLD, CKD, PUD, Lung Cancer s/p RUL lobectomy who presents with a perforated gastric ulcer s/p laparoscopic repair with Srinath Patch 1/9.  Postop course complicated by prolonged intubation for hypercapnic respiratory failure 2/2 aspiration, ESPERANZA 2/2 ATN, and now likely aspiration pneumonia with recurrent hypoxemic respiratory failure, septic shock, lactic acidosis, and ESPERANZA.  Continues to have difficulty with vent weaning.    --transition ketamine to fentanyl gtt, continue precedex, prn versed  --htn, add prn labetalol  --acute hypoxemic respiratory failure  failed PS 10/5 within minutes with rapid shallow breathing  COPD, continue bronchdilators, robitussin for secretion clearance  --s/p Srinath patch for perforated  1/9  PPI bid for PUD  continue TF  elevated alk phos now improving, ?related to antifungals, monitor  liver lesion, MRI when stable  --ESPERANZA persists, likely ATN from septic shock  continue supportive care  --septic shock from aspiration pneumonia, fever curve improving  continue meropenem, caspo  f/u bronch Cx  --anemia, stable  LUE DVT, continue AC with heparin  -- updated

## 2025-01-19 NOTE — PROGRESS NOTE ADULT - SUBJECTIVE AND OBJECTIVE BOX
Date/Time Patient Seen:  		  Referring MD:   Data Reviewed	       Patient is a 79y old  Female who presents with a chief complaint of transferred from [place] s/p grahm patch repair (18 Jan 2025 20:42)      Subjective/HPI     PAST MEDICAL & SURGICAL HISTORY:  Asthma    HLD (hyperlipidemia)    Rheumatoid arthritis    TIA (transient ischemic attack)  2012    Esophageal reflux    Lung cancer  Right lung.    Chronic obstructive pulmonary disease, unspecified COPD type  O2 at night    Hiatal hernia with GERD    Essential hypertension    Hyperlipidemia, unspecified hyperlipidemia type    Calculus of gallbladder with chronic cholecystitis without obstruction    Childhood asthma    Stage 3 chronic kidney disease    Carotid artery plaque    IBS (irritable bowel syndrome)    Psoriatic arthritis    S/P lobectomy of lung  Right lung in 1996.    H/O colonoscopy    History of endoscopy  Sept 2017    S/P cholecystectomy    CAD (coronary artery disease)          Medication list         MEDICATIONS  (STANDING):  albuterol/ipratropium for Nebulization 3 milliLiter(s) Nebulizer every 6 hours  buDESOnide    Inhalation Suspension 0.5 milliGRAM(s) Inhalation two times a day  caspofungin IVPB 50 milliGRAM(s) IV Intermittent every 24 hours  caspofungin IVPB      chlorhexidine 0.12% Liquid 15 milliLiter(s) Oral Mucosa every 12 hours  chlorhexidine 2% Cloths 1 Application(s) Topical <User Schedule>  dexMEDEtomidine Infusion 1.2 MICROgram(s)/kG/Hr (15.6 mL/Hr) IV Continuous <Continuous>  dextrose 5%. 1000 milliLiter(s) (100 mL/Hr) IV Continuous <Continuous>  dextrose 5%. 1000 milliLiter(s) (50 mL/Hr) IV Continuous <Continuous>  dextrose 50% Injectable 25 Gram(s) IV Push once  dextrose 50% Injectable 12.5 Gram(s) IV Push once  dextrose 50% Injectable 25 Gram(s) IV Push once  dextrose Oral Gel 15 Gram(s) Oral once  glucagon  Injectable 1 milliGRAM(s) IntraMuscular once  guaifenesin/dextromethorphan Oral Liquid 10 milliLiter(s) Oral every 6 hours  heparin  Infusion.  Unit(s)/Hr (10 mL/Hr) IV Continuous <Continuous>  ketamine Infusion. 0.25 mG/kG/Hr (1.3 mL/Hr) IV Continuous <Continuous>  meropenem  IVPB 500 milliGRAM(s) IV Intermittent every 12 hours  pantoprazole   Suspension 40 milliGRAM(s) Oral two times a day    MEDICATIONS  (PRN):  fentaNYL    Injectable 75 MICROGram(s) IV Push every 2 hours PRN Mild Pain (1 - 3), or vent synchrony  heparin   Injectable 4000 Unit(s) IV Push every 6 hours PRN For aPTT less than 40  heparin   Injectable 2000 Unit(s) IV Push every 6 hours PRN For aPTT between 40 - 57         Vitals log        ICU Vital Signs Last 24 Hrs  T(C): 37.4 (19 Jan 2025 04:18), Max: 39 (18 Jan 2025 10:30)  T(F): 99.3 (19 Jan 2025 04:18), Max: 102.2 (18 Jan 2025 10:30)  HR: 70 (19 Jan 2025 08:01) (64 - 112)  BP: 118/58 (19 Jan 2025 08:00) (92/43 - 196/153)  BP(mean): 74 (19 Jan 2025 08:00) (56 - 168)  ABP: --  ABP(mean): --  RR: 24 (19 Jan 2025 08:00) (12 - 42)  SpO2: 97% (19 Jan 2025 08:01) (88% - 100%)    O2 Parameters below as of 19 Jan 2025 08:01  Patient On (Oxygen Delivery Method): ventilator             Mode: AC/ CMV (Assist Control/ Continuous Mandatory Ventilation)  RR (machine): 30  TV (machine): 350  FiO2: 40  PEEP: 5  ITime: 1  MAP: 19  PIP: 36      Input and Output:  I&O's Detail    18 Jan 2025 07:01  -  19 Jan 2025 07:00  --------------------------------------------------------  IN:    Dexmedetomidine: 467 mL    Heparin Infusion: 176 mL    IV PiggyBack: 260 mL    IV PiggyBack: 50 mL    Ketamine: 231.8 mL    Lactated Ringers Bolus: 500 mL    Nepro with Carb Steady: 960 mL  Total IN: 2644.8 mL    OUT:    Indwelling Catheter - Urethral (mL): 895 mL  Total OUT: 895 mL    Total NET: 1749.8 mL      19 Jan 2025 07:01  -  19 Jan 2025 08:35  --------------------------------------------------------  IN:    Dexmedetomidine: 19.5 mL    Heparin Infusion: 6 mL    Ketamine: 10.4 mL    Nepro with Carb Steady: 40 mL  Total IN: 75.9 mL    OUT:  Total OUT: 0 mL    Total NET: 75.9 mL          Lab Data                        7.3    14.41 )-----------( 80       ( 19 Jan 2025 05:15 )             22.5     01-19    144  |  111[H]  |  63[H]  ----------------------------<  127[H]  3.6   |  27  |  2.00[H]    Ca    8.7      19 Jan 2025 05:15  Phos  4.8     01-19  Mg     2.3     01-19    TPro  5.1[L]  /  Alb  1.6[L]  /  TBili  0.6  /  DBili  x   /  AST  134[H]  /  ALT  59  /  AlkPhos  1537[H]  01-19    ABG - ( 19 Jan 2025 04:52 )  pH, Arterial: 7.29  pH, Blood: x     /  pCO2: 54    /  pO2: 72    / HCO3: 26    / Base Excess: -0.8  /  SaO2: 93.2                    Review of Systems	      Objective     Physical Examination  heart s1s2  lung dc bS  hea dnc  head at  abd soft        Pertinent Lab findings & Imaging      Dante:  NO   Adequate UO     I&O's Detail    18 Jan 2025 07:01  -  19 Jan 2025 07:00  --------------------------------------------------------  IN:    Dexmedetomidine: 467 mL    Heparin Infusion: 176 mL    IV PiggyBack: 260 mL    IV PiggyBack: 50 mL    Ketamine: 231.8 mL    Lactated Ringers Bolus: 500 mL    Nepro with Carb Steady: 960 mL  Total IN: 2644.8 mL    OUT:    Indwelling Catheter - Urethral (mL): 895 mL  Total OUT: 895 mL    Total NET: 1749.8 mL      19 Jan 2025 07:01  -  19 Jan 2025 08:35  --------------------------------------------------------  IN:    Dexmedetomidine: 19.5 mL    Heparin Infusion: 6 mL    Ketamine: 10.4 mL    Nepro with Carb Steady: 40 mL  Total IN: 75.9 mL    OUT:  Total OUT: 0 mL    Total NET: 75.9 mL               Discussed with:     Cultures:	        Radiology

## 2025-01-19 NOTE — PROGRESS NOTE ADULT - SUBJECTIVE AND OBJECTIVE BOX
SUBJECTIVE:  Patient seen and examined at bedside. Patient still intubated and sedated     VITALS  Vital Signs Last 24 Hrs  T(C): 37.4 (19 Jan 2025 04:18), Max: 38.8 (18 Jan 2025 11:44)  T(F): 99.3 (19 Jan 2025 04:18), Max: 101.9 (18 Jan 2025 11:44)  HR: 76 (19 Jan 2025 08:30) (64 - 112)  BP: 133/50 (19 Jan 2025 08:30) (92/43 - 196/153)  BP(mean): 75 (19 Jan 2025 08:30) (56 - 168)  RR: 31 (19 Jan 2025 08:30) (12 - 42)  SpO2: 96% (19 Jan 2025 08:30) (88% - 100%)    Parameters below as of 19 Jan 2025 08:01  Patient On (Oxygen Delivery Method): ventilator    PHYSICAL EXAM  GENERAL:  Intubated and sedated  ABDOMEN:  Soft, non-tender, non-distended.  EXTREMITIES: No calf tenderness bilaterally. Left arm swelling more than right    INTAKE & OUTPUT  I&O's Summary    18 Jan 2025 07:01  -  19 Jan 2025 07:00  --------------------------------------------------------  IN: 2644.8 mL / OUT: 895 mL / NET: 1749.8 mL    19 Jan 2025 07:01  -  19 Jan 2025 10:57  --------------------------------------------------------  IN: 75.9 mL / OUT: 0 mL / NET: 75.9 mL    I&O's Detail    18 Jan 2025 07:01  -  19 Jan 2025 07:00  --------------------------------------------------------  IN:    Dexmedetomidine: 467 mL    Heparin Infusion: 176 mL    IV PiggyBack: 260 mL    IV PiggyBack: 50 mL    Ketamine: 231.8 mL    Lactated Ringers Bolus: 500 mL    Nepro with Carb Steady: 960 mL  Total IN: 2644.8 mL    OUT:    Indwelling Catheter - Urethral (mL): 895 mL  Total OUT: 895 mL    Total NET: 1749.8 mL    19 Jan 2025 07:01  -  19 Jan 2025 10:57  --------------------------------------------------------  IN:    Dexmedetomidine: 19.5 mL    Heparin Infusion: 6 mL    Ketamine: 10.4 mL    Nepro with Carb Steady: 40 mL  Total IN: 75.9 mL    OUT:  Total OUT: 0 mL    Total NET: 75.9 mL    MEDICATIONS  MEDICATIONS  (STANDING):  albuterol/ipratropium for Nebulization 3 milliLiter(s) Nebulizer every 6 hours  buDESOnide    Inhalation Suspension 0.5 milliGRAM(s) Inhalation two times a day  caspofungin IVPB 50 milliGRAM(s) IV Intermittent every 24 hours  caspofungin IVPB      chlorhexidine 0.12% Liquid 15 milliLiter(s) Oral Mucosa every 12 hours  chlorhexidine 2% Cloths 1 Application(s) Topical <User Schedule>  dexMEDEtomidine Infusion 1.2 MICROgram(s)/kG/Hr (15.6 mL/Hr) IV Continuous <Continuous>  dextrose 5%. 1000 milliLiter(s) (100 mL/Hr) IV Continuous <Continuous>  dextrose 5%. 1000 milliLiter(s) (50 mL/Hr) IV Continuous <Continuous>  dextrose 50% Injectable 25 Gram(s) IV Push once  dextrose 50% Injectable 12.5 Gram(s) IV Push once  dextrose 50% Injectable 25 Gram(s) IV Push once  dextrose Oral Gel 15 Gram(s) Oral once  fentaNYL   Infusion. 0.5 MICROgram(s)/kG/Hr (2.6 mL/Hr) IV Continuous <Continuous>  glucagon  Injectable 1 milliGRAM(s) IntraMuscular once  guaifenesin/dextromethorphan Oral Liquid 10 milliLiter(s) Oral every 6 hours  heparin  Infusion.  Unit(s)/Hr (10 mL/Hr) IV Continuous <Continuous>  ketamine Infusion. 0.25 mG/kG/Hr (1.3 mL/Hr) IV Continuous <Continuous>  meropenem  IVPB 500 milliGRAM(s) IV Intermittent every 12 hours  pantoprazole   Suspension 40 milliGRAM(s) Oral two times a day    MEDICATIONS  (PRN):  heparin   Injectable 4000 Unit(s) IV Push every 6 hours PRN For aPTT less than 40  heparin   Injectable 2000 Unit(s) IV Push every 6 hours PRN For aPTT between 40 - 57  labetalol Injectable 10 milliGRAM(s) IV Push daily PRN SBP >160  midazolam Injectable 2 milliGRAM(s) IV Push daily PRN discomfort      LABS:                        7.3    14.41 )-----------( 80       ( 19 Jan 2025 05:15 )             22.5     144  |  111[H]  |  63[H]  ----------------------------<  127[H]  3.6   |  27  |  2.00[H]    Ca    8.7      19 Jan 2025 05:15  Phos  4.8     01-19  Mg     2.3     01-19    TPro  5.1[L]  /  Alb  1.6[L]  /  TBili  0.6  /  DBili  x   /  AST  134[H]  /  ALT  59  /  AlkPhos  1537[H]  01-19    PT/INR - ( 19 Jan 2025 05:15 )   PT: 15.7 sec;   INR: 1.35 ratio       PTT - ( 19 Jan 2025 05:15 )  PTT:97.6 sec    Culture - Bronchial (collected 17 Jan 2025 16:40)  Source: Bronchial  Gram Stain (18 Jan 2025 06:59):    Few polymorphonuclear leukocytes seen per low power field    Rare Squamous epithelial cells seen per low power field    Rare Gram positive cocci in pairs seen per oil power field  Preliminary Report (18 Jan 2025 19:03):    Commensal merline consistent with body site    ASSESSMENT & PLAN  79F with HTN, HLD, CKD, PUD, Lung Cancer s/p RUL lobectomy who presents with a perforated gastric ulcer POD#7 from laparoscopic repair with Srinath Patch and Onur Drain placement. Pt transferred from  to [facility] ICU for severe hypercapnic respiratory failure 2ry aspiration requiring intubation, extubated with hospital course further complicated by ESPERANZA d/t ATN, respiratory failure on HFNC with worsening hypoxemia/hypercapnic ultimately reintubated 1/13/25 for concern of airway protection with worsening dual pressor shock state post intubation.     - Patient remains intubated and sedated  - Failed being weaned off yesterday  - Abdomen remains soft  - Tube feeds  - Drain removed 2 days ago 1/17  - ID recs  - Rest of care per ICU  - Case discussed with Dr. Valdez

## 2025-01-19 NOTE — PROGRESS NOTE ADULT - ASSESSMENT
79F with HTN, HLD, CKD, PUD, Lung Cancer s/p RUL lobectomy who presents with a perforated gastric ulcer POD 2 from laparoscopic repair with Srinath Patch and Onur Drain placement. Pt transferred from  to [facility] ICU for severe hypercapnic respiratory failure 2ry aspiration requiring intubation, now extubated with hospital course further complicated by ESPERANZA d/t ATN. Admitted to the ICU with:     Perforated gastric ulcer s/p lap repair with Srinath patch and onur drain  Acute hypoxic/hypercapnic respiratory failure  Aspiration PNA  Pneumoperitoneum  ESPERANZA    NEURO:   -on ketamine and precedex for sedation, wean trial today  - fentanyl prn for pain/vent synch    CV:   - Maintain MAP>65, patient slightly hypertensive, taking patient off midodrine   - off Lasix  - TTE technically difficult study, LVEF 60-65%  - off levophed   - off vasopressin      PULM:   - Pt had severe hypercapnic resp failure, aspiration requiring intubation, was previously extubated and on HFNC  - Pt w/ acute worsening hypoxia and required emergent intubation, developed respiratory acidosis, suspect aspiration PNA  - continue ventilation  - Continue inhaled steroids and duonebs for secretions.   - Aspiration precautions   CT chest; complete occlusion of the Right mainstem bronchus   - bronchoscopy 1/17; thick mucus suctioned out fo bronchus   - off Lasix   - robitussin q6hrs for cough  - ABG pH 7.32 pCO2 51 HCO3 26, repeat ABG tmrw     GI:    - s/p lap repair with srinath patch with onur drain placement.  - tube feeds via OG   - protonix bid for PUD  - CT abdomen revealed multiple hypodense hepatic nodules, in setting of elevated Alk phos (possibly related to chronic DMARD use and meropenem)  - GI consulted, rec MRI abd w/ IV contrast when pt stable   - GGT and ALP isoenzyme pending   - nutrition consult    RENAL:   - ESPERANZA likely in the setting of ATN/dehydration  - Strict I/Os.  - monitor and replete lytes prn  - hypokalemia, replete with 60Meq, repeat BMP at 2 PM    ENDO:   - no active issues  - Maintain euglycemia 120-180    ID:   - febrile to 102.2F this AM; ordered CT chest/Abd/pel w/ PO and IV contrast  - s/p zosyn, escalated to meropenem on 1/15  - escalated to capsofungin for gastric perforation/peritonitis  - sputum culture commensal merline  - BC x 2 NG at 24hrs  - lactate downtrended  - U/A neg  - bronch culture pending  - monitor WBC    HEME:   - s/p 1uPRBC   - monitor H/H  - maintain active type+screen  - Doppler u/s LUE: DVT LUE  - c/w full dose heparin gtt   79F with HTN, HLD, CKD, PUD, Lung Cancer s/p RUL lobectomy who presents with a perforated gastric ulcer POD 2 from laparoscopic repair with Srinath Patch and Onur Drain placement. Pt transferred from  to [facility] ICU for severe hypercapnic respiratory failure 2ry aspiration requiring intubation, now extubated with hospital course further complicated by ESPERANZA d/t ATN. Admitted to the ICU with:     Perforated gastric ulcer s/p lap repair with Srinath patch and onur drain  Acute hypoxic/hypercapnic respiratory failure  Aspiration PNA  Pneumoperitoneum  ESPERANZA    NEURO:   - c/w precedex for sedation  - will wean off ketamine and start fentanyl for sedation  - d/c PRN fentanyl, switch to Versed 2 mg IV qd prn for discomfort    CV:   - Maintain MAP>65, patient HTN overnight s/p IV hydralazine x 1  - start labetalol 10 mg IV prn for BP > 160  - off Lasix  - TTE technically difficult study, LVEF 60-65%  - off pressors    PULM:   - Pt had severe hypercapnic resp failure, aspiration requiring intubation, was previously extubated and on HFNC  - Pt w/ acute worsening hypoxia and required emergent intubation, developed respiratory acidosis, suspect aspiration PNA  - continue ventilation  - Continue inhaled steroids and duonebs for secretions.   - Aspiration precautions   - CT chest; complete occlusion of the Right mainstem bronchus   - bronchoscopy 1/17; thick mucus suctioned out of bronchus   - robitussin q6hrs for cough  - ABG pH 7.29 pCO2 54 HCO3 26     GI:    - s/p lap repair with srinath patch with onur drain placement, drain removed  - tube feeds via OG   - protonix bid for PUD  - CT abdomen revealed multiple hypodense hepatic nodules  - GI consulted, rec MRI abd w/ IV contrast when pt stable   - elevated Alk phos (possibly related to chronic DMARD use and meropenem)  - GGT elevated, ALP isoenzyme pending   - nutrition consulted    RENAL:   - ESPERANZA likely in the setting of ATN/dehydration  - Strict I/Os.  - monitor and replete lytes prn    ENDO:   - no active issues  - Maintain euglycemia 120-180    ID:   - s/p CT chest/Abd/pel w/ PO and IV contrast  - s/p zosyn, escalated to meropenem on 1/15  - escalated to capsofungin for gastric perforation/peritonitis  - sputum culture commensal merline  - BC x 2 NGTD  - lactate downtrended  - U/A neg  - bronch culture commensal merline  - monitor WBC    HEME:   - s/p 1uPRBC   - monitor H/H  - maintain active type+screen  - Doppler u/s LUE: DVT LUE  - c/w full dose heparin gtt   79F with HTN, HLD, CKD, PUD, Lung Cancer s/p RUL lobectomy who presents with a perforated gastric ulcer POD 2 from laparoscopic repair with Srinath Patch and Onur Drain placement. Pt transferred from  to \[facility]\"" ICU for severe hypercapnic respiratory failure 2ry aspiration requiring intubation, now extubated with hospital course further complicated by ESPERANZA d/t ATN. Admitted to the ICU with:     Perforated gastric ulcer s/p lap repair with Srinath patch and onur drain  Acute hypoxic/hypercapnic respiratory failure  Aspiration PNA  Pneumoperitoneum  ESPERANZA    NEURO:   - c/w precedex for sedation  - will wean off ketamine and start fentanyl for sedation  - d/c PRN fentanyl, switch to Versed 2 mg IV qd prn for discomfort    CV:   - Maintain MAP>65, patient HTN overnight s/p IV hydralazine x 1  - start labetalol 10 mg IV prn for BP > 160  - off Lasix  - TTE technically difficult study, LVEF 60-65%  - off pressors    PULM:   - Pt had severe hypercapnic resp failure, aspiration requiring intubation, was previously extubated and on HFNC  - Pt w/ acute worsening hypoxia and required emergent intubation, developed respiratory acidosis, suspect aspiration PNA  - continue ventilation  - Continue inhaled steroids and duonebs for secretions.   - Aspiration precautions   - CT chest; complete occlusion of the Right mainstem bronchus   - bronchoscopy 1/17; thick mucus suctioned out of bronchus   - robitussin q6hrs for cough  - ABG pH 7.29 pCO2 54 HCO3 26     GI:    - s/p lap repair with srinath patch with onur drain placement, drain removed 1/17  - tube feeds via OG   - protonix bid for PUD  - CT abdomen revealed multiple hypodense hepatic nodules  - GI consulted, rec MRI abd w/ IV contrast when pt stable   - elevated Alk phos (possibly related to chronic DMARD use and meropenem)  - GGT elevated, ALP isoenzyme pending   - nutrition consulted    RENAL:   - ESPERANZA likely in the setting of ATN/dehydration  - Strict I/Os.  - monitor and replete lytes prn    ENDO:   - no active issues  - Maintain euglycemia 120-180    ID:   - s/p CT chest/Abd/pel w/ PO and IV contrast  - s/p zosyn, escalated to meropenem on 1/15  - escalated to capsofungin for gastric perforation/peritonitis  - sputum culture commensal merline  - BC x 2 NGTD  - lactate downtrended  - U/A neg  - bronch culture commensal merline  - monitor WBC    HEME:   - s/p 1uPRBC   - monitor H/H  - maintain active type+screen  - Doppler u/s LUE: DVT LUE  - c/w full dose heparin gtt   79F with HTN, HLD, CKD, PUD, Lung Cancer s/p RUL lobectomy who presents with a perforated gastric ulcer POD 2 from laparoscopic repair with Srinath Patch and Onur Drain placement. Pt transferred from  to [facility] ICU for severe hypercapnic respiratory failure 2ry aspiration requiring intubation, now extubated with hospital course further complicated by ESPERANZA d/t ATN. Admitted to the ICU with:     Perforated gastric ulcer s/p lap repair with Srinath patch and onur drain  Acute hypoxic/hypercapnic respiratory failure  Aspiration PNA  Pneumoperitoneum  ESPERANZA    NEURO:   - c/w precedex for sedation  - will wean off ketamine and start fentanyl for sedation  - d/c PRN fentanyl, switch to Versed 2 mg IV qd prn for discomfort    CV:   - Maintain MAP>65, patient HTN overnight s/p IV hydralazine x 1  - start labetalol 10 mg IV prn for BP > 160  - off Lasix  - TTE technically difficult study, LVEF 60-65%  - off pressors    PULM:   - Pt had severe hypercapnic resp failure, aspiration requiring intubation, was previously extubated and on HFNC  - Pt w/ acute worsening hypoxia and required emergent intubation, developed respiratory acidosis, suspect aspiration PNA  - continue ventilation  - Continue inhaled steroids and duonebs for secretions.   - Aspiration precautions   - CT chest; complete occlusion of the Right mainstem bronchus   - bronchoscopy 1/17; thick mucus suctioned out of bronchus   - robitussin q6hrs for cough  - ABG pH 7.29 pCO2 54 HCO3 26     GI:    - s/p lap repair with srinath patch with onur drain placement, drain removed 1/17  - tube feeds via OG   - protonix bid for PUD  - CT abdomen revealed multiple hypodense hepatic nodules  - GI consulted, rec MRI abd w/ IV contrast when pt stable   - elevated Alk phos (possibly related to chronic DMARD use and meropenem)  - GGT elevated, ALP isoenzyme pending   - nutrition consulted    RENAL:   - ESPERANZA likely in the setting of ATN/dehydration  - Strict I/Os.  - monitor and replete lytes prn    ENDO:   - no active issues  - Maintain euglycemia 120-180    ID:   - s/p CT chest/Abd/pel w/ PO and IV contrast  - s/p zosyn, escalated to meropenem on 1/15  - escalated to capsofungin for gastric perforation/peritonitis  - sputum culture commensal merline  - BC x 2 NGTD  - lactate downtrended  - U/A neg  - bronch culture commensal merline  - monitor WBC    HEME:   - s/p 1uPRBC   - monitor H/H  - maintain active type+screen  - Doppler u/s LUE: DVT LUE  - c/w full dose heparin gtt    Plan of care discussed with  at bedside.  79F with HTN, HLD, CKD, PUD, Lung Cancer s/p RUL lobectomy who presents with a perforated gastric ulcer POD 2 from laparoscopic repair with Srinath Patch and Onur Drain placement. Pt transferred from  to [facility] ICU for severe hypercapnic respiratory failure 2ry aspiration requiring intubation, now extubated with hospital course further complicated by ESPERANZA d/t ATN. Admitted to the ICU with:     Perforated gastric ulcer s/p lap repair with Srinath patch and onur drain  Acute hypoxic/hypercapnic respiratory failure  Aspiration PNA  Pneumoperitoneum  ESPERANZA    NEURO:   - c/w precedex for sedation  - will wean off ketamine and start fentanyl for sedation  - d/c PRN fentanyl, switch to Versed 2 mg IV qd prn for discomfort    CV:   - Maintain MAP>65, patient HTN overnight s/p IV hydralazine x 1  - start labetalol 10 mg IV prn for BP > 160  - off Lasix  - off pressors  - TTE technically difficult study, LVEF 60-65%    PULM:   - Pt had severe hypercapnic resp failure, aspiration requiring intubation, was previously extubated and on HFNC  - Pt w/ acute worsening hypoxia and required emergent intubation, developed respiratory acidosis, suspect aspiration PNA  - continue ventilation  - Continue inhaled steroids and duonebs for secretions.   - Aspiration precautions  - CT chest; complete occlusion of the Right mainstem bronchus   - bronchoscopy 1/17; thick mucus suctioned out of bronchus   - robitussin q6hrs for cough  - ABG pH 7.29 pCO2 54 HCO3 26     GI:    - s/p lap repair with srinath patch with onur drain placement, drain removed 1/17  - tube feeds via OG   - protonix bid for PUD  - CT abdomen revealed multiple hypodense hepatic nodules  - GI consulted, rec MRI abd w/ IV contrast when pt stable   - elevated Alk phos (possibly related to chronic DMARD use and meropenem)  - GGT elevated, ALP isoenzyme pending   - nutrition consulted    RENAL:   - ESPERANZA likely in the setting of ATN/dehydration  - Strict I/Os.  - monitor and replete lytes prn    ENDO:   - no active issues  - Maintain euglycemia 120-180    ID:   - s/p CT chest/Abd/pel w/ PO and IV contrast  - s/p zosyn, escalated to meropenem on 1/15  - escalated to capsofungin for gastric perforation/peritonitis  - sputum culture commensal merline  - BC x 2 NGTD  - lactate downtrended  - U/A neg  - bronch culture commensal merline  - monitor WBC    HEME:   - s/p 1uPRBC   - monitor H/H  - maintain active type+screen  - Doppler u/s LUE: DVT LUE  - c/w full dose heparin gtt    Plan of care discussed with  at bedside.

## 2025-01-19 NOTE — PROGRESS NOTE ADULT - ASSESSMENT
80 y/o WF with PMHx with HTN, HLD, CKD, PUD (dx 2011), Lung CA s/p lobectomy (1996) presents to [facility] ED from home with  via ambulance c/o worsening generalized achy abdominal pain since last night.  Patient reports baseline abdominal discomfort x several years which worsened suddenly    intestinal perf  atelectasis  pleural eff  HTN  HLD  CKD  PUD  PNA hx  Lung Ca - hx of Lobectomy     intubated - ventilated  abg noted  antimicrobial rx regimen  icu care  post op care  s/p Bronch on 1 17 25 -   VS noted - fever noted -     post op care  PUD perf - surgery and op note reviewed  lung protective vent support  fio2 titration  oral hygiene  skin care  suction PRN  HOB elev  PPI  Bronchodilators - Stress steroids - hx of COPD -   chr changes - deformity - lung parenchyma and chest - see CT chest -   ICU care  I and O  serial labs  replete lytes  dvt p  pain relief

## 2025-01-19 NOTE — PROGRESS NOTE ADULT - ASSESSMENT
As in above full PA notation, unless countered below.  Ms. Thompson personally seen during daily rounds.  Now on heparin infusion for DVT.  Vitals reviewed with fever/ temperature elevation noted.  Abdomen soft/ non tense/ no appreciable tenderness.  Labs reviewed with relative plateau of WBCs.  Clinically, stable overall at present with main issue respiratory- currently tolerating goal rate feeds.  Surgically, stable at present with interval CT scan of abdomen and pelvis on 1/16 negative for evidence of ongoing leakage/ collection.  To continue current supportive care.  Reviewed with ICU team and Surgery PA.

## 2025-01-19 NOTE — PROGRESS NOTE ADULT - SUBJECTIVE AND OBJECTIVE BOX
NEPHROLOGY PROGRESS NOTE    CHIEF COMPLAINT:  ESPERANZA/CKD    HPI:  Renal function has been stable since CT with dye 3 days ago.  Remains vented.      EXAM:  Vital Signs Last 24 Hrs  T(C): 37.4 (2025 04:18), Max: 38.6 (2025 12:00)  T(F): 99.3 (2025 04:18), Max: 101.5 (2025 12:00)  HR: 96 (2025 10:56) (64 - 112)  BP: 133/50 (2025 08:30) (92/43 - 196/153)  BP(mean): 75 (2025 08:30) (56 - 168)  RR: 31 (2025 08:30) (12 - 42)  SpO2: 91% (2025 10:56) (88% - 100%)    Parameters below as of 2025 08:01  Patient On (Oxygen Delivery Method): ventilator      I&O's Summary    2025 07:01  -  2025 07:00  --------------------------------------------------------  IN: 2644.8 mL / OUT: 895 mL / NET: 1749.8 mL    2025 07:01  -  2025 11:52  --------------------------------------------------------  IN: 75.9 mL / OUT: 0 mL / NET: 75.9 mL      Daily     Daily Weight in k (2025 01:30)    Vented, sedated  Normal respiratory effort, lungs rhonchi  Heart RRR with no murmur, no peripheral edema    LABS                        7.3    14.41 )-----------( 80       ( 2025 05:15 )             22.5         144  |  111[H]  |  63[H]  ----------------------------<  127[H]  3.6   |  27  |  2.00[H]    Ca    8.7      2025 05:15  Phos  4.8       Mg     2.3         TPro  5.1[L]  /  Alb  1.6[L]  /  TBili  0.6  /  DBili  x   /  AST  134[H]  /  ALT  59  /  AlkPhos  1537[H]        Impression:  ESPERANZA - septic ATN maintenance phase;  no evidence of contrast nephropathy  Perforated gastric ulcer s/p lap repair  Post op acute respiratory failure, aspiration PNA, mucus plugging  CKD 3 presumably due to RVD, hypertensive nephrosclerosis. Cr ranging widely 1.1-1.5 per historical data    Recommendations:   Continue supportive care, antibiotics/antifungals as per critical care  Monitor daily BMP and I/O

## 2025-01-20 NOTE — PROGRESS NOTE ADULT - ASSESSMENT
ESPERANZA - septic ATN, no evidence of contrast nephropathy  Perforated gastric ulcer s/p lap repair  Post op acute respiratory failure, aspiration PNA, mucus plugging  CKD 3 presumably due to RVD, hypertensive nephrosclerosis. Cr ranging widely 1.1-1.5 per historical data  Anemia    Worsening renal indices. IV lasix for low UO. On vent support. Monitor h/h trend. Transfuse PRBC's PRN.   Overall prognosis poor. Supportive care. Discussed with patients  at bedside. Goals of care discussion. D/w ICU team.

## 2025-01-20 NOTE — PROVIDER CONTACT NOTE (HYPOGLYCEMIA EVENT) - NS PROVIDER CONTACT CONTRIBUTING FACTORS OF EPISODE
Tube feeding/Previous finger stick less than 100 mg/dL
Patient NPO greater than 8 hours
Patient NPO greater than 8 hours/Previous finger stick less than 100 mg/dL/Chronic kidney disease
Tube feeding

## 2025-01-20 NOTE — PROVIDER CONTACT NOTE (HYPOGLYCEMIA EVENT) - NS PROVIDER CONTACT RECOMMEND-HYPO
25 grams Dextrose D50 administered. D5W at 50 ml/hr continued as ordered.
D5 W ivf ordered
per MD Kaba, new order for D5 1/2NS IVF

## 2025-01-20 NOTE — PROVIDER CONTACT NOTE (CRITICAL VALUE NOTIFICATION) - SITUATION
lactate level 8.8 and phosphate 9
.3,  hgb 6.9 / Hct 22.7, Potassium 6.5 and glucose 43
Potassium: 2.7
Bands 15%

## 2025-01-20 NOTE — PROGRESS NOTE ADULT - PROBLEM SELECTOR PLAN 4
pod 9 natan patch for perforated gastric ulcer  cont iv meropenem, iv fluconazole  wean pressors as tolerated
pod 8 natan patch for perforated gastric ulcer  cont iv meropenem, iv fluconazole  wean pressors as tolerated
pod 9 natan patch for perforated gastric ulcer  cont iv meropenem, iv fluconazole  wean pressors as tolerated
pod 6 natan patch for perforated gastric ulcer  cont iv meropenem, iv fluconazole  wean pressors as tolerated
pod 6 natan patch for perforated gastric ulcer  cont iv meropenem, iv fluconazole  wean pressors as tolerated
pod 8 natan patch for perforated gastric ulcer  cont iv meropenem, iv fluconazole  wean pressors as tolerated

## 2025-01-20 NOTE — PROGRESS NOTE ADULT - ASSESSMENT
78 y/o WF with PMHx with HTN, HLD, CKD, PUD (dx 2011), Lung CA s/p lobectomy (1996)  who was admitted on 1/9 for worsening generalized achy abdominal pain since 1/7 night.  Patient reports baseline abdominal discomfort x several years which worsened suddenly (described as 11/10) without associated fever/chills. She was found to have pneumooperitoneum, s/p Laparoscopic repair of perforated gastric ulcer on 1/9. Patient has been on antibiotics and antifungals. On 1/13 she was intubated.     On 1/15 AM Zosyn switched to meropenem given fevers. Respiratory culture from 1/13 growing commensal merline consistent with body site and blood cultures from 1/14 remain no growth. CT chest and A/P done 1/16 showed complete opacification of the distal right mainstem bronchus with resultant near complete collapse of right middle and right lower lobes with trace aeration remaining. No evidence of intraabdominal infection. S/p bronchoscopy on 1/17, BAL cultures growing pseudomonas.    #Pneumonia  #Pseudomonas infection  #Perforated gastric ulcer  #Fevers  #Leukocytosis  #Hypotension    -continue meropenem (renally dosed) pending pseudomonas sensitivities  -continue caspofungin  -plan to give a dose of vancomycin  -repeat MRSA nasal PCR  -follow cultures to completion--requested pseudomonas sensitivities from Micro  -monitor WBC  -wound care  -discussed with Dr. Sesar Carrillo MD  Division of Infectious Diseases   Cell [phone] between 8am and 6pm   After 6pm and weekends please call ID service at [phone].     35 minutes spent on total encounter assessing patient, examination, chart review, counseling and coordinating care by the attending physician/nurse/care manager.

## 2025-01-20 NOTE — PROGRESS NOTE ADULT - REASON FOR ADMISSION
transferred from [place] [facility] patch repair
transferred from [place] s/p perforated gastric ulcer with natan patch repair
transferred from [facility] patch repair
transferred from [facility]ra gibbs patch repair
transferred from [facility] patch repair
transferred from [facility] s/p natan patch repair
transferred from [facility]ira gibbs patch repair
transferred from [facility]ira gibbs patch repair

## 2025-01-20 NOTE — PROGRESS NOTE ADULT - PROBLEM SELECTOR PROBLEM 3
ESPERANZA (acute kidney injury)

## 2025-01-20 NOTE — PROGRESS NOTE ADULT - SUBJECTIVE AND OBJECTIVE BOX
Date of Service 01-20-25 @ 13:28    Patient is a 79y old  Female who presents with a chief complaint of transferred from [place] s/p [facility] patch repair (20 Jan 2025 12:25)      INTERVAL /OVERNIGHT EVENTS: still intubated    MEDICATIONS  (STANDING):  albuterol/ipratropium for Nebulization 3 milliLiter(s) Nebulizer every 6 hours  buDESOnide    Inhalation Suspension 0.5 milliGRAM(s) Inhalation two times a day  caspofungin IVPB 50 milliGRAM(s) IV Intermittent every 24 hours  caspofungin IVPB      chlorhexidine 0.12% Liquid 15 milliLiter(s) Oral Mucosa every 12 hours  chlorhexidine 2% Cloths 1 Application(s) Topical <User Schedule>  dexMEDEtomidine Infusion 1.2 MICROgram(s)/kG/Hr (15.6 mL/Hr) IV Continuous <Continuous>  dextrose 5%. 1000 milliLiter(s) (100 mL/Hr) IV Continuous <Continuous>  dextrose 5%. 1000 milliLiter(s) (50 mL/Hr) IV Continuous <Continuous>  dextrose 5%. 1000 milliLiter(s) (50 mL/Hr) IV Continuous <Continuous>  dextrose 50% Injectable 25 Gram(s) IV Push once  dextrose 50% Injectable 12.5 Gram(s) IV Push once  dextrose 50% Injectable 25 Gram(s) IV Push once  dextrose Oral Gel 15 Gram(s) Oral once  fentaNYL   Infusion. 0.501 MICROgram(s)/kG/Hr (2.6 mL/Hr) IV Continuous <Continuous>  glucagon  Injectable 1 milliGRAM(s) IntraMuscular once  guaifenesin/dextromethorphan Oral Liquid 10 milliLiter(s) Oral every 6 hours  heparin  Infusion.  Unit(s)/Hr (10 mL/Hr) IV Continuous <Continuous>  meropenem  IVPB 500 milliGRAM(s) IV Intermittent every 12 hours  pantoprazole   Suspension 40 milliGRAM(s) Oral two times a day    MEDICATIONS  (PRN):  acetaminophen   Oral Liquid .. 650 milliGRAM(s) Oral every 6 hours PRN Temp greater or equal to 38C (100.4F), Mild Pain (1 - 3), Moderate Pain (4 - 6), Severe Pain (7 - 10)  heparin   Injectable 4000 Unit(s) IV Push every 6 hours PRN For aPTT less than 40  heparin   Injectable 2000 Unit(s) IV Push every 6 hours PRN For aPTT between 40 - 57  labetalol Injectable 10 milliGRAM(s) IV Push daily PRN SBP >160  midazolam Injectable 2 milliGRAM(s) IV Push daily PRN discomfort      Allergies    penicillin (Other)  minocycline (Other)  Enbrel (Unknown)  IV Contrast (Hives; Rash)  Zofran (Other)  Levaquin (Other)    Intolerances        REVIEW OF SYSTEMS:  unable to obtain    Vital Signs Last 24 Hrs  T(C): 38 (20 Jan 2025 12:00), Max: 38.6 (20 Jan 2025 08:09)  T(F): 100.4 (20 Jan 2025 12:00), Max: 101.5 (20 Jan 2025 08:09)  HR: 85 (20 Jan 2025 13:00) (79 - 125)  BP: 97/20 (20 Jan 2025 13:00) (97/20 - 151/51)  BP(mean): 44 (20 Jan 2025 13:00) (44 - 125)  RR: 30 (20 Jan 2025 13:00) (22 - 44)  SpO2: 96% (20 Jan 2025 13:00) (89% - 99%)    Parameters below as of 20 Jan 2025 08:30  Patient On (Oxygen Delivery Method): ventilator        PHYSICAL EXAM:  GENERAL: NAD, well-groomed, well-developed  HEAD:  Atraumatic, Normocephalic  EYES: EOMI, PERRLA, conjunctiva and sclera clear  ENMT: No tonsillar erythema, exudates, or enlargement; Moist mucous membranes, Good dentition, No lesions  NECK: Supple, No JVD, Normal thyroid  NERVOUS SYSTEM:  sedated  CHEST/LUNG: Clear to auscultation bilaterally; No rales, rhonchi, wheezing, or rubs  HEART: Regular rate and rhythm; No murmurs, rubs, or gallops  ABDOMEN: Soft, Nontender, Nondistended; Bowel sounds present  EXTREMITIES:  2+ Peripheral Pulses, No clubbing, cyanosis, or edema  LYMPH: No lymphadenopathy noted  SKIN: No rashes or lesions    LABS:                        7.1    12.99 )-----------( 75       ( 20 Jan 2025 05:38 )             22.2     20 Jan 2025 05:38    145    |  113    |  78     ----------------------------<  56     4.6     |  21     |  2.90     Ca    8.8        20 Jan 2025 05:38  Phos  7.3       20 Jan 2025 05:38  Mg     2.4       20 Jan 2025 05:38    TPro  4.9    /  Alb  1.5    /  TBili  0.9    /  DBili  x      /  AST  194    /  ALT  64     /  AlkPhos  1279   20 Jan 2025 05:38    PT/INR - ( 19 Jan 2025 05:15 )   PT: 15.7 sec;   INR: 1.35 ratio         PTT - ( 20 Jan 2025 05:38 )  PTT:116.3 sec  Urinalysis Basic - ( 20 Jan 2025 05:38 )    Color: x / Appearance: x / SG: x / pH: x  Gluc: 56 mg/dL / Ketone: x  / Bili: x / Urobili: x   Blood: x / Protein: x / Nitrite: x   Leuk Esterase: x / RBC: x / WBC x   Sq Epi: x / Non Sq Epi: x / Bacteria: x      CAPILLARY BLOOD GLUCOSE      POCT Blood Glucose.: 165 mg/dL (20 Jan 2025 12:15)  POCT Blood Glucose.: 21 mg/dL (20 Jan 2025 11:50)  POCT Blood Glucose.: 21 mg/dL (20 Jan 2025 11:46)  POCT Blood Glucose.: 282 mg/dL (20 Jan 2025 06:16)  POCT Blood Glucose.: 54 mg/dL (20 Jan 2025 06:09)  POCT Blood Glucose.: 52 mg/dL (20 Jan 2025 06:07)  POCT Blood Glucose.: 95 mg/dL (19 Jan 2025 23:26)  POCT Blood Glucose.: 104 mg/dL (19 Jan 2025 17:32)      RADIOLOGY & ADDITIONAL TESTS:    Notes Reviewed:  [x ] YES  [ ] NO    Care Discussed with Consultants/Other Providers [x ] YES  [ ] NO

## 2025-01-20 NOTE — PROGRESS NOTE ADULT - PROBLEM SELECTOR PLAN 3
gentle IVF  renal eval with dr. chen appreciated  serial BMP
gentle IVF  renal eval
gentle IVF  renal eval  serial BMP
gentle IVF  renal eval
gentle IVF  renal eval with dr. chen  serial BMP
gentle IVF  renal eval with dr. chen appreciated  serial BMP
gentle IVF  renal eval with dr. chen appreciated  serial BMP

## 2025-01-20 NOTE — PROVIDER CONTACT NOTE (CRITICAL VALUE NOTIFICATION) - PERSON GIVING RESULT:
Selin Rollins
Lactate 6.8
Vanessa Wang/jaime
hgb 6.7/hct 20.4
loulou mckeon
GILMA Tavares
Stefan Guan
Cezar Blackwood, RRT
Denzel Rogers RRT
Cezar Blackwood, RRT ABG
Selin Rollins
Stefan
Vanessa Wang/jaime

## 2025-01-20 NOTE — PROGRESS NOTE ADULT - SUBJECTIVE AND OBJECTIVE BOX
Date/Time Patient Seen:  		  Referring MD:   Data Reviewed	       Patient is a 79y old  Female who presents with a chief complaint of transferred from [place] s/p [facility] patch repair (19 Jan 2025 12:43)      Subjective/HPI     PAST MEDICAL & SURGICAL HISTORY:  Asthma    HLD (hyperlipidemia)    Rheumatoid arthritis    TIA (transient ischemic attack)  2012    Esophageal reflux    Lung cancer  Right lung.    Chronic obstructive pulmonary disease, unspecified COPD type  O2 at night    Hiatal hernia with GERD    Essential hypertension    Hyperlipidemia, unspecified hyperlipidemia type    Calculus of gallbladder with chronic cholecystitis without obstruction    Childhood asthma    Stage 3 chronic kidney disease    Carotid artery plaque    IBS (irritable bowel syndrome)    Psoriatic arthritis    S/P lobectomy of lung  Right lung in 1996.    H/O colonoscopy    History of endoscopy  Sept 2017    S/P cholecystectomy    CAD (coronary artery disease)          Medication list         MEDICATIONS  (STANDING):  albuterol/ipratropium for Nebulization 3 milliLiter(s) Nebulizer every 6 hours  buDESOnide    Inhalation Suspension 0.5 milliGRAM(s) Inhalation two times a day  caspofungin IVPB 50 milliGRAM(s) IV Intermittent every 24 hours  caspofungin IVPB      chlorhexidine 0.12% Liquid 15 milliLiter(s) Oral Mucosa every 12 hours  chlorhexidine 2% Cloths 1 Application(s) Topical <User Schedule>  dexMEDEtomidine Infusion 1.2 MICROgram(s)/kG/Hr (15.6 mL/Hr) IV Continuous <Continuous>  dextrose 5%. 1000 milliLiter(s) (100 mL/Hr) IV Continuous <Continuous>  dextrose 5%. 1000 milliLiter(s) (50 mL/Hr) IV Continuous <Continuous>  dextrose 50% Injectable 25 Gram(s) IV Push once  dextrose 50% Injectable 12.5 Gram(s) IV Push once  dextrose 50% Injectable 25 Gram(s) IV Push once  dextrose Oral Gel 15 Gram(s) Oral once  fentaNYL   Infusion. 0.501 MICROgram(s)/kG/Hr (2.6 mL/Hr) IV Continuous <Continuous>  glucagon  Injectable 1 milliGRAM(s) IntraMuscular once  guaifenesin/dextromethorphan Oral Liquid 10 milliLiter(s) Oral every 6 hours  heparin  Infusion.  Unit(s)/Hr (10 mL/Hr) IV Continuous <Continuous>  meropenem  IVPB 500 milliGRAM(s) IV Intermittent every 12 hours  pantoprazole   Suspension 40 milliGRAM(s) Oral two times a day    MEDICATIONS  (PRN):  heparin   Injectable 4000 Unit(s) IV Push every 6 hours PRN For aPTT less than 40  heparin   Injectable 2000 Unit(s) IV Push every 6 hours PRN For aPTT between 40 - 57  labetalol Injectable 10 milliGRAM(s) IV Push daily PRN SBP >160  midazolam Injectable 2 milliGRAM(s) IV Push daily PRN discomfort         Vitals log        ICU Vital Signs Last 24 Hrs  T(C): 38.4 (20 Jan 2025 04:15), Max: 38.4 (19 Jan 2025 16:00)  T(F): 101.2 (20 Jan 2025 04:15), Max: 101.2 (19 Jan 2025 16:00)  HR: 97 (20 Jan 2025 07:00) (76 - 113)  BP: 123/53 (20 Jan 2025 07:00) (111/81 - 157/69)  BP(mean): 76 (20 Jan 2025 07:00) (54 - 89)  ABP: --  ABP(mean): --  RR: 30 (20 Jan 2025 07:00) (22 - 53)  SpO2: 95% (20 Jan 2025 07:00) (89% - 99%)    O2 Parameters below as of 20 Jan 2025 07:00  Patient On (Oxygen Delivery Method): ventilator    O2 Concentration (%): 50         Mode: SIMV (Synchronized Intermittent Mandatory Ventilation)  RR (machine): 30  TV (machine): 350  FiO2: 50  PEEP: 5  PS: 10  ITime: 0.9  MAP: 17  PIP: 32      Input and Output:  I&O's Detail    19 Jan 2025 07:01  -  20 Jan 2025 07:00  --------------------------------------------------------  IN:    Dexmedetomidine: 448.5 mL    Enteral Tube Flush: 120 mL    FentaNYL: 10.1 mL    FentaNYL: 112.4 mL    Heparin Infusion: 138 mL    IV PiggyBack: 100 mL    IV PiggyBack: 250 mL    IV PiggyBack: 100 mL    Ketamine: 69.1 mL    Nepro with Carb Steady: 920 mL  Total IN: 2268.1 mL    OUT:    Indwelling Catheter - Urethral (mL): 80 mL    Rectal Tube (mL): 700 mL  Total OUT: 780 mL    Total NET: 1488.1 mL          Lab Data                        7.1    12.99 )-----------( 75       ( 20 Jan 2025 05:38 )             22.2     01-20    145  |  113[H]  |  78[H]  ----------------------------<  56[L]  4.6   |  21[L]  |  2.90[H]    Ca    8.8      20 Jan 2025 05:38  Phos  7.3     01-20  Mg     2.4     01-20    TPro  4.9[L]  /  Alb  1.5[L]  /  TBili  0.9  /  DBili  x   /  AST  194[H]  /  ALT  64  /  AlkPhos  1279[H]  01-20    ABG - ( 19 Jan 2025 04:52 )  pH, Arterial: 7.29  pH, Blood: x     /  pCO2: 54    /  pO2: 72    / HCO3: 26    / Base Excess: -0.8  /  SaO2: 93.2                    Review of Systems	      Objective     Physical Examination  heart s1s2  lung dc BS  head nc  head at        Pertinent Lab findings & Imaging      Dante:  NO   Adequate UO     I&O's Detail    19 Jan 2025 07:01  -  20 Jan 2025 07:00  --------------------------------------------------------  IN:    Dexmedetomidine: 448.5 mL    Enteral Tube Flush: 120 mL    FentaNYL: 10.1 mL    FentaNYL: 112.4 mL    Heparin Infusion: 138 mL    IV PiggyBack: 100 mL    IV PiggyBack: 250 mL    IV PiggyBack: 100 mL    Ketamine: 69.1 mL    Nepro with Carb Steady: 920 mL  Total IN: 2268.1 mL    OUT:    Indwelling Catheter - Urethral (mL): 80 mL    Rectal Tube (mL): 700 mL  Total OUT: 780 mL    Total NET: 1488.1 mL               Discussed with:     Cultures:	        Radiology

## 2025-01-20 NOTE — PROVIDER CONTACT NOTE (CRITICAL VALUE NOTIFICATION) - ACTION/TREATMENT ORDERED:
Fluids
changed to [facility] 28/ 330/ 60% +5 iTime .8
1u PRBC
Vent setting changes
no new order.
Repeat Labs 1706
potassium orders to be placed
LR 500ML BOLUS ORDERED
K riders ordered
see new order

## 2025-01-20 NOTE — PROVIDER CONTACT NOTE (HYPOGLYCEMIA EVENT) - NS PROVIDER CONTACT BACKGROUND-HYPO
Age: 79y    Gender: Female    POCT Blood Glucose:  165 mg/dL (01-20-25 @ 12:15)  21 mg/dL (01-20-25 @ 11:50)  21 mg/dL (01-20-25 @ 11:46)  282 mg/dL (01-20-25 @ 06:16)  54 mg/dL (01-20-25 @ 06:09)  52 mg/dL (01-20-25 @ 06:07)  95 mg/dL (01-19-25 @ 23:26)  104 mg/dL (01-19-25 @ 17:32)      eMAR:dextrose 50% Injectable   25 Gram(s) IV Push (01-20-25 @ 11:55)    dextrose 50% Injectable   50 milliLiter(s) IV Push (01-20-25 @ 06:28)    
Age: 79y    Gender: Female    POCT Blood Glucose:  233 mg/dL (01-11-25 @ 21:26)  73 mg/dL (01-11-25 @ 20:53)  81 mg/dL (01-11-25 @ 17:52)  81 mg/dL (01-11-25 @ 12:02)  85 mg/dL (01-11-25 @ 06:52)  98 mg/dL (01-10-25 @ 23:23)      eMAR:dextrose 50% Injectable   25 Gram(s) IV Push (01-11-25 @ 21:05)    
Age: 79y    Gender: Female    POCT Blood Glucose:  282 mg/dL (01-20-25 @ 06:16)  54 mg/dL (01-20-25 @ 06:09)  52 mg/dL (01-20-25 @ 06:07)  95 mg/dL (01-19-25 @ 23:26)  104 mg/dL (01-19-25 @ 17:32)  125 mg/dL (01-19-25 @ 11:13)      eMAR:  dextrose 50% Injectable   50 milliLiter(s) IV Push (01-20-25 @ 06:28)    
Age: 79y    Gender: Female    POCT Blood Glucose:  176 mg/dL (01-10-25 @ 12:43)  46 mg/dL (01-10-25 @ 12:11)  51 mg/dL (01-10-25 @ 12:07)  86 mg/dL (01-10-25 @ 05:30)  93 mg/dL (01-09-25 @ 23:11)  142 mg/dL (01-09-25 @ 18:12)      eMAR:

## 2025-01-20 NOTE — PROVIDER CONTACT NOTE (CRITICAL VALUE NOTIFICATION) - TEST AND RESULT REPORTED:
Lactate 4.0
Potassium 2.7
ABG/pH & pCO2
Hgb 7.0
abg paco2 75 mmHg
lactate and phosphate
Lactate 3.9
PTT, H/H, glucose and Potassium
Potassium: 2.7
ABG pH & pCO2
Bands 15%
timoteo aguilar
Yasmine

## 2025-01-20 NOTE — PROGRESS NOTE ADULT - SUBJECTIVE AND OBJECTIVE BOX
POD# s/p     S: Patient seen and examined at bedside.  No overnight events.  Patient reports no new complaints at this time.  Admits to flatus and BM.  Voiding, ambulating and tolerating diet. Patient denies any fever, chills, chest pain, shortness of breath, nausea, vomiting, or urinary complaints.    MEDICATIONS:  MEDICATIONS  (STANDING):  albuterol/ipratropium for Nebulization 3 milliLiter(s) Nebulizer every 6 hours  buDESOnide    Inhalation Suspension 0.5 milliGRAM(s) Inhalation two times a day  caspofungin IVPB      caspofungin IVPB 50 milliGRAM(s) IV Intermittent every 24 hours  chlorhexidine 0.12% Liquid 15 milliLiter(s) Oral Mucosa every 12 hours  chlorhexidine 2% Cloths 1 Application(s) Topical <User Schedule>  dexMEDEtomidine Infusion 1.2 MICROgram(s)/kG/Hr (15.6 mL/Hr) IV Continuous <Continuous>  dextrose 5%. 1000 milliLiter(s) (100 mL/Hr) IV Continuous <Continuous>  dextrose 5%. 1000 milliLiter(s) (50 mL/Hr) IV Continuous <Continuous>  dextrose 50% Injectable 25 Gram(s) IV Push once  dextrose 50% Injectable 12.5 Gram(s) IV Push once  dextrose 50% Injectable 25 Gram(s) IV Push once  dextrose Oral Gel 15 Gram(s) Oral once  fentaNYL   Infusion. 0.501 MICROgram(s)/kG/Hr (2.6 mL/Hr) IV Continuous <Continuous>  glucagon  Injectable 1 milliGRAM(s) IntraMuscular once  guaifenesin/dextromethorphan Oral Liquid 10 milliLiter(s) Oral every 6 hours  heparin  Infusion.  Unit(s)/Hr (10 mL/Hr) IV Continuous <Continuous>  meropenem  IVPB 500 milliGRAM(s) IV Intermittent every 12 hours  pantoprazole   Suspension 40 milliGRAM(s) Oral two times a day    MEDICATIONS  (PRN):  acetaminophen   Oral Liquid .. 650 milliGRAM(s) Oral every 6 hours PRN Temp greater or equal to 38C (100.4F), Mild Pain (1 - 3), Moderate Pain (4 - 6), Severe Pain (7 - 10)  heparin   Injectable 4000 Unit(s) IV Push every 6 hours PRN For aPTT less than 40  heparin   Injectable 2000 Unit(s) IV Push every 6 hours PRN For aPTT between 40 - 57  labetalol Injectable 10 milliGRAM(s) IV Push daily PRN SBP >160  midazolam Injectable 2 milliGRAM(s) IV Push daily PRN discomfort      O:  VITAL SIGNS:  Vital Signs Last 24 Hrs  T(C): 38.6 (20 Jan 2025 08:09), Max: 38.6 (20 Jan 2025 08:09)  T(F): 101.5 (20 Jan 2025 08:09), Max: 101.5 (20 Jan 2025 08:09)  HR: 121 (20 Jan 2025 09:00) (79 - 121)  BP: 128/60 (20 Jan 2025 09:00) (113/33 - 157/69)  BP(mean): 72 (20 Jan 2025 09:00) (54 - 86)  RR: 40 (20 Jan 2025 09:00) (22 - 53)  SpO2: 97% (20 Jan 2025 09:00) (89% - 99%)    Parameters below as of 20 Jan 2025 08:30  Patient On (Oxygen Delivery Method): ventilator        PHYSICAL EXAM:  GENERAL: No acute distress, lying comfortably in bed  HEAD:  Atraumatic, Normocephalic  CHEST/LUNG: Non labored respirations, no accessory muscle use. CTAB; No wheezes, rales, or rhonchi  HEART: Regular rate and rhythm; No murmurs, rubs, or gallops  ABDOMEN: Soft, non-tender, non-distended; bowel sounds+  EXT: calves non-tender b/l, no edema  NEUROLOGY: A&O x 3, no focal deficits    INTAKE & OUTPUT:  I&O's Summary    19 Jan 2025 07:01  -  20 Jan 2025 07:00  --------------------------------------------------------  IN: 2341.3 mL / OUT: 780 mL / NET: 1561.3 mL    20 Jan 2025 07:01  -  20 Jan 2025 09:55  --------------------------------------------------------  IN: 73.2 mL / OUT: 0 mL / NET: 73.2 mL      I&O's Detail    19 Jan 2025 07:01  -  20 Jan 2025 07:00  --------------------------------------------------------  IN:    Dexmedetomidine: 468 mL    Enteral Tube Flush: 120 mL    FentaNYL: 10.1 mL    FentaNYL: 120.1 mL    Heparin Infusion: 144 mL    IV PiggyBack: 100 mL    IV PiggyBack: 250 mL    IV PiggyBack: 100 mL    Ketamine: 69.1 mL    Nepro with Carb Steady: 960 mL  Total IN: 2341.3 mL    OUT:    Indwelling Catheter - Urethral (mL): 80 mL    Rectal Tube (mL): 700 mL  Total OUT: 780 mL    Total NET: 1561.3 mL      20 Jan 2025 07:01  -  20 Jan 2025 09:55  --------------------------------------------------------  IN:    Dexmedetomidine: 19.5 mL    FentaNYL: 7.7 mL    Heparin Infusion: 6 mL    Nepro with Carb Steady: 40 mL  Total IN: 73.2 mL    OUT:  Total OUT: 0 mL    Total NET: 73.2 mL          LABS:                        7.1    12.99 )-----------( 75       ( 20 Jan 2025 05:38 )             22.2     01-20    145  |  113[H]  |  78[H]  ----------------------------<  56[L]  4.6   |  21[L]  |  2.90[H]    Ca    8.8      20 Jan 2025 05:38  Phos  7.3     01-20  Mg     2.4     01-20    TPro  4.9[L]  /  Alb  1.5[L]  /  TBili  0.9  /  DBili  x   /  AST  194[H]  /  ALT  64  /  AlkPhos  1279[H]  01-20    PT/INR - ( 19 Jan 2025 05:15 )   PT: 15.7 sec;   INR: 1.35 ratio         PTT - ( 20 Jan 2025 05:38 )  PTT:116.3 sec  Lactate, Blood: 0.9 mmol/L (01-18 @ 03:14)  Lactate, Blood: 1.6 mmol/L (01-17 @ 00:25)      Culture - Fungal, Bronchial (collected 17 Jan 2025 16:40)  Source: Bronchial  Preliminary Report (19 Jan 2025 11:44):    Testing in progress    Culture - Bronchial (collected 17 Jan 2025 16:40)  Source: Bronchial  Gram Stain (18 Jan 2025 06:59):    Few polymorphonuclear leukocytes seen per low power field    Rare Squamous epithelial cells seen per low power field    Rare Gram positive cocci in pairs seen per oil power field  Preliminary Report (19 Jan 2025 18:06):    Rare Pseudomonas aeruginosa    Commensal merline consistent with body site        RADIOLOGY & ADDITIONAL STUDIES:    A: 78y/o Female POD# s/p    P:  - VSSAF  - On exam:   - Labs   - On CT:  - Continue diet  - Continue antibiotics  - Continue to monitor Is & Os  - DVT prophylaxis with  - Encourage OOBA, IS  - Pain control, supportive care    Case to be discussed with   POD#11 s/p lap natan patch repair for perforated gastric ulcer, POC c/b prolonged intubation followed by extubation, patient was re-intubated for hypercapnic respiratory failure 2/2 aspiration    S: Patient seen and examined at bedside. Patient continues to be intubated and sedated, unable to attain ROS from patient.     MEDICATIONS:  MEDICATIONS  (STANDING):  albuterol/ipratropium for Nebulization 3 milliLiter(s) Nebulizer every 6 hours  buDESOnide    Inhalation Suspension 0.5 milliGRAM(s) Inhalation two times a day  caspofungin IVPB      caspofungin IVPB 50 milliGRAM(s) IV Intermittent every 24 hours  chlorhexidine 0.12% Liquid 15 milliLiter(s) Oral Mucosa every 12 hours  chlorhexidine 2% Cloths 1 Application(s) Topical <User Schedule>  dexMEDEtomidine Infusion 1.2 MICROgram(s)/kG/Hr (15.6 mL/Hr) IV Continuous <Continuous>  dextrose 5%. 1000 milliLiter(s) (100 mL/Hr) IV Continuous <Continuous>  dextrose 5%. 1000 milliLiter(s) (50 mL/Hr) IV Continuous <Continuous>  dextrose 50% Injectable 25 Gram(s) IV Push once  dextrose 50% Injectable 12.5 Gram(s) IV Push once  dextrose 50% Injectable 25 Gram(s) IV Push once  dextrose Oral Gel 15 Gram(s) Oral once  fentaNYL   Infusion. 0.501 MICROgram(s)/kG/Hr (2.6 mL/Hr) IV Continuous <Continuous>  glucagon  Injectable 1 milliGRAM(s) IntraMuscular once  guaifenesin/dextromethorphan Oral Liquid 10 milliLiter(s) Oral every 6 hours  heparin  Infusion.  Unit(s)/Hr (10 mL/Hr) IV Continuous <Continuous>  meropenem  IVPB 500 milliGRAM(s) IV Intermittent every 12 hours  pantoprazole   Suspension 40 milliGRAM(s) Oral two times a day    MEDICATIONS  (PRN):  acetaminophen   Oral Liquid .. 650 milliGRAM(s) Oral every 6 hours PRN Temp greater or equal to 38C (100.4F), Mild Pain (1 - 3), Moderate Pain (4 - 6), Severe Pain (7 - 10)  heparin   Injectable 4000 Unit(s) IV Push every 6 hours PRN For aPTT less than 40  heparin   Injectable 2000 Unit(s) IV Push every 6 hours PRN For aPTT between 40 - 57  labetalol Injectable 10 milliGRAM(s) IV Push daily PRN SBP >160  midazolam Injectable 2 milliGRAM(s) IV Push daily PRN discomfort      O:  VITAL SIGNS:  Vital Signs Last 24 Hrs  T(C): 38.6 (20 Jan 2025 08:09), Max: 38.6 (20 Jan 2025 08:09)  T(F): 101.5 (20 Jan 2025 08:09), Max: 101.5 (20 Jan 2025 08:09)  HR: 121 (20 Jan 2025 09:00) (79 - 121)  BP: 128/60 (20 Jan 2025 09:00) (113/33 - 157/69)  BP(mean): 72 (20 Jan 2025 09:00) (54 - 86)  RR: 40 (20 Jan 2025 09:00) (22 - 53)  SpO2: 97% (20 Jan 2025 09:00) (89% - 99%)    Parameters below as of 20 Jan 2025 08:30  Patient On (Oxygen Delivery Method): ventilator        PHYSICAL EXAM:  GENERAL: sedated, on ventilator support  HEAD:  Atraumatic, Normocephalic  CHEST/LUNG: on ventilator  HEART: Regular rate and rhythm  ABDOMEN: Soft, NTND, no drains in place, incisions c/d/i. Rectal tube in place. NGT in place with Tube feeds running.  : arcos catheter in place  EXT: ecchymosis of the BUE  NEUROLOGY: sedated    INTAKE & OUTPUT:  I&O's Summary    19 Jan 2025 07:01  -  20 Jan 2025 07:00  --------------------------------------------------------  IN: 2341.3 mL / OUT: 780 mL / NET: 1561.3 mL    20 Jan 2025 07:01  -  20 Jan 2025 09:55  --------------------------------------------------------  IN: 73.2 mL / OUT: 0 mL / NET: 73.2 mL      I&O's Detail    19 Jan 2025 07:01  -  20 Jan 2025 07:00  --------------------------------------------------------  IN:    Dexmedetomidine: 468 mL    Enteral Tube Flush: 120 mL    FentaNYL: 10.1 mL    FentaNYL: 120.1 mL    Heparin Infusion: 144 mL    IV PiggyBack: 100 mL    IV PiggyBack: 250 mL    IV PiggyBack: 100 mL    Ketamine: 69.1 mL    Nepro with Carb Steady: 960 mL  Total IN: 2341.3 mL    OUT:    Indwelling Catheter - Urethral (mL): 80 mL    Rectal Tube (mL): 700 mL  Total OUT: 780 mL    Total NET: 1561.3 mL      20 Jan 2025 07:01  -  20 Jan 2025 09:55  --------------------------------------------------------  IN:    Dexmedetomidine: 19.5 mL    FentaNYL: 7.7 mL    Heparin Infusion: 6 mL    Nepro with Carb Steady: 40 mL  Total IN: 73.2 mL    OUT:  Total OUT: 0 mL    Total NET: 73.2 mL          LABS:                        7.1    12.99 )-----------( 75       ( 20 Jan 2025 05:38 )             22.2     01-20    145  |  113[H]  |  78[H]  ----------------------------<  56[L]  4.6   |  21[L]  |  2.90[H]    Ca    8.8      20 Jan 2025 05:38  Phos  7.3     01-20  Mg     2.4     01-20    TPro  4.9[L]  /  Alb  1.5[L]  /  TBili  0.9  /  DBili  x   /  AST  194[H]  /  ALT  64  /  AlkPhos  1279[H]  01-20    PT/INR - ( 19 Jan 2025 05:15 )   PT: 15.7 sec;   INR: 1.35 ratio         PTT - ( 20 Jan 2025 05:38 )  PTT:116.3 sec  Lactate, Blood: 0.9 mmol/L (01-18 @ 03:14)  Lactate, Blood: 1.6 mmol/L (01-17 @ 00:25)      Culture - Fungal, Bronchial (collected 17 Jan 2025 16:40)  Source: Bronchial  Preliminary Report (19 Jan 2025 11:44):    Testing in progress    Culture - Bronchial (collected 17 Jan 2025 16:40)  Source: Bronchial  Gram Stain (18 Jan 2025 06:59):    Few polymorphonuclear leukocytes seen per low power field    Rare Squamous epithelial cells seen per low power field    Rare Gram positive cocci in pairs seen per oil power field  Preliminary Report (19 Jan 2025 18:06):    Rare Pseudomonas aeruginosa    Commensal merline consistent with body site      A: 80y/o Female PMHx HTN, HLD, CKD, PUD, Lung Cancer s/p RUL lobectomy who presents with a perforated gastric ulcer s/p laparoscopic repair with Natan Patch 1/9, POC c/b prolonged intubation subsequent extubation, however with conitnued hypercapneic respiratory failure requiring re-intubation as well with ESPERANZA & lactic acidosis. Currently in the ICU, sedated, on ventilator support.     P:  - Continue care per ICU  - Rec GOC conversation, including possible trach  - No acute surgical intervention indicated at this time  - Continue tube feeds    Case discussed with Dr. Marquez (covering for Dr. Valdez)

## 2025-01-20 NOTE — PROGRESS NOTE ADULT - ASSESSMENT
Perforated gastric ulcer s/p natan patch 1/9/2025  Hx lung CA s/p lobectomy  Acute respiratory failure  Abnormal imaging  Elevated alkaline phosphatase    CT AP 1/16 noted, heterogenous liver with scattered lesions largest 1.4cm    Recommendations:  - LFTs and alk phos noted, continue to trend  - GGT elevated   - Isoenzyme sent to differentiate cause of elevated alk phos, f/u results  - PPI QD  - OGT feeds  - Continue ICU care for ventilation/bronchoscopy  - Can consider MR abdomen w/ IV contrast if pt medically able  - D/w  at bedside  - D/w ICU team    I reviewed the overnight course of events on the unit, re-confirming the patient history. I discussed the care with the patient  Differential diagnosis and plan of care discussed with patient after the evaluation  105 minutes spent on total encounter of which more than fifty percent of the encounter was spent counseling and/or coordinating care by the attending physician.

## 2025-01-20 NOTE — CHART NOTE - NSCHARTNOTEFT_GEN_A_CORE
: Amanda Kaba    INDICATION: Hypoxic respiratory failure    PROCEDURE:  [x] LIMITED ECHO  [x] LIMITED CHEST  [ ] LIMITED RETROPERITONEAL  [ ] LIMITED ABDOMINAL  [ ] LIMITED DVT  [ ] NEEDLE GUIDANCE VASCULAR  [ ] NEEDLE GUIDANCE THORACENTESIS  [ ] NEEDLE GUIDANCE PARACENTESIS  [ ] NEEDLE GUIDANCE PERICARDIOCENTESIS  [ ] OTHER    FINDINGS: Small right pleural effusion, trace left pleural effusion, b/l B lines worse at bases; b/l lung sliding; no pericardial effusion, LV>RV, grossly normal LVSF, IVC with minimal respiratory variation      INTERPRETATION: b/l pleural effusion

## 2025-01-20 NOTE — PROGRESS NOTE ADULT - SUBJECTIVE AND OBJECTIVE BOX
CHIEF COMPLAINT: surgery    Interval Events: Pt with worsening multiorgan failure overnight, discussed at length with  at bedside see assessment for conversation details    REVIEW OF SYSTEMS:  Constitutional: [ ] negative [ ] fevers [ ] chills [ ] weight loss [ ] weight gain  HEENT: [ ] negative [ ] dry eyes [ ] eye irritation [ ] postnasal drip [ ] nasal congestion  CV: [ ] negative  [ ] chest pain [ ] orthopnea [ ] palpitations [ ] murmur  Resp: [ ] negative [ ] cough [ ] shortness of breath [ ] dyspnea [ ] wheezing [ ] sputum [ ] hemoptysis  GI: [ ] negative [ ] nausea [ ] vomiting [ ] diarrhea [ ] constipation [ ] abd pain [ ] dysphagia   : [ ] negative [ ] dysuria [ ] nocturia [ ] hematuria [ ] increased urinary frequency  Musculoskeletal: [ ] negative [ ] back pain [ ] myalgias [ ] arthralgias [ ] fracture  Skin: [ ] negative [ ] rash [ ] itch  Neurological: [ ] negative [ ] headache [ ] dizziness [ ] syncope [ ] weakness [ ] numbness  Psychiatric: [ ] negative [ ] anxiety [ ] depression  Endocrine: [ ] negative [ ] diabetes [ ] thyroid problem  Hematologic/Lymphatic: [ ] negative [ ] anemia [ ] bleeding problem  Allergic/Immunologic: [ ] negative [ ] itchy eyes [ ] nasal discharge [ ] hives [ ] angioedema  [ ] All other systems negative  [x] Unable to assess ROS because pt intubated, sedated    OBJECTIVE:  ICU Vital Signs Last 24 Hrs  T(C): 37 (20 Jan 2025 15:55), Max: 38.6 (20 Jan 2025 08:09)  T(F): 98.6 (20 Jan 2025 15:55), Max: 101.5 (20 Jan 2025 08:09)  HR: 92 (20 Jan 2025 16:37) (70 - 125)  BP: 143/25 (20 Jan 2025 16:30) (65/18 - 151/51)  BP(mean): 52 (20 Jan 2025 16:30) (34 - 125)  ABP: --  ABP(mean): --  RR: 30 (20 Jan 2025 16:30) (22 - 37)  SpO2: 100% (20 Jan 2025 16:37) (89% - 100%)    O2 Parameters below as of 20 Jan 2025 14:02  Patient On (Oxygen Delivery Method): ventilator          Mode: SIMV with PS, RR (machine): 30, TV (machine): 350, FiO2: 50, PEEP: 5, PS: 10, ITime: 0.9, MAP: 18, PIP: 36    01-19 @ 07:01  - 01-20 @ 07:00  --------------------------------------------------------  IN: 2341.3 mL / OUT: 780 mL / NET: 1561.3 mL    01-20 @ 07:01 - 01-20 @ 17:34  --------------------------------------------------------  IN: 501.4 mL / OUT: 35 mL / NET: 466.4 mL      CAPILLARY BLOOD GLUCOSE      POCT Blood Glucose.: 82 mg/dL (20 Jan 2025 15:01)      PHYSICAL EXAM:  General: intubated, sedated, ill appearing  HEENT: PERRL  Respiratory: decreased lung sounds right, ronchi left  Cardiovascular: S1 S2 no MRG  Abdomen: soft NTND +BS  Extremities: cool, clammy  Skin: pale, cyanotic fingertips  Neurological: sedated, no spontaneous movement  Psychiatry: non verbal, sedated    LINES:    HOSPITAL MEDICATIONS:  heparin  Infusion.  Unit(s)/Hr IV Continuous <Continuous>    caspofungin IVPB      caspofungin IVPB 50 milliGRAM(s) IV Intermittent every 24 hours  cefepime   IVPB 1000 milliGRAM(s) IV Intermittent every 24 hours    labetalol Injectable 10 milliGRAM(s) IV Push daily PRN  norepinephrine Infusion 0.05 MICROgram(s)/kG/Min IV Continuous <Continuous>    dextrose 50% Injectable 25 Gram(s) IV Push once  dextrose 50% Injectable 12.5 Gram(s) IV Push once  dextrose 50% Injectable 25 Gram(s) IV Push once  dextrose Oral Gel 15 Gram(s) Oral once  glucagon  Injectable 1 milliGRAM(s) IntraMuscular once    albuterol/ipratropium for Nebulization 3 milliLiter(s) Nebulizer every 6 hours  buDESOnide    Inhalation Suspension 0.5 milliGRAM(s) Inhalation two times a day  guaifenesin/dextromethorphan Oral Liquid 10 milliLiter(s) Oral every 6 hours    acetaminophen   Oral Liquid .. 650 milliGRAM(s) Oral every 6 hours PRN  dexMEDEtomidine Infusion 1.2 MICROgram(s)/kG/Hr IV Continuous <Continuous>  fentaNYL   Infusion. 0.501 MICROgram(s)/kG/Hr IV Continuous <Continuous>  midazolam Injectable 2 milliGRAM(s) IV Push daily PRN    pantoprazole   Suspension 40 milliGRAM(s) Oral two times a day        dextrose 5%. 1000 milliLiter(s) IV Continuous <Continuous>  dextrose 5%. 1000 milliLiter(s) IV Continuous <Continuous>  dextrose 5%. 1000 milliLiter(s) IV Continuous <Continuous>  dextrose 5%. 1000 milliLiter(s) IV Continuous <Continuous>      chlorhexidine 0.12% Liquid 15 milliLiter(s) Oral Mucosa every 12 hours  chlorhexidine 2% Cloths 1 Application(s) Topical <User Schedule>        LABS:                        6.9    11.57 )-----------( 64       ( 20 Jan 2025 13:05 )             22.7     Hgb Trend: 6.9<--, 7.1<--, 7.3<--, 7.0<--, 7.5<--  01-20    143  |  112[H]  |  85[H]  ----------------------------<  43[LL]  6.5[HH]   |  17[L]  |  3.40[H]    Ca    8.9      20 Jan 2025 13:05  Phos  9.0     01-20  Mg     2.9     01-20    TPro  4.9[L]  /  Alb  1.4[L]  /  TBili  1.3[H]  /  DBili  x   /  AST  1738[H]  /  ALT  322[H]  /  AlkPhos  1234[H]  01-20    Creatinine Trend: 3.40<--, 2.90<--, 2.00<--, 2.00<--, 1.90<--, 2.00<--  PT/INR - ( 19 Jan 2025 05:15 )   PT: 15.7 sec;   INR: 1.35 ratio         PTT - ( 20 Jan 2025 14:30 )  PTT:199.3 sec  Urinalysis Basic - ( 20 Jan 2025 13:05 )    Color: x / Appearance: x / SG: x / pH: x  Gluc: 43 mg/dL / Ketone: x  / Bili: x / Urobili: x   Blood: x / Protein: x / Nitrite: x   Leuk Esterase: x / RBC: x / WBC x   Sq Epi: x / Non Sq Epi: x / Bacteria: x      Arterial Blood Gas:  01-19 @ 04:52  7.29/54/72/26/93.2/-0.8  ABG lactate: --        MICROBIOLOGY:     RADIOLOGY:  [ ] Reviewed and interpreted by me    EKG:

## 2025-01-20 NOTE — PROGRESS NOTE ADULT - PROVIDER SPECIALTY LIST ADULT
Critical Care
Critical Care
Nephrology
Pulmonology
Surgery
Critical Care
Infectious Disease
Nephrology
Pulmonology
Pulmonology
Surgery
Critical Care
Gastroenterology
Infectious Disease
Nephrology
Pulmonology
Surgery
Surgery
Critical Care
Family Medicine
Gastroenterology
Pulmonology
Surgery
Critical Care
Critical Care
Gastroenterology
Hospitalist
Family Medicine
Internal Medicine

## 2025-01-20 NOTE — PROGRESS NOTE ADULT - ASSESSMENT
78 y/o WF with PMHx with HTN, HLD, CKD, PUD (dx 2011), Lung CA s/p lobectomy (1996) presents to [facility] ED from home with  via ambulance c/o worsening generalized achy abdominal pain since last night.  Patient reports baseline abdominal discomfort x several years which worsened suddenly    intestinal perf  atelectasis  pleural eff  HTN  HLD  CKD  PUD  PNA hx  Lung Ca - hx of Lobectomy     intubated - ventilated  abg noted  antimicrobial rx regimen  icu care  post op care  s/p Bronch on 1 17 25 -   VS noted - fever noted -     post op care  PUD perf - surgery and op note reviewed  lung protective vent support  fio2 titration  oral hygiene  skin care  suction PRN  HOB elev  PPI  Bronchodilators - Stress steroids - hx of COPD -   chr changes - deformity - lung parenchyma and chest - see CT chest -   ICU care  I and O  serial labs  replete lytes  dvt p  pain relief

## 2025-01-20 NOTE — PROGRESS NOTE ADULT - PROBLEM SELECTOR PROBLEM 1
Acute respiratory failure with hypercapnia

## 2025-01-20 NOTE — PROVIDER CONTACT NOTE (HYPOGLYCEMIA EVENT) - NS PROVIDER CONTACT NOTE-TREATMENT-HYPO
Dextrose 50% 25 Grams IV Push

## 2025-01-20 NOTE — PROGRESS NOTE ADULT - NUTRITIONAL ASSESSMENT
This patient has been assessed with a concern for Malnutrition and has been determined to have a diagnosis/diagnoses of Moderate protein-calorie malnutrition.    This patient is being managed with:   Diet NPO-  Tube Feeding Modality: Nasogastric  Nepro with Carb Steady  Total Volume for 24 Hours (mL): 960  Continuous  Starting Tube Feed Rate {mL per Hour}: 10  Increase Tube Feed Rate by (mL): 10     Every 8 hours  Until Goal Tube Feed Rate (mL per Hour): 40  Tube Feed Duration (in Hours): 24  Tube Feed Start Time: 12:00  Entered: Jan 14 2025  2:39PM  
This patient has been assessed with a concern for Malnutrition and has been determined to have a diagnosis/diagnoses of Moderate protein-calorie malnutrition.    This patient is being managed with:   Diet NPO-  Tube Feeding Modality: Nasogastric  Nepro with Carb Steady  Total Volume for 24 Hours (mL): 960  Continuous  Starting Tube Feed Rate {mL per Hour}: 10  Increase Tube Feed Rate by (mL): 10     Every 8 hours  Until Goal Tube Feed Rate (mL per Hour): 40  Tube Feed Duration (in Hours): 24  Tube Feed Start Time: 12:00  Entered: Jan 14 2025  2:39PM  
This patient has been assessed with a concern for Malnutrition and has been determined to have a diagnosis/diagnoses of Moderate protein-calorie malnutrition.  
This patient has been assessed with a concern for Malnutrition and has been determined to have a diagnosis/diagnoses of Moderate protein-calorie malnutrition.    This patient is being managed with:   Diet NPO-  Tube Feeding Modality: Nasogastric  Nepro with Carb Steady  Total Volume for 24 Hours (mL): 960  Continuous  Starting Tube Feed Rate {mL per Hour}: 10  Increase Tube Feed Rate by (mL): 10     Every 8 hours  Until Goal Tube Feed Rate (mL per Hour): 40  Tube Feed Duration (in Hours): 24  Tube Feed Start Time: 12:00  Entered: Jan 14 2025  2:39PM  
This patient has been assessed with a concern for Malnutrition and has been determined to have a diagnosis/diagnoses of Moderate protein-calorie malnutrition.  
This patient has been assessed with a concern for Malnutrition and has been determined to have a diagnosis/diagnoses of Moderate protein-calorie malnutrition.    This patient is being managed with:   Diet Full Liquid-  DASH/TLC {Sodium & Cholesterol Restricted}  Supplement Feeding Modality:  Oral  Ensure Clear Cans or Servings Per Day:  3       Frequency:  Three Times a day  Entered: Jan 13 2025  8:20AM  
This patient has been assessed with a concern for Malnutrition and has been determined to have a diagnosis/diagnoses of Moderate protein-calorie malnutrition.    This patient is being managed with:   Diet Full Liquid-  DASH/TLC {Sodium & Cholesterol Restricted}  Supplement Feeding Modality:  Oral  Ensure Clear Cans or Servings Per Day:  3       Frequency:  Three Times a day  Entered: Jan 13 2025  8:20AM  
This patient has been assessed with a concern for Malnutrition and has been determined to have a diagnosis/diagnoses of Moderate protein-calorie malnutrition.    This patient is being managed with:   Diet NPO-  Tube Feeding Modality: Nasogastric  Nepro with Carb Steady  Total Volume for 24 Hours (mL): 960  Continuous  Starting Tube Feed Rate {mL per Hour}: 10  Increase Tube Feed Rate by (mL): 10     Every 8 hours  Until Goal Tube Feed Rate (mL per Hour): 40  Tube Feed Duration (in Hours): 24  Tube Feed Start Time: 12:00  Entered: Jan 14 2025  2:39PM  
This patient has been assessed with a concern for Malnutrition and has been determined to have a diagnosis/diagnoses of Moderate protein-calorie malnutrition.    This patient is being managed with:   Diet NPO-  Tube Feeding Modality: Nasogastric  Nepro with Carb Steady  Total Volume for 24 Hours (mL): 960  Continuous  Starting Tube Feed Rate {mL per Hour}: 10  Increase Tube Feed Rate by (mL): 10     Every 8 hours  Until Goal Tube Feed Rate (mL per Hour): 40  Tube Feed Duration (in Hours): 24  Tube Feed Start Time: 12:00  Entered: Jan 14 2025  2:39PM    The following pending diet order is being considered for treatment of Moderate protein-calorie malnutrition:  Diet NPO with Tube Feed-  Tube Feeding Modality: Nasogastric  Nepro with Carb Steady  Total Volume for 24 Hours (mL): 900  Continuous  Starting Tube Feed Rate {mL per Hour}: 40  Increase Tube Feed Rate by (mL): 10     Every 6 hours  Until Goal Tube Feed Rate (mL per Hour): 50  Tube Feed Duration (in Hours): 18  Tube Feed Start Time: 12:00  Entered: Jan 18 2025  2:55PM  
This patient has been assessed with a concern for Malnutrition and has been determined to have a diagnosis/diagnoses of Moderate protein-calorie malnutrition.    This patient is being managed with:   Diet NPO-  Entered: Jan 9 2025 12:05PM  
This patient has been assessed with a concern for Malnutrition and has been determined to have a diagnosis/diagnoses of Moderate protein-calorie malnutrition.    This patient is being managed with:   Diet NPO-  Tube Feeding Modality: Nasogastric  Nepro with Carb Steady  Total Volume for 24 Hours (mL): 960  Continuous  Starting Tube Feed Rate {mL per Hour}: 10  Increase Tube Feed Rate by (mL): 10     Every 8 hours  Until Goal Tube Feed Rate (mL per Hour): 40  Tube Feed Duration (in Hours): 24  Tube Feed Start Time: 12:00  Entered: Jan 14 2025  2:39PM  
This patient has been assessed with a concern for Malnutrition and has been determined to have a diagnosis/diagnoses of Moderate protein-calorie malnutrition.    This patient is being managed with:   Diet NPO-  Tube Feeding Modality: Nasogastric  Nepro with Carb Steady  Total Volume for 24 Hours (mL): 960  Continuous  Starting Tube Feed Rate {mL per Hour}: 10  Increase Tube Feed Rate by (mL): 10     Every 8 hours  Until Goal Tube Feed Rate (mL per Hour): 40  Tube Feed Duration (in Hours): 24  Tube Feed Start Time: 12:00  Entered: Jan 14 2025  2:39PM  
This patient has been assessed with a concern for Malnutrition and has been determined to have a diagnosis/diagnoses of Moderate protein-calorie malnutrition.    This patient is being managed with:   Diet NPO-  Tube Feeding Modality: Nasogastric  Nepro with Carb Steady  Total Volume for 24 Hours (mL): 960  Continuous  Starting Tube Feed Rate {mL per Hour}: 10  Increase Tube Feed Rate by (mL): 10     Every 8 hours  Until Goal Tube Feed Rate (mL per Hour): 40  Tube Feed Duration (in Hours): 24  Tube Feed Start Time: 12:00  Entered: Jan 14 2025  2:39PM    The following pending diet order is being considered for treatment of Moderate protein-calorie malnutrition:  Diet NPO with Tube Feed-  Tube Feeding Modality: Nasogastric  Nepro with Carb Steady  Total Volume for 24 Hours (mL): 900  Continuous  Starting Tube Feed Rate {mL per Hour}: 40  Increase Tube Feed Rate by (mL): 10     Every 6 hours  Until Goal Tube Feed Rate (mL per Hour): 50  Tube Feed Duration (in Hours): 18  Tube Feed Start Time: 12:00  Entered: Jan 18 2025  2:55PM  
This patient has been assessed with a concern for Malnutrition and has been determined to have a diagnosis/diagnoses of Moderate protein-calorie malnutrition.    This patient is being managed with:   Diet NPO-  Tube Feeding Modality: Nasogastric  Nepro with Carb Steady  Total Volume for 24 Hours (mL): 960  Continuous  Starting Tube Feed Rate {mL per Hour}: 10  Increase Tube Feed Rate by (mL): 10     Every 8 hours  Until Goal Tube Feed Rate (mL per Hour): 40  Tube Feed Duration (in Hours): 24  Tube Feed Start Time: 12:00  Entered: Jan 14 2025  2:39PM  
This patient has been assessed with a concern for Malnutrition and has been determined to have a diagnosis/diagnoses of Moderate protein-calorie malnutrition.    This patient is being managed with:   Diet NPO-  Tube Feeding Modality: Nasogastric  Nepro with Carb Steady  Total Volume for 24 Hours (mL): 960  Continuous  Starting Tube Feed Rate {mL per Hour}: 10  Increase Tube Feed Rate by (mL): 10     Every 8 hours  Until Goal Tube Feed Rate (mL per Hour): 40  Tube Feed Duration (in Hours): 24  Tube Feed Start Time: 12:00  Entered: Jan 14 2025  2:39PM  
This patient has been assessed with a concern for Malnutrition and has been determined to have a diagnosis/diagnoses of Moderate protein-calorie malnutrition.    This patient is being managed with:   Diet NPO-  Tube Feeding Modality: Nasogastric  Nepro with Carb Steady  Total Volume for 24 Hours (mL): 960  Continuous  Starting Tube Feed Rate {mL per Hour}: 10  Increase Tube Feed Rate by (mL): 10     Every 8 hours  Until Goal Tube Feed Rate (mL per Hour): 40  Tube Feed Duration (in Hours): 24  Tube Feed Start Time: 12:00  Entered: Jan 14 2025  2:39PM    The following pending diet order is being considered for treatment of Moderate protein-calorie malnutrition:  Diet NPO with Tube Feed-  Tube Feeding Modality: Nasogastric  Nepro with Carb Steady  Total Volume for 24 Hours (mL): 900  Continuous  Starting Tube Feed Rate {mL per Hour}: 40  Increase Tube Feed Rate by (mL): 10     Every 6 hours  Until Goal Tube Feed Rate (mL per Hour): 50  Tube Feed Duration (in Hours): 18  Tube Feed Start Time: 12:00  Entered: Jan 18 2025  2:55PM  
This patient has been assessed with a concern for Malnutrition and has been determined to have a diagnosis/diagnoses of Moderate protein-calorie malnutrition.    This patient is being managed with:   Diet NPO-  Tube Feeding Modality: Nasogastric  Nepro with Carb Steady  Total Volume for 24 Hours (mL): 960  Continuous  Starting Tube Feed Rate {mL per Hour}: 10  Increase Tube Feed Rate by (mL): 10     Every 8 hours  Until Goal Tube Feed Rate (mL per Hour): 40  Tube Feed Duration (in Hours): 24  Tube Feed Start Time: 12:00  Entered: Jan 14 2025  2:39PM  
This patient has been assessed with a concern for Malnutrition and has been determined to have a diagnosis/diagnoses of Moderate protein-calorie malnutrition.    This patient is being managed with:   Diet NPO-  Tube Feeding Modality: Nasogastric  Nepro with Carb Steady  Total Volume for 24 Hours (mL): 960  Continuous  Starting Tube Feed Rate {mL per Hour}: 10  Increase Tube Feed Rate by (mL): 10     Every 8 hours  Until Goal Tube Feed Rate (mL per Hour): 40  Tube Feed Duration (in Hours): 24  Tube Feed Start Time: 12:00  Entered: Jan 14 2025  2:39PM    The following pending diet order is being considered for treatment of Moderate protein-calorie malnutrition:  Diet NPO with Tube Feed-  Tube Feeding Modality: Nasogastric  Nepro with Carb Steady  Total Volume for 24 Hours (mL): 900  Continuous  Starting Tube Feed Rate {mL per Hour}: 40  Increase Tube Feed Rate by (mL): 10     Every 6 hours  Until Goal Tube Feed Rate (mL per Hour): 50  Tube Feed Duration (in Hours): 18  Tube Feed Start Time: 12:00  Entered: Jan 18 2025  2:55PM  
This patient has been assessed with a concern for Malnutrition and has been determined to have a diagnosis/diagnoses of Moderate protein-calorie malnutrition.    This patient is being managed with:   Diet NPO-  Tube Feeding Modality: Nasogastric  Nepro with Carb Steady  Total Volume for 24 Hours (mL): 960  Continuous  Starting Tube Feed Rate {mL per Hour}: 10  Increase Tube Feed Rate by (mL): 10     Every 8 hours  Until Goal Tube Feed Rate (mL per Hour): 40  Tube Feed Duration (in Hours): 24  Tube Feed Start Time: 12:00  Entered: Jan 14 2025  2:39PM    The following pending diet order is being considered for treatment of Moderate protein-calorie malnutrition:  Diet NPO with Tube Feed-  Tube Feeding Modality: Nasogastric  Nepro with Carb Steady  Total Volume for 24 Hours (mL): 900  Continuous  Starting Tube Feed Rate {mL per Hour}: 40  Increase Tube Feed Rate by (mL): 10     Every 6 hours  Until Goal Tube Feed Rate (mL per Hour): 50  Tube Feed Duration (in Hours): 18  Tube Feed Start Time: 12:00  Entered: Jan 18 2025  2:55PM

## 2025-01-20 NOTE — PROGRESS NOTE ADULT - ASSESSMENT
78 yo woman with HTN, HLD, CKD, PUD, Lung Cancer s/p RUL lobectomy who presents with a perforated gastric ulcer s/p laparoscopic repair with Srinath Patch 1/9.  Postop course complicated by prolonged intubation for hypercapnic respiratory failure 2/2 aspiration, ESPERANZA 2/2 ATN, and now likely aspiration pneumonia with recurrent hypoxemic respiratory failure, septic shock, lactic acidosis, and ESPERANZA.      Neuro: continue fentanyl for sedation  Pulm: acute on chronic hypoxic respiratory failure 2/2 PNA, unable to wean from vent, continue protective vent settings  CV: worsening septic shock requiring pressors to maintain MAP >65  GI: NPO with NGT to LWS  /Renal: ESPERANZA 2/2 ATN, worsening, with hyperkalemia. At this time pt would be poor candidate for HD given pressor requirement and overall multiorgan failure; s/p lasix, D50, calcium  Heme/DVT PPX: anemia drifted from baseline, will hold heparin gtt for now; trend CBC  Endo: hypoglycemia likely 2/2 sepsis, continue D5  ID:  pseudomonas PNA, d/w ID will start avycaz  Ethics: Explained to  that prognosis is grave and pt is likely dying. He expressed understanding. Pt now DNR but will continue all other measures. Hoping for family to visit from [place] of state.

## 2025-01-20 NOTE — CHART NOTE - NSCHARTNOTEFT_GEN_A_CORE
Called to bedside emergently by RN.     On arrival, patient hypotensive SBP 50s. Repeated additional time to ensure, SBP repeated to ensure accuracy. Given 4cc Phenylephrine immediately with transient improvement in SBP, 90s. However patient with additional episode of profound hypotension, SBP 50s. Initiated on Levophed gtt, titrate to MAP >65. STAT CBC, CMP, Mg and Phos ordered. Shock state likely secondary to acute underlying infectious etiology. POCUS with B/L b lines, IVC plethoric. No further fluid administration.     Clinically toxic/ pale appearing, with B/L UE anasarca. Now with worsening renal failure and anuria. Extensive GOC discussion with  at bedside. Understanding of critical illness and overall poor prognosis given prolonged course of mechanical ventilation and multisystem organ failure. Patient remains Full Code at this time.         CRITICAL CARE TIME SPENT: 109 minutes assessing presenting problems of acute illness, which pose high probability of life threatening deterioration or end organ damage/dysfunction, as well as medical decision making including initiating plan of care, reviewing data, reviewing radiologic exams, discussing with multidisciplinary team,  discussing goals of care with patient/family, and writing this note.  Non-inclusive of procedures performed Called to bedside emergently by RN.     On arrival, patient hypotensive SBP 50s. Repeated additional time to ensure, SBP repeated to ensure accuracy. Given 4cc Phenylephrine immediately with transient improvement in SBP, 90s. However patient with additional episode of profound hypotension, SBP 50s. Initiated on Levophed gtt, titrate to MAP >65. STAT CBC, CMP, Mg and Phos ordered. Shock state likely secondary to acute underlying infectious etiology. POCUS with B/L b lines, IVC plethoric. No further fluid administration. Patient with multiple hypoglycemic episodes this AM, lowest BG 27. Given multiple pushes of D50 with initial response however subsequent downtrending BGs. Initiated om D5 @50/cc with rapid escalation of infusion. STAT BG checked, repeat 82.     Clinically toxic/ pale appearing, with B/L UE anasarca. Now with persistent renal failure, worsening acidosis and development of anuria. Extensive GOC discussion with  at bedside, understanding of critical illness, overall poor prognosis and high risk of cardiac arrest given prolonged course of mechanical ventilation and multisystem organ failure. Patient remains Full Code at this time.         CRITICAL CARE TIME SPENT: 109 minutes assessing presenting problems of acute illness, which pose high probability of life threatening deterioration or end organ damage/dysfunction, as well as medical decision making including initiating plan of care, reviewing data, reviewing radiologic exams, discussing with multidisciplinary team,  discussing goals of care with patient/family, and writing this note.  Non-inclusive of procedures performed

## 2025-01-20 NOTE — PROGRESS NOTE ADULT - ASSESSMENT
As in above full PA notation, unless countered in the below.  Ms. Thompson personally seen during daily rounds.  Now on heparin infusion for DVT, but overall there is a metabolic deterioration ongoing.  Vitals reviewed and discussed with ICU team.  Abdomen soft/ non tense/ no appreciable tenderness with wounds all C/D/I.  Labs reviewed and discussed with ICU team.  Clinically, respiratory failure with ongoing ventilation assistance required secondary to pulmonary deterioration, with newer issue of further renal deterioration.  Surgically, stable at present with interval CT scan of abdomen and pelvis on 1/16 negative for evidence of ongoing leakage or collections.  To continue current supportive care, though transition to more palliative approach seems reasonable given grave prognosis at this point with evolving multisystem organ failure.  Reviewed with ICU attending and PA, Surgical PA and today's RN.

## 2025-01-20 NOTE — PROGRESS NOTE ADULT - PROBLEM SELECTOR PLAN 2
IV ABX - zosyn  ID eval with dr. debra alvarez
IV ABX - zosyn  ID eval with dr. debra toro
IV ABX  ID eval
IV ABX - zosyn  ID eval with dr. debra toro
IV ABX
IV ABX
IV ABX - zosyn  ID eval with dr. debra toro

## 2025-01-20 NOTE — PROGRESS NOTE ADULT - PROBLEM SELECTOR PROBLEM 4
Perforated gastric ulcer

## 2025-01-20 NOTE — PROVIDER CONTACT NOTE (CRITICAL VALUE NOTIFICATION) - NAME OF MD/NP/PA/DO NOTIFIED:
ASHANTIA Ethel Amanda
Chidi Mohan
Dr. Kaba
Yogi Taylor
MD Gauthier
SCOTT Murillo
Chidi Stearns
Dr. Kaba
Valeri Eduardo
SCOTT Viera
AMRIK Stearns
SCOTT Merida
MD Kaba

## 2025-01-20 NOTE — PROGRESS NOTE ADULT - SUBJECTIVE AND OBJECTIVE BOX
[facility]    Herrera Oneil NP    [address] [place] [address]  [phone]      Chief Complaint:  Patient is a 79y old  Female who presents with a chief complaint of transferred from [facility] s/p grahm patch repair (20 Jan 2025 12:05)      HPI/ 24 hr events:   Patient seen and examined at bedside, spouse present  Remains intubated/sedated   As per nursing staff, patient with liquid/brown output in rectal tube  No acute GI events overnight  AM labs reviewed       REVIEW OF SYSTEMS:   General: Negative  HEENT: Negative  CV: Negative  Respiratory: Negative  GI: See HPI  : Negative  MSK: Negative  Hematologic: Negative  Skin: Negative    MEDICATIONS:   MEDICATIONS  (STANDING):  albuterol/ipratropium for Nebulization 3 milliLiter(s) Nebulizer every 6 hours  buDESOnide    Inhalation Suspension 0.5 milliGRAM(s) Inhalation two times a day  caspofungin IVPB 50 milliGRAM(s) IV Intermittent every 24 hours  caspofungin IVPB      chlorhexidine 0.12% Liquid 15 milliLiter(s) Oral Mucosa every 12 hours  chlorhexidine 2% Cloths 1 Application(s) Topical <User Schedule>  dexMEDEtomidine Infusion 1.2 MICROgram(s)/kG/Hr (15.6 mL/Hr) IV Continuous <Continuous>  dextrose 5%. 1000 milliLiter(s) (100 mL/Hr) IV Continuous <Continuous>  dextrose 5%. 1000 milliLiter(s) (50 mL/Hr) IV Continuous <Continuous>  dextrose 5%. 1000 milliLiter(s) (50 mL/Hr) IV Continuous <Continuous>  dextrose 50% Injectable 25 Gram(s) IV Push once  dextrose 50% Injectable 12.5 Gram(s) IV Push once  dextrose 50% Injectable 25 Gram(s) IV Push once  dextrose Oral Gel 15 Gram(s) Oral once  fentaNYL   Infusion. 0.501 MICROgram(s)/kG/Hr (2.6 mL/Hr) IV Continuous <Continuous>  glucagon  Injectable 1 milliGRAM(s) IntraMuscular once  guaifenesin/dextromethorphan Oral Liquid 10 milliLiter(s) Oral every 6 hours  heparin  Infusion.  Unit(s)/Hr (10 mL/Hr) IV Continuous <Continuous>  meropenem  IVPB 500 milliGRAM(s) IV Intermittent every 12 hours  pantoprazole   Suspension 40 milliGRAM(s) Oral two times a day    MEDICATIONS  (PRN):  acetaminophen   Oral Liquid .. 650 milliGRAM(s) Oral every 6 hours PRN Temp greater or equal to 38C (100.4F), Mild Pain (1 - 3), Moderate Pain (4 - 6), Severe Pain (7 - 10)  heparin   Injectable 4000 Unit(s) IV Push every 6 hours PRN For aPTT less than 40  heparin   Injectable 2000 Unit(s) IV Push every 6 hours PRN For aPTT between 40 - 57  labetalol Injectable 10 milliGRAM(s) IV Push daily PRN SBP >160  midazolam Injectable 2 milliGRAM(s) IV Push daily PRN discomfort      ALLERGIES:   Allergies    penicillin (Other)  minocycline (Other)  Enbrel (Unknown)  IV Contrast (Hives; Rash)  Zofran (Other)  Levaquin (Other)    Intolerances        VITAL SIGNS:   Vital Signs Last 24 Hrs  T(C): 38 (20 Jan 2025 12:00), Max: 38.6 (20 Jan 2025 08:09)  T(F): 100.4 (20 Jan 2025 12:00), Max: 101.5 (20 Jan 2025 08:09)  HR: 97 (20 Jan 2025 11:00) (79 - 125)  BP: 103/54 (20 Jan 2025 11:00) (103/54 - 151/51)  BP(mean): 67 (20 Jan 2025 11:00) (54 - 125)  RR: 35 (20 Jan 2025 11:00) (22 - 44)  SpO2: 94% (20 Jan 2025 11:00) (89% - 99%)    Parameters below as of 20 Jan 2025 08:30  Patient On (Oxygen Delivery Method): ventilator      I&O's Summary    19 Jan 2025 07:01  -  20 Jan 2025 07:00  --------------------------------------------------------  IN: 2341.3 mL / OUT: 780 mL / NET: 1561.3 mL    20 Jan 2025 07:01  -  20 Jan 2025 12:28  --------------------------------------------------------  IN: 289.8 mL / OUT: 25 mL / NET: 264.8 mL        PHYSICAL EXAM:   GENERAL:  No acute distress  HEENT:  NC/AT, intubated   CHEST:  No increased effort  HEART:  Regular rate  ABDOMEN:  Soft, no non-verbal signs of pain, non-distended, positive bowel sounds, +rectal tube  EXTREMITIES: No cyanosis  SKIN:  Warm, dry  NEURO:  Calm    LABS:                        7.1    12.99 )-----------( 75       ( 20 Jan 2025 05:38 )             22.2     01-20    145  |  113[H]  |  78[H]  ----------------------------<  56[L]  4.6   |  21[L]  |  2.90[H]    Ca    8.8      20 Jan 2025 05:38  Phos  7.3     01-20  Mg     2.4     01-20    TPro  4.9[L]  /  Alb  1.5[L]  /  TBili  0.9  /  DBili  x   /  AST  194[H]  /  ALT  64  /  AlkPhos  1279[H]  01-20    LIVER FUNCTIONS - ( 20 Jan 2025 05:38 )  Alb: 1.5 g/dL / Pro: 4.9 g/dL / ALK PHOS: 1279 U/L / ALT: 64 U/L / AST: 194 U/L / GGT: x           PT/INR - ( 19 Jan 2025 05:15 )   PT: 15.7 sec;   INR: 1.35 ratio         PTT - ( 20 Jan 2025 05:38 )  PTT:116.3 sec    Culture - Fungal, Bronchial (collected 17 Jan 2025 16:40)  Source: Bronchial  Preliminary Report (20 Jan 2025 10:44):    No growth    Culture - Bronchial (collected 17 Jan 2025 16:40)  Source: Bronchial  Gram Stain (18 Jan 2025 06:59):    Few polymorphonuclear leukocytes seen per low power field    Rare Squamous epithelial cells seen per low power field    Rare Gram positive cocci in pairs seen per oil power field  Preliminary Report (19 Jan 2025 18:06):    Rare Pseudomonas aeruginosa    Commensal merline consistent with body site                                    RADIOLOGY & ADDITIONAL STUDIES:   [facility]    Herrera Oneil NP    [address] [place] [address]  [phone]      Chief Complaint:  Patient is a 79y old  Female who presents with a chief complaint of transferred from [facility] s/p grahm patch repair (20 Jan 2025 12:05)      HPI/ 24 hr events:   Patient seen and examined at bedside, spouse present  Remains intubated/sedated   As per nursing staff, patient with liquid/brown output in rectal tube  No overt signs of GI bleeding noted   No acute GI events overnight  AM labs reviewed       REVIEW OF SYSTEMS:   General: Negative  HEENT: Negative  CV: Negative  Respiratory: Negative  GI: See HPI  : Negative  MSK: Negative  Hematologic: Negative  Skin: Negative    MEDICATIONS:   MEDICATIONS  (STANDING):  albuterol/ipratropium for Nebulization 3 milliLiter(s) Nebulizer every 6 hours  buDESOnide    Inhalation Suspension 0.5 milliGRAM(s) Inhalation two times a day  caspofungin IVPB 50 milliGRAM(s) IV Intermittent every 24 hours  caspofungin IVPB      chlorhexidine 0.12% Liquid 15 milliLiter(s) Oral Mucosa every 12 hours  chlorhexidine 2% Cloths 1 Application(s) Topical <User Schedule>  dexMEDEtomidine Infusion 1.2 MICROgram(s)/kG/Hr (15.6 mL/Hr) IV Continuous <Continuous>  dextrose 5%. 1000 milliLiter(s) (100 mL/Hr) IV Continuous <Continuous>  dextrose 5%. 1000 milliLiter(s) (50 mL/Hr) IV Continuous <Continuous>  dextrose 5%. 1000 milliLiter(s) (50 mL/Hr) IV Continuous <Continuous>  dextrose 50% Injectable 25 Gram(s) IV Push once  dextrose 50% Injectable 12.5 Gram(s) IV Push once  dextrose 50% Injectable 25 Gram(s) IV Push once  dextrose Oral Gel 15 Gram(s) Oral once  fentaNYL   Infusion. 0.501 MICROgram(s)/kG/Hr (2.6 mL/Hr) IV Continuous <Continuous>  glucagon  Injectable 1 milliGRAM(s) IntraMuscular once  guaifenesin/dextromethorphan Oral Liquid 10 milliLiter(s) Oral every 6 hours  heparin  Infusion.  Unit(s)/Hr (10 mL/Hr) IV Continuous <Continuous>  meropenem  IVPB 500 milliGRAM(s) IV Intermittent every 12 hours  pantoprazole   Suspension 40 milliGRAM(s) Oral two times a day    MEDICATIONS  (PRN):  acetaminophen   Oral Liquid .. 650 milliGRAM(s) Oral every 6 hours PRN Temp greater or equal to 38C (100.4F), Mild Pain (1 - 3), Moderate Pain (4 - 6), Severe Pain (7 - 10)  heparin   Injectable 4000 Unit(s) IV Push every 6 hours PRN For aPTT less than 40  heparin   Injectable 2000 Unit(s) IV Push every 6 hours PRN For aPTT between 40 - 57  labetalol Injectable 10 milliGRAM(s) IV Push daily PRN SBP >160  midazolam Injectable 2 milliGRAM(s) IV Push daily PRN discomfort      ALLERGIES:   Allergies    penicillin (Other)  minocycline (Other)  Enbrel (Unknown)  IV Contrast (Hives; Rash)  Zofran (Other)  Levaquin (Other)    Intolerances        VITAL SIGNS:   Vital Signs Last 24 Hrs  T(C): 38 (20 Jan 2025 12:00), Max: 38.6 (20 Jan 2025 08:09)  T(F): 100.4 (20 Jan 2025 12:00), Max: 101.5 (20 Jan 2025 08:09)  HR: 97 (20 Jan 2025 11:00) (79 - 125)  BP: 103/54 (20 Jan 2025 11:00) (103/54 - 151/51)  BP(mean): 67 (20 Jan 2025 11:00) (54 - 125)  RR: 35 (20 Jan 2025 11:00) (22 - 44)  SpO2: 94% (20 Jan 2025 11:00) (89% - 99%)    Parameters below as of 20 Jan 2025 08:30  Patient On (Oxygen Delivery Method): ventilator      I&O's Summary    19 Jan 2025 07:01  -  20 Jan 2025 07:00  --------------------------------------------------------  IN: 2341.3 mL / OUT: 780 mL / NET: 1561.3 mL    20 Jan 2025 07:01  -  20 Jan 2025 12:28  --------------------------------------------------------  IN: 289.8 mL / OUT: 25 mL / NET: 264.8 mL        PHYSICAL EXAM:   GENERAL:  No acute distress  HEENT:  NC/AT, intubated   CHEST:  No increased effort  HEART:  Regular rate  ABDOMEN:  Soft, no non-verbal signs of pain, non-distended, positive bowel sounds, +rectal tube  EXTREMITIES: No cyanosis  SKIN:  Warm, dry  NEURO:  Calm    LABS:                        7.1    12.99 )-----------( 75       ( 20 Jan 2025 05:38 )             22.2     01-20    145  |  113[H]  |  78[H]  ----------------------------<  56[L]  4.6   |  21[L]  |  2.90[H]    Ca    8.8      20 Jan 2025 05:38  Phos  7.3     01-20  Mg     2.4     01-20    TPro  4.9[L]  /  Alb  1.5[L]  /  TBili  0.9  /  DBili  x   /  AST  194[H]  /  ALT  64  /  AlkPhos  1279[H]  01-20    LIVER FUNCTIONS - ( 20 Jan 2025 05:38 )  Alb: 1.5 g/dL / Pro: 4.9 g/dL / ALK PHOS: 1279 U/L / ALT: 64 U/L / AST: 194 U/L / GGT: x           PT/INR - ( 19 Jan 2025 05:15 )   PT: 15.7 sec;   INR: 1.35 ratio         PTT - ( 20 Jan 2025 05:38 )  PTT:116.3 sec    Culture - Fungal, Bronchial (collected 17 Jan 2025 16:40)  Source: Bronchial  Preliminary Report (20 Jan 2025 10:44):    No growth    Culture - Bronchial (collected 17 Jan 2025 16:40)  Source: Bronchial  Gram Stain (18 Jan 2025 06:59):    Few polymorphonuclear leukocytes seen per low power field    Rare Squamous epithelial cells seen per low power field    Rare Gram positive cocci in pairs seen per oil power field  Preliminary Report (19 Jan 2025 18:06):    Rare Pseudomonas aeruginosa    Commensal merline consistent with body site                                    RADIOLOGY & ADDITIONAL STUDIES:

## 2025-01-20 NOTE — PROGRESS NOTE ADULT - SUBJECTIVE AND OBJECTIVE BOX
[facility]                                                       Dr. Glass, Dr. Loyd, Dr. Cristobal, Dr. Ballesteros, Dr. Garcia, Dr. Solorzano                                      [facility], [address]                                                 [address] [address]                                      Ph: [phone]  Fax: [phone]                                         Ph: [phone]  Fax: [phone]      Patient is a 79y old  Female who presents with a chief complaint of transferred from [place] s/p grahm patch repair (20 Jan 2025 11:44)    Patient seen in follow up for ESPERANZA.        PAST MEDICAL HISTORY:  Asthma    HLD (hyperlipidemia)    Rheumatoid arthritis    TIA (transient ischemic attack)    Esophageal reflux    Lung cancer    Chronic obstructive pulmonary disease, unspecified COPD type    Hiatal hernia with GERD    Essential hypertension    Hyperlipidemia, unspecified hyperlipidemia type    Calculus of gallbladder with chronic cholecystitis without obstruction    Childhood asthma    Stage 3 chronic kidney disease    Carotid artery plaque    IBS (irritable bowel syndrome)    Psoriatic arthritis      MEDICATIONS  (STANDING):  albuterol/ipratropium for Nebulization 3 milliLiter(s) Nebulizer every 6 hours  buDESOnide    Inhalation Suspension 0.5 milliGRAM(s) Inhalation two times a day  caspofungin IVPB 50 milliGRAM(s) IV Intermittent every 24 hours  caspofungin IVPB      chlorhexidine 0.12% Liquid 15 milliLiter(s) Oral Mucosa every 12 hours  chlorhexidine 2% Cloths 1 Application(s) Topical <User Schedule>  dexMEDEtomidine Infusion 1.2 MICROgram(s)/kG/Hr (15.6 mL/Hr) IV Continuous <Continuous>  dextrose 5%. 1000 milliLiter(s) (100 mL/Hr) IV Continuous <Continuous>  dextrose 5%. 1000 milliLiter(s) (50 mL/Hr) IV Continuous <Continuous>  dextrose 5%. 1000 milliLiter(s) (50 mL/Hr) IV Continuous <Continuous>  dextrose 50% Injectable 25 Gram(s) IV Push once  dextrose 50% Injectable 12.5 Gram(s) IV Push once  dextrose 50% Injectable 25 Gram(s) IV Push once  dextrose Oral Gel 15 Gram(s) Oral once  fentaNYL   Infusion. 0.501 MICROgram(s)/kG/Hr (2.6 mL/Hr) IV Continuous <Continuous>  glucagon  Injectable 1 milliGRAM(s) IntraMuscular once  guaifenesin/dextromethorphan Oral Liquid 10 milliLiter(s) Oral every 6 hours  heparin  Infusion.  Unit(s)/Hr (10 mL/Hr) IV Continuous <Continuous>  meropenem  IVPB 500 milliGRAM(s) IV Intermittent every 12 hours  pantoprazole   Suspension 40 milliGRAM(s) Oral two times a day    MEDICATIONS  (PRN):  acetaminophen   Oral Liquid .. 650 milliGRAM(s) Oral every 6 hours PRN Temp greater or equal to 38C (100.4F), Mild Pain (1 - 3), Moderate Pain (4 - 6), Severe Pain (7 - 10)  heparin   Injectable 4000 Unit(s) IV Push every 6 hours PRN For aPTT less than 40  heparin   Injectable 2000 Unit(s) IV Push every 6 hours PRN For aPTT between 40 - 57  labetalol Injectable 10 milliGRAM(s) IV Push daily PRN SBP >160  midazolam Injectable 2 milliGRAM(s) IV Push daily PRN discomfort    T(C): 38.4 (01-20-25 @ 11:00), Max: 38.6 (01-20-25 @ 08:09)  HR: 97 (01-20-25 @ 11:00) (69 - 125)  BP: 103/54 (01-20-25 @ 11:00) (93/55 - 180/66)  RR: 35 (01-20-25 @ 11:00) (12 - 53)  SpO2: 94% (01-20-25 @ 11:00) (89% - 100%)  Wt(kg): --  I&O's Detail    19 Jan 2025 07:01  -  20 Jan 2025 07:00  --------------------------------------------------------  IN:    Dexmedetomidine: 468 mL    Enteral Tube Flush: 120 mL    FentaNYL: 10.1 mL    FentaNYL: 120.1 mL    Heparin Infusion: 144 mL    IV PiggyBack: 100 mL    IV PiggyBack: 250 mL    IV PiggyBack: 100 mL    Ketamine: 69.1 mL    Nepro with Carb Steady: 960 mL  Total IN: 2341.3 mL    OUT:    Indwelling Catheter - Urethral (mL): 80 mL    Rectal Tube (mL): 700 mL  Total OUT: 780 mL    Total NET: 1561.3 mL      20 Jan 2025 07:01  -  20 Jan 2025 12:05  --------------------------------------------------------  IN:    Dexmedetomidine: 78 mL    FentaNYL: 30.8 mL    Heparin Infusion: 21 mL    Nepro with Carb Steady: 160 mL  Total IN: 289.8 mL    OUT:    Indwelling Catheter - Urethral (mL): 25 mL  Total OUT: 25 mL    Total NET: 264.8 mL          PHYSICAL EXAM:  General: on vent  Respiratory: b/l air entry  Cardiovascular: S1 S2  Gastrointestinal: soft  Extremities:  edema    Mode: SIMV (Synchronized Intermittent Mandatory Ventilation), RR (machine): 30, TV (machine): 350, FiO2: 50, PEEP: 5, PS: 10, ITime: 0.9, MAP: 17, PIP: 31                          7.1    12.99 )-----------( 75       ( 20 Jan 2025 05:38 )             22.2     01-20    145  |  113[H]  |  78[H]  ----------------------------<  56[L]  4.6   |  21[L]  |  2.90[H]    Ca    8.8      20 Jan 2025 05:38  Phos  7.3     01-20  Mg     2.4     01-20    TPro  4.9[L]  /  Alb  1.5[L]  /  TBili  0.9  /  DBili  x   /  AST  194[H]  /  ALT  64  /  AlkPhos  1279[H]  01-20        LIVER FUNCTIONS - ( 20 Jan 2025 05:38 )  Alb: 1.5 g/dL / Pro: 4.9 g/dL / ALK PHOS: 1279 U/L / ALT: 64 U/L / AST: 194 U/L / GGT: x           Urinalysis Basic - ( 20 Jan 2025 05:38 )    Color: x / Appearance: x / SG: x / pH: x  Gluc: 56 mg/dL / Ketone: x  / Bili: x / Urobili: x   Blood: x / Protein: x / Nitrite: x   Leuk Esterase: x / RBC: x / WBC x   Sq Epi: x / Non Sq Epi: x / Bacteria: x      ABG - ( 19 Jan 2025 04:52 )  pH, Arterial: 7.29  pH, Blood: x     /  pCO2: 54    /  pO2: 72    / HCO3: 26    / Base Excess: -0.8  /  SaO2: 93.2              Sodium, Serum: 145 (01-20 @ 05:38)  Sodium, Serum: 144 (01-19 @ 05:15)  Sodium, Serum: 143 (01-18 @ 11:10)  Sodium, Serum: 143 (01-18 @ 03:14)    Creatinine, Serum: 2.90 (01-20 @ 05:38)  Creatinine, Serum: 2.00 (01-19 @ 05:15)  Creatinine, Serum: 2.00 (01-18 @ 11:10)  Creatinine, Serum: 1.90 (01-18 @ 03:14)  Creatinine, Serum: 2.00 (01-17 @ 06:18)  Creatinine, Serum: 1.90 (01-16 @ 23:48)    Potassium, Serum: 4.6 (01-20 @ 05:38)  Potassium, Serum: 3.6 (01-19 @ 05:15)  Potassium, Serum: 3.8 (01-18 @ 11:10)  Potassium, Serum: 3.6 (01-18 @ 03:14)    Hemoglobin: 7.1 (01-20 @ 05:38)  Hemoglobin: 7.3 (01-19 @ 05:15)  Hemoglobin: 7.0 (01-18 @ 11:10)  Hemoglobin: 7.5 (01-18 @ 03:14)

## 2025-01-20 NOTE — GOALS OF CARE CONVERSATION - ADVANCED CARE PLANNING - CONVERSATION DETAILS
Do you have Advance Directives (HCP / LV / Organ donation / Documentation of oral advance Directive):   (  x  )  yes    (      )    NO                                                                            Do you have LV - Living will :                                                                                                                ( x   )  yes    (      )   No    Do you have HCP - Health Care Proxy:                                                                                                 (  x   )  yes   (       ) N0    Do you have DNR- Do Not Resuscitate :                                                                                             ( x     )  yes  (        )  No    Do you have DNI- Do Not intubate  :                                                                                                       (      )  yes   (  x     ) No    Do you have MOLST - Medical orders for Life sustaining treatment  :                                                (      ) yes    (    x   ) No    Decision Maker :  (     ) Patient     (    x  )  HCA   (     ) Public Health Law Surrogate     (      ) Surrogate  (       ) Guardian    Goals of Care :  (      )   Complete Care     (       ) No Limitations                              (       )   Comfort Care       (       )  Hospice                               ( x     )   Limited medical Intervention / s    Medical Interventions :   (        )   CPR       (    x    )  DNR                                               (   x     )  Intubation with MV - Mechanical Ventilation  (    x  ) BIPAP/CPAP    (         )   DNI                                               (         )  Artificial Nutrition -  IVF, TPN / PPN, Tube Feeds             (   x      )   No Feeding Tube                                                (   x     ) Use Antibiotics                         (          ) No Antibiotics                                                (   x      ) Blood and Blood Products     (         )   No Blood or Blood products                                                (          )  Dialysis                                    (      x   )  No Dialysis                                                (          )  Medical Management only  (   x      )  No Invasive Interventions or Surgery  Time spent :                        (   x    ) upto 30 minutes                       (      x     )   more than 30 minutes  ACP and GOC reviewed

## 2025-01-20 NOTE — PROGRESS NOTE ADULT - PROBLEM SELECTOR PLAN 5
? etiology  multifactorial  serial CBC  PRBC prn  heme eval if necessary
? etiology  multifactorial  serial CBC  PRBC prn

## 2025-01-20 NOTE — PROGRESS NOTE ADULT - SUBJECTIVE AND OBJECTIVE BOX
[facility]  INFECTIOUS DISEASES - [address], [place] [address]  Tel: [phone]     Fax: [phone]  =======================================================    GINNY VASQUEZ [id number]    Follow up: Tmax of 101.5 this AM.     Allergies:  penicillin (Other)  minocycline (Other)  Enbrel (Unknown)  IV Contrast (Hives; Rash)  Zofran (Other)  Levaquin (Other)      Antibiotics:  acetaminophen   Oral Liquid .. 650 milliGRAM(s) Oral every 6 hours PRN  albuterol/ipratropium for Nebulization 3 milliLiter(s) Nebulizer every 6 hours  buDESOnide    Inhalation Suspension 0.5 milliGRAM(s) Inhalation two times a day  caspofungin IVPB      caspofungin IVPB 50 milliGRAM(s) IV Intermittent every 24 hours  chlorhexidine 0.12% Liquid 15 milliLiter(s) Oral Mucosa every 12 hours  chlorhexidine 2% Cloths 1 Application(s) Topical <User Schedule>  dexMEDEtomidine Infusion 1.2 MICROgram(s)/kG/Hr IV Continuous <Continuous>  dextrose 5%. 1000 milliLiter(s) IV Continuous <Continuous>  dextrose 5%. 1000 milliLiter(s) IV Continuous <Continuous>  dextrose 50% Injectable 25 Gram(s) IV Push once  dextrose 50% Injectable 12.5 Gram(s) IV Push once  dextrose 50% Injectable 25 Gram(s) IV Push once  dextrose Oral Gel 15 Gram(s) Oral once  fentaNYL   Infusion. 0.501 MICROgram(s)/kG/Hr IV Continuous <Continuous>  glucagon  Injectable 1 milliGRAM(s) IntraMuscular once  guaifenesin/dextromethorphan Oral Liquid 10 milliLiter(s) Oral every 6 hours  heparin   Injectable 4000 Unit(s) IV Push every 6 hours PRN  heparin   Injectable 2000 Unit(s) IV Push every 6 hours PRN  heparin  Infusion.  Unit(s)/Hr IV Continuous <Continuous>  labetalol Injectable 10 milliGRAM(s) IV Push daily PRN  meropenem  IVPB 500 milliGRAM(s) IV Intermittent every 12 hours  midazolam Injectable 2 milliGRAM(s) IV Push daily PRN  pantoprazole   Suspension 40 milliGRAM(s) Oral two times a day       REVIEW OF SYSTEMS:  Unable to obtain, intubated and sedated     Physical Exam:  ICU Vital Signs Last 24 Hrs  T(C): 38.6 (20 Jan 2025 08:09), Max: 38.6 (20 Jan 2025 08:09)  T(F): 101.5 (20 Jan 2025 08:09), Max: 101.5 (20 Jan 2025 08:09)  HR: 97 (20 Jan 2025 11:00) (79 - 125)  BP: 103/54 (20 Jan 2025 11:00) (103/54 - 151/51)  BP(mean): 67 (20 Jan 2025 11:00) (54 - 125)  ABP: --  ABP(mean): --  RR: 35 (20 Jan 2025 11:00) (22 - 44)  SpO2: 94% (20 Jan 2025 11:00) (89% - 99%)    O2 Parameters below as of 20 Jan 2025 08:30  Patient On (Oxygen Delivery Method): ventilator      GEN: intubated  HEENT: normocephalic and atraumatic. L IJ central line  LUNGS: intubated  HEART: Regular rate and rhythm   ABDOMEN: Soft, nondistended.    EXTREMITIES: No leg edema.  NEUROLOGIC: intubated, sedated  SKIN: Unstageable sacral wound  + blister on R forearm    Labs:  01-20    145  |  113[H]  |  78[H]  ----------------------------<  56[L]  4.6   |  21[L]  |  2.90[H]    Ca    8.8      20 Jan 2025 05:38  Phos  7.3     01-20  Mg     2.4     01-20    TPro  4.9[L]  /  Alb  1.5[L]  /  TBili  0.9  /  DBili  x   /  AST  194[H]  /  ALT  64  /  AlkPhos  1279[H]  01-20                          7.1    12.99 )-----------( 75       ( 20 Jan 2025 05:38 )             22.2     PT/INR - ( 19 Jan 2025 05:15 )   PT: 15.7 sec;   INR: 1.35 ratio         PTT - ( 20 Jan 2025 05:38 )  PTT:116.3 sec  Urinalysis Basic - ( 20 Jan 2025 05:38 )    Color: x / Appearance: x / SG: x / pH: x  Gluc: 56 mg/dL / Ketone: x  / Bili: x / Urobili: x   Blood: x / Protein: x / Nitrite: x   Leuk Esterase: x / RBC: x / WBC x   Sq Epi: x / Non Sq Epi: x / Bacteria: x      LIVER FUNCTIONS - ( 20 Jan 2025 05:38 )  Alb: 1.5 g/dL / Pro: 4.9 g/dL / ALK PHOS: 1279 U/L / ALT: 64 U/L / AST: 194 U/L / GGT: x             RECENT CULTURES:  01-17 @ 16:40 Bronchial     Rare Pseudomonas aeruginosa  Commensal merline consistent with body site    Few polymorphonuclear leukocytes seen per low power field  Rare Squamous epithelial cells seen per low power field  Rare Gram positive cocci in pairs seen per oil power field      01-14 @ 10:10 .Blood BLOOD     No growth at 5 days        01-14 @ 09:40 .Blood BLOOD     No growth at 5 days        01-13 @ 19:00 ET Tube ET Tube     Commensal merline consistent with body site    Moderate polymorphonuclear leukocytes per low power field  No Squamous epithelial cells per low power field  No organisms seen per oil power field            All imaging and data are reviewed.

## 2025-01-21 NOTE — DISCHARGE NOTE FOR THE EXPIRED PATIENT - HOSPITAL COURSE
78 yo woman with HTN, HLD, CKD, PUD, Lung Cancer s/p RUL lobectomy who presents with a perforated gastric ulcer s/p laparoscopic repair with Srinath Patch 1/9.  Postop course complicated by prolonged intubation for hypercapnic respiratory failure 2/2 aspiration, ESPERANZA 2/2 ATN, and now likely aspiration pneumonia with recurrent hypoxemic respiratory failure, septic shock, lactic acidosis, and ESPERANZA.     Progressive shock and MSOF despite aggressive therapies.    Made DNR/I 1/20/25    Passed peacefully in the presence of the staff.       was appreciative of the efforts of the staff.

## 2025-01-22 LAB — GALACTOMANNAN AG SERPL-ACNC: 4.43 INDEX — HIGH (ref 0–0.49)

## 2025-01-23 LAB
M PNEUMO DNA SPEC QL NAA+PROBE: NEGATIVE — SIGNIFICANT CHANGE UP
NON-GYNECOLOGICAL CYTOLOGY STUDY: SIGNIFICANT CHANGE UP
SPECIMEN SOURCE: SIGNIFICANT CHANGE UP

## 2025-01-24 ENCOUNTER — APPOINTMENT (OUTPATIENT)
Dept: NEPHROLOGY | Facility: CLINIC | Age: 80
End: 2025-01-24

## 2025-01-29 LAB
ALP BONE SERPL-MCNC: 55 % — SIGNIFICANT CHANGE UP (ref 14–68)
ALP FLD-CCNC: 1514 IU/L — HIGH (ref 44–121)
ALP INTEST CFR SERPL: 0 % — SIGNIFICANT CHANGE UP (ref 0–18)
ALP LIVER SERPL-CCNC: 45 % — SIGNIFICANT CHANGE UP (ref 18–85)

## 2025-02-12 LAB
CULTURE RESULTS: ABNORMAL
SPECIMEN SOURCE: SIGNIFICANT CHANGE UP

## 2025-06-19 NOTE — ED PROVIDER NOTE - RATE
2025       Mr. Erick Marion  [address]  [facility] [address]      RE: Disruptive Behavior  Griffin Ayala MD Office.         :  1958    Dear Mr. Erick Marion,    You are receiving this letter because of the phone encounters that occurred on 25. [facility] does not tolerate this type of behavior.    For all future appointments, you must respect our staff and refrain from angry outbursts and use of profanity/disruptive behavior.    At your future appointment a conduct agreement form will be given to you.  Failure to sign and comply with the agreement will result in dismissal.    Although we appreciate the opportunity to provide services to you, we must respect and accommodate the needs of our other patients and staff.  In accordance with our Dismissal Policy, if you are not able to adjust your conduct to the expectations described above, you will not be permitted to schedule an appointment with Griffin Ayala MD and the Family Medicine Department and will be instructed to seek services from another provider, group of providers or clinic.    Your health and well-being are very important to us and we sincerely hope that we can continue to provide services to you.      Sincerely,              Leona  Supervisor  [facility] [address] [address]  Dept Phone: [phone]    
72

## (undated) DEVICE — DRAPE TOWEL BLUE 17" X 24"

## (undated) DEVICE — TROCAR COVIDIEN VERSAONE BLUNT TIP HASSAN 12MM

## (undated) DEVICE — SOL IRR POUR NS 0.9% 1000ML

## (undated) DEVICE — DRSG KERLIX ROLL 4.5"

## (undated) DEVICE — DRSG DERMABOND 0.7ML

## (undated) DEVICE — DRSG CAST FIBERGLASS 4"

## (undated) DEVICE — SUT SOFSILK 2-0 30" V-20

## (undated) DEVICE — TOURNIQUET CUFF 18" DUAL PORT SINGLE BLADDER LUER LOCK (BLACK)

## (undated) DEVICE — SHEARS COVIDIEN ENDO SHEAR 5MM X 31CM W UNIPOLAR CAUTERY

## (undated) DEVICE — DRSG WEBRIL 3"

## (undated) DEVICE — DRSG WEBRIL 6"

## (undated) DEVICE — SAW BLADE RASP CONMED LINVATEC MICRO 100 OSCILLATING 5.5 X 13 X 0.4MM

## (undated) DEVICE — DRAPE 3/4 SHEET W REINFORCEMENT 56X77"

## (undated) DEVICE — VENODYNE/SCD SLEEVE CALF LARGE

## (undated) DEVICE — SUT POLYSORB 3-0 30" V-20 UNDYED

## (undated) DEVICE — PLV-SCD MACHINE: Type: DURABLE MEDICAL EQUIPMENT

## (undated) DEVICE — SYR LUER LOK 10CC

## (undated) DEVICE — DRSG KLING 6"

## (undated) DEVICE — SUT MONOCRYL 3-0 18" PS-2 UNDYED

## (undated) DEVICE — TROCAR ETHICON ENDOPATH XCEL BLADELESS 5MM X 100MM STABILITY

## (undated) DEVICE — PLV/PSP-ESU FORCEFX F8I7418A: Type: DURABLE MEDICAL EQUIPMENT

## (undated) DEVICE — SAW BLADE LINVATEC SAGITTAL MIC 9.5X25.5X0.4MM

## (undated) DEVICE — S&N VERSAJET II EXACT HANDPIECE 8MM X 45 DEGREE

## (undated) DEVICE — S&N VERSAJET II EXACT HANDPIECE 14MM X 45 DEGREE

## (undated) DEVICE — GLV 7.5 PROTEXIS (WHITE)

## (undated) DEVICE — D HELP - CLEARVIEW CLEARIFY SYSTEM

## (undated) DEVICE — SMOKE EVACUTATION SYS LAPROSCOPIC AC/PA

## (undated) DEVICE — WARMING BLANKET UPPER ADULT

## (undated) DEVICE — DRSG ADAPTIC 3 X 8"

## (undated) DEVICE — VENODYNE/SCD SLEEVE CALF BARIATRIC

## (undated) DEVICE — NDL HYPO SAFE 18G X 1.5" (PINK)

## (undated) DEVICE — DRSG CAST PLASTER 6" (BLUE)

## (undated) DEVICE — SUT POLYSORB 4-0 30" V-20 UNDYED

## (undated) DEVICE — ELCTR BOVIE TIP BLADE INSULATED 2.75" EDGE

## (undated) DEVICE — DRSG COMBINE 5X9"

## (undated) DEVICE — DRSG KLING 3"

## (undated) DEVICE — BUR MICROAIRE CARBIDE OVAL 4MM 8 FLUTE

## (undated) DEVICE — VENODYNE/SCD SLEEVE CALF MEDIUM

## (undated) DEVICE — SUT POLYSORB 2-0 30" V-20 UNDYED

## (undated) DEVICE — GLV 7 PROTEXIS (WHITE)

## (undated) DEVICE — SUT MAXON 0 27" T-12

## (undated) DEVICE — NDL HYPO SAFE 22G X 1.5" (BLACK)

## (undated) DEVICE — SUT MAXON 2-0 30" GS-22

## (undated) DEVICE — SHEARS HARMONIC ACE 5MM X 36CM CURVED TIP

## (undated) DEVICE — DRSG CURITY GAUZE SPONGE 4 X 4" 12-PLY

## (undated) DEVICE — SOL IRR BAG NS 0.9% 3000ML

## (undated) DEVICE — Device

## (undated) DEVICE — DISSECTOR ENDOSCOPIC KITTNER SINGLE TIP

## (undated) DEVICE — PACK LOWER EXTREMITY NS PLAINVI

## (undated) DEVICE — TUBING STRYKER PNEUMOSURE HI FLOW INSUFFLATOR

## (undated) DEVICE — PACK GENERAL LAPAROSCOPY

## (undated) DEVICE — SOL IRR POUR H2O 500ML

## (undated) DEVICE — TROCAR ETHICON ENDOPATH XCEL BLADELESS 11MM X 100MM STABILITY

## (undated) DEVICE — DRAPE INSTRUMENT POUCH 6.75" X 11"

## (undated) DEVICE — TROCAR ETHICON ENDOPATH XCEL UNIVERSAL SLEEVE WITH OPTIVIEW 5MM X 100MM

## (undated) DEVICE — SUT MONOSOF 4-0 18" P-12

## (undated) DEVICE — DRSG XEROFORM 1 X 8"

## (undated) DEVICE — BLADE SURGICAL #15 CARBON

## (undated) DEVICE — SUT POLYSORB 0 30" GU-46

## (undated) DEVICE — PLV/PSP-TOURNIQUET #1 3006JAEN: Type: DURABLE MEDICAL EQUIPMENT

## (undated) DEVICE — SUT POLYSORB 5-0 18" P-12 UNDYED

## (undated) DEVICE — DRSG TELFA 3 X 8

## (undated) DEVICE — TROCAR COVIDIEN ENDO CLOSE SUTURING DEVICE

## (undated) DEVICE — BLADE SCALPEL SAFETYLOCK #15

## (undated) DEVICE — SOL INJ NS 0.9% 1000ML

## (undated) DEVICE — PREP BETADINE KIT

## (undated) DEVICE — SUT SOFSILK 0 30" V-20

## (undated) DEVICE — ELCTR BOVIE PENCIL SMOKE EVACUATION